# Patient Record
Sex: FEMALE | Race: ASIAN | NOT HISPANIC OR LATINO
[De-identification: names, ages, dates, MRNs, and addresses within clinical notes are randomized per-mention and may not be internally consistent; named-entity substitution may affect disease eponyms.]

---

## 2017-11-06 ENCOUNTER — APPOINTMENT (OUTPATIENT)
Dept: ORTHOPEDIC SURGERY | Facility: CLINIC | Age: 77
End: 2017-11-06
Payer: MEDICARE

## 2017-11-06 VITALS — BODY MASS INDEX: 26.06 KG/M2 | WEIGHT: 138 LBS | HEIGHT: 61 IN

## 2017-11-06 VITALS — TEMPERATURE: 98.1 F | DIASTOLIC BLOOD PRESSURE: 60 MMHG | HEART RATE: 97 BPM | SYSTOLIC BLOOD PRESSURE: 147 MMHG

## 2017-11-06 DIAGNOSIS — Z86.39 PERSONAL HISTORY OF OTHER ENDOCRINE, NUTRITIONAL AND METABOLIC DISEASE: ICD-10-CM

## 2017-11-06 PROCEDURE — 20610 DRAIN/INJ JOINT/BURSA W/O US: CPT | Mod: RT

## 2017-11-06 PROCEDURE — 73562 X-RAY EXAM OF KNEE 3: CPT | Mod: LT

## 2017-11-06 PROCEDURE — 99204 OFFICE O/P NEW MOD 45 MIN: CPT | Mod: 25

## 2017-11-15 ENCOUNTER — FORM ENCOUNTER (OUTPATIENT)
Age: 77
End: 2017-11-15

## 2017-11-16 ENCOUNTER — OUTPATIENT (OUTPATIENT)
Dept: OUTPATIENT SERVICES | Facility: HOSPITAL | Age: 77
LOS: 1 days | End: 2017-11-16
Payer: COMMERCIAL

## 2017-11-16 DIAGNOSIS — R07.9 CHEST PAIN, UNSPECIFIED: ICD-10-CM

## 2017-11-16 PROCEDURE — 93017 CV STRESS TEST TRACING ONLY: CPT

## 2017-11-16 PROCEDURE — 93306 TTE W/DOPPLER COMPLETE: CPT | Mod: 26

## 2017-11-16 PROCEDURE — 78452 HT MUSCLE IMAGE SPECT MULT: CPT

## 2017-11-16 PROCEDURE — 93016 CV STRESS TEST SUPVJ ONLY: CPT

## 2017-11-16 PROCEDURE — 93018 CV STRESS TEST I&R ONLY: CPT

## 2017-11-16 PROCEDURE — 93306 TTE W/DOPPLER COMPLETE: CPT

## 2017-11-16 PROCEDURE — A9500: CPT

## 2017-11-16 PROCEDURE — 78452 HT MUSCLE IMAGE SPECT MULT: CPT | Mod: 26

## 2018-01-03 ENCOUNTER — APPOINTMENT (OUTPATIENT)
Dept: ELECTROPHYSIOLOGY | Facility: CLINIC | Age: 78
End: 2018-01-03
Payer: MEDICARE

## 2018-01-03 ENCOUNTER — APPOINTMENT (OUTPATIENT)
Dept: PULMONOLOGY | Facility: CLINIC | Age: 78
End: 2018-01-03
Payer: MEDICARE

## 2018-01-03 VITALS — SYSTOLIC BLOOD PRESSURE: 139 MMHG | DIASTOLIC BLOOD PRESSURE: 75 MMHG | HEART RATE: 92 BPM | OXYGEN SATURATION: 99 %

## 2018-01-03 VITALS
HEIGHT: 59 IN | SYSTOLIC BLOOD PRESSURE: 138 MMHG | HEART RATE: 101 BPM | DIASTOLIC BLOOD PRESSURE: 60 MMHG | BODY MASS INDEX: 27.72 KG/M2 | WEIGHT: 137.5 LBS | OXYGEN SATURATION: 99 %

## 2018-01-03 DIAGNOSIS — M25.561 PAIN IN RIGHT KNEE: ICD-10-CM

## 2018-01-03 DIAGNOSIS — Z92.3 PERSONAL HISTORY OF IRRADIATION: ICD-10-CM

## 2018-01-03 DIAGNOSIS — M25.562 PAIN IN RIGHT KNEE: ICD-10-CM

## 2018-01-03 DIAGNOSIS — Z87.39 PERSONAL HISTORY OF OTHER DISEASES OF THE MUSCULOSKELETAL SYSTEM AND CONNECTIVE TISSUE: ICD-10-CM

## 2018-01-03 DIAGNOSIS — Z86.39 PERSONAL HISTORY OF OTHER ENDOCRINE, NUTRITIONAL AND METABOLIC DISEASE: ICD-10-CM

## 2018-01-03 DIAGNOSIS — Z87.09 PERSONAL HISTORY OF OTHER DISEASES OF THE RESPIRATORY SYSTEM: ICD-10-CM

## 2018-01-03 DIAGNOSIS — Z85.3 PERSONAL HISTORY OF MALIGNANT NEOPLASM OF BREAST: ICD-10-CM

## 2018-01-03 PROCEDURE — 94729 DIFFUSING CAPACITY: CPT

## 2018-01-03 PROCEDURE — 99215 OFFICE O/P EST HI 40 MIN: CPT

## 2018-01-03 PROCEDURE — 94664 DEMO&/EVAL PT USE INHALER: CPT | Mod: 59

## 2018-01-03 PROCEDURE — 85018 HEMOGLOBIN: CPT | Mod: QW

## 2018-01-03 PROCEDURE — 99205 OFFICE O/P NEW HI 60 MIN: CPT | Mod: 25

## 2018-01-03 PROCEDURE — 94727 GAS DIL/WSHOT DETER LNG VOL: CPT

## 2018-01-03 PROCEDURE — 94060 EVALUATION OF WHEEZING: CPT

## 2018-01-03 RX ORDER — MOMETASONE FUROATE MONOHYDRATE 50 UG/1
50 SPRAY, METERED NASAL
Refills: 0 | Status: DISCONTINUED | COMMUNITY
End: 2018-01-03

## 2018-01-03 RX ORDER — CLARITHROMYCIN 500 MG/1
500 TABLET, FILM COATED ORAL
Refills: 0 | Status: DISCONTINUED | COMMUNITY
End: 2018-01-03

## 2018-02-12 ENCOUNTER — APPOINTMENT (OUTPATIENT)
Dept: ORTHOPEDIC SURGERY | Facility: CLINIC | Age: 78
End: 2018-02-12
Payer: MEDICARE

## 2018-02-12 VITALS
BODY MASS INDEX: 27.62 KG/M2 | WEIGHT: 137 LBS | SYSTOLIC BLOOD PRESSURE: 157 MMHG | HEART RATE: 89 BPM | DIASTOLIC BLOOD PRESSURE: 72 MMHG | HEIGHT: 59 IN | TEMPERATURE: 98.1 F

## 2018-02-12 PROCEDURE — 99214 OFFICE O/P EST MOD 30 MIN: CPT | Mod: 25

## 2018-02-12 PROCEDURE — 20610 DRAIN/INJ JOINT/BURSA W/O US: CPT | Mod: LT

## 2018-02-14 ENCOUNTER — APPOINTMENT (OUTPATIENT)
Dept: ELECTROPHYSIOLOGY | Facility: CLINIC | Age: 78
End: 2018-02-14

## 2018-05-15 ENCOUNTER — RESULT REVIEW (OUTPATIENT)
Age: 78
End: 2018-05-15

## 2018-05-21 ENCOUNTER — APPOINTMENT (OUTPATIENT)
Dept: ORTHOPEDIC SURGERY | Facility: CLINIC | Age: 78
End: 2018-05-21
Payer: MEDICARE

## 2018-05-21 VITALS
TEMPERATURE: 98.7 F | HEART RATE: 96 BPM | SYSTOLIC BLOOD PRESSURE: 171 MMHG | HEIGHT: 59 IN | WEIGHT: 135 LBS | BODY MASS INDEX: 27.21 KG/M2 | DIASTOLIC BLOOD PRESSURE: 69 MMHG

## 2018-05-21 PROCEDURE — 20610 DRAIN/INJ JOINT/BURSA W/O US: CPT | Mod: 50

## 2018-05-21 PROCEDURE — 99214 OFFICE O/P EST MOD 30 MIN: CPT | Mod: 25

## 2018-07-13 ENCOUNTER — APPOINTMENT (OUTPATIENT)
Dept: PULMONOLOGY | Facility: CLINIC | Age: 78
End: 2018-07-13

## 2018-08-13 ENCOUNTER — APPOINTMENT (OUTPATIENT)
Dept: ORTHOPEDIC SURGERY | Facility: CLINIC | Age: 78
End: 2018-08-13
Payer: MEDICARE

## 2018-08-13 PROCEDURE — 99214 OFFICE O/P EST MOD 30 MIN: CPT | Mod: 25

## 2018-08-13 PROCEDURE — 20610 DRAIN/INJ JOINT/BURSA W/O US: CPT | Mod: 50

## 2018-11-16 ENCOUNTER — OTHER (OUTPATIENT)
Age: 78
End: 2018-11-16

## 2018-11-19 ENCOUNTER — APPOINTMENT (OUTPATIENT)
Dept: ORTHOPEDIC SURGERY | Facility: CLINIC | Age: 78
End: 2018-11-19
Payer: MEDICARE

## 2018-11-19 VITALS
DIASTOLIC BLOOD PRESSURE: 69 MMHG | TEMPERATURE: 98.3 F | BODY MASS INDEX: 29.03 KG/M2 | SYSTOLIC BLOOD PRESSURE: 171 MMHG | WEIGHT: 144 LBS | HEIGHT: 59 IN | HEART RATE: 97 BPM

## 2018-11-19 PROCEDURE — 20610 DRAIN/INJ JOINT/BURSA W/O US: CPT | Mod: 50

## 2018-11-19 PROCEDURE — 99214 OFFICE O/P EST MOD 30 MIN: CPT | Mod: 25

## 2019-04-22 ENCOUNTER — APPOINTMENT (OUTPATIENT)
Dept: ORTHOPEDIC SURGERY | Facility: CLINIC | Age: 79
End: 2019-04-22
Payer: MEDICARE

## 2019-04-22 VITALS
HEART RATE: 90 BPM | WEIGHT: 138 LBS | TEMPERATURE: 98.3 F | DIASTOLIC BLOOD PRESSURE: 71 MMHG | HEIGHT: 59 IN | BODY MASS INDEX: 27.82 KG/M2 | SYSTOLIC BLOOD PRESSURE: 173 MMHG

## 2019-04-22 DIAGNOSIS — M17.12 UNILATERAL PRIMARY OSTEOARTHRITIS, LEFT KNEE: ICD-10-CM

## 2019-04-22 DIAGNOSIS — M17.11 UNILATERAL PRIMARY OSTEOARTHRITIS, RIGHT KNEE: ICD-10-CM

## 2019-04-22 PROCEDURE — 99214 OFFICE O/P EST MOD 30 MIN: CPT

## 2019-04-22 NOTE — PHYSICAL EXAM
[LE] : 5/5 motor strength in bilateral lower extremities [ALL] : dorsalis pedis, posterior tibial, femoral, popliteal, and radial 2+ and symmetric bilaterally [Normal] : Oriented to person, place, and time, insight and judgement were intact and the affect was normal [Poor Appearance] : well-appearing [Obese] : not obese [Acute Distress] : not in acute distress [de-identified] : GENERAL APPEARANCE: Well nourished and hydrated, pleasant, alert, and oriented x 3. Appears their stated age. \par HEENT: Normocephalic, extraocular eye motion intact. Nasal septum midline. Oral cavity clear. External auditory canal clear. \par RESPIRATORY: Breath sounds clear and audible in all lobes. No wheezing, No accessory muscle use.\par CARDIOVASCULAR: No apparent abnormalities. No lower leg edema. No varicosities. Pedal pulses are palpable.\par NEUROLOGIC: Sensation is normal, no muscle weakness in the upper or lower extremities.\par DERMATOLOGIC: No apparent skin lesions, moist, warm, no rash.\par SPINE: Cervical spine appears normal and moves freely; thoracic spine appears normal and moves freely; lumbosacral spine appears normal and moves freely, normal, nontender.\par MUSCULOSKELETAL: Hands, wrists, and elbows are normal and move freely, shoulders are normal and move freely. [de-identified] : Bilateral knee exam shows varus alignment, medial joint line tenderness, pain and crepitus with ROM.

## 2019-04-22 NOTE — HISTORY OF PRESENT ILLNESS
[Stable] : stable [Pain Location] : pain [4] : a current pain level of 4/10 [Walking] : walking [Intermit.] : ~He/She~ states the symptoms seem to be intermittent [Standing] : standing [Bending] : worsened by bending [Recumbency] : relieved by recumbency [Rest] : relieved by rest [de-identified] : 79 year old female presents with bilateral knee pain, R>L. She has a known diagnosis of bilateral medial compartment osteoarthritis. Today she notes that her symptoms are stable since her last visit. She continues to have pain and difficulty when ambulating, and is currently using a cane.\par She has responded well to cortisone injections in the past. However, she noted elevated blood sugar following her cortisone injections she received at her last visit. \par She reports a medical history of diabetes. [de-identified] : cortisone

## 2019-04-22 NOTE — DISCUSSION/SUMMARY
[de-identified] : 79 year old female presents with end stage medial compartmental ostearthritis. I discussed operative and non operative treatments with the patient. Based on imaging she is a candidate for a total knee replacements once she has failed and exhausted nonoperative treatment. She would like to proceed with conservative treatment at this time. \par \par In the past she has received bilateral knee cortisone injections, but noted her blood sugar was elevated after injection. She defers repeat cortisone injections today.\par \par I ordered hyaluronic acid injections for her bilateral knees.\par \par She will follow-up when hyaluronic acid injections are available in the office. \par \par \par A knee replacement means resurfacing of all 3 surfaces of the patella, the femur, and tibia with metal and plastic parts. The prosthetic parts are usually cemented into position and well outpatient range of motion from full extension to about 120° of flexion. The postoperative motion, however, is determined by multiple factors, the most important of which is preoperative motion. In general, the better motion preoperatively, the better the motion postoperatively. The operation, pending medical clearance, gently requires hospitalization of 3-4 days for one knee, 5-7 days for bilateral knee replacements. In general, we prefer to perform the procedure under spinal anesthesia with femoral nerve block and occasional single shot sciatic nerve block. We may ask a patient to give 2 units of blood for bilateral total knee arthroplasties, for one knee we institute a normogram which may include administration of preoperative Procrit. The operative procedure takes probably 1-2 hours. The operation requires a straight incision anywhere from 5-7 inches down from the knee. Postoperatively, the patient is positioned in a continuous passive motion machine within the first 24 hours and is walking the day after surgery. The first couple days are very painful and the pain medication will alleviate, but not eliminate the pain. The patient must really push hard to get range of motion. Our goal for having a person go home as that the range of motion is approximately 0-90° of flexion and that they can walk with a walker or cane. A walking aid is to be dispensed once the patient is secure enough. In general there, there is no cast or brace required with routine knee replacement. In the long term, we do not encourage our patients to run for the sake of running, although pending their preoperative status, we often allow patient to play doubles tennis or comparative activities. We also allowed them to do gentle intermediate downhill skiing if they are truly an expert skier. Biking is encouraged as well swimming. The followup periods are usually 3 weeks, 6 weeks, 3 months, and yearly intervals. Potential complications with total knee replacement included anesthetic complications and death, infection around 1%, nerve damage, by which means peroneal nerve palsy, footdrop or flapping foot with ambulation. This is particularly more apt to occur in the patient with a valgus or knock-knee deformity. The incidence can be quite high in this particular patient population. There will be areas of skin numbness, but this is not an untoward effect nor do we consider it a complication. Other potential complications include dislocation of the patella component, usually less than 2%; loosening of the tibial or femoral component is much more infrequent. Most often this occurs with infection or long-term use. Patient of extreme risk including markedly overweight patient's may be more prone to prosthetic wear. Major blood vessel damage is also extremely rare. Directly because of the anatomic proximity of the popliteal artery this could be lacerated with subsequent repair required. Be it unlikely, disruption of the popliteal artery could theoretically result in amputation. Similarly, infection could theoretically result in amputation if one were to grow out of an organism that cannot be controlled with antibiotics. General medical complications include phlebitis, for which we would prophylactically anticoagulate patients, but could still occur, and fatal pulmonary embolus which has been reported. Cardiovascular problems, such as heart attack or ischemia are always a concern with such hemodynamic changes in the blood vascular system. Other General complications are rare, but anything medicine could theoretically happen. I think the patient understands the risk benefit ratio of total knee replacement and will think about whether there like to pursue with an operation or nonoperative treatment program.

## 2019-04-22 NOTE — ADDENDUM
[FreeTextEntry1] : I, Wood Jeffrey, acted solely as a scribe for Dr. Galen Berry on this date 04/22/2019.

## 2019-09-23 ENCOUNTER — EMERGENCY (EMERGENCY)
Facility: HOSPITAL | Age: 79
LOS: 1 days | Discharge: HOME | End: 2019-09-23
Attending: STUDENT IN AN ORGANIZED HEALTH CARE EDUCATION/TRAINING PROGRAM
Payer: COMMERCIAL

## 2019-09-23 VITALS
HEART RATE: 88 BPM | OXYGEN SATURATION: 98 % | SYSTOLIC BLOOD PRESSURE: 177 MMHG | DIASTOLIC BLOOD PRESSURE: 80 MMHG | RESPIRATION RATE: 18 BRPM

## 2019-09-23 VITALS
DIASTOLIC BLOOD PRESSURE: 67 MMHG | HEIGHT: 61 IN | HEART RATE: 91 BPM | OXYGEN SATURATION: 99 % | WEIGHT: 130.07 LBS | RESPIRATION RATE: 20 BRPM | TEMPERATURE: 98 F | SYSTOLIC BLOOD PRESSURE: 185 MMHG

## 2019-09-23 LAB
ALBUMIN SERPL ELPH-MCNC: 3.9 G/DL — SIGNIFICANT CHANGE UP (ref 3.3–5.2)
ALP SERPL-CCNC: 79 U/L — SIGNIFICANT CHANGE UP (ref 40–120)
ALT FLD-CCNC: 16 U/L — SIGNIFICANT CHANGE UP
ANION GAP SERPL CALC-SCNC: 16 MMOL/L — SIGNIFICANT CHANGE UP (ref 5–17)
AST SERPL-CCNC: 18 U/L — SIGNIFICANT CHANGE UP
BASOPHILS # BLD AUTO: 0.05 K/UL — SIGNIFICANT CHANGE UP (ref 0–0.2)
BASOPHILS NFR BLD AUTO: 0.4 % — SIGNIFICANT CHANGE UP (ref 0–2)
BILIRUB SERPL-MCNC: 0.2 MG/DL — LOW (ref 0.4–2)
BUN SERPL-MCNC: 19 MG/DL — SIGNIFICANT CHANGE UP (ref 8–20)
CALCIUM SERPL-MCNC: 9 MG/DL — SIGNIFICANT CHANGE UP (ref 8.6–10.2)
CHLORIDE SERPL-SCNC: 92 MMOL/L — LOW (ref 98–107)
CO2 SERPL-SCNC: 22 MMOL/L — SIGNIFICANT CHANGE UP (ref 22–29)
CREAT SERPL-MCNC: 1.09 MG/DL — SIGNIFICANT CHANGE UP (ref 0.5–1.3)
EOSINOPHIL # BLD AUTO: 0.12 K/UL — SIGNIFICANT CHANGE UP (ref 0–0.5)
EOSINOPHIL NFR BLD AUTO: 1 % — SIGNIFICANT CHANGE UP (ref 0–6)
GLUCOSE SERPL-MCNC: 169 MG/DL — HIGH (ref 70–115)
HCT VFR BLD CALC: 35.5 % — SIGNIFICANT CHANGE UP (ref 34.5–45)
HGB BLD-MCNC: 11 G/DL — LOW (ref 11.5–15.5)
IMM GRANULOCYTES NFR BLD AUTO: 0.6 % — SIGNIFICANT CHANGE UP (ref 0–1.5)
LYMPHOCYTES # BLD AUTO: 1.38 K/UL — SIGNIFICANT CHANGE UP (ref 1–3.3)
LYMPHOCYTES # BLD AUTO: 11 % — LOW (ref 13–44)
MCHC RBC-ENTMCNC: 20.4 PG — LOW (ref 27–34)
MCHC RBC-ENTMCNC: 31 GM/DL — LOW (ref 32–36)
MCV RBC AUTO: 65.9 FL — LOW (ref 80–100)
MONOCYTES # BLD AUTO: 0.73 K/UL — SIGNIFICANT CHANGE UP (ref 0–0.9)
MONOCYTES NFR BLD AUTO: 5.8 % — SIGNIFICANT CHANGE UP (ref 2–14)
NEUTROPHILS # BLD AUTO: 10.22 K/UL — HIGH (ref 1.8–7.4)
NEUTROPHILS NFR BLD AUTO: 81.2 % — HIGH (ref 43–77)
PLATELET # BLD AUTO: 283 K/UL — SIGNIFICANT CHANGE UP (ref 150–400)
POTASSIUM SERPL-MCNC: 4.2 MMOL/L — SIGNIFICANT CHANGE UP (ref 3.5–5.3)
POTASSIUM SERPL-SCNC: 4.2 MMOL/L — SIGNIFICANT CHANGE UP (ref 3.5–5.3)
PROT SERPL-MCNC: 7.3 G/DL — SIGNIFICANT CHANGE UP (ref 6.6–8.7)
RBC # BLD: 5.39 M/UL — HIGH (ref 3.8–5.2)
RBC # FLD: 15.6 % — HIGH (ref 10.3–14.5)
SODIUM SERPL-SCNC: 130 MMOL/L — LOW (ref 135–145)
WBC # BLD: 12.58 K/UL — HIGH (ref 3.8–10.5)
WBC # FLD AUTO: 12.58 K/UL — HIGH (ref 3.8–10.5)

## 2019-09-23 PROCEDURE — 99283 EMERGENCY DEPT VISIT LOW MDM: CPT

## 2019-09-23 PROCEDURE — 36415 COLL VENOUS BLD VENIPUNCTURE: CPT

## 2019-09-23 PROCEDURE — 99284 EMERGENCY DEPT VISIT MOD MDM: CPT

## 2019-09-23 PROCEDURE — 80053 COMPREHEN METABOLIC PANEL: CPT

## 2019-09-23 PROCEDURE — 85027 COMPLETE CBC AUTOMATED: CPT

## 2019-09-23 PROCEDURE — 82962 GLUCOSE BLOOD TEST: CPT

## 2019-09-23 NOTE — ED PROVIDER NOTE - PATIENT PORTAL LINK FT
You can access the FollowMyHealth Patient Portal offered by [facility] by registering at the following website: [email or web address]. By joining [employer]’s FollowMyHealth portal, you will also be able to view your health information using other applications (apps) compatible with our system.

## 2019-09-23 NOTE — ED ADULT NURSE NOTE - OBJECTIVE STATEMENT
pt with reports of low blood sugar, sent from PMD office for BS in 30s. pt AOX3, given PO glucose en route by EMS. BS stable on arrival. no complaints, given meal tray on arrival to ED.

## 2019-09-23 NOTE — ED PROVIDER NOTE - CLINICAL SUMMARY MEDICAL DECISION MAKING FREE TEXT BOX
labs reviewed. Pt ate food and is now holding his glucose steady.  will d/c to home with instructions to f/up with pcp in 1-2 days. instructed to return for any new/concerning symptoms.

## 2019-09-23 NOTE — ED PROVIDER NOTE - OBJECTIVE STATEMENT
Pt is a 78 yo F brought in for hypoglycemia. Pt states that she was at her doctor's office when she started to feel shaky and weak and became sweaty. Pt's glucose was checked and it was 37.  Pt was given oral glucose and improved. Pt with no complaints currently. Pt states that she took her insulin today but did not eat as much as she usually does afterwards. no n/v. no cp. no sob. no abd pain.

## 2020-02-24 ENCOUNTER — EMERGENCY (EMERGENCY)
Facility: HOSPITAL | Age: 80
LOS: 1 days | Discharge: HOME | End: 2020-02-24
Attending: STUDENT IN AN ORGANIZED HEALTH CARE EDUCATION/TRAINING PROGRAM
Payer: MEDICARE

## 2020-02-24 VITALS
SYSTOLIC BLOOD PRESSURE: 211 MMHG | DIASTOLIC BLOOD PRESSURE: 67 MMHG | WEIGHT: 115.96 LBS | HEART RATE: 104 BPM | TEMPERATURE: 98 F | RESPIRATION RATE: 18 BRPM | HEIGHT: 60 IN | OXYGEN SATURATION: 97 %

## 2020-02-24 PROCEDURE — 70450 CT HEAD/BRAIN W/O DYE: CPT | Mod: 26

## 2020-02-24 PROCEDURE — 99053 MED SERV 10PM-8AM 24 HR FAC: CPT

## 2020-02-24 PROCEDURE — 99285 EMERGENCY DEPT VISIT HI MDM: CPT

## 2020-02-24 NOTE — ED ADULT NURSE NOTE - CHIEF COMPLAINT QUOTE
"My BP is high and im very dizzy." +headache and dizziness that started at 1930. pt states she is compliant with her home HTN medications. MD Golden called to triage to assess pt @9023, priority head ct.

## 2020-02-24 NOTE — ED ADULT NURSE NOTE - NSIMPLEMENTINTERV_GEN_ALL_ED
Implemented All Fall Risk Interventions:  [place] to call system. Call bell, personal items and telephone within reach. Instruct patient to call for assistance. Room bathroom lighting operational. Non-slip footwear when patient is off stretcher. Physically safe environment: no spills, clutter or unnecessary equipment. Stretcher in lowest position, wheels locked, appropriate side rails in place. Provide visual cue, wrist band, yellow gown, etc. Monitor gait and stability. Monitor for mental status changes and reorient to person, place, and time. Review medications for side effects contributing to fall risk. Reinforce activity limits and safety measures with patient and family.

## 2020-02-24 NOTE — ED ADULT NURSE NOTE - OBJECTIVE STATEMENT
Pt received with reports of episode of lightheadedness/dizziness, headache, and HTN. Pt denies any pain at this time. Pt received awake, alert, oriented x4, denies lightheadedness/dizziness at this time. Respirations appearing even, unlabored, pt denies SOB or chest pain at this time. Pt denies N/V/D. Pt with skin warm, dry, appropriate for race. Peripheral pulses palpable, brisk capillary refill, no edema observed. Pt ambulatory with cane at baseline, denies recent falls. 20g PIV in place in L AC, labs drawn and sent per orders. VS documented per flow. Pt with no apparent acute distress observed at this time. Safety maintained. Will continue to monitor.

## 2020-02-24 NOTE — ED ADULT NURSE NOTE - CAS ELECT INFOMATION PROVIDED
DC instructions/DC instructions reviewed with patient, verbalizing understanding. VS documented per flow prior to discharge. Pt with no apparent acute distress observed at time of DC. Pt ambulatory to exit. Safety maintained in ED.

## 2020-02-24 NOTE — ED ADULT TRIAGE NOTE - CHIEF COMPLAINT QUOTE
"My BP is high and im very dizzy." +headache and dizziness that started at 1930. pt states she is compliant with her home HTN medications. MD Golden called to triage to assess pt @0737, priority head ct.

## 2020-02-25 VITALS
DIASTOLIC BLOOD PRESSURE: 75 MMHG | TEMPERATURE: 98 F | OXYGEN SATURATION: 98 % | HEART RATE: 85 BPM | SYSTOLIC BLOOD PRESSURE: 180 MMHG | RESPIRATION RATE: 20 BRPM

## 2020-02-25 LAB
ALBUMIN SERPL ELPH-MCNC: 4 G/DL — SIGNIFICANT CHANGE UP (ref 3.3–5.2)
ALP SERPL-CCNC: 89 U/L — SIGNIFICANT CHANGE UP (ref 40–120)
ALT FLD-CCNC: 11 U/L — SIGNIFICANT CHANGE UP
ANION GAP SERPL CALC-SCNC: 15 MMOL/L — SIGNIFICANT CHANGE UP (ref 5–17)
ANISOCYTOSIS BLD QL: SLIGHT — SIGNIFICANT CHANGE UP
APTT BLD: 32.3 SEC — SIGNIFICANT CHANGE UP (ref 27.5–36.3)
AST SERPL-CCNC: 13 U/L — SIGNIFICANT CHANGE UP
BASOPHILS # BLD AUTO: 0.16 K/UL — SIGNIFICANT CHANGE UP (ref 0–0.2)
BASOPHILS NFR BLD AUTO: 1.7 % — SIGNIFICANT CHANGE UP (ref 0–2)
BILIRUB SERPL-MCNC: 0.2 MG/DL — LOW (ref 0.4–2)
BUN SERPL-MCNC: 18 MG/DL — SIGNIFICANT CHANGE UP (ref 8–20)
CALCIUM SERPL-MCNC: 9.7 MG/DL — SIGNIFICANT CHANGE UP (ref 8.6–10.2)
CHLORIDE SERPL-SCNC: 93 MMOL/L — LOW (ref 98–107)
CO2 SERPL-SCNC: 26 MMOL/L — SIGNIFICANT CHANGE UP (ref 22–29)
CREAT SERPL-MCNC: 0.94 MG/DL — SIGNIFICANT CHANGE UP (ref 0.5–1.3)
DACRYOCYTES BLD QL SMEAR: SLIGHT — SIGNIFICANT CHANGE UP
ELLIPTOCYTES BLD QL SMEAR: SLIGHT — SIGNIFICANT CHANGE UP
EOSINOPHIL # BLD AUTO: 0.16 K/UL — SIGNIFICANT CHANGE UP (ref 0–0.5)
EOSINOPHIL NFR BLD AUTO: 1.7 % — SIGNIFICANT CHANGE UP (ref 0–6)
GIANT PLATELETS BLD QL SMEAR: PRESENT — SIGNIFICANT CHANGE UP
GLUCOSE SERPL-MCNC: 311 MG/DL — HIGH (ref 70–99)
HCT VFR BLD CALC: 40.1 % — SIGNIFICANT CHANGE UP (ref 34.5–45)
HGB BLD-MCNC: 11.6 G/DL — SIGNIFICANT CHANGE UP (ref 11.5–15.5)
HYPOCHROMIA BLD QL: SLIGHT — SIGNIFICANT CHANGE UP
INR BLD: 0.94 RATIO — SIGNIFICANT CHANGE UP (ref 0.88–1.16)
LIDOCAIN IGE QN: 16 U/L — LOW (ref 22–51)
LYMPHOCYTES # BLD AUTO: 1.99 K/UL — SIGNIFICANT CHANGE UP (ref 1–3.3)
LYMPHOCYTES # BLD AUTO: 21.6 % — SIGNIFICANT CHANGE UP (ref 13–44)
MACROCYTES BLD QL: SLIGHT — SIGNIFICANT CHANGE UP
MAGNESIUM SERPL-MCNC: 1.9 MG/DL — SIGNIFICANT CHANGE UP (ref 1.6–2.6)
MANUAL SMEAR VERIFICATION: SIGNIFICANT CHANGE UP
MCHC RBC-ENTMCNC: 19.5 PG — LOW (ref 27–34)
MCHC RBC-ENTMCNC: 28.9 GM/DL — LOW (ref 32–36)
MCV RBC AUTO: 67.5 FL — LOW (ref 80–100)
MICROCYTES BLD QL: SLIGHT — SIGNIFICANT CHANGE UP
MONOCYTES # BLD AUTO: 0.72 K/UL — SIGNIFICANT CHANGE UP (ref 0–0.9)
MONOCYTES NFR BLD AUTO: 7.8 % — SIGNIFICANT CHANGE UP (ref 2–14)
NEUTROPHILS # BLD AUTO: 6.19 K/UL — SIGNIFICANT CHANGE UP (ref 1.8–7.4)
NEUTROPHILS NFR BLD AUTO: 67.2 % — SIGNIFICANT CHANGE UP (ref 43–77)
NT-PROBNP SERPL-SCNC: 287 PG/ML — SIGNIFICANT CHANGE UP (ref 0–300)
OVALOCYTES BLD QL SMEAR: SLIGHT — SIGNIFICANT CHANGE UP
PLAT MORPH BLD: NORMAL — SIGNIFICANT CHANGE UP
PLATELET # BLD AUTO: 252 K/UL — SIGNIFICANT CHANGE UP (ref 150–400)
POIKILOCYTOSIS BLD QL AUTO: SLIGHT — SIGNIFICANT CHANGE UP
POLYCHROMASIA BLD QL SMEAR: SLIGHT — SIGNIFICANT CHANGE UP
POTASSIUM SERPL-MCNC: 3.9 MMOL/L — SIGNIFICANT CHANGE UP (ref 3.5–5.3)
POTASSIUM SERPL-SCNC: 3.9 MMOL/L — SIGNIFICANT CHANGE UP (ref 3.5–5.3)
PROT SERPL-MCNC: 7.4 G/DL — SIGNIFICANT CHANGE UP (ref 6.6–8.7)
PROTHROM AB SERPL-ACNC: 10.6 SEC — SIGNIFICANT CHANGE UP (ref 10–12.9)
RBC # BLD: 5.94 M/UL — HIGH (ref 3.8–5.2)
RBC # FLD: 16.2 % — HIGH (ref 10.3–14.5)
RBC BLD AUTO: ABNORMAL
SCHISTOCYTES BLD QL AUTO: SLIGHT — SIGNIFICANT CHANGE UP
SODIUM SERPL-SCNC: 134 MMOL/L — LOW (ref 135–145)
TROPONIN T SERPL-MCNC: <0.01 NG/ML — SIGNIFICANT CHANGE UP (ref 0–0.06)
WBC # BLD: 9.21 K/UL — SIGNIFICANT CHANGE UP (ref 3.8–10.5)
WBC # FLD AUTO: 9.21 K/UL — SIGNIFICANT CHANGE UP (ref 3.8–10.5)

## 2020-02-25 PROCEDURE — 93005 ELECTROCARDIOGRAM TRACING: CPT

## 2020-02-25 PROCEDURE — 83880 ASSAY OF NATRIURETIC PEPTIDE: CPT

## 2020-02-25 PROCEDURE — 71045 X-RAY EXAM CHEST 1 VIEW: CPT | Mod: 26

## 2020-02-25 PROCEDURE — 71045 X-RAY EXAM CHEST 1 VIEW: CPT

## 2020-02-25 PROCEDURE — 70450 CT HEAD/BRAIN W/O DYE: CPT

## 2020-02-25 PROCEDURE — 80053 COMPREHEN METABOLIC PANEL: CPT

## 2020-02-25 PROCEDURE — 84484 ASSAY OF TROPONIN QUANT: CPT

## 2020-02-25 PROCEDURE — 85027 COMPLETE CBC AUTOMATED: CPT

## 2020-02-25 PROCEDURE — 83735 ASSAY OF MAGNESIUM: CPT

## 2020-02-25 PROCEDURE — 93010 ELECTROCARDIOGRAM REPORT: CPT

## 2020-02-25 PROCEDURE — 83690 ASSAY OF LIPASE: CPT

## 2020-02-25 PROCEDURE — 36415 COLL VENOUS BLD VENIPUNCTURE: CPT

## 2020-02-25 PROCEDURE — 99284 EMERGENCY DEPT VISIT MOD MDM: CPT | Mod: 25

## 2020-02-25 PROCEDURE — 85610 PROTHROMBIN TIME: CPT

## 2020-02-25 PROCEDURE — 85730 THROMBOPLASTIN TIME PARTIAL: CPT

## 2020-02-25 RX ORDER — ACETAMINOPHEN 500 MG
650 TABLET ORAL ONCE
Refills: 0 | Status: COMPLETED | OUTPATIENT
Start: 2020-02-25 | End: 2020-02-25

## 2020-02-25 RX ADMIN — Medication 650 MILLIGRAM(S): at 01:54

## 2020-02-25 NOTE — ED PROVIDER NOTE - OBJECTIVE STATEMENT
79yo female with pmh of DM, HTN, HLD presents with ha. Pt states 3 hours ago with gradual onset of mild ha which has persisted. Did not take anything for ha. Pt states she's unsteady on her feet which is why she uses a cane this has been for years, not new contrary to triage note. Pt denies fevers/chills, head trauma, loc, focal neuro deficits, cp/sob/palp, cough, abd pain/n/v/d, urinary symptoms, recent travel and sick contacts.

## 2020-02-25 NOTE — ED PROVIDER NOTE - NS ED ROS FT
Review of Systems:  	•	CONSTITUTIONAL - no fever, no diaphoresis, no weight change  	•	SKIN - no rash  	•	HEMATOLOGIC - no bleeding, no bruising  	•	EYES - no eye pain, no blurred vision  	•	ENT - no change in hearing, no pain  	•	RESPIRATORY - no shortness of breath, no cough  	•	CARDIAC - no chest pain, no palpitations  	•	GI - no abd pain, no nausea, no vomiting, no diarrhea, no constipation, no bleeding  	•	GENITO-URINARY - no vaginal bleeding, no discharge, no dysuria; no hematuria  	•	ENDO - no polydypsia, no polyurea, no heat/no cold intolerance  	•	MUSCULOSKELETAL - no joint paint, no swelling, no redness  	•	NEUROLOGIC - no weakness, +headache, no anesthesia, no paresthesias

## 2020-02-25 NOTE — ED PROVIDER NOTE - PHYSICAL EXAMINATION
Const: Awake, alert and oriented. In no acute distress. Well appearing.  HEENT: NC/AT. Moist mucous membranes.  Eyes: No scleral icterus. EOMI.  Neck:. Soft and supple. Full ROM without pain.  Cardiac: Regular rate and regular rhythm. +S1/S2. Peripheral pulses 2+ and symmetric. No LE edema.  Resp: Speaking in full sentences. No evidence of respiratory distress. No wheezes, rales or rhonchi.  Abd: Soft, non-tender, non-distended. Normal bowel sounds in all 4 quadrants. No guarding or rebound.  Back: Spine midline and non-tender. No CVAT.  Skin: No rashes, abrasions or lacerations.  Lymph: No cervical lymphadenopathy.  Neuro: Awake, alert & oriented x 3. Moves all extremities symmetrically. follows commands, motor elidia upper and lower ext 5/5, sensory symm and intact CN 2-12 grossly intact, no ataxia, no nystagmus, no dysmetria, no ddk, symm elidia, no pronator drift

## 2020-02-25 NOTE — ED PROVIDER NOTE - PATIENT PORTAL LINK FT
You can access the FollowMyHealth Patient Portal offered by [facility] by registering at the following website: [email or web address]. By joining [email or web address]’s FollowMyHealth portal, you will also be able to view your health information using other applications (apps) compatible with our system.

## 2020-02-25 NOTE — ED PROVIDER NOTE - NSFOLLOWUPINSTRUCTIONS_ED_ALL_ED_FT
Hypertension    Hypertension, commonly called high blood pressure, is when the force of blood pumping through your arteries is too strong. Hypertension forces your heart to work harder to pump blood. Your arteries may become narrow or stiff. Having untreated or uncontrolled hypertension for a long period of time can cause heart attack, stroke, kidney disease, and other problems. If started on a medication, take exactly as prescribed by your health care professional. Maintain a healthy lifestyle and follow up with your primary care physician.    SEEK IMMEDIATE MEDICAL CARE IF YOU HAVE ANY OF THE FOLLOWING SYMPTOMS: severe headache, confusion, chest pain, abdominal pain, vomiting, or shortness of breath.    Headache    A headache is pain or discomfort felt around the head or neck area. The specific cause of a headache may not be found as there are many types including tension headaches, migraine headaches, and cluster headaches. Watch your condition for any changes. Things you can do to manage your pain include taking over the counter and prescription medications as instructed by your health care provider, lying down in a dark quiet room, limiting stress, getting regular sleep, and refraining from alcohol and tobacco products.    SEEK IMMEDIATE MEDICAL CARE IF YOU HAVE ANY OF THE FOLLOWING SYMPTOMS: fever, vomiting, stiff neck, loss of vision, problems with speech, muscle weakness, loss of balance, trouble walking, passing out, or confusion.    Please follow up with your primary care provider within 2 days.  Please return to ED for any concerning symptoms.

## 2020-02-25 NOTE — ED PROVIDER NOTE - CLINICAL SUMMARY MEDICAL DECISION MAKING FREE TEXT BOX
79yo female with pmh of DM, HTN, HLD presents with ha, pt with HTN which started to improve with no intervention due to symptoms and BP concern for ICH, CTH negative, pt neuro intact, no evidence of end organ damage on labs, pt to follow up with pmd, continue meds

## 2020-10-28 ENCOUNTER — APPOINTMENT (OUTPATIENT)
Dept: PULMONOLOGY | Facility: CLINIC | Age: 80
End: 2020-10-28
Payer: MEDICARE

## 2020-10-28 VITALS
SYSTOLIC BLOOD PRESSURE: 168 MMHG | HEART RATE: 101 BPM | DIASTOLIC BLOOD PRESSURE: 80 MMHG | HEIGHT: 59 IN | WEIGHT: 138 LBS | RESPIRATION RATE: 16 BRPM | BODY MASS INDEX: 27.82 KG/M2 | OXYGEN SATURATION: 97 %

## 2020-10-28 DIAGNOSIS — Z20.828 CONTACT WITH AND (SUSPECTED) EXPOSURE TO OTHER VIRAL COMMUNICABLE DISEASES: ICD-10-CM

## 2020-10-28 PROCEDURE — 99215 OFFICE O/P EST HI 40 MIN: CPT | Mod: 25

## 2020-10-28 PROCEDURE — 99072 ADDL SUPL MATRL&STAF TM PHE: CPT

## 2020-10-28 RX ORDER — PRAMIPEXOLE DIHYDROCHLORIDE 0.12 MG/1
0.12 TABLET ORAL
Refills: 0 | Status: DISCONTINUED | COMMUNITY
End: 2020-10-28

## 2020-10-28 RX ORDER — BUDESONIDE 0.25 MG/2ML
0.25 SUSPENSION RESPIRATORY (INHALATION)
Refills: 0 | Status: DISCONTINUED | COMMUNITY
End: 2020-10-28

## 2020-10-28 RX ORDER — TAMOXIFEN CITRATE 20 MG/1
TABLET, FILM COATED ORAL
Refills: 0 | Status: DISCONTINUED | COMMUNITY
End: 2020-10-28

## 2020-10-28 NOTE — REASON FOR VISIT
[Follow-Up] : a follow-up visit [Bronchiectasis] : bronchiectasis [Shortness of Breath] : shortness of breath [Wheezing] : wheezing

## 2020-10-28 NOTE — REVIEW OF SYSTEMS
Detail Level: Zone Detail Level: Detailed [Cough] : cough [Sputum] : sputum [Dyspnea] : dyspnea [Wheezing] : wheezing [Arthralgias] : arthralgias [Diabetes] : diabetes [Negative] : Psychiatric

## 2020-10-28 NOTE — PHYSICAL EXAM
[No Acute Distress] : no acute distress [Normal Oropharynx] : normal oropharynx [Normal Appearance] : normal appearance [No Neck Mass] : no neck mass [Normal Rate/Rhythm] : normal rate/rhythm [Normal S1, S2] : normal s1, s2 [No Murmurs] : no murmurs [No Resp Distress] : no resp distress [No Acc Muscle Use] : no acc muscle use [Normal Rhythm and Effort] : normal rhythm and effort [Wheeze] : wheeze [Rhonchi] : rhonchi [No Abnormalities] : no abnormalities [Benign] : benign [No Clubbing] : no clubbing [No Cyanosis] : no cyanosis [No Edema] : no edema [FROM] : FROM [Normal Color/ Pigmentation] : normal color/ pigmentation [No Focal Deficits] : no focal deficits [Oriented x3] : oriented x3 [Normal Affect] : normal affect [TextBox_[id number]] : José Luisping

## 2020-10-28 NOTE — CONSULT LETTER
[Dear  ___] : Dear  [unfilled], [Consult Letter:] : I had the pleasure of evaluating your patient, [unfilled]. [Please see my note below.] : Please see my note below. [Consult Closing:] : Thank you very much for allowing me to participate in the care of this patient.  If you have any questions, please do not hesitate to contact me. [Sincerely,] : Sincerely, [FreeTextEntry3] : Gian Hale MD\par

## 2020-11-20 ENCOUNTER — APPOINTMENT (OUTPATIENT)
Dept: DISASTER EMERGENCY | Facility: CLINIC | Age: 80
End: 2020-11-20

## 2020-11-24 ENCOUNTER — APPOINTMENT (OUTPATIENT)
Dept: PULMONOLOGY | Facility: CLINIC | Age: 80
End: 2020-11-24

## 2021-04-11 ENCOUNTER — INPATIENT (INPATIENT)
Facility: HOSPITAL | Age: 81
LOS: 12 days | Discharge: HOME | DRG: 202 | End: 2021-04-24
Attending: FAMILY MEDICINE | Admitting: HOSPITALIST
Payer: MEDICARE

## 2021-04-11 VITALS
WEIGHT: 147.93 LBS | TEMPERATURE: 98 F | DIASTOLIC BLOOD PRESSURE: 44 MMHG | HEART RATE: 105 BPM | OXYGEN SATURATION: 94 % | SYSTOLIC BLOOD PRESSURE: 199 MMHG | RESPIRATION RATE: 24 BRPM | HEIGHT: 60 IN

## 2021-04-11 DIAGNOSIS — I48.91 UNSPECIFIED ATRIAL FIBRILLATION: ICD-10-CM

## 2021-04-11 DIAGNOSIS — R09.89 OTHER SPECIFIED SYMPTOMS AND SIGNS INVOLVING THE CIRCULATORY AND RESPIRATORY SYSTEMS: ICD-10-CM

## 2021-04-11 LAB
ALBUMIN SERPL ELPH-MCNC: 3.4 G/DL — SIGNIFICANT CHANGE UP (ref 3.3–5.2)
ALP SERPL-CCNC: 105 U/L — SIGNIFICANT CHANGE UP (ref 40–120)
ALT FLD-CCNC: 19 U/L — SIGNIFICANT CHANGE UP
ANION GAP SERPL CALC-SCNC: 10 MMOL/L — SIGNIFICANT CHANGE UP (ref 5–17)
ANISOCYTOSIS BLD QL: SIGNIFICANT CHANGE UP
AST SERPL-CCNC: 20 U/L — SIGNIFICANT CHANGE UP
BASOPHILS # BLD AUTO: 0.04 K/UL — SIGNIFICANT CHANGE UP (ref 0–0.2)
BASOPHILS NFR BLD AUTO: 0.4 % — SIGNIFICANT CHANGE UP (ref 0–2)
BILIRUB SERPL-MCNC: 0.2 MG/DL — LOW (ref 0.4–2)
BUN SERPL-MCNC: 22 MG/DL — HIGH (ref 8–20)
CALCIUM SERPL-MCNC: 9 MG/DL — SIGNIFICANT CHANGE UP (ref 8.6–10.2)
CHLORIDE SERPL-SCNC: 104 MMOL/L — SIGNIFICANT CHANGE UP (ref 98–107)
CO2 SERPL-SCNC: 27 MMOL/L — SIGNIFICANT CHANGE UP (ref 22–29)
CREAT SERPL-MCNC: 1.97 MG/DL — HIGH (ref 0.5–1.3)
D DIMER BLD IA.RAPID-MCNC: 544 NG/ML DDU — HIGH
DACRYOCYTES BLD QL SMEAR: SLIGHT — SIGNIFICANT CHANGE UP
ELLIPTOCYTES BLD QL SMEAR: SLIGHT — SIGNIFICANT CHANGE UP
EOSINOPHIL # BLD AUTO: 0.06 K/UL — SIGNIFICANT CHANGE UP (ref 0–0.5)
EOSINOPHIL NFR BLD AUTO: 0.6 % — SIGNIFICANT CHANGE UP (ref 0–6)
GLUCOSE BLDC GLUCOMTR-MCNC: 217 MG/DL — HIGH (ref 70–99)
GLUCOSE SERPL-MCNC: 174 MG/DL — HIGH (ref 70–99)
HCT VFR BLD CALC: 31.8 % — LOW (ref 34.5–45)
HGB BLD-MCNC: 9.3 G/DL — LOW (ref 11.5–15.5)
HYPOCHROMIA BLD QL: SIGNIFICANT CHANGE UP
IMM GRANULOCYTES NFR BLD AUTO: 0.6 % — SIGNIFICANT CHANGE UP (ref 0–1.5)
LYMPHOCYTES # BLD AUTO: 0.7 K/UL — LOW (ref 1–3.3)
LYMPHOCYTES # BLD AUTO: 6.8 % — LOW (ref 13–44)
MANUAL SMEAR VERIFICATION: SIGNIFICANT CHANGE UP
MCHC RBC-ENTMCNC: 19.3 PG — LOW (ref 27–34)
MCHC RBC-ENTMCNC: 29.2 GM/DL — LOW (ref 32–36)
MCV RBC AUTO: 66 FL — LOW (ref 80–100)
MICROCYTES BLD QL: SIGNIFICANT CHANGE UP
MONOCYTES # BLD AUTO: 0.54 K/UL — SIGNIFICANT CHANGE UP (ref 0–0.9)
MONOCYTES NFR BLD AUTO: 5.2 % — SIGNIFICANT CHANGE UP (ref 2–14)
NEUTROPHILS # BLD AUTO: 8.95 K/UL — HIGH (ref 1.8–7.4)
NEUTROPHILS NFR BLD AUTO: 86.4 % — HIGH (ref 43–77)
NT-PROBNP SERPL-SCNC: 1789 PG/ML — HIGH (ref 0–300)
PLAT MORPH BLD: NORMAL — SIGNIFICANT CHANGE UP
PLATELET # BLD AUTO: 270 K/UL — SIGNIFICANT CHANGE UP (ref 150–400)
POIKILOCYTOSIS BLD QL AUTO: SLIGHT — SIGNIFICANT CHANGE UP
POLYCHROMASIA BLD QL SMEAR: SLIGHT — SIGNIFICANT CHANGE UP
POTASSIUM SERPL-MCNC: 4.4 MMOL/L — SIGNIFICANT CHANGE UP (ref 3.5–5.3)
POTASSIUM SERPL-SCNC: 4.4 MMOL/L — SIGNIFICANT CHANGE UP (ref 3.5–5.3)
PROT SERPL-MCNC: 6.7 G/DL — SIGNIFICANT CHANGE UP (ref 6.6–8.7)
RBC # BLD: 4.82 M/UL — SIGNIFICANT CHANGE UP (ref 3.8–5.2)
RBC # FLD: 17.8 % — HIGH (ref 10.3–14.5)
RBC BLD AUTO: ABNORMAL
SARS-COV-2 RNA SPEC QL NAA+PROBE: SIGNIFICANT CHANGE UP
SCHISTOCYTES BLD QL AUTO: SLIGHT — SIGNIFICANT CHANGE UP
SODIUM SERPL-SCNC: 141 MMOL/L — SIGNIFICANT CHANGE UP (ref 135–145)
TROPONIN T SERPL-MCNC: 0.03 NG/ML — SIGNIFICANT CHANGE UP (ref 0–0.06)
WBC # BLD: 10.35 K/UL — SIGNIFICANT CHANGE UP (ref 3.8–10.5)
WBC # FLD AUTO: 10.35 K/UL — SIGNIFICANT CHANGE UP (ref 3.8–10.5)

## 2021-04-11 PROCEDURE — 93010 ELECTROCARDIOGRAM REPORT: CPT

## 2021-04-11 PROCEDURE — 99223 1ST HOSP IP/OBS HIGH 75: CPT

## 2021-04-11 PROCEDURE — 99291 CRITICAL CARE FIRST HOUR: CPT

## 2021-04-11 PROCEDURE — 71045 X-RAY EXAM CHEST 1 VIEW: CPT | Mod: 26

## 2021-04-11 RX ORDER — INSULIN LISPRO 100/ML
VIAL (ML) SUBCUTANEOUS
Refills: 0 | Status: DISCONTINUED | OUTPATIENT
Start: 2021-04-11 | End: 2021-04-24

## 2021-04-11 RX ORDER — SODIUM CHLORIDE 9 MG/ML
1000 INJECTION, SOLUTION INTRAVENOUS
Refills: 0 | Status: DISCONTINUED | OUTPATIENT
Start: 2021-04-11 | End: 2021-04-24

## 2021-04-11 RX ORDER — HEPARIN SODIUM 5000 [USP'U]/ML
INJECTION INTRAVENOUS; SUBCUTANEOUS
Qty: 25000 | Refills: 0 | Status: DISCONTINUED | OUTPATIENT
Start: 2021-04-11 | End: 2021-04-11

## 2021-04-11 RX ORDER — DILTIAZEM HCL 120 MG
10 CAPSULE, EXT RELEASE 24 HR ORAL ONCE
Refills: 0 | Status: COMPLETED | OUTPATIENT
Start: 2021-04-11 | End: 2021-04-11

## 2021-04-11 RX ORDER — HYDRALAZINE HCL 50 MG
50 TABLET ORAL EVERY 12 HOURS
Refills: 0 | Status: DISCONTINUED | OUTPATIENT
Start: 2021-04-11 | End: 2021-04-24

## 2021-04-11 RX ORDER — DILTIAZEM HCL 120 MG
30 CAPSULE, EXT RELEASE 24 HR ORAL ONCE
Refills: 0 | Status: COMPLETED | OUTPATIENT
Start: 2021-04-11 | End: 2021-04-11

## 2021-04-11 RX ORDER — GLUCAGON INJECTION, SOLUTION 0.5 MG/.1ML
1 INJECTION, SOLUTION SUBCUTANEOUS ONCE
Refills: 0 | Status: DISCONTINUED | OUTPATIENT
Start: 2021-04-11 | End: 2021-04-24

## 2021-04-11 RX ORDER — HEPARIN SODIUM 5000 [USP'U]/ML
5500 INJECTION INTRAVENOUS; SUBCUTANEOUS ONCE
Refills: 0 | Status: COMPLETED | OUTPATIENT
Start: 2021-04-11 | End: 2021-04-11

## 2021-04-11 RX ORDER — PANTOPRAZOLE SODIUM 20 MG/1
40 TABLET, DELAYED RELEASE ORAL
Refills: 0 | Status: DISCONTINUED | OUTPATIENT
Start: 2021-04-11 | End: 2021-04-24

## 2021-04-11 RX ORDER — IPRATROPIUM/ALBUTEROL SULFATE 18-103MCG
3 AEROSOL WITH ADAPTER (GRAM) INHALATION ONCE
Refills: 0 | Status: COMPLETED | OUTPATIENT
Start: 2021-04-11 | End: 2021-04-11

## 2021-04-11 RX ORDER — DEXTROSE 50 % IN WATER 50 %
25 SYRINGE (ML) INTRAVENOUS ONCE
Refills: 0 | Status: DISCONTINUED | OUTPATIENT
Start: 2021-04-11 | End: 2021-04-24

## 2021-04-11 RX ORDER — IPRATROPIUM/ALBUTEROL SULFATE 18-103MCG
3 AEROSOL WITH ADAPTER (GRAM) INHALATION
Refills: 0 | Status: COMPLETED | OUTPATIENT
Start: 2021-04-11 | End: 2021-04-11

## 2021-04-11 RX ORDER — HEPARIN SODIUM 5000 [USP'U]/ML
5000 INJECTION INTRAVENOUS; SUBCUTANEOUS EVERY 12 HOURS
Refills: 0 | Status: DISCONTINUED | OUTPATIENT
Start: 2021-04-11 | End: 2021-04-15

## 2021-04-11 RX ORDER — DEXTROSE 50 % IN WATER 50 %
15 SYRINGE (ML) INTRAVENOUS ONCE
Refills: 0 | Status: DISCONTINUED | OUTPATIENT
Start: 2021-04-11 | End: 2021-04-24

## 2021-04-11 RX ORDER — INSULIN GLARGINE 100 [IU]/ML
15 INJECTION, SOLUTION SUBCUTANEOUS EVERY MORNING
Refills: 0 | Status: DISCONTINUED | OUTPATIENT
Start: 2021-04-12 | End: 2021-04-12

## 2021-04-11 RX ORDER — HEPARIN SODIUM 5000 [USP'U]/ML
5500 INJECTION INTRAVENOUS; SUBCUTANEOUS EVERY 6 HOURS
Refills: 0 | Status: DISCONTINUED | OUTPATIENT
Start: 2021-04-11 | End: 2021-04-11

## 2021-04-11 RX ORDER — INSULIN GLARGINE 100 [IU]/ML
15 INJECTION, SOLUTION SUBCUTANEOUS AT BEDTIME
Refills: 0 | Status: DISCONTINUED | OUTPATIENT
Start: 2021-04-11 | End: 2021-04-12

## 2021-04-11 RX ORDER — HEPARIN SODIUM 5000 [USP'U]/ML
2500 INJECTION INTRAVENOUS; SUBCUTANEOUS EVERY 6 HOURS
Refills: 0 | Status: DISCONTINUED | OUTPATIENT
Start: 2021-04-11 | End: 2021-04-11

## 2021-04-11 RX ORDER — AZITHROMYCIN 500 MG/1
250 TABLET, FILM COATED ORAL DAILY
Refills: 0 | Status: COMPLETED | OUTPATIENT
Start: 2021-04-12 | End: 2021-04-15

## 2021-04-11 RX ORDER — MAGNESIUM SULFATE 500 MG/ML
2 VIAL (ML) INJECTION ONCE
Refills: 0 | Status: COMPLETED | OUTPATIENT
Start: 2021-04-11 | End: 2021-04-11

## 2021-04-11 RX ORDER — DEXTROSE 50 % IN WATER 50 %
12.5 SYRINGE (ML) INTRAVENOUS ONCE
Refills: 0 | Status: DISCONTINUED | OUTPATIENT
Start: 2021-04-11 | End: 2021-04-24

## 2021-04-11 RX ORDER — HEPARIN SODIUM 5000 [USP'U]/ML
5000 INJECTION INTRAVENOUS; SUBCUTANEOUS EVERY 12 HOURS
Refills: 0 | Status: DISCONTINUED | OUTPATIENT
Start: 2021-04-11 | End: 2021-04-11

## 2021-04-11 RX ORDER — AMLODIPINE BESYLATE 2.5 MG/1
5 TABLET ORAL DAILY
Refills: 0 | Status: DISCONTINUED | OUTPATIENT
Start: 2021-04-11 | End: 2021-04-13

## 2021-04-11 RX ORDER — ALBUTEROL 90 UG/1
1 AEROSOL, METERED ORAL EVERY 4 HOURS
Refills: 0 | Status: COMPLETED | OUTPATIENT
Start: 2021-04-11 | End: 2022-03-10

## 2021-04-11 RX ORDER — AZITHROMYCIN 500 MG/1
500 TABLET, FILM COATED ORAL ONCE
Refills: 0 | Status: COMPLETED | OUTPATIENT
Start: 2021-04-11 | End: 2021-04-11

## 2021-04-11 RX ORDER — TIOTROPIUM BROMIDE 18 UG/1
1 CAPSULE ORAL; RESPIRATORY (INHALATION) DAILY
Refills: 0 | Status: COMPLETED | OUTPATIENT
Start: 2021-04-11 | End: 2022-03-10

## 2021-04-11 RX ORDER — IPRATROPIUM/ALBUTEROL SULFATE 18-103MCG
3 AEROSOL WITH ADAPTER (GRAM) INHALATION EVERY 6 HOURS
Refills: 0 | Status: DISCONTINUED | OUTPATIENT
Start: 2021-04-11 | End: 2021-04-14

## 2021-04-11 RX ADMIN — AZITHROMYCIN 500 MILLIGRAM(S): 500 TABLET, FILM COATED ORAL at 19:51

## 2021-04-11 RX ADMIN — Medication 3 MILLILITER(S): at 15:30

## 2021-04-11 RX ADMIN — HEPARIN SODIUM 5500 UNIT(S): 5000 INJECTION INTRAVENOUS; SUBCUTANEOUS at 17:40

## 2021-04-11 RX ADMIN — INSULIN GLARGINE 15 UNIT(S): 100 INJECTION, SOLUTION SUBCUTANEOUS at 21:09

## 2021-04-11 RX ADMIN — Medication 50 MILLIGRAM(S): at 22:14

## 2021-04-11 RX ADMIN — Medication 3 MILLILITER(S): at 19:51

## 2021-04-11 RX ADMIN — Medication 3 MILLILITER(S): at 15:28

## 2021-04-11 RX ADMIN — Medication 50 GRAM(S): at 15:28

## 2021-04-11 RX ADMIN — Medication 30 MILLILITER(S): at 22:14

## 2021-04-11 RX ADMIN — Medication 40 MILLIGRAM(S): at 23:48

## 2021-04-11 RX ADMIN — Medication 30 MILLIGRAM(S): at 17:41

## 2021-04-11 RX ADMIN — HEPARIN SODIUM 1200 UNIT(S)/HR: 5000 INJECTION INTRAVENOUS; SUBCUTANEOUS at 17:39

## 2021-04-11 RX ADMIN — Medication 3 MILLILITER(S): at 15:29

## 2021-04-11 RX ADMIN — AMLODIPINE BESYLATE 5 MILLIGRAM(S): 2.5 TABLET ORAL at 22:13

## 2021-04-11 RX ADMIN — Medication 10 MILLIGRAM(S): at 17:41

## 2021-04-11 RX ADMIN — Medication 4: at 19:51

## 2021-04-11 RX ADMIN — Medication 125 MILLIGRAM(S): at 15:28

## 2021-04-11 NOTE — ED PROVIDER NOTE - CARE PLAN
Principal Discharge DX:	Atrial fibrillation with rapid ventricular response  Secondary Diagnosis:	Diabetes mellitus  Secondary Diagnosis:	Asthma  Secondary Diagnosis:	Hyperlipemia  Secondary Diagnosis:	Hypertension

## 2021-04-11 NOTE — H&P ADULT - NSICDXPASTMEDICALHX_GEN_ALL_CORE_FT
PAST MEDICAL HISTORY:  Asthma     Diabetes mellitus     Emphysema     Hyperlipemia     Hypertension

## 2021-04-11 NOTE — H&P ADULT - HISTORY OF PRESENT ILLNESS
81yr old female with PMH of HTN, HPL, Asthma, DM-2 presented to ER with c/o Shortness of breath since last 2days - not improving with her inhalers, She could not quantify how many times she uses inhalers but uses every other day or so. recetly she had labs done at her PMD - which showed abnormal renal test - Hence was awaiting to see nephrology in office. She was seen in ER, found to be in acute respiratory distress, with some improvement with nebulizer and steroid. She was found to be in ? AFib per ER physician however EKG reveals sinus tachycardia with HR in 110s. She was admitted for acute asthma exacerbation with possible ? aFib RVR and THERESA.   pt has been eating and drinking well. but has had recent onset of swelling in her legs.  c/o epigastric burning pain.

## 2021-04-11 NOTE — H&P ADULT - NSHPPHYSICALEXAM_GEN_ALL_CORE
PHYSICAL EXAM:    GENERAL: NAD,  well-developed, acutely ill appearing   HEAD:  Atraumatic, Normocephalic  EYES: EOMI, PERRLA, conjunctiva and sclera clear  ENMT:  Moist mucous membranes,  No lesions  NECK: Supple, No JVD  NERVOUS SYSTEM:  Alert & Oriented X3, Good concentration  CHEST/LUNG: Clear to percussion bilaterally; b/l exp wheeze+  HEART: tachycardia. Regular rate and rhythm; No murmurs  ABDOMEN: Soft, Nontender, Nondistended; Bowel sounds present  EXTREMITIES:   No clubbing, cyanosis, or 2+ pedal edema  SKIN: No rashes or lesions

## 2021-04-11 NOTE — ED PROVIDER NOTE - OBJECTIVE STATEMENT
The patient is a 81 year old female presents with SOB, cough and wheezing with history of asthma  Feels exactly same as prior asthma but her ventolin is not effective  No fever and already received both vaccines for covid  History of DM, HLD and HTN

## 2021-04-11 NOTE — ED PROVIDER NOTE - CLINICAL SUMMARY MEDICAL DECISION MAKING FREE TEXT BOX
The patient presents with wheezing SOB dyspneic and also has new onset atrial fibrillation and will admit for further evaluation

## 2021-04-11 NOTE — ED PROVIDER NOTE - CRITICAL CARE ATTENDING CONTRIBUTION TO CARE
The patient seen immediately due to critical conditions and required multiple visits to stabilize the patient

## 2021-04-11 NOTE — ED ADULT NURSE NOTE - NSIMPLEMENTINTERV_GEN_ALL_ED
Implemented All Fall with Harm Risk Interventions:  [place] to call system. Call bell, personal items and telephone within reach. Instruct patient to call for assistance. Room bathroom lighting operational. Non-slip footwear when patient is off stretcher. Physically safe environment: no spills, clutter or unnecessary equipment. Stretcher in lowest position, wheels locked, appropriate side rails in place. Provide visual cue, wrist band, yellow gown, etc. Monitor gait and stability. Monitor for mental status changes and reorient to person, place, and time. Review medications for side effects contributing to fall risk. Reinforce activity limits and safety measures with patient and family. Provide visual clues: red socks.

## 2021-04-12 LAB
A1C WITH ESTIMATED AVERAGE GLUCOSE RESULT: 8.7 % — HIGH (ref 4–5.6)
ANION GAP SERPL CALC-SCNC: 14 MMOL/L — SIGNIFICANT CHANGE UP (ref 5–17)
BUN SERPL-MCNC: 33 MG/DL — HIGH (ref 8–20)
CALCIUM SERPL-MCNC: 8.5 MG/DL — LOW (ref 8.6–10.2)
CHLORIDE SERPL-SCNC: 102 MMOL/L — SIGNIFICANT CHANGE UP (ref 98–107)
CO2 SERPL-SCNC: 23 MMOL/L — SIGNIFICANT CHANGE UP (ref 22–29)
COVID-19 SPIKE DOMAIN AB INTERP: POSITIVE
COVID-19 SPIKE DOMAIN ANTIBODY RESULT: >250 U/ML — HIGH
CREAT SERPL-MCNC: 2.25 MG/DL — HIGH (ref 0.5–1.3)
ESTIMATED AVERAGE GLUCOSE: 203 MG/DL — HIGH (ref 68–114)
GLUCOSE BLDC GLUCOMTR-MCNC: 213 MG/DL — HIGH (ref 70–99)
GLUCOSE BLDC GLUCOMTR-MCNC: 297 MG/DL — HIGH (ref 70–99)
GLUCOSE BLDC GLUCOMTR-MCNC: 338 MG/DL — HIGH (ref 70–99)
GLUCOSE SERPL-MCNC: 264 MG/DL — HIGH (ref 70–99)
HCT VFR BLD CALC: 29.7 % — LOW (ref 34.5–45)
HGB BLD-MCNC: 9.1 G/DL — LOW (ref 11.5–15.5)
IRON SATN MFR SERPL: 13 % — LOW (ref 14–50)
IRON SATN MFR SERPL: 37 UG/DL — SIGNIFICANT CHANGE UP (ref 37–145)
MAGNESIUM SERPL-MCNC: 2.7 MG/DL — HIGH (ref 1.6–2.6)
MCHC RBC-ENTMCNC: 19.9 PG — LOW (ref 27–34)
MCHC RBC-ENTMCNC: 30.6 GM/DL — LOW (ref 32–36)
MCV RBC AUTO: 64.8 FL — LOW (ref 80–100)
PLATELET # BLD AUTO: 259 K/UL — SIGNIFICANT CHANGE UP (ref 150–400)
POTASSIUM SERPL-MCNC: 4.4 MMOL/L — SIGNIFICANT CHANGE UP (ref 3.5–5.3)
POTASSIUM SERPL-SCNC: 4.4 MMOL/L — SIGNIFICANT CHANGE UP (ref 3.5–5.3)
RBC # BLD: 4.58 M/UL — SIGNIFICANT CHANGE UP (ref 3.8–5.2)
RBC # FLD: 17.5 % — HIGH (ref 10.3–14.5)
SARS-COV-2 IGG+IGM SERPL QL IA: >250 U/ML — HIGH
SARS-COV-2 IGG+IGM SERPL QL IA: POSITIVE
SODIUM SERPL-SCNC: 139 MMOL/L — SIGNIFICANT CHANGE UP (ref 135–145)
TIBC SERPL-MCNC: 283 UG/DL — SIGNIFICANT CHANGE UP (ref 220–430)
TRANSFERRIN SERPL-MCNC: 198 MG/DL — SIGNIFICANT CHANGE UP (ref 192–382)
TSH SERPL-MCNC: 0.96 UIU/ML — SIGNIFICANT CHANGE UP (ref 0.27–4.2)
WBC # BLD: 8.75 K/UL — SIGNIFICANT CHANGE UP (ref 3.8–10.5)
WBC # FLD AUTO: 8.75 K/UL — SIGNIFICANT CHANGE UP (ref 3.8–10.5)

## 2021-04-12 PROCEDURE — 93970 EXTREMITY STUDY: CPT | Mod: 26

## 2021-04-12 PROCEDURE — 71250 CT THORAX DX C-: CPT | Mod: 26

## 2021-04-12 PROCEDURE — 76775 US EXAM ABDO BACK WALL LIM: CPT | Mod: 26

## 2021-04-12 PROCEDURE — 99233 SBSQ HOSP IP/OBS HIGH 50: CPT

## 2021-04-12 RX ORDER — INSULIN GLARGINE 100 [IU]/ML
20 INJECTION, SOLUTION SUBCUTANEOUS AT BEDTIME
Refills: 0 | Status: DISCONTINUED | OUTPATIENT
Start: 2021-04-12 | End: 2021-04-13

## 2021-04-12 RX ORDER — FOLIC ACID 0.8 MG
1 TABLET ORAL DAILY
Refills: 0 | Status: DISCONTINUED | OUTPATIENT
Start: 2021-04-12 | End: 2021-04-24

## 2021-04-12 RX ORDER — INSULIN GLARGINE 100 [IU]/ML
20 INJECTION, SOLUTION SUBCUTANEOUS EVERY MORNING
Refills: 0 | Status: DISCONTINUED | OUTPATIENT
Start: 2021-04-13 | End: 2021-04-13

## 2021-04-12 RX ORDER — INSULIN LISPRO 100/ML
8 VIAL (ML) SUBCUTANEOUS
Refills: 0 | Status: DISCONTINUED | OUTPATIENT
Start: 2021-04-12 | End: 2021-04-14

## 2021-04-12 RX ADMIN — AZITHROMYCIN 250 MILLIGRAM(S): 500 TABLET, FILM COATED ORAL at 12:46

## 2021-04-12 RX ADMIN — Medication 6: at 08:12

## 2021-04-12 RX ADMIN — Medication 8 UNIT(S): at 17:50

## 2021-04-12 RX ADMIN — HEPARIN SODIUM 5000 UNIT(S): 5000 INJECTION INTRAVENOUS; SUBCUTANEOUS at 05:44

## 2021-04-12 RX ADMIN — Medication 3 MILLILITER(S): at 14:55

## 2021-04-12 RX ADMIN — Medication 8: at 12:52

## 2021-04-12 RX ADMIN — PANTOPRAZOLE SODIUM 40 MILLIGRAM(S): 20 TABLET, DELAYED RELEASE ORAL at 05:53

## 2021-04-12 RX ADMIN — Medication 3 MILLILITER(S): at 20:02

## 2021-04-12 RX ADMIN — Medication 40 MILLIGRAM(S): at 17:39

## 2021-04-12 RX ADMIN — Medication 50 MILLIGRAM(S): at 05:53

## 2021-04-12 RX ADMIN — Medication 40 MILLIGRAM(S): at 05:44

## 2021-04-12 RX ADMIN — Medication 8 UNIT(S): at 12:52

## 2021-04-12 RX ADMIN — Medication 40 MILLIGRAM(S): at 12:46

## 2021-04-12 RX ADMIN — Medication 1 MILLIGRAM(S): at 21:41

## 2021-04-12 RX ADMIN — Medication 50 MILLIGRAM(S): at 17:39

## 2021-04-12 RX ADMIN — INSULIN GLARGINE 20 UNIT(S): 100 INJECTION, SOLUTION SUBCUTANEOUS at 21:43

## 2021-04-12 RX ADMIN — INSULIN GLARGINE 15 UNIT(S): 100 INJECTION, SOLUTION SUBCUTANEOUS at 09:51

## 2021-04-12 RX ADMIN — Medication 4: at 17:50

## 2021-04-12 RX ADMIN — Medication 3 MILLILITER(S): at 09:06

## 2021-04-12 RX ADMIN — AMLODIPINE BESYLATE 5 MILLIGRAM(S): 2.5 TABLET ORAL at 05:44

## 2021-04-12 RX ADMIN — Medication 30 MILLILITER(S): at 12:46

## 2021-04-12 RX ADMIN — Medication 3 MILLILITER(S): at 02:50

## 2021-04-12 NOTE — CHART NOTE - NSCHARTNOTEFT_GEN_A_CORE
SOCIAL WORK NOTE:  AS PER MD AND SBIRT- PATIENT WAS ACCEPTED TO [facility] INPATIENT DETOX UNIT TODAY BY [facility]. AMBULANCE ARRANGED FOR 1:00 PM TRANSFER AND ALL CLINICALS FAXED TO THEM AT FAX # [phone].  ED TEAM AWARE. NEAF COMPLETED AND PLACED ON DC RACK

## 2021-04-12 NOTE — CONSULT NOTE ADULT - SUBJECTIVE AND OBJECTIVE BOX
Patient is a 81y old  Female who presents with a chief complaint of SOB not improving with inhalers, cough and abnormal renal function test (11 Apr 2021 18:35)      HPI:  81yr old female with PMH of HTN, HPL, Asthma, DM-2 presented to ER with c/o Shortness of breath since last 2days - not improving with her inhalers, She could not quantify how many times she uses inhalers but uses every other day or so. recetly she had labs done at her PMD - which showed abnormal renal test - Hence was awaiting to see nephrology in office. She was seen in ER, found to be in acute respiratory distress, with some improvement with nebulizer and steroid. She was found to be in ? AFib per ER physician however EKG reveals sinus tachycardia with HR in 110s. She was admitted for acute asthma exacerbation with possible ? aFib RVR and THERESA.   pt has been eating and drinking well. but has had recent onset of swelling in her legs.  c/o epigastric burning pain.  (11 Apr 2021 18:35)    Feb 2020 - creat was 0.9   Treatment in ER noted , she feels better   Meds PTA included Metformin , Diovan and HCTZ   Covid PCR swab negative , Antibody test positive   Elevated BP , better now   No CHF on CXR , no hydro on renal sono , no DVT on leg doppler       PAST MEDICAL & SURGICAL HISTORY:  Asthma    Diabetes mellitus    Hypertension    Emphysema    Hyperlipemia    S/P appendectomy    S/P tubal ligation    S/P knee surgery  left        FAMILY HISTORY:  No pertinent family history in first degree relatives        Social History:    MEDICATIONS  (STANDING):  ALBUTerol    90 MICROgram(s) HFA Inhaler 1 Puff(s) Inhalation every 4 hours  albuterol/ipratropium for Nebulization 3 milliLiter(s) Nebulizer every 6 hours  aluminum hydroxide/magnesium hydroxide/simethicone Suspension 30 milliLiter(s) Oral every 12 hours  amLODIPine   Tablet 5 milliGRAM(s) Oral daily  azithromycin   Tablet 250 milliGRAM(s) Oral daily  dextrose 40% Gel 15 Gram(s) Oral once  dextrose 5%. 1000 milliLiter(s) (50 mL/Hr) IV Continuous <Continuous>  dextrose 5%. 1000 milliLiter(s) (100 mL/Hr) IV Continuous <Continuous>  dextrose 50% Injectable 25 Gram(s) IV Push once  dextrose 50% Injectable 12.5 Gram(s) IV Push once  dextrose 50% Injectable 25 Gram(s) IV Push once  glucagon  Injectable 1 milliGRAM(s) IntraMuscular once  heparin   Injectable 5000 Unit(s) SubCutaneous every 12 hours  hydrALAZINE 50 milliGRAM(s) Oral every 12 hours  insulin glargine Injectable (LANTUS) 20 Unit(s) SubCutaneous at bedtime  insulin lispro (ADMELOG) corrective regimen sliding scale   SubCutaneous three times a day before meals  insulin lispro Injectable (ADMELOG) 8 Unit(s) SubCutaneous three times a day before meals  methylPREDNISolone sodium succinate Injectable 40 milliGRAM(s) IV Push every 6 hours  pantoprazole    Tablet 40 milliGRAM(s) Oral before breakfast  tiotropium 18 MICROgram(s) Capsule 1 Capsule(s) Inhalation daily    MEDICATIONS  (PRN):  Medications  -methylPREDNISolone 4 MG Oral Tablet Therapy PackAs per package directions MDD:6 Quantity: 1 Refills: 0 Ordered: [id number] BROUSSARD MD Start : [id number]  -Cefuroxime Axetil 500 MG Oral TabletTake 1 tablet twice daily Quantity: 14 Refills: 0 Ordered: [id number]-Txe-[id number] BROUSSARD MD Start : 80-Oaf-8319Yzyohb  -Euflexxa 20 MG/2ML Intra-articular Solution Prefilled Syringeinject 20mg/2ml into both knees once a week for 3 weeks Quantity: 12 Refills: 0 Ordered: 32-Omd-[id number] N.P., YEON J Start : 26-[id number]-[id number]  -hydroCHLOROthiazide TABS Quantity: 0 Refills: 0 Ordered: 6-Nov-2017 MD Active  -HumaLOG 100 UNIT/ML Subcutaneous Solution Quantity: 0 Refills: 0 Ordered: 6-Nov-2017 MD Active  -metFORMIN HCl - 500 MG Oral Tablet Quantity: 0 Refills: 0 Ordered: 6-Nov-2017 MD Active  -Montelukast Sodium TABS Quantity: 0 Refills: 0 Ordered: 6-Nov-2017 MD Active  -Omeprazole 10 MG Oral Capsule Delayed Release Quantity: 0 Refills: 0 Ordered: 6-Nov-2017 MD Active  -Diovan TABS Quantity: 0 Refills: 0 Ordered: 6-Nov-2017 MD Active  -Crestor 5 MG Oral Tablet Quantity: 0 Refills: 0 Ordered: 6-Nov-2017 MD Active  -Gabapentin 300 MG Oral Capsule Quantity: 0 Refills: 0 Ordered: 6-Nov-2017 MD Active  -Fluticasone Propionate 50 MCG/ACT Nasal Suspension Quantity: 0 Refills: 0 Ordered: 6-Nov-2017 MD Active  -Vitamin D TABS Quantity: 0 Refills: 0 Ordered: 6-Nov-2017 MD Active  -Calcium TABS Quantity: 0 Refills: 0 Ordered: 6-Nov-2017 MD Active  -amLODIPine Besylate 10 MG Oral Tablet Quantity: 0 Refills: 0 Ordered: 6-Nov-2017 MD Active  -Alendronate Sodium TABS Quantity: 0 Refills: 0 Ordered: 6-Nov-2017 MD Active  -Albuterol AERS Quantity: 0 Refills: 0 Ordered: 6-Nov-2017 MD Active  -Tudorza Pressair 400 MCG/ACT Inhalation Aerosol Powder Breath Activated Quantity: 0 Refills: 0 Ordered: 6-Nov-2017 MD Active  -Advair Diskus 250-50 MCG/DOSE MISC Quantity: 0 Refills: 0 Ordered: 3-Prince-2018 MD Active      Allergies    No Known Allergies    Intolerances        Vital Signs Last 24 Hrs  T(C): 36.6 (12 Apr 2021 11:31), Max: 36.7 (12 Apr 2021 05:36)  T(F): 97.9 (12 Apr 2021 11:31), Max: 98 (12 Apr 2021 05:36)  HR: 88 (12 Apr 2021 14:55) (86 - 110)  BP: 144/66 (12 Apr 2021 11:31) (144/66 - 178/73)  BP(mean): --  RR: 18 (12 Apr 2021 11:31) (16 - 21)  SpO2: 100% (12 Apr 2021 14:55) (95% - 100%)  Daily     Daily   I&O's Detail    I&O's Summary    PE  GENERAL: NAD,  feels better   HEAD:  Atraumatic, Normocephalic  EYES: EOMI, PERRLA, conjunctiva and sclera clear  ENMT:  Moist mucous membranes,  No lesions  NECK: Supple, No JVD  NERVOUS SYSTEM:  Alert & Oriented X3, Good concentration  CHEST/LUNG: Clear to percussion bilaterally; b/l exp wheeze+  HEART: tachycardia. Regular rate and rhythm; No murmurs  ABDOMEN: Soft, Nontender, Nondistended; Bowel sounds present  EXTREMITIES:   No clubbing, cyanosis, or 2+ pedal edema      LABS:                        9.1    8.75  )-----------( 259      ( 12 Apr 2021 07:24 )             29.7     04-12    139  |  102  |  33.0<H>  ----------------------------<  264<H>  4.4   |  23.0  |  2.25<H>    Ca    8.5<L>      12 Apr 2021 07:24  Mg     2.7     04-12    TPro  6.7  /  Alb  3.4  /  TBili  0.2<L>  /  DBili  x   /  AST  20  /  ALT  19  /  AlkPhos  105  04-11        Magnesium, Serum: 2.7 mg/dL (04-12 @ 07:24)           EXAM:  US DPLX LWR EXT VEINS COMPL BI                          PROCEDURE DATE:  04/12/2021          INTERPRETATION:  CLINICAL INFORMATION: Edema. Evaluate DVT.    COMPARISON: None available.    TECHNIQUE: Duplex sonography of the BILATERAL LOWER extremity veins with color and spectral Doppler, with and without compression.    FINDINGS:    RIGHT:  Normal compressibility of the RIGHT common femoral, femoral and popliteal veins.  Doppler examination shows normal spontaneous and phasic flow.  No RIGHT calf vein thrombosis is detected.    LEFT:  Normal compressibility of the LEFT common femoral, femoral and popliteal veins.  Doppler examination shows normal spontaneous and phasic flow.  No LEFT calf vein thrombosis is detected.    IMPRESSION:  No evidence of deep venous thrombosis in either lower extremity.                NIRAV JOSHI MD; Attending Radiologist  This document has been electronically signed. Apr 12 2021  4:32AM    PROCEDURE DATE:  04/12/2021          INTERPRETATION:  CLINICAL INFORMATION: Acute kidney insufficiency.    COMPARISON: None available.    TECHNIQUE: Sonography of the kidneys and bladder.    FINDINGS:    Right kidney: 10.5 cm. No renal mass, hydronephrosis or calculi.    Left kidney: 9.1 cm. No renal mass, hydronephrosis or calculi.    Urinary bladder: Within normal limits.    Miscellaneous: Right pleural effusion.    IMPRESSION:    No hydronephrosis.            MARIALUISA AVILA MD; Attending Radiologist  This document has been electronically signed. Apr 12 2021  4:33AM  `   EXAM:  CT CHEST                          PROCEDURE DATE:  04/12/2021          INTERPRETATION:  CLINICAL INFORMATION: Shortness of breath.    COMPARISON: 4/26/2013.    CONTRAST/COMPLICATIONS:  IV Contrast: NONE  Oral Contrast: NONE  Complications: None reported at time of study completion    PROCEDURE:  CT of the Chest was performed.  Sagittal and coronal reformats were performed.    FINDINGS:    LUNGS AND AIRWAYS: Patent central airways.  Nodular opacity in the right upper lobe measuring 5 mm (series 3:75) unchanged. 4 mm left lower lobe peripheral lung nodule (series 3:90). No lung consolidation or mass. Bibasilar subsegmental atelectasis (right greater than left) with mild bilateral lower lobe bronchiectasis.  PLEURA: Small right and trace left pleural effusion.  MEDIASTINUM AND SURINDER: No lymphadenopathy.  VESSELS: Main pulmonary artery is dilated measuring up to 3.9 cm. Thoracic aorta is normal in caliber. Atherosclerotic calcifications of the thoracic aorta, great vessels, and coronary arteries.  HEART: Heart is borderline enlarged. No pericardial effusion.  CHEST WALL AND LOWER NECK: Surgical clips in the left axilla.  VISUALIZED UPPER ABDOMEN: Small hiatus hernia.  BONES: Mild degenerative changes of the spine. Mild compression fracture of T11 which is new compared with prior exam from 2013.    IMPRESSION:  Bibasilar subsegmental atelectasis (right greater than left) with mild bilateral lower lobe bronchiectasis. Small right and trace left pleural effusion.    Dilated mainpulmonary artery which may be reflective of underlying pulmonary arterial hypertension.    Mild compression fracture of T11 which is new compared with prior exam from 4/26/2013.              JORDAN CONCEPCION DO; Attending Radiologist  This document has been electronically signed. Apr 12 2021  2:57AM

## 2021-04-12 NOTE — PROGRESS NOTE ADULT - SUBJECTIVE AND OBJECTIVE BOX
IFEOMA VIVI    [id number]    81y      Female    CC: SOB   Feels some better today however any exertion makes it worse still   on 3lit NC o2    INTERVAL HPI/OVERNIGHT EVENTS: no acute events     TELE: Sinus - HR - 80-90s    REVIEW OF SYSTEMS:    CONSTITUTIONAL: No fever,  fatigue  RESPIRATORY: No cough, wheezing,  No shortness of breath  CARDIOVASCULAR: No chest pain, palpitations  GASTROINTESTINAL: No abdominal or epigastric pain. No nausea, vomiting        Vital Signs Last 24 Hrs  T(C): 36.6 (12 Apr 2021 11:31), Max: 36.7 (12 Apr 2021 05:36)  T(F): 97.9 (12 Apr 2021 11:31), Max: 98 (12 Apr 2021 05:36)  HR: 88 (12 Apr 2021 14:55) (86 - 110)  BP: 144/66 (12 Apr 2021 11:31) (144/66 - 178/73)  BP(mean): --  RR: 18 (12 Apr 2021 11:31) (16 - 21)  SpO2: 100% (12 Apr 2021 14:55) (95% - 100%)    PHYSICAL EXAM:    GENERAL: NAD, well-groomed  HEENT: PERRL, +EOMI  NECK: soft, Supple  CHEST/LUNG: Clear to percussion bilaterally; No wheezing  HEART: S1S2+, Regular rate and rhythm; No murmurs  ABDOMEN: Soft, Nontender, Nondistended; Bowel sounds present  EXTREMITIES:  No clubbing, cyanosis, or edema  SKIN: No rashes or lesions  NEURO: AAOX3        LABS:                        9.1    8.75  )-----------( 259      ( 12 Apr 2021 07:24 )             29.7     04-12    139  |  102  |  33.0<H>  ----------------------------<  264<H>  4.4   |  23.0  |  2.25<H>    Ca    8.5<L>      12 Apr 2021 07:24  Mg     2.7     04-12    TPro  6.7  /  Alb  3.4  /  TBili  0.2<L>  /  DBili  x   /  AST  20  /  ALT  19  /  AlkPhos  105  04-11            MEDICATIONS  (STANDING):  ALBUTerol    90 MICROgram(s) HFA Inhaler 1 Puff(s) Inhalation every 4 hours  albuterol/ipratropium for Nebulization 3 milliLiter(s) Nebulizer every 6 hours  aluminum hydroxide/magnesium hydroxide/simethicone Suspension 30 milliLiter(s) Oral every 12 hours  amLODIPine   Tablet 5 milliGRAM(s) Oral daily  azithromycin   Tablet 250 milliGRAM(s) Oral daily  dextrose 40% Gel 15 Gram(s) Oral once  dextrose 5%. 1000 milliLiter(s) (50 mL/Hr) IV Continuous <Continuous>  dextrose 5%. 1000 milliLiter(s) (100 mL/Hr) IV Continuous <Continuous>  dextrose 50% Injectable 25 Gram(s) IV Push once  dextrose 50% Injectable 12.5 Gram(s) IV Push once  dextrose 50% Injectable 25 Gram(s) IV Push once  folic acid 1 milliGRAM(s) Oral daily  glucagon  Injectable 1 milliGRAM(s) IntraMuscular once  heparin   Injectable 5000 Unit(s) SubCutaneous every 12 hours  hydrALAZINE 50 milliGRAM(s) Oral every 12 hours  insulin glargine Injectable (LANTUS) 20 Unit(s) SubCutaneous at bedtime  insulin lispro (ADMELOG) corrective regimen sliding scale   SubCutaneous three times a day before meals  insulin lispro Injectable (ADMELOG) 8 Unit(s) SubCutaneous three times a day before meals  methylPREDNISolone sodium succinate Injectable 40 milliGRAM(s) IV Push every 6 hours  pantoprazole    Tablet 40 milliGRAM(s) Oral before breakfast  tiotropium 18 MICROgram(s) Capsule 1 Capsule(s) Inhalation daily    MEDICATIONS  (PRN):      RADIOLOGY & ADDITIONAL TESTS:

## 2021-04-12 NOTE — PROGRESS NOTE ADULT - ASSESSMENT
81yr old female with PMH of HTN, HPL, Asthma, DM-2 presented to ER with c/o Shortness of breath since 2days and peripheral edema admitted for possible acute asthma exacerbation and possible acute renal failure.     # Acute Asthma Exacerbation -   ct steroids iv q6   Nebs q4   monitor   supportive O2     #  ? THERESA - ? CKD -   appreicate renal recs   appears mildly volume overloaded - no ivfluids  hold valsartan, HCTZ    # HTN - ct hydralazine, amlodipine, hold valsartan - BP - elevated -monitor - increase hydralazine - once ECHO results available   # Sinus tachycardia - suspect 2/2 neb + acute asthma - Monitor    # DM-2 - takes Levemir 30-30 + humalog 10 TID - BS uncontrolled - 2/2 steroids -   will ct 20 units BID + Admelog 8 TID + SSI for now   titrate     # Peripheral edema , elevated d-dimer - b/l doppler LE - Neg for DVT   f/u ECHO     # Abdominal pain - ?gastritis - resolved with Pantop + maalox - ct pantop - while on steroids     # DVT px - Heparin sq     Dc - pending clinical improvement - PT eval - walks with cane at home

## 2021-04-13 ENCOUNTER — TRANSCRIPTION ENCOUNTER (OUTPATIENT)
Age: 81
End: 2021-04-13

## 2021-04-13 LAB
24R-OH-CALCIDIOL SERPL-MCNC: 17.1 NG/ML — LOW (ref 30–80)
ANION GAP SERPL CALC-SCNC: 16 MMOL/L — SIGNIFICANT CHANGE UP (ref 5–17)
ANISOCYTOSIS BLD QL: SLIGHT — SIGNIFICANT CHANGE UP
BASOPHILS # BLD AUTO: 0 K/UL — SIGNIFICANT CHANGE UP (ref 0–0.2)
BASOPHILS NFR BLD AUTO: 0 % — SIGNIFICANT CHANGE UP (ref 0–2)
BUN SERPL-MCNC: 49 MG/DL — HIGH (ref 8–20)
CALCIUM SERPL-MCNC: 8.2 MG/DL — LOW (ref 8.6–10.2)
CALCIUM SERPL-MCNC: 8.5 MG/DL — SIGNIFICANT CHANGE UP (ref 8.4–10.5)
CHLORIDE SERPL-SCNC: 98 MMOL/L — SIGNIFICANT CHANGE UP (ref 98–107)
CO2 SERPL-SCNC: 23 MMOL/L — SIGNIFICANT CHANGE UP (ref 22–29)
CREAT SERPL-MCNC: 2.32 MG/DL — HIGH (ref 0.5–1.3)
ELLIPTOCYTES BLD QL SMEAR: SLIGHT — SIGNIFICANT CHANGE UP
EOSINOPHIL # BLD AUTO: 0 K/UL — SIGNIFICANT CHANGE UP (ref 0–0.5)
EOSINOPHIL NFR BLD AUTO: 0 % — SIGNIFICANT CHANGE UP (ref 0–6)
GLUCOSE BLDC GLUCOMTR-MCNC: 140 MG/DL — HIGH (ref 70–99)
GLUCOSE BLDC GLUCOMTR-MCNC: 170 MG/DL — HIGH (ref 70–99)
GLUCOSE BLDC GLUCOMTR-MCNC: 221 MG/DL — HIGH (ref 70–99)
GLUCOSE BLDC GLUCOMTR-MCNC: 240 MG/DL — HIGH (ref 70–99)
GLUCOSE BLDC GLUCOMTR-MCNC: 298 MG/DL — HIGH (ref 70–99)
GLUCOSE SERPL-MCNC: 252 MG/DL — HIGH (ref 70–99)
HCT VFR BLD CALC: 30 % — LOW (ref 34.5–45)
HGB BLD-MCNC: 9.2 G/DL — LOW (ref 11.5–15.5)
HYPOCHROMIA BLD QL: SLIGHT — SIGNIFICANT CHANGE UP
LYMPHOCYTES # BLD AUTO: 0.99 K/UL — LOW (ref 1–3.3)
LYMPHOCYTES # BLD AUTO: 6.1 % — LOW (ref 13–44)
MANUAL SMEAR VERIFICATION: SIGNIFICANT CHANGE UP
MCHC RBC-ENTMCNC: 19.7 PG — LOW (ref 27–34)
MCHC RBC-ENTMCNC: 30.7 GM/DL — LOW (ref 32–36)
MCV RBC AUTO: 64.2 FL — LOW (ref 80–100)
MICROCYTES BLD QL: SLIGHT — SIGNIFICANT CHANGE UP
MONOCYTES # BLD AUTO: 0.57 K/UL — SIGNIFICANT CHANGE UP (ref 0–0.9)
MONOCYTES NFR BLD AUTO: 3.5 % — SIGNIFICANT CHANGE UP (ref 2–14)
NEUTROPHILS # BLD AUTO: 14.72 K/UL — HIGH (ref 1.8–7.4)
NEUTROPHILS NFR BLD AUTO: 90.4 % — HIGH (ref 43–77)
PLAT MORPH BLD: NORMAL — SIGNIFICANT CHANGE UP
PLATELET # BLD AUTO: 301 K/UL — SIGNIFICANT CHANGE UP (ref 150–400)
POIKILOCYTOSIS BLD QL AUTO: SLIGHT — SIGNIFICANT CHANGE UP
POTASSIUM SERPL-MCNC: 4.9 MMOL/L — SIGNIFICANT CHANGE UP (ref 3.5–5.3)
POTASSIUM SERPL-SCNC: 4.9 MMOL/L — SIGNIFICANT CHANGE UP (ref 3.5–5.3)
PTH-INTACT FLD-MCNC: 129 PG/ML — HIGH (ref 15–65)
RBC # BLD: 4.67 M/UL — SIGNIFICANT CHANGE UP (ref 3.8–5.2)
RBC # FLD: 17.6 % — HIGH (ref 10.3–14.5)
RBC BLD AUTO: ABNORMAL
SODIUM SERPL-SCNC: 137 MMOL/L — SIGNIFICANT CHANGE UP (ref 135–145)
WBC # BLD: 16.28 K/UL — HIGH (ref 3.8–10.5)
WBC # FLD AUTO: 16.28 K/UL — HIGH (ref 3.8–10.5)

## 2021-04-13 PROCEDURE — 99233 SBSQ HOSP IP/OBS HIGH 50: CPT

## 2021-04-13 PROCEDURE — 93306 TTE W/DOPPLER COMPLETE: CPT | Mod: 26

## 2021-04-13 RX ORDER — LANOLIN ALCOHOL/MO/W.PET/CERES
1 CREAM (GRAM) TOPICAL AT BEDTIME
Refills: 0 | Status: DISCONTINUED | OUTPATIENT
Start: 2021-04-13 | End: 2021-04-24

## 2021-04-13 RX ORDER — INSULIN GLARGINE 100 [IU]/ML
30 INJECTION, SOLUTION SUBCUTANEOUS AT BEDTIME
Refills: 0 | Status: DISCONTINUED | OUTPATIENT
Start: 2021-04-13 | End: 2021-04-14

## 2021-04-13 RX ORDER — INSULIN GLARGINE 100 [IU]/ML
25 INJECTION, SOLUTION SUBCUTANEOUS EVERY MORNING
Refills: 0 | Status: DISCONTINUED | OUTPATIENT
Start: 2021-04-13 | End: 2021-04-14

## 2021-04-13 RX ORDER — AMLODIPINE BESYLATE 2.5 MG/1
5 TABLET ORAL ONCE
Refills: 0 | Status: COMPLETED | OUTPATIENT
Start: 2021-04-13 | End: 2021-04-13

## 2021-04-13 RX ORDER — AMLODIPINE BESYLATE 2.5 MG/1
10 TABLET ORAL DAILY
Refills: 0 | Status: DISCONTINUED | OUTPATIENT
Start: 2021-04-14 | End: 2021-04-15

## 2021-04-13 RX ADMIN — Medication 3 MILLILITER(S): at 02:40

## 2021-04-13 RX ADMIN — Medication 40 MILLIGRAM(S): at 05:53

## 2021-04-13 RX ADMIN — Medication 1 MILLIGRAM(S): at 21:21

## 2021-04-13 RX ADMIN — Medication 4: at 13:07

## 2021-04-13 RX ADMIN — AMLODIPINE BESYLATE 5 MILLIGRAM(S): 2.5 TABLET ORAL at 05:53

## 2021-04-13 RX ADMIN — HEPARIN SODIUM 5000 UNIT(S): 5000 INJECTION INTRAVENOUS; SUBCUTANEOUS at 05:53

## 2021-04-13 RX ADMIN — Medication 8 UNIT(S): at 08:38

## 2021-04-13 RX ADMIN — Medication 1 MILLIGRAM(S): at 13:00

## 2021-04-13 RX ADMIN — Medication 3 MILLILITER(S): at 08:36

## 2021-04-13 RX ADMIN — Medication 50 MILLIGRAM(S): at 05:53

## 2021-04-13 RX ADMIN — Medication 8 UNIT(S): at 13:06

## 2021-04-13 RX ADMIN — INSULIN GLARGINE 20 UNIT(S): 100 INJECTION, SOLUTION SUBCUTANEOUS at 10:00

## 2021-04-13 RX ADMIN — Medication 40 MILLIGRAM(S): at 21:21

## 2021-04-13 RX ADMIN — INSULIN GLARGINE 30 UNIT(S): 100 INJECTION, SOLUTION SUBCUTANEOUS at 21:21

## 2021-04-13 RX ADMIN — Medication 100 MILLIGRAM(S): at 21:21

## 2021-04-13 RX ADMIN — Medication 30 MILLILITER(S): at 05:54

## 2021-04-13 RX ADMIN — Medication 6: at 08:38

## 2021-04-13 RX ADMIN — Medication 40 MILLIGRAM(S): at 00:12

## 2021-04-13 RX ADMIN — PANTOPRAZOLE SODIUM 40 MILLIGRAM(S): 20 TABLET, DELAYED RELEASE ORAL at 05:55

## 2021-04-13 RX ADMIN — AZITHROMYCIN 250 MILLIGRAM(S): 500 TABLET, FILM COATED ORAL at 13:00

## 2021-04-13 NOTE — DISCHARGE NOTE PROVIDER - PROVIDER TOKENS
PROVIDER:[TOKEN:[[id number]],FOLLOWUP:[1 week]] PROVIDER:[TOKEN:[[id number]],FOLLOWUP:[1 week]],PROVIDER:[TOKEN:[[id number]],FOLLOWUP:[1 week]],PROVIDER:[TOKEN:[[id number]],FOLLOWUP:[1-3 days]]

## 2021-04-13 NOTE — DISCHARGE NOTE PROVIDER - CARE PROVIDER_API CALL
Alvin Giles (DO)  Medicine  [address] [place] [address] [place] [address]  Phone: [phone]  Fax: [phone]  Follow Up Time: 1 week   Alvin Giles (DO)  Medicine  [address] [facility], [place] [address]  Phone: [phone]  Fax: [phone]  Follow Up Time: 1 week    Kd Bennett)  Cardiovascular Disease  [address] [facility], [address]  [place] [address]  Phone: [phone]  Fax: [phone]  Follow Up Time: 1 week    Tiana Mcdonough  INTERNAL MEDICINE  [address] [place] [address]  Phone: [phone]  Fax: [phone]  Follow Up Time: 1-3 days

## 2021-04-13 NOTE — PROGRESS NOTE ADULT - ASSESSMENT
THERESA - baseline creat 0.9 Feb 2020  No hydro on renal sono   IRDM , HTN , AEOA   Poorly controlled DM - A1-C 8.7   Diovan , HCTZ , Metformin held for now   CAROL on CXR   No hydro on renal sono   ECHO noted   Ordered 24 h urine , but she refusesdto stay     HTN - Watch with med adjustments    Patient is going to sign out AMA   She does agree to follow up with us as an outpatient in 1-2 weeks

## 2021-04-13 NOTE — DISCHARGE NOTE PROVIDER - NSDCCPCAREPLAN_GEN_ALL_CORE_FT
PRINCIPAL DISCHARGE DIAGNOSIS  Diagnosis: Asthma  Assessment and Plan of Treatment: Continue medications as prescribed. Please follow up with your primary care physician within 1 week.      SECONDARY DISCHARGE DIAGNOSES  Diagnosis: Diabetes mellitus  Assessment and Plan of Treatment: Uncontrolled and was titrating medications. A1c 8.7.    Diagnosis: Hyperlipemia  Assessment and Plan of Treatment: Continue medications as prescribed. Please follow up with your primary care physician and cardiologist within 1 week.    Diagnosis: Hypertension  Assessment and Plan of Treatment: Continue medications as prescribed. Please follow up with your primary care physician and cardiologist within 1 week.    Diagnosis: Asthma  Assessment and Plan of Treatment: Continue medications as prescribed. Please follow up with your primary care physician within 1 week.

## 2021-04-13 NOTE — PROGRESS NOTE ADULT - ASSESSMENT
81yr old female with PMH of HTN, HPL, Asthma, DM-2 presented to ER with c/o Shortness of breath since 2days and peripheral edema admitted for possible acute asthma exacerbation and possible acute renal failure.     # Acute Asthma Exacerbation -   taper steroids iv q8  Nebs q4   wean off O2 as tolerated   PT     #  ? THERESA - ? CKD   appreicate renal recs   appears mildly volume overloaded - no ivfluids   hold valsartan, HCTZ, f/u ECHO - urine studies still pending     # HTN - ct hydralazine, amlodipine, hold valsartan - BP - elevated -monitor - increase amlodipine to 10mg   # Sinus tachycardia - suspect 2/2 neb + acute asthma - resolved - ok to dc monitor     # DM-2 - takes Levemir 30-30 + humalog 10 TID - BS uncontrolled - 2/2 steroids -   will ct 20 units BID + Admelog 8 TID + SSI for now   titrate     # Peripheral edema , elevated d-dimer - b/l doppler LE - Neg for DVT - f/u ECHO   # Abdominal pain - ?gastritis - resolved with Pantop + maalox - ct pantop - while on steroids   # DVT px - Heparin sq   Dc - pending clinical improvement, ECHO, urine studies - home. 1-2 days       Family son - Tyron was updated @[phone] - who discussed with patient and patient agreeable to stay  - AMA cancelled.

## 2021-04-13 NOTE — DISCHARGE NOTE PROVIDER - NSDCFUADDAPPT_GEN_ALL_CORE_FT
Patient Information     Patient Name MRN Sex Kiesha Collins [id number] Female 1940      Telephone Encounter by Denice Adair at 2017 10:09 AM     Author:  Denice Adair  Service:  (none) Author Type:  (none)     Filed:  2017 10:28 AM  Encounter Date:  2017 Status:  Addendum     :  Luh Hassan RN (NURS- Registered Nurse)        Related Notes: Original Note by Denice Adair filed at 2017 10:13 AM            LUH - PATIENT HAD A PNEUMONIA SHOT ON 2016 AND SAID SHE THINKS SHE NEEDS A SECOND ONE. SHOULD SHE GET A 2ND ONE NOW, OR AT A DIFFERENT TIME?  PLEASE CALL PATIENT.      Denice Adair ....................  2017   10:12 AM  Pt has a order in from Dr Messer for her second pneumococcal 23 to be administered and she has waited a year between Prevnar 13 and pneumococcal for Medicare to pay for this. Pt called and will schedule appointment in the future for this. LUH HASSAN, VICKY ....................  2017   10:27 AM         
< from: TTE Echo Complete w/Doppler (11.16.17 @ 09:15) >     Summary:   1. Left ventricular ejection fraction, by visual estimation, is 70 to   75%.   2. Normal global left ventricular systolic function.   3. Mildly increased LV wall thickness.   4. Normal left ventricular internal cavity size.   5. Spectral Doppler shows impaired relaxation pattern of left   ventricular myocardial filling (Grade I diastolic dysfunction).   6. Trivial pericardial effusion.   7. Mild mitral annular calcification.   8. Estimated pulmonary artery systolic pressure is 37.8 mmHg assuming a   right atrial pressure of 3 mmHg, which is consistent with borderline   pulmonary hypertension.   9. Peak transaortic gradient equals 11.0 mmHg, mean transaortic gradient   equals 5.9 mmHg, the calculated aortic valve area equals 1.73 cm² by the   continuity equation consistent with mild aortic stenosis.    < end of copied text >

## 2021-04-13 NOTE — DISCHARGE NOTE PROVIDER - NSDCMRMEDTOKEN_GEN_ALL_CORE_FT
amLODIPine 5 mg oral tablet: 1 tab(s) orally once a day  HumaLOG 100 units/mL injectable solution: 10 unit(s) injectable 3 times a day  hydrALAZINE 50 mg oral tablet: 1 tab(s) orally 2 times a day  Levemir 100 units/mL subcutaneous solution: 30 unit(s) subcutaneous 2 times a day  valsartan 320 mg oral tablet: 1 tab(s) orally once a day   calcitriol 0.25 mcg oral capsule: 1 cap(s) orally once a day  calcium acetate 667 mg oral tablet: 1 tab(s) orally once a day   dilTIAZem 240 mg/24 hours oral capsule, extended release: 1 cap(s) orally once a day  furosemide 40 mg oral tablet: 1 tab(s) orally 2 times a day  HumaLOG 100 units/mL injectable solution: 6  injectable 3 times a day  hydrALAZINE 50 mg oral tablet: 1 tab(s) orally 2 times a day  insulin detemir 100 units/mL subcutaneous solution: 12 unit(s) subcutaneous once a day (at bedtime)  magnesium oxide 400 mg (241.3 mg elemental magnesium) oral tablet: 1 tab(s) orally once a day  metoprolol tartrate 25 mg oral tablet: 1 tab(s) orally 2 times a day  pantoprazole 40 mg oral delayed release tablet: 1 tab(s) orally once a day (before a meal)  potassium chloride 15 mEq oral tablet, extended release: 2 tab(s) orally once a day  warfarin 5 mg oral tablet: 1 tab(s) orally once a day (at bedtime) MDD:Max 1 tab a day

## 2021-04-13 NOTE — PROGRESS NOTE ADULT - SUBJECTIVE AND OBJECTIVE BOX
IFEOMA VIVI    [id number]    81y      Female    CC: SOB   Feels better today, wanted to leave AMA as she is not able to sleep, still remains in ER Isolation bed   discussed risks of leaving - high chances of readmission, renal failure     INTERVAL HPI/OVERNIGHT EVENTS: no acute events     TELE: Sinus - HR - 80-90s - no acute events     REVIEW OF SYSTEMS:    CONSTITUTIONAL: No fever,  fatigue  RESPIRATORY: No cough, wheezing  CARDIOVASCULAR: No chest pain, palpitations  GASTROINTESTINAL: No abdominal or epigastric pain. No nausea, vomiting          Vital Signs Last 24 Hrs  T(C): 36.8 (13 Apr 2021 08:46), Max: 36.8 (13 Apr 2021 08:46)  T(F): 98.3 (13 Apr 2021 08:46), Max: 98.3 (13 Apr 2021 08:46)  HR: 90 (13 Apr 2021 08:46) (89 - 96)  BP: 153/49 (13 Apr 2021 08:46) (143/62 - 157/71)  BP(mean): --  RR: --  SpO2: 98% (13 Apr 2021 04:21) (98% - 98%)        PHYSICAL EXAM:    GENERAL: NAD, well-groomed  HEENT: PERRL, +EOMI  NECK: soft, Supple  CHEST/LUNG: Clear to percussion bilaterally; No wheezing  HEART: S1S2+, Regular rate and rhythm; No murmurs  ABDOMEN: Soft, Nontender, Nondistended; Bowel sounds present  EXTREMITIES:  No clubbing, cyanosis, or edema  SKIN: No rashes or lesions  NEURO: AAOX3        LABS:                          9.2    16.28 )-----------( 301      ( 13 Apr 2021 07:00 )             30.0   04-13    137  |  98  |  49.0<H>  ----------------------------<  252<H>  4.9   |  23.0  |  2.32<H>    Ca    8.2<L>      13 Apr 2021 07:00    Mg     2.7     04-12    TPro  6.7  /  Alb  3.4  /  TBili  0.2<L>  /  DBili  x   /  AST  20  /  ALT  19  /  AlkPhos  105  04-11              MEDICATIONS  (STANDING):  ALBUTerol    90 MICROgram(s) HFA Inhaler 1 Puff(s) Inhalation every 4 hours  albuterol/ipratropium for Nebulization 3 milliLiter(s) Nebulizer every 6 hours  aluminum hydroxide/magnesium hydroxide/simethicone Suspension 30 milliLiter(s) Oral every 12 hours  amLODIPine   Tablet 5 milliGRAM(s) Oral daily  azithromycin   Tablet 250 milliGRAM(s) Oral daily  dextrose 40% Gel 15 Gram(s) Oral once  dextrose 5%. 1000 milliLiter(s) (50 mL/Hr) IV Continuous <Continuous>  dextrose 5%. 1000 milliLiter(s) (100 mL/Hr) IV Continuous <Continuous>  dextrose 50% Injectable 25 Gram(s) IV Push once  dextrose 50% Injectable 12.5 Gram(s) IV Push once  dextrose 50% Injectable 25 Gram(s) IV Push once  folic acid 1 milliGRAM(s) Oral daily  glucagon  Injectable 1 milliGRAM(s) IntraMuscular once  heparin   Injectable 5000 Unit(s) SubCutaneous every 12 hours  hydrALAZINE 50 milliGRAM(s) Oral every 12 hours  insulin glargine Injectable (LANTUS) 20 Unit(s) SubCutaneous every morning  insulin glargine Injectable (LANTUS) 20 Unit(s) SubCutaneous at bedtime  insulin lispro (ADMELOG) corrective regimen sliding scale   SubCutaneous three times a day before meals  insulin lispro Injectable (ADMELOG) 8 Unit(s) SubCutaneous three times a day before meals  melatonin 1 milliGRAM(s) Oral at bedtime  methylPREDNISolone sodium succinate Injectable 40 milliGRAM(s) IV Push every 8 hours  pantoprazole    Tablet 40 milliGRAM(s) Oral before breakfast  tiotropium 18 MICROgram(s) Capsule 1 Capsule(s) Inhalation daily    MEDICATIONS  (PRN):  guaiFENesin   Syrup  (Sugar-Free) 100 milliGRAM(s) Oral every 6 hours PRN Cough      US renal - no hydro

## 2021-04-13 NOTE — DISCHARGE NOTE PROVIDER - HOSPITAL COURSE
Pt is an 81yr old female with PMH of HTN, HPL, Asthma, DM-2 presented to ER with c/o Shortness of breath since 2days and peripheral edema admitted for acute asthma exacerbation and possible acute renal failure. CT Chest shows bibasilar subsegmental atelectasis (right greater than left) with mild bilateral lower lobe bronchiectasis. Small right and trace left pleural effusion. Started on IV steroids. PO antibiotics, Nebs q4. Tapered off supplemental oxygen.  Seen by Nephrology for ? THERESA - ? CKD. Appears mildly volume overloaded so no iv fluids given. Valsartan and HCTZ held. US Renal shows no hydronephrosis. Also with T2DM, uncontrolled and was titrating medications. A1c 8.7.  TTE Echo was completed and Left ventricular ejection fraction is 70 to 75%. Grade I diastolic dysfunction). Trivial pericardial effusion, borderline pulmonary hypertension and mild aortic stenosis.     Pt requesting 4/13 to leave AMA. Patient is alert and oriented x3 and has capacity to make decisions. I explained at length to the Patient the risks of signing out AMA including but not limited to harm, injury or death. I explained the risks, benefits and alternatives to treatment as well as the risks of refusing inpatient treatment at this time since patient is still on IV steroids and THERESA not resolved. I offered to answer any questions and fully addressed concerns and answered any such questions. Patient fully understands what has been explained and answered and she accepts responsibility for any and all results of this decision.         Pt is an 81yr old female with PMH of HTN, HPL, Asthma, DM-2 presented to ER with c/o Shortness of breath since 2days and peripheral edema admitted for acute asthma exacerbation and possible acute renal failure. CT Chest shows bibasilar subsegmental atelectasis (right greater than left) with mild bilateral lower lobe bronchiectasis. Small right and trace left pleural effusion. Started on IV steroids. PO antibiotics, Nebs q4. Tapered off supplemental oxygen.  Seen by Nephrology for ? THERESA - ? CKD. Appears mildly volume overloaded so no iv fluids given. Valsartan and HCTZ held. US Renal shows no hydronephrosis. Also with T2DM, uncontrolled and was titrating medications. A1c 8.7.  TTE Echo was completed and Left ventricular ejection fraction is 70 to 75%. Grade I diastolic dysfunction). Trivial pericardial effusion, borderline pulmonary hypertension and mild aortic stenosis.     Pt had right heart cath showed Wedge pressure of 23. THERESA on CKD discharge Crt 2.45. INR on discharge 2.16   Pt will need to follow up with PCP on Mon, Wed, Fri for INR draw, will discharge on 5mg Coumadin for 7 days   Pt has finished prednisone neal for sever asthma. However due to steroid neal, pt will go home with 12u levemir at bedtime and Humolog 6U premeal 3 times a day with sliding scale. d  Pt also found to have secondary elevated PTH secondary to CKD. will need to follow up with nephrology and PCP

## 2021-04-13 NOTE — DISCHARGE NOTE PROVIDER - NSDCPNSUBOBJ_GEN_ALL_CORE
----- Message from Hunter Nevarez Vital Signs Last 24 Hrs  T(C): 36.7 (24 Apr 2021 05:50), Max: 37.1 (23 Apr 2021 16:09)  T(F): 98.1 (24 Apr 2021 05:50), Max: 98.7 (23 Apr 2021 16:09)  HR: 76 (24 Apr 2021 08:26) (70 - 82)  BP: 154/53 (24 Apr 2021 05:50) (126/60 - 154/53)  BP(mean): --  RR: 18 (24 Apr 2021 05:50) (17 - 19)  SpO2: 96% (24 Apr 2021 08:26) (93% - 96%)    PHYSICAL EXAM:    Constitutional: NAD,   HEENT: PERR, EOMI,   Neck: Soft and supple,    Respiratory: Breath sounds are clear bilaterally   Cardiovascular: S1 and S2,   Gastrointestinal: Bowel Sounds present, soft, nontender,   Extremities: No peripheral edema  Vascular: 2+ peripheral pulses  Neurological: A/O x 3,   Musculoskeletal: 5/5 strength b/l upper and lower extremities

## 2021-04-13 NOTE — PROGRESS NOTE ADULT - SUBJECTIVE AND OBJECTIVE BOX
NEPHROLOGY INTERVAL HPI/OVERNIGHT EVENTS:    Feels better   wants to sign out AMA   ECHO noted     MEDICATIONS  (STANDING):  ALBUTerol    90 MICROgram(s) HFA Inhaler 1 Puff(s) Inhalation every 4 hours  albuterol/ipratropium for Nebulization 3 milliLiter(s) Nebulizer every 6 hours  aluminum hydroxide/magnesium hydroxide/simethicone Suspension 30 milliLiter(s) Oral every 12 hours  amLODIPine   Tablet 5 milliGRAM(s) Oral daily  azithromycin   Tablet 250 milliGRAM(s) Oral daily  dextrose 40% Gel 15 Gram(s) Oral once  dextrose 5%. 1000 milliLiter(s) (50 mL/Hr) IV Continuous <Continuous>  dextrose 5%. 1000 milliLiter(s) (100 mL/Hr) IV Continuous <Continuous>  dextrose 50% Injectable 25 Gram(s) IV Push once  dextrose 50% Injectable 12.5 Gram(s) IV Push once  dextrose 50% Injectable 25 Gram(s) IV Push once  folic acid 1 milliGRAM(s) Oral daily  glucagon  Injectable 1 milliGRAM(s) IntraMuscular once  heparin   Injectable 5000 Unit(s) SubCutaneous every 12 hours  hydrALAZINE 50 milliGRAM(s) Oral every 12 hours  insulin glargine Injectable (LANTUS) 20 Unit(s) SubCutaneous every morning  insulin glargine Injectable (LANTUS) 20 Unit(s) SubCutaneous at bedtime  insulin lispro (ADMELOG) corrective regimen sliding scale   SubCutaneous three times a day before meals  insulin lispro Injectable (ADMELOG) 8 Unit(s) SubCutaneous three times a day before meals  methylPREDNISolone sodium succinate Injectable 40 milliGRAM(s) IV Push every 8 hours  pantoprazole    Tablet 40 milliGRAM(s) Oral before breakfast  tiotropium 18 MICROgram(s) Capsule 1 Capsule(s) Inhalation daily    MEDICATIONS  (PRN):      Allergies    No Known Allergies    Intolerances    Vital Signs Last 24 Hrs  T(C): 36.8 (13 Apr 2021 08:46), Max: 36.8 (13 Apr 2021 08:46)  T(F): 98.3 (13 Apr 2021 08:46), Max: 98.3 (13 Apr 2021 08:46)  HR: 90 (13 Apr 2021 08:46) (88 - 98)  BP: 153/49 (13 Apr 2021 08:46) (143/62 - 157/71)  BP(mean): --  RR: --  SpO2: 98% (13 Apr 2021 04:21) (98% - 100%)  Daily     Daily   I&O's Detail    I&O's Summary    PE  GENERAL: NAD,  feels better   HEAD:  Atraumatic, Normocephalic  EYES: EOMI, PERRLA, conjunctiva and sclera clear  ENMT:  Moist mucous membranes,  No lesions  NECK: Supple, No JVD  NERVOUS SYSTEM:  Alert & Oriented X3, Good concentration  CHEST/LUNG: EAE, Improved aeration   HEART: tachycardia. Regular rate and rhythm; No murmurs  ABDOMEN: Soft, Nontender, Nondistended; Bowel sounds present  EXTREMITIES:   No clubbing, cyanosis, or 2+ pedal edema    LABS:                        9.2    16.28 )-----------( 301      ( 13 Apr 2021 07:00 )             30.0     04-13    137  |  98  |  49.0<H>  ----------------------------<  252<H>  4.9   |  23.0  |  2.32<H>    Ca    8.2<L>      13 Apr 2021 07:00  Mg     2.7     04-12    TPro  6.7  /  Alb  3.4  /  TBili  0.2<L>  /  DBili  x   /  AST  20  /  ALT  19  /  AlkPhos  105  04-11  < from: TTE Echo Complete w/Doppler (11.16.17 @ 09:15) >        Summary:   1. Left ventricular ejection fraction, by visual estimation, is 70 to   75%.   2. Normal global left ventricular systolic function.   3. Mildly increased LV wall thickness.   4. Normal left ventricular internal cavity size.   5. Spectral Doppler shows impaired relaxation pattern of left   ventricular myocardial filling (Grade I diastolic dysfunction).   6. Trivial pericardial effusion.   7. Mild mitral annular calcification.   8. Estimated pulmonary artery systolic pressure is 37.8 mmHg assuming a   right atrial pressure of 3 mmHg, which is consistent with borderline   pulmonary hypertension.   9. Peak transaortic gradient equals 11.0 mmHg, mean transaortic gradient   equals 5.9 mmHg, the calculated aortic valve area equals 1.73 cm² by the   continuity equation consistent with mild aortic stenosis.    < end of copied text >                RADIOLOGY & ADDITIONAL TESTS:

## 2021-04-13 NOTE — DISCHARGE NOTE PROVIDER - CARE PROVIDERS DIRECT ADDRESSES
,DirectAddress_Unknown ,DirectAddress_Unknown,[email or web address]

## 2021-04-14 LAB
ANION GAP SERPL CALC-SCNC: 14 MMOL/L — SIGNIFICANT CHANGE UP (ref 5–17)
APPEARANCE UR: CLEAR — SIGNIFICANT CHANGE UP
BACTERIA # UR AUTO: ABNORMAL
BILIRUB UR-MCNC: NEGATIVE — SIGNIFICANT CHANGE UP
BUN SERPL-MCNC: 60 MG/DL — HIGH (ref 8–20)
CALCIUM SERPL-MCNC: 8.4 MG/DL — LOW (ref 8.6–10.2)
CHLORIDE SERPL-SCNC: 99 MMOL/L — SIGNIFICANT CHANGE UP (ref 98–107)
CO2 SERPL-SCNC: 24 MMOL/L — SIGNIFICANT CHANGE UP (ref 22–29)
COLOR SPEC: YELLOW — SIGNIFICANT CHANGE UP
CREAT ?TM UR-MCNC: 77 MG/DL — SIGNIFICANT CHANGE UP
CREAT SERPL-MCNC: 2.5 MG/DL — HIGH (ref 0.5–1.3)
DIFF PNL FLD: ABNORMAL
EPI CELLS # UR: SIGNIFICANT CHANGE UP
GLUCOSE BLDC GLUCOMTR-MCNC: 172 MG/DL — HIGH (ref 70–99)
GLUCOSE BLDC GLUCOMTR-MCNC: 56 MG/DL — LOW (ref 70–99)
GLUCOSE BLDC GLUCOMTR-MCNC: 68 MG/DL — LOW (ref 70–99)
GLUCOSE SERPL-MCNC: 152 MG/DL — HIGH (ref 70–99)
GLUCOSE UR QL: NEGATIVE MG/DL — SIGNIFICANT CHANGE UP
HCT VFR BLD CALC: 30.1 % — LOW (ref 34.5–45)
HGB BLD-MCNC: 9.2 G/DL — LOW (ref 11.5–15.5)
KETONES UR-MCNC: NEGATIVE — SIGNIFICANT CHANGE UP
LEUKOCYTE ESTERASE UR-ACNC: NEGATIVE — SIGNIFICANT CHANGE UP
MCHC RBC-ENTMCNC: 19.5 PG — LOW (ref 27–34)
MCHC RBC-ENTMCNC: 30.6 GM/DL — LOW (ref 32–36)
MCV RBC AUTO: 63.9 FL — LOW (ref 80–100)
NITRITE UR-MCNC: NEGATIVE — SIGNIFICANT CHANGE UP
PH UR: 6 — SIGNIFICANT CHANGE UP (ref 5–8)
PLATELET # BLD AUTO: 278 K/UL — SIGNIFICANT CHANGE UP (ref 150–400)
POTASSIUM SERPL-MCNC: 5.2 MMOL/L — SIGNIFICANT CHANGE UP (ref 3.5–5.3)
POTASSIUM SERPL-SCNC: 5.2 MMOL/L — SIGNIFICANT CHANGE UP (ref 3.5–5.3)
PROT UR-MCNC: 500 MG/DL
RBC # BLD: 4.71 M/UL — SIGNIFICANT CHANGE UP (ref 3.8–5.2)
RBC # FLD: 17.6 % — HIGH (ref 10.3–14.5)
RBC CASTS # UR COMP ASSIST: SIGNIFICANT CHANGE UP /HPF (ref 0–4)
SODIUM SERPL-SCNC: 137 MMOL/L — SIGNIFICANT CHANGE UP (ref 135–145)
SODIUM UR-SCNC: <30 MMOL/L — SIGNIFICANT CHANGE UP
SP GR SPEC: 1.01 — SIGNIFICANT CHANGE UP (ref 1.01–1.02)
UROBILINOGEN FLD QL: NEGATIVE MG/DL — SIGNIFICANT CHANGE UP
WBC # BLD: 14.96 K/UL — HIGH (ref 3.8–10.5)
WBC # FLD AUTO: 14.96 K/UL — HIGH (ref 3.8–10.5)
WBC UR QL: SIGNIFICANT CHANGE UP

## 2021-04-14 PROCEDURE — 99233 SBSQ HOSP IP/OBS HIGH 50: CPT

## 2021-04-14 PROCEDURE — 78580 LUNG PERFUSION IMAGING: CPT | Mod: 26

## 2021-04-14 PROCEDURE — 99222 1ST HOSP IP/OBS MODERATE 55: CPT

## 2021-04-14 PROCEDURE — 71045 X-RAY EXAM CHEST 1 VIEW: CPT | Mod: 26

## 2021-04-14 RX ORDER — ALBUTEROL 90 UG/1
1 AEROSOL, METERED ORAL EVERY 4 HOURS
Refills: 0 | Status: DISCONTINUED | OUTPATIENT
Start: 2021-04-14 | End: 2021-04-19

## 2021-04-14 RX ORDER — TIOTROPIUM BROMIDE 18 UG/1
1 CAPSULE ORAL; RESPIRATORY (INHALATION) DAILY
Refills: 0 | Status: DISCONTINUED | OUTPATIENT
Start: 2021-04-14 | End: 2021-04-19

## 2021-04-14 RX ORDER — ALPRAZOLAM 0.25 MG
0.25 TABLET ORAL ONCE
Refills: 0 | Status: DISCONTINUED | OUTPATIENT
Start: 2021-04-14 | End: 2021-04-14

## 2021-04-14 RX ADMIN — AMLODIPINE BESYLATE 10 MILLIGRAM(S): 2.5 TABLET ORAL at 05:11

## 2021-04-14 RX ADMIN — PANTOPRAZOLE SODIUM 40 MILLIGRAM(S): 20 TABLET, DELAYED RELEASE ORAL at 05:14

## 2021-04-14 RX ADMIN — Medication 1 MILLIGRAM(S): at 22:16

## 2021-04-14 RX ADMIN — AZITHROMYCIN 250 MILLIGRAM(S): 500 TABLET, FILM COATED ORAL at 11:51

## 2021-04-14 RX ADMIN — Medication 50 MILLIGRAM(S): at 17:52

## 2021-04-14 RX ADMIN — Medication 40 MILLIGRAM(S): at 14:29

## 2021-04-14 RX ADMIN — INSULIN GLARGINE 25 UNIT(S): 100 INJECTION, SOLUTION SUBCUTANEOUS at 08:41

## 2021-04-14 RX ADMIN — Medication 40 MILLIGRAM(S): at 22:16

## 2021-04-14 RX ADMIN — Medication 1 MILLIGRAM(S): at 11:51

## 2021-04-14 RX ADMIN — HEPARIN SODIUM 5000 UNIT(S): 5000 INJECTION INTRAVENOUS; SUBCUTANEOUS at 17:52

## 2021-04-14 RX ADMIN — ALBUTEROL 1 PUFF(S): 90 AEROSOL, METERED ORAL at 16:28

## 2021-04-14 RX ADMIN — HEPARIN SODIUM 5000 UNIT(S): 5000 INJECTION INTRAVENOUS; SUBCUTANEOUS at 05:12

## 2021-04-14 RX ADMIN — Medication 0.25 MILLIGRAM(S): at 22:16

## 2021-04-14 RX ADMIN — Medication 40 MILLIGRAM(S): at 05:12

## 2021-04-14 RX ADMIN — ALBUTEROL 1 PUFF(S): 90 AEROSOL, METERED ORAL at 19:50

## 2021-04-14 RX ADMIN — Medication 8 UNIT(S): at 07:50

## 2021-04-14 RX ADMIN — Medication 3 MILLILITER(S): at 04:38

## 2021-04-14 RX ADMIN — ALBUTEROL 1 PUFF(S): 90 AEROSOL, METERED ORAL at 12:12

## 2021-04-14 RX ADMIN — Medication 2: at 07:50

## 2021-04-14 RX ADMIN — Medication 50 MILLIGRAM(S): at 05:11

## 2021-04-14 NOTE — PROGRESS NOTE ADULT - SUBJECTIVE AND OBJECTIVE BOX
NEPHROLOGY INTERVAL HPI/OVERNIGHT EVENTS:    Examined earlier  sitting up eating   No distress  Denies HA CP  + Dyspnea w exertion    MEDICATIONS  (STANDING):  ALBUTerol    90 MICROgram(s) HFA Inhaler 1 Puff(s) Inhalation every 4 hours  amLODIPine   Tablet 10 milliGRAM(s) Oral daily  azithromycin   Tablet 250 milliGRAM(s) Oral daily  dextrose 40% Gel 15 Gram(s) Oral once  dextrose 5%. 1000 milliLiter(s) (50 mL/Hr) IV Continuous <Continuous>  dextrose 5%. 1000 milliLiter(s) (100 mL/Hr) IV Continuous <Continuous>  dextrose 50% Injectable 25 Gram(s) IV Push once  dextrose 50% Injectable 12.5 Gram(s) IV Push once  dextrose 50% Injectable 25 Gram(s) IV Push once  folic acid 1 milliGRAM(s) Oral daily  glucagon  Injectable 1 milliGRAM(s) IntraMuscular once  heparin   Injectable 5000 Unit(s) SubCutaneous every 12 hours  hydrALAZINE 50 milliGRAM(s) Oral every 12 hours  insulin glargine Injectable (LANTUS) 30 Unit(s) SubCutaneous at bedtime  insulin glargine Injectable (LANTUS) 25 Unit(s) SubCutaneous every morning  insulin lispro (ADMELOG) corrective regimen sliding scale   SubCutaneous three times a day before meals  insulin lispro Injectable (ADMELOG) 8 Unit(s) SubCutaneous three times a day before meals  melatonin 1 milliGRAM(s) Oral at bedtime  methylPREDNISolone sodium succinate Injectable 40 milliGRAM(s) IV Push every 8 hours  pantoprazole    Tablet 40 milliGRAM(s) Oral before breakfast  tiotropium 18 MICROgram(s) Capsule 1 Capsule(s) Inhalation daily    MEDICATIONS  (PRN):  guaiFENesin   Syrup  (Sugar-Free) 100 milliGRAM(s) Oral every 6 hours PRN Cough      Allergies    No Known Allergies    Intolerances        Vital Signs Last 24 Hrs  T(C): 36.3 (14 Apr 2021 09:14), Max: 36.8 (13 Apr 2021 15:29)  T(F): 97.3 (14 Apr 2021 09:14), Max: 98.3 (13 Apr 2021 15:29)  HR: 100 (14 Apr 2021 09:14) (88 - 103)  BP: 150/61 (14 Apr 2021 09:14) (121/51 - 161/72)  BP(mean): --  RR: 20 (14 Apr 2021 09:14) (18 - 20)  SpO2: 90% (14 Apr 2021 09:14) (90% - 98%)  Daily     Daily     PHYSICAL EXAM:  GENERAL: NAD  HEAD:  NCAT  EYES: EOMI  NECK: Supple, No JVD  NERVOUS SYSTEM:  Alert & Oriented X3  CHEST/LUNG: EAE, Improved aeration   HEART: tachycardia. Regular rate and rhythm  ABDOMEN: Soft, Nontender, Nondistended; Bowel sounds present  EXTREMITIES:  1- 2+ pedal edema B/L LE    LABS:                        9.2    14.96 )-----------( 278      ( 14 Apr 2021 07:24 )             30.1     04-14    137  |  99  |  60.0<H>  ----------------------------<  152<H>  5.2   |  24.0  |  2.50<H>    Ca    8.4<L>      14 Apr 2021 07:24                  RADIOLOGY & ADDITIONAL TESTS:

## 2021-04-14 NOTE — PROGRESS NOTE ADULT - ASSESSMENT
81yr old female with PMH of HTN, HPL, Asthma, DM-2 presented to ER with c/o Shortness of breath since 2days and peripheral edema admitted for possible acute asthma exacerbation and possible acute renal failure.     # Acute Asthma Exacerbation + severe pulm HTN  - suspect acute diastolic congestive heart failure - VQ scan - neg for PE   appreciate pulm recs    steroids iv q8  Nebs q4   wean off O2 as tolerated   PT     #  worsening THERESA on possible underlying CKD   appreicate renal recs   appears mildly volume overloaded - no ivfluids - may need diuresis /RRT   hold valsartan, HCTZ, f/u ECHO - urine studies still pending     # HTN - ct hydralazine, amlodipine, hold valsartan - BP - elevated -monitor - increase amlodipine to 10mg   # Sinus tachycardia - suspect 2/2 neb + acute asthma - resolved - ok to dc monitor     # DM-2 with hypoglycemia - suspect 2/2 poor renal clearance at home - takes Levemir 30-30 + humalog 10 TID - BS uncontrolled - 2/2 steroids -   hold LAntus - ct SSI for ow - monitor and titrate     # Peripheral edema , elevated d-dimer - b/l doppler LE - Neg for DVT - f/u ECHO - severe PHTN   # Abdominal pain - ?gastritis - resolved with Pantop + maalox - ct pantop - while on steroids   # DVT px - Heparin sq   Dc - pending clinical improvement      Family son - Tyron was updated @[phone].  Prednisone Counseling:  I discussed with the patient the risks of prolonged use of prednisone including but not limited to weight gain, insomnia, osteoporosis, mood changes, diabetes, susceptibility to infection, glaucoma and high blood pressure.  In cases where prednisone use is prolonged, patients should be monitored with blood pressure checks, serum glucose levels and an eye exam.  Additionally, the patient may need to be placed on GI prophylaxis, PCP prophylaxis, and calcium and vitamin D supplementation and/or a bisphosphonate.  The patient verbalized understanding of the proper use and the possible adverse effects of prednisone.  All of the patient's questions and concerns were addressed.

## 2021-04-14 NOTE — CONSULT NOTE ADULT - TIME BILLING
reviewing labs, notes, orders, radiographic studies, as well as counseling and coordinating care with the relevant multidisciplinary team, including with the primary and consulting providers.

## 2021-04-14 NOTE — CONSULT NOTE ADULT - SUBJECTIVE AND OBJECTIVE BOX
PULMONARY CONSULT NOTE      IFEOMA TRINIDAD  MRN-[id number]    Patient is a 81y old  Female who presents with a chief complaint of SOB not improving with inhalers, cough and abnormal renal function test (13 Apr 2021 15:23)      HISTORY OF PRESENT ILLNESS:  81F PMH asthma, bronchiectasis, breast CA s/p XRT, HTN, HLD, DM2 who p/w SOB. She denies history of smoking. No fevers, no chills. She was treated for acute exacerbation of asthma. On TTE found with PASP of 64mmHg and therefore pulmonary consult called. Also found with THERESA during her course. Denying N/V/D.     MEDICATIONS  (STANDING):  ALBUTerol    90 MICROgram(s) HFA Inhaler 1 Puff(s) Inhalation every 4 hours  amLODIPine   Tablet 10 milliGRAM(s) Oral daily  azithromycin   Tablet 250 milliGRAM(s) Oral daily  dextrose 40% Gel 15 Gram(s) Oral once  dextrose 5%. 1000 milliLiter(s) (50 mL/Hr) IV Continuous <Continuous>  dextrose 5%. 1000 milliLiter(s) (100 mL/Hr) IV Continuous <Continuous>  dextrose 50% Injectable 25 Gram(s) IV Push once  dextrose 50% Injectable 12.5 Gram(s) IV Push once  dextrose 50% Injectable 25 Gram(s) IV Push once  folic acid 1 milliGRAM(s) Oral daily  glucagon  Injectable 1 milliGRAM(s) IntraMuscular once  heparin   Injectable 5000 Unit(s) SubCutaneous every 12 hours  hydrALAZINE 50 milliGRAM(s) Oral every 12 hours  insulin glargine Injectable (LANTUS) 30 Unit(s) SubCutaneous at bedtime  insulin glargine Injectable (LANTUS) 25 Unit(s) SubCutaneous every morning  insulin lispro (ADMELOG) corrective regimen sliding scale   SubCutaneous three times a day before meals  insulin lispro Injectable (ADMELOG) 8 Unit(s) SubCutaneous three times a day before meals  melatonin 1 milliGRAM(s) Oral at bedtime  methylPREDNISolone sodium succinate Injectable 40 milliGRAM(s) IV Push every 8 hours  pantoprazole    Tablet 40 milliGRAM(s) Oral before breakfast  tiotropium 18 MICROgram(s) Capsule 1 Capsule(s) Inhalation daily    MEDICATIONS  (PRN):  guaiFENesin   Syrup  (Sugar-Free) 100 milliGRAM(s) Oral every 6 hours PRN Cough    Allergies    No Known Allergies    Intolerances      PAST MEDICAL & SURGICAL HISTORY:  Asthma    Diabetes mellitus    Hypertension    Emphysema    Hyperlipemia    S/P appendectomy    S/P tubal ligation    S/P knee surgery  left      FAMILY HISTORY:  No pertinent family history in first degree relatives          SOCIAL HISTORY  Smoking History:   Never smoker; reports second hand smoke exposure from her .    REVIEW OF SYSTEMS:  CONSTITUTIONAL:  No fevers, chills  HEENT:  No headache  CARDIOVASCULAR:  No chest pain, no palpitations  RESPIRATORY:  As per HPI  GASTROINTESTINAL:  No abdominal pain  GENITOURINARY:  No dysuria  NEUROLOGIC:  No seizures or headaches  PSYCHIATRIC:  No disorder of thought or mood      Vital Signs Last 24 Hrs  T(C): 36.3 (14 Apr 2021 09:14), Max: 36.8 (13 Apr 2021 15:29)  T(F): 97.3 (14 Apr 2021 09:14), Max: 98.3 (13 Apr 2021 15:29)  HR: 100 (14 Apr 2021 09:14) (88 - 103)  BP: 150/61 (14 Apr 2021 09:14) (121/51 - 161/72)  BP(mean): --  RR: 20 (14 Apr 2021 09:14) (18 - 20)  SpO2: 90% (14 Apr 2021 09:14) (90% - 98%)      PHYSICAL EXAMINATION:  GENERAL: In no apparent distress  HEENT: NC/AT  NECK: Supple, non-tender   LUNGS: Diffuse B/L wheezing  CV: +S1, S2  ABDOMEN: Soft, non-tender  EXTREMITIES: No rashes, lesions, 1-2+ pedal edema B/L  NEUROLOGIC: Grossly non-focal  PSYCH: Normal affect      LABS:                        9.2    14.96 )-----------( 278      ( 14 Apr 2021 07:24 )             30.1     04-14    137  |  99  |  60.0<H>  ----------------------------<  152<H>  5.2   |  24.0  |  2.50<H>    Ca    8.4<L>      14 Apr 2021 07:24                  Serum Pro-Brain Natriuretic Peptide: 1789 pg/mL (04-11-21 @ 15:54)          MICROBIOLOGY:  COVID-19 PCR . (04.11.21 @ 15:55)    COVID-19 PCR: NotDetec: You can help in the fight against COVID-19. [facility] may contact  you to see if you are interested in voluntarily participating in one of  our clinical trials.  Testing is performed using polymerase chain reaction (PCR) or  transcription mediated amplification (TMA). This COVID-19 (SARS-CoV-2)  nucleic acid amplification test was validated by [email or web address] [facility] and is  in use under the FDA Emergency Use Authorization (EUA) for clinical labs  CLIA-certified to perform high complexity testing. Test results should be  correlated with clinical presentation, patient history, and epidemiology.          RADIOLOGY & ADDITIONAL STUDIES:  < from: CT Chest No Cont (04.12.21 @ 01:45) >  EXAM:  CT CHEST                          PROCEDURE DATE:  04/12/2021          INTERPRETATION:  CLINICAL INFORMATION: Shortness of breath.    COMPARISON: 4/26/2013.    CONTRAST/COMPLICATIONS:  IV Contrast: NONE  Oral Contrast: NONE  Complications: None reported at time of study completion    PROCEDURE:  CT of the Chest was performed.  Sagittal and coronal reformats were performed.    FINDINGS:    LUNGS AND AIRWAYS: Patent central airways.  Nodular opacity in the right upper lobe measuring 5 mm (series 3:75) unchanged. 4 mm left lower lobe peripheral lung nodule (series 3:90). No lung consolidation or mass. Bibasilar subsegmental atelectasis (right greater than left) with mild bilateral lower lobe bronchiectasis.  PLEURA: Small right and trace left pleural effusion.  MEDIASTINUM AND SURINDER: No lymphadenopathy.  VESSELS: Main pulmonary artery is dilated measuring up to 3.9 cm. Thoracic aorta is normal in caliber. Atherosclerotic calcifications of the thoracic aorta, great vessels, and coronary arteries.  HEART: Heart is borderline enlarged. No pericardial effusion.  CHEST WALL AND LOWER NECK: Surgical clips in the left axilla.  VISUALIZED UPPER ABDOMEN: Small hiatus hernia.  BONES: Mild degenerative changes of the spine. Mild compression fracture of T11 which is new compared with prior exam from 2013.    IMPRESSION:  Bibasilar subsegmental atelectasis (right greater than left) with mild bilateral lower lobe bronchiectasis. Small right and trace left pleural effusion.    Dilated mainpulmonary artery which may be reflective of underlying pulmonary arterial hypertension.    Mild compression fracture of T11 which is new compared with prior exam from 4/26/2013.    < end of copied text >      ECHO:  < from: TTE Echo Complete w/o Contrast w/ Doppler (04.13.21 @ 17:34) >  Summary:   1. Normal global left ventricular systolic function.   2. Left ventricular ejection fraction, by visual estimation, is 70 to 75%.   3. Mildly increased LV wall thickness.   4. Normal left ventricular internal cavity size.   5. Normal right ventricular size and function.   6. The left atrium is normal in size.   7. The right atrium is normal in size.   8. Lipomatous hypertrophy of the intra-atrial septum.   9. Moderate mitral annular calcification.  10. Mild mitral valve regurgitation.  11. Elevated mitral valve mean gradient    5 mmHg at HR 99 BPM.  12. No aortic valve stenosis.  13. Estimated pulmonary artery systolic pressure is 63.7 mmHg assuming a right atrial pressure of 8 mmHg, which is consistent with severe pulmonary hypertension.  14. Recommend clinical correlation with the above findings.    Ewa Urbina MD Electronically signed on 4/13/2021 at 6:34:53 PM    < end of copied text >    < from: TTE Echo Complete w/Doppler (11.16.17 @ 09:15) >  Summary:   1. Left ventricular ejection fraction, by visual estimation, is 70 to   75%.   2. Normal global left ventricular systolic function.   3. Mildly increased LV wall thickness.   4. Normal left ventricular internal cavity size.   5. Spectral Doppler shows impaired relaxation pattern of left   ventricular myocardial filling (Grade I diastolic dysfunction).   6. Trivial pericardial effusion.   7. Mild mitral annular calcification.   8. Estimated pulmonary artery systolic pressure is 37.8 mmHg assuming a   right atrial pressure of 3 mmHg, which is consistent with borderline   pulmonary hypertension.   9. Peak transaortic gradient equals 11.0 mmHg, mean transaortic gradient   equals 5.9 mmHg, the calculated aortic valve area equals 1.73 cm² by the   continuity equation consistent with mild aortic stenosis.     Kd Bennett MD Electronically signed on 11/17/2017 at 10:10:42 AM    < end of copied text >

## 2021-04-14 NOTE — CHART NOTE - NSCHARTNOTEFT_GEN_A_CORE
Medicine PA- Cd for pt w/ wheezing, feels anxious. Medicine PA- Cd for pt w/ wheezing, feels anxious/ nervous, denies CP, SOB.   VSS- /66-23-98.3-95% 3L  Gen- Pt appears comfortable, in NAD  S1S2 ausc   Lungs- + few scattered wheezes  -stat albuterol neb tx  - xanax 0.25mg x1  - continue to monitor  - call PA if status changes

## 2021-04-14 NOTE — PROGRESS NOTE ADULT - SUBJECTIVE AND OBJECTIVE BOX
IFEOMA VIVI    [id number]    81y      Female    CC: SOB   still feels sob, sugars running low - sweaty, hungry     INTERVAL HPI/OVERNIGHT EVENTS: no acute events     TELE: Sinus - HR - 80-90s - no acute events     REVIEW OF SYSTEMS:    CONSTITUTIONAL: No fever,  fatigue  RESPIRATORY: No cough, wheezing  CARDIOVASCULAR: No chest pain, palpitations  GASTROINTESTINAL: No abdominal or epigastric pain. No nausea, vomiting          Vital Signs Last 24 Hrs  T(C): 36.4 (14 Apr 2021 14:09), Max: 36.8 (13 Apr 2021 19:49)  T(F): 97.6 (14 Apr 2021 14:09), Max: 98.3 (13 Apr 2021 19:49)  HR: 98 (14 Apr 2021 14:09) (88 - 100)  BP: 148/59 (14 Apr 2021 14:09) (121/51 - 150/61)  BP(mean): --  RR: 18 (14 Apr 2021 14:09) (18 - 20)  SpO2: 94% (14 Apr 2021 14:09) (90% - 97%)        PHYSICAL EXAM:    GENERAL: NAD  HEENT: PERRL, +EOMI  NECK: soft, Supple  CHEST/LUNG: Clear to percussion bilaterally; b/l basal crackles, no wheezing  HEART: S1S2+, Regular rate and rhythm; No murmurs  ABDOMEN: Soft, Nontender, Nondistended; Bowel sounds present  EXTREMITIES:  No clubbing, cyanosis, or edema  SKIN: No rashes or lesions  NEURO: AAOX3        LABS:                            9.2    14.96 )-----------( 278      ( 14 Apr 2021 07:24 )             30.1   04-14    137  |  99  |  60.0<H>  ----------------------------<  152<H>  5.2   |  24.0  |  2.50<H>    Ca    8.4<L>      14 Apr 2021 07:24              MEDICATIONS  (STANDING):  ALBUTerol    90 MICROgram(s) HFA Inhaler 1 Puff(s) Inhalation every 4 hours  amLODIPine   Tablet 10 milliGRAM(s) Oral daily  azithromycin   Tablet 250 milliGRAM(s) Oral daily  dextrose 40% Gel 15 Gram(s) Oral once  dextrose 5%. 1000 milliLiter(s) (50 mL/Hr) IV Continuous <Continuous>  dextrose 5%. 1000 milliLiter(s) (100 mL/Hr) IV Continuous <Continuous>  dextrose 50% Injectable 25 Gram(s) IV Push once  dextrose 50% Injectable 12.5 Gram(s) IV Push once  dextrose 50% Injectable 25 Gram(s) IV Push once  folic acid 1 milliGRAM(s) Oral daily  glucagon  Injectable 1 milliGRAM(s) IntraMuscular once  heparin   Injectable 5000 Unit(s) SubCutaneous every 12 hours  hydrALAZINE 50 milliGRAM(s) Oral every 12 hours  insulin lispro (ADMELOG) corrective regimen sliding scale   SubCutaneous three times a day before meals  melatonin 1 milliGRAM(s) Oral at bedtime  methylPREDNISolone sodium succinate Injectable 40 milliGRAM(s) IV Push every 8 hours  pantoprazole    Tablet 40 milliGRAM(s) Oral before breakfast  tiotropium 18 MICROgram(s) Capsule 1 Capsule(s) Inhalation daily    MEDICATIONS  (PRN):  guaiFENesin   Syrup  (Sugar-Free) 100 milliGRAM(s) Oral every 6 hours PRN Cough

## 2021-04-14 NOTE — PROGRESS NOTE ADULT - ASSESSMENT
THERESA - baseline creat 0.9 Feb 2020  rising Cr 1.9--> 2.2--> 2.3--> 2.5  No hydro on renal sono   IRDM , HTN   Poorly controlled DM - A1-C 8.7   Diovan , HCTZ , Metformin held for now   CAROL on CXR   No hydro on renal sono   ECHO noted severe pulm HTN, diastolic HF  Ordered 24 h urine - start  SOB--> NM scan noted very low probability PE  Needs diuretics if renal function cont along this trajectory  may ultimately need RRT    Will follow

## 2021-04-15 LAB
ANION GAP SERPL CALC-SCNC: 14 MMOL/L — SIGNIFICANT CHANGE UP (ref 5–17)
APTT BLD: 27.2 SEC — LOW (ref 27.5–35.5)
APTT BLD: >200 SEC — CRITICAL HIGH (ref 27.5–35.5)
BUN SERPL-MCNC: 60 MG/DL — HIGH (ref 8–20)
CALCIUM SERPL-MCNC: 8.1 MG/DL — LOW (ref 8.6–10.2)
CENTROMERE AB SER-ACNC: <0.2 AI — SIGNIFICANT CHANGE UP
CHLORIDE SERPL-SCNC: 100 MMOL/L — SIGNIFICANT CHANGE UP (ref 98–107)
CO2 SERPL-SCNC: 22 MMOL/L — SIGNIFICANT CHANGE UP (ref 22–29)
CREAT SERPL-MCNC: 2.46 MG/DL — HIGH (ref 0.5–1.3)
ENA SCL70 AB SER-ACNC: <0.2 AI — SIGNIFICANT CHANGE UP
GLUCOSE BLDC GLUCOMTR-MCNC: 228 MG/DL — HIGH (ref 70–99)
GLUCOSE BLDC GLUCOMTR-MCNC: 292 MG/DL — HIGH (ref 70–99)
GLUCOSE BLDC GLUCOMTR-MCNC: 349 MG/DL — HIGH (ref 70–99)
GLUCOSE BLDC GLUCOMTR-MCNC: 349 MG/DL — HIGH (ref 70–99)
GLUCOSE SERPL-MCNC: 260 MG/DL — HIGH (ref 70–99)
HCT VFR BLD CALC: 29.4 % — LOW (ref 34.5–45)
HCT VFR BLD CALC: 30.4 % — LOW (ref 34.5–45)
HGB BLD-MCNC: 8.7 G/DL — LOW (ref 11.5–15.5)
HGB BLD-MCNC: 9 G/DL — LOW (ref 11.5–15.5)
INR BLD: 0.93 RATIO — SIGNIFICANT CHANGE UP (ref 0.88–1.16)
MCHC RBC-ENTMCNC: 19.1 PG — LOW (ref 27–34)
MCHC RBC-ENTMCNC: 19.2 PG — LOW (ref 27–34)
MCHC RBC-ENTMCNC: 29.6 GM/DL — LOW (ref 32–36)
MCHC RBC-ENTMCNC: 29.6 GM/DL — LOW (ref 32–36)
MCV RBC AUTO: 64.6 FL — LOW (ref 80–100)
MCV RBC AUTO: 65 FL — LOW (ref 80–100)
PLATELET # BLD AUTO: 255 K/UL — SIGNIFICANT CHANGE UP (ref 150–400)
PLATELET # BLD AUTO: 264 K/UL — SIGNIFICANT CHANGE UP (ref 150–400)
POTASSIUM SERPL-MCNC: 5.5 MMOL/L — HIGH (ref 3.5–5.3)
POTASSIUM SERPL-SCNC: 5.5 MMOL/L — HIGH (ref 3.5–5.3)
PROTHROM AB SERPL-ACNC: 10.8 SEC — SIGNIFICANT CHANGE UP (ref 10.6–13.6)
RBC # BLD: 4.55 M/UL — SIGNIFICANT CHANGE UP (ref 3.8–5.2)
RBC # BLD: 4.68 M/UL — SIGNIFICANT CHANGE UP (ref 3.8–5.2)
RBC # FLD: 17.8 % — HIGH (ref 10.3–14.5)
RBC # FLD: 17.9 % — HIGH (ref 10.3–14.5)
SODIUM SERPL-SCNC: 136 MMOL/L — SIGNIFICANT CHANGE UP (ref 135–145)
WBC # BLD: 10.03 K/UL — SIGNIFICANT CHANGE UP (ref 3.8–10.5)
WBC # BLD: 10.81 K/UL — HIGH (ref 3.8–10.5)
WBC # FLD AUTO: 10.03 K/UL — SIGNIFICANT CHANGE UP (ref 3.8–10.5)
WBC # FLD AUTO: 10.81 K/UL — HIGH (ref 3.8–10.5)

## 2021-04-15 PROCEDURE — 99497 ADVNCD CARE PLAN 30 MIN: CPT | Mod: 25

## 2021-04-15 PROCEDURE — 99233 SBSQ HOSP IP/OBS HIGH 50: CPT

## 2021-04-15 PROCEDURE — 93010 ELECTROCARDIOGRAM REPORT: CPT

## 2021-04-15 PROCEDURE — 99223 1ST HOSP IP/OBS HIGH 75: CPT

## 2021-04-15 RX ORDER — HEPARIN SODIUM 5000 [USP'U]/ML
5500 INJECTION INTRAVENOUS; SUBCUTANEOUS ONCE
Refills: 0 | Status: COMPLETED | OUTPATIENT
Start: 2021-04-15 | End: 2021-04-15

## 2021-04-15 RX ORDER — HEPARIN SODIUM 5000 [USP'U]/ML
INJECTION INTRAVENOUS; SUBCUTANEOUS
Qty: 25000 | Refills: 0 | Status: DISCONTINUED | OUTPATIENT
Start: 2021-04-15 | End: 2021-04-20

## 2021-04-15 RX ORDER — FUROSEMIDE 40 MG
60 TABLET ORAL
Refills: 0 | Status: DISCONTINUED | OUTPATIENT
Start: 2021-04-15 | End: 2021-04-20

## 2021-04-15 RX ORDER — ALBUTEROL 90 UG/1
2.5 AEROSOL, METERED ORAL ONCE
Refills: 0 | Status: DISCONTINUED | OUTPATIENT
Start: 2021-04-14 | End: 2021-04-24

## 2021-04-15 RX ORDER — DILTIAZEM HCL 120 MG
30 CAPSULE, EXT RELEASE 24 HR ORAL EVERY 8 HOURS
Refills: 0 | Status: DISCONTINUED | OUTPATIENT
Start: 2021-04-15 | End: 2021-04-19

## 2021-04-15 RX ORDER — HEPARIN SODIUM 5000 [USP'U]/ML
2500 INJECTION INTRAVENOUS; SUBCUTANEOUS EVERY 6 HOURS
Refills: 0 | Status: DISCONTINUED | OUTPATIENT
Start: 2021-04-15 | End: 2021-04-22

## 2021-04-15 RX ORDER — HEPARIN SODIUM 5000 [USP'U]/ML
5500 INJECTION INTRAVENOUS; SUBCUTANEOUS EVERY 6 HOURS
Refills: 0 | Status: DISCONTINUED | OUTPATIENT
Start: 2021-04-15 | End: 2021-04-22

## 2021-04-15 RX ORDER — ALPRAZOLAM 0.25 MG
0.25 TABLET ORAL EVERY 8 HOURS
Refills: 0 | Status: DISCONTINUED | OUTPATIENT
Start: 2021-04-15 | End: 2021-04-19

## 2021-04-15 RX ORDER — SODIUM ZIRCONIUM CYCLOSILICATE 10 G/10G
5 POWDER, FOR SUSPENSION ORAL ONCE
Refills: 0 | Status: COMPLETED | OUTPATIENT
Start: 2021-04-15 | End: 2021-04-15

## 2021-04-15 RX ORDER — IPRATROPIUM/ALBUTEROL SULFATE 18-103MCG
3 AEROSOL WITH ADAPTER (GRAM) INHALATION EVERY 6 HOURS
Refills: 0 | Status: DISCONTINUED | OUTPATIENT
Start: 2021-04-15 | End: 2021-04-24

## 2021-04-15 RX ADMIN — HEPARIN SODIUM 0 UNIT(S)/HR: 5000 INJECTION INTRAVENOUS; SUBCUTANEOUS at 22:19

## 2021-04-15 RX ADMIN — Medication 1 MILLIGRAM(S): at 09:22

## 2021-04-15 RX ADMIN — Medication 3 MILLILITER(S): at 14:45

## 2021-04-15 RX ADMIN — Medication 4: at 07:38

## 2021-04-15 RX ADMIN — Medication 3 MILLILITER(S): at 20:50

## 2021-04-15 RX ADMIN — Medication 30 MILLIGRAM(S): at 15:03

## 2021-04-15 RX ADMIN — Medication 40 MILLIGRAM(S): at 12:55

## 2021-04-15 RX ADMIN — ALBUTEROL 1 PUFF(S): 90 AEROSOL, METERED ORAL at 03:47

## 2021-04-15 RX ADMIN — Medication 6: at 11:30

## 2021-04-15 RX ADMIN — AMLODIPINE BESYLATE 10 MILLIGRAM(S): 2.5 TABLET ORAL at 06:59

## 2021-04-15 RX ADMIN — Medication 40 MILLIGRAM(S): at 06:58

## 2021-04-15 RX ADMIN — SODIUM ZIRCONIUM CYCLOSILICATE 5 GRAM(S): 10 POWDER, FOR SUSPENSION ORAL at 15:03

## 2021-04-15 RX ADMIN — Medication 0.25 MILLIGRAM(S): at 20:06

## 2021-04-15 RX ADMIN — HEPARIN SODIUM 5000 UNIT(S): 5000 INJECTION INTRAVENOUS; SUBCUTANEOUS at 06:58

## 2021-04-15 RX ADMIN — PANTOPRAZOLE SODIUM 40 MILLIGRAM(S): 20 TABLET, DELAYED RELEASE ORAL at 06:59

## 2021-04-15 RX ADMIN — Medication 30 MILLIGRAM(S): at 22:19

## 2021-04-15 RX ADMIN — Medication 8: at 16:29

## 2021-04-15 RX ADMIN — Medication 1 MILLIGRAM(S): at 22:19

## 2021-04-15 RX ADMIN — Medication 0.25 MILLIGRAM(S): at 09:22

## 2021-04-15 RX ADMIN — Medication 40 MILLIGRAM(S): at 23:32

## 2021-04-15 RX ADMIN — Medication 50 MILLIGRAM(S): at 06:59

## 2021-04-15 RX ADMIN — HEPARIN SODIUM 5500 UNIT(S): 5000 INJECTION INTRAVENOUS; SUBCUTANEOUS at 14:20

## 2021-04-15 RX ADMIN — ALBUTEROL 1 PUFF(S): 90 AEROSOL, METERED ORAL at 00:55

## 2021-04-15 RX ADMIN — AZITHROMYCIN 250 MILLIGRAM(S): 500 TABLET, FILM COATED ORAL at 09:22

## 2021-04-15 RX ADMIN — Medication 50 MILLIGRAM(S): at 17:06

## 2021-04-15 RX ADMIN — HEPARIN SODIUM 1200 UNIT(S)/HR: 5000 INJECTION INTRAVENOUS; SUBCUTANEOUS at 14:19

## 2021-04-15 RX ADMIN — Medication 3 MILLILITER(S): at 09:22

## 2021-04-15 RX ADMIN — Medication 60 MILLIGRAM(S): at 17:06

## 2021-04-15 NOTE — PROGRESS NOTE ADULT - SUBJECTIVE AND OBJECTIVE BOX
NEPHROLOGY INTERVAL HPI/OVERNIGHT EVENTS:    Examined  + dyspnea w minimal exertion  + LE edema    MEDICATIONS  (STANDING):  ALBUTerol    0.083%. 2.5 milliGRAM(s) Nebulizer once  ALBUTerol    90 MICROgram(s) HFA Inhaler 1 Puff(s) Inhalation every 4 hours  albuterol/ipratropium for Nebulization 3 milliLiter(s) Nebulizer every 6 hours  amLODIPine   Tablet 10 milliGRAM(s) Oral daily  dextrose 40% Gel 15 Gram(s) Oral once  dextrose 5%. 1000 milliLiter(s) (50 mL/Hr) IV Continuous <Continuous>  dextrose 5%. 1000 milliLiter(s) (100 mL/Hr) IV Continuous <Continuous>  dextrose 50% Injectable 25 Gram(s) IV Push once  dextrose 50% Injectable 12.5 Gram(s) IV Push once  dextrose 50% Injectable 25 Gram(s) IV Push once  folic acid 1 milliGRAM(s) Oral daily  glucagon  Injectable 1 milliGRAM(s) IntraMuscular once  heparin   Injectable 5000 Unit(s) SubCutaneous every 12 hours  hydrALAZINE 50 milliGRAM(s) Oral every 12 hours  insulin lispro (ADMELOG) corrective regimen sliding scale   SubCutaneous three times a day before meals  melatonin 1 milliGRAM(s) Oral at bedtime  methylPREDNISolone sodium succinate Injectable 40 milliGRAM(s) IV Push every 8 hours  pantoprazole    Tablet 40 milliGRAM(s) Oral before breakfast  tiotropium 18 MICROgram(s) Capsule 1 Capsule(s) Inhalation daily    MEDICATIONS  (PRN):  ALPRAZolam 0.25 milliGRAM(s) Oral every 8 hours PRN anxiety  guaiFENesin   Syrup  (Sugar-Free) 100 milliGRAM(s) Oral every 6 hours PRN Cough      Allergies    No Known Allergies    Intolerances        Vital Signs Last 24 Hrs  T(C): 36.5 (15 Apr 2021 11:13), Max: 36.8 (2021 18:30)  T(F): 97.7 (15 Apr 2021 11:13), Max: 98.3 (2021 18:30)  HR: 80 (15 Apr 2021 11:13) (80 - 100)  BP: 134/64 (15 Apr 2021 11:13) (134/64 - 184/66)  BP(mean): --  RR: 19 (15 Apr 2021 11:13) (18 - 23)  SpO2: 96% (15 Apr 2021 11:13) (94% - 98%)  Daily     Daily     PHYSICAL EXAM:  GENERAL: NAD  HEAD:  NCAT  EYES: EOMI  NECK: Supple, No JVD  NERVOUS SYSTEM:  Alert & Oriented X3  CHEST/LUNG: EAE, Improved aeration   HEART: tachycardia. Regular rate and rhythm  ABDOMEN: Soft, Nontender, Nondistended; Bowel sounds present  EXTREMITIES:  1- 2+ pedal edema B/L LE    LABS:                        9.0    10.03 )-----------( 264      ( 15 Apr 2021 10:03 )             30.4     04-15    136  |  100  |  60.0<H>  ----------------------------<  260<H>  5.5<H>   |  22.0  |  2.46<H>    Ca    8.1<L>      15 Apr 2021 10:03        Urinalysis Basic - ( 2021 18:47 )    Color: Yellow / Appearance: Clear / S.015 / pH: x  Gluc: x / Ketone: Negative  / Bili: Negative / Urobili: Negative mg/dL   Blood: x / Protein: 500 mg/dL / Nitrite: Negative   Leuk Esterase: Negative / RBC: 0-2 /HPF / WBC 0-2   Sq Epi: x / Non Sq Epi: Occasional / Bacteria: Few              RADIOLOGY & ADDITIONAL TESTS:

## 2021-04-15 NOTE — PROGRESS NOTE ADULT - ASSESSMENT
THERESA - baseline creat 0.9 Feb 2020  rising Cr 1.9--> 2.2--> 2.3--> 2.5--> 2.4  No hydro on renal sono   IRDM , HTN   Poorly controlled DM - A1-C 8.7   Diovan , HCTZ , Metformin - cont to hold for now   CXR  w PVC   No hydro on renal sono   ECHO noted severe pulm HTN, diastolic HF  Ordered 24 h urine - Ongoing  SOB--> NM scan noted very low probability PE  Will start Lasix 60mg IV Q 12 first dose now  HyperKalemia will give one dose Lokelma  Needs diuretics if renal function cont along this trajectory will ultimately need RRT  I discussed dialysis w her she agrees to start should need arise  Cards plans noted RHC next week when renal function somewhat improved    Will follow

## 2021-04-15 NOTE — PROGRESS NOTE ADULT - ASSESSMENT
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  Pulmonary & Critical Care Medicine  [facility]  Pulmonary and Sleep Medicine at [place]  [address]  [place], [place] [address]  T: [phone]  F: [phone]

## 2021-04-15 NOTE — CONSULT NOTE ADULT - ATTENDING COMMENTS
Pt is seen, examined, chart reviewed, d/w np/pa.  Management as outlined above.  Severe Pulmonary HTN: newly diagnosed.  PE excluded on V/Q scan.  Echo with EF 70-75%, no significant diastolic dysfunction noted on this echo.   Diuresis per Renal due to THERESA/CKD. Plan for RHC next week when clinical exam improves.   New Onset Atrial Fibrillation: Repeat EKG and place on telemetry monitoring. Can D/C PO norvasc and start diltiazem if needed. - [id number] score of 5.  Start AC at this time IV hep.   Asthma Exacerbation:  Pulm following.

## 2021-04-15 NOTE — PROGRESS NOTE ADULT - ATTENDING COMMENTS
pt seen and examined at bedside  anxious, nervous about her condition. Discussed clinical condition   RS: b/l wheezing expiratory   CVS - tachycardia   PA - soft   Peripheral edema  '  agree with above   diuresis   RHC once stable   Possible need for RRT   Afib - AC   labs in am pt seen and examined at bedside  anxious, nervous about her condition. Discussed clinical condition   RS: b/l wheezing expiratory   CVS - tachycardia   PA - soft   Peripheral edema  '  agree with above   diuresis   RHC once stable   Possible need for RRT   Afib - AC   labs in am    family - Nicanor was updated @ [phone] in details

## 2021-04-15 NOTE — PROGRESS NOTE ADULT - SUBJECTIVE AND OBJECTIVE BOX
PULMONARY PROGRESS NOTE      IFEOMA TRINIDAD  MRN-[id number]    Patient is a 81y old  Female who presents with a chief complaint of SOB not improving with inhalers, cough and abnormal renal function test (2021 16:24)        INTERVAL HPI/OVERNIGHT EVENTS:  Pt seen and examined at the bedside. She still feels SOB.    MEDICATIONS  (STANDING):  ALBUTerol    0.083%. 2.5 milliGRAM(s) Nebulizer once  ALBUTerol    90 MICROgram(s) HFA Inhaler 1 Puff(s) Inhalation every 4 hours  albuterol/ipratropium for Nebulization 3 milliLiter(s) Nebulizer every 6 hours  amLODIPine   Tablet 10 milliGRAM(s) Oral daily  dextrose 40% Gel 15 Gram(s) Oral once  dextrose 5%. 1000 milliLiter(s) (50 mL/Hr) IV Continuous <Continuous>  dextrose 5%. 1000 milliLiter(s) (100 mL/Hr) IV Continuous <Continuous>  dextrose 50% Injectable 25 Gram(s) IV Push once  dextrose 50% Injectable 12.5 Gram(s) IV Push once  dextrose 50% Injectable 25 Gram(s) IV Push once  folic acid 1 milliGRAM(s) Oral daily  glucagon  Injectable 1 milliGRAM(s) IntraMuscular once  heparin   Injectable 5000 Unit(s) SubCutaneous every 12 hours  hydrALAZINE 50 milliGRAM(s) Oral every 12 hours  insulin lispro (ADMELOG) corrective regimen sliding scale   SubCutaneous three times a day before meals  melatonin 1 milliGRAM(s) Oral at bedtime  methylPREDNISolone sodium succinate Injectable 40 milliGRAM(s) IV Push every 8 hours  pantoprazole    Tablet 40 milliGRAM(s) Oral before breakfast  tiotropium 18 MICROgram(s) Capsule 1 Capsule(s) Inhalation daily    MEDICATIONS  (PRN):  ALPRAZolam 0.25 milliGRAM(s) Oral every 8 hours PRN anxiety  guaiFENesin   Syrup  (Sugar-Free) 100 milliGRAM(s) Oral every 6 hours PRN Cough    Allergies    No Known Allergies    Intolerances      PAST MEDICAL & SURGICAL HISTORY:  Asthma    Diabetes mellitus    Hypertension    Emphysema    Hyperlipemia    S/P appendectomy    S/P tubal ligation    S/P knee surgery  left          REVIEW OF SYSTEMS:  Negative except as noted in HPI      Vital Signs Last 24 Hrs  T(C): 36.7 (15 Apr 2021 06:37), Max: 36.8 (2021 18:30)  T(F): 98 (15 Apr 2021 06:37), Max: 98.3 (2021 18:30)  HR: 81 (15 Apr 2021 09:25) (81 - 100)  BP: 138/68 (15 Apr 2021 06:37) (138/68 - 184/66)  BP(mean): --  RR: 19 (15 Apr 2021 06:37) (18 - 23)  SpO2: 97% (15 Apr 2021 09:52) (94% - 98%)      PHYSICAL EXAMINATION:  GEN: In no apparent distress  HEENT: NC/AT  NECK: Supple, non-tender  CV: +S1, S2, RRR  RESPIRATORY: No significant wheezing on exam. Non-labored breathing  ABDOMEN: Soft, non-tender  EXTREMITIES: 1+ pedal edema B/L  NEURO: Grossly non-focal  PSYCH: Normal affect      LABS:                        9.0    10.03 )-----------( 264      ( 15 Apr 2021 10:03 )             30.4     04-15    136  |  100  |  60.0<H>  ----------------------------<  260<H>  5.5<H>   |  22.0  |  2.46<H>    Ca    8.1<L>      15 Apr 2021 10:03        Urinalysis Basic - ( 2021 18:47 )    Color: Yellow / Appearance: Clear / S.015 / pH: x  Gluc: x / Ketone: Negative  / Bili: Negative / Urobili: Negative mg/dL   Blood: x / Protein: 500 mg/dL / Nitrite: Negative   Leuk Esterase: Negative / RBC: 0-2 /HPF / WBC 0-2   Sq Epi: x / Non Sq Epi: Occasional / Bacteria: Few                      MICROBIOLOGY:  COVID-19 Bharat Domain Antibody (21 @ 12:43)    COVID-19 Bharat Domain Antibody Result: >250.00: Roche ECLIA Total AB ([place])  NOTE: This result index represents a total antibody measurement, which  includes IgG, IgA and IgM.  Measures Receptor Binding Domain of the Bharat Protein  Negative <= 0.79 U/mL  Positive  >= 0.80 U/mL U/mL    COVID-19 Bharat Domain AB Interp: Positive: This test has been authorized for emergency use by the FDA. [facility]  [employer] has validated this test to be accurate.  Results from antibody testing should not be used to inform infection  status.  A positive result is consistent with prior infection, or rarely be due to  past or present infection with non-SARS-CoV-2 coronavirus strains, such  as  coronavirus HKU1,  NL63, OC43, A3 or 229E.  A negative result does not rule out SARS-CoV-2 infection, particularly in  those who have beenin recent contact with the virus.        RADIOLOGY & ADDITIONAL STUDIES:  < from: Xray Chest 1 View AP/PA. (21 @ 11:44) >     EXAM:  XR CHEST AP OR PA 1V                          PROCEDURE DATE:  2021          INTERPRETATION:  HISTORY: Admitting Dxs: I48.91 UNSPECIFIED ATRIAL FIBRILLATION; ; v/q scan. in [facility] [place];  TECHNIQUE: Portable frontal view of the chest, 1 view.  COMPARISON: none.  FINDINGS:  Clips are seen in the left axillary region.  HEART:  Enlarged  LUNGS: Small bilateral effusions. Mild congestive changes. No lobar consolidation. No pneumothorax  BONES: degenerative changes        IMPRESSION:    Small bilateral effusions. Mild congestive changes. No lobar consolidation. No pneumothorax    < end of copied text >      ECHO:  < from: TTE Echo Complete w/o Contrast w/ Doppler (21 @ 17:34) >  Summary:   1. Normal global left ventricular systolic function.   2. Left ventricular ejection fraction, by visual estimation, is 70 to 75%.   3. Mildly increased LV wall thickness.   4. Normal left ventricular internal cavity size.   5. Normal right ventricular size and function.   6. The left atrium is normal in size.   7. The right atrium is normal in size.   8. Lipomatous hypertrophy of the intra-atrial septum.   9. Moderate mitral annular calcification.  10. Mild mitral valve regurgitation.  11. Elevated mitral valve mean gradient    5 mmHg at HR 99 BPM.  12. No aortic valve stenosis.  13. Estimated pulmonary artery systolic pressure is 63.7 mmHg assuming a right atrial pressure of 8 mmHg, which is consistent with severe pulmonary hypertension.  14. Recommend clinical correlation with the above findings.    Ewa Urbina MD Electronically signed on 2021 at 6:34:53 PM    < end of copied text >

## 2021-04-15 NOTE — GOALS OF CARE CONVERSATION - ADVANCED CARE PLANNING - CONVERSATION DETAILS
patient wants to be home and spend some time with her grandchildren. is very scared and has been depressed with ongoing medical issues, understands that she might need dialysis ultimately.   All this is however very overwhelming for her and she has  not made any end of life decisions. continues to remain hopeful that she will be able to prolong her life so she can be home with her grandchildren,   FULL code for now.

## 2021-04-15 NOTE — PROGRESS NOTE ADULT - ASSESSMENT
81yr old female with PMH of HTN, HPL, Asthma, DM-2 presented to ER with c/o Shortness of breath since 2days and peripheral edema admitted for possible acute asthma exacerbation and possible acute renal failure.     # Acute Asthma Exacerbation + severe pulm HTN  - suspect acute diastolic congestive heart failure - VQ scan - neg for PE   appreciate pulm recs    steroids iv q8 decreased to q 12hrs  Nebs q6  wean off O2 as tolerated   - As part of PH  check JAYDA, RF, ANCA, anti-Scl-70, anti-centromere Ab, anti-RNP (ordered)  Cardiology consult, probable RHC next week    #  worsening THERESA on possible underlying CKD/Hyperkalemia-  baseline creat 0.9 Feb 2020-   rising Cr 1.9--> 2.2--> 2.3--> 2.5--> 2.4  No hydro on renal sono, 24 h urine in progress   Lasix 60mg IV Q 12 first dose now  HyperKalemia - one dose Lokelma ordered  May eventually need RRT       # HTN - ct hydralazine, hold valsartan , stop amlodipine and start Cardizem  30 mg TID with hold parameters    # PAF- repeat EKG with atrial fibrillation, placed on Cardiac monitor, currently in NSR. Start Heparin drip as per Cardiology recommendations. Change amlodipine to Cardizem, discussed with Cardiology    # DM-2 with hypoglycemia - hypoglycemia yesterday, Lantus dc'd, steroids decreased today to BID, monitor and continue ISS, may need to resume Lantus    # Peripheral edema , elevated d-dimer - b/l doppler LE - Neg for DVT - f/u ECHO - severe PHTN , started on IV Lasix  # Abdominal pain - on PPI  # DVT px - Heparin drip

## 2021-04-15 NOTE — PROGRESS NOTE ADULT - SUBJECTIVE AND OBJECTIVE BOX
CC: SOB/THERESA/PHTN/PAF    INTERVAL HPI/OVERNIGHT EVENTS: Patient seen and examined. Events overnight noted. Patient with rapid speech, tearful, anxious appearing. Wants to go home but understands need to stay at this time. Emotional support given. Improved with po Xanax. Denies chest pain +NESS, no nausea, vomiting, fever, chills.     Vital Signs Last 24 Hrs  T(C): 36.5 (15 Apr 2021 11:13), Max: 36.8 (2021 18:30)  T(F): 97.7 (15 Apr 2021 11:13), Max: 98.3 (2021 18:30)  HR: 80 (15 Apr 2021 11:13) (80 - 100)  BP: 134/64 (15 Apr 2021 11:13) (134/64 - 184/66)  BP(mean): --  RR: 19 (15 Apr 2021 11:13) (18 - 23)  SpO2: 96% (15 Apr 2021 11:) (94% - 98%)    PHYSICAL EXAMINATION:  GEN: In no apparent distress  HEENT: NC/AT  NECK: Supple, non-tender  CV: +S1, S2, RRR  RESPIRATORY: CTA B/L, no wheeze, rales  ABDOMEN: Soft, non-tender  EXTREMITIES: 1+ pedal edema B/L  NEURO: Grossly non-focal  PSYCH: Anxious    I&O's Detail                                9.0    10.03 )-----------( 264      ( 15 Apr 2021 10:03 )             30.4     15 Apr 2021 10:03    136    |  100    |  60.0   ----------------------------<  260    5.5     |  22.0   |  2.46     Ca    8.1        15 Apr 2021 10:03        CAPILLARY BLOOD GLUCOSE      POCT Blood Glucose.: 292 mg/dL (15 Apr 2021 11:29)  POCT Blood Glucose.: 228 mg/dL (15 Apr 2021 07:38)  POCT Blood Glucose.: 68 mg/dL (2021 16:53)      Urinalysis Basic - ( 2021 18:47 )    Color: Yellow / Appearance: Clear / S.015 / pH: x  Gluc: x / Ketone: Negative  / Bili: Negative / Urobili: Negative mg/dL   Blood: x / Protein: 500 mg/dL / Nitrite: Negative   Leuk Esterase: Negative / RBC: 0-2 /HPF / WBC 0-2   Sq Epi: x / Non Sq Epi: Occasional / Bacteria: Few        MEDICATIONS  (STANDING):  ALBUTerol    0.083%. 2.5 milliGRAM(s) Nebulizer once  ALBUTerol    90 MICROgram(s) HFA Inhaler 1 Puff(s) Inhalation every 4 hours  albuterol/ipratropium for Nebulization 3 milliLiter(s) Nebulizer every 6 hours  amLODIPine   Tablet 10 milliGRAM(s) Oral daily  dextrose 40% Gel 15 Gram(s) Oral once  dextrose 5%. 1000 milliLiter(s) (50 mL/Hr) IV Continuous <Continuous>  dextrose 5%. 1000 milliLiter(s) (100 mL/Hr) IV Continuous <Continuous>  dextrose 50% Injectable 25 Gram(s) IV Push once  dextrose 50% Injectable 12.5 Gram(s) IV Push once  dextrose 50% Injectable 25 Gram(s) IV Push once  folic acid 1 milliGRAM(s) Oral daily  furosemide   Injectable 60 milliGRAM(s) IV Push two times a day  glucagon  Injectable 1 milliGRAM(s) IntraMuscular once  heparin   Injectable 5500 Unit(s) IV Push once  heparin  Infusion.  Unit(s)/Hr (12 mL/Hr) IV Continuous <Continuous>  hydrALAZINE 50 milliGRAM(s) Oral every 12 hours  insulin lispro (ADMELOG) corrective regimen sliding scale   SubCutaneous three times a day before meals  melatonin 1 milliGRAM(s) Oral at bedtime  methylPREDNISolone sodium succinate Injectable 40 milliGRAM(s) IV Push every 12 hours  pantoprazole    Tablet 40 milliGRAM(s) Oral before breakfast  sodium zirconium cyclosilicate 5 Gram(s) Oral once  tiotropium 18 MICROgram(s) Capsule 1 Capsule(s) Inhalation daily    MEDICATIONS  (PRN):  ALPRAZolam 0.25 milliGRAM(s) Oral every 8 hours PRN anxiety  guaiFENesin   Syrup  (Sugar-Free) 100 milliGRAM(s) Oral every 6 hours PRN Cough  heparin   Injectable 5500 Unit(s) IV Push every 6 hours PRN For aPTT less than 40  heparin   Injectable 2500 Unit(s) IV Push every 6 hours PRN For aPTT between 40 - 57      RADIOLOGY & ADDITIONAL TESTS:

## 2021-04-15 NOTE — CONSULT NOTE ADULT - SUBJECTIVE AND OBJECTIVE BOX
[facility]                                                               Office:  [address] [address]                                                              Telephone: [phone]. Fax:[phone]                                                                        CARDIOLOGY CONSULTATION NOTE                                                                                             Consult requested by:  Dr. Kong  Reason for Consultation: Pulmonary HTN  History obtained by: Patient and medical record   obtained: No    Covid Status: Negative 21    Chief complaint:    Patient is a 81y old  Female who presents with a chief complaint of SOB not improving with inhalers, cough and abnormal renal function test (15 Apr 2021 11:31)      HPI: 80 y/o F with a PMHx of HTN, HPL, asthma, and DMII who presented tot he ER with complaints of worsening dyspnea for 2 days. Patient states that she had increased inhaler usage, and just felt like nothing was improving.   81yr old female with PMH of HTN, HPL, Asthma, DM-2 presented to ER with c/o Shortness of breath since last 2days - not improving with her inhalers, She could not quantify how many times she uses inhalers but uses every other day or so. recetly she had labs done at her PMD - which showed abnormal renal test - Hence was awaiting to see nephrology in office. She was seen in ER, found to be in acute respiratory distress, with some improvement with nebulizer and steroid. She was found to be in ? AFib per ER physician however EKG reveals sinus tachycardia with HR in 110s. She was admitted for acute asthma exacerbation with possible ? aFib RVR and THERESA.   pt has been eating and drinking well. but has had recent onset of swelling in her legs.  c/o epigastric burning pain.  (2021 18:35)        REVIEW OF SYMPTOMS:     CONSTITUTIONAL: No fever, weight loss, or fatigue  ENMT:  No difficulty hearing, tinnitus, vertigo; No sinus or throat pain  NECK: No pain or stiffness  CARDIOVASCULAR: chest pain, dyspnea, syncope, palpitations, dizziness, Orthopnea, Paroxsymal nocturnal dyspnea  RESPIRATORY: Dyspnea on exertion, Shortness of breath, cough, wheezing  : No dysuria, no hematuria   GI: No dark color stool, no melena, no diarrhea, no constipation, no abdominal pain   NEURO: No headache, no dizziness, no slurred speech   MUSCULOSKELETAL: No joint pain or swelling; No muscle, back, or extremity pain  PSYCH: No agitation, no anxiety.    ALL OTHER REVIEW OF SYSTEMS ARE NEGATIVE.      PREVIOUS DIAGNOSTIC TESTING  ECHO FINDINGS:      STRESS FINDINGS:      CATHETERIZATION FINDINGS:         ALLERGIES: Allergies    No Known Allergies    Intolerances          PAST MEDICAL HISTORY  Asthma    Diabetes mellitus    Hypertension    Emphysema    Hyperlipemia        PAST SURGICAL HISTORY  S/P appendectomy    S/P tubal ligation    S/P knee surgery        FAMILY HISTORY:  No pertinent family history in first degree relatives        SOCIAL HISTORY:  Denies smoking/alcohol/drugs  CIGARETTES:     ALCOHOL:  DRUGS:      CURRENT MEDICATIONS:  amLODIPine   Tablet 10 milliGRAM(s) Oral daily  hydrALAZINE 50 milliGRAM(s) Oral every 12 hours    ALBUTerol    0.083%.  ALBUTerol    90 MICROgram(s) HFA Inhaler  albuterol/ipratropium for Nebulization  tiotropium 18 MICROgram(s) Capsule   melatonin  pantoprazole    Tablet  dextrose 40% Gel  dextrose 5%.  dextrose 5%.  dextrose 50% Injectable  dextrose 50% Injectable  dextrose 50% Injectable  folic acid  glucagon  Injectable  heparin   Injectable  insulin lispro (ADMELOG) corrective regimen sliding scale  methylPREDNISolone sodium succinate Injectable        HOME MEDICATIONS:      Vital Signs Last 24 Hrs  T(C): 36.5 (15 Apr 2021 11:13), Max: 36.8 (2021 18:30)  T(F): 97.7 (15 Apr 2021 11:13), Max: 98.3 (2021 18:30)  HR: 80 (15 Apr 2021 11:13) (80 - 100)  BP: 134/64 (15 Apr 2021 11:13) (134/64 - 184/66)  BP(mean): --  RR: 19 (15 Apr 2021 11:13) (18 - 23)  SpO2: 96% (15 Apr 2021 11:13) (94% - 98%)      PHYSICAL EXAM:  Constitutional: Comfortable . No acute distress.   HEENT: Atraumatic and normocephalic , neck is supple . no JVD. No carotid bruit. PEERL   CNS: A&Ox3. No focal deficits. EOMI. Cranial nerves II-IX are intact.   Lymph Nodes: Cervical : Not palpable.  Respiratory: CTAB  Cardiovascular: S1S2 RRR. No murmur/rubs or gallop.  Gastrointestinal: Soft non-tender and non distended . +Bowel sounds. negative Naranjo's sign.  Extremities: No edema.   Psychiatric: Calm . no agitation.  Skin: No skin rash/ulcers visualized to face, hands or feet.    Intake and output:     LABS:                        9.0    10.03 )-----------( 264      ( 15 Apr 2021 10:03 )             30.4     04-15    136  |  100  |  60.0<H>  ----------------------------<  260<H>  5.5<H>   |  22.0  |  2.46<H>    Ca    8.1<L>      15 Apr 2021 10:03        ;p-BNP=Serum Pro-Brain Natriuretic Peptide: 1789 pg/mL ( @ 15:54)      Urinalysis Basic - ( 2021 18:47 )    Color: Yellow / Appearance: Clear / S.015 / pH: x  Gluc: x / Ketone: Negative  / Bili: Negative / Urobili: Negative mg/dL   Blood: x / Protein: 500 mg/dL / Nitrite: Negative   Leuk Esterase: Negative / RBC: 0-2 /HPF / WBC 0-2   Sq Epi: x / Non Sq Epi: Occasional / Bacteria: Few        INTERPRETATION OF TELEMETRY: Reviewed by me.   ECG: Reviewed by me.     RADIOLOGY & ADDITIONAL STUDIES:    X-ray:  reviewed by me.   CT scan:   MRI:                                                                          [facility]                                                       [facility]                                                               Office:  [address] [address]                                                              Telephone: [phone]. Fax:[phone]                                                                        CARDIOLOGY CONSULTATION NOTE                                                                                             Consult requested by:  Dr. Kong  Reason for Consultation: Pulmonary HTN  History obtained by: Patient and medical record   obtained: No    Covid Status: Negative 21    Chief complaint:    Patient is a 81y old  Female who presents with a chief complaint of SOB not improving with inhalers, cough and abnormal renal function test (15 Apr 2021 11:31)      HPI: 82 y/o F with a PMHx of HTN, HPL, asthma, and DMII who presented tot he ER with complaints of worsening dyspnea for 2 days. Patient states that she had increased inhaler usage, and just felt like nothing was improving. Patient also following with her PMD for worsening renal failure, and had not seen a Nephrologist yet. Patient was found to have acute asthma exacerbation and was in Afib with RVR. Patient's echo shows severe pulmonary HTN, which is new to be diagnosed. Patient still with some shortness of breath that has been improving, but still with decreased renal function. Patient denies any fevers, chills, CP, syncope, abdominal pain, N/V/D, headache, or dizziness.     REVIEW OF SYMPTOMS:     CONSTITUTIONAL: No fever, weight loss, or fatigue  ENMT:  No difficulty hearing, tinnitus, vertigo; No sinus or throat pain  NECK: No pain or stiffness  CARDIOVASCULAR: AS PER HPI  RESPIRATORY: AS PER HPI  : No dysuria, no hematuria   GI: No dark color stool, no melena, no diarrhea, no constipation, no abdominal pain   NEURO: No headache, no dizziness, no slurred speech   MUSCULOSKELETAL: No joint pain or swelling; No muscle, back, or extremity pain  PSYCH: No agitation, no anxiety.    ALL OTHER REVIEW OF SYSTEMS ARE NEGATIVE.      PREVIOUS DIAGNOSTIC TESTING  ECHO FINDINGS:  < from: TTE Echo Complete w/o Contrast w/ Doppler (21 @ 17:34) >  Left Ventricle: The left ventricular internal cavity size is normal. Left ventricular wall thickness is mildly increased.  GlobalLV systolic function was normal. Left ventricular ejection fraction, by visual estimation, is 70 to 75%.  Right Ventricle: Normal right ventricular size and function.  Left Atrium: The left atrium is normal in size. Lipomatous hypertrophy of the intra-atrial septum.  Right Atrium: The right atrium is normal in size.  Pericardium: Trivial pericardial effusion is present. There is a significant pericardial fat pad present.  Mitral Valve: There is moderate mitral annular calcification. Mild mitral valve regurgitation is seen. Elevated mitral valve mean gradient    5 mmHg at HR 99 BPM.  Tricuspid Valve: The tricuspid valve is not well seen. Trivial tricuspid regurgitation is visualized. Estimated pulmonary artery systolic pressure is 63.7 mmHg assuming a right atrial pressure of 8 mmHg, which is consistent with severe pulmonary hypertension.  Aortic Valve: The aortic valve was not well visualized. No aortic valve stenosis.  Pulmonic Valve: The pulmonic valve was not well visualized.  Aorta: The aortic root is normal in size and structure.  Venous: The inferior vena cava was dilated, with respiratory size variation greater than 50%.      Summary:   1. Normal global left ventricular systolic function.   2. Left ventricular ejection fraction, by visual estimation, is 70 to 75%.   3. Mildly increased LV wall thickness.   4. Normal left ventricular internal cavity size.   5. Normal right ventricular size and function.   6. The left atrium is normal in size.   7. The right atrium is normal in size.   8. Lipomatous hypertrophy of the intra-atrial septum.   9. Moderate mitral annular calcification.  10. Mild mitral valve regurgitation.  11. Elevated mitral valve mean gradient    5 mmHg at HR 99 BPM.  12. No aortic valve stenosis.  13. Estimated pulmonary artery systolic pressure is 63.7 mmHg assuming a right atrial pressure of 8 mmHg, which is consistent with severe pulmonary hypertension.  14. Recommend clinical correlation with the above findings.    Ewa Urbina MD Electronically signed on 2021 at 6:34:53 PM    < end of copied text >      ALLERGIES: Allergies    No Known Allergies    Intolerances      PAST MEDICAL HISTORY  Asthma    Diabetes mellitus    Hypertension    Emphysema    Hyperlipemia        PAST SURGICAL HISTORY  S/P appendectomy    S/P tubal ligation    S/P knee surgery        FAMILY HISTORY:  No pertinent family history in first degree relatives        SOCIAL HISTORY:   CIGARETTES:   Never smoker, passive smoke exposure from her .  ALCOHOL: Denies  DRUGS: Denies      CURRENT MEDICATIONS:  amLODIPine   Tablet 10 milliGRAM(s) Oral daily  hydrALAZINE 50 milliGRAM(s) Oral every 12 hours    ALBUTerol    0.083%.  ALBUTerol    90 MICROgram(s) HFA Inhaler  albuterol/ipratropium for Nebulization  tiotropium 18 MICROgram(s) Capsule   melatonin  pantoprazole    Tablet  dextrose 40% Gel  dextrose 5%.  dextrose 5%.  dextrose 50% Injectable  dextrose 50% Injectable  dextrose 50% Injectable  folic acid  glucagon  Injectable  heparin   Injectable  insulin lispro (ADMELOG) corrective regimen sliding scale  methylPREDNISolone sodium succinate Injectable      HOME MEDICATIONS:  Home Medications:  amLODIPine 5 mg oral tablet: 1 tab(s) orally once a day (2021 18:46)  HumaLOG 100 units/mL injectable solution: 10 unit(s) injectable 3 times a day (2021 18:47)  hydrALAZINE 50 mg oral tablet: 1 tab(s) orally 2 times a day (2021 18:46)  Levemir 100 units/mL subcutaneous solution: 30 unit(s) subcutaneous 2 times a day (2021 18:46)  valsartan 320 mg oral tablet: 1 tab(s) orally once a day (2021 18:46)      Vital Signs Last 24 Hrs  T(C): 36.5 (15 Apr 2021 11:13), Max: 36.8 (2021 18:30)  T(F): 97.7 (15 Apr 2021 11:13), Max: 98.3 (2021 18:30)  HR: 80 (15 Apr 2021 11:13) (80 - 100)  BP: 134/64 (15 Apr 2021 11:13) (134/64 - 184/66)  RR: 19 (15 Apr 2021 11:13) (18 - 23)  SpO2: 96% (15 Apr 2021 11:13) (94% - 98%)      PHYSICAL EXAM:  Constitutional: Anxious. No acute distress.   HEENT: Atraumatic and normocephalic , neck is supple . no JVD. No carotid bruit. PEERL   CNS: A&Ox3. No focal deficits. EOMI. Cranial nerves II-IX are intact.   Lymph Nodes: Cervical : Not palpable.  Respiratory: Increased expiratory phase  Cardiovascular: S1S2 RRR. No murmur/rubs or gallop.  Gastrointestinal: Soft non-tender and non distended . +Bowel sounds. negative Naranjo's sign.  Extremities: 1+ B/l LE edema.   Psychiatric: Calm . no agitation.  Skin: No skin rash/ulcers visualized to face, hands or feet.    Intake and output:     LABS:                        9.0    10.03 )-----------( 264      ( 15 Apr 2021 10:03 )             30.4     04-15    136  |  100  |  60.0<H>  ----------------------------<  260<H>  5.5<H>   |  22.0  |  2.46<H>    Ca    8.1<L>      15 Apr 2021 10:03        ;p-BNP=Serum Pro-Brain Natriuretic Peptide: 1789 pg/mL ( @ 15:54)      Urinalysis Basic - ( 2021 18:47 )    Color: Yellow / Appearance: Clear / S.015 / pH: x  Gluc: x / Ketone: Negative  / Bili: Negative / Urobili: Negative mg/dL   Blood: x / Protein: 500 mg/dL / Nitrite: Negative   Leuk Esterase: Negative / RBC: 0-2 /HPF / WBC 0-2   Sq Epi: x / Non Sq Epi: Occasional / Bacteria: Few        INTERPRETATION OF TELEMETRY: Not on telemetry  ECG: Reviewed by me. Afib with RVR, PRWP, NSST/T wave abnormalities     RADIOLOGY & ADDITIONAL STUDIES:    X-ray:  reviewed by me.   < from: Xray Chest 1 View AP/PA. (21 @ 11:44) >     EXAM:  XR CHEST AP OR PA 1V                          PROCEDURE DATE:  2021          INTERPRETATION:  HISTORY: Admitting Dxs: I48.91 UNSPECIFIED ATRIAL FIBRILLATION; ; v/q [place]. [place] [facility] [place];  TECHNIQUE: Portable frontal view of the chest, 1 view.  COMPARISON: none.  FINDINGS:  Clips are seen in the left axillary region.  HEART:  Enlarged  LUNGS: Small bilateral effusions. Mild congestive changes. No lobar consolidation. No pneumothorax  BONES: degenerative changes        IMPRESSION:    Small bilateral effusions. Mild congestive changes. No lobar consolidation. No pneumothorax    < end of copied text >    CT scan:   < from: CT Chest No Cont (21 @ 01:45) >     EXAM:  CT CHEST                          PROCEDURE DATE:  2021          INTERPRETATION:  CLINICAL INFORMATION: Shortness of breath.    COMPARISON: 2013.    CONTRAST/COMPLICATIONS:  IV Contrast: NONE  Oral Contrast: NONE  Complications: None reported at time of study completion    PROCEDURE:  CT of the Chest was performed.  Sagittal and coronal reformats were performed.    FINDINGS:    LUNGS AND AIRWAYS: Patent central airways.  Nodular opacity in the right upper lobe measuring 5 mm (series 3:75) unchanged. 4 mm left lower lobe peripheral lung nodule (series 3:90). No lung consolidation or mass. Bibasilar subsegmental atelectasis (right greater than left) with mild bilateral lower lobe bronchiectasis.  PLEURA: Small right and trace left pleural effusion.  MEDIASTINUM AND SURINDER: No lymphadenopathy.  VESSELS: Main pulmonary artery is dilated measuring up to 3.9 cm. Thoracic aorta is normal in caliber. Atherosclerotic calcifications of the thoracic aorta, great vessels, and coronary arteries.  HEART: Heart is borderline enlarged. No pericardial effusion.  CHEST WALL AND LOWER NECK: Surgical clips in the left axilla.  VISUALIZED UPPER ABDOMEN: Small hiatus hernia.  BONES: Mild degenerative changes of the spine. Mild compression fracture of T11 which is new compared with prior exam from .    IMPRESSION:  Bibasilar subsegmental atelectasis (right greater than left) with mild bilateral lower lobe bronchiectasis. Small right and trace left pleural effusion.    Dilated mainpulmonary artery which may be reflective of underlying pulmonary arterial hypertension.    Mild compression fracture of T11 which is new compared with prior exam from 2013.    < end of copied text >

## 2021-04-16 LAB
ANA TITR SER: NEGATIVE — SIGNIFICANT CHANGE UP
ANION GAP SERPL CALC-SCNC: 12 MMOL/L — SIGNIFICANT CHANGE UP (ref 5–17)
APTT BLD: 176.4 SEC — CRITICAL HIGH (ref 27.5–35.5)
APTT BLD: 78.1 SEC — HIGH (ref 27.5–35.5)
BUN SERPL-MCNC: 67 MG/DL — HIGH (ref 8–20)
CALCIUM SERPL-MCNC: 7.8 MG/DL — LOW (ref 8.6–10.2)
CHLORIDE SERPL-SCNC: 96 MMOL/L — LOW (ref 98–107)
CO2 SERPL-SCNC: 25 MMOL/L — SIGNIFICANT CHANGE UP (ref 22–29)
COLLECT DURATION TIME UR: 24 HR — SIGNIFICANT CHANGE UP
COLLECT DURATION TIME UR: 24 HR — SIGNIFICANT CHANGE UP
CREAT SERPL-MCNC: 2.99 MG/DL — HIGH (ref 0.5–1.3)
GLUCOSE BLDC GLUCOMTR-MCNC: 263 MG/DL — HIGH (ref 70–99)
GLUCOSE BLDC GLUCOMTR-MCNC: 274 MG/DL — HIGH (ref 70–99)
GLUCOSE BLDC GLUCOMTR-MCNC: 394 MG/DL — HIGH (ref 70–99)
GLUCOSE BLDC GLUCOMTR-MCNC: 407 MG/DL — HIGH (ref 70–99)
GLUCOSE BLDC GLUCOMTR-MCNC: 92 MG/DL — SIGNIFICANT CHANGE UP (ref 70–99)
GLUCOSE SERPL-MCNC: 407 MG/DL — HIGH (ref 70–99)
HCT VFR BLD CALC: 29.5 % — LOW (ref 34.5–45)
HGB BLD-MCNC: 8.7 G/DL — LOW (ref 11.5–15.5)
MCHC RBC-ENTMCNC: 19.1 PG — LOW (ref 27–34)
MCHC RBC-ENTMCNC: 29.5 GM/DL — LOW (ref 32–36)
MCV RBC AUTO: 64.8 FL — LOW (ref 80–100)
PLATELET # BLD AUTO: 250 K/UL — SIGNIFICANT CHANGE UP (ref 150–400)
POTASSIUM SERPL-MCNC: 5.3 MMOL/L — SIGNIFICANT CHANGE UP (ref 3.5–5.3)
POTASSIUM SERPL-SCNC: 5.3 MMOL/L — SIGNIFICANT CHANGE UP (ref 3.5–5.3)
PROT 24H UR-MRATE: 3278 MG/24HR — HIGH (ref 50–100)
RBC # BLD: 4.55 M/UL — SIGNIFICANT CHANGE UP (ref 3.8–5.2)
RBC # FLD: 17.7 % — HIGH (ref 10.3–14.5)
RHEUMATOID FACT SERPL-ACNC: <10 IU/ML — SIGNIFICANT CHANGE UP (ref 0–13)
SODIUM SERPL-SCNC: 133 MMOL/L — LOW (ref 135–145)
TOTAL VOLUME - 24 HOUR: 2375 ML — SIGNIFICANT CHANGE UP
TOTAL VOLUME - 24 HOUR: 2375 ML — SIGNIFICANT CHANGE UP
URINE CREATININE CALCULATION: 0.8 G/24 HR — SIGNIFICANT CHANGE UP (ref 0.8–1.8)
URINE CREATININE CALCULATION: 0.8 G/24 HR — SIGNIFICANT CHANGE UP (ref 0.8–1.8)
WBC # BLD: 11.6 K/UL — HIGH (ref 3.8–10.5)
WBC # FLD AUTO: 11.6 K/UL — HIGH (ref 3.8–10.5)

## 2021-04-16 PROCEDURE — 99233 SBSQ HOSP IP/OBS HIGH 50: CPT

## 2021-04-16 PROCEDURE — 99232 SBSQ HOSP IP/OBS MODERATE 35: CPT

## 2021-04-16 PROCEDURE — 93010 ELECTROCARDIOGRAM REPORT: CPT

## 2021-04-16 RX ORDER — INSULIN GLARGINE 100 [IU]/ML
15 INJECTION, SOLUTION SUBCUTANEOUS AT BEDTIME
Refills: 0 | Status: DISCONTINUED | OUTPATIENT
Start: 2021-04-16 | End: 2021-04-18

## 2021-04-16 RX ORDER — ACETAMINOPHEN 500 MG
650 TABLET ORAL ONCE
Refills: 0 | Status: COMPLETED | OUTPATIENT
Start: 2021-04-16 | End: 2021-04-16

## 2021-04-16 RX ORDER — INSULIN GLARGINE 100 [IU]/ML
20 INJECTION, SOLUTION SUBCUTANEOUS EVERY MORNING
Refills: 0 | Status: DISCONTINUED | OUTPATIENT
Start: 2021-04-16 | End: 2021-04-20

## 2021-04-16 RX ORDER — SIMETHICONE 80 MG/1
80 TABLET, CHEWABLE ORAL ONCE
Refills: 0 | Status: COMPLETED | OUTPATIENT
Start: 2021-04-16 | End: 2021-04-16

## 2021-04-16 RX ORDER — SODIUM ZIRCONIUM CYCLOSILICATE 10 G/10G
5 POWDER, FOR SUSPENSION ORAL DAILY
Refills: 0 | Status: DISCONTINUED | OUTPATIENT
Start: 2021-04-16 | End: 2021-04-19

## 2021-04-16 RX ADMIN — Medication 3 MILLILITER(S): at 08:50

## 2021-04-16 RX ADMIN — PANTOPRAZOLE SODIUM 40 MILLIGRAM(S): 20 TABLET, DELAYED RELEASE ORAL at 05:57

## 2021-04-16 RX ADMIN — HEPARIN SODIUM 1000 UNIT(S)/HR: 5000 INJECTION INTRAVENOUS; SUBCUTANEOUS at 00:12

## 2021-04-16 RX ADMIN — Medication 30 MILLIGRAM(S): at 05:55

## 2021-04-16 RX ADMIN — Medication 1 MILLIGRAM(S): at 22:23

## 2021-04-16 RX ADMIN — Medication 3 MILLILITER(S): at 20:41

## 2021-04-16 RX ADMIN — Medication 0.25 MILLIGRAM(S): at 10:23

## 2021-04-16 RX ADMIN — Medication 650 MILLIGRAM(S): at 00:48

## 2021-04-16 RX ADMIN — SODIUM ZIRCONIUM CYCLOSILICATE 5 GRAM(S): 10 POWDER, FOR SUSPENSION ORAL at 11:29

## 2021-04-16 RX ADMIN — Medication 100 MILLIGRAM(S): at 02:24

## 2021-04-16 RX ADMIN — Medication 6: at 11:28

## 2021-04-16 RX ADMIN — Medication 3 MILLILITER(S): at 14:54

## 2021-04-16 RX ADMIN — Medication 30 MILLIGRAM(S): at 22:23

## 2021-04-16 RX ADMIN — INSULIN GLARGINE 20 UNIT(S): 100 INJECTION, SOLUTION SUBCUTANEOUS at 08:38

## 2021-04-16 RX ADMIN — HEPARIN SODIUM 0 UNIT(S)/HR: 5000 INJECTION INTRAVENOUS; SUBCUTANEOUS at 09:45

## 2021-04-16 RX ADMIN — SIMETHICONE 80 MILLIGRAM(S): 80 TABLET, CHEWABLE ORAL at 16:05

## 2021-04-16 RX ADMIN — HEPARIN SODIUM 800 UNIT(S)/HR: 5000 INJECTION INTRAVENOUS; SUBCUTANEOUS at 10:49

## 2021-04-16 RX ADMIN — Medication 60 MILLIGRAM(S): at 05:56

## 2021-04-16 RX ADMIN — Medication 30 MILLIGRAM(S): at 13:17

## 2021-04-16 RX ADMIN — Medication 12: at 08:05

## 2021-04-16 RX ADMIN — Medication 1 MILLIGRAM(S): at 11:29

## 2021-04-16 RX ADMIN — INSULIN GLARGINE 15 UNIT(S): 100 INJECTION, SOLUTION SUBCUTANEOUS at 22:23

## 2021-04-16 RX ADMIN — Medication 50 MILLIGRAM(S): at 17:04

## 2021-04-16 RX ADMIN — Medication 3 MILLILITER(S): at 02:52

## 2021-04-16 RX ADMIN — Medication 50 MILLIGRAM(S): at 05:55

## 2021-04-16 RX ADMIN — Medication 40 MILLIGRAM(S): at 10:54

## 2021-04-16 RX ADMIN — HEPARIN SODIUM 800 UNIT(S)/HR: 5000 INJECTION INTRAVENOUS; SUBCUTANEOUS at 17:59

## 2021-04-16 RX ADMIN — Medication 0.25 MILLIGRAM(S): at 22:23

## 2021-04-16 RX ADMIN — Medication 60 MILLIGRAM(S): at 17:03

## 2021-04-16 NOTE — PROGRESS NOTE ADULT - ASSESSMENT
Progressive THERESA, +PVC  No hydronephrosis seen on sonogram  Elevated BUN also due to steroid effects  - avoid potential nephrotoxins  - check serologies  - cont IV Lasix  - will need to consider RRT if continues to fail medical management    Anemia: multifactorial  Tsat= 13%  - check Ferritin and if acceptable start IV Fe  - consider DILAN  - check serum immunofixation    RO:  - check phos, iPTH, Vit D

## 2021-04-16 NOTE — PROGRESS NOTE ADULT - ASSESSMENT
81yr old female with PMH of HTN, HPL, Asthma, DM-2 presented to ER with c/o Shortness of breath since 2days and peripheral edema admitted for possible acute asthma exacerbation and possible acute renal failure.     # Acute Asthma Exacerbation + severe pulm HTN  - suspect acute diastolic congestive heart failure - VQ scan - neg for PE   appreciate pulm recs    steroids changed to oral taper  Nebs q6  wean off O2 as tolerated   - As part of PH  check JAYDA, RF, ANCA, anti-Scl-70, anti-centromere Ab, anti-RNP (ordered)  Cardiology consult, probable RHC next week    #  worsening THERESA on possible underlying CKD/Hyperkalemia-  baseline creat 0.9 Feb 2020-   rising Cr 1.9--> 2.2--> 2.3--> 2.5--> 2.4-->2.9  avoid potential nephrotoxins  No hydro on renal sono, 24 h urine in progress   Lasix 60mg IV Q 12  May eventually need RRT       # HTN - ct hydralazine, Cardizem. ARB on hold due to THERESA    # PAF- CM reviewed, continued episodes of PAF, now in NSR, on Heparin drip. Cardizem 30 mg TID, titrate for rate control    # DM-2 with hypoglycemia - Hyperglycemic after Lantus discontinued, Lantus now resumed. Steroids changed to oral taper    # Peripheral edema , elevated d-dimer - b/l doppler LE - Neg for DVT - f/u ECHO - severe PHTN ,  on IV Lasix  # Abdominal pain - on PPI  # DVT px - Heparin drip

## 2021-04-16 NOTE — PROGRESS NOTE ADULT - ASSESSMENT
A/P: 82 y/o F with a PMHx of HTN, HPL, asthma, and DMII who presented tot he ER with complaints of worsening dyspnea for 2 days. Patient states that she had increased inhaler usage, and just felt like nothing was improving. Patient also following with her PMD for worsening renal failure, and had not seen a Nephrologist yet. Patient was found to have acute asthma exacerbation and was in Afib with RVR. Patient's echo shows severe pulmonary HTN, which is new to be diagnosed. Patient still with some shortness of breath that has been improving, but still with decreased renal function. Patient denies any fevers, chills, CP, syncope, abdominal pain, N/V/D, headache, or dizziness.   Troponin negative x 1  BNP 1789    Severe Pulmonary HTN  Newly diagnosed.   PE excluded on V/Q scan.   Echo with EF 70-75%, no significant diastolic dysfunction noted on this echo.   Diuresis per Renal due to THERESA/CKD.   Plan for RHC next week when clinical exam improves.     Acute Asthma Exacerbation   Cont current management     THERESA on CKD  Lasix 60mg IV Q 12    PAF   IV Heparin drip.   Cardizem 30 mg TID, titrate for rate control

## 2021-04-16 NOTE — PROGRESS NOTE ADULT - SUBJECTIVE AND OBJECTIVE BOX
NEPHROLOGY INTERVAL HPI/OVERNIGHT EVENTS:  pt still with considerable sob  requiring supplemental O2    MEDICATIONS  (STANDING):  ALBUTerol    0.083%. 2.5 milliGRAM(s) Nebulizer once  ALBUTerol    90 MICROgram(s) HFA Inhaler 1 Puff(s) Inhalation every 4 hours  albuterol/ipratropium for Nebulization 3 milliLiter(s) Nebulizer every 6 hours  dextrose 40% Gel 15 Gram(s) Oral once  dextrose 5%. 1000 milliLiter(s) (50 mL/Hr) IV Continuous <Continuous>  dextrose 5%. 1000 milliLiter(s) (100 mL/Hr) IV Continuous <Continuous>  dextrose 50% Injectable 25 Gram(s) IV Push once  dextrose 50% Injectable 12.5 Gram(s) IV Push once  dextrose 50% Injectable 25 Gram(s) IV Push once  diltiazem    Tablet 30 milliGRAM(s) Oral every 8 hours  folic acid 1 milliGRAM(s) Oral daily  furosemide   Injectable 60 milliGRAM(s) IV Push two times a day  glucagon  Injectable 1 milliGRAM(s) IntraMuscular once  heparin  Infusion.  Unit(s)/Hr (12 mL/Hr) IV Continuous <Continuous>  hydrALAZINE 50 milliGRAM(s) Oral every 12 hours  insulin glargine Injectable (LANTUS) 20 Unit(s) SubCutaneous every morning  insulin glargine Injectable (LANTUS) 15 Unit(s) SubCutaneous at bedtime  insulin lispro (ADMELOG) corrective regimen sliding scale   SubCutaneous three times a day before meals  melatonin 1 milliGRAM(s) Oral at bedtime  pantoprazole    Tablet 40 milliGRAM(s) Oral before breakfast  predniSONE   Tablet   Oral   tiotropium 18 MICROgram(s) Capsule 1 Capsule(s) Inhalation daily    MEDICATIONS  (PRN):  ALPRAZolam 0.25 milliGRAM(s) Oral every 8 hours PRN anxiety  guaiFENesin   Syrup  (Sugar-Free) 100 milliGRAM(s) Oral every 6 hours PRN Cough  heparin   Injectable 5500 Unit(s) IV Push every 6 hours PRN For aPTT less than 40  heparin   Injectable 2500 Unit(s) IV Push every 6 hours PRN For aPTT between 40 - 57      Allergies    No Known Allergies          Vital Signs Last 24 Hrs  T(C): 36.9 (2021 04:56), Max: 36.9 (15 Apr 2021 23:52)  T(F): 98.4 (2021 04:56), Max: 98.4 (15 Apr 2021 23:52)  HR: 85 (2021 08:52) (77 - 96)  BP: 154/68 (2021 04:56) (134/64 - 163/54)  BP(mean): --  RR: 18 (2021 04:56) (18 - 19)  SpO2: 98% (2021 08:52) (96% - 98%)    PHYSICAL EXAM:      LABS:                        8.7    11.60 )-----------( 250      ( 2021 08:16 )             29.5     04-16    133<L>  |  96<L>  |  67.0<H>  ----------------------------<  407<H>  5.3   |  25.0  |  2.99<H>        Ca    7.8<L>      2021 08:16      PT/INR - ( 15 Apr 2021 13:48 )   PT: 10.8 sec;   INR: 0.93 ratio         PTT - ( 2021 08:16 )  PTT:176.4 sec  Urinalysis Basic - ( 2021 18:47 )    Color: Yellow / Appearance: Clear / S.015 / pH: x  Gluc: x / Ketone: Negative  / Bili: Negative / Urobili: Negative mg/dL   Blood: x / Protein: 500 mg/dL / Nitrite: Negative   Leuk Esterase: Negative / RBC: 0-2 /HPF / WBC 0-2   Sq Epi: x / Non Sq Epi: Occasional / Bacteria: Few          RADIOLOGY & ADDITIONAL TESTS:  < from: US Renal (21 @ 04:28) >   EXAM:  US KIDNEY(S)                          PROCEDURE DATE:  2021          INTERPRETATION:  CLINICAL INFORMATION: Acute kidney insufficiency.    COMPARISON: None available.    TECHNIQUE: Sonography of the kidneys and bladder.    FINDINGS:    Right kidney: 10.5 cm. No renal mass, hydronephrosis or calculi.    Left kidney: 9.1 cm. No renal mass, hydronephrosis or calculi.    Urinary bladder: Within normal limits.    Miscellaneous: Right pleural effusion.    IMPRESSION:    No hydronephrosis.    < end of copied text >      < from: Xray Chest 1 View AP/PA. (21 @ 11:44) >     EXAM:  XR CHEST AP OR PA 1V                          PROCEDURE DATE:  2021          INTERPRETATION:  HISTORY: Admitting Dxs: I48.91 UNSPECIFIED ATRIAL FIBRILLATION; ; v/q scan. in nuc med now;  TECHNIQUE: Portable frontal view of the chest, 1 view.  COMPARISON: none.  FINDINGS:  Clips are seen in the left axillary region.  HEART:  Enlarged  LUNGS: Small bilateral effusions. Mild congestive changes. No lobar consolidation. No pneumothorax  BONES: degenerative changes        IMPRESSION:    Small bilateral effusions. Mild congestive changes. No lobar consolidation. No pneumothorax    < end of copied text >

## 2021-04-16 NOTE — PROGRESS NOTE ADULT - SUBJECTIVE AND OBJECTIVE BOX
CC: SOB/THERESA/PHTN/PAF    INTERVAL HPI/OVERNIGHT EVENTS: Patient seen and examined. PAF noted on cardiac monitor overnight. Now in NSR. Appears less anxious. Denies chest pain, palpitations, nausea, vomiting, fever, chills. +NESS.     Vital Signs Last 24 Hrs  T(C): 36.9 (2021 04:56), Max: 36.9 (15 Apr 2021 23:52)  T(F): 98.4 (2021 04:56), Max: 98.4 (15 Apr 2021 23:52)  HR: 85 (2021 08:52) (77 - 96)  BP: 154/68 (2021 04:56) (142/68 - 163/54)  BP(mean): --  RR: 18 (2021 04:56) (18 - 19)  SpO2: 98% (2021 08:52) (96% - 98%)      PHYSICAL EXAMINATION:  GEN: In no apparent distress  HEENT: NC/AT  NECK: Supple, non-tender  CV: +S1, S2, RRR  RESPIRATORY: CTA B/L, no wheeze, rales  ABDOMEN: Soft, non-tender  EXTREMITIES: trace pedal edema B/L  NEURO: Grossly non-focal  PSYCH: Calm at present    I&O's Detail    15 Apr 2021 07:01  -  2021 07:00  --------------------------------------------------------  IN:    Heparin Infusion: 36 mL  Total IN: 36 mL    OUT:  Total OUT: 0 mL    Total NET: 36 mL                                    8.7    11.60 )-----------( 250      ( 2021 08:16 )             29.5     2021 08:16    133    |  96     |  67.0   ----------------------------<  407    5.3     |  25.0   |  2.99     Ca    7.8        2021 08:16      PT/INR - ( 15 Apr 2021 13:48 )   PT: 10.8 sec;   INR: 0.93 ratio         PTT - ( 2021 08:16 )  PTT:176.4 sec  CAPILLARY BLOOD GLUCOSE      POCT Blood Glucose.: 263 mg/dL (2021 11:27)  POCT Blood Glucose.: 394 mg/dL (2021 08:34)  POCT Blood Glucose.: 407 mg/dL (2021 08:02)  POCT Blood Glucose.: 349 mg/dL (15 Apr 2021 22:18)  POCT Blood Glucose.: 349 mg/dL (15 Apr 2021 16:28)      Urinalysis Basic - ( 2021 18:47 )    Color: Yellow / Appearance: Clear / S.015 / pH: x  Gluc: x / Ketone: Negative  / Bili: Negative / Urobili: Negative mg/dL   Blood: x / Protein: 500 mg/dL / Nitrite: Negative   Leuk Esterase: Negative / RBC: 0-2 /HPF / WBC 0-2   Sq Epi: x / Non Sq Epi: Occasional / Bacteria: Few        MEDICATIONS  (STANDING):  ALBUTerol    0.083%. 2.5 milliGRAM(s) Nebulizer once  ALBUTerol    90 MICROgram(s) HFA Inhaler 1 Puff(s) Inhalation every 4 hours  albuterol/ipratropium for Nebulization 3 milliLiter(s) Nebulizer every 6 hours  dextrose 40% Gel 15 Gram(s) Oral once  dextrose 5%. 1000 milliLiter(s) (50 mL/Hr) IV Continuous <Continuous>  dextrose 5%. 1000 milliLiter(s) (100 mL/Hr) IV Continuous <Continuous>  dextrose 50% Injectable 25 Gram(s) IV Push once  dextrose 50% Injectable 12.5 Gram(s) IV Push once  dextrose 50% Injectable 25 Gram(s) IV Push once  diltiazem    Tablet 30 milliGRAM(s) Oral every 8 hours  folic acid 1 milliGRAM(s) Oral daily  furosemide   Injectable 60 milliGRAM(s) IV Push two times a day  glucagon  Injectable 1 milliGRAM(s) IntraMuscular once  heparin  Infusion.  Unit(s)/Hr (12 mL/Hr) IV Continuous <Continuous>  hydrALAZINE 50 milliGRAM(s) Oral every 12 hours  insulin glargine Injectable (LANTUS) 20 Unit(s) SubCutaneous every morning  insulin glargine Injectable (LANTUS) 15 Unit(s) SubCutaneous at bedtime  insulin lispro (ADMELOG) corrective regimen sliding scale   SubCutaneous three times a day before meals  melatonin 1 milliGRAM(s) Oral at bedtime  pantoprazole    Tablet 40 milliGRAM(s) Oral before breakfast  predniSONE   Tablet   Oral   predniSONE   Tablet 40 milliGRAM(s) Oral daily  sodium zirconium cyclosilicate 5 Gram(s) Oral daily  tiotropium 18 MICROgram(s) Capsule 1 Capsule(s) Inhalation daily    MEDICATIONS  (PRN):  ALPRAZolam 0.25 milliGRAM(s) Oral every 8 hours PRN anxiety  guaiFENesin   Syrup  (Sugar-Free) 100 milliGRAM(s) Oral every 6 hours PRN Cough  heparin   Injectable 5500 Unit(s) IV Push every 6 hours PRN For aPTT less than 40  heparin   Injectable 2500 Unit(s) IV Push every 6 hours PRN For aPTT between 40 - 57      RADIOLOGY & ADDITIONAL TESTS:   CC: SOB/THERESA/PHTN/PAF      Hospital course in brief:   81yr old female with PMH of HTN, HPL, Asthma, DM-2 presented to ER with c/o Shortness of breath since 2days and peripheral edema admitted for possible acute asthma exacerbation and worsening  renal failure. patient initially repsonded to nebs and ivsteroids and wanted to leave AMA. however was still sob - Aghreed to stay. She was found to be fluid overloaded, ECHO showed severe pulm HTN - plm was consulted - VQ scan - neg. renal was consutled for worsening renal failure , started on iv lasix  - with only some improvement in clinical condition. Was also fpund to be in New onset Afib - Cardio was cnsulted, started on rate control  and AC.  remains sob requiring O2 aqnd deconditioned now. Per Renal - she may need RRT if continues to deteriorate.       INTERVAL HPI/OVERNIGHT EVENTS: Patient seen and examined. PAF noted on cardiac monitor overnight. Now in NSR. Appears less anxious. Denies chest pain, palpitations, nausea, vomiting, fever, chills. +NESS.             Vital Signs Last 24 Hrs  T(C): 36.9 (2021 04:56), Max: 36.9 (15 Apr 2021 23:52)  T(F): 98.4 (2021 04:56), Max: 98.4 (15 Apr 2021 23:52)  HR: 85 (2021 08:52) (77 - 96)  BP: 154/68 (2021 04:56) (142/68 - 163/54)  BP(mean): --  RR: 18 (2021 04:56) (18 - 19)  SpO2: 98% (2021 08:52) (96% - 98%)      PHYSICAL EXAMINATION:  GEN: In no apparent distress  HEENT: NC/AT  NECK: Supple, non-tender  CV: +S1, S2, RRR  RESPIRATORY: CTA B/L, no wheeze, rales  ABDOMEN: Soft, non-tender  EXTREMITIES: trace pedal edema B/L  NEURO: Grossly non-focal  PSYCH: Calm at present    I&O's Detail    15 Apr 2021 07:01  -  2021 07:00  --------------------------------------------------------  IN:    Heparin Infusion: 36 mL  Total IN: 36 mL    OUT:  Total OUT: 0 mL    Total NET: 36 mL                                    8.7    11.60 )-----------( 250      ( 2021 08:16 )             29.5     2021 08:16    133    |  96     |  67.0   ----------------------------<  407    5.3     |  25.0   |  2.99     Ca    7.8        2021 08:16      PT/INR - ( 15 Apr 2021 13:48 )   PT: 10.8 sec;   INR: 0.93 ratio         PTT - ( 2021 08:16 )  PTT:176.4 sec  CAPILLARY BLOOD GLUCOSE      POCT Blood Glucose.: 263 mg/dL (2021 11:27)  POCT Blood Glucose.: 394 mg/dL (2021 08:34)  POCT Blood Glucose.: 407 mg/dL (2021 08:02)  POCT Blood Glucose.: 349 mg/dL (15 Apr 2021 22:18)  POCT Blood Glucose.: 349 mg/dL (15 Apr 2021 16:28)      Urinalysis Basic - ( 2021 18:47 )    Color: Yellow / Appearance: Clear / S.015 / pH: x  Gluc: x / Ketone: Negative  / Bili: Negative / Urobili: Negative mg/dL   Blood: x / Protein: 500 mg/dL / Nitrite: Negative   Leuk Esterase: Negative / RBC: 0-2 /HPF / WBC 0-2   Sq Epi: x / Non Sq Epi: Occasional / Bacteria: Few        MEDICATIONS  (STANDING):  ALBUTerol    0.083%. 2.5 milliGRAM(s) Nebulizer once  ALBUTerol    90 MICROgram(s) HFA Inhaler 1 Puff(s) Inhalation every 4 hours  albuterol/ipratropium for Nebulization 3 milliLiter(s) Nebulizer every 6 hours  dextrose 40% Gel 15 Gram(s) Oral once  dextrose 5%. 1000 milliLiter(s) (50 mL/Hr) IV Continuous <Continuous>  dextrose 5%. 1000 milliLiter(s) (100 mL/Hr) IV Continuous <Continuous>  dextrose 50% Injectable 25 Gram(s) IV Push once  dextrose 50% Injectable 12.5 Gram(s) IV Push once  dextrose 50% Injectable 25 Gram(s) IV Push once  diltiazem    Tablet 30 milliGRAM(s) Oral every 8 hours  folic acid 1 milliGRAM(s) Oral daily  furosemide   Injectable 60 milliGRAM(s) IV Push two times a day  glucagon  Injectable 1 milliGRAM(s) IntraMuscular once  heparin  Infusion.  Unit(s)/Hr (12 mL/Hr) IV Continuous <Continuous>  hydrALAZINE 50 milliGRAM(s) Oral every 12 hours  insulin glargine Injectable (LANTUS) 20 Unit(s) SubCutaneous every morning  insulin glargine Injectable (LANTUS) 15 Unit(s) SubCutaneous at bedtime  insulin lispro (ADMELOG) corrective regimen sliding scale   SubCutaneous three times a day before meals  melatonin 1 milliGRAM(s) Oral at bedtime  pantoprazole    Tablet 40 milliGRAM(s) Oral before breakfast  predniSONE   Tablet   Oral   predniSONE   Tablet 40 milliGRAM(s) Oral daily  sodium zirconium cyclosilicate 5 Gram(s) Oral daily  tiotropium 18 MICROgram(s) Capsule 1 Capsule(s) Inhalation daily    MEDICATIONS  (PRN):  ALPRAZolam 0.25 milliGRAM(s) Oral every 8 hours PRN anxiety  guaiFENesin   Syrup  (Sugar-Free) 100 milliGRAM(s) Oral every 6 hours PRN Cough  heparin   Injectable 5500 Unit(s) IV Push every 6 hours PRN For aPTT less than 40  heparin   Injectable 2500 Unit(s) IV Push every 6 hours PRN For aPTT between 40 - 57      RADIOLOGY & ADDITIONAL TESTS:

## 2021-04-16 NOTE — PROGRESS NOTE ADULT - ATTENDING COMMENTS
pt seen and examined at bedside   c/o sob, getting weak.   RS: b/l basal crackles   CVS - tachycardic  Pedal edema 1+  Worsening renal failure  Afib - On AC  Severe pulm HTN   acute hypoxic resp failure - asthma flare- start on taper steroids + CHF + renal failure   Family - Nicanor - son's Boyfriend - who is an RN - is the  for now. updated with current plan of care. @ [phone]

## 2021-04-16 NOTE — PROGRESS NOTE ADULT - SUBJECTIVE AND OBJECTIVE BOX
CARDIOLOGY PROGRESS NOTE   ([facility])                                                                                                        Subjective: no n ew c/o, nad    Vitals:  T(C): 37.2 (04-16-21 @ 17:10), Max: 37.2 (04-16-21 @ 17:10)  HR: 87 (04-16-21 @ 17:10) (80 - 87)  BP: 153/67 (04-16-21 @ 17:10) (153/67 - 162/67)  RR: 17 (04-16-21 @ 17:10) (17 - 19)  SpO2: 98% (04-16-21 @ 17:10) (96% - 99%)  Wt(kg): --  I&O's Summary    15 Apr 2021 07:01  -  16 Apr 2021 07:00  --------------------------------------------------------  IN: 36 mL / OUT: 0 mL / NET: 36 mL          PHYSICAL EXAM:  Appearance: Normal	  HEENT:   Atraumatic  Cardiovascular: Normal S1 S2, No JVD, 1/6 SM lsb  Respiratory: Lungs bilateral air entry, clear	  Gastrointestinal:  Soft, Non-tender, + BS	  Skin: No rashes, No ecchymoses, No cyanosis  Neurologic: Alert and awake, able to move extremities  Extremities: trace ankle  edema      CURRENT MEDICATIONS:  diltiazem    Tablet 30 milliGRAM(s) Oral every 8 hours  furosemide   Injectable 60 milliGRAM(s) IV Push two times a day  hydrALAZINE 50 milliGRAM(s) Oral every 12 hours      ALBUTerol    0.083%. 2.5 milliGRAM(s) Nebulizer once  ALBUTerol    90 MICROgram(s) HFA Inhaler 1 Puff(s) Inhalation every 4 hours  albuterol/ipratropium for Nebulization 3 milliLiter(s) Nebulizer every 6 hours  guaiFENesin   Syrup  (Sugar-Free) 100 milliGRAM(s) Oral every 6 hours PRN  tiotropium 18 MICROgram(s) Capsule 1 Capsule(s) Inhalation daily    ALPRAZolam 0.25 milliGRAM(s) Oral every 8 hours PRN  melatonin 1 milliGRAM(s) Oral at bedtime    pantoprazole    Tablet 40 milliGRAM(s) Oral before breakfast    dextrose 40% Gel 15 Gram(s) Oral once  dextrose 50% Injectable 25 Gram(s) IV Push once  dextrose 50% Injectable 12.5 Gram(s) IV Push once  dextrose 50% Injectable 25 Gram(s) IV Push once  glucagon  Injectable 1 milliGRAM(s) IntraMuscular once  insulin glargine Injectable (LANTUS) 20 Unit(s) SubCutaneous every morning  insulin glargine Injectable (LANTUS) 15 Unit(s) SubCutaneous at bedtime  insulin lispro (ADMELOG) corrective regimen sliding scale   SubCutaneous three times a day before meals  predniSONE   Tablet   Oral   predniSONE   Tablet 40 milliGRAM(s) Oral daily    dextrose 5%. 1000 milliLiter(s) IV Continuous <Continuous>  dextrose 5%. 1000 milliLiter(s) IV Continuous <Continuous>  folic acid 1 milliGRAM(s) Oral daily  heparin   Injectable 5500 Unit(s) IV Push every 6 hours PRN  heparin   Injectable 2500 Unit(s) IV Push every 6 hours PRN  heparin  Infusion.  Unit(s)/Hr IV Continuous <Continuous>      LABS:	 	                            8.7    11.60 )-----------( 250      ( 16 Apr 2021 08:16 )             29.5     04-16    133<L>  |  96<L>  |  67.0<H>  ----------------------------<  407<H>  5.3   |  25.0  |  2.99<H>    Ca    7.8<L>      16 Apr 2021 08:16        TSH: Thyroid Stimulating Hormone, Serum: 0.96 uIU/mL (04-12 @ 07:24)

## 2021-04-16 NOTE — CHART NOTE - NSCHARTNOTEFT_GEN_A_CORE
Called by RN Re: patient c/o chest discomfort   patient seen at bedside  Patient describes it as "gas pain" that started approximately around 15:50. patient reports having lunch around 1300  pain is non-reproducable, nonradiating, and patient describes that it is similar to gas pain she has had in the past.  denies abdominal pain, palpitations, n/v/d  chart reviewed, last EKG on on time of admission.    GENERAL: elderly patient, NAD  CV: + s1,s2 regular rate and rhythm  Lungs: normal effort  ABDOMEN: soft, nontender, nondistended +BS    A/P  chest discomfort  - will given simethocone for possible gas pain  -EKG reviewed: SR with PACs, Rate 91, Qtc 457 no ST interval abnormalities  -discussed RN to continue to monitor and contact provider if symptoms worsen

## 2021-04-16 NOTE — PROVIDER CONTACT NOTE (CRITICAL VALUE NOTIFICATION) - ACTION/TREATMENT ORDERED:
Follow nomogram
Per Medicine PA follow nomogram. Stop infusion for 1 hour.  Then decrease by 2oounits

## 2021-04-17 LAB
24R-OH-CALCIDIOL SERPL-MCNC: 14.8 NG/ML — LOW (ref 30–80)
ANION GAP SERPL CALC-SCNC: 15 MMOL/L — SIGNIFICANT CHANGE UP (ref 5–17)
APTT BLD: 76.6 SEC — HIGH (ref 27.5–35.5)
BUN SERPL-MCNC: 72 MG/DL — HIGH (ref 8–20)
CALCIUM SERPL-MCNC: 7.9 MG/DL — LOW (ref 8.6–10.2)
CHLORIDE SERPL-SCNC: 97 MMOL/L — LOW (ref 98–107)
CO2 SERPL-SCNC: 27 MMOL/L — SIGNIFICANT CHANGE UP (ref 22–29)
CREAT SERPL-MCNC: 2.69 MG/DL — HIGH (ref 0.5–1.3)
FERRITIN SERPL-MCNC: 293 NG/ML — HIGH (ref 15–150)
GLUCOSE BLDC GLUCOMTR-MCNC: 166 MG/DL — HIGH (ref 70–99)
GLUCOSE BLDC GLUCOMTR-MCNC: 183 MG/DL — HIGH (ref 70–99)
GLUCOSE BLDC GLUCOMTR-MCNC: 269 MG/DL — HIGH (ref 70–99)
GLUCOSE BLDC GLUCOMTR-MCNC: 323 MG/DL — HIGH (ref 70–99)
GLUCOSE SERPL-MCNC: 161 MG/DL — HIGH (ref 70–99)
HCT VFR BLD CALC: 30.6 % — LOW (ref 34.5–45)
HGB BLD-MCNC: 9.2 G/DL — LOW (ref 11.5–15.5)
MAGNESIUM SERPL-MCNC: 2.4 MG/DL — SIGNIFICANT CHANGE UP (ref 1.6–2.6)
MCHC RBC-ENTMCNC: 19.2 PG — LOW (ref 27–34)
MCHC RBC-ENTMCNC: 30.1 GM/DL — LOW (ref 32–36)
MCV RBC AUTO: 64 FL — LOW (ref 80–100)
PHOSPHATE SERPL-MCNC: 5.7 MG/DL — HIGH (ref 2.4–4.7)
PLATELET # BLD AUTO: 221 K/UL — SIGNIFICANT CHANGE UP (ref 150–400)
POTASSIUM SERPL-MCNC: 4.6 MMOL/L — SIGNIFICANT CHANGE UP (ref 3.5–5.3)
POTASSIUM SERPL-SCNC: 4.6 MMOL/L — SIGNIFICANT CHANGE UP (ref 3.5–5.3)
PTH-INTACT FLD-MCNC: 267 PG/ML — HIGH (ref 15–65)
RBC # BLD: 4.78 M/UL — SIGNIFICANT CHANGE UP (ref 3.8–5.2)
RBC # FLD: 17.7 % — HIGH (ref 10.3–14.5)
RHEUMATOID FACT SERPL-ACNC: <10 IU/ML — SIGNIFICANT CHANGE UP (ref 0–13)
SODIUM SERPL-SCNC: 139 MMOL/L — SIGNIFICANT CHANGE UP (ref 135–145)
WBC # BLD: 8.48 K/UL — SIGNIFICANT CHANGE UP (ref 3.8–10.5)
WBC # FLD AUTO: 8.48 K/UL — SIGNIFICANT CHANGE UP (ref 3.8–10.5)

## 2021-04-17 PROCEDURE — 99232 SBSQ HOSP IP/OBS MODERATE 35: CPT

## 2021-04-17 PROCEDURE — 99233 SBSQ HOSP IP/OBS HIGH 50: CPT

## 2021-04-17 RX ADMIN — INSULIN GLARGINE 15 UNIT(S): 100 INJECTION, SOLUTION SUBCUTANEOUS at 21:25

## 2021-04-17 RX ADMIN — SODIUM ZIRCONIUM CYCLOSILICATE 5 GRAM(S): 10 POWDER, FOR SUSPENSION ORAL at 11:17

## 2021-04-17 RX ADMIN — Medication 50 MILLIGRAM(S): at 05:26

## 2021-04-17 RX ADMIN — HEPARIN SODIUM 800 UNIT(S)/HR: 5000 INJECTION INTRAVENOUS; SUBCUTANEOUS at 02:23

## 2021-04-17 RX ADMIN — Medication 3 MILLILITER(S): at 08:47

## 2021-04-17 RX ADMIN — Medication 30 MILLIGRAM(S): at 05:25

## 2021-04-17 RX ADMIN — Medication 3 MILLILITER(S): at 20:08

## 2021-04-17 RX ADMIN — Medication 40 MILLIGRAM(S): at 05:26

## 2021-04-17 RX ADMIN — Medication 2: at 11:17

## 2021-04-17 RX ADMIN — Medication 60 MILLIGRAM(S): at 17:05

## 2021-04-17 RX ADMIN — INSULIN GLARGINE 20 UNIT(S): 100 INJECTION, SOLUTION SUBCUTANEOUS at 07:52

## 2021-04-17 RX ADMIN — Medication 30 MILLIGRAM(S): at 13:24

## 2021-04-17 RX ADMIN — Medication 60 MILLIGRAM(S): at 05:25

## 2021-04-17 RX ADMIN — Medication 2: at 16:15

## 2021-04-17 RX ADMIN — Medication 50 MILLIGRAM(S): at 17:06

## 2021-04-17 RX ADMIN — Medication 1 MILLIGRAM(S): at 11:18

## 2021-04-17 RX ADMIN — Medication 6: at 07:29

## 2021-04-17 RX ADMIN — PANTOPRAZOLE SODIUM 40 MILLIGRAM(S): 20 TABLET, DELAYED RELEASE ORAL at 05:26

## 2021-04-17 RX ADMIN — Medication 3 MILLILITER(S): at 03:11

## 2021-04-17 RX ADMIN — Medication 0.25 MILLIGRAM(S): at 17:05

## 2021-04-17 RX ADMIN — Medication 1 MILLIGRAM(S): at 21:25

## 2021-04-17 RX ADMIN — Medication 30 MILLIGRAM(S): at 21:25

## 2021-04-17 RX ADMIN — Medication 3 MILLILITER(S): at 14:12

## 2021-04-17 NOTE — PROGRESS NOTE ADULT - ASSESSMENT
Progressive THEERSA===> likely renal hypoperfusion due to diuretics (in setting of pulm HTN and pre load dependence)  No hydronephrosis seen on sonogram  Elevated BUN also due to steroid effects  New pulm HTN (?) etiology  - avoid potential nephrotoxins  - cont IV Lasix to keep azotemic ---> if renal function worsens and/or hypotension develops then hold diuretics  - await serologies  - eventual cardiac cath    Anemia: multifactorial  Tsat= 13%  - await Ferritin---> if acceptable start IV Fe  - consider DILAN  - await serum immunofixation    RO:  - await phos, iPTH, Vit D Progressive THERESA===> likely renal hypoperfusion due to diuretics (in setting of pulm HTN and pre load dependence)  No hydronephrosis seen on sonogram  Elevated BUN also due to steroid effects  New pulm HTN (?) etiology  Nephrotic proteinuria (3,278 mg/24hr) likely DM  - avoid potential nephrotoxins  - cont IV Lasix to keep azotemic ---> if renal function worsens and/or hypotension develops then hold diuretics  - await serologies  - eventual cardiac cath    Anemia: multifactorial  Tsat= 13%  - await Ferritin---> if acceptable start IV Fe  - consider DILAN  - await serum immunofixation    RO:  - await phos, iPTH, Vit D

## 2021-04-17 NOTE — PROGRESS NOTE ADULT - ASSESSMENT
81yr old female with PMH of HTN, HPL, Asthma, DM-2 presented to ER with c/o Shortness of breath since 2days and peripheral edema admitted for possible acute asthma exacerbation and possible acute renal failure.     # Acute Asthma Exacerbation + severe pulm HTN   suspect acute diastolic congestive heart failure - VQ scan - neg for PE   appreciate pulm recs    steroids changed to oral taper  Nebs q6  wean off O2 as tolerated   - As part of PH  check JAYDA, RF, ANCA, anti-Scl-70, anti-centromere Ab, anti-RNP (ordered)  Cardiology consult, probable RHC next week    #  worsening THERESA on possible underlying CKD/Hyperkalemia-  baseline creat 0.9 Feb 2020-   rising Cr 1.9--> 2.2--> 2.3--> 2.5--> 2.4-->2.9-->2.6  avoid potential nephrotoxins  No hydro on renal sono, 24 h urine in progress   Lasix 60mg IV Q 12  May eventually need RRT       # HTN - ct hydralazine, Cardizem. ARB on hold due to THERESA    # PAF- CM reviewed, continued episodes of PAF, now in NSR, on Heparin drip. Cardizem 30 mg TID, titrate for rate control    # DM-2 with hypoglycemia - Hyperglycemic after Lantus discontinued, Lantus now resumed. Steroids changed to oral taper. Will continue to observe. If still elevated tomorrow, will adjust lantus     # Peripheral edema , elevated d-dimer - b/l doppler LE - Neg for DVT - f/u ECHO - severe PHTN ,  on IV Lasix    #anemia- multifactorial, HH stable.     # Abdominal pain - on PPI    # DVT px - Heparin drip    LVM to pt daughter's bf for update

## 2021-04-17 NOTE — PROGRESS NOTE ADULT - SUBJECTIVE AND OBJECTIVE BOX
HPI  Pt is a 80yo F admitted to [facility] for asthema exacerbation and sever pul HTN   Pt was seen and examined at bedside. Comfortable in bed, no acute distress. Crt function slight improving. BGM elevated     Vital Signs Last 24 Hrs  T(C): 36.8 (17 Apr 2021 11:25), Max: 37.2 (16 Apr 2021 17:10)  T(F): 98.2 (17 Apr 2021 11:25), Max: 98.9 (16 Apr 2021 17:10)  HR: 84 (17 Apr 2021 14:13) (79 - 99)  BP: 133/68 (17 Apr 2021 13:23) (113/66 - 154/86)  BP(mean): --  RR: 17 (17 Apr 2021 13:23) (17 - 19)  SpO2: 96% (17 Apr 2021 14:13) (96% - 98%)    I&O's Summary      CAPILLARY BLOOD GLUCOSE      POCT Blood Glucose.: 183 mg/dL (17 Apr 2021 11:15)  POCT Blood Glucose.: 269 mg/dL (17 Apr 2021 07:26)  POCT Blood Glucose.: 274 mg/dL (16 Apr 2021 22:20)  POCT Blood Glucose.: 92 mg/dL (16 Apr 2021 16:04)      PHYSICAL EXAM:    Constitutional: NAD,   HEENT: PERR, EOMI,   Neck: Soft and supple,    Respiratory: Breath sounds are clear bilaterally   Cardiovascular: S1 and S2,   Gastrointestinal: Bowel Sounds present, soft, nontender,   Extremities: No peripheral edema  Vascular: 2+ peripheral pulses  Neurological: A/O x 3,   Musculoskeletal: 5/5 strength b/l upper and lower extremities      MEDICATIONS:  MEDICATIONS  (STANDING):  ALBUTerol    0.083%. 2.5 milliGRAM(s) Nebulizer once  ALBUTerol    90 MICROgram(s) HFA Inhaler 1 Puff(s) Inhalation every 4 hours  albuterol/ipratropium for Nebulization 3 milliLiter(s) Nebulizer every 6 hours  dextrose 40% Gel 15 Gram(s) Oral once  dextrose 5%. 1000 milliLiter(s) (50 mL/Hr) IV Continuous <Continuous>  dextrose 5%. 1000 milliLiter(s) (100 mL/Hr) IV Continuous <Continuous>  dextrose 50% Injectable 25 Gram(s) IV Push once  dextrose 50% Injectable 12.5 Gram(s) IV Push once  dextrose 50% Injectable 25 Gram(s) IV Push once  diltiazem    Tablet 30 milliGRAM(s) Oral every 8 hours  folic acid 1 milliGRAM(s) Oral daily  furosemide   Injectable 60 milliGRAM(s) IV Push two times a day  glucagon  Injectable 1 milliGRAM(s) IntraMuscular once  heparin  Infusion.  Unit(s)/Hr (12 mL/Hr) IV Continuous <Continuous>  hydrALAZINE 50 milliGRAM(s) Oral every 12 hours  insulin glargine Injectable (LANTUS) 20 Unit(s) SubCutaneous every morning  insulin glargine Injectable (LANTUS) 15 Unit(s) SubCutaneous at bedtime  insulin lispro (ADMELOG) corrective regimen sliding scale   SubCutaneous three times a day before meals  melatonin 1 milliGRAM(s) Oral at bedtime  pantoprazole    Tablet 40 milliGRAM(s) Oral before breakfast  predniSONE   Tablet 40 milliGRAM(s) Oral daily  predniSONE   Tablet   Oral   sodium zirconium cyclosilicate 5 Gram(s) Oral daily  tiotropium 18 MICROgram(s) Capsule 1 Capsule(s) Inhalation daily      LABS: All Labs Reviewed:                        9.2    8.48  )-----------( 221      ( 17 Apr 2021 11:03 )             30.6     04-17    139  |  97<L>  |  72.0<H>  ----------------------------<  161<H>  4.6   |  27.0  |  2.69<H>    Ca    7.9<L>      17 Apr 2021 11:00  Phos  5.7     04-17  Mg     2.4     04-17      PTT - ( 17 Apr 2021 01:49 )  PTT:76.6 sec      Blood Culture:     RADIOLOGY/EKG:    DVT PPX:    ADVANCED DIRECTIVE:    DISPOSITION:

## 2021-04-17 NOTE — PROGRESS NOTE ADULT - SUBJECTIVE AND OBJECTIVE BOX
[place] [facility]                                                        Office: [address] [address]                                                       Telephone: [phone]. Fax:[phone]                                                                             PROGRESS NOTE    Subjective:  Patient is feeling OK, daughter at bedside     Review of symptoms:   Cardiac:  No chest pain. +Mild dyspnea. No palpitations.  Respiratory:no cough. No dyspnea  Gastrointestinal: No diarrhea. No abdominal pain. No bleeding.   Neuro: No focal neuro complaints.      	  Vitals:  T(C): 36.8 (04-17-21 @ 18:15), Max: 36.8 (04-17-21 @ 11:25)  HR: 98 (04-17-21 @ 18:15) (79 - 99)  BP: 155/70 (04-17-21 @ 17:06) (113/66 - 155/70)  RR: 18 (04-17-21 @ 18:15) (17 - 19)  SpO2: 97% (04-17-21 @ 18:15) (96% - 98%)  Wt(kg): --  I&O's Summary        PHYSICAL EXAM:  Appearance: Comfortable. No acute distress  HEENT:  Head and neck: Atraumatic. Normocephalic. , Neck is supple. No JVD,   Neurologic: Alert and awake, Grossly nonfocal.   Lymphatic: No cervical lymphadenopathy  Cardiovascular: Normal S1 S2, No murmurs. No JVD,   Respiratory: Lungs clear to auscultation, Occasional rhonchi  Gastrointestinal:  Soft, Non-tender, + BS  .    CURRENT MEDICATIONS:    MEDICATIONS  (STANDING):  ALBUTerol    0.083%. 2.5 milliGRAM(s) Nebulizer once  ALBUTerol    90 MICROgram(s) HFA Inhaler 1 Puff(s) Inhalation every 4 hours  albuterol/ipratropium for Nebulization 3 milliLiter(s) Nebulizer every 6 hours  dextrose 40% Gel 15 Gram(s) Oral once  dextrose 5%. 1000 milliLiter(s) (50 mL/Hr) IV Continuous <Continuous>  dextrose 5%. 1000 milliLiter(s) (100 mL/Hr) IV Continuous <Continuous>  dextrose 50% Injectable 25 Gram(s) IV Push once  dextrose 50% Injectable 12.5 Gram(s) IV Push once  dextrose 50% Injectable 25 Gram(s) IV Push once  diltiazem    Tablet 30 milliGRAM(s) Oral every 8 hours  folic acid 1 milliGRAM(s) Oral daily  furosemide   Injectable 60 milliGRAM(s) IV Push two times a day  glucagon  Injectable 1 milliGRAM(s) IntraMuscular once  heparin  Infusion.  Unit(s)/Hr (12 mL/Hr) IV Continuous <Continuous>  hydrALAZINE 50 milliGRAM(s) Oral every 12 hours  insulin glargine Injectable (LANTUS) 20 Unit(s) SubCutaneous every morning  insulin glargine Injectable (LANTUS) 15 Unit(s) SubCutaneous at bedtime  insulin lispro (ADMELOG) corrective regimen sliding scale   SubCutaneous three times a day before meals  melatonin 1 milliGRAM(s) Oral at bedtime  pantoprazole    Tablet 40 milliGRAM(s) Oral before breakfast  predniSONE   Tablet   Oral   predniSONE   Tablet 40 milliGRAM(s) Oral daily  sodium zirconium cyclosilicate 5 Gram(s) Oral daily  tiotropium 18 MICROgram(s) Capsule 1 Capsule(s) Inhalation daily      LABS:	 	                          9.2    8.48  )-----------( 221      ( 17 Apr 2021 11:03 )             30.6     04-17    139  |  97<L>  |  72.0<H>  ----------------------------<  161<H>  4.6   |  27.0  |  2.69<H>    Ca    7.9<L>      17 Apr 2021 11:00  Phos  5.7     04-17  Mg     2.4     04-17      proBNP:   Lipid Profile:           TELEMETRY: Reviewed  - No significant arrhythmias    ECG:

## 2021-04-17 NOTE — PROGRESS NOTE ADULT - SUBJECTIVE AND OBJECTIVE BOX
NEPHROLOGY INTERVAL HPI/OVERNIGHT EVENTS:  pt resting, comfortable  less sob noted and no acute distress noted    MEDICATIONS  (STANDING):  ALBUTerol    0.083%. 2.5 milliGRAM(s) Nebulizer once  ALBUTerol    90 MICROgram(s) HFA Inhaler 1 Puff(s) Inhalation every 4 hours  albuterol/ipratropium for Nebulization 3 milliLiter(s) Nebulizer every 6 hours  dextrose 40% Gel 15 Gram(s) Oral once  dextrose 5%. 1000 milliLiter(s) (50 mL/Hr) IV Continuous <Continuous>  dextrose 5%. 1000 milliLiter(s) (100 mL/Hr) IV Continuous <Continuous>  dextrose 50% Injectable 25 Gram(s) IV Push once  dextrose 50% Injectable 12.5 Gram(s) IV Push once  dextrose 50% Injectable 25 Gram(s) IV Push once  diltiazem    Tablet 30 milliGRAM(s) Oral every 8 hours  folic acid 1 milliGRAM(s) Oral daily  furosemide   Injectable 60 milliGRAM(s) IV Push two times a day  glucagon  Injectable 1 milliGRAM(s) IntraMuscular once  heparin  Infusion.  Unit(s)/Hr (12 mL/Hr) IV Continuous <Continuous>  hydrALAZINE 50 milliGRAM(s) Oral every 12 hours  insulin glargine Injectable (LANTUS) 20 Unit(s) SubCutaneous every morning  insulin glargine Injectable (LANTUS) 15 Unit(s) SubCutaneous at bedtime  insulin lispro (ADMELOG) corrective regimen sliding scale   SubCutaneous three times a day before meals  melatonin 1 milliGRAM(s) Oral at bedtime  pantoprazole    Tablet 40 milliGRAM(s) Oral before breakfast  predniSONE   Tablet   Oral   predniSONE   Tablet 40 milliGRAM(s) Oral daily  sodium zirconium cyclosilicate 5 Gram(s) Oral daily  tiotropium 18 MICROgram(s) Capsule 1 Capsule(s) Inhalation daily    MEDICATIONS  (PRN):  ALPRAZolam 0.25 milliGRAM(s) Oral every 8 hours PRN anxiety  guaiFENesin   Syrup  (Sugar-Free) 100 milliGRAM(s) Oral every 6 hours PRN Cough  heparin   Injectable 5500 Unit(s) IV Push every 6 hours PRN For aPTT less than 40  heparin   Injectable 2500 Unit(s) IV Push every 6 hours PRN For aPTT between 40 - 57      Allergies    No Known Allergies          Vital Signs Last 24 Hrs  T(C): 36.7 (17 Apr 2021 05:07), Max: 37.2 (16 Apr 2021 17:10)  T(F): 98.1 (17 Apr 2021 05:07), Max: 98.9 (16 Apr 2021 17:10)  HR: 83 (17 Apr 2021 05:07) (79 - 99)  BP: 113/66 (17 Apr 2021 05:07) (113/66 - 162/67)  BP(mean): --  RR: 18 (17 Apr 2021 05:07) (17 - 18)  SpO2: 97% (17 Apr 2021 05:07) (97% - 99%)    PHYSICAL EXAM:  GENERAL: Debilitated  HEAD: Dry mucous membranes  NECK: Supple, No JVD  NERVOUS SYSTEM:  Alert & Oriented X3  CHEST/LUNG: Diminished BS  HEART: RR no rub  ABDOMEN: Soft, Nontender, Nondistended; +BS  EXTREMITIES: Less LE edema    LABS:                        8.7    11.60 )-----------( 250      ( 16 Apr 2021 08:16 )             29.5     04-16    133<L>  |  96<L>  |  67.0<H>  ----------------------------<  407<H>  5.3   |  25.0  |  2.99<H>    Ca    7.8<L>      16 Apr 2021 08:16      PT/INR - ( 15 Apr 2021 13:48 )   PT: 10.8 sec;   INR: 0.93 ratio         PTT - ( 17 Apr 2021 01:49 )  PTT:76.6 sec        RADIOLOGY & ADDITIONAL TESTS:  < from: US Renal (04.12.21 @ 04:28) >     EXAM:  US KIDNEY(S)                          PROCEDURE DATE:  04/12/2021          INTERPRETATION:  CLINICAL INFORMATION: Acute kidney insufficiency.    COMPARISON: None available.    TECHNIQUE: Sonography of the kidneys and bladder.    FINDINGS:    Right kidney: 10.5 cm. No renal mass, hydronephrosis or calculi.    Left kidney: 9.1 cm. No renal mass, hydronephrosis or calculi.    Urinary bladder: Within normal limits.    Miscellaneous: Right pleural effusion.    IMPRESSION:    No hydronephrosis.    < end of copied text >

## 2021-04-18 LAB
ANION GAP SERPL CALC-SCNC: 10 MMOL/L — SIGNIFICANT CHANGE UP (ref 5–17)
APTT BLD: 102.5 SEC — HIGH (ref 27.5–35.5)
APTT BLD: 108.5 SEC — HIGH (ref 27.5–35.5)
APTT BLD: 83.1 SEC — HIGH (ref 27.5–35.5)
BUN SERPL-MCNC: 67 MG/DL — HIGH (ref 8–20)
CALCIUM SERPL-MCNC: 7.9 MG/DL — LOW (ref 8.4–10.5)
CALCIUM SERPL-MCNC: 8.3 MG/DL — LOW (ref 8.6–10.2)
CHLORIDE SERPL-SCNC: 96 MMOL/L — LOW (ref 98–107)
CO2 SERPL-SCNC: 32 MMOL/L — HIGH (ref 22–29)
CREAT SERPL-MCNC: 2.38 MG/DL — HIGH (ref 0.5–1.3)
GLUCOSE BLDC GLUCOMTR-MCNC: 119 MG/DL — HIGH (ref 70–99)
GLUCOSE BLDC GLUCOMTR-MCNC: 169 MG/DL — HIGH (ref 70–99)
GLUCOSE BLDC GLUCOMTR-MCNC: 208 MG/DL — HIGH (ref 70–99)
GLUCOSE BLDC GLUCOMTR-MCNC: 354 MG/DL — HIGH (ref 70–99)
GLUCOSE BLDC GLUCOMTR-MCNC: 415 MG/DL — HIGH (ref 70–99)
GLUCOSE SERPL-MCNC: 168 MG/DL — HIGH (ref 70–99)
HCT VFR BLD CALC: 31.5 % — LOW (ref 34.5–45)
HGB BLD-MCNC: 9.4 G/DL — LOW (ref 11.5–15.5)
MCHC RBC-ENTMCNC: 19.1 PG — LOW (ref 27–34)
MCHC RBC-ENTMCNC: 29.8 GM/DL — LOW (ref 32–36)
MCV RBC AUTO: 64 FL — LOW (ref 80–100)
PLATELET # BLD AUTO: 225 K/UL — SIGNIFICANT CHANGE UP (ref 150–400)
POTASSIUM SERPL-MCNC: 3.9 MMOL/L — SIGNIFICANT CHANGE UP (ref 3.5–5.3)
POTASSIUM SERPL-SCNC: 3.9 MMOL/L — SIGNIFICANT CHANGE UP (ref 3.5–5.3)
RBC # BLD: 4.92 M/UL — SIGNIFICANT CHANGE UP (ref 3.8–5.2)
RBC # FLD: 18.1 % — HIGH (ref 10.3–14.5)
SARS-COV-2 RNA SPEC QL NAA+PROBE: SIGNIFICANT CHANGE UP
SODIUM SERPL-SCNC: 138 MMOL/L — SIGNIFICANT CHANGE UP (ref 135–145)
WBC # BLD: 9.17 K/UL — SIGNIFICANT CHANGE UP (ref 3.8–10.5)
WBC # FLD AUTO: 9.17 K/UL — SIGNIFICANT CHANGE UP (ref 3.8–10.5)

## 2021-04-18 PROCEDURE — 99233 SBSQ HOSP IP/OBS HIGH 50: CPT

## 2021-04-18 PROCEDURE — 99232 SBSQ HOSP IP/OBS MODERATE 35: CPT

## 2021-04-18 RX ORDER — IRON SUCROSE 20 MG/ML
250 INJECTION, SOLUTION INTRAVENOUS
Refills: 0 | Status: COMPLETED | OUTPATIENT
Start: 2021-04-18 | End: 2021-04-21

## 2021-04-18 RX ORDER — INSULIN GLARGINE 100 [IU]/ML
20 INJECTION, SOLUTION SUBCUTANEOUS AT BEDTIME
Refills: 0 | Status: DISCONTINUED | OUTPATIENT
Start: 2021-04-18 | End: 2021-04-19

## 2021-04-18 RX ORDER — ERGOCALCIFEROL 1.25 MG/1
50000 CAPSULE ORAL
Refills: 0 | Status: DISCONTINUED | OUTPATIENT
Start: 2021-04-18 | End: 2021-04-24

## 2021-04-18 RX ORDER — CALCITRIOL 0.5 UG/1
0.25 CAPSULE ORAL DAILY
Refills: 0 | Status: DISCONTINUED | OUTPATIENT
Start: 2021-04-18 | End: 2021-04-24

## 2021-04-18 RX ORDER — CALCIUM ACETATE 667 MG
667 TABLET ORAL
Refills: 0 | Status: DISCONTINUED | OUTPATIENT
Start: 2021-04-18 | End: 2021-04-24

## 2021-04-18 RX ADMIN — Medication 30 MILLIGRAM(S): at 05:31

## 2021-04-18 RX ADMIN — IRON SUCROSE 262.5 MILLIGRAM(S): 20 INJECTION, SOLUTION INTRAVENOUS at 15:40

## 2021-04-18 RX ADMIN — Medication 12: at 17:36

## 2021-04-18 RX ADMIN — INSULIN GLARGINE 20 UNIT(S): 100 INJECTION, SOLUTION SUBCUTANEOUS at 22:02

## 2021-04-18 RX ADMIN — Medication 50 MILLIGRAM(S): at 17:37

## 2021-04-18 RX ADMIN — ERGOCALCIFEROL 50000 UNIT(S): 1.25 CAPSULE ORAL at 22:02

## 2021-04-18 RX ADMIN — Medication 40 MILLIGRAM(S): at 05:31

## 2021-04-18 RX ADMIN — PANTOPRAZOLE SODIUM 40 MILLIGRAM(S): 20 TABLET, DELAYED RELEASE ORAL at 05:32

## 2021-04-18 RX ADMIN — Medication 1 MILLIGRAM(S): at 12:48

## 2021-04-18 RX ADMIN — HEPARIN SODIUM 700 UNIT(S)/HR: 5000 INJECTION INTRAVENOUS; SUBCUTANEOUS at 16:39

## 2021-04-18 RX ADMIN — Medication 50 MILLIGRAM(S): at 05:31

## 2021-04-18 RX ADMIN — Medication 0.25 MILLIGRAM(S): at 02:09

## 2021-04-18 RX ADMIN — CALCITRIOL 0.25 MICROGRAM(S): 0.5 CAPSULE ORAL at 12:43

## 2021-04-18 RX ADMIN — Medication 0.25 MILLIGRAM(S): at 11:47

## 2021-04-18 RX ADMIN — Medication 1 MILLIGRAM(S): at 22:02

## 2021-04-18 RX ADMIN — Medication 100 MILLIGRAM(S): at 19:56

## 2021-04-18 RX ADMIN — Medication 60 MILLIGRAM(S): at 17:59

## 2021-04-18 RX ADMIN — Medication 0.25 MILLIGRAM(S): at 19:56

## 2021-04-18 RX ADMIN — Medication 3 MILLILITER(S): at 02:08

## 2021-04-18 RX ADMIN — Medication 3 MILLILITER(S): at 20:56

## 2021-04-18 RX ADMIN — Medication 3 MILLILITER(S): at 08:00

## 2021-04-18 RX ADMIN — INSULIN GLARGINE 20 UNIT(S): 100 INJECTION, SOLUTION SUBCUTANEOUS at 07:57

## 2021-04-18 RX ADMIN — Medication 30 MILLIGRAM(S): at 15:39

## 2021-04-18 RX ADMIN — Medication 60 MILLIGRAM(S): at 05:32

## 2021-04-18 RX ADMIN — Medication 667 MILLIGRAM(S): at 17:36

## 2021-04-18 RX ADMIN — SODIUM ZIRCONIUM CYCLOSILICATE 5 GRAM(S): 10 POWDER, FOR SUSPENSION ORAL at 12:48

## 2021-04-18 RX ADMIN — HEPARIN SODIUM 700 UNIT(S)/HR: 5000 INJECTION INTRAVENOUS; SUBCUTANEOUS at 23:32

## 2021-04-18 RX ADMIN — Medication 667 MILLIGRAM(S): at 08:38

## 2021-04-18 RX ADMIN — Medication 4: at 12:44

## 2021-04-18 RX ADMIN — Medication 3 MILLILITER(S): at 14:41

## 2021-04-18 RX ADMIN — Medication 667 MILLIGRAM(S): at 12:43

## 2021-04-18 RX ADMIN — Medication 30 MILLIGRAM(S): at 22:01

## 2021-04-18 NOTE — DIETITIAN INITIAL EVALUATION ADULT. - OTHER INFO
81yr old female with PMH of HTN, HPL, Asthma, DM-2 presented to ER with c/o Shortness of breath since 2days and peripheral edema admitted for possible acute asthma exacerbation and possible acute renal failure.  Diet, Consistent Carbohydrate w/Evening Snack (04-11-21 @ 18:33)  PO intake good.  Pt c/o needing to be changed -RN made aware- only brief interview conducted  1+ edema to feet and ankles

## 2021-04-18 NOTE — CHART NOTE - NSCHARTNOTEFT_GEN_A_CORE
Spoke with Dr. Lawson (Endocrinology)  Elevated PTH likely 2/2 worsening THERESA on possible underlying CKD, Scr 2.38  Nephro added: Ergo, Rocaltrol and Phoslo  Continue with current therapy   Dw Dr. Lombardo who agrees  Will hold off on consult for now per Dr. Lombardo Spoke with Dr. Lawson (Endocrinology)  Elevated PTH likely 2/2 worsening THERESA on possible underlying CKD, Scr 2.38  Nephro added: Ergo, Rocaltrol and Phoslo  Continue with current therapy   Dw Dr. Lombardo who agrees  Will hold off on consult for now per Dr. Lombardo    Addendum @ 18:30  BG elevated >400, administered 12U admelog  Repeat   Optimize hydration  Given CKD hx, held admelog - prolonged half-life with use of insulin in CKD  Changed PM Lantus to 20U QHS  Continue to monitor   Dr Lombardo requesting Endo consult for reccs

## 2021-04-18 NOTE — PROGRESS NOTE ADULT - ASSESSMENT
81yr old female with PMH of HTN, HPL, Asthma, DM-2 presented to ER with c/o Shortness of breath since 2days and peripheral edema admitted for possible acute asthma exacerbation and possible acute renal failure.     # Acute Asthma Exacerbation + severe pulm HTN   suspect acute diastolic congestive heart failure - VQ scan - neg for PE   appreciate pulm recs    steroids changed to oral taper  Nebs q6  wean off O2 as tolerated   - As part of PH  check JAYDA, RF, ANCA, anti-Scl-70, anti-centromere Ab, anti-RNP (ordered)  Cardiology consult, probable RHC next week  pt is slightly improved     #  worsening THERESA on possible underlying CKD/Hyperkalemia-  baseline creat 0.9 Feb 2020-   rising Cr 1.9--> 2.2--> 2.3--> 2.5--> 2.4-->2.9-->2.6-->2.38   avoid potential nephrotoxins  No hydro on renal sono, 24 h urine in progress   Lasix 60mg IV Q 12  May eventually need RRT  nephrology consult appreciated     # HTN - ct hydralazine, Cardizem. ARB on hold due to THERESA    # PAF- CM reviewed, continued episodes of PAF, now in NSR, on Heparin drip. Cardizem 30 mg TID, titrate for rate control    # DM-2 with hypoglycemia - Hyperglycemic after Lantus discontinued, Lantus now resumed. Steroids changed to oral taper. Will continue to observe. If still elevated tomorrow, will adjust lantus     # Peripheral edema , elevated d-dimer - b/l doppler LE - Neg for DVT - f/u ECHO - severe PHTN ,  on IV Lasix    #anemia- multifactorial, HH stable.     #hypocalcemia  Elevated PTH  endocrine consult     # Abdominal pain - on PPI    # DVT px - Heparin drip    LVM to pt daughter's bf for update

## 2021-04-18 NOTE — PROGRESS NOTE ADULT - SUBJECTIVE AND OBJECTIVE BOX
[place] [facility]                                                        Office: [address] [address]                                                       Telephone: [phone]. Fax:[phone]                                                                             PROGRESS NOTE    Subjective:  Patient is feeling tired, no chest pain or shortness of breath     Review of symptoms:   Cardiac:  No chest pain. No dyspnea. No palpitations.  Respiratory:no cough. No dyspnea  Gastrointestinal: No diarrhea. No abdominal pain. No bleeding.   Neuro: No focal neuro complaints.      	  Vitals:  T(C): 36.8 (04-18-21 @ 11:09), Max: 36.9 (04-18-21 @ 06:25)  HR: 87 (04-18-21 @ 11:09) (74 - 109)  BP: 147/56 (04-18-21 @ 11:09) (133/68 - 155/70)  RR: 18 (04-18-21 @ 11:09) (17 - 18)  SpO2: 100% (04-18-21 @ 11:09) (96% - 100%)  Wt(kg): --  I&O's Summary        PHYSICAL EXAM:  Appearance: Comfortable. No acute distress  HEENT:  Head and neck: Atraumatic. Normocephalic. , Neck is supple. No JVD,   Neurologic: Alert and awake, Grossly nonfocal.   Lymphatic: No cervical lymphadenopathy  Cardiovascular: Normal S1 S2, No murmurs. No JVD,   Respiratory: Lungs clear to auscultation  Gastrointestinal:  Soft, Non-tender, + BS  Lower Extremities: No edema    CURRENT MEDICATIONS:    MEDICATIONS  (STANDING):  ALBUTerol    0.083%. 2.5 milliGRAM(s) Nebulizer once  ALBUTerol    90 MICROgram(s) HFA Inhaler 1 Puff(s) Inhalation every 4 hours  albuterol/ipratropium for Nebulization 3 milliLiter(s) Nebulizer every 6 hours  calcitriol   Capsule 0.25 MICROGram(s) Oral daily  calcium acetate 667 milliGRAM(s) Oral three times a day with meals  dextrose 40% Gel 15 Gram(s) Oral once  dextrose 5%. 1000 milliLiter(s) (50 mL/Hr) IV Continuous <Continuous>  dextrose 5%. 1000 milliLiter(s) (100 mL/Hr) IV Continuous <Continuous>  dextrose 50% Injectable 25 Gram(s) IV Push once  dextrose 50% Injectable 12.5 Gram(s) IV Push once  dextrose 50% Injectable 25 Gram(s) IV Push once  diltiazem    Tablet 30 milliGRAM(s) Oral every 8 hours  ergocalciferol 26058 Unit(s) Oral <User Schedule>  folic acid 1 milliGRAM(s) Oral daily  furosemide   Injectable 60 milliGRAM(s) IV Push two times a day  glucagon  Injectable 1 milliGRAM(s) IntraMuscular once  heparin  Infusion.  Unit(s)/Hr (12 mL/Hr) IV Continuous <Continuous>  hydrALAZINE 50 milliGRAM(s) Oral every 12 hours  insulin glargine Injectable (LANTUS) 20 Unit(s) SubCutaneous every morning  insulin glargine Injectable (LANTUS) 15 Unit(s) SubCutaneous at bedtime  insulin lispro (ADMELOG) corrective regimen sliding scale   SubCutaneous three times a day before meals  iron sucrose IVPB 250 milliGRAM(s) IV Intermittent <User Schedule>  melatonin 1 milliGRAM(s) Oral at bedtime  pantoprazole    Tablet 40 milliGRAM(s) Oral before breakfast  predniSONE   Tablet   Oral   sodium zirconium cyclosilicate 5 Gram(s) Oral daily  tiotropium 18 MICROgram(s) Capsule 1 Capsule(s) Inhalation daily      LABS:	 	                          9.4    9.17  )-----------( 225      ( 18 Apr 2021 09:57 )             31.5     04-18    138  |  96<L>  |  67.0<H>  ----------------------------<  168<H>  3.9   |  32.0<H>  |  2.38<H>    Ca    8.3<L>      18 Apr 2021 09:55  Phos  5.7     04-17  Mg     2.4     04-17      proBNP:   Lipid Profile:           TELEMETRY: Reviewed  - No significant arrhythmias    ECG:

## 2021-04-18 NOTE — PROGRESS NOTE ADULT - ASSESSMENT
A/P: 80 y/o F with a PMHx of HTN, HPL, asthma, and DMII who presented tot he ER with complaints of worsening dyspnea for 2 days. Patient states that she had increased inhaler usage, and just felt like nothing was improving. Patient also following with her PMD for worsening renal failure, and had not seen a Nephrologist yet. Patient was found to have acute asthma exacerbation and was in Afib with RVR. Patient's echo shows severe pulmonary HTN, which is new to be diagnosed. Patient still with some shortness of breath that has been improving, but still with decreased renal function. Patient denies any fevers, chills, CP, syncope, abdominal pain, N/V/D, headache, or dizziness.   Troponin negative x 1  BNP 1789    Severe Pulmonary HTN  Newly diagnosed.   PE excluded on V/Q scan.   Echo with EF 70-75%, no significant diastolic dysfunction noted on this echo.   Diuresis per Renal due to THERESA/CKD.   Plan for RHC next week when clinical exam improves. - Patient states that she is not able to lie down, unable to schedule RHC     Acute Asthma Exacerbation   Cont current management     THERESA on CKD  Lasix 60mg IV Q 12    PAF - Average rate around 100  IV Heparin drip.   Cardizem 30 mg TID, titrate for rate control    Dr Bennett to follow.

## 2021-04-18 NOTE — PROGRESS NOTE ADULT - SUBJECTIVE AND OBJECTIVE BOX
NEPHROLOGY INTERVAL HPI/OVERNIGHT EVENTS:  pt resting comfortably  no acute distress noted    MEDICATIONS  (STANDING):  ALBUTerol    0.083%. 2.5 milliGRAM(s) Nebulizer once  ALBUTerol    90 MICROgram(s) HFA Inhaler 1 Puff(s) Inhalation every 4 hours  albuterol/ipratropium for Nebulization 3 milliLiter(s) Nebulizer every 6 hours  dextrose 40% Gel 15 Gram(s) Oral once  dextrose 5%. 1000 milliLiter(s) (50 mL/Hr) IV Continuous <Continuous>  dextrose 5%. 1000 milliLiter(s) (100 mL/Hr) IV Continuous <Continuous>  dextrose 50% Injectable 25 Gram(s) IV Push once  dextrose 50% Injectable 12.5 Gram(s) IV Push once  dextrose 50% Injectable 25 Gram(s) IV Push once  diltiazem    Tablet 30 milliGRAM(s) Oral every 8 hours  folic acid 1 milliGRAM(s) Oral daily  furosemide   Injectable 60 milliGRAM(s) IV Push two times a day  glucagon  Injectable 1 milliGRAM(s) IntraMuscular once  heparin  Infusion.  Unit(s)/Hr (12 mL/Hr) IV Continuous <Continuous>  hydrALAZINE 50 milliGRAM(s) Oral every 12 hours  insulin glargine Injectable (LANTUS) 20 Unit(s) SubCutaneous every morning  insulin glargine Injectable (LANTUS) 15 Unit(s) SubCutaneous at bedtime  insulin lispro (ADMELOG) corrective regimen sliding scale   SubCutaneous three times a day before meals  melatonin 1 milliGRAM(s) Oral at bedtime  pantoprazole    Tablet 40 milliGRAM(s) Oral before breakfast  predniSONE   Tablet   Oral   sodium zirconium cyclosilicate 5 Gram(s) Oral daily  tiotropium 18 MICROgram(s) Capsule 1 Capsule(s) Inhalation daily    MEDICATIONS  (PRN):  ALPRAZolam 0.25 milliGRAM(s) Oral every 8 hours PRN anxiety  guaiFENesin   Syrup  (Sugar-Free) 100 milliGRAM(s) Oral every 6 hours PRN Cough  heparin   Injectable 5500 Unit(s) IV Push every 6 hours PRN For aPTT less than 40  heparin   Injectable 2500 Unit(s) IV Push every 6 hours PRN For aPTT between 40 - 57      Allergies    No Known Allergies          Vital Signs Last 24 Hrs  T(C): 36.9 (18 Apr 2021 06:25), Max: 36.9 (18 Apr 2021 06:25)  T(F): 98.5 (18 Apr 2021 06:25), Max: 98.5 (18 Apr 2021 06:25)  HR: 85 (18 Apr 2021 06:25) (77 - 109)  BP: 150/69 (18 Apr 2021 06:25) (133/68 - 155/70)  BP(mean): --  RR: 18 (18 Apr 2021 06:25) (17 - 19)  SpO2: 100% (18 Apr 2021 06:25) (96% - 100%)    PHYSICAL EXAM:  GENERAL: Fatigued  HEAD: Dry mucous membranes  NECK: Supple, No JVD  NERVOUS SYSTEM:  Alert & Oriented X3  CHEST/LUNG: Diminished BS  HEART: RR no rub  ABDOMEN: Soft, Nontender, Nondistended; +BS  EXTREMITIES:  LE edema stable    LABS:                        9.2    8.48  )-----------( 221      ( 17 Apr 2021 11:03 )             30.6     04-17    139  |  97<L>  |  72.0<H>  ----------------------------<  161<H>  4.6   |  27.0  |  2.69<H>    Ca    7.9<L>      17 Apr 2021 11:00  Phos  5.7     04-17  Mg     2.4     04-17      PTT - ( 17 Apr 2021 01:49 )  PTT:76.6 sec    Intact PTH: 267 pg/mL (04-17 @ 20:22)  Vitamin D, 25-Hydroxy: 14.8 ng/mL (04-17 @ 20:22)  Magnesium, Serum: 2.4 mg/dL (04-17 @ 11:00)  Phosphorus Level, Serum: 5.7 mg/dL (04-17 @ 11:00)      RADIOLOGY & ADDITIONAL TESTS:  < from: US Renal (04.12.21 @ 04:28) >   EXAM:  US KIDNEY(S)                          PROCEDURE DATE:  04/12/2021          INTERPRETATION:  CLINICAL INFORMATION: Acute kidney insufficiency.    COMPARISON: None available.    TECHNIQUE: Sonography of the kidneys and bladder.    FINDINGS:    Right kidney: 10.5 cm. No renal mass, hydronephrosis or calculi.    Left kidney: 9.1 cm. No renal mass, hydronephrosis or calculi.    Urinary bladder: Within normal limits.    Miscellaneous: Right pleural effusion.    IMPRESSION:    No hydronephrosis.    < end of copied text >

## 2021-04-18 NOTE — PROGRESS NOTE ADULT - ASSESSMENT
Progressive THERESA (renal hypoperfusion due to diuretics (in setting of pulm HTN and pre load dependence)---> improved yesterday  No hydronephrosis seen on sonogram  Elevated BUN also due to steroid effects  New pulm HTN (?) etiology  Nephrotic proteinuria (3,278 mg/24hr) likely DM  - avoid potential nephrotoxins  - cont IV Lasix to keep azotemic ---> if renal function worsens and/or hypotension develops then hold diuretics  -  serologies negative thus far; still awaiting further results  - eventual cardiac cath when renal function stabilizes    Anemia: multifactorial  Tsat= 13%; Ferritin= 293  - start IV Fe  - will start DILAN if serum immunofixation unremarkable    RO:  - add Ergo, Rocaltrol and Phoslo

## 2021-04-18 NOTE — DIETITIAN INITIAL EVALUATION ADULT. - PERTINENT MEDS FT
MEDICATIONS  (STANDING):  ALBUTerol    0.083%. 2.5 milliGRAM(s) Nebulizer once  ALBUTerol    90 MICROgram(s) HFA Inhaler 1 Puff(s) Inhalation every 4 hours  albuterol/ipratropium for Nebulization 3 milliLiter(s) Nebulizer every 6 hours  calcitriol   Capsule 0.25 MICROGram(s) Oral daily  calcium acetate 667 milliGRAM(s) Oral three times a day with meals  dextrose 40% Gel 15 Gram(s) Oral once  dextrose 5%. 1000 milliLiter(s) (50 mL/Hr) IV Continuous <Continuous>  dextrose 5%. 1000 milliLiter(s) (100 mL/Hr) IV Continuous <Continuous>  dextrose 50% Injectable 25 Gram(s) IV Push once  dextrose 50% Injectable 12.5 Gram(s) IV Push once  dextrose 50% Injectable 25 Gram(s) IV Push once  diltiazem    Tablet 30 milliGRAM(s) Oral every 8 hours  ergocalciferol 50082 Unit(s) Oral <User Schedule>  folic acid 1 milliGRAM(s) Oral daily  furosemide   Injectable 60 milliGRAM(s) IV Push two times a day  glucagon  Injectable 1 milliGRAM(s) IntraMuscular once  heparin  Infusion.  Unit(s)/Hr (12 mL/Hr) IV Continuous <Continuous>  hydrALAZINE 50 milliGRAM(s) Oral every 12 hours  insulin glargine Injectable (LANTUS) 20 Unit(s) SubCutaneous every morning  insulin glargine Injectable (LANTUS) 15 Unit(s) SubCutaneous at bedtime  insulin lispro (ADMELOG) corrective regimen sliding scale   SubCutaneous three times a day before meals  iron sucrose IVPB 250 milliGRAM(s) IV Intermittent <User Schedule>  melatonin 1 milliGRAM(s) Oral at bedtime  pantoprazole    Tablet 40 milliGRAM(s) Oral before breakfast  predniSONE   Tablet   Oral   sodium zirconium cyclosilicate 5 Gram(s) Oral daily  tiotropium 18 MICROgram(s) Capsule 1 Capsule(s) Inhalation daily    MEDICATIONS  (PRN):  ALPRAZolam 0.25 milliGRAM(s) Oral every 8 hours PRN anxiety  guaiFENesin   Syrup  (Sugar-Free) 100 milliGRAM(s) Oral every 6 hours PRN Cough  heparin   Injectable 5500 Unit(s) IV Push every 6 hours PRN For aPTT less than 40  heparin   Injectable 2500 Unit(s) IV Push every 6 hours PRN For aPTT between 40 - 57

## 2021-04-18 NOTE — DIETITIAN INITIAL EVALUATION ADULT. - PERTINENT LABORATORY DATA
04-18 Na138 mmol/L Glu 168 mg/dL<H> K+ 3.9 mmol/L Cr  2.38 mg/dL<H> BUN 67.0 mg/dL<H> Phos n/a   Alb n/a   PAB n/a

## 2021-04-18 NOTE — PROGRESS NOTE ADULT - SUBJECTIVE AND OBJECTIVE BOX
HPI  Pt is a 80yo F admitted to [facility] for asthema exacerbation and sever pul HTN   Pt was seen and examined at bedside. Comfortable in bed, no acute distress. Crt function slight improving. Pt states she is still not able to lay flat completely     Vital Signs Last 24 Hrs  T(C): 36.8 (18 Apr 2021 11:09), Max: 36.9 (18 Apr 2021 06:25)  T(F): 98.2 (18 Apr 2021 11:09), Max: 98.5 (18 Apr 2021 06:25)  HR: 87 (18 Apr 2021 11:09) (74 - 109)  BP: 147/56 (18 Apr 2021 11:09) (133/68 - 155/70)  BP(mean): --  RR: 18 (18 Apr 2021 11:09) (17 - 18)  SpO2: 100% (18 Apr 2021 11:09) (96% - 100%)    I&O's Summary      CAPILLARY BLOOD GLUCOSE      POCT Blood Glucose.: 208 mg/dL (18 Apr 2021 12:13)  POCT Blood Glucose.: 119 mg/dL (18 Apr 2021 07:50)  POCT Blood Glucose.: 323 mg/dL (17 Apr 2021 21:25)  POCT Blood Glucose.: 166 mg/dL (17 Apr 2021 16:14)      PHYSICAL EXAM:    Constitutional: NAD,   HEENT: PERR, EOMI,   Neck: Soft and supple,    Respiratory: Breath sounds are clear bilaterally   Cardiovascular: S1 and S2,   Gastrointestinal: Bowel Sounds present, soft, nontender,   Extremities: No peripheral edema  Vascular: 2+ peripheral pulses  Neurological: A/O x 3,   Musculoskeletal: 5/5 strength b/l upper and lower extremities    MEDICATIONS:  MEDICATIONS  (STANDING):  ALBUTerol    0.083%. 2.5 milliGRAM(s) Nebulizer once  ALBUTerol    90 MICROgram(s) HFA Inhaler 1 Puff(s) Inhalation every 4 hours  albuterol/ipratropium for Nebulization 3 milliLiter(s) Nebulizer every 6 hours  calcitriol   Capsule 0.25 MICROGram(s) Oral daily  calcium acetate 667 milliGRAM(s) Oral three times a day with meals  dextrose 40% Gel 15 Gram(s) Oral once  dextrose 5%. 1000 milliLiter(s) (50 mL/Hr) IV Continuous <Continuous>  dextrose 5%. 1000 milliLiter(s) (100 mL/Hr) IV Continuous <Continuous>  dextrose 50% Injectable 25 Gram(s) IV Push once  dextrose 50% Injectable 12.5 Gram(s) IV Push once  dextrose 50% Injectable 25 Gram(s) IV Push once  diltiazem    Tablet 30 milliGRAM(s) Oral every 8 hours  ergocalciferol 66908 Unit(s) Oral <User Schedule>  folic acid 1 milliGRAM(s) Oral daily  furosemide   Injectable 60 milliGRAM(s) IV Push two times a day  glucagon  Injectable 1 milliGRAM(s) IntraMuscular once  heparin  Infusion.  Unit(s)/Hr (12 mL/Hr) IV Continuous <Continuous>  hydrALAZINE 50 milliGRAM(s) Oral every 12 hours  insulin glargine Injectable (LANTUS) 20 Unit(s) SubCutaneous every morning  insulin glargine Injectable (LANTUS) 15 Unit(s) SubCutaneous at bedtime  insulin lispro (ADMELOG) corrective regimen sliding scale   SubCutaneous three times a day before meals  iron sucrose IVPB 250 milliGRAM(s) IV Intermittent <User Schedule>  melatonin 1 milliGRAM(s) Oral at bedtime  pantoprazole    Tablet 40 milliGRAM(s) Oral before breakfast  predniSONE   Tablet   Oral   sodium zirconium cyclosilicate 5 Gram(s) Oral daily  tiotropium 18 MICROgram(s) Capsule 1 Capsule(s) Inhalation daily      LABS: All Labs Reviewed:                        9.4    9.17  )-----------( 225      ( 18 Apr 2021 09:57 )             31.5     04-18    138  |  96<L>  |  67.0<H>  ----------------------------<  168<H>  3.9   |  32.0<H>  |  2.38<H>    Ca    8.3<L>      18 Apr 2021 09:55  Phos  5.7     04-17  Mg     2.4     04-17      PTT - ( 18 Apr 2021 09:57 )  PTT:108.5 sec      Blood Culture:     RADIOLOGY/EKG:    DVT PPX:    ADVANCED DIRECTIVE:    DISPOSITION:

## 2021-04-19 LAB
ANA TITR SER: NEGATIVE — SIGNIFICANT CHANGE UP
ANION GAP SERPL CALC-SCNC: 11 MMOL/L — SIGNIFICANT CHANGE UP (ref 5–17)
APTT BLD: 75.7 SEC — HIGH (ref 27.5–35.5)
BUN SERPL-MCNC: 61 MG/DL — HIGH (ref 8–20)
C3 SERPL-MCNC: 138 MG/DL — SIGNIFICANT CHANGE UP (ref 81–157)
C4 SERPL-MCNC: 36 MG/DL — SIGNIFICANT CHANGE UP (ref 13–39)
CALCIUM SERPL-MCNC: 8.6 MG/DL — SIGNIFICANT CHANGE UP (ref 8.6–10.2)
CHLORIDE SERPL-SCNC: 96 MMOL/L — LOW (ref 98–107)
CO2 SERPL-SCNC: 36 MMOL/L — HIGH (ref 22–29)
CREAT SERPL-MCNC: 2.14 MG/DL — HIGH (ref 0.5–1.3)
GLUCOSE BLDC GLUCOMTR-MCNC: 124 MG/DL — HIGH (ref 70–99)
GLUCOSE BLDC GLUCOMTR-MCNC: 216 MG/DL — HIGH (ref 70–99)
GLUCOSE BLDC GLUCOMTR-MCNC: 226 MG/DL — HIGH (ref 70–99)
GLUCOSE BLDC GLUCOMTR-MCNC: 95 MG/DL — SIGNIFICANT CHANGE UP (ref 70–99)
GLUCOSE SERPL-MCNC: 60 MG/DL — LOW (ref 70–99)
HCT VFR BLD CALC: 31.1 % — LOW (ref 34.5–45)
HGB BLD-MCNC: 9.4 G/DL — LOW (ref 11.5–15.5)
INTERPRETATION SERPL IFE-IMP: SIGNIFICANT CHANGE UP
MAGNESIUM SERPL-MCNC: 1.9 MG/DL — SIGNIFICANT CHANGE UP (ref 1.6–2.6)
MCHC RBC-ENTMCNC: 19.4 PG — LOW (ref 27–34)
MCHC RBC-ENTMCNC: 30.2 GM/DL — LOW (ref 32–36)
MCV RBC AUTO: 64.1 FL — LOW (ref 80–100)
PLATELET # BLD AUTO: 222 K/UL — SIGNIFICANT CHANGE UP (ref 150–400)
POTASSIUM SERPL-MCNC: 3.4 MMOL/L — LOW (ref 3.5–5.3)
POTASSIUM SERPL-SCNC: 3.4 MMOL/L — LOW (ref 3.5–5.3)
RBC # BLD: 4.85 M/UL — SIGNIFICANT CHANGE UP (ref 3.8–5.2)
RBC # FLD: 18 % — HIGH (ref 10.3–14.5)
SODIUM SERPL-SCNC: 143 MMOL/L — SIGNIFICANT CHANGE UP (ref 135–145)
WBC # BLD: 10.39 K/UL — SIGNIFICANT CHANGE UP (ref 3.8–10.5)
WBC # FLD AUTO: 10.39 K/UL — SIGNIFICANT CHANGE UP (ref 3.8–10.5)

## 2021-04-19 PROCEDURE — 99232 SBSQ HOSP IP/OBS MODERATE 35: CPT

## 2021-04-19 PROCEDURE — 99223 1ST HOSP IP/OBS HIGH 75: CPT

## 2021-04-19 PROCEDURE — 99233 SBSQ HOSP IP/OBS HIGH 50: CPT

## 2021-04-19 RX ORDER — POTASSIUM CHLORIDE 20 MEQ
40 PACKET (EA) ORAL ONCE
Refills: 0 | Status: COMPLETED | OUTPATIENT
Start: 2021-04-19 | End: 2021-04-19

## 2021-04-19 RX ORDER — INSULIN LISPRO 100/ML
6 VIAL (ML) SUBCUTANEOUS
Refills: 0 | Status: DISCONTINUED | OUTPATIENT
Start: 2021-04-19 | End: 2021-04-24

## 2021-04-19 RX ORDER — DILTIAZEM HCL 120 MG
60 CAPSULE, EXT RELEASE 24 HR ORAL EVERY 6 HOURS
Refills: 0 | Status: DISCONTINUED | OUTPATIENT
Start: 2021-04-19 | End: 2021-04-24

## 2021-04-19 RX ORDER — INSULIN LISPRO 100/ML
VIAL (ML) SUBCUTANEOUS AT BEDTIME
Refills: 0 | Status: DISCONTINUED | OUTPATIENT
Start: 2021-04-19 | End: 2021-04-24

## 2021-04-19 RX ORDER — DILTIAZEM HCL 120 MG
30 CAPSULE, EXT RELEASE 24 HR ORAL EVERY 6 HOURS
Refills: 0 | Status: DISCONTINUED | OUTPATIENT
Start: 2021-04-19 | End: 2021-04-19

## 2021-04-19 RX ADMIN — Medication 60 MILLIGRAM(S): at 17:17

## 2021-04-19 RX ADMIN — Medication 50 MILLIGRAM(S): at 05:14

## 2021-04-19 RX ADMIN — INSULIN GLARGINE 20 UNIT(S): 100 INJECTION, SOLUTION SUBCUTANEOUS at 09:13

## 2021-04-19 RX ADMIN — Medication 6 UNIT(S): at 12:51

## 2021-04-19 RX ADMIN — Medication 667 MILLIGRAM(S): at 17:17

## 2021-04-19 RX ADMIN — Medication 667 MILLIGRAM(S): at 09:13

## 2021-04-19 RX ADMIN — Medication 1 MILLIGRAM(S): at 21:46

## 2021-04-19 RX ADMIN — Medication 60 MILLIGRAM(S): at 05:14

## 2021-04-19 RX ADMIN — Medication 667 MILLIGRAM(S): at 12:52

## 2021-04-19 RX ADMIN — Medication 0.25 MILLIGRAM(S): at 21:46

## 2021-04-19 RX ADMIN — Medication 4: at 12:51

## 2021-04-19 RX ADMIN — IRON SUCROSE 262.5 MILLIGRAM(S): 20 INJECTION, SOLUTION INTRAVENOUS at 17:19

## 2021-04-19 RX ADMIN — PANTOPRAZOLE SODIUM 40 MILLIGRAM(S): 20 TABLET, DELAYED RELEASE ORAL at 05:14

## 2021-04-19 RX ADMIN — Medication 6 UNIT(S): at 17:16

## 2021-04-19 RX ADMIN — HEPARIN SODIUM 700 UNIT(S)/HR: 5000 INJECTION INTRAVENOUS; SUBCUTANEOUS at 07:24

## 2021-04-19 RX ADMIN — Medication 30 MILLIGRAM(S): at 05:14

## 2021-04-19 RX ADMIN — Medication 40 MILLIEQUIVALENT(S): at 17:16

## 2021-04-19 RX ADMIN — Medication 3 MILLILITER(S): at 08:29

## 2021-04-19 RX ADMIN — Medication 3 MILLILITER(S): at 03:01

## 2021-04-19 RX ADMIN — Medication 60 MILLIGRAM(S): at 23:12

## 2021-04-19 RX ADMIN — Medication 3 MILLILITER(S): at 21:20

## 2021-04-19 RX ADMIN — CALCITRIOL 0.25 MICROGRAM(S): 0.5 CAPSULE ORAL at 12:52

## 2021-04-19 RX ADMIN — Medication 3 MILLILITER(S): at 15:11

## 2021-04-19 RX ADMIN — Medication 60 MILLIGRAM(S): at 12:52

## 2021-04-19 RX ADMIN — Medication 1 MILLIGRAM(S): at 12:52

## 2021-04-19 RX ADMIN — Medication 50 MILLIGRAM(S): at 17:18

## 2021-04-19 RX ADMIN — Medication 4: at 17:16

## 2021-04-19 NOTE — PROGRESS NOTE ADULT - ASSESSMENT
81yr old female with PMH of HTN, HPL, Asthma, DM-2 presented to ER with c/o Shortness of breath since 2days and peripheral edema admitted for possible acute asthma exacerbation and possible acute renal failure.     # Acute Asthma Exacerbation + severe pulm HTN   suspect acute diastolic congestive heart failure - VQ scan - neg for PE   appreciate pulm recs    steroids changed to oral taper  Nebs q6  wean off O2 as tolerated   As part of PH  check JAYDA, RF, ANCA, anti-Scl-70, anti-centromere Ab, anti-RNP (ordered)  Cardiology consult  RHC ?tomorrow if able to lay flat   pt is slightly improved     #  worsening THERESA on possible underlying CKD/Hyperkalemia  improving   avoid potential nephrotoxins  No hydro on renal sono, 24 h urine in progress   Lasix 60mg IV Q 12  nephrology consult appreciated     # HTN   cont hydralazine, Cardizem.   ARB on hold due to THERESA    # PAF  continued episodes of PAF, now in NSR,   on Heparin drip.   Cardizem 30 mg TID, titrate for rate control    # DM-2   with hypoglycemia   increased lantus to 20u BID   will continue monitor     # Peripheral edema , elevated d-dimer    b/l doppler LE - Neg for DVT   f/u ECHO - severe PHTN ,  on IV Lasix    #anemia  multifactorial, HH stable.     #hypocalcemia  Elevated PTH  likely secondary to CKD     # Abdominal pain   on PPI    # DVT px - Heparin drip    spoke w pt  for updates   f/u stat covid swab for possible RHC

## 2021-04-19 NOTE — PROGRESS NOTE ADULT - SUBJECTIVE AND OBJECTIVE BOX
HPI  Pt is a 82yo F admitted to [facility] for asthema exacerbation and sever pul HTN   Pt was seen and examined at bedside. Comfortable in bed, no acute distress. Crt function improving. Pt is able to lay flat 30 degrees today without problem       Vital Signs Last 24 Hrs  T(C): 36.8 (19 Apr 2021 05:00), Max: 36.8 (18 Apr 2021 11:09)  T(F): 98.3 (19 Apr 2021 05:00), Max: 98.3 (19 Apr 2021 05:00)  HR: 98 (19 Apr 2021 08:31) (68 - 98)  BP: 162/64 (19 Apr 2021 05:00) (115/75 - 162/64)  BP(mean): --  RR: 18 (19 Apr 2021 05:00) (18 - 18)  SpO2: 99% (19 Apr 2021 08:31) (97% - 100%)    I&O's Summary      CAPILLARY BLOOD GLUCOSE      POCT Blood Glucose.: 95 mg/dL (19 Apr 2021 07:52)  POCT Blood Glucose.: 169 mg/dL (18 Apr 2021 21:59)  POCT Blood Glucose.: 354 mg/dL (18 Apr 2021 17:58)  POCT Blood Glucose.: 415 mg/dL (18 Apr 2021 16:42)  POCT Blood Glucose.: 208 mg/dL (18 Apr 2021 12:13)      PHYSICAL EXAM:    Constitutional: NAD,   HEENT: PERR, EOMI,   Neck: Soft and supple,    Respiratory: Breath sounds are clear bilaterally   Cardiovascular: S1 and S2,   Gastrointestinal: Bowel Sounds present, soft, nontender,   Extremities: No peripheral edema  Vascular: 2+ peripheral pulses  Neurological: A/O x 3,   Musculoskeletal: 5/5 strength b/l upper and lower extremities    MEDICATIONS:  MEDICATIONS  (STANDING):  ALBUTerol    0.083%. 2.5 milliGRAM(s) Nebulizer once  albuterol/ipratropium for Nebulization 3 milliLiter(s) Nebulizer every 6 hours  calcitriol   Capsule 0.25 MICROGram(s) Oral daily  calcium acetate 667 milliGRAM(s) Oral three times a day with meals  dextrose 40% Gel 15 Gram(s) Oral once  dextrose 5%. 1000 milliLiter(s) (50 mL/Hr) IV Continuous <Continuous>  dextrose 5%. 1000 milliLiter(s) (100 mL/Hr) IV Continuous <Continuous>  dextrose 50% Injectable 25 Gram(s) IV Push once  dextrose 50% Injectable 12.5 Gram(s) IV Push once  dextrose 50% Injectable 25 Gram(s) IV Push once  diltiazem    Tablet 30 milliGRAM(s) Oral every 6 hours  ergocalciferol 17611 Unit(s) Oral <User Schedule>  folic acid 1 milliGRAM(s) Oral daily  furosemide   Injectable 60 milliGRAM(s) IV Push two times a day  glucagon  Injectable 1 milliGRAM(s) IntraMuscular once  heparin  Infusion.  Unit(s)/Hr (12 mL/Hr) IV Continuous <Continuous>  hydrALAZINE 50 milliGRAM(s) Oral every 12 hours  insulin glargine Injectable (LANTUS) 20 Unit(s) SubCutaneous every morning  insulin glargine Injectable (LANTUS) 20 Unit(s) SubCutaneous at bedtime  insulin lispro (ADMELOG) corrective regimen sliding scale   SubCutaneous three times a day before meals  iron sucrose IVPB 250 milliGRAM(s) IV Intermittent <User Schedule>  melatonin 1 milliGRAM(s) Oral at bedtime  pantoprazole    Tablet 40 milliGRAM(s) Oral before breakfast  potassium chloride    Tablet ER 40 milliEquivalent(s) Oral once  predniSONE   Tablet   Oral   predniSONE   Tablet 30 milliGRAM(s) Oral daily      LABS: All Labs Reviewed:                        9.4    10.39 )-----------( 222      ( 19 Apr 2021 07:06 )             31.1     04-19    143  |  96<L>  |  61.0<H>  ----------------------------<  60<L>  3.4<L>   |  36.0<H>  |  2.14<H>    Ca    8.6      19 Apr 2021 07:06  Phos  5.7     04-17  Mg     1.9     04-19      PTT - ( 19 Apr 2021 07:06 )  PTT:75.7 sec      Blood Culture:     RADIOLOGY/EKG:    DVT PPX:    ADVANCED DIRECTIVE:    DISPOSITION:

## 2021-04-19 NOTE — PROGRESS NOTE ADULT - ASSESSMENT
THERESA: renal hypoperfusion due to diuretics (in setting of new pulm HTN and pre load dependence)----> now recovering  No hydronephrosis seen on sonogram  Elevated BUN also due to steroid effects  Nephrotic proteinuria (3,278 mg/24hr) likely DM  Contraction alkalosis  - avoid potential nephrotoxins  - agree with stopping Lokelma  - consider transitioning to PO diuretics  - monitor labs    Anemia: multifactorial  Tsat= 13%; Ferritin= 293  - started IV Fe  - await serum immunofixation     RO:  - cont Ergo, Rocaltrol and Phoslo

## 2021-04-19 NOTE — PROGRESS NOTE ADULT - SUBJECTIVE AND OBJECTIVE BOX
NEPHROLOGY INTERVAL HPI/OVERNIGHT EVENTS:  pt remains comfortable  no acute sob    MEDICATIONS  (STANDING):  ALBUTerol    0.083%. 2.5 milliGRAM(s) Nebulizer once  albuterol/ipratropium for Nebulization 3 milliLiter(s) Nebulizer every 6 hours  calcitriol   Capsule 0.25 MICROGram(s) Oral daily  calcium acetate 667 milliGRAM(s) Oral three times a day with meals  dextrose 40% Gel 15 Gram(s) Oral once  dextrose 5%. 1000 milliLiter(s) (50 mL/Hr) IV Continuous <Continuous>  dextrose 5%. 1000 milliLiter(s) (100 mL/Hr) IV Continuous <Continuous>  dextrose 50% Injectable 25 Gram(s) IV Push once  dextrose 50% Injectable 12.5 Gram(s) IV Push once  dextrose 50% Injectable 25 Gram(s) IV Push once  diltiazem    Tablet 60 milliGRAM(s) Oral every 6 hours  ergocalciferol 18627 Unit(s) Oral <User Schedule>  folic acid 1 milliGRAM(s) Oral daily  furosemide   Injectable 60 milliGRAM(s) IV Push two times a day  glucagon  Injectable 1 milliGRAM(s) IntraMuscular once  heparin  Infusion.  Unit(s)/Hr (12 mL/Hr) IV Continuous <Continuous>  hydrALAZINE 50 milliGRAM(s) Oral every 12 hours  insulin glargine Injectable (LANTUS) 20 Unit(s) SubCutaneous at bedtime  insulin glargine Injectable (LANTUS) 20 Unit(s) SubCutaneous every morning  insulin lispro (ADMELOG) corrective regimen sliding scale   SubCutaneous three times a day before meals  iron sucrose IVPB 250 milliGRAM(s) IV Intermittent <User Schedule>  melatonin 1 milliGRAM(s) Oral at bedtime  pantoprazole    Tablet 40 milliGRAM(s) Oral before breakfast  potassium chloride    Tablet ER 40 milliEquivalent(s) Oral once  predniSONE   Tablet   Oral   predniSONE   Tablet 30 milliGRAM(s) Oral daily    MEDICATIONS  (PRN):  ALPRAZolam 0.25 milliGRAM(s) Oral every 8 hours PRN anxiety  guaiFENesin   Syrup  (Sugar-Free) 100 milliGRAM(s) Oral every 6 hours PRN Cough  heparin   Injectable 5500 Unit(s) IV Push every 6 hours PRN For aPTT less than 40  heparin   Injectable 2500 Unit(s) IV Push every 6 hours PRN For aPTT between 40 - 57      Allergies    No Known Allergies        Vital Signs Last 24 Hrs  T(C): 36.8 (19 Apr 2021 05:00), Max: 36.8 (18 Apr 2021 11:09)  T(F): 98.3 (19 Apr 2021 05:00), Max: 98.3 (19 Apr 2021 05:00)  HR: 98 (19 Apr 2021 08:31) (68 - 98)  BP: 162/64 (19 Apr 2021 05:00) (115/75 - 162/64)  BP(mean): --  RR: 18 (19 Apr 2021 05:00) (18 - 18)  SpO2: 99% (19 Apr 2021 08:31) (97% - 100%)    PHYSICAL EXAM:  GENERAL: Comfortable  NECK: Supple, No JVD  NERVOUS SYSTEM:  Alert & Oriented X3  CHEST/LUNG: Diminished BS  HEART: RR no rub  ABDOMEN: Soft, Nontender, Nondistended; +BS  EXTREMITIES:  LE edema less    LABS:                        9.4    10.39 )-----------( 222      ( 19 Apr 2021 07:06 )             31.1     04-19    143  |  96<L>  |  61.0<H>  ----------------------------<  60<L>  3.4<L>   |  36.0<H>  |  2.14<H>    Ca    8.6      19 Apr 2021 07:06  Phos  5.7     04-17  Mg     1.9     04-19      PTT - ( 19 Apr 2021 07:06 )  PTT:75.7 sec    Magnesium, Serum: 1.9 mg/dL (04-19 @ 07:06)      RADIOLOGY & ADDITIONAL TESTS:  < from: Xray Chest 1 View AP/PA. (04.14.21 @ 11:44) >     EXAM:  XR CHEST AP OR PA 1V                          PROCEDURE DATE:  04/14/2021          INTERPRETATION:  HISTORY: Admitting Dxs: I48.91 UNSPECIFIED ATRIAL FIBRILLATION; ; v/q scan. in nuc med now;  TECHNIQUE: Portable frontal view of the chest, 1 view.  COMPARISON: none.  FINDINGS:  Clips are seen in the left axillary region.  HEART:  Enlarged  LUNGS: Small bilateral effusions. Mild congestive changes. No lobar consolidation. No pneumothorax  BONES: degenerative changes        IMPRESSION:    Small bilateral effusions. Mild congestive changes. No lobar consolidation. No pneumothorax    < end of copied text >

## 2021-04-19 NOTE — PROGRESS NOTE ADULT - SUBJECTIVE AND OBJECTIVE BOX
[facility]                                                       [facility]                                                               Office: [address] [address]                                                              Telephone: [phone]. Fax:[phone]                                                                             PROGRESS NOTE  Reason for follow up: Dyspnea  COVID Status: 4/18- not detected  Update: 4/19- patient resting comfortably laying <30degree angle on 2L NC. c/o occasional SOB. Denies CP or palpitations. Tele AFib/flutter 90-110s. SBP avg >140s.      Review of symptoms:   All review of systems negative unless indicated otherwise above.     	  Vitals:  T(C): 36.8 (04-19-21 @ 05:00), Max: 36.8 (04-18-21 @ 11:09)  HR: 98 (04-19-21 @ 08:31) (68 - 98)  BP: 162/64 (04-19-21 @ 05:00) (115/75 - 162/64)  RR: 18 (04-19-21 @ 05:00) (18 - 18)  SpO2: 99% (04-19-21 @ 08:31) (97% - 100%)  Wt(kg): --  I&O's Summary          PHYSICAL EXAM:  Appearance: NAD, well nourished	  Neurologic: A&Ox3, PERRL, EOMI, Grossly non-focal with full strength in all four extremities  HEENT:   Normal oral mucosa, sclera non-icteric	  Lymphatic: No cervical lymphadenopathy  Cardiovascular: Normal S1 S2, No JVD, No murmur, No carotid bruits  Respiratory: b/l LL crackles	  Psychiatry: Mood & affect appropriate  Gastrointestinal:  Soft, Non-tender, + BS, no bruits	  Extremities: Normal range of motion, No clubbing, cyanosis. + trace b/l LE edema  Vascular: Peripheral pulses palpable 2+ bilaterally  Skin: No Erythema, No ecchymoses, No cyanosis, No rashes  Lines and Tubes: n/a      CURRENT MEDICATIONS:  diltiazem    Tablet 30 milliGRAM(s) Oral every 6 hours  furosemide   Injectable 60 milliGRAM(s) IV Push two times a day  hydrALAZINE 50 milliGRAM(s) Oral every 12 hours    ALBUTerol    0.083%.  albuterol/ipratropium for Nebulization  melatonin  pantoprazole    Tablet  dextrose 40% Gel  dextrose 50% Injectable  dextrose 50% Injectable  dextrose 50% Injectable  glucagon  Injectable  insulin glargine Injectable (LANTUS)  insulin glargine Injectable (LANTUS)  insulin lispro (ADMELOG) corrective regimen sliding scale  predniSONE   Tablet  predniSONE   Tablet  calcitriol   Capsule  calcium acetate  dextrose 5%.  dextrose 5%.  ergocalciferol  folic acid  heparin  Infusion.  iron sucrose IVPB  potassium chloride    Tablet ER      DIAGNOSTIC TESTING:  [ ] Echocardiogram:   < from: TTE Echo Complete w/o Contrast w/ Doppler (04.13.21 @ 17:34) >  Summary:   1. Normal global left ventricular systolic function.   2. Left ventricular ejection fraction, by visual estimation, is 70 to 75%.   3. Mildly increased LV wall thickness.   4. Normal left ventricular internal cavity size.   5. Normal right ventricular size and function.   6. The left atrium is normal in size.   7. The right atrium is normal in size.   8. Lipomatous hypertrophy of the intra-atrial septum.   9. Moderate mitral annular calcification.  10. Mild mitral valve regurgitation.  11. Elevated mitral valve mean gradient    5 mmHg at HR 99 BPM.  12. No aortic valve stenosis.  13. Estimated pulmonary artery systolic pressure is 63.7 mmHg assuming a right atrial pressure of 8 mmHg, which is consistent with severe pulmonary hypertension.  14. Recommend clinical correlation with the above findings.    Ewa Urbina MD Electronically signed on 4/13/2021 at 6:34:53 PM    < end of copied text >        LABS:	 	                            9.4    10.39 )-----------( 222      ( 19 Apr 2021 07:06 )             31.1     04-19    143  |  96<L>  |  61.0<H>  ----------------------------<  60<L>  3.4<L>   |  36.0<H>  |  2.14<H>    Ca    8.6      19 Apr 2021 07:06  Phos  5.7     04-17  Mg     1.9     04-19      proBNP:   Lipid Profile:   HgA1c:   TSH:       TELEMETRY: Reviewed

## 2021-04-19 NOTE — PROGRESS NOTE ADULT - ATTENDING COMMENTS
Pt is seen, examined, chart reviewed, d/w np/pa.  Management as outlined above.  Severe Pulmonary HTN: NPO after MN for RHC tomorrow 4/20.   PAF - Average rate around 100: cont hep. Increase CCB.

## 2021-04-19 NOTE — CONSULT NOTE ADULT - SUBJECTIVE AND OBJECTIVE BOX
HPI:  81 year old female with PMH of Type 2 DM with associated nephropathy,  HTN, HLD and Asthma who was admitted on 4/11 with dyspnea and severe pulmonary HTN (PE was excluded, possible diastolic CHF) +/- asthma exacerbation.  She was also found to have THERESA and pAfib.   We are now consulted for management of hyperglycemia in setting of steroid use.   She was on IV Solumedrol, and is now tapered to prednisone (received 40 mg yesterday and today tapered to 30 mg).     Patient was diagnosed with Type 2 DM in 1986 and was initially treated with oral agents and then transitioned to insulin.  Her DM is moderately uncontrolled on a regimen of Levemir 15- 20 units BID at home.  She was recently informed of associated nephropathy and was referred to renal prior to admission.      PAST MEDICAL & SURGICAL HISTORY:  Asthma  Diabetes mellitus  Hypertension  Emphysema  Hyperlipemia  S/P appendectomy  S/P tubal ligation  S/P knee surgery  left    Allergies  No Known Allergies    MEDICATIONS  (STANDING):  ALBUTerol    0.083%. 2.5 milliGRAM(s) Nebulizer once  albuterol/ipratropium for Nebulization 3 milliLiter(s) Nebulizer every 6 hours  calcitriol   Capsule 0.25 MICROGram(s) Oral daily  calcium acetate 667 milliGRAM(s) Oral three times a day with meals  diltiazem    Tablet 60 milliGRAM(s) Oral every 6 hours  ergocalciferol 78773 Unit(s) Oral <User Schedule>  folic acid 1 milliGRAM(s) Oral daily  furosemide   Injectable 60 milliGRAM(s) IV Push two times a day  glucagon  Injectable 1 milliGRAM(s) IntraMuscular once  heparin  Infusion.  Unit(s)/Hr (12 mL/Hr) IV Continuous <Continuous>  hydrALAZINE 50 milliGRAM(s) Oral every 12 hours  insulin glargine Injectable (LANTUS) 20 Unit(s) SubCutaneous every morning  insulin glargine Injectable (LANTUS) 20 Unit(s) SubCutaneous at bedtime  insulin lispro (ADMELOG) corrective regimen sliding scale   SubCutaneous three times a day before meals  iron sucrose IVPB 250 milliGRAM(s) IV Intermittent <User Schedule>  melatonin 1 milliGRAM(s) Oral at bedtime  pantoprazole    Tablet 40 milliGRAM(s) Oral before breakfast  potassium chloride    Tablet ER 40 milliEquivalent(s) Oral once  predniSONE   Tablet   Oral   predniSONE   Tablet 30 milliGRAM(s) Oral daily    SH: no smoking or EtOH    FH:  no known DM    ROS:  c/o dyspnea, otherwise 10 point ROS negative.     Vital Signs Last 24 Hrs  T(C): 36.8 (19 Apr 2021 05:00), Max: 36.8 (19 Apr 2021 05:00)  T(F): 98.3 (19 Apr 2021 05:00), Max: 98.3 (19 Apr 2021 05:00)  HR: 98 (19 Apr 2021 08:31) (68 - 98)  BP: 162/64 (19 Apr 2021 05:00) (115/75 - 162/64)  RR: 18 (19 Apr 2021 05:00) (18 - 18)  SpO2: 99% (19 Apr 2021 08:31) (97% - 99%)    PE:  Gen: elderly female, NAD  HEENT:  sclera anicteric, EOMI,  MMM  Neck:  no thyromegaly, no LAD  CV:  nl S1 + S2, RRR, no m/r/g  Resp:  nl respiratory effort,  reduced BS at left base  GI/ Abd: soft, NT/ ND, BS +  Neuro:  No tremor, sensation intact to light touch on b/l feet  MS:  no c/c/e, nl muscle tone  Skin:  no foot ulcers, no acanthosis  Psych: affect appropriate    LABS:  A1C with Estimated Average Glucose Result: 8.7 % (04.12.21 @ 07:25)  Thyroid Stimulating Hormone, Serum: 0.96 uIU/mL (04.12.21 @ 07:24)    04-19    143  |  96<L>  |  61.0<H>  ----------------------------<  60<L>  3.4<L>   |  36.0<H>  |  2.14<H>    Ca    8.6      19 Apr 2021 07:06  Mg     1.9     04-19                          9.4    10.39 )-----------( 222      ( 19 Apr 2021 07:06 )             31.1     CAPILLARY BLOOD GLUCOSE  POCT Blood Glucose.: 95 mg/dL (19 Apr 2021 07:52)  POCT Blood Glucose.: 169 mg/dL (18 Apr 2021 21:59)  POCT Blood Glucose.: 354 mg/dL (18 Apr 2021 17:58)  POCT Blood Glucose.: 415 mg/dL (18 Apr 2021 16:42)  POCT Blood Glucose.: 208 mg/dL (18 Apr 2021 12:13)     None known

## 2021-04-19 NOTE — PROGRESS NOTE ADULT - ASSESSMENT
80 y/o F with a PMHx of HTN, HPL, asthma, and DMII who presented tot he ER with complaints of worsening dyspnea for 2 days. Patient states that she had increased inhaler usage, and just felt like nothing was improving. Patient also following with her PMD for worsening renal failure, and had not seen a Nephrologist yet. Patient was found to have acute asthma exacerbation and was in Afib with RVR. Patient's echo shows severe pulmonary HTN, which is new to be diagnosed. Patient still with some shortness of breath that has been improving, but still with decreased renal function. Patient denies any fevers, chills, CP, syncope, abdominal pain, N/V/D, headache, or dizziness.   Troponin negative x 1  BNP 1789    4/19- patient resting comfortably laying <30degree angle on 2L NC. c/o occasional SOB. Denies CP or palpitations. Tele AFib/flutter 90-110s. SBP avg >140s.    Severe Pulmonary HTN  -Newly diagnosed.   -PE excluded on V/Q scan.   -Echo with EF 70-75%, no significant diastolic dysfunction noted on this echo.   -Diuresis per Renal due to THERESA/CKD.   -NPO after MN for RHC tomorrow 4/20    Acute Asthma Exacerbation   -Cont current management     THERESA on CKD  -Lasix 60mg IV Q 12    pAF - Average rate around 100  -c/w IV Heparin drip.   -increase Cardizem to 60 mg q6hrs, w/holding parameters. uptitrate for rate control    Kori Ellington, ДМИТРИЙ  [phone]

## 2021-04-20 DIAGNOSIS — I48.0 PAROXYSMAL ATRIAL FIBRILLATION: ICD-10-CM

## 2021-04-20 DIAGNOSIS — I27.20 PULMONARY HYPERTENSION, UNSPECIFIED: ICD-10-CM

## 2021-04-20 LAB
ANION GAP SERPL CALC-SCNC: 11 MMOL/L — SIGNIFICANT CHANGE UP (ref 5–17)
APTT BLD: 101.5 SEC — HIGH (ref 27.5–35.5)
APTT BLD: 28.8 SEC — SIGNIFICANT CHANGE UP (ref 27.5–35.5)
BLD GP AB SCN SERPL QL: SIGNIFICANT CHANGE UP
BUN SERPL-MCNC: 63 MG/DL — HIGH (ref 8–20)
CALCIUM SERPL-MCNC: 8.7 MG/DL — SIGNIFICANT CHANGE UP (ref 8.6–10.2)
CHLORIDE SERPL-SCNC: 94 MMOL/L — LOW (ref 98–107)
CO2 SERPL-SCNC: 34 MMOL/L — HIGH (ref 22–29)
CREAT SERPL-MCNC: 2.35 MG/DL — HIGH (ref 0.5–1.3)
GLUCOSE BLDC GLUCOMTR-MCNC: 199 MG/DL — HIGH (ref 70–99)
GLUCOSE BLDC GLUCOMTR-MCNC: 208 MG/DL — HIGH (ref 70–99)
GLUCOSE BLDC GLUCOMTR-MCNC: 209 MG/DL — HIGH (ref 70–99)
GLUCOSE BLDC GLUCOMTR-MCNC: 432 MG/DL — HIGH (ref 70–99)
GLUCOSE BLDC GLUCOMTR-MCNC: 447 MG/DL — HIGH (ref 70–99)
GLUCOSE BLDC GLUCOMTR-MCNC: 449 MG/DL — HIGH (ref 70–99)
GLUCOSE BLDC GLUCOMTR-MCNC: 467 MG/DL — CRITICAL HIGH (ref 70–99)
GLUCOSE BLDC GLUCOMTR-MCNC: 64 MG/DL — LOW (ref 70–99)
GLUCOSE BLDC GLUCOMTR-MCNC: 66 MG/DL — LOW (ref 70–99)
GLUCOSE SERPL-MCNC: 64 MG/DL — LOW (ref 70–99)
HCT VFR BLD CALC: 29.9 % — LOW (ref 34.5–45)
HGB BLD-MCNC: 9.1 G/DL — LOW (ref 11.5–15.5)
MCHC RBC-ENTMCNC: 19.2 PG — LOW (ref 27–34)
MCHC RBC-ENTMCNC: 30.4 GM/DL — LOW (ref 32–36)
MCV RBC AUTO: 63.2 FL — LOW (ref 80–100)
PLATELET # BLD AUTO: 233 K/UL — SIGNIFICANT CHANGE UP (ref 150–400)
POTASSIUM SERPL-MCNC: 4 MMOL/L — SIGNIFICANT CHANGE UP (ref 3.5–5.3)
POTASSIUM SERPL-SCNC: 4 MMOL/L — SIGNIFICANT CHANGE UP (ref 3.5–5.3)
RBC # BLD: 4.73 M/UL — SIGNIFICANT CHANGE UP (ref 3.8–5.2)
RBC # FLD: 18.3 % — HIGH (ref 10.3–14.5)
SARS-COV-2 RNA SPEC QL NAA+PROBE: SIGNIFICANT CHANGE UP
SODIUM SERPL-SCNC: 139 MMOL/L — SIGNIFICANT CHANGE UP (ref 135–145)
WBC # BLD: 11.47 K/UL — HIGH (ref 3.8–10.5)
WBC # FLD AUTO: 11.47 K/UL — HIGH (ref 3.8–10.5)

## 2021-04-20 PROCEDURE — 99233 SBSQ HOSP IP/OBS HIGH 50: CPT

## 2021-04-20 PROCEDURE — 99232 SBSQ HOSP IP/OBS MODERATE 35: CPT

## 2021-04-20 PROCEDURE — 99222 1ST HOSP IP/OBS MODERATE 55: CPT | Mod: 25

## 2021-04-20 PROCEDURE — 93451 RIGHT HEART CATH: CPT | Mod: 26

## 2021-04-20 RX ORDER — INSULIN GLARGINE 100 [IU]/ML
10 INJECTION, SOLUTION SUBCUTANEOUS EVERY MORNING
Refills: 0 | Status: DISCONTINUED | OUTPATIENT
Start: 2021-04-20 | End: 2021-04-21

## 2021-04-20 RX ORDER — ERYTHROPOIETIN 10000 [IU]/ML
20000 INJECTION, SOLUTION INTRAVENOUS; SUBCUTANEOUS
Refills: 0 | Status: DISCONTINUED | OUTPATIENT
Start: 2021-04-20 | End: 2021-04-24

## 2021-04-20 RX ORDER — DEXTROSE 50 % IN WATER 50 %
12.5 SYRINGE (ML) INTRAVENOUS ONCE
Refills: 0 | Status: COMPLETED | OUTPATIENT
Start: 2021-04-20 | End: 2021-04-20

## 2021-04-20 RX ORDER — FUROSEMIDE 40 MG
40 TABLET ORAL
Refills: 0 | Status: DISCONTINUED | OUTPATIENT
Start: 2021-04-20 | End: 2021-04-24

## 2021-04-20 RX ORDER — HEPARIN SODIUM 5000 [USP'U]/ML
INJECTION INTRAVENOUS; SUBCUTANEOUS
Qty: 25000 | Refills: 0 | Status: DISCONTINUED | OUTPATIENT
Start: 2021-04-20 | End: 2021-04-22

## 2021-04-20 RX ADMIN — Medication 50 MILLIGRAM(S): at 05:40

## 2021-04-20 RX ADMIN — Medication 60 MILLIGRAM(S): at 05:40

## 2021-04-20 RX ADMIN — Medication 667 MILLIGRAM(S): at 17:31

## 2021-04-20 RX ADMIN — ERYTHROPOIETIN 20000 UNIT(S): 10000 INJECTION, SOLUTION INTRAVENOUS; SUBCUTANEOUS at 22:48

## 2021-04-20 RX ADMIN — Medication 1 MILLIGRAM(S): at 22:48

## 2021-04-20 RX ADMIN — Medication 6 UNIT(S): at 17:30

## 2021-04-20 RX ADMIN — Medication 3 MILLILITER(S): at 15:46

## 2021-04-20 RX ADMIN — CALCITRIOL 0.25 MICROGRAM(S): 0.5 CAPSULE ORAL at 14:24

## 2021-04-20 RX ADMIN — HEPARIN SODIUM 1200 UNIT(S)/HR: 5000 INJECTION INTRAVENOUS; SUBCUTANEOUS at 17:35

## 2021-04-20 RX ADMIN — Medication 3 MILLILITER(S): at 20:25

## 2021-04-20 RX ADMIN — Medication 30 MILLIGRAM(S): at 05:40

## 2021-04-20 RX ADMIN — Medication 12: at 17:30

## 2021-04-20 RX ADMIN — PANTOPRAZOLE SODIUM 40 MILLIGRAM(S): 20 TABLET, DELAYED RELEASE ORAL at 05:40

## 2021-04-20 RX ADMIN — Medication 1 MILLIGRAM(S): at 14:25

## 2021-04-20 RX ADMIN — Medication 12.5 GRAM(S): at 08:50

## 2021-04-20 RX ADMIN — Medication 667 MILLIGRAM(S): at 14:25

## 2021-04-20 RX ADMIN — Medication 3 MILLILITER(S): at 08:26

## 2021-04-20 RX ADMIN — Medication 50 MILLIGRAM(S): at 17:31

## 2021-04-20 RX ADMIN — Medication 60 MILLIGRAM(S): at 22:52

## 2021-04-20 RX ADMIN — IRON SUCROSE 262.5 MILLIGRAM(S): 20 INJECTION, SOLUTION INTRAVENOUS at 17:40

## 2021-04-20 RX ADMIN — Medication 40 MILLIGRAM(S): at 17:40

## 2021-04-20 RX ADMIN — Medication 60 MILLIGRAM(S): at 17:31

## 2021-04-20 RX ADMIN — Medication 3 MILLILITER(S): at 03:16

## 2021-04-20 NOTE — PROGRESS NOTE ADULT - ATTENDING COMMENTS
Pt is seen, examined, chart reviewed, d/w np/pa.  Management as outlined above.  Pulmonary hypertension.   s/p RHC; findings are c/w element of left sided heart failure  would continue with diuresis at this time; lasix 60mg IV q12h with plans to transition to PO dose and follow up output for a repeat TTE when patient is euvolemic.

## 2021-04-20 NOTE — PROGRESS NOTE ADULT - PROBLEM SELECTOR PLAN 2
-continue anticoagulation with heparin gtt; resume gtt 2 hours post sheath removal  -Diltiazem 60mg q6h with parameters  -continue tele monitoring  -daily BMP; keep K> 4, mag >2

## 2021-04-20 NOTE — PROGRESS NOTE ADULT - ASSESSMENT
82 yo female with Type 2 DM with likely underlying nephropathy, HTN, HLD, asthma admitted with dyspnea in setting of severe pulmonary HTN, asthma exacerbation, THERESA and pAfib.      1.  Type 2 DM-  Lantus reduced to 10 units daily as prednisone is being weaned.  Continue standing premeal Lispro 6 units with meals + scale.   With further weaning in dose of prednisone, will need lowering in dose of Lispro.  Will need discharge on basal bolus insulin regimen.      2.  Secondary Hyperparathyroidism-  due to underlying renal disease, management per renal (on Calcitriol, ergocalciferol and Calicum acetate).  Calcium level has improved.    3.  Severe  Pulm HTN-  likely secondary to left heart failure-  management of diuretics as per cardiology  4.  Asthma-  steroids are being tapered.  5.  THERESA-  management as per Renal.

## 2021-04-20 NOTE — CONSULT NOTE ADULT - CONSULT REASON
THERESA
Diabetes Management
Evaluation of severe pulmonary hypertension on echo
Pulmonary HTN
Pulmonary hypertension

## 2021-04-20 NOTE — PROGRESS NOTE ADULT - PROBLEM SELECTOR PLAN 1
-post cath orders and right groin precautions   -based on RHC; findings are c/w element of left sided heart failure  -would continue with diuresis at this time; lasix 60mg IV q12h with plans to transition to PO dose and follow up output for a repeat TTE when patient is euvolemic.  -ordered furosemide 40mg BID; contraction alkalosis; confer with renal to see if metolazone would be a better option for diuresis at this time.  -goal net negative fluid balance at least 1 L    -return to bed when recovery time complete; please call Cardiac Cath NP if groin develops hematoma/bleeding/increased pain at access site

## 2021-04-20 NOTE — PROGRESS NOTE ADULT - ASSESSMENT
81yr old female with PMH of HTN, HPL, Asthma, DM-2 presented to ER with c/o Shortness of breath since 2days and peripheral edema admitted for possible acute asthma exacerbation and possible acute renal failure.     # Acute Asthma Exacerbation + severe pulm HTN   suspect acute diastolic congestive heart failure - VQ scan - neg for PE   appreciate pulm recs    steroids changed to oral taper  Nebs q6  wean off O2 as tolerated   As part of PH  check JAYDA, RF, ANCA, anti-Scl-70, anti-centromere Ab, anti-RNP (ordered)  Cardiology consult  Pending RHC today     #  worsening THERESA on possible underlying CKD/Hyperkalemia  improving   avoid potential nephrotoxins  No hydro on renal sono, 24 h urine in progress   Lasix 60mg IV Q 12  nephrology consult appreciated     # HTN   cont hydralazine, Cardizem.   ARB on hold due to THERESA    # PAF  continued episodes of PAF, now in NSR,   on Heparin drip.   Cardizem 30 mg TID, titrate for rate control    # DM-2   with hypoglycemia   endocrine consult appreciated  Lantus qhs and Lispro q6 premeal   will continue monitor     # Peripheral edema , elevated d-dimer    b/l doppler LE - Neg for DVT   f/u ECHO - severe PHTN ,  on IV Lasix    #anemia  multifactorial, HH stable.     #hypocalcemia  Elevated PTH  likely secondary to CKD     # Abdominal pain   on PPI    # DVT px - Heparin drip    Case up

## 2021-04-20 NOTE — PROGRESS NOTE ADULT - SUBJECTIVE AND OBJECTIVE BOX
Department of Cardiology                                                                  [facility]/[place] [address]                                                            Telephone: [phone]. Fax:[phone]    Procedure Note: Right Heart Cardiac Catheterization       Narrative:  81y  Female is now s/p right heart catheterization via right groin approach with Dr. Medel  Right groin sheath manually removed upon arrival in holding room  Cardiac output 5.94  Cardiac index 3.64  PVR 1.85  RA 16/14/12  RV 48/4/14  PCW 23/29/21  PA mean 32  PA 51/20/32  SAT AO 91.0%  SAT PA 61.6%      PAST MEDICAL & SURGICAL HISTORY:  Asthma    Diabetes mellitus    Hypertension    Emphysema    Hyperlipemia    S/P appendectomy    S/P tubal ligation    S/P knee surgery  left      FAMILY HISTORY:  No pertinent family history in first degree relatives      Home Medications:  amLODIPine 5 mg oral tablet: 1 tab(s) orally once a day (11 Apr 2021 18:46)  HumaLOG 100 units/mL injectable solution: 10 unit(s) injectable 3 times a day (11 Apr 2021 18:47)  hydrALAZINE 50 mg oral tablet: 1 tab(s) orally 2 times a day (11 Apr 2021 18:46)  Levemir 100 units/mL subcutaneous solution: 30 unit(s) subcutaneous 2 times a day (11 Apr 2021 18:46)  valsartan 320 mg oral tablet: 1 tab(s) orally once a day (11 Apr 2021 18:46)    Patient is a 81y old  Female who presents with a chief complaint of SOB not improving with inhalers, cough and abnormal renal function test (20 Apr 2021 09:13)    HEALTH ISSUES - PROBLEM Dx:  Suspected deep vein thrombosis (DVT)      HPI:  81yr old female with PMH of HTN, HPL, Asthma, DM-2 presented to ER with c/o Shortness of breath since last 2days - not improving with her inhalers, She could not quantify how many times she uses inhalers but uses every other day or so. recetly she had labs done at her PMD - which showed abnormal renal test - Hence was awaiting to see nephrology in office. She was seen in ER, found to be in acute respiratory distress, with some improvement with nebulizer and steroid. She was found to be in ? AFib per ER physician however EKG reveals sinus tachycardia with HR in 110s. She was admitted for acute asthma exacerbation with possible ? aFib RVR and THERESA.   pt has been eating and drinking well. but has had recent onset of swelling in her legs.  c/o epigastric burning pain.  (11 Apr 2021 18:35)    General: No fatigue, no fevers/chills  Respiratory: No dyspnea, no cough, no wheeze  CV: No chest pain, no palpitations  Abd: No nausea  Neuro: No headache, no dizziness    Objective:  Vital Signs Last 24 Hrs  T(C): 36.4 (20 Apr 2021 09:31), Max: 36.8 (19 Apr 2021 16:56)  T(F): 97.6 (20 Apr 2021 09:31), Max: 98.2 (19 Apr 2021 16:56)  HR: 92 (20 Apr 2021 09:31) (83 - 116)  BP: 160/65 (20 Apr 2021 09:31) (130/61 - 160/65)  BP(mean): --  RR: 18 (20 Apr 2021 09:31) (18 - 20)  SpO2: 97% (20 Apr 2021 09:31) (96% - 99%)    CM: SR  Neuro: A&OX3, CN 2-12 intact  HEENT: NC, AT  Lungs: CTA B/L  CV: S1, S2, no murmur, RRR  Abd: Soft  Right Groin: Soft, no bleeding, no hematoma  Extremity: + distal pulses                           9.1    11.47 )-----------( 233      ( 20 Apr 2021 10:22 )             29.9     04-20    139  |  94<L>  |  63.0<H>  ----------------------------<  64<L>  4.0   |  34.0<H>  |  2.35<H>    Ca    8.7      20 Apr 2021 10:22  Mg     1.9     04-19      PTT - ( 20 Apr 2021 10:22 )  PTT:101.5 sec      81y  Female is now s/p left heart catheterization via (right or left radial or groin) approach with  ___ :    -ADMIT due to: / Pt is already in-patient  -post cardiac cath orders  -radial or groin precautions  -bedrest x hours post procedure  -EKG post cath  -labs and EKG in am  -continue current medical therapy  -Dual anti platelet therapy with aspirin/ plavix (brilinta)  -statin therapy  -beta blocker  -follow up outpt in 2 weeks with Cardiologist  -[facility] following during hospitalization  - .rehab  -Management per Hospitalist / ICU  -Discharge in am if overnight tele, EKG, labs in am all remain WNL                                                                            Department of Cardiology                                                                  [facility]/[place] [address] [address]                                                            Telephone: [phone]. Fax:[phone]    Cardiology Progress Note  Procedure Note: Right Heart Cardiac Catheterization     Narrative:  81y  Female is now s/p right heart catheterization via right groin approach with Dr. Medel  Right groin sheath manually removed upon arrival in holding room  Cardiac output 5.94  Cardiac index 3.64  PVR 1.85  RA 16/14/12  RV 48/4/14  PCW 23/29/21  PA mean 32  PA 51/20/32  SAT AO 91.0%  SAT PA 61.6%      PAST MEDICAL & SURGICAL HISTORY:  Asthma    Diabetes mellitus    Hypertension    Emphysema    Hyperlipemia    S/P appendectomy    S/P tubal ligation    S/P knee surgery  left      FAMILY HISTORY:  No pertinent family history in first degree relatives      Home Medications:  amLODIPine 5 mg oral tablet: 1 tab(s) orally once a day (11 Apr 2021 18:46)  HumaLOG 100 units/mL injectable solution: 10 unit(s) injectable 3 times a day (11 Apr 2021 18:47)  hydrALAZINE 50 mg oral tablet: 1 tab(s) orally 2 times a day (11 Apr 2021 18:46)  Levemir 100 units/mL subcutaneous solution: 30 unit(s) subcutaneous 2 times a day (11 Apr 2021 18:46)  valsartan 320 mg oral tablet: 1 tab(s) orally once a day (11 Apr 2021 18:46)    Patient is a 81y old  Female who presents with a chief complaint of SOB not improving with inhalers, cough and abnormal renal function test (20 Apr 2021 09:13)    HEALTH ISSUES - PROBLEM Dx:  Suspected deep vein thrombosis (DVT)      HPI:  81yr old female with PMH of HTN, HPL, Asthma, DM-2 presented to ER with c/o Shortness of breath since last 2days - not improving with her inhalers, She could not quantify how many times she uses inhalers but uses every other day or so. recetly she had labs done at her PMD - which showed abnormal renal test - Hence was awaiting to see nephrology in office. She was seen in ER, found to be in acute respiratory distress, with some improvement with nebulizer and steroid. She was found to be in ? AFib per ER physician however EKG reveals sinus tachycardia with HR in 110s. She was admitted for acute asthma exacerbation with possible ? aFib RVR and THERESA.   pt has been eating and drinking well. but has had recent onset of swelling in her legs.  c/o epigastric burning pain.  (11 Apr 2021 18:35)    General: No fatigue, no fevers/chills  Respiratory: No dyspnea, no cough, no wheeze  CV: No chest pain, no palpitations  Abd: No nausea  Neuro: No headache, no dizziness    Objective:  Vital Signs Last 24 Hrs  T(C): 36.4 (20 Apr 2021 09:31), Max: 36.8 (19 Apr 2021 16:56)  T(F): 97.6 (20 Apr 2021 09:31), Max: 98.2 (19 Apr 2021 16:56)  HR: 92 (20 Apr 2021 09:31) (83 - 116)  BP: 160/65 (20 Apr 2021 09:31) (130/61 - 160/65)  BP(mean): --  RR: 18 (20 Apr 2021 09:31) (18 - 20)  SpO2: 97% (20 Apr 2021 09:31) (96% - 99%)    CM: SR  Neuro: A&OX3, CN 2-12 intact  HEENT: NC, AT  Lungs: CTA B/L  CV: S1, S2, no murmur, RRR  Abd: Soft  Right Groin: Soft, no bleeding, no hematoma  Extremity: + distal pulses                           9.1    11.47 )-----------( 233      ( 20 Apr 2021 10:22 )             29.9     04-20    139  |  94<L>  |  63.0<H>  ----------------------------<  64<L>  4.0   |  34.0<H>  |  2.35<H>    Ca    8.7      20 Apr 2021 10:22  Mg     1.9     04-19    PTT - ( 20 Apr 2021 10:22 )  PTT:101.5 sec

## 2021-04-20 NOTE — PROGRESS NOTE ADULT - SUBJECTIVE AND OBJECTIVE BOX
HPI  Pt is a 80yo F admitted to [facility] for asthema exacerbation and sever pul HTN   Pt was seen and examined at bedside. Comfortable in bed, no acute distress. pending RHC today, Hypoglycemic episode noted this morning     Vital Signs Last 24 Hrs  T(C): 36.7 (20 Apr 2021 09:12), Max: 36.8 (19 Apr 2021 16:56)  T(F): 98.1 (20 Apr 2021 09:12), Max: 98.2 (19 Apr 2021 16:56)  HR: 86 (20 Apr 2021 09:12) (83 - 116)  BP: 142/76 (20 Apr 2021 09:12) (130/61 - 144/68)  BP(mean): --  RR: 20 (20 Apr 2021 09:12) (18 - 20)  SpO2: 96% (20 Apr 2021 09:12) (96% - 99%)    I&O's Summary      CAPILLARY BLOOD GLUCOSE      POCT Blood Glucose.: 208 mg/dL (20 Apr 2021 09:03)  POCT Blood Glucose.: 66 mg/dL (20 Apr 2021 08:26)  POCT Blood Glucose.: 64 mg/dL (20 Apr 2021 08:24)  POCT Blood Glucose.: 124 mg/dL (19 Apr 2021 21:45)  POCT Blood Glucose.: 226 mg/dL (19 Apr 2021 16:46)  POCT Blood Glucose.: 216 mg/dL (19 Apr 2021 12:36)      PHYSICAL EXAM:    Constitutional: NAD,   HEENT: PERR, EOMI,   Neck: Soft and supple,    Respiratory: Breath sounds are clear bilaterally   Cardiovascular: S1 and S2,   Gastrointestinal: Bowel Sounds present, soft, nontender,   Extremities: No peripheral edema  Vascular: 2+ peripheral pulses  Neurological: A/O x 3,   Musculoskeletal: 5/5 strength b/l upper and lower extremities    MEDICATIONS:  MEDICATIONS  (STANDING):  ALBUTerol    0.083%. 2.5 milliGRAM(s) Nebulizer once  albuterol/ipratropium for Nebulization 3 milliLiter(s) Nebulizer every 6 hours  calcitriol   Capsule 0.25 MICROGram(s) Oral daily  calcium acetate 667 milliGRAM(s) Oral three times a day with meals  dextrose 40% Gel 15 Gram(s) Oral once  dextrose 5%. 1000 milliLiter(s) (50 mL/Hr) IV Continuous <Continuous>  dextrose 5%. 1000 milliLiter(s) (100 mL/Hr) IV Continuous <Continuous>  dextrose 50% Injectable 25 Gram(s) IV Push once  dextrose 50% Injectable 12.5 Gram(s) IV Push once  dextrose 50% Injectable 25 Gram(s) IV Push once  diltiazem    Tablet 60 milliGRAM(s) Oral every 6 hours  epoetin susie-epbx (RETACRIT) Injectable 13232 Unit(s) SubCutaneous <User Schedule>  ergocalciferol 33140 Unit(s) Oral <User Schedule>  folic acid 1 milliGRAM(s) Oral daily  glucagon  Injectable 1 milliGRAM(s) IntraMuscular once  heparin  Infusion.  Unit(s)/Hr (12 mL/Hr) IV Continuous <Continuous>  hydrALAZINE 50 milliGRAM(s) Oral every 12 hours  insulin glargine Injectable (LANTUS) 20 Unit(s) SubCutaneous every morning  insulin lispro (ADMELOG) corrective regimen sliding scale   SubCutaneous three times a day before meals  insulin lispro (ADMELOG) corrective regimen sliding scale   SubCutaneous at bedtime  insulin lispro Injectable (ADMELOG) 6 Unit(s) SubCutaneous three times a day before meals  iron sucrose IVPB 250 milliGRAM(s) IV Intermittent <User Schedule>  melatonin 1 milliGRAM(s) Oral at bedtime  pantoprazole    Tablet 40 milliGRAM(s) Oral before breakfast  predniSONE   Tablet   Oral       LABS: All Labs Reviewed:                        9.4    10.39 )-----------( 222      ( 19 Apr 2021 07:06 )             31.1     04-19    143  |  96<L>  |  61.0<H>  ----------------------------<  60<L>  3.4<L>   |  36.0<H>  |  2.14<H>    Ca    8.6      19 Apr 2021 07:06  Mg     1.9     04-19      PTT - ( 19 Apr 2021 07:06 )  PTT:75.7 sec      Blood Culture:     RADIOLOGY/EKG:    DVT PPX:    ADVANCED DIRECTIVE:    DISPOSITION:

## 2021-04-20 NOTE — PROGRESS NOTE ADULT - SUBJECTIVE AND OBJECTIVE BOX
HPI  Pt is a 80yo F admitted to [facility] for asthema exacerbation and sever pul HTN   Pt was seen and examined at bedside. Comfortable in bed, no acute distress. pending RHC today, Hypoglycemic episode noted this morning     Vital Signs Last 24 Hrs  T(C): 36.4 (20 Apr 2021 09:31), Max: 36.8 (19 Apr 2021 16:56)  T(F): 97.6 (20 Apr 2021 09:31), Max: 98.2 (19 Apr 2021 16:56)  HR: 92 (20 Apr 2021 09:31) (83 - 116)  BP: 160/65 (20 Apr 2021 09:31) (130/61 - 160/65)  BP(mean): --  RR: 18 (20 Apr 2021 09:31) (18 - 20)  SpO2: 97% (20 Apr 2021 09:31) (96% - 99%)    I&O's Summary      CAPILLARY BLOOD GLUCOSE      POCT Blood Glucose.: 209 mg/dL (20 Apr 2021 09:28)  POCT Blood Glucose.: 208 mg/dL (20 Apr 2021 09:03)  POCT Blood Glucose.: 66 mg/dL (20 Apr 2021 08:26)  POCT Blood Glucose.: 64 mg/dL (20 Apr 2021 08:24)  POCT Blood Glucose.: 124 mg/dL (19 Apr 2021 21:45)  POCT Blood Glucose.: 226 mg/dL (19 Apr 2021 16:46)  POCT Blood Glucose.: 216 mg/dL (19 Apr 2021 12:36)      PHYSICAL EXAM:    Constitutional: NAD,   HEENT: PERR, EOMI,   Neck: Soft and supple,    Respiratory: Breath sounds are clear bilaterally   Cardiovascular: S1 and S2,   Gastrointestinal: Bowel Sounds present, soft, nontender,   Extremities: No peripheral edema  Vascular: 2+ peripheral pulses  Neurological: A/O x 3,   Musculoskeletal: 5/5 strength b/l upper and lower extremities    MEDICATIONS:  MEDICATIONS  (STANDING):  ALBUTerol    0.083%. 2.5 milliGRAM(s) Nebulizer once  albuterol/ipratropium for Nebulization 3 milliLiter(s) Nebulizer every 6 hours  calcitriol   Capsule 0.25 MICROGram(s) Oral daily  calcium acetate 667 milliGRAM(s) Oral three times a day with meals  dextrose 40% Gel 15 Gram(s) Oral once  dextrose 5%. 1000 milliLiter(s) (50 mL/Hr) IV Continuous <Continuous>  dextrose 5%. 1000 milliLiter(s) (100 mL/Hr) IV Continuous <Continuous>  dextrose 50% Injectable 25 Gram(s) IV Push once  dextrose 50% Injectable 12.5 Gram(s) IV Push once  dextrose 50% Injectable 25 Gram(s) IV Push once  diltiazem    Tablet 60 milliGRAM(s) Oral every 6 hours  epoetin susie-epbx (RETACRIT) Injectable 86123 Unit(s) SubCutaneous <User Schedule>  ergocalciferol 54903 Unit(s) Oral <User Schedule>  folic acid 1 milliGRAM(s) Oral daily  glucagon  Injectable 1 milliGRAM(s) IntraMuscular once  heparin  Infusion.  Unit(s)/Hr (12 mL/Hr) IV Continuous <Continuous>  hydrALAZINE 50 milliGRAM(s) Oral every 12 hours  insulin glargine Injectable (LANTUS) 10 Unit(s) SubCutaneous every morning  insulin lispro (ADMELOG) corrective regimen sliding scale   SubCutaneous three times a day before meals  insulin lispro (ADMELOG) corrective regimen sliding scale   SubCutaneous at bedtime  insulin lispro Injectable (ADMELOG) 6 Unit(s) SubCutaneous three times a day before meals  iron sucrose IVPB 250 milliGRAM(s) IV Intermittent <User Schedule>  melatonin 1 milliGRAM(s) Oral at bedtime  pantoprazole    Tablet 40 milliGRAM(s) Oral before breakfast  predniSONE   Tablet   Oral       LABS: All Labs Reviewed:                        9.4    10.39 )-----------( 222      ( 19 Apr 2021 07:06 )             31.1     04-19    143  |  96<L>  |  61.0<H>  ----------------------------<  60<L>  3.4<L>   |  36.0<H>  |  2.14<H>    Ca    8.6      19 Apr 2021 07:06  Mg     1.9     04-19      PTT - ( 19 Apr 2021 07:06 )  PTT:75.7 sec      Blood Culture:     RADIOLOGY/EKG:    DVT PPX:    ADVANCED DIRECTIVE:    DISPOSITION:

## 2021-04-20 NOTE — PROGRESS NOTE ADULT - ASSESSMENT
THERESA: renal hypoperfusion due to diuretics (in setting of new pulm HTN and pre load dependence)  Pt clinically improving  No hydronephrosis seen on sonogram  Elevated BUN also steroid induced  Nephrotic proteinuria (3,278 mg/24hr) likely DM  Contraction alkalosis  - avoid potential nephrotoxins  -scheduled for R heart cath today---> hold diuretics pre procedure and transition to oral dosing  - monitor labs    Anemia: multifactorial  Tsat= 13%; Ferritin= 293  serum immunofixation: no monoclonal band  - cont IV Fe  - add DILAN   - follow H/H    RO:  - cont Ergo, Rocaltrol and Phoslo

## 2021-04-20 NOTE — CONSULT NOTE ADULT - REASON FOR ADMISSION
SOB not improving with inhalers, cough and abnormal renal function test

## 2021-04-20 NOTE — PROGRESS NOTE ADULT - SUBJECTIVE AND OBJECTIVE BOX
CC:  follow up DM    Interval HPI/ Overnight Events:  Became hypoglycemic this AM after 20 units of Lantus was administered yesterday (was NPO this morning for RHC).  RHC with findings of left hear failure.  Cardiology has adjusted diuretics.     MEDICATIONS  (STANDING):  ALBUTerol    0.083%. 2.5 milliGRAM(s) Nebulizer once  albuterol/ipratropium for Nebulization 3 milliLiter(s) Nebulizer every 6 hours  calcitriol   Capsule 0.25 MICROGram(s) Oral daily  calcium acetate 667 milliGRAM(s) Oral three times a day with meals  diltiazem    Tablet 60 milliGRAM(s) Oral every 6 hours  epoetin susie-epbx (RETACRIT) Injectable 41668 Unit(s) SubCutaneous <User Schedule>  ergocalciferol 04485 Unit(s) Oral <User Schedule>  folic acid 1 milliGRAM(s) Oral daily  furosemide    Tablet 40 milliGRAM(s) Oral two times a day  glucagon  Injectable 1 milliGRAM(s) IntraMuscular once  heparin  Infusion.  Unit(s)/Hr (12 mL/Hr) IV Continuous <Continuous>  hydrALAZINE 50 milliGRAM(s) Oral every 12 hours  insulin glargine Injectable (LANTUS) 10 Unit(s) SubCutaneous every morning  insulin lispro (ADMELOG) corrective regimen sliding scale   SubCutaneous at bedtime  insulin lispro (ADMELOG) corrective regimen sliding scale   SubCutaneous three times a day before meals  insulin lispro Injectable (ADMELOG) 6 Unit(s) SubCutaneous three times a day before meals  iron sucrose IVPB 250 milliGRAM(s) IV Intermittent <User Schedule>  melatonin 1 milliGRAM(s) Oral at bedtime  pantoprazole    Tablet 40 milliGRAM(s) Oral before breakfast  predniSONE   Tablet   Oral     Vital Signs Last 24 Hrs  T(C): 36.8 (20 Apr 2021 17:15), Max: 36.8 (20 Apr 2021 17:15)  T(F): 98.2 (20 Apr 2021 17:15), Max: 98.2 (20 Apr 2021 17:15)  HR: 103 (20 Apr 2021 17:15) (83 - 103)  BP: 149/67 (20 Apr 2021 17:15) (130/61 - 161/66)  RR: 18 (20 Apr 2021 17:15) (18 - 20)  SpO2: 96% (20 Apr 2021 17:15) (93% - 100%)    PE:  Gen: AAO, NAD  HEENT:  sclera anicteric, MMM  Neck:  no thyromegaly, no LAD  CV:  nl S1 + S2,  irregular rhythm   Resp:  nl respiratory effort, CTA b/l  GI/ Abd: soft, NT/ ND, BS +  Neuro:  No tremor  MS:  no c/c/e, nl muscle tone  Skin:  no acanthosis  Psych:  affect appropriate.    LABS:  04-20    139  |  94<L>  |  63.0<H>  ----------------------------<  64<L>  4.0   |  34.0<H>  |  2.35<H>    Ca    8.7      20 Apr 2021 10:22  Mg     1.9     04-19                          9.1    11.47 )-----------( 233      ( 20 Apr 2021 10:22 )             29.9     CAPILLARY BLOOD GLUCOSE  POCT Blood Glucose.: 209 mg/dL (20 Apr 2021 09:28)  POCT Blood Glucose.: 208 mg/dL (20 Apr 2021 09:03)  POCT Blood Glucose.: 66 mg/dL (20 Apr 2021 08:26)  POCT Blood Glucose.: 64 mg/dL (20 Apr 2021 08:24)  POCT Blood Glucose.: 124 mg/dL (19 Apr 2021 21:45)

## 2021-04-20 NOTE — CHART NOTE - NSCHARTNOTEFT_GEN_A_CORE
called by RN for pre-meal  FSBS at 1720, 447 mg/dl, patient given premeal and sliding scale Admelog of 18 units subcut. RN advised to repeat FSBS in 1 hour.

## 2021-04-20 NOTE — CONSULT NOTE ADULT - ASSESSMENT
80 yo female with Type 2 DM with likely underlying nephropathy, HTN, HLD, asthma admitted with dyspnea in setting of severe pulmonary HTN, asthma exacerbation, THERESA and pAfib.  Her diabetes has been complicated by steroid induced hyperglycemia.      1.  Type 2 DM-  would benefit from addition of prandial lispro as most commonly, glucocorticoids cause post prandial hyperglycemia.  Will reduce basal insulin to Lantus 20 units qAM and add standing premeal Lispro 6 units with meals + scale.     2.  Secondary Hyperparathyroidism-  due to underlying renal disease, management per renal (on Calcitriol, ergocalciferol and Calicum acetate).  Calcium level has improved.    3.  Severe  Pulm HTN-  management as per medicine and cardiology-  on diuretics.  4.  Asthma-  steroids are being tapered.  5.  THERESA-  management as per Renal.  
Impression:  - Pulmonary HTN - w h/o diastolic dysfunction, likely to be from group 2 - PH 2/2 L-heart disease; given h/o bronchiectasis, could contribute but pt not profoundly hypoxic  - Asthma, with acute exacerbation  - Bronchiectasis  - Breast CA s/p XRT  - THERESA    Recs:  - C/w steroids, nebs  - TTE appreciated  - Nephrology following re: THERESA  - Possible congestive THERESA - given LE edema, elevated BNP, and PH on TTE must consider diuresis  - V/Q scan pending to r/o CTEPH  - As part of PH would check JAYDA, RF, ANCA, anti-Scl-70, anti-centromere Ab, anti-RNP  - Needs o/p PFTs  - Eventual RHC if repeat TTE as outpatient with unimproved PASP when euvolemic and acute issues resolved  Thank you for this consult    Galen Murrieta M.D.  Pulmonary & Critical Care Medicine  [facility]  Pulmonary and Sleep Medicine at [place]  [address]  [place], [place] [address]  T: [phone]  F: [phone]
THERESA - baseline creat 0.9 Feb 2020  No hydro on renal sono   IRDM , HTN , AEOA   Poorly controlled DM - A1-C 8.7   Diovan , HCTZ , Metformin held for now   CAROL on CXR   No hydro on renal sono   ECHO to be done   Check UA , 24 h urine collection , repeat labs in am     HTN - Watch with med adjustments    RO - PTH , 25 vit D , Phos     Anemia - Follow up iron stores , add folate .  If BP stable in am , will add Epogen   
82 y/o female with PMhx of HTN, DM2 , ashtma, who presented with respiratory distresss  Echo showed severe pulmonary hypertension  It is reasonable to evaluate filling pressure and etiology of pulmonary hypertension especially in a patient with THERESA on CKD that has been diuresis   will proceed with RHC.
A/P: 82 y/o F with a PMHx of HTN, HPL, asthma, and DMII who presented tot he ER with complaints of worsening dyspnea for 2 days. Patient states that she had increased inhaler usage, and just felt like nothing was improving. Patient also following with her PMD for worsening renal failure, and had not seen a Nephrologist yet. Patient was found to have acute asthma exacerbation and was in Afib with RVR. Patient's echo shows severe pulmonary HTN, which is new to be diagnosed. Patient still with some shortness of breath that has been improving, but still with decreased renal function. Patient denies any fevers, chills, CP, syncope, abdominal pain, N/V/D, headache, or dizziness.   Troponin negative x 1  BNP 1789    Severe Pulmonary HTN  - Newly diagnosed.   - Unknown cause at this time.   - PE excluded on V/Q scan.   - Echo with EF 70-75%, no significant diastolic dysfunction noted on this echo.   - Diuresis per Renal due to THERESA/CKD.   - Plan for RHC next week when clinical exam improves.   - Monitor on telemetry.   -  Strict i/o and daily weights.    - Keep K > 4, Mg > 2.   - Monitor renal function with ongoing diuresis.    New Onset Atrial Fibrillation   - Noted on EKG 04/11/21.   - Repeat EKG and place on telemetry monitoring.   - Can D/C PO norvasc and start diltiazem if needed.   - [id number] score of 5   - Start AC at this time.   - Continue to monitor.     THERESA/CKD  - Nephrology following.   - Diuresis per renal    Asthma Exacerbation   - Pulm following.   - Continue steroids and nebs.     Assessment and recommendations are final when note is signed by the attending.

## 2021-04-20 NOTE — PROGRESS NOTE ADULT - SUBJECTIVE AND OBJECTIVE BOX
NEPHROLOGY INTERVAL HPI/OVERNIGHT EVENTS:  pt clinically stable  no acute distress noted    MEDICATIONS  (STANDING):  ALBUTerol    0.083%. 2.5 milliGRAM(s) Nebulizer once  albuterol/ipratropium for Nebulization 3 milliLiter(s) Nebulizer every 6 hours  calcitriol   Capsule 0.25 MICROGram(s) Oral daily  calcium acetate 667 milliGRAM(s) Oral three times a day with meals  dextrose 40% Gel 15 Gram(s) Oral once  dextrose 5%. 1000 milliLiter(s) (50 mL/Hr) IV Continuous <Continuous>  dextrose 5%. 1000 milliLiter(s) (100 mL/Hr) IV Continuous <Continuous>  dextrose 50% Injectable 25 Gram(s) IV Push once  dextrose 50% Injectable 12.5 Gram(s) IV Push once  dextrose 50% Injectable 25 Gram(s) IV Push once  diltiazem    Tablet 60 milliGRAM(s) Oral every 6 hours  ergocalciferol 37723 Unit(s) Oral <User Schedule>  folic acid 1 milliGRAM(s) Oral daily  furosemide   Injectable 60 milliGRAM(s) IV Push two times a day  glucagon  Injectable 1 milliGRAM(s) IntraMuscular once  heparin  Infusion.  Unit(s)/Hr (12 mL/Hr) IV Continuous <Continuous>  hydrALAZINE 50 milliGRAM(s) Oral every 12 hours  insulin glargine Injectable (LANTUS) 20 Unit(s) SubCutaneous every morning  insulin lispro (ADMELOG) corrective regimen sliding scale   SubCutaneous at bedtime  insulin lispro (ADMELOG) corrective regimen sliding scale   SubCutaneous three times a day before meals  insulin lispro Injectable (ADMELOG) 6 Unit(s) SubCutaneous three times a day before meals  iron sucrose IVPB 250 milliGRAM(s) IV Intermittent <User Schedule>  melatonin 1 milliGRAM(s) Oral at bedtime  pantoprazole    Tablet 40 milliGRAM(s) Oral before breakfast  predniSONE   Tablet   Oral     MEDICATIONS  (PRN):  ALPRAZolam 0.25 milliGRAM(s) Oral every 8 hours PRN anxiety  guaiFENesin   Syrup  (Sugar-Free) 100 milliGRAM(s) Oral every 6 hours PRN Cough  heparin   Injectable 5500 Unit(s) IV Push every 6 hours PRN For aPTT less than 40  heparin   Injectable 2500 Unit(s) IV Push every 6 hours PRN For aPTT between 40 - 57      Allergies    No Known Allergies        Vital Signs Last 24 Hrs  T(C): 36.6 (20 Apr 2021 04:31), Max: 36.8 (19 Apr 2021 16:56)  T(F): 97.8 (20 Apr 2021 04:31), Max: 98.2 (19 Apr 2021 16:56)  HR: 83 (20 Apr 2021 08:31) (83 - 116)  BP: 130/61 (20 Apr 2021 04:31) (130/61 - 144/68)  BP(mean): --  RR: 18 (20 Apr 2021 05:33) (18 - 18)  SpO2: 96% (20 Apr 2021 08:31) (96% - 99%)    PHYSICAL EXAM:  GENERAL: Fatigued  NECK: Supple, No JVD  NERVOUS SYSTEM:  Alert & Oriented X3  CHEST/LUNG: Diminished BS  HEART: RR no rub  ABDOMEN: Soft, Nontender, Nondistended; +BS  EXTREMITIES:  LE edema improved    LABS:                        9.4    10.39 )-----------( 222      ( 19 Apr 2021 07:06 )             31.1     04-19    143  |  96<L>  |  61.0<H>  ----------------------------<  60<L>  3.4<L>   |  36.0<H>  |  2.14<H>    Ca    8.6      19 Apr 2021 07:06  Mg     1.9     04-19      PTT - ( 19 Apr 2021 07:06 )  PTT:75.7 sec        RADIOLOGY & ADDITIONAL TESTS:  < from: Xray Chest 1 View AP/PA. (04.14.21 @ 11:44) >   EXAM:  XR CHEST AP OR PA 1V                          PROCEDURE DATE:  04/14/2021          INTERPRETATION:  HISTORY: Admitting Dxs: I48.91 UNSPECIFIED ATRIAL FIBRILLATION; ; v/q scan. in nuc med now;  TECHNIQUE: Portable frontal view of the chest, 1 view.  COMPARISON: none.  FINDINGS:  Clips are seen in the left axillary region.  HEART:  Enlarged  LUNGS: Small bilateral effusions. Mild congestive changes. No lobar consolidation. No pneumothorax  BONES: degenerative changes        IMPRESSION:    Small bilateral effusions. Mild congestive changes. No lobar consolidation. No pneumothorax        < end of copied text >

## 2021-04-20 NOTE — PROGRESS NOTE ADULT - ASSESSMENT
81yr old female with PMH of HTN, HPL, Asthma, DM-2 presented to ER with c/o Shortness of breath since 2days and peripheral edema admitted for possible acute asthma exacerbation and possible acute renal failure.     # Acute Asthma Exacerbation + severe pulm HTN   suspect acute diastolic congestive heart failure - VQ scan - neg for PE   appreciate pulm recs    steroids changed to oral taper  Nebs q6  wean off O2 as tolerated   As part of PH  check JAYDA, RF, ANCA, anti-Scl-70, anti-centromere Ab, anti-RNP (ordered)  Cardiology consult  Pending RHC today     #  worsening THERESA on possible underlying CKD/Hyperkalemia  improving   avoid potential nephrotoxins  No hydro on renal sono, 24 h urine in progress   Lasix 60mg IV Q 12  nephrology consult appreciated     # HTN   cont hydralazine, Cardizem.   ARB on hold due to THERESA    # PAF  continued episodes of PAF, now in NSR,   on Heparin drip.   Cardizem 30 mg TID, titrate for rate control    # DM-2   with hypoglycemia   endocrine consult appreciated  Lantus qhs and Lispro q6 premeal   will continue monitor     # Peripheral edema , elevated d-dimer    b/l doppler LE - Neg for DVT   f/u ECHO - severe PHTN ,  on IV Lasix    #anemia  multifactorial, HH stable.     #hypocalcemia  Elevated PTH  likely secondary to CKD     # Abdominal pain   on PPI    # DVT px - Heparin drip

## 2021-04-20 NOTE — CONSULT NOTE ADULT - SUBJECTIVE AND OBJECTIVE BOX
CARDIOLOGY CONSULTATION NOTE ([facility])  Consult requested by:  Kd Singer    Reason for Consultation: Evaluation of severe pulmonary HTN by RHC    History obtained by: Patient and medical record     obtained: No    Chief complaint:    Patient is a 81y old  Female who presents with a chief complaint of SOB not improving with inhalers, cough and abnormal renal function test (20 Apr 2021 17:15)      HPI:  81yr old female with PMH of HTN, HPL, Asthma, DM-2 presented to ER with c/o Shortness of breath since last 2days - not improving with her inhalers, She could not quantify how many times she uses inhalers but uses every other day or so. recetly she had labs done at her PMD - which showed abnormal renal test - Hence was awaiting to see nephrology in office. She was seen in ER, found to be in acute respiratory distress, with some improvement with nebulizer and steroid. She was found to be in ? AFib per ER physician however EKG reveals sinus tachycardia with HR in 110s. She was admitted for acute asthma exacerbation with possible ? aFib RVR and THERESA.   pt has been eating and drinking well. but has had recent onset of swelling in her legs.  c/o epigastric burning pain.  (11 Apr 2021 18:35)    Above noted and appreciated  Presented with acute respiratory distress . Echo showed severe pulmonary hypertension , has been receiving diuresis but has THERESA   Currently SOB and asking how long she will be asked to lay flat         REVIEW OF SYMPTOMS: Cardiovascular:  as per HPI;  Respiratory:  as per HPI  Genitourinary:  No dysuria, no hematuria; Gastrointestinal:  No nausea, no vomiting. No diarrhea.  No abdominal pain. No dark color stool, no melena ; Neurological: No headache, no dizziness, no slurred speech;  Psychiatric: No agitation, no anxiety.  ALL OTHER REVIEW OF SYSTEMS ARE NEGATIVE.    ALLERGIES: Allergies    No Known Allergies    Intolerances      MEDICATIONS  (STANDING):  ALBUTerol    0.083%. 2.5 milliGRAM(s) Nebulizer once  albuterol/ipratropium for Nebulization 3 milliLiter(s) Nebulizer every 6 hours  calcitriol   Capsule 0.25 MICROGram(s) Oral daily  calcium acetate 667 milliGRAM(s) Oral three times a day with meals  diltiazem    Tablet 60 milliGRAM(s) Oral every 6 hours  epoetin susie-epbx (RETACRIT) Injectable 27383 Unit(s) SubCutaneous <User Schedule>  ergocalciferol 73019 Unit(s) Oral <User Schedule>  folic acid 1 milliGRAM(s) Oral daily  furosemide    Tablet 40 milliGRAM(s) Oral two times a day  glucagon  Injectable 1 milliGRAM(s) IntraMuscular once  heparin  Infusion.  Unit(s)/Hr (12 mL/Hr) IV Continuous <Continuous>  hydrALAZINE 50 milliGRAM(s) Oral every 12 hours  insulin glargine Injectable (LANTUS) 10 Unit(s) SubCutaneous every morning  insulin lispro (ADMELOG) corrective regimen sliding scale   SubCutaneous at bedtime  insulin lispro (ADMELOG) corrective regimen sliding scale   SubCutaneous three times a day before meals  insulin lispro Injectable (ADMELOG) 6 Unit(s) SubCutaneous three times a day before meals  iron sucrose IVPB 250 milliGRAM(s) IV Intermittent <User Schedule>  melatonin 1 milliGRAM(s) Oral at bedtime  pantoprazole    Tablet 40 milliGRAM(s) Oral before breakfast  predniSONE   Tablet   Oral     MEDICATIONS  (PRN):  ALPRAZolam 0.25 milliGRAM(s) Oral every 8 hours PRN anxiety  guaiFENesin   Syrup  (Sugar-Free) 100 milliGRAM(s) Oral every 6 hours PRN Cough  heparin   Injectable 5500 Unit(s) IV Push every 6 hours PRN For aPTT less than 40  heparin   Injectable 2500 Unit(s) IV Push every 6 hours PRN For aPTT between 40 - 57        PAST MEDICAL HISTORY  Asthma    Diabetes mellitus    Hypertension    Emphysema    Hyperlipemia        PAST SURGICAL HISTORY  S/P appendectomy    S/P tubal ligation    S/P knee surgery        FAMILY HISTORY:  No pertinent family history in first degree relatives        SOCIAL HISTORY:  Denies smoking/alcohol/drugs      Vital Signs Last 24 Hrs  T(C): 36.8 (20 Apr 2021 17:15), Max: 36.8 (20 Apr 2021 17:15)  T(F): 98.2 (20 Apr 2021 17:15), Max: 98.2 (20 Apr 2021 17:15)  HR: 103 (20 Apr 2021 17:15) (83 - 103)  BP: 149/67 (20 Apr 2021 17:15) (130/61 - 161/66)  BP(mean): --  RR: 18 (20 Apr 2021 17:15) (18 - 20)  SpO2: 96% (20 Apr 2021 17:15) (93% - 100%)      PHYSICAL EXAM:  Constitutional: Comfortable . No acute distress.   HEENT: Atraumatic and normcephalic , neck is supple . no JVD. No carotid bruit.   CNS: A&Ox3. No focal deficits.  Lymph Nodes: Cervical : Not palpable.  Respiratory: bilateral basilar rales  Cardiovascular: S1S2 RRR. No murmur/rubs or gallop.  Gastrointestinal: Soft non-tender and non distended . +Bowel sounds, no HSM  Extremities: trace edema , DP and PT +2   Psychiatric: Calm . no agitation., mood appropriate  Skin: No skin lesions    Intake and output:     LABS:                        9.1    11.47 )-----------( 233      ( 20 Apr 2021 10:22 )             29.9     04-20    139  |  94<L>  |  63.0<H>  ----------------------------<  64<L>  4.0   |  34.0<H>  |  2.35<H>    Ca    8.7      20 Apr 2021 10:22  Mg     1.9     04-19        ;p-BNP=  PTT - ( 20 Apr 2021 15:01 )  PTT:28.8 sec      INTERPRETATION OF TELEMETRY:  ECG: (reviewed by me ), NSR with premature atrial complexes    RADIOLOGY & ADDITIONAL STUDIES:    X-ray:  (reviewed by me) , mild congestion, , mild cardiomegaly    ECHO FINDINGS:   < from: TTE Echo Complete w/o Contrast w/ Doppler (04.13.21 @ 17:34) >  Summary:   1. Normal global left ventricular systolic function.   2. Left ventricular ejection fraction, by visual estimation, is 70 to 75%.   3. Mildly increased LV wall thickness.   4. Normal left ventricular internal cavity size.   5. Normal right ventricular size and function.   6. The left atrium is normal in size.   7. The right atrium is normal in size.   8. Lipomatous hypertrophy of the intra-atrial septum.   9. Moderate mitral annular calcification.  10. Mild mitral valve regurgitation.  11. Elevated mitral valve mean gradient    5 mmHg at HR 99 BPM.  12. No aortic valve stenosis.  13. Estimated pulmonary artery systolic pressure is 63.7 mmHg assuming a right atrial pressure of 8 mmHg, which is consistent with severe pulmonary hypertension.  14. Recommend clinical correlation with the above findings.    < end of copied text >      Catheterization Findings

## 2021-04-21 LAB
ANION GAP SERPL CALC-SCNC: 14 MMOL/L — SIGNIFICANT CHANGE UP (ref 5–17)
APTT BLD: 114.3 SEC — HIGH (ref 27.5–35.5)
APTT BLD: 130.5 SEC — CRITICAL HIGH (ref 27.5–35.5)
APTT BLD: 96.2 SEC — HIGH (ref 27.5–35.5)
APTT BLD: >200 SEC — CRITICAL HIGH (ref 27.5–35.5)
BUN SERPL-MCNC: 67 MG/DL — HIGH (ref 8–20)
CALCIUM SERPL-MCNC: 8.6 MG/DL — SIGNIFICANT CHANGE UP (ref 8.6–10.2)
CHLORIDE SERPL-SCNC: 96 MMOL/L — LOW (ref 98–107)
CO2 SERPL-SCNC: 31 MMOL/L — HIGH (ref 22–29)
CREAT SERPL-MCNC: 2.44 MG/DL — HIGH (ref 0.5–1.3)
GBM IGG SER-ACNC: 3 — SIGNIFICANT CHANGE UP (ref 0–20)
GLUCOSE BLDC GLUCOMTR-MCNC: 194 MG/DL — HIGH (ref 70–99)
GLUCOSE BLDC GLUCOMTR-MCNC: 198 MG/DL — HIGH (ref 70–99)
GLUCOSE BLDC GLUCOMTR-MCNC: 250 MG/DL — HIGH (ref 70–99)
GLUCOSE BLDC GLUCOMTR-MCNC: 254 MG/DL — HIGH (ref 70–99)
GLUCOSE BLDC GLUCOMTR-MCNC: 66 MG/DL — LOW (ref 70–99)
GLUCOSE BLDC GLUCOMTR-MCNC: 68 MG/DL — LOW (ref 70–99)
GLUCOSE BLDC GLUCOMTR-MCNC: 69 MG/DL — LOW (ref 70–99)
GLUCOSE BLDC GLUCOMTR-MCNC: 86 MG/DL — SIGNIFICANT CHANGE UP (ref 70–99)
GLUCOSE SERPL-MCNC: 61 MG/DL — LOW (ref 70–99)
HCT VFR BLD CALC: 27.2 % — LOW (ref 34.5–45)
HCT VFR BLD CALC: 29.5 % — LOW (ref 34.5–45)
HGB BLD-MCNC: 8.2 G/DL — LOW (ref 11.5–15.5)
HGB BLD-MCNC: 9.2 G/DL — LOW (ref 11.5–15.5)
INR BLD: 1.05 RATIO — SIGNIFICANT CHANGE UP (ref 0.88–1.16)
MAGNESIUM SERPL-MCNC: 1.8 MG/DL — SIGNIFICANT CHANGE UP (ref 1.6–2.6)
MCHC RBC-ENTMCNC: 19.4 PG — LOW (ref 27–34)
MCHC RBC-ENTMCNC: 19.5 PG — LOW (ref 27–34)
MCHC RBC-ENTMCNC: 30.1 GM/DL — LOW (ref 32–36)
MCHC RBC-ENTMCNC: 31.2 GM/DL — LOW (ref 32–36)
MCV RBC AUTO: 62.4 FL — LOW (ref 80–100)
MCV RBC AUTO: 64.3 FL — LOW (ref 80–100)
PLATELET # BLD AUTO: 225 K/UL — SIGNIFICANT CHANGE UP (ref 150–400)
PLATELET # BLD AUTO: 236 K/UL — SIGNIFICANT CHANGE UP (ref 150–400)
POTASSIUM SERPL-MCNC: 3.4 MMOL/L — LOW (ref 3.5–5.3)
POTASSIUM SERPL-SCNC: 3.4 MMOL/L — LOW (ref 3.5–5.3)
PROTHROM AB SERPL-ACNC: 12.1 SEC — SIGNIFICANT CHANGE UP (ref 10.6–13.6)
RBC # BLD: 4.23 M/UL — SIGNIFICANT CHANGE UP (ref 3.8–5.2)
RBC # BLD: 4.73 M/UL — SIGNIFICANT CHANGE UP (ref 3.8–5.2)
RBC # FLD: 18.2 % — HIGH (ref 10.3–14.5)
RBC # FLD: 18.5 % — HIGH (ref 10.3–14.5)
SODIUM SERPL-SCNC: 141 MMOL/L — SIGNIFICANT CHANGE UP (ref 135–145)
WBC # BLD: 11.42 K/UL — HIGH (ref 3.8–10.5)
WBC # BLD: 13.04 K/UL — HIGH (ref 3.8–10.5)
WBC # FLD AUTO: 11.42 K/UL — HIGH (ref 3.8–10.5)
WBC # FLD AUTO: 13.04 K/UL — HIGH (ref 3.8–10.5)

## 2021-04-21 PROCEDURE — 99232 SBSQ HOSP IP/OBS MODERATE 35: CPT

## 2021-04-21 PROCEDURE — 99233 SBSQ HOSP IP/OBS HIGH 50: CPT

## 2021-04-21 RX ORDER — POTASSIUM CHLORIDE 20 MEQ
30 PACKET (EA) ORAL DAILY
Refills: 0 | Status: DISCONTINUED | OUTPATIENT
Start: 2021-04-21 | End: 2021-04-24

## 2021-04-21 RX ORDER — WARFARIN SODIUM 2.5 MG/1
5 TABLET ORAL ONCE
Refills: 0 | Status: COMPLETED | OUTPATIENT
Start: 2021-04-21 | End: 2021-04-21

## 2021-04-21 RX ORDER — INSULIN GLARGINE 100 [IU]/ML
8 INJECTION, SOLUTION SUBCUTANEOUS EVERY MORNING
Refills: 0 | Status: DISCONTINUED | OUTPATIENT
Start: 2021-04-21 | End: 2021-04-23

## 2021-04-21 RX ORDER — DEXTROSE 50 % IN WATER 50 %
15 SYRINGE (ML) INTRAVENOUS ONCE
Refills: 0 | Status: COMPLETED | OUTPATIENT
Start: 2021-04-21 | End: 2021-04-21

## 2021-04-21 RX ORDER — MAGNESIUM OXIDE 400 MG ORAL TABLET 241.3 MG
400 TABLET ORAL DAILY
Refills: 0 | Status: DISCONTINUED | OUTPATIENT
Start: 2021-04-21 | End: 2021-04-24

## 2021-04-21 RX ADMIN — Medication 60 MILLIGRAM(S): at 13:18

## 2021-04-21 RX ADMIN — Medication 50 MILLIGRAM(S): at 06:23

## 2021-04-21 RX ADMIN — Medication 60 MILLIGRAM(S): at 06:18

## 2021-04-21 RX ADMIN — Medication 2: at 22:20

## 2021-04-21 RX ADMIN — Medication 667 MILLIGRAM(S): at 16:44

## 2021-04-21 RX ADMIN — PANTOPRAZOLE SODIUM 40 MILLIGRAM(S): 20 TABLET, DELAYED RELEASE ORAL at 06:17

## 2021-04-21 RX ADMIN — Medication 3 MILLILITER(S): at 08:42

## 2021-04-21 RX ADMIN — Medication 667 MILLIGRAM(S): at 13:18

## 2021-04-21 RX ADMIN — HEPARIN SODIUM 1000 UNIT(S)/HR: 5000 INJECTION INTRAVENOUS; SUBCUTANEOUS at 12:25

## 2021-04-21 RX ADMIN — Medication 40 MILLIGRAM(S): at 06:23

## 2021-04-21 RX ADMIN — Medication 6 UNIT(S): at 11:49

## 2021-04-21 RX ADMIN — MAGNESIUM OXIDE 400 MG ORAL TABLET 400 MILLIGRAM(S): 241.3 TABLET ORAL at 13:19

## 2021-04-21 RX ADMIN — CALCITRIOL 0.25 MICROGRAM(S): 0.5 CAPSULE ORAL at 13:19

## 2021-04-21 RX ADMIN — HEPARIN SODIUM 0 UNIT(S)/HR: 5000 INJECTION INTRAVENOUS; SUBCUTANEOUS at 11:13

## 2021-04-21 RX ADMIN — Medication 30 MILLIEQUIVALENT(S): at 13:19

## 2021-04-21 RX ADMIN — Medication 40 MILLIGRAM(S): at 16:45

## 2021-04-21 RX ADMIN — Medication 4: at 16:43

## 2021-04-21 RX ADMIN — Medication 15 GRAM(S): at 07:50

## 2021-04-21 RX ADMIN — HEPARIN SODIUM 0 UNIT(S)/HR: 5000 INJECTION INTRAVENOUS; SUBCUTANEOUS at 20:36

## 2021-04-21 RX ADMIN — Medication 3 MILLILITER(S): at 15:30

## 2021-04-21 RX ADMIN — Medication 3 MILLILITER(S): at 03:24

## 2021-04-21 RX ADMIN — Medication 3 MILLILITER(S): at 20:47

## 2021-04-21 RX ADMIN — Medication 1 MILLIGRAM(S): at 13:19

## 2021-04-21 RX ADMIN — HEPARIN SODIUM 1200 UNIT(S)/HR: 5000 INJECTION INTRAVENOUS; SUBCUTANEOUS at 02:38

## 2021-04-21 RX ADMIN — INSULIN GLARGINE 10 UNIT(S): 100 INJECTION, SOLUTION SUBCUTANEOUS at 10:14

## 2021-04-21 RX ADMIN — Medication 667 MILLIGRAM(S): at 08:45

## 2021-04-21 RX ADMIN — Medication 6 UNIT(S): at 16:43

## 2021-04-21 RX ADMIN — Medication 1 MILLIGRAM(S): at 23:29

## 2021-04-21 RX ADMIN — WARFARIN SODIUM 5 MILLIGRAM(S): 2.5 TABLET ORAL at 23:29

## 2021-04-21 RX ADMIN — Medication 60 MILLIGRAM(S): at 16:45

## 2021-04-21 RX ADMIN — Medication 50 MILLIGRAM(S): at 16:46

## 2021-04-21 RX ADMIN — Medication 60 MILLIGRAM(S): at 23:29

## 2021-04-21 RX ADMIN — Medication 20 MILLIGRAM(S): at 06:18

## 2021-04-21 RX ADMIN — Medication 2: at 11:48

## 2021-04-21 NOTE — PROGRESS NOTE ADULT - ASSESSMENT
DM 2 with ARF and possible underlying CRF; Pulmonary Hypertension. hypokalemia on diuretic.    PO meds, follow up labs.

## 2021-04-21 NOTE — PROGRESS NOTE ADULT - ASSESSMENT
81yr old female with PMH of HTN, HPL, Asthma, DM-2 presented to ER with c/o Shortness of breath since 2days and peripheral edema admitted for possible acute asthma exacerbation and possible acute renal failure.     # Acute Asthma Exacerbation + severe pulm HTN   suspect acute diastolic congestive heart failure - VQ scan - neg for PE   appreciate pulm recs    steroids changed to oral taper  Nebs q6  wean off O2 as tolerated   As part of PH  check JAYDA, RF, ANCA, anti-Scl-70, anti-centromere Ab, anti-RNP (ordered)  Cardiology consult  RHC showed Wedge pressure 23   pending oxygen at rest and ambulation   Lasix 40mg BID PO     #  worsening THERESA on possible underlying CKD/Hyperkalemia  avoid potential nephrotoxins  No hydro on renal sono, 24 h urine in progress   nephrology consult appreciated     # HTN   cont hydralazine, Cardizem.   ARB on hold due to THERSEA    # PAF  continued episodes of PAF, now in NSR,   on Heparin drip.   Cardizem 30 mg TID, titrate for rate control  Will start Coumadin 5mg tonight   f/u INR in the am     # DM-2   with hypoglycemia   endocrine consult appreciated  Lantus qhs and Lispro q6 premeal   will continue monitor     # Peripheral edema , elevated d-dimer    b/l doppler LE - Neg for DVT   f/u ECHO - severe PHTN ,  onPO lasix now     #anemia  multifactorial, HH stable.     #hypocalcemia  Elevated PTH  likely secondary to CKD     # Abdominal pain   on PPI    # DVT px - Heparin drip

## 2021-04-21 NOTE — PROGRESS NOTE ADULT - SUBJECTIVE AND OBJECTIVE BOX
CC:  follow up DM    interval HPI/ Overnight Events:  Denies any dyspnea.  Wants to go home.  Had mild hypoglycemia this AM.    MEDICATIONS  (STANDING):  ALBUTerol    0.083%. 2.5 milliGRAM(s) Nebulizer once  albuterol/ipratropium for Nebulization 3 milliLiter(s) Nebulizer every 6 hours  calcitriol   Capsule 0.25 MICROGram(s) Oral daily  calcium acetate 667 milliGRAM(s) Oral three times a day with meals  dextrose 40% Gel 15 Gram(s) Oral once  dextrose 5%. 1000 milliLiter(s) (50 mL/Hr) IV Continuous <Continuous>  dextrose 5%. 1000 milliLiter(s) (100 mL/Hr) IV Continuous <Continuous>  dextrose 50% Injectable 25 Gram(s) IV Push once  dextrose 50% Injectable 12.5 Gram(s) IV Push once  dextrose 50% Injectable 25 Gram(s) IV Push once  diltiazem    Tablet 60 milliGRAM(s) Oral every 6 hours  epoetin susie-epbx (RETACRIT) Injectable 24922 Unit(s) SubCutaneous <User Schedule>  ergocalciferol 90328 Unit(s) Oral <User Schedule>  folic acid 1 milliGRAM(s) Oral daily  furosemide    Tablet 40 milliGRAM(s) Oral two times a day  glucagon  Injectable 1 milliGRAM(s) IntraMuscular once  heparin  Infusion.  Unit(s)/Hr (12 mL/Hr) IV Continuous <Continuous>  hydrALAZINE 50 milliGRAM(s) Oral every 12 hours  insulin glargine Injectable (LANTUS) 10 Unit(s) SubCutaneous every morning  insulin lispro (ADMELOG) corrective regimen sliding scale   SubCutaneous at bedtime  insulin lispro (ADMELOG) corrective regimen sliding scale   SubCutaneous three times a day before meals  insulin lispro Injectable (ADMELOG) 6 Unit(s) SubCutaneous three times a day before meals  iron sucrose IVPB 250 milliGRAM(s) IV Intermittent <User Schedule>  magnesium oxide 400 milliGRAM(s) Oral daily  melatonin 1 milliGRAM(s) Oral at bedtime  pantoprazole    Tablet 40 milliGRAM(s) Oral before breakfast  potassium chloride    Tablet ER 30 milliEquivalent(s) Oral daily  predniSONE   Tablet   Oral   predniSONE   Tablet 20 milliGRAM(s) Oral daily  warfarin 5 milliGRAM(s) Oral once    Vital Signs Last 24 Hrs  T(C): 36.7 (21 Apr 2021 16:15), Max: 37.1 (21 Apr 2021 06:36)  T(F): 98.1 (21 Apr 2021 16:15), Max: 98.8 (21 Apr 2021 06:36)  HR: 80 (21 Apr 2021 16:15) (80 - 99)  BP: 120/56 (21 Apr 2021 16:15) (120/56 - 129/62)  RR: 18 (21 Apr 2021 16:15) (18 - 18)  SpO2: 96% (21 Apr 2021 16:15) (95% - 99%)    PE:  Gen: AAO, NAD  HEENT:  sclera anicteric, MMM  Neck:  no thyromegaly, no LAD  CV:  nl S1 + S2, RRR, no m/r/g  Resp:  nl respiratory effort, CTA b/l  GI/ Abd: soft, NT/ ND, BS +  Neuro:  No tremor   MS:  no c/c/e, nl muscle tone  Skin:  no acanthosis  Psych: affect appropriate    LABS:  04-21    141  |  96<L>  |  67.0<H>  ----------------------------<  61<L>  3.4<L>   |  31.0<H>  |  2.44<H>    Ca    8.6      21 Apr 2021 08:20  Mg     1.8     04-21               9.2    13.04 )-----------( 236      ( 21 Apr 2021 08:20 )             29.5     CAPILLARY BLOOD GLUCOSE  POCT Blood Glucose.: 250 mg/dL (21 Apr 2021 16:41)  POCT Blood Glucose.: 194 mg/dL (21 Apr 2021 11:27)  POCT Blood Glucose.: 198 mg/dL (21 Apr 2021 10:14)  POCT Blood Glucose.: 86 mg/dL (21 Apr 2021 08:20)  POCT Blood Glucose.: 69 mg/dL (21 Apr 2021 08:02)  POCT Blood Glucose.: 68 mg/dL (21 Apr 2021 07:41)  POCT Blood Glucose.: 66 mg/dL (21 Apr 2021 07:39)  POCT Blood Glucose.: 199 mg/dL (20 Apr 2021 20:34)  POCT Blood Glucose.: 432 mg/dL (20 Apr 2021 18:32)  POCT Blood Glucose.: 449 mg/dL (20 Apr 2021 18:29)

## 2021-04-21 NOTE — PROGRESS NOTE ADULT - ASSESSMENT
80 yo female with Type 2 DM with likely underlying nephropathy, HTN, HLD, asthma admitted with dyspnea in setting of severe pulmonary HTN, asthma exacerbation, THERESA and pAfib.      1.  Type 2 DM-  Will reduce Lantus to 8 units daily. Continue standing premeal Lispro 6 units with meals + scale.  W ill need discharge on basal bolus insulin regimen.      2.  Secondary Hyperparathyroidism-  due to underlying renal disease, management per renal (on Calcitriol, ergocalciferol and Calcium acetate).  Calcium level has improved.    3.  Severe  Pulm HTN-  Continue diuretics as per cardiology  4.  Asthma-  steroids are being tapered.  5.  THERESA-  management as per Renal.

## 2021-04-21 NOTE — PROGRESS NOTE ADULT - SUBJECTIVE AND OBJECTIVE BOX
HPI  Pt is a 82yo F admitted to [facility] for asthema exacerbation and sever pul HTN   Pt was seen and examined at bedside. Comfortable in bed, no acute distress.  Hypoglycemic episode noted this morning       Vital Signs Last 24 Hrs  T(C): 36.9 (21 Apr 2021 10:55), Max: 37.1 (21 Apr 2021 06:36)  T(F): 98.5 (21 Apr 2021 10:55), Max: 98.8 (21 Apr 2021 06:36)  HR: 99 (21 Apr 2021 10:55) (81 - 103)  BP: 129/62 (21 Apr 2021 10:55) (126/70 - 152/65)  BP(mean): --  RR: 18 (21 Apr 2021 10:55) (18 - 18)  SpO2: 95% (21 Apr 2021 08:45) (93% - 100%)    I&O's Summary    21 Apr 2021 07:01  -  21 Apr 2021 12:51  --------------------------------------------------------  IN: 48 mL / OUT: 0 mL / NET: 48 mL        CAPILLARY BLOOD GLUCOSE      POCT Blood Glucose.: 194 mg/dL (21 Apr 2021 11:27)  POCT Blood Glucose.: 198 mg/dL (21 Apr 2021 10:14)  POCT Blood Glucose.: 86 mg/dL (21 Apr 2021 08:20)  POCT Blood Glucose.: 69 mg/dL (21 Apr 2021 08:02)  POCT Blood Glucose.: 68 mg/dL (21 Apr 2021 07:41)  POCT Blood Glucose.: 66 mg/dL (21 Apr 2021 07:39)  POCT Blood Glucose.: 199 mg/dL (20 Apr 2021 20:34)  POCT Blood Glucose.: 432 mg/dL (20 Apr 2021 18:32)  POCT Blood Glucose.: 449 mg/dL (20 Apr 2021 18:29)  POCT Blood Glucose.: 467 mg/dL (20 Apr 2021 17:22)  POCT Blood Glucose.: 447 mg/dL (20 Apr 2021 17:20)      PHYSICAL EXAM:    Constitutional: NAD,   HEENT: PERR, EOMI,   Neck: Soft and supple,    Respiratory: Breath sounds are clear bilaterally   Cardiovascular: S1 and S2,   Gastrointestinal: Bowel Sounds present, soft, nontender,   Extremities: No peripheral edema  Vascular: 2+ peripheral pulses  Neurological: A/O x 3,   Musculoskeletal: 5/5 strength b/l upper and lower extremities    MEDICATIONS:  MEDICATIONS  (STANDING):  ALBUTerol    0.083%. 2.5 milliGRAM(s) Nebulizer once  albuterol/ipratropium for Nebulization 3 milliLiter(s) Nebulizer every 6 hours  calcitriol   Capsule 0.25 MICROGram(s) Oral daily  calcium acetate 667 milliGRAM(s) Oral three times a day with meals  dextrose 40% Gel 15 Gram(s) Oral once  dextrose 5%. 1000 milliLiter(s) (50 mL/Hr) IV Continuous <Continuous>  dextrose 5%. 1000 milliLiter(s) (100 mL/Hr) IV Continuous <Continuous>  dextrose 50% Injectable 25 Gram(s) IV Push once  dextrose 50% Injectable 12.5 Gram(s) IV Push once  dextrose 50% Injectable 25 Gram(s) IV Push once  diltiazem    Tablet 60 milliGRAM(s) Oral every 6 hours  epoetin susie-epbx (RETACRIT) Injectable 91167 Unit(s) SubCutaneous <User Schedule>  ergocalciferol 15638 Unit(s) Oral <User Schedule>  folic acid 1 milliGRAM(s) Oral daily  furosemide    Tablet 40 milliGRAM(s) Oral two times a day  glucagon  Injectable 1 milliGRAM(s) IntraMuscular once  heparin  Infusion.  Unit(s)/Hr (12 mL/Hr) IV Continuous <Continuous>  hydrALAZINE 50 milliGRAM(s) Oral every 12 hours  insulin glargine Injectable (LANTUS) 10 Unit(s) SubCutaneous every morning  insulin lispro (ADMELOG) corrective regimen sliding scale   SubCutaneous at bedtime  insulin lispro (ADMELOG) corrective regimen sliding scale   SubCutaneous three times a day before meals  insulin lispro Injectable (ADMELOG) 6 Unit(s) SubCutaneous three times a day before meals  iron sucrose IVPB 250 milliGRAM(s) IV Intermittent <User Schedule>  magnesium oxide 400 milliGRAM(s) Oral daily  melatonin 1 milliGRAM(s) Oral at bedtime  pantoprazole    Tablet 40 milliGRAM(s) Oral before breakfast  potassium chloride    Tablet ER 30 milliEquivalent(s) Oral daily  predniSONE   Tablet 20 milliGRAM(s) Oral daily  predniSONE   Tablet   Oral   warfarin 5 milliGRAM(s) Oral once      LABS: All Labs Reviewed:                        9.2    13.04 )-----------( 236      ( 21 Apr 2021 08:20 )             29.5     04-21    141  |  96<L>  |  67.0<H>  ----------------------------<  61<L>  3.4<L>   |  31.0<H>  |  2.44<H>    Ca    8.6      21 Apr 2021 08:20  Mg     1.8     04-21      PTT - ( 21 Apr 2021 10:03 )  PTT:>200.0 sec      Blood Culture:     RADIOLOGY/EKG:    DVT PPX:    ADVANCED DIRECTIVE:    DISPOSITION:

## 2021-04-21 NOTE — PROGRESS NOTE ADULT - SUBJECTIVE AND OBJECTIVE BOX
NEPHROLOGY INTERVAL HPI/OVERNIGHT EVENTS:    No new events.    MEDICATIONS  (STANDING):  ALBUTerol    0.083%. 2.5 milliGRAM(s) Nebulizer once  albuterol/ipratropium for Nebulization 3 milliLiter(s) Nebulizer every 6 hours  calcitriol   Capsule 0.25 MICROGram(s) Oral daily  calcium acetate 667 milliGRAM(s) Oral three times a day with meals  dextrose 40% Gel 15 Gram(s) Oral once  dextrose 5%. 1000 milliLiter(s) (50 mL/Hr) IV Continuous <Continuous>  dextrose 5%. 1000 milliLiter(s) (100 mL/Hr) IV Continuous <Continuous>  dextrose 50% Injectable 25 Gram(s) IV Push once  dextrose 50% Injectable 12.5 Gram(s) IV Push once  dextrose 50% Injectable 25 Gram(s) IV Push once  diltiazem    Tablet 60 milliGRAM(s) Oral every 6 hours  ergocalciferol 13826 Unit(s) Oral <User Schedule>  folic acid 1 milliGRAM(s) Oral daily  furosemide   Injectable 60 milliGRAM(s) IV Push two times a day  glucagon  Injectable 1 milliGRAM(s) IntraMuscular once  heparin  Infusion.  Unit(s)/Hr (12 mL/Hr) IV Continuous <Continuous>  hydrALAZINE 50 milliGRAM(s) Oral every 12 hours  insulin glargine Injectable (LANTUS) 20 Unit(s) SubCutaneous every morning  insulin lispro (ADMELOG) corrective regimen sliding scale   SubCutaneous at bedtime  insulin lispro (ADMELOG) corrective regimen sliding scale   SubCutaneous three times a day before meals  insulin lispro Injectable (ADMELOG) 6 Unit(s) SubCutaneous three times a day before meals  iron sucrose IVPB 250 milliGRAM(s) IV Intermittent <User Schedule>  melatonin 1 milliGRAM(s) Oral at bedtime  pantoprazole    Tablet 40 milliGRAM(s) Oral before breakfast  predniSONE   Tablet   Oral     MEDICATIONS  (PRN):  ALPRAZolam 0.25 milliGRAM(s) Oral every 8 hours PRN anxiety  guaiFENesin   Syrup  (Sugar-Free) 100 milliGRAM(s) Oral every 6 hours PRN Cough  heparin   Injectable 5500 Unit(s) IV Push every 6 hours PRN For aPTT less than 40  heparin   Injectable 2500 Unit(s) IV Push every 6 hours PRN For aPTT between 40 - 57      Allergies    No Known Allergies        Vital Signs Last 24 Hrs  T(C): 36.9 (21 Apr 2021 10:55), Max: 37.1 (21 Apr 2021 06:36)  T(F): 98.5 (21 Apr 2021 10:55), Max: 98.8 (21 Apr 2021 06:36)  HR: 99 (21 Apr 2021 10:55) (81 - 103)  BP: 129/62 (21 Apr 2021 10:55) (126/70 - 161/66)  BP(mean): --  RR: 18 (21 Apr 2021 10:55) (18 - 18)  SpO2: 95% (21 Apr 2021 08:45) (93% - 100%)  T(C): 36.6 (20 Apr 2021 04:31), Max: 36.8 (19 Apr 2021 16:56)  T(F): 97.8 (20 Apr 2021 04:31), Max: 98.2 (19 Apr 2021 16:56)  HR: 83 (20 Apr 2021 08:31) (83 - 116)  BP: 130/61 (20 Apr 2021 04:31) (130/61 - 144/68)      PHYSICAL EXAM:    GENERAL: Comfortable in bed eating  NECK: Supple, No JVD  NERVOUS SYSTEM:  Alert, verbal  CHEST/LUNG: Diminished BS  HEART: RR with occasional PC    ABDOMEN: Soft, Nontender, Nondistended; +BS  EXTREMITIES:  Legs same  : Neg      LABS:    04-21    141  |  96<L>  |  67.0<H>  ----------------------------<  61<L>  3.4<L>   |  31.0<H>  |  2.44<H>    Ca    8.6      21 Apr 2021 08:20  Mg     1.8     04-21                            9.4    10.39 )-----------( 222      ( 19 Apr 2021 07:06 )             31.1     04-19    143  |  96<L>  |  61.0<H>  ----------------------------<  60<L>  3.4<L>   |  36.0<H>  |  2.14<H>    Ca    8.6      19 Apr 2021 07:06  Mg     1.9     04-19      PTT - ( 19 Apr 2021 07:06 )  PTT:75.7 sec

## 2021-04-21 NOTE — PROGRESS NOTE ADULT - SUBJECTIVE AND OBJECTIVE BOX
[facility] CARDIOLOGY[facility]            [facility]                          [address] [address]                       Phone: [phone]. Fax:[phone]                      ________________________________________________    HPI:  81yr old female with PMH of HTN, HPL, Asthma, DM-2 presented to ER with c/o Shortness of breath since last 2days - not improving with her inhalers, She could not quantify how many times she uses inhalers but uses every other day or so. recetly she had labs done at her PMD - which showed abnormal renal test - Hence was awaiting to see nephrology in office. She was seen in ER, found to be in acute respiratory distress, with some improvement with nebulizer and steroid. She was found to be in ? AFib per ER physician however EKG reveals sinus tachycardia with HR in 110s. She was admitted for acute asthma exacerbation with possible ? aFib RVR and THERESA.   pt has been eating and drinking well. but has had recent onset of swelling in her legs.  c/o epigastric burning pain.  (11 Apr 2021 18:35)      ROS: All review of systems negative unless indicated otherwise below.                          LAB RESULTS                 COMPLETE BLOOD COUNT( 21 Apr 2021 08:20 )                            9.2 g/dL<L>  13.04 K/uL<H> )---------------( 236 K/uL                        29.5 %<L>      Automated Differential     Auto Basophil # - X      Auto Basophil % - X      Auto Eosinophil # - X      Auto Eosinophil % - X      Auto Immature Granulocyte # - X      Auto Immature Granulocyte % - X      Auto Lymphocyte # - X      Auto Lymphocyte % - X      Auto Monocyte # - X      Auto Monocyte % - X      Auto Neutrophil # - X      Auto Neutrophil % - X                                      CHEMISTRY                 Basic Metabolic Panel (04-21-21 @ 08:20)    141  |  96<L>  |  67.0<H>  ----------------------------<  61<L>  3.4<L>   |  31.0<H>  |  2.44<H>    Ca    8.6      21 Apr 2021 08:20  Phos  5.7     04-17  Mg     1.8     04-21                    Liver Functions (04-11-21 @ 15:54))  TPro  6.7  /  Alb  3.4  /  TBili  0.2  /  DBili  x   /  AST  20  /  ALT  19  /  AlkPhos  105     PT/INR/PTT ( 21 Apr 2021 14:50 )                        :                       :      12.1         :       114.3                 .        .                   .              .           .       1.05        .                                                                   Cardiac Enzymes   ( 11 Apr 2021 15:54 )  Troponin T  0.03 ,  CPK  X    , CKMB  X    , BNP 1789<H>                          RADIOLOGY RESULTS: Personally visualized   < from: Xray Chest 1 View AP/PA. (04.14.21 @ 11:44) >  IMPRESSION:    Small bilateral effusions. Mild congestive changes. No lobar consolidation. No pneumothorax    < end of copied text >                          CARDIOLOGY RESULTS: Official Report/Preliminary Verbal Reports  ECHO:   < from: TTE Echo Complete w/o Contrast w/ Doppler (04.13.21 @ 17:34) >  Summary:   1. Normal global left ventricular systolic function.   2. Left ventricular ejection fraction, by visual estimation, is 70 to 75%.   3. Mildly increased LV wall thickness.   4. Normal left ventricular internal cavity size.   5. Normal right ventricular size and function.   6. The left atrium is normal in size.   7. The right atrium is normal in size.   8. Lipomatous hypertrophy of the intra-atrial septum.   9. Moderate mitral annular calcification.  10. Mild mitral valve regurgitation.  11. Elevated mitral valve mean gradient    5 mmHg at HR 99 BPM.  12. No aortic valve stenosis.  13. Estimated pulmonary artery systolic pressure is 63.7 mmHg assuming a right atrial pressure of 8 mmHg, which is consistent with severe pulmonary hypertension.  14. Recommend clinical correlation with the above findings.    Ewa Urbina MD Electronically signed on 4/13/2021 at 6:34:53 PM    < end of copied text >    HOME MEDICATIONS:  amLODIPine 5 mg oral tablet: 1 tab(s) orally once a day (11 Apr 2021 18:46)  HumaLOG 100 units/mL injectable solution: 10 unit(s) injectable 3 times a day (11 Apr 2021 18:47)  hydrALAZINE 50 mg oral tablet: 1 tab(s) orally 2 times a day (11 Apr 2021 18:46)  Levemir 100 units/mL subcutaneous solution: 30 unit(s) subcutaneous 2 times a day (11 Apr 2021 18:46)  valsartan 320 mg oral tablet: 1 tab(s) orally once a day (11 Apr 2021 18:46)                             Current Admission Active Medications  ALBUTerol    0.083%. 2.5 milliGRAM(s) Nebulizer once  albuterol/ipratropium for Nebulization 3 milliLiter(s) Nebulizer every 6 hours  ALPRAZolam 0.25 milliGRAM(s) Oral every 8 hours PRN anxiety  calcitriol   Capsule 0.25 MICROGram(s) Oral daily  calcium acetate 667 milliGRAM(s) Oral three times a day with meals  dextrose 40% Gel 15 Gram(s) Oral once  dextrose 5%. 1000 milliLiter(s) (50 mL/Hr) IV Continuous <Continuous>  dextrose 5%. 1000 milliLiter(s) (100 mL/Hr) IV Continuous <Continuous>  dextrose 50% Injectable 25 Gram(s) IV Push once  dextrose 50% Injectable 12.5 Gram(s) IV Push once  dextrose 50% Injectable 25 Gram(s) IV Push once  diltiazem    Tablet 60 milliGRAM(s) Oral every 6 hours  epoetin susie-epbx (RETACRIT) Injectable 13112 Unit(s) SubCutaneous <User Schedule>  ergocalciferol 38957 Unit(s) Oral <User Schedule>  folic acid 1 milliGRAM(s) Oral daily  furosemide    Tablet 40 milliGRAM(s) Oral two times a day  glucagon  Injectable 1 milliGRAM(s) IntraMuscular once  guaiFENesin   Syrup  (Sugar-Free) 100 milliGRAM(s) Oral every 6 hours PRN Cough  heparin   Injectable 5500 Unit(s) IV Push every 6 hours PRN For aPTT less than 40  heparin   Injectable 2500 Unit(s) IV Push every 6 hours PRN For aPTT between 40 - 57  heparin  Infusion.  Unit(s)/Hr (12 mL/Hr) IV Continuous <Continuous>  hydrALAZINE 50 milliGRAM(s) Oral every 12 hours  insulin glargine Injectable (LANTUS) 10 Unit(s) SubCutaneous every morning  insulin lispro (ADMELOG) corrective regimen sliding scale   SubCutaneous at bedtime  insulin lispro (ADMELOG) corrective regimen sliding scale   SubCutaneous three times a day before meals  insulin lispro Injectable (ADMELOG) 6 Unit(s) SubCutaneous three times a day before meals  iron sucrose IVPB 250 milliGRAM(s) IV Intermittent <User Schedule>  magnesium oxide 400 milliGRAM(s) Oral daily  melatonin 1 milliGRAM(s) Oral at bedtime  pantoprazole    Tablet 40 milliGRAM(s) Oral before breakfast  potassium chloride    Tablet ER 30 milliEquivalent(s) Oral daily  predniSONE   Tablet   Oral   predniSONE   Tablet 20 milliGRAM(s) Oral daily  warfarin 5 milliGRAM(s) Oral once                        PHYSICAL EXAM:  Vital Signs Last 24 Hrs  T(C): 36.7 (21 Apr 2021 16:15), Max: 37.1 (21 Apr 2021 06:36)  T(F): 98.1 (21 Apr 2021 16:15), Max: 98.8 (21 Apr 2021 06:36)  HR: 80 (21 Apr 2021 16:15) (80 - 103)  BP: 120/56 (21 Apr 2021 16:15) (120/56 - 149/67)  BP(mean): --  RR: 18 (21 Apr 2021 16:15) (18 - 18)  SpO2: 96% (21 Apr 2021 16:15) (95% - 99%)    GENERAL: NAD  NECK: Supple, No JVD  NERVOUS SYSTEM:  Alert & Oriented X3, non focal neuro exam.   CHEST/LUNG: clear lungs, No rales, rhonchi, wheezing, or rubs  HEART: Regular rate and rhythm; s1 and s2 auscultated, No murmurs, rubs, or gallops  ABDOMEN: Soft, Nontender, Nondistended; Bowel sounds present and normoactive.   EXTREMITIES:  2+ Peripheral Pulses, No clubbing, cyanosis, or edema  CATH SITE:

## 2021-04-21 NOTE — PROGRESS NOTE ADULT - ATTENDING COMMENTS
Pt is seen, examined, chart reviewed, d/w np/pa.  Management as outlined above.  Severe Pulmonary HTN:  PE excluded on V/Q scan. - Echo with EF 70-75%, no significant diastolic dysfunction noted on this echo.   s/p RHC with wedge of 23. PO lffursomide.   New Onset Atrial Fibrillation:  [id number] score of 5.  start coumadin, bridge with heparin

## 2021-04-21 NOTE — PROGRESS NOTE ADULT - ASSESSMENT
80 y/o F with a PMHx of HTN, HPL, asthma, and DMII who presented tot he ER with complaints of worsening dyspnea for 2 days. Patient states that she had increased inhaler usage, and just felt like nothing was improving. Patient also following with her PMD for worsening renal failure, and had not seen a Nephrologist yet. Patient was found to have acute asthma exacerbation and was in Afib with RVR. Patient's echo shows severe pulmonary HTN, which is new to be diagnosed. Patient still with some shortness of breath that has been improving, but still with decreased renal function. Patient denies any fevers, chills, CP, syncope, abdominal pain, N/V/D, headache, or dizziness.   Troponin negative x 1  BNP 1789    Severe Pulmonary HTN  - Newly diagnosed.   - Unknown cause at this time.   - PE excluded on V/Q scan.   - Echo with EF 70-75%, no significant diastolic dysfunction noted on this echo.   - s/p RHC with wedge of 23   - stop IV lasix 60mg BID  - transition to 40mg BID  - Diet/lifestyle modifications and medication compliance heavily reinforced   - Patient educated about groin site care, signs and symptoms of complication post procedure, and plan of care moving forward. Patient verbalized understanding with teach-back.     New Onset Atrial Fibrillation   - Noted on EKG 04/11/21.   - rate controlled  - continue cardizem   - [id number] score of 5   - start coumadin, bridge with heparin   - Continue to monitor.     THERESA/CKD  - Nephrology following.   - Diuresis per renal    Asthma Exacerbation   - Pulm following.   - Continue steroids and nebs.

## 2021-04-21 NOTE — PROVIDER CONTACT NOTE (CRITICAL VALUE NOTIFICATION) - RECOMMENDATIONS
PA made aware of aPTT. Full anticoagulation nomogram followed. Heparin gtt decreased to 8ml/hr and stopped infusion for 1 hour.

## 2021-04-22 LAB
ANION GAP SERPL CALC-SCNC: 13 MMOL/L — SIGNIFICANT CHANGE UP (ref 5–17)
APTT BLD: 62.4 SEC — HIGH (ref 27.5–35.5)
APTT BLD: 68 SEC — HIGH (ref 27.5–35.5)
BUN SERPL-MCNC: 57 MG/DL — HIGH (ref 8–20)
CALCIUM SERPL-MCNC: 8.8 MG/DL — SIGNIFICANT CHANGE UP (ref 8.6–10.2)
CHLORIDE SERPL-SCNC: 93 MMOL/L — LOW (ref 98–107)
CO2 SERPL-SCNC: 32 MMOL/L — HIGH (ref 22–29)
CREAT SERPL-MCNC: 2.33 MG/DL — HIGH (ref 0.5–1.3)
GLUCOSE BLDC GLUCOMTR-MCNC: 105 MG/DL — HIGH (ref 70–99)
GLUCOSE BLDC GLUCOMTR-MCNC: 138 MG/DL — HIGH (ref 70–99)
GLUCOSE BLDC GLUCOMTR-MCNC: 172 MG/DL — HIGH (ref 70–99)
GLUCOSE BLDC GLUCOMTR-MCNC: 235 MG/DL — HIGH (ref 70–99)
GLUCOSE SERPL-MCNC: 161 MG/DL — HIGH (ref 70–99)
HCT VFR BLD CALC: 30.7 % — LOW (ref 34.5–45)
HCT VFR BLD CALC: 31.4 % — LOW (ref 34.5–45)
HGB BLD-MCNC: 9.4 G/DL — LOW (ref 11.5–15.5)
HGB BLD-MCNC: 9.6 G/DL — LOW (ref 11.5–15.5)
INR BLD: 1.04 RATIO — SIGNIFICANT CHANGE UP (ref 0.88–1.16)
MAGNESIUM SERPL-MCNC: 1.9 MG/DL — SIGNIFICANT CHANGE UP (ref 1.6–2.6)
MCHC RBC-ENTMCNC: 19.5 PG — LOW (ref 27–34)
MCHC RBC-ENTMCNC: 19.6 PG — LOW (ref 27–34)
MCHC RBC-ENTMCNC: 30.6 GM/DL — LOW (ref 32–36)
MCHC RBC-ENTMCNC: 30.6 GM/DL — LOW (ref 32–36)
MCV RBC AUTO: 63.8 FL — LOW (ref 80–100)
MCV RBC AUTO: 64.1 FL — LOW (ref 80–100)
PLATELET # BLD AUTO: 256 K/UL — SIGNIFICANT CHANGE UP (ref 150–400)
PLATELET # BLD AUTO: 278 K/UL — SIGNIFICANT CHANGE UP (ref 150–400)
POTASSIUM SERPL-MCNC: 3.7 MMOL/L — SIGNIFICANT CHANGE UP (ref 3.5–5.3)
POTASSIUM SERPL-SCNC: 3.7 MMOL/L — SIGNIFICANT CHANGE UP (ref 3.5–5.3)
PROTHROM AB SERPL-ACNC: 12 SEC — SIGNIFICANT CHANGE UP (ref 10.6–13.6)
RBC # BLD: 4.81 M/UL — SIGNIFICANT CHANGE UP (ref 3.8–5.2)
RBC # BLD: 4.9 M/UL — SIGNIFICANT CHANGE UP (ref 3.8–5.2)
RBC # FLD: 18.9 % — HIGH (ref 10.3–14.5)
RBC # FLD: 19 % — HIGH (ref 10.3–14.5)
RHEUMATOID FACTOR IDENTRA RESULT: SIGNIFICANT CHANGE UP
SODIUM SERPL-SCNC: 137 MMOL/L — SIGNIFICANT CHANGE UP (ref 135–145)
WBC # BLD: 15.59 K/UL — HIGH (ref 3.8–10.5)
WBC # BLD: 18.95 K/UL — HIGH (ref 3.8–10.5)
WBC # FLD AUTO: 15.59 K/UL — HIGH (ref 3.8–10.5)
WBC # FLD AUTO: 18.95 K/UL — HIGH (ref 3.8–10.5)

## 2021-04-22 PROCEDURE — 99233 SBSQ HOSP IP/OBS HIGH 50: CPT

## 2021-04-22 PROCEDURE — 99232 SBSQ HOSP IP/OBS MODERATE 35: CPT

## 2021-04-22 RX ORDER — HEPARIN SODIUM 5000 [USP'U]/ML
800 INJECTION INTRAVENOUS; SUBCUTANEOUS
Qty: 25000 | Refills: 0 | Status: DISCONTINUED | OUTPATIENT
Start: 2021-04-22 | End: 2021-04-24

## 2021-04-22 RX ORDER — WARFARIN SODIUM 2.5 MG/1
5 TABLET ORAL ONCE
Refills: 0 | Status: COMPLETED | OUTPATIENT
Start: 2021-04-22 | End: 2021-04-22

## 2021-04-22 RX ORDER — METOPROLOL TARTRATE 50 MG
25 TABLET ORAL ONCE
Refills: 0 | Status: COMPLETED | OUTPATIENT
Start: 2021-04-22 | End: 2021-04-22

## 2021-04-22 RX ORDER — METOPROLOL TARTRATE 50 MG
25 TABLET ORAL
Refills: 0 | Status: DISCONTINUED | OUTPATIENT
Start: 2021-04-22 | End: 2021-04-24

## 2021-04-22 RX ORDER — HEPARIN SODIUM 5000 [USP'U]/ML
5500 INJECTION INTRAVENOUS; SUBCUTANEOUS EVERY 6 HOURS
Refills: 0 | Status: DISCONTINUED | OUTPATIENT
Start: 2021-04-22 | End: 2021-04-24

## 2021-04-22 RX ORDER — HEPARIN SODIUM 5000 [USP'U]/ML
2500 INJECTION INTRAVENOUS; SUBCUTANEOUS EVERY 6 HOURS
Refills: 0 | Status: DISCONTINUED | OUTPATIENT
Start: 2021-04-22 | End: 2021-04-24

## 2021-04-22 RX ADMIN — Medication 50 MILLIGRAM(S): at 17:23

## 2021-04-22 RX ADMIN — Medication 1 MILLIGRAM(S): at 22:09

## 2021-04-22 RX ADMIN — Medication 667 MILLIGRAM(S): at 17:22

## 2021-04-22 RX ADMIN — Medication 3 MILLILITER(S): at 04:02

## 2021-04-22 RX ADMIN — Medication 50 MILLIGRAM(S): at 06:37

## 2021-04-22 RX ADMIN — HEPARIN SODIUM 0 UNIT(S)/HR: 5000 INJECTION INTRAVENOUS; SUBCUTANEOUS at 09:29

## 2021-04-22 RX ADMIN — Medication 25 MILLIGRAM(S): at 13:45

## 2021-04-22 RX ADMIN — Medication 60 MILLIGRAM(S): at 15:19

## 2021-04-22 RX ADMIN — Medication 667 MILLIGRAM(S): at 07:57

## 2021-04-22 RX ADMIN — HEPARIN SODIUM 800 UNIT(S)/HR: 5000 INJECTION INTRAVENOUS; SUBCUTANEOUS at 10:09

## 2021-04-22 RX ADMIN — Medication 60 MILLIGRAM(S): at 22:09

## 2021-04-22 RX ADMIN — Medication 4: at 16:29

## 2021-04-22 RX ADMIN — Medication 40 MILLIGRAM(S): at 17:22

## 2021-04-22 RX ADMIN — Medication 667 MILLIGRAM(S): at 11:23

## 2021-04-22 RX ADMIN — Medication 20 MILLIGRAM(S): at 06:37

## 2021-04-22 RX ADMIN — WARFARIN SODIUM 5 MILLIGRAM(S): 2.5 TABLET ORAL at 22:09

## 2021-04-22 RX ADMIN — PANTOPRAZOLE SODIUM 40 MILLIGRAM(S): 20 TABLET, DELAYED RELEASE ORAL at 07:57

## 2021-04-22 RX ADMIN — Medication 2: at 07:57

## 2021-04-22 RX ADMIN — INSULIN GLARGINE 8 UNIT(S): 100 INJECTION, SOLUTION SUBCUTANEOUS at 08:32

## 2021-04-22 RX ADMIN — CALCITRIOL 0.25 MICROGRAM(S): 0.5 CAPSULE ORAL at 11:22

## 2021-04-22 RX ADMIN — Medication 60 MILLIGRAM(S): at 08:32

## 2021-04-22 RX ADMIN — HEPARIN SODIUM 800 UNIT(S)/HR: 5000 INJECTION INTRAVENOUS; SUBCUTANEOUS at 17:21

## 2021-04-22 RX ADMIN — Medication 3 MILLILITER(S): at 09:21

## 2021-04-22 RX ADMIN — MAGNESIUM OXIDE 400 MG ORAL TABLET 400 MILLIGRAM(S): 241.3 TABLET ORAL at 11:23

## 2021-04-22 RX ADMIN — Medication 6 UNIT(S): at 07:57

## 2021-04-22 RX ADMIN — Medication 3 MILLILITER(S): at 20:48

## 2021-04-22 RX ADMIN — Medication 6 UNIT(S): at 16:29

## 2021-04-22 RX ADMIN — Medication 30 MILLIEQUIVALENT(S): at 11:23

## 2021-04-22 RX ADMIN — Medication 6 UNIT(S): at 11:22

## 2021-04-22 RX ADMIN — Medication 1 MILLIGRAM(S): at 11:22

## 2021-04-22 RX ADMIN — Medication 40 MILLIGRAM(S): at 06:37

## 2021-04-22 RX ADMIN — Medication 3 MILLILITER(S): at 15:30

## 2021-04-22 NOTE — PROGRESS NOTE ADULT - ASSESSMENT
DM 2 with ARF and underlying CRF; Pulmonary Hypertension. hypokalemia  on diuretic better. pt on iv heparin.    PO meds, follow up labs.

## 2021-04-22 NOTE — PHYSICAL THERAPY INITIAL EVALUATION ADULT - DIAGNOSIS, PT EVAL
PT eval only.
Patient is functionally independent and does not require skilled physical therapy needs at this time.

## 2021-04-22 NOTE — PROGRESS NOTE ADULT - SUBJECTIVE AND OBJECTIVE BOX
CC:  follow up DM    Interval HPI/ Overnight Events:  Patient remains on IV heparin for Afib.  Denies any associated CP or dyspnea.   No further episodes of hypoglycemia after reduction in dose of Lantus.     MEDICATIONS  (STANDING):  ALBUTerol    0.083%. 2.5 milliGRAM(s) Nebulizer once  albuterol/ipratropium for Nebulization 3 milliLiter(s) Nebulizer every 6 hours  calcitriol   Capsule 0.25 MICROGram(s) Oral daily  calcium acetate 667 milliGRAM(s) Oral three times a day with meals  dextrose 40% Gel 15 Gram(s) Oral once  dextrose 5%. 1000 milliLiter(s) (50 mL/Hr) IV Continuous <Continuous>  dextrose 5%. 1000 milliLiter(s) (100 mL/Hr) IV Continuous <Continuous>  dextrose 50% Injectable 25 Gram(s) IV Push once  dextrose 50% Injectable 12.5 Gram(s) IV Push once  dextrose 50% Injectable 25 Gram(s) IV Push once  diltiazem    Tablet 60 milliGRAM(s) Oral every 6 hours  epoetin susie-epbx (RETACRIT) Injectable 19145 Unit(s) SubCutaneous <User Schedule>  ergocalciferol 35909 Unit(s) Oral <User Schedule>  folic acid 1 milliGRAM(s) Oral daily  furosemide    Tablet 40 milliGRAM(s) Oral two times a day  glucagon  Injectable 1 milliGRAM(s) IntraMuscular once  heparin  Infusion. 800 Unit(s)/Hr (8 mL/Hr) IV Continuous <Continuous>  hydrALAZINE 50 milliGRAM(s) Oral every 12 hours  insulin glargine Injectable (LANTUS) 8 Unit(s) SubCutaneous every morning  insulin lispro (ADMELOG) corrective regimen sliding scale   SubCutaneous at bedtime  insulin lispro (ADMELOG) corrective regimen sliding scale   SubCutaneous three times a day before meals  insulin lispro Injectable (ADMELOG) 6 Unit(s) SubCutaneous three times a day before meals  magnesium oxide 400 milliGRAM(s) Oral daily  melatonin 1 milliGRAM(s) Oral at bedtime  metoprolol tartrate 25 milliGRAM(s) Oral two times a day  pantoprazole    Tablet 40 milliGRAM(s) Oral before breakfast  potassium chloride    Tablet ER 30 milliEquivalent(s) Oral daily  predniSONE   Tablet   Oral   warfarin 5 milliGRAM(s) Oral once    Vital Signs Last 24 Hrs  T(C): 37.3 (22 Apr 2021 11:23), Max: 37.3 (22 Apr 2021 11:23)  T(F): 99.2 (22 Apr 2021 11:23), Max: 99.2 (22 Apr 2021 11:23)  HR: 108 (22 Apr 2021 11:23) (80 - 108)  BP: 153/66 (22 Apr 2021 11:23) (120/56 - 153/66)  RR: 19 (22 Apr 2021 11:23) (18 - 19)  SpO2: 95% (22 Apr 2021 09:21) (95% - 97%)    PE:  Gen: AAO, NAD  HEENT:  sclera anicteric, MMM  Neck:  no thyromegaly, no LAD  CV:  nl S1 + S2, RRR, 2/6 systolic murmur  Resp:  nl respiratory effort, CTA b/l  GI/ Abd: soft, NT/ ND, BS +  MS:  no c/c/e, nl muscle tone  Skin:  no acanthosis  Psych: affect appropriate    LABS:  04-22    137  |  93<L>  |  57.0<H>  ----------------------------<  161<H>  3.7   |  32.0<H>  |  2.33<H>    Ca    8.8      22 Apr 2021 08:19  Mg     1.9     04-22                          9.4    15.59 )-----------( 278      ( 22 Apr 2021 08:19 )             30.7     CAPILLARY BLOOD GLUCOSE  POCT Blood Glucose.: 105 mg/dL (22 Apr 2021 11:21)  POCT Blood Glucose.: 172 mg/dL (22 Apr 2021 07:56)  POCT Blood Glucose.: 254 mg/dL (21 Apr 2021 22:07)  POCT Blood Glucose.: 250 mg/dL (21 Apr 2021 16:41)

## 2021-04-22 NOTE — PROGRESS NOTE ADULT - ASSESSMENT
81yr old female with PMH of HTN, HPL, Asthma, DM-2 presented to ER with c/o Shortness of breath since 2days and peripheral edema admitted for possible acute asthma exacerbation and possible acute renal failure.     # Acute Asthma Exacerbation + severe pulm HTN   suspect acute diastolic congestive heart failure - VQ scan - neg for PE   appreciate pulm recs   steroids changed to oral taper  Nebs q6  wean off O2 as tolerated   As part of PH  check JAYDA, RF, ANCA, anti-Scl-70, anti-centromere Ab, anti-RNP (ordered)  Cardiology consult  RHC showed Wedge pressure 23   pending oxygen at rest and ambulation   Lasix 40mg BID PO     #  worsening THERESA on possible underlying CKD/Hyperkalemia  avoid potential nephrotoxins  No hydro on renal sono  nephrology consult appreciated     # HTN   cont hydralazine, Cardizem.   ARB on hold due to THERESA    # PAF  continued episodes of PAF, now in NSR,   on Heparin drip.   Cardizem 30 mg TID, titrate for rate control  Coumadin 5mg tonight   Subtherapeutic    f/u INR in the am     # DM-2   with hypoglycemia   endocrine consult appreciated  Lantus qhs and Lispro q6 pre-meal   will continue monitor     # Peripheral edema , elevated d-dimer    b/l doppler LE - Neg for DVT   f/u ECHO - severe PHTN ,  on PO Lasix now     #anemia  multifactorial, HH stable.     #hypocalcemia  Elevated PTH  likely secondary to CKD     # Abdominal pain   on PPI    # DVT px   Heparin drip  Coumadin bridge

## 2021-04-22 NOTE — PROGRESS NOTE ADULT - ASSESSMENT
80 yo female with Type 2 DM with likely underlying nephropathy, HTN, HLD, asthma admitted with dyspnea in setting of severe pulmonary HTN, asthma exacerbation, THERESA and pAfib.      1.  Type 2 DM-  Continue current insulin doses as blood glucose levels have improved today.  Will need discharge on basal bolus insulin regimen.      2.  Secondary Hyperparathyroidism-  due to underlying renal disease, management per renal (on Calcitriol, ergocalciferol and Calcium acetate).  Calcium level has improved.    3.  Severe  Pulm HTN-  Continue diuretics as per cardiology  4.  Asthma-  steroids are being tapered.  5.  THERESA-  management as per Renal.    6.  Afib-  on heparin gtt being bridged to warfarin.

## 2021-04-22 NOTE — PROGRESS NOTE ADULT - SUBJECTIVE AND OBJECTIVE BOX
HPI  Pt is a 80yo F admitted to [facility] for asthema exacerbation and sever pul HTN   Pt was seen and examined at bedside. Comfortable in bed, no acute distress.  Hypoglycemic episode noted this morning     Vital Signs Last 24 Hrs  T(C): 37.3 (22 Apr 2021 11:23), Max: 37.3 (22 Apr 2021 11:23)  T(F): 99.2 (22 Apr 2021 11:23), Max: 99.2 (22 Apr 2021 11:23)  HR: 108 (22 Apr 2021 11:23) (80 - 108)  BP: 153/66 (22 Apr 2021 11:23) (120/56 - 153/66)  BP(mean): --  RR: 19 (22 Apr 2021 11:23) (18 - 19)  SpO2: 95% (22 Apr 2021 09:21) (95% - 97%)    I&O's Summary    21 Apr 2021 07:01  -  22 Apr 2021 07:00  --------------------------------------------------------  IN: 118 mL / OUT: 0 mL / NET: 118 mL        CAPILLARY BLOOD GLUCOSE      POCT Blood Glucose.: 105 mg/dL (22 Apr 2021 11:21)  POCT Blood Glucose.: 172 mg/dL (22 Apr 2021 07:56)  POCT Blood Glucose.: 254 mg/dL (21 Apr 2021 22:07)  POCT Blood Glucose.: 250 mg/dL (21 Apr 2021 16:41)      PHYSICAL EXAM:    Constitutional: NAD,   HEENT: PERR, EOMI,   Neck: Soft and supple,    Respiratory: Breath sounds are clear bilaterally   Cardiovascular: S1 and S2,   Gastrointestinal: Bowel Sounds present, soft, nontender,   Extremities: No peripheral edema  Vascular: 2+ peripheral pulses  Neurological: A/O x 3,   Musculoskeletal: 5/5 strength b/l upper and lower extremities    MEDICATIONS:  MEDICATIONS  (STANDING):  ALBUTerol    0.083%. 2.5 milliGRAM(s) Nebulizer once  albuterol/ipratropium for Nebulization 3 milliLiter(s) Nebulizer every 6 hours  calcitriol   Capsule 0.25 MICROGram(s) Oral daily  calcium acetate 667 milliGRAM(s) Oral three times a day with meals  dextrose 40% Gel 15 Gram(s) Oral once  dextrose 5%. 1000 milliLiter(s) (50 mL/Hr) IV Continuous <Continuous>  dextrose 5%. 1000 milliLiter(s) (100 mL/Hr) IV Continuous <Continuous>  dextrose 50% Injectable 25 Gram(s) IV Push once  dextrose 50% Injectable 12.5 Gram(s) IV Push once  dextrose 50% Injectable 25 Gram(s) IV Push once  diltiazem    Tablet 60 milliGRAM(s) Oral every 6 hours  epoetin susie-epbx (RETACRIT) Injectable 23664 Unit(s) SubCutaneous <User Schedule>  ergocalciferol 41687 Unit(s) Oral <User Schedule>  folic acid 1 milliGRAM(s) Oral daily  furosemide    Tablet 40 milliGRAM(s) Oral two times a day  glucagon  Injectable 1 milliGRAM(s) IntraMuscular once  heparin  Infusion. 800 Unit(s)/Hr (8 mL/Hr) IV Continuous <Continuous>  hydrALAZINE 50 milliGRAM(s) Oral every 12 hours  insulin glargine Injectable (LANTUS) 8 Unit(s) SubCutaneous every morning  insulin lispro (ADMELOG) corrective regimen sliding scale   SubCutaneous at bedtime  insulin lispro (ADMELOG) corrective regimen sliding scale   SubCutaneous three times a day before meals  insulin lispro Injectable (ADMELOG) 6 Unit(s) SubCutaneous three times a day before meals  magnesium oxide 400 milliGRAM(s) Oral daily  melatonin 1 milliGRAM(s) Oral at bedtime  pantoprazole    Tablet 40 milliGRAM(s) Oral before breakfast  potassium chloride    Tablet ER 30 milliEquivalent(s) Oral daily  predniSONE   Tablet   Oral       LABS: All Labs Reviewed:                        9.4    15.59 )-----------( 278      ( 22 Apr 2021 08:19 )             30.7     04-22    137  |  93<L>  |  57.0<H>  ----------------------------<  161<H>  3.7   |  32.0<H>  |  2.33<H>    Ca    8.8      22 Apr 2021 08:19  Mg     1.9     04-22      PT/INR - ( 22 Apr 2021 08:19 )   PT: 12.0 sec;   INR: 1.04 ratio         PTT - ( 22 Apr 2021 08:19 )  PTT:68.0 sec      Blood Culture:     RADIOLOGY/EKG:    DVT PPX:    ADVANCED DIRECTIVE:    DISPOSITION:

## 2021-04-22 NOTE — PHYSICAL THERAPY INITIAL EVALUATION ADULT - PERTINENT HX OF CURRENT PROBLEM, REHAB EVAL
Acute asthma exacerbation, SOB
Pt is an 82 y/o female who presented with SOB, cough. (+) asthma exacerbation and hypotension.

## 2021-04-22 NOTE — PROGRESS NOTE ADULT - SUBJECTIVE AND OBJECTIVE BOX
NEPHROLOGY INTERVAL HPI/OVERNIGHT EVENTS:    No new events.    MEDICATIONS  (STANDING):  ALBUTerol    0.083%. 2.5 milliGRAM(s) Nebulizer once  albuterol/ipratropium for Nebulization 3 milliLiter(s) Nebulizer every 6 hours  calcitriol   Capsule 0.25 MICROGram(s) Oral daily  calcium acetate 667 milliGRAM(s) Oral three times a day with meals  dextrose 40% Gel 15 Gram(s) Oral once  dextrose 5%. 1000 milliLiter(s) (50 mL/Hr) IV Continuous <Continuous>  dextrose 5%. 1000 milliLiter(s) (100 mL/Hr) IV Continuous <Continuous>  dextrose 50% Injectable 25 Gram(s) IV Push once  dextrose 50% Injectable 12.5 Gram(s) IV Push once  dextrose 50% Injectable 25 Gram(s) IV Push once  diltiazem    Tablet 60 milliGRAM(s) Oral every 6 hours  ergocalciferol 21598 Unit(s) Oral <User Schedule>  folic acid 1 milliGRAM(s) Oral daily  furosemide   Injectable 60 milliGRAM(s) IV Push two times a day  glucagon  Injectable 1 milliGRAM(s) IntraMuscular once  heparin  Infusion.  Unit(s)/Hr (12 mL/Hr) IV Continuous <Continuous>  hydrALAZINE 50 milliGRAM(s) Oral every 12 hours  insulin glargine Injectable (LANTUS) 20 Unit(s) SubCutaneous every morning  insulin lispro (ADMELOG) corrective regimen sliding scale   SubCutaneous at bedtime  insulin lispro (ADMELOG) corrective regimen sliding scale   SubCutaneous three times a day before meals  insulin lispro Injectable (ADMELOG) 6 Unit(s) SubCutaneous three times a day before meals  iron sucrose IVPB 250 milliGRAM(s) IV Intermittent <User Schedule>  melatonin 1 milliGRAM(s) Oral at bedtime  pantoprazole    Tablet 40 milliGRAM(s) Oral before breakfast  predniSONE   Tablet   Oral     MEDICATIONS  (PRN):  ALPRAZolam 0.25 milliGRAM(s) Oral every 8 hours PRN anxiety  guaiFENesin   Syrup  (Sugar-Free) 100 milliGRAM(s) Oral every 6 hours PRN Cough  heparin   Injectable 5500 Unit(s) IV Push every 6 hours PRN For aPTT less than 40  heparin   Injectable 2500 Unit(s) IV Push every 6 hours PRN For aPTT between 40 - 57      Allergies    No Known Allergies        Vital Signs Last 24 Hrs  T(C): 36.8 (22 Apr 2021 04:49), Max: 36.9 (21 Apr 2021 10:55)  T(F): 98.2 (22 Apr 2021 04:49), Max: 98.5 (21 Apr 2021 10:55)  HR: 102 (22 Apr 2021 08:33) (80 - 102)  BP: 137/75 (22 Apr 2021 08:33) (120/56 - 137/75)  BP(mean): --  RR: 18 (22 Apr 2021 04:49) (18 - 18)  SpO2: 97% (22 Apr 2021 04:49) (95% - 97%)  T(C): 36.9 (21 Apr 2021 10:55), Max: 37.1 (21 Apr 2021 06:36)  T(F): 98.5 (21 Apr 2021 10:55), Max: 98.8 (21 Apr 2021 06:36)  HR: 99 (21 Apr 2021 10:55) (81 - 103)  BP: 129/62 (21 Apr 2021 10:55) (126/70 - 161/66)        PHYSICAL EXAM:    GENERAL: Comfortable in bed   NECK: Supple, No JVD  NERVOUS SYSTEM:  Alert, verbal  CHEST/LUNG: no 02, no wheezes  HEART: RR with occasional PC    ABDOMEN: Soft, Nontender, Nondistended; +BS  EXTREMITIES:  Legs same  : Neg      LABS:    04-22    137  |  93<L>  |  57.0<H>  ----------------------------<  161<H>  3.7   |  32.0<H>  |  2.33<H>    Ca    8.8      22 Apr 2021 08:19  Mg     1.9     04-22 04-21    141  |  96<L>  |  67.0<H>  ----------------------------<  61<L>  3.4<L>   |  31.0<H>  |  2.44<H>    Ca    8.6      21 Apr 2021 08:20  Mg     1.8     04-21                            9.4    10.39 )-----------( 222      ( 19 Apr 2021 07:06 )             31.1     04-19    143  |  96<L>  |  61.0<H>  ----------------------------<  60<L>  3.4<L>   |  36.0<H>  |  2.14<H>    Ca    8.6      19 Apr 2021 07:06  Mg     1.9     04-19      PTT - ( 19 Apr 2021 07:06 )  PTT:75.7 sec

## 2021-04-22 NOTE — PHYSICAL THERAPY INITIAL EVALUATION ADULT - GENERAL OBSERVATIONS, REHAB EVAL
Patient is found on commode with nursing no c/o pain 0/10 on telemetry.
Pt received sitting at edge of stretcher, + telemetry/SpO2 monitor.

## 2021-04-22 NOTE — PHYSICAL THERAPY INITIAL EVALUATION ADULT - ADDITIONAL COMMENTS
Patient reports she lives in house with 2 JOY and no steps inside. Patient has RW and cane at home. Pt lives with spouse and is available to assist if needed. Patient reports she was independent with all ADLs PTA.
Pt lives in a house with 2 JOY, no steps inside. Pt Reports using a cane but also has a RW. Spouse is available to assist as needed.

## 2021-04-23 LAB
APTT BLD: 106.8 SEC — HIGH (ref 27.5–35.5)
APTT BLD: 62.7 SEC — HIGH (ref 27.5–35.5)
APTT BLD: 94.2 SEC — HIGH (ref 27.5–35.5)
GLUCOSE BLDC GLUCOMTR-MCNC: 145 MG/DL — HIGH (ref 70–99)
GLUCOSE BLDC GLUCOMTR-MCNC: 247 MG/DL — HIGH (ref 70–99)
GLUCOSE BLDC GLUCOMTR-MCNC: 266 MG/DL — HIGH (ref 70–99)
GLUCOSE BLDC GLUCOMTR-MCNC: 321 MG/DL — HIGH (ref 70–99)
HCT VFR BLD CALC: 31.7 % — LOW (ref 34.5–45)
HGB BLD-MCNC: 9.5 G/DL — LOW (ref 11.5–15.5)
INR BLD: 1.31 RATIO — HIGH (ref 0.88–1.16)
INR BLD: 1.62 RATIO — HIGH (ref 0.88–1.16)
MCHC RBC-ENTMCNC: 19.3 PG — LOW (ref 27–34)
MCHC RBC-ENTMCNC: 30 GM/DL — LOW (ref 32–36)
MCV RBC AUTO: 64.4 FL — LOW (ref 80–100)
PLATELET # BLD AUTO: 261 K/UL — SIGNIFICANT CHANGE UP (ref 150–400)
PROTHROM AB SERPL-ACNC: 15 SEC — HIGH (ref 10.6–13.6)
PROTHROM AB SERPL-ACNC: 18.4 SEC — HIGH (ref 10.6–13.6)
RBC # BLD: 4.92 M/UL — SIGNIFICANT CHANGE UP (ref 3.8–5.2)
RBC # FLD: 19.3 % — HIGH (ref 10.3–14.5)
WBC # BLD: 17.63 K/UL — HIGH (ref 3.8–10.5)
WBC # FLD AUTO: 17.63 K/UL — HIGH (ref 3.8–10.5)

## 2021-04-23 PROCEDURE — 99233 SBSQ HOSP IP/OBS HIGH 50: CPT

## 2021-04-23 PROCEDURE — 99232 SBSQ HOSP IP/OBS MODERATE 35: CPT

## 2021-04-23 RX ORDER — INSULIN GLARGINE 100 [IU]/ML
12 INJECTION, SOLUTION SUBCUTANEOUS EVERY MORNING
Refills: 0 | Status: DISCONTINUED | OUTPATIENT
Start: 2021-04-23 | End: 2021-04-24

## 2021-04-23 RX ORDER — WARFARIN SODIUM 2.5 MG/1
5 TABLET ORAL ONCE
Refills: 0 | Status: COMPLETED | OUTPATIENT
Start: 2021-04-23 | End: 2021-04-23

## 2021-04-23 RX ORDER — ALPRAZOLAM 0.25 MG
0.25 TABLET ORAL EVERY 8 HOURS
Refills: 0 | Status: DISCONTINUED | OUTPATIENT
Start: 2021-04-23 | End: 2021-04-24

## 2021-04-23 RX ORDER — ACETAMINOPHEN 500 MG
650 TABLET ORAL ONCE
Refills: 0 | Status: COMPLETED | OUTPATIENT
Start: 2021-04-23 | End: 2021-04-23

## 2021-04-23 RX ADMIN — Medication 650 MILLIGRAM(S): at 21:37

## 2021-04-23 RX ADMIN — Medication 650 MILLIGRAM(S): at 22:22

## 2021-04-23 RX ADMIN — HEPARIN SODIUM 700 UNIT(S)/HR: 5000 INJECTION INTRAVENOUS; SUBCUTANEOUS at 08:58

## 2021-04-23 RX ADMIN — Medication 3 MILLILITER(S): at 15:43

## 2021-04-23 RX ADMIN — Medication 50 MILLIGRAM(S): at 16:47

## 2021-04-23 RX ADMIN — Medication 6 UNIT(S): at 08:03

## 2021-04-23 RX ADMIN — Medication 60 MILLIGRAM(S): at 16:47

## 2021-04-23 RX ADMIN — Medication 60 MILLIGRAM(S): at 21:34

## 2021-04-23 RX ADMIN — Medication 30 MILLIEQUIVALENT(S): at 11:42

## 2021-04-23 RX ADMIN — Medication 1 MILLIGRAM(S): at 11:42

## 2021-04-23 RX ADMIN — Medication 60 MILLIGRAM(S): at 05:56

## 2021-04-23 RX ADMIN — HEPARIN SODIUM 800 UNIT(S)/HR: 5000 INJECTION INTRAVENOUS; SUBCUTANEOUS at 01:50

## 2021-04-23 RX ADMIN — Medication 6 UNIT(S): at 11:41

## 2021-04-23 RX ADMIN — Medication 10 MILLIGRAM(S): at 05:56

## 2021-04-23 RX ADMIN — HEPARIN SODIUM 700 UNIT(S)/HR: 5000 INJECTION INTRAVENOUS; SUBCUTANEOUS at 17:49

## 2021-04-23 RX ADMIN — Medication 4: at 21:33

## 2021-04-23 RX ADMIN — Medication 667 MILLIGRAM(S): at 08:04

## 2021-04-23 RX ADMIN — Medication 0.25 MILLIGRAM(S): at 23:53

## 2021-04-23 RX ADMIN — INSULIN GLARGINE 8 UNIT(S): 100 INJECTION, SOLUTION SUBCUTANEOUS at 08:58

## 2021-04-23 RX ADMIN — Medication 50 MILLIGRAM(S): at 05:56

## 2021-04-23 RX ADMIN — Medication 6 UNIT(S): at 16:47

## 2021-04-23 RX ADMIN — CALCITRIOL 0.25 MICROGRAM(S): 0.5 CAPSULE ORAL at 11:42

## 2021-04-23 RX ADMIN — Medication 40 MILLIGRAM(S): at 05:56

## 2021-04-23 RX ADMIN — Medication 3 MILLILITER(S): at 03:56

## 2021-04-23 RX ADMIN — Medication 6: at 11:41

## 2021-04-23 RX ADMIN — Medication 1 MILLIGRAM(S): at 21:34

## 2021-04-23 RX ADMIN — MAGNESIUM OXIDE 400 MG ORAL TABLET 400 MILLIGRAM(S): 241.3 TABLET ORAL at 11:43

## 2021-04-23 RX ADMIN — Medication 40 MILLIGRAM(S): at 16:47

## 2021-04-23 RX ADMIN — Medication 667 MILLIGRAM(S): at 16:47

## 2021-04-23 RX ADMIN — Medication 25 MILLIGRAM(S): at 11:42

## 2021-04-23 RX ADMIN — PANTOPRAZOLE SODIUM 40 MILLIGRAM(S): 20 TABLET, DELAYED RELEASE ORAL at 05:57

## 2021-04-23 RX ADMIN — Medication 4: at 08:03

## 2021-04-23 RX ADMIN — Medication 667 MILLIGRAM(S): at 11:42

## 2021-04-23 RX ADMIN — WARFARIN SODIUM 5 MILLIGRAM(S): 2.5 TABLET ORAL at 21:34

## 2021-04-23 RX ADMIN — Medication 3 MILLILITER(S): at 19:50

## 2021-04-23 RX ADMIN — Medication 60 MILLIGRAM(S): at 11:42

## 2021-04-23 RX ADMIN — Medication 3 MILLILITER(S): at 08:18

## 2021-04-23 NOTE — PROGRESS NOTE ADULT - SUBJECTIVE AND OBJECTIVE BOX
HPI  Pt is a 80yo F admitted to [facility] for asthema exacerbation and sever pul HTN   Pt was seen and examined at bedside. Comfortable in bed, no acute distress.     Vital Signs Last 24 Hrs  T(C): 36.9 (23 Apr 2021 04:34), Max: 37.3 (22 Apr 2021 11:23)  T(F): 98.4 (23 Apr 2021 04:34), Max: 99.2 (22 Apr 2021 11:23)  HR: 76 (23 Apr 2021 08:18) (74 - 108)  BP: 152/68 (23 Apr 2021 04:34) (128/76 - 153/66)  BP(mean): --  RR: 18 (23 Apr 2021 04:34) (18 - 19)  SpO2: 96% (23 Apr 2021 08:18) (94% - 98%)    I&O's Summary    22 Apr 2021 07:01  -  23 Apr 2021 07:00  --------------------------------------------------------  IN: 88 mL / OUT: 0 mL / NET: 88 mL        CAPILLARY BLOOD GLUCOSE      POCT Blood Glucose.: 247 mg/dL (23 Apr 2021 08:01)  POCT Blood Glucose.: 138 mg/dL (22 Apr 2021 22:00)  POCT Blood Glucose.: 235 mg/dL (22 Apr 2021 16:21)  POCT Blood Glucose.: 105 mg/dL (22 Apr 2021 11:21)      PHYSICAL EXAM:    Constitutional: NAD,   HEENT: PERR, EOMI,   Neck: Soft and supple,    Respiratory: Breath sounds are clear bilaterally   Cardiovascular: S1 and S2,   Gastrointestinal: Bowel Sounds present, soft, nontender,   Extremities: No peripheral edema  Vascular: 2+ peripheral pulses  Neurological: A/O x 3,   Musculoskeletal: 5/5 strength b/l upper and lower extremities    MEDICATIONS:  MEDICATIONS  (STANDING):  ALBUTerol    0.083%. 2.5 milliGRAM(s) Nebulizer once  albuterol/ipratropium for Nebulization 3 milliLiter(s) Nebulizer every 6 hours  calcitriol   Capsule 0.25 MICROGram(s) Oral daily  calcium acetate 667 milliGRAM(s) Oral three times a day with meals  dextrose 40% Gel 15 Gram(s) Oral once  dextrose 5%. 1000 milliLiter(s) (50 mL/Hr) IV Continuous <Continuous>  dextrose 5%. 1000 milliLiter(s) (100 mL/Hr) IV Continuous <Continuous>  dextrose 50% Injectable 25 Gram(s) IV Push once  dextrose 50% Injectable 12.5 Gram(s) IV Push once  dextrose 50% Injectable 25 Gram(s) IV Push once  diltiazem    Tablet 60 milliGRAM(s) Oral every 6 hours  epoetin susie-epbx (RETACRIT) Injectable 19407 Unit(s) SubCutaneous <User Schedule>  ergocalciferol 05046 Unit(s) Oral <User Schedule>  folic acid 1 milliGRAM(s) Oral daily  furosemide    Tablet 40 milliGRAM(s) Oral two times a day  glucagon  Injectable 1 milliGRAM(s) IntraMuscular once  heparin  Infusion. 800 Unit(s)/Hr (8 mL/Hr) IV Continuous <Continuous>  hydrALAZINE 50 milliGRAM(s) Oral every 12 hours  insulin glargine Injectable (LANTUS) 8 Unit(s) SubCutaneous every morning  insulin lispro (ADMELOG) corrective regimen sliding scale   SubCutaneous at bedtime  insulin lispro (ADMELOG) corrective regimen sliding scale   SubCutaneous three times a day before meals  insulin lispro Injectable (ADMELOG) 6 Unit(s) SubCutaneous three times a day before meals  magnesium oxide 400 milliGRAM(s) Oral daily  melatonin 1 milliGRAM(s) Oral at bedtime  metoprolol tartrate 25 milliGRAM(s) Oral two times a day  pantoprazole    Tablet 40 milliGRAM(s) Oral before breakfast  potassium chloride    Tablet ER 30 milliEquivalent(s) Oral daily  predniSONE   Tablet   Oral   predniSONE   Tablet 10 milliGRAM(s) Oral daily  warfarin 5 milliGRAM(s) Oral once      LABS: All Labs Reviewed:                        9.5    17.63 )-----------( 261      ( 23 Apr 2021 08:26 )             31.7     04-22    137  |  93<L>  |  57.0<H>  ----------------------------<  161<H>  3.7   |  32.0<H>  |  2.33<H>    Ca    8.8      22 Apr 2021 08:19  Mg     1.9     04-22      PT/INR - ( 23 Apr 2021 08:26 )   PT: 15.0 sec;   INR: 1.31 ratio         PTT - ( 23 Apr 2021 08:26 )  PTT:106.8 sec      Blood Culture:     RADIOLOGY/EKG:    DVT PPX:    ADVANCED DIRECTIVE:    DISPOSITION:

## 2021-04-23 NOTE — PROGRESS NOTE ADULT - ASSESSMENT
81yr old female with PMH of HTN, HPL, Asthma, DM-2 presented to ER with c/o Shortness of breath since 2days and peripheral edema admitted for possible acute asthma exacerbation and possible acute renal failure.     # Acute Asthma Exacerbation + severe pulm HTN   suspect acute diastolic congestive heart failure - VQ scan - neg for PE   appreciate pulm recs   steroids changed to oral taper  Nebs q6  wean off O2 as tolerated   As part of PH  check JAYDA, RF, ANCA, anti-Scl-70, anti-centromere Ab, anti-RNP (ordered)  Cardiology consult  RHC showed Wedge pressure 23   pending oxygen at rest and ambulation   Lasix 40mg BID PO     #  worsening THERESA on possible underlying CKD/Hyperkalemia  avoid potential nephrotoxins  No hydro on renal sono  nephrology consult appreciated     # HTN   cont hydralazine, Cardizem.   ARB on hold due to THERESA    # PAF  continued episodes of PAF, now in NSR,   on Heparin drip.   Cardizem 60 mg q6hr, will changed to long acting tomorrow   Metoprolol 25 BID  HR controlled   Coumadin 5mg tonight   Subtherapeutic 1.31  f/u INR in the am     # DM-2   endocrine consult appreciated  Lantus qhs and Lispro q6 pre-meal   will continue monitor     # Peripheral edema , elevated d-dimer    b/l doppler LE - Neg for DVT   f/u ECHO - severe PHTN ,  on PO Lasix now     #anemia  multifactorial, HH stable.     #hypocalcemia  Elevated PTH  likely secondary to CKD     # Abdominal pain   on PPI    # DVT px   Heparin drip  Coumadin bridge

## 2021-04-23 NOTE — PROGRESS NOTE ADULT - ASSESSMENT
THERESA: renal hypoperfusion due to diuretics in setting of new pulm HTN and pre load dependence  Cr 2.4--> 2.3 renal function slowly improving  No hydronephrosis seen on sonogram  Elevated BUN also due to steroid effects  Nephrotic proteinuria (3,278 mg/24hr) likely DM  - avoid potential nephrotoxins  - Would cont Lasix 40 mg po Q 12  - monitor labs    Anemia: multifactorial  Tsat= 13%; Ferritin= 293  - cont IV Fe    RO:  - cont Ergo, Rocaltrol and Phoslo    Will follow

## 2021-04-23 NOTE — PROGRESS NOTE ADULT - SUBJECTIVE AND OBJECTIVE BOX
CC:  follow up DM    Interval HPI/ Overnight Events:  Denies any CP or dyspnea.  Wants to go home.      MEDICATIONS  (STANDING):  ALBUTerol    0.083%. 2.5 milliGRAM(s) Nebulizer once  albuterol/ipratropium for Nebulization 3 milliLiter(s) Nebulizer every 6 hours  calcitriol   Capsule 0.25 MICROGram(s) Oral daily  calcium acetate 667 milliGRAM(s) Oral three times a day with meals  dextrose 40% Gel 15 Gram(s) Oral once  dextrose 5%. 1000 milliLiter(s) (50 mL/Hr) IV Continuous <Continuous>  dextrose 5%. 1000 milliLiter(s) (100 mL/Hr) IV Continuous <Continuous>  dextrose 50% Injectable 25 Gram(s) IV Push once  dextrose 50% Injectable 12.5 Gram(s) IV Push once  dextrose 50% Injectable 25 Gram(s) IV Push once  diltiazem    Tablet 60 milliGRAM(s) Oral every 6 hours  epoetin susie-epbx (RETACRIT) Injectable 80995 Unit(s) SubCutaneous <User Schedule>  ergocalciferol 90954 Unit(s) Oral <User Schedule>  folic acid 1 milliGRAM(s) Oral daily  furosemide    Tablet 40 milliGRAM(s) Oral two times a day  glucagon  Injectable 1 milliGRAM(s) IntraMuscular once  heparin  Infusion. 800 Unit(s)/Hr (8 mL/Hr) IV Continuous <Continuous>  hydrALAZINE 50 milliGRAM(s) Oral every 12 hours  insulin glargine Injectable (LANTUS) 8 Unit(s) SubCutaneous every morning  insulin lispro (ADMELOG) corrective regimen sliding scale   SubCutaneous at bedtime  insulin lispro (ADMELOG) corrective regimen sliding scale   SubCutaneous three times a day before meals  insulin lispro Injectable (ADMELOG) 6 Unit(s) SubCutaneous three times a day before meals  magnesium oxide 400 milliGRAM(s) Oral daily  melatonin 1 milliGRAM(s) Oral at bedtime  metoprolol tartrate 25 milliGRAM(s) Oral two times a day  pantoprazole    Tablet 40 milliGRAM(s) Oral before breakfast  potassium chloride    Tablet ER 30 milliEquivalent(s) Oral daily  predniSONE   Tablet   Oral   predniSONE   Tablet 10 milliGRAM(s) Oral daily  warfarin 5 milliGRAM(s) Oral once    Vital Signs Last 24 Hrs  T(C): 36.8 (23 Apr 2021 10:43), Max: 36.9 (23 Apr 2021 04:34)  T(F): 98.2 (23 Apr 2021 10:43), Max: 98.4 (23 Apr 2021 04:34)  HR: 76 (23 Apr 2021 11:38) (74 - 103)  BP: 128/61 (23 Apr 2021 11:38) (126/60 - 152/68)  RR: 19 (23 Apr 2021 10:43) (18 - 19)  SpO2: 96% (23 Apr 2021 08:18) (94% - 98%)    PE:  Gen: AAO, NAD  HEENT:  sclera anicteric,  MMM  Neck:  no thyromegaly, no LAD  CV:  nl S1 + S2, RRR, no m/r/g  Resp:  nl respiratory effort, CTA b/l  GI/ Abd: soft, NT/ ND, BS +  Neuro:  No tremor   MS:  no c/c/e, nl muscle tone  Skin:  no acanthosis  Psych:  affect appropriate    LABS:  04-22    137  |  93<L>  |  57.0<H>  ----------------------------<  161<H>  3.7   |  32.0<H>  |  2.33<H>    Ca    8.8      22 Apr 2021 08:19  Mg     1.9     04-22                          9.5    17.63 )-----------( 261      ( 23 Apr 2021 08:26 )             31.7     CAPILLARY BLOOD GLUCOSE  POCT Blood Glucose.: 266 mg/dL (23 Apr 2021 11:38)  POCT Blood Glucose.: 247 mg/dL (23 Apr 2021 08:01)  POCT Blood Glucose.: 138 mg/dL (22 Apr 2021 22:00)  POCT Blood Glucose.: 235 mg/dL (22 Apr 2021 16:21)

## 2021-04-23 NOTE — PROGRESS NOTE ADULT - ASSESSMENT
80 yo female with Type 2 DM with likely underlying nephropathy, HTN, HLD, asthma admitted with dyspnea in setting of severe pulmonary HTN, asthma exacerbation, THERESA and pAfib.   She has developed mild hyperglycemia today.       1.  Type 2 DM-  Will increase Lantus to 12 units daily.  Continue Lispro 6 units with meals.  WOudl expect further BG improvement once prednisone is stopped.  Will need discharge on basal bolus insulin regimen.      2.  Secondary Hyperparathyroidism-  due to underlying renal disease, management per renal (on Calcitriol, ergocalciferol and Calcium acetate).  Calcium level has improved.    3.  Severe  Pulm HTN-  Continue diuretics as per cardiology  4.  Asthma-  steroids are being tapered.  5.  THERESA-  management as per Renal.    6.  Afib-  on heparin gtt being bridged to warfarin.

## 2021-04-23 NOTE — PROGRESS NOTE ADULT - SUBJECTIVE AND OBJECTIVE BOX
NEPHROLOGY INTERVAL HPI/OVERNIGHT EVENTS:    Examined  No distress  feeling better    MEDICATIONS  (STANDING):  ALBUTerol    0.083%. 2.5 milliGRAM(s) Nebulizer once  albuterol/ipratropium for Nebulization 3 milliLiter(s) Nebulizer every 6 hours  calcitriol   Capsule 0.25 MICROGram(s) Oral daily  calcium acetate 667 milliGRAM(s) Oral three times a day with meals  dextrose 40% Gel 15 Gram(s) Oral once  dextrose 5%. 1000 milliLiter(s) (50 mL/Hr) IV Continuous <Continuous>  dextrose 5%. 1000 milliLiter(s) (100 mL/Hr) IV Continuous <Continuous>  dextrose 50% Injectable 25 Gram(s) IV Push once  dextrose 50% Injectable 12.5 Gram(s) IV Push once  dextrose 50% Injectable 25 Gram(s) IV Push once  diltiazem    Tablet 60 milliGRAM(s) Oral every 6 hours  epoetin susie-epbx (RETACRIT) Injectable 62813 Unit(s) SubCutaneous <User Schedule>  ergocalciferol 22215 Unit(s) Oral <User Schedule>  folic acid 1 milliGRAM(s) Oral daily  furosemide    Tablet 40 milliGRAM(s) Oral two times a day  glucagon  Injectable 1 milliGRAM(s) IntraMuscular once  heparin  Infusion. 800 Unit(s)/Hr (8 mL/Hr) IV Continuous <Continuous>  hydrALAZINE 50 milliGRAM(s) Oral every 12 hours  insulin glargine Injectable (LANTUS) 12 Unit(s) SubCutaneous every morning  insulin lispro (ADMELOG) corrective regimen sliding scale   SubCutaneous at bedtime  insulin lispro (ADMELOG) corrective regimen sliding scale   SubCutaneous three times a day before meals  insulin lispro Injectable (ADMELOG) 6 Unit(s) SubCutaneous three times a day before meals  magnesium oxide 400 milliGRAM(s) Oral daily  melatonin 1 milliGRAM(s) Oral at bedtime  metoprolol tartrate 25 milliGRAM(s) Oral two times a day  pantoprazole    Tablet 40 milliGRAM(s) Oral before breakfast  potassium chloride    Tablet ER 30 milliEquivalent(s) Oral daily  predniSONE   Tablet   Oral   predniSONE   Tablet 10 milliGRAM(s) Oral daily  warfarin 5 milliGRAM(s) Oral once    MEDICATIONS  (PRN):  guaiFENesin   Syrup  (Sugar-Free) 100 milliGRAM(s) Oral every 6 hours PRN Cough  heparin   Injectable 5500 Unit(s) IV Push every 6 hours PRN For aPTT less than 40  heparin   Injectable 2500 Unit(s) IV Push every 6 hours PRN For aPTT between 40 - 57      Allergies    No Known Allergies    Intolerances        Vital Signs Last 24 Hrs  T(C): 36.8 (23 Apr 2021 10:43), Max: 36.9 (23 Apr 2021 04:34)  T(F): 98.2 (23 Apr 2021 10:43), Max: 98.4 (23 Apr 2021 04:34)  HR: 76 (23 Apr 2021 11:38) (74 - 100)  BP: 128/61 (23 Apr 2021 11:38) (126/60 - 152/68)  BP(mean): --  RR: 19 (23 Apr 2021 10:43) (18 - 19)  SpO2: 96% (23 Apr 2021 08:18) (94% - 98%)  Daily     Daily     PHYSICAL EXAM:  GENERAL: Comfortable  NECK: Supple, No JVD  NERVOUS SYSTEM:  Alert & Oriented X3  CHEST/LUNG: Diminished BS  HEART: RR no rub  ABDOMEN: Soft, Nontender, Nondistended; +BS  EXTREMITIES:  LE edema less      LABS:                        9.5    17.63 )-----------( 261      ( 23 Apr 2021 08:26 )             31.7     04-22    137  |  93<L>  |  57.0<H>  ----------------------------<  161<H>  3.7   |  32.0<H>  |  2.33<H>    Ca    8.8      22 Apr 2021 08:19  Mg     1.9     04-22      PT/INR - ( 23 Apr 2021 08:26 )   PT: 15.0 sec;   INR: 1.31 ratio         PTT - ( 23 Apr 2021 08:26 )  PTT:106.8 sec            RADIOLOGY & ADDITIONAL TESTS:

## 2021-04-24 ENCOUNTER — TRANSCRIPTION ENCOUNTER (OUTPATIENT)
Age: 81
End: 2021-04-24

## 2021-04-24 VITALS
RESPIRATION RATE: 19 BRPM | OXYGEN SATURATION: 96 % | HEART RATE: 76 BPM | DIASTOLIC BLOOD PRESSURE: 68 MMHG | TEMPERATURE: 98 F | SYSTOLIC BLOOD PRESSURE: 150 MMHG

## 2021-04-24 LAB
ANION GAP SERPL CALC-SCNC: 12 MMOL/L — SIGNIFICANT CHANGE UP (ref 5–17)
APTT BLD: 76.6 SEC — HIGH (ref 27.5–35.5)
APTT BLD: 93.5 SEC — HIGH (ref 27.5–35.5)
BUN SERPL-MCNC: 64 MG/DL — HIGH (ref 8–20)
CALCIUM SERPL-MCNC: 9.2 MG/DL — SIGNIFICANT CHANGE UP (ref 8.6–10.2)
CHLORIDE SERPL-SCNC: 91 MMOL/L — LOW (ref 98–107)
CO2 SERPL-SCNC: 33 MMOL/L — HIGH (ref 22–29)
CREAT SERPL-MCNC: 2.48 MG/DL — HIGH (ref 0.5–1.3)
GLUCOSE BLDC GLUCOMTR-MCNC: 259 MG/DL — HIGH (ref 70–99)
GLUCOSE BLDC GLUCOMTR-MCNC: 309 MG/DL — HIGH (ref 70–99)
GLUCOSE SERPL-MCNC: 257 MG/DL — HIGH (ref 70–99)
HCT VFR BLD CALC: 29 % — LOW (ref 34.5–45)
HGB BLD-MCNC: 8.7 G/DL — LOW (ref 11.5–15.5)
INR BLD: 2.16 RATIO — HIGH (ref 0.88–1.16)
MCHC RBC-ENTMCNC: 19.3 PG — LOW (ref 27–34)
MCHC RBC-ENTMCNC: 30 GM/DL — LOW (ref 32–36)
MCV RBC AUTO: 64.4 FL — LOW (ref 80–100)
PLATELET # BLD AUTO: 261 K/UL — SIGNIFICANT CHANGE UP (ref 150–400)
POTASSIUM SERPL-MCNC: 3.9 MMOL/L — SIGNIFICANT CHANGE UP (ref 3.5–5.3)
POTASSIUM SERPL-SCNC: 3.9 MMOL/L — SIGNIFICANT CHANGE UP (ref 3.5–5.3)
PROTHROM AB SERPL-ACNC: 24.2 SEC — HIGH (ref 10.6–13.6)
RBC # BLD: 4.5 M/UL — SIGNIFICANT CHANGE UP (ref 3.8–5.2)
RBC # FLD: 19.9 % — HIGH (ref 10.3–14.5)
SODIUM SERPL-SCNC: 136 MMOL/L — SIGNIFICANT CHANGE UP (ref 135–145)
WBC # BLD: 13.56 K/UL — HIGH (ref 3.8–10.5)
WBC # FLD AUTO: 13.56 K/UL — HIGH (ref 3.8–10.5)

## 2021-04-24 PROCEDURE — 86850 RBC ANTIBODY SCREEN: CPT

## 2021-04-24 PROCEDURE — 84300 ASSAY OF URINE SODIUM: CPT

## 2021-04-24 PROCEDURE — 86769 SARS-COV-2 COVID-19 ANTIBODY: CPT

## 2021-04-24 PROCEDURE — 99285 EMERGENCY DEPT VISIT HI MDM: CPT | Mod: 25

## 2021-04-24 PROCEDURE — 94799 UNLISTED PULMONARY SVC/PX: CPT

## 2021-04-24 PROCEDURE — C1769: CPT

## 2021-04-24 PROCEDURE — U0003: CPT

## 2021-04-24 PROCEDURE — 97163 PT EVAL HIGH COMPLEX 45 MIN: CPT

## 2021-04-24 PROCEDURE — 85027 COMPLETE CBC AUTOMATED: CPT

## 2021-04-24 PROCEDURE — 81001 URINALYSIS AUTO W/SCOPE: CPT

## 2021-04-24 PROCEDURE — 99239 HOSP IP/OBS DSCHRG MGMT >30: CPT

## 2021-04-24 PROCEDURE — 82306 VITAMIN D 25 HYDROXY: CPT

## 2021-04-24 PROCEDURE — 83735 ASSAY OF MAGNESIUM: CPT

## 2021-04-24 PROCEDURE — 83970 ASSAY OF PARATHORMONE: CPT

## 2021-04-24 PROCEDURE — 86334 IMMUNOFIX E-PHORESIS SERUM: CPT

## 2021-04-24 PROCEDURE — 83036 HEMOGLOBIN GLYCOSYLATED A1C: CPT

## 2021-04-24 PROCEDURE — 83516 IMMUNOASSAY NONANTIBODY: CPT

## 2021-04-24 PROCEDURE — 84466 ASSAY OF TRANSFERRIN: CPT

## 2021-04-24 PROCEDURE — 85025 COMPLETE CBC W/AUTO DIFF WBC: CPT

## 2021-04-24 PROCEDURE — 71250 CT THORAX DX C-: CPT

## 2021-04-24 PROCEDURE — 76775 US EXAM ABDO BACK WALL LIM: CPT

## 2021-04-24 PROCEDURE — 94640 AIRWAY INHALATION TREATMENT: CPT

## 2021-04-24 PROCEDURE — 80048 BASIC METABOLIC PNL TOTAL CA: CPT

## 2021-04-24 PROCEDURE — 82962 GLUCOSE BLOOD TEST: CPT

## 2021-04-24 PROCEDURE — 96374 THER/PROPH/DIAG INJ IV PUSH: CPT

## 2021-04-24 PROCEDURE — 83880 ASSAY OF NATRIURETIC PEPTIDE: CPT

## 2021-04-24 PROCEDURE — 93451 RIGHT HEART CATH: CPT

## 2021-04-24 PROCEDURE — 94760 N-INVAS EAR/PLS OXIMETRY 1: CPT

## 2021-04-24 PROCEDURE — 86900 BLOOD TYPING SEROLOGIC ABO: CPT

## 2021-04-24 PROCEDURE — 84100 ASSAY OF PHOSPHORUS: CPT

## 2021-04-24 PROCEDURE — 78580 LUNG PERFUSION IMAGING: CPT

## 2021-04-24 PROCEDURE — 85730 THROMBOPLASTIN TIME PARTIAL: CPT

## 2021-04-24 PROCEDURE — 93306 TTE W/DOPPLER COMPLETE: CPT

## 2021-04-24 PROCEDURE — 86901 BLOOD TYPING SEROLOGIC RH(D): CPT

## 2021-04-24 PROCEDURE — 86038 ANTINUCLEAR ANTIBODIES: CPT

## 2021-04-24 PROCEDURE — A9540: CPT

## 2021-04-24 PROCEDURE — 86431 RHEUMATOID FACTOR QUANT: CPT

## 2021-04-24 PROCEDURE — 82728 ASSAY OF FERRITIN: CPT

## 2021-04-24 PROCEDURE — 85610 PROTHROMBIN TIME: CPT

## 2021-04-24 PROCEDURE — 84484 ASSAY OF TROPONIN QUANT: CPT

## 2021-04-24 PROCEDURE — C1894: CPT

## 2021-04-24 PROCEDURE — 93970 EXTREMITY STUDY: CPT

## 2021-04-24 PROCEDURE — 87635 SARS-COV-2 COVID-19 AMP PRB: CPT

## 2021-04-24 PROCEDURE — 83550 IRON BINDING TEST: CPT

## 2021-04-24 PROCEDURE — 93005 ELECTROCARDIOGRAM TRACING: CPT

## 2021-04-24 PROCEDURE — 82570 ASSAY OF URINE CREATININE: CPT

## 2021-04-24 PROCEDURE — 80053 COMPREHEN METABOLIC PANEL: CPT

## 2021-04-24 PROCEDURE — 86160 COMPLEMENT ANTIGEN: CPT

## 2021-04-24 PROCEDURE — 86255 FLUORESCENT ANTIBODY SCREEN: CPT

## 2021-04-24 PROCEDURE — 82310 ASSAY OF CALCIUM: CPT

## 2021-04-24 PROCEDURE — 86235 NUCLEAR ANTIGEN ANTIBODY: CPT

## 2021-04-24 PROCEDURE — 84443 ASSAY THYROID STIM HORMONE: CPT

## 2021-04-24 PROCEDURE — 36415 COLL VENOUS BLD VENIPUNCTURE: CPT

## 2021-04-24 PROCEDURE — 71045 X-RAY EXAM CHEST 1 VIEW: CPT

## 2021-04-24 PROCEDURE — 83520 IMMUNOASSAY QUANT NOS NONAB: CPT

## 2021-04-24 PROCEDURE — 83540 ASSAY OF IRON: CPT

## 2021-04-24 PROCEDURE — 84156 ASSAY OF PROTEIN URINE: CPT

## 2021-04-24 PROCEDURE — 85379 FIBRIN DEGRADATION QUANT: CPT

## 2021-04-24 PROCEDURE — 86200 CCP ANTIBODY: CPT

## 2021-04-24 PROCEDURE — 96375 TX/PRO/DX INJ NEW DRUG ADDON: CPT

## 2021-04-24 PROCEDURE — C1889: CPT

## 2021-04-24 PROCEDURE — U0005: CPT

## 2021-04-24 RX ORDER — INSULIN LISPRO 100/ML
10 VIAL (ML) SUBCUTANEOUS
Qty: 0 | Refills: 0 | DISCHARGE

## 2021-04-24 RX ORDER — DILTIAZEM HCL 120 MG
1 CAPSULE, EXT RELEASE 24 HR ORAL
Qty: 14 | Refills: 0
Start: 2021-04-24 | End: 2021-05-07

## 2021-04-24 RX ORDER — HYDRALAZINE HCL 50 MG
1 TABLET ORAL
Qty: 0 | Refills: 0 | DISCHARGE

## 2021-04-24 RX ORDER — PANTOPRAZOLE SODIUM 20 MG/1
1 TABLET, DELAYED RELEASE ORAL
Qty: 14 | Refills: 0
Start: 2021-04-24 | End: 2021-05-07

## 2021-04-24 RX ORDER — INSULIN LISPRO 100/ML
6 VIAL (ML) SUBCUTANEOUS
Qty: 0 | Refills: 0 | DISCHARGE
Start: 2021-04-24

## 2021-04-24 RX ORDER — INSULIN DETEMIR 100/ML (3)
12 INSULIN PEN (ML) SUBCUTANEOUS
Qty: 0 | Refills: 0 | DISCHARGE
Start: 2021-04-24

## 2021-04-24 RX ORDER — METOPROLOL TARTRATE 50 MG
1 TABLET ORAL
Qty: 28 | Refills: 0
Start: 2021-04-24 | End: 2021-05-07

## 2021-04-24 RX ORDER — LANOLIN ALCOHOL/MO/W.PET/CERES
3 CREAM (GRAM) TOPICAL ONCE
Refills: 0 | Status: COMPLETED | OUTPATIENT
Start: 2021-04-24 | End: 2021-04-24

## 2021-04-24 RX ORDER — WARFARIN SODIUM 2.5 MG/1
1 TABLET ORAL
Qty: 7 | Refills: 0
Start: 2021-04-24 | End: 2021-04-30

## 2021-04-24 RX ORDER — MAGNESIUM OXIDE 400 MG ORAL TABLET 241.3 MG
1 TABLET ORAL
Qty: 14 | Refills: 0
Start: 2021-04-24 | End: 2021-05-07

## 2021-04-24 RX ORDER — CALCIUM ACETATE 667 MG
1 TABLET ORAL
Qty: 14 | Refills: 0
Start: 2021-04-24 | End: 2021-05-07

## 2021-04-24 RX ORDER — CALCITRIOL 0.5 UG/1
1 CAPSULE ORAL
Qty: 14 | Refills: 0
Start: 2021-04-24 | End: 2021-05-07

## 2021-04-24 RX ORDER — HYDRALAZINE HCL 50 MG
1 TABLET ORAL
Qty: 60 | Refills: 0
Start: 2021-04-24 | End: 2021-05-23

## 2021-04-24 RX ORDER — VALSARTAN 80 MG/1
1 TABLET ORAL
Qty: 0 | Refills: 0 | DISCHARGE

## 2021-04-24 RX ORDER — INSULIN DETEMIR 100/ML (3)
30 INSULIN PEN (ML) SUBCUTANEOUS
Qty: 0 | Refills: 0 | DISCHARGE

## 2021-04-24 RX ORDER — AMLODIPINE BESYLATE 2.5 MG/1
1 TABLET ORAL
Qty: 0 | Refills: 0 | DISCHARGE

## 2021-04-24 RX ORDER — DILTIAZEM HCL 120 MG
240 CAPSULE, EXT RELEASE 24 HR ORAL DAILY
Refills: 0 | Status: DISCONTINUED | OUTPATIENT
Start: 2021-04-24 | End: 2021-04-24

## 2021-04-24 RX ORDER — POTASSIUM CHLORIDE 20 MEQ
2 PACKET (EA) ORAL
Qty: 28 | Refills: 0
Start: 2021-04-24 | End: 2021-05-07

## 2021-04-24 RX ORDER — FUROSEMIDE 40 MG
1 TABLET ORAL
Qty: 28 | Refills: 0
Start: 2021-04-24 | End: 2021-05-07

## 2021-04-24 RX ADMIN — Medication 667 MILLIGRAM(S): at 11:25

## 2021-04-24 RX ADMIN — Medication 10 MILLIGRAM(S): at 05:56

## 2021-04-24 RX ADMIN — Medication 3 MILLIGRAM(S): at 01:35

## 2021-04-24 RX ADMIN — Medication 40 MILLIGRAM(S): at 05:56

## 2021-04-24 RX ADMIN — MAGNESIUM OXIDE 400 MG ORAL TABLET 400 MILLIGRAM(S): 241.3 TABLET ORAL at 11:24

## 2021-04-24 RX ADMIN — PANTOPRAZOLE SODIUM 40 MILLIGRAM(S): 20 TABLET, DELAYED RELEASE ORAL at 05:55

## 2021-04-24 RX ADMIN — Medication 667 MILLIGRAM(S): at 08:49

## 2021-04-24 RX ADMIN — Medication 8: at 08:02

## 2021-04-24 RX ADMIN — Medication 60 MILLIGRAM(S): at 05:55

## 2021-04-24 RX ADMIN — Medication 6 UNIT(S): at 08:01

## 2021-04-24 RX ADMIN — Medication 240 MILLIGRAM(S): at 10:03

## 2021-04-24 RX ADMIN — Medication 30 MILLIEQUIVALENT(S): at 11:24

## 2021-04-24 RX ADMIN — Medication 25 MILLIGRAM(S): at 11:24

## 2021-04-24 RX ADMIN — INSULIN GLARGINE 12 UNIT(S): 100 INJECTION, SOLUTION SUBCUTANEOUS at 08:48

## 2021-04-24 RX ADMIN — Medication 6 UNIT(S): at 11:25

## 2021-04-24 RX ADMIN — HEPARIN SODIUM 700 UNIT(S)/HR: 5000 INJECTION INTRAVENOUS; SUBCUTANEOUS at 08:26

## 2021-04-24 RX ADMIN — HEPARIN SODIUM 700 UNIT(S)/HR: 5000 INJECTION INTRAVENOUS; SUBCUTANEOUS at 02:13

## 2021-04-24 RX ADMIN — Medication 3 MILLILITER(S): at 08:25

## 2021-04-24 RX ADMIN — CALCITRIOL 0.25 MICROGRAM(S): 0.5 CAPSULE ORAL at 11:24

## 2021-04-24 RX ADMIN — Medication 3 MILLILITER(S): at 03:45

## 2021-04-24 RX ADMIN — Medication 6: at 11:25

## 2021-04-24 RX ADMIN — Medication 1 MILLIGRAM(S): at 11:24

## 2021-04-24 NOTE — PROGRESS NOTE ADULT - SUBJECTIVE AND OBJECTIVE BOX
NEPHROLOGY INTERVAL HPI/OVERNIGHT EVENTS:    Examined  No distress  feeling better  Less dyspnea    MEDICATIONS  (STANDING):  ALBUTerol    0.083%. 2.5 milliGRAM(s) Nebulizer once  albuterol/ipratropium for Nebulization 3 milliLiter(s) Nebulizer every 6 hours  calcitriol   Capsule 0.25 MICROGram(s) Oral daily  calcium acetate 667 milliGRAM(s) Oral three times a day with meals  dextrose 40% Gel 15 Gram(s) Oral once  dextrose 5%. 1000 milliLiter(s) (50 mL/Hr) IV Continuous <Continuous>  dextrose 5%. 1000 milliLiter(s) (100 mL/Hr) IV Continuous <Continuous>  dextrose 50% Injectable 25 Gram(s) IV Push once  dextrose 50% Injectable 12.5 Gram(s) IV Push once  dextrose 50% Injectable 25 Gram(s) IV Push once  diltiazem    milliGRAM(s) Oral daily  epoetin susie-epbx (RETACRIT) Injectable 22724 Unit(s) SubCutaneous <User Schedule>  ergocalciferol 58085 Unit(s) Oral <User Schedule>  folic acid 1 milliGRAM(s) Oral daily  furosemide    Tablet 40 milliGRAM(s) Oral two times a day  glucagon  Injectable 1 milliGRAM(s) IntraMuscular once  heparin  Infusion. 800 Unit(s)/Hr (8 mL/Hr) IV Continuous <Continuous>  hydrALAZINE 50 milliGRAM(s) Oral every 12 hours  insulin glargine Injectable (LANTUS) 12 Unit(s) SubCutaneous every morning  insulin lispro (ADMELOG) corrective regimen sliding scale   SubCutaneous at bedtime  insulin lispro (ADMELOG) corrective regimen sliding scale   SubCutaneous three times a day before meals  insulin lispro Injectable (ADMELOG) 6 Unit(s) SubCutaneous three times a day before meals  magnesium oxide 400 milliGRAM(s) Oral daily  melatonin 1 milliGRAM(s) Oral at bedtime  metoprolol tartrate 25 milliGRAM(s) Oral two times a day  pantoprazole    Tablet 40 milliGRAM(s) Oral before breakfast  potassium chloride    Tablet ER 30 milliEquivalent(s) Oral daily    MEDICATIONS  (PRN):  ALPRAZolam 0.25 milliGRAM(s) Oral every 8 hours PRN anxiety  guaiFENesin   Syrup  (Sugar-Free) 100 milliGRAM(s) Oral every 6 hours PRN Cough  heparin   Injectable 5500 Unit(s) IV Push every 6 hours PRN For aPTT less than 40  heparin   Injectable 2500 Unit(s) IV Push every 6 hours PRN For aPTT between 40 - 57      Allergies    No Known Allergies    Intolerances        Vital Signs Last 24 Hrs  T(C): 36.7 (24 Apr 2021 05:50), Max: 37.1 (23 Apr 2021 16:09)  T(F): 98.1 (24 Apr 2021 05:50), Max: 98.7 (23 Apr 2021 16:09)  HR: 76 (24 Apr 2021 08:26) (70 - 82)  BP: 154/53 (24 Apr 2021 05:50) (128/61 - 154/53)  BP(mean): --  RR: 18 (24 Apr 2021 05:50) (17 - 18)  SpO2: 96% (24 Apr 2021 08:26) (93% - 96%)  Daily     Daily     PHYSICAL EXAM:  GENERAL: Comfortable  NECK: Supple, No JVD  NERVOUS SYSTEM:  Alert & Oriented X3  CHEST/LUNG: Diminished BS  HEART: RR no rub  ABDOMEN: Soft, Nontender, Nondistended; +BS  EXTREMITIES:  LE edema less    LABS:                        9.5    17.63 )-----------( 261      ( 23 Apr 2021 08:26 )             31.7     04-24    136  |  91<L>  |  64.0<H>  ----------------------------<  257<H>  3.9   |  33.0<H>  |  2.48<H>    Ca    9.2      24 Apr 2021 07:16      PT/INR - ( 24 Apr 2021 07:16 )   PT: 24.2 sec;   INR: 2.16 ratio         PTT - ( 24 Apr 2021 07:16 )  PTT:93.5 sec            RADIOLOGY & ADDITIONAL TESTS:

## 2021-04-24 NOTE — DISCHARGE NOTE NURSING/CASE MANAGEMENT/SOCIAL WORK - PATIENT PORTAL LINK FT
You can access the FollowMyHealth Patient Portal offered by [facility] by registering at the following website: [email or web address]. By joining [facility]’s FollowMyHealth portal, you will also be able to view your health information using other applications (apps) compatible with our system.

## 2021-04-24 NOTE — PROGRESS NOTE ADULT - ASSESSMENT
THERESA: renal hypoperfusion due to diuretics in setting of new pulm HTN and pre load dependence  Cr 2.4--> 2.3--> 2.4 renal function slowly improving- suspect at new baseline  No hydronephrosis seen on sonogram  Elevated BUN also due to steroid effects  Nephrotic proteinuria (3,278 mg/24hr) likely DM  - avoid potential nephrotoxins  - Would cont Lasix 40 mg po Q 12  - monitor labs    Anemia: multifactorial  Tsat= 13%; Ferritin= 293  - cont IV Fe    RO:  - cont Ergo, Rocaltrol and Phoslo    If discharged should f/u w us in 1-2 weeks

## 2021-04-24 NOTE — PROGRESS NOTE ADULT - NSICDXPILOT_GEN_ALL_CORE
[place]

## 2021-04-24 NOTE — DISCHARGE NOTE NURSING/CASE MANAGEMENT/SOCIAL WORK - NSDCFUADDAPPT_GEN_ALL_CORE_FT
< from: TTE Echo Complete w/Doppler (11.16.17 @ 09:15) >     Summary:   1. Left ventricular ejection fraction, by visual estimation, is 70 to   75%.   2. Normal global left ventricular systolic function.   3. Mildly increased LV wall thickness.   4. Normal left ventricular internal cavity size.   5. Spectral Doppler shows impaired relaxation pattern of left   ventricular myocardial filling (Grade I diastolic dysfunction).   6. Trivial pericardial effusion.   7. Mild mitral annular calcification.   8. Estimated pulmonary artery systolic pressure is 37.8 mmHg assuming a   right atrial pressure of 3 mmHg, which is consistent with borderline   pulmonary hypertension.   9. Peak transaortic gradient equals 11.0 mmHg, mean transaortic gradient   equals 5.9 mmHg, the calculated aortic valve area equals 1.73 cm² by the   continuity equation consistent with mild aortic stenosis.    < end of copied text >

## 2021-04-24 NOTE — PROGRESS NOTE ADULT - PROVIDER SPECIALTY LIST ADULT
Cardiology
Hospitalist
Nephrology
Cardiology
Cardiology
Hospitalist
Hospitalist
Nephrology
Nephrology
Cardiology
Endocrinology
Hospitalist
Nephrology
Nephrology
Endocrinology
Nephrology
Endocrinology
Endocrinology
Hospitalist
Nephrology
Cardiology
Hospitalist
Pulmonology
Cardiology

## 2021-04-24 NOTE — PROGRESS NOTE ADULT - REASON FOR ADMISSION
SOB not improving with inhalers, cough and abnormal renal function test

## 2021-04-30 ENCOUNTER — EMERGENCY (EMERGENCY)
Facility: HOSPITAL | Age: 81
LOS: 1 days | Discharge: HOME | End: 2021-04-30
Attending: EMERGENCY MEDICINE
Payer: MEDICARE

## 2021-04-30 VITALS
SYSTOLIC BLOOD PRESSURE: 150 MMHG | HEART RATE: 67 BPM | HEIGHT: 60 IN | RESPIRATION RATE: 18 BRPM | WEIGHT: 147.93 LBS | DIASTOLIC BLOOD PRESSURE: 60 MMHG | OXYGEN SATURATION: 97 % | TEMPERATURE: 98 F

## 2021-04-30 LAB
ALBUMIN SERPL ELPH-MCNC: 3.2 G/DL — LOW (ref 3.3–5.2)
ALP SERPL-CCNC: 85 U/L — SIGNIFICANT CHANGE UP (ref 40–120)
ALT FLD-CCNC: 22 U/L — SIGNIFICANT CHANGE UP
ANION GAP SERPL CALC-SCNC: 12 MMOL/L — SIGNIFICANT CHANGE UP (ref 5–17)
ANISOCYTOSIS BLD QL: SLIGHT — SIGNIFICANT CHANGE UP
APTT BLD: 57.6 SEC — HIGH (ref 27.5–35.5)
AST SERPL-CCNC: 17 U/L — SIGNIFICANT CHANGE UP
BASOPHILS # BLD AUTO: 0.17 K/UL — SIGNIFICANT CHANGE UP (ref 0–0.2)
BASOPHILS NFR BLD AUTO: 2.6 % — HIGH (ref 0–2)
BILIRUB SERPL-MCNC: <0.2 MG/DL — LOW (ref 0.4–2)
BUN SERPL-MCNC: 72 MG/DL — HIGH (ref 8–20)
BURR CELLS BLD QL SMEAR: PRESENT — SIGNIFICANT CHANGE UP
CALCIUM SERPL-MCNC: 9 MG/DL — SIGNIFICANT CHANGE UP (ref 8.6–10.2)
CHLORIDE SERPL-SCNC: 98 MMOL/L — SIGNIFICANT CHANGE UP (ref 98–107)
CO2 SERPL-SCNC: 27 MMOL/L — SIGNIFICANT CHANGE UP (ref 22–29)
CREAT SERPL-MCNC: 2.88 MG/DL — HIGH (ref 0.5–1.3)
EOSINOPHIL # BLD AUTO: 0.28 K/UL — SIGNIFICANT CHANGE UP (ref 0–0.5)
EOSINOPHIL NFR BLD AUTO: 4.3 % — SIGNIFICANT CHANGE UP (ref 0–6)
GLUCOSE SERPL-MCNC: 158 MG/DL — HIGH (ref 70–99)
HCT VFR BLD CALC: 31.5 % — LOW (ref 34.5–45)
HGB BLD-MCNC: 9.4 G/DL — LOW (ref 11.5–15.5)
HYPOCHROMIA BLD QL: SLIGHT — SIGNIFICANT CHANGE UP
INR BLD: 4.35 RATIO — HIGH (ref 0.88–1.16)
LYMPHOCYTES # BLD AUTO: 0.51 K/UL — LOW (ref 1–3.3)
LYMPHOCYTES # BLD AUTO: 7.8 % — LOW (ref 13–44)
MANUAL SMEAR VERIFICATION: SIGNIFICANT CHANGE UP
MCHC RBC-ENTMCNC: 19.1 PG — LOW (ref 27–34)
MCHC RBC-ENTMCNC: 29.8 GM/DL — LOW (ref 32–36)
MCV RBC AUTO: 64.2 FL — LOW (ref 80–100)
MICROCYTES BLD QL: SLIGHT — SIGNIFICANT CHANGE UP
MONOCYTES # BLD AUTO: 0.46 K/UL — SIGNIFICANT CHANGE UP (ref 0–0.9)
MONOCYTES NFR BLD AUTO: 7 % — SIGNIFICANT CHANGE UP (ref 2–14)
NEUTROPHILS # BLD AUTO: 5.04 K/UL — SIGNIFICANT CHANGE UP (ref 1.8–7.4)
NEUTROPHILS NFR BLD AUTO: 77.4 % — HIGH (ref 43–77)
PLAT MORPH BLD: NORMAL — SIGNIFICANT CHANGE UP
PLATELET # BLD AUTO: 192 K/UL — SIGNIFICANT CHANGE UP (ref 150–400)
POIKILOCYTOSIS BLD QL AUTO: SLIGHT — SIGNIFICANT CHANGE UP
POTASSIUM SERPL-MCNC: 4 MMOL/L — SIGNIFICANT CHANGE UP (ref 3.5–5.3)
POTASSIUM SERPL-SCNC: 4 MMOL/L — SIGNIFICANT CHANGE UP (ref 3.5–5.3)
PROT SERPL-MCNC: 5.6 G/DL — LOW (ref 6.6–8.7)
PROTHROM AB SERPL-ACNC: 47 SEC — HIGH (ref 10.6–13.6)
RBC # BLD: 4.91 M/UL — SIGNIFICANT CHANGE UP (ref 3.8–5.2)
RBC # FLD: 20.7 % — HIGH (ref 10.3–14.5)
RBC BLD AUTO: ABNORMAL
SCHISTOCYTES BLD QL AUTO: SLIGHT — SIGNIFICANT CHANGE UP
SODIUM SERPL-SCNC: 137 MMOL/L — SIGNIFICANT CHANGE UP (ref 135–145)
TARGETS BLD QL SMEAR: SLIGHT — SIGNIFICANT CHANGE UP
TROPONIN T SERPL-MCNC: 0.03 NG/ML — SIGNIFICANT CHANGE UP (ref 0–0.06)
VARIANT LYMPHS # BLD: 0.9 % — SIGNIFICANT CHANGE UP (ref 0–6)
WBC # BLD: 6.51 K/UL — SIGNIFICANT CHANGE UP (ref 3.8–10.5)
WBC # FLD AUTO: 6.51 K/UL — SIGNIFICANT CHANGE UP (ref 3.8–10.5)

## 2021-04-30 PROCEDURE — 94640 AIRWAY INHALATION TREATMENT: CPT

## 2021-04-30 PROCEDURE — 36415 COLL VENOUS BLD VENIPUNCTURE: CPT

## 2021-04-30 PROCEDURE — 84484 ASSAY OF TROPONIN QUANT: CPT

## 2021-04-30 PROCEDURE — 85025 COMPLETE CBC W/AUTO DIFF WBC: CPT

## 2021-04-30 PROCEDURE — 99284 EMERGENCY DEPT VISIT MOD MDM: CPT | Mod: 25

## 2021-04-30 PROCEDURE — 71046 X-RAY EXAM CHEST 2 VIEWS: CPT | Mod: 26

## 2021-04-30 PROCEDURE — 85730 THROMBOPLASTIN TIME PARTIAL: CPT

## 2021-04-30 PROCEDURE — 99285 EMERGENCY DEPT VISIT HI MDM: CPT

## 2021-04-30 PROCEDURE — 71046 X-RAY EXAM CHEST 2 VIEWS: CPT

## 2021-04-30 PROCEDURE — 93010 ELECTROCARDIOGRAM REPORT: CPT

## 2021-04-30 PROCEDURE — 80053 COMPREHEN METABOLIC PANEL: CPT

## 2021-04-30 PROCEDURE — 93005 ELECTROCARDIOGRAM TRACING: CPT

## 2021-04-30 PROCEDURE — 85610 PROTHROMBIN TIME: CPT

## 2021-04-30 PROCEDURE — 99053 MED SERV 10PM-8AM 24 HR FAC: CPT

## 2021-04-30 RX ORDER — IPRATROPIUM/ALBUTEROL SULFATE 18-103MCG
3 AEROSOL WITH ADAPTER (GRAM) INHALATION
Refills: 0 | Status: COMPLETED | OUTPATIENT
Start: 2021-04-30 | End: 2021-04-30

## 2021-04-30 RX ADMIN — Medication 3 MILLILITER(S): at 02:25

## 2021-04-30 RX ADMIN — Medication 3 MILLILITER(S): at 02:23

## 2021-04-30 RX ADMIN — Medication 3 MILLILITER(S): at 02:20

## 2021-04-30 NOTE — ED PROVIDER NOTE - PROGRESS NOTE DETAILS
Pt re-assessed at this time, feeling well, noting improvement in symptoms. Lungs cta b/l. No hypoxia. VSS,. All results reviewed with pt, all questions answered. Cr slightly above baseline, 2.88. INR supratherapeutic at 4.35. Son states warfarin dose was just decreased and is scheduled for INR check on Saturday. Pt provided copy of all results and instructed to f/u with PMD as scheduled this weekend. States she has nebulizer and medications at home, does not need refills. Return precautions given. Stable for dc.

## 2021-04-30 NOTE — ED ADULT TRIAGE NOTE - CHIEF COMPLAINT QUOTE
patient states that she has HX of asthma, complaining of SOB and chest discomfort  used an inhaler without relief, was recently discharged

## 2021-04-30 NOTE — ED ADULT NURSE NOTE - NS ED NURSE LEVEL OF CONSCIOUSNESS ORIENTATION
Purpose of the visit was to evaluate for: goals of care/advanced care planning, pain/symptom management, withdrawal of interventions and comfort care. Spoke with RN as well as family (Andrew, son, and granddaughter, Mary) and discussed palliative care, goals of care, resuscitation status and [facility].      Assessment:  Patient is palliative care appropriate for inpatient care given (list diagnosis/symptoms):  PPS 40%, Hip fx and pain with any movement, a. Fib, HX MI.  Son, Andrew, reports HX of stroke, lupus, and vertebrae fractures with kyphoplasty.  Patient drowsy presently and unable to participate in conversation.  Unable to assess orientation status.          Recommendations/Plan: transfer to [facility] for palliative care for assessment and symptom management prn.  Initiate palliative care.   We will have a bed for patient on 4P today.    Other Comments: Son states she is not eating and would not want tube feeds, and she would want nothing to sustain life.  Son agrees po medications can be streamlined, and if she can take po medications that is ok, but if she refuses or unable to take po medications then we can stop them.  Suggested the son talk with Dr. Ledbetter to review po medications.  I asked if he would like IVFs, and he said not if they would cause harm.  I did explain that IVFs do not always cause harm, and he reiterated he just wanted comfort care.  RN and son reports the patient is refusing po medications.  Son reports when he told her they were going to let her be comfortable, she said thank you.  DC plans are to work with hospice and admit HSB or dc home with hospice if she is unable to stay here.      Thank you  JOSEFINA MCINTOSHN RN CHPN   Oriented - self; Oriented - place; Oriented - time

## 2021-04-30 NOTE — ED PROVIDER NOTE - ATTENDING CONTRIBUTION TO CARE
Recent admission for asthma and THERESA, tonight with recurrent dyspnea but did not use nebs at home. Improved with ED treatment, comfortable work of breathing, no hypoxia, labs stable from prior. Has good outpatient follow up arranged.

## 2021-04-30 NOTE — ED PROVIDER NOTE - PATIENT PORTAL LINK FT
You can access the FollowMyHealth Patient Portal offered by [facility] by registering at the following website: [email or web address]. By joining [facility]s FollowMyHealth portal, you will also be able to view your health information using other applications (apps) compatible with our system.

## 2021-04-30 NOTE — ED PROVIDER NOTE - NSFOLLOWUPINSTRUCTIONS_ED_ALL_ED_FT
- Follow up with your doctor on Saturday for INR check  - Return to the ED for any new or worsening symptoms.  - You may use your nebulizer machine as needed for shortness of breath 1-2 treatments every 6 hours     Asthma    Asthma is a condition in which the airways tighten and narrow, making it difficult to breath. Asthma episodes, also called asthma attacks, range from minor to life-threatening. Symptoms include wheezing, coughing, chest tightness, or shortness of breath. The diagnosis of asthma is made by a review of your medical history and a physical exam, but may involve additional testing. Asthma cannot be cured, but medicines and lifestyle changes can help control it. Avoid triggers of asthma which may include animal dander, pollen, mold, smoke, air pollutants, etc.     SEEK IMMEDIATE MEDICAL CARE IF YOU HAVE ANY OF THE FOLLOWING SYMPTOMS: worsening of symptoms, shortness of breath at rest, chest pain, bluish discoloration to lips or fingertips, lightheadedness/dizziness, or fever.

## 2021-04-30 NOTE — ED ADULT NURSE NOTE - OBJECTIVE STATEMENT
Pt. A&ox3. Pt. placed on cardiac monitor and continuos pulse ox. Pt. in NSR. Pt. respirations even and unlabored. Pt. O2 sat above 95%. Pt complaining of SOB and chest pain Pt. has hx of asthma. Pt. used inhaler at home with no relief.

## 2021-04-30 NOTE — ED ADULT NURSE NOTE - NSFALLRSKOUTCOME_ED_ALL_ED
Initiate Treatment: hydrocortisone 2.5 % topical cream BID\\nSig: Apply to affected areas around eyes bid x 2 weeks Detail Level: Zone Render In Strict Bullet Format?: No Plan to get approval for patch testing Continue Regimen: Vanicream moisturizing cream after applying hydrocortisone cream Universal Safety Interventions

## 2021-04-30 NOTE — ED PROVIDER NOTE - PHYSICAL EXAMINATION
Gen: No acute distress, non toxic  HEENT: Mucous membranes moist, pink conjunctivae, EOMI  CV: RRR, nl s1/s2.  Resp: Mild diffuse expiratory wheezes, no resp distress. O2 97% on RA  GI: Abdomen soft, NT, ND. No rebound, no guarding  : No CVAT  Neuro: A&O x 3, moving all 4 extremities  MSK: No spine or joint tenderness to palpation  Skin: No rashes. intact and perfused.

## 2021-04-30 NOTE — ED ADULT TRIAGE NOTE - BP NONINVASIVE DIASTOLIC (MM HG)
60 Spoke to patient and let her know we have sooner appointment for Tomorrow  the 24th with Kate  Patient stated she cant come in  because she works   Per Dr VELASQUEZ

## 2021-04-30 NOTE — ED PROVIDER NOTE - CLINICAL SUMMARY MEDICAL DECISION MAKING FREE TEXT BOX
80yo F with sob, wheezing onset today. Recent long admission for asthma in [facility]. No resp distress, no hypoxia. Will check labs including troponin, cxr and give duonebs. Will defer giving steroids at this time given pt's DMII and recent admission with iv steroid tx

## 2021-04-30 NOTE — ED PROVIDER NOTE - OBJECTIVE STATEMENT
82yo F pmhx HTN, HLD, DMII, asthma presents to ED c/o sob onset today. Pt was recently admitted for 2 weeks for asthma exacerbation. States she has been doing well at home since discharge but this evening began experiencing worsening shortness of breath. States symptoms feel like her asthma. Used ventolin inhaler without improvement. Also has nebulizer machine at home but did not use prior to arrival. Denies fever, sick contacts, cough, chest pain, palpitations, diaphoresis, nausea, vomiting, abdominal pain, dizziness.

## 2021-05-03 ENCOUNTER — APPOINTMENT (OUTPATIENT)
Dept: CARDIOLOGY | Facility: CLINIC | Age: 81
End: 2021-05-03
Payer: MEDICARE

## 2021-05-03 ENCOUNTER — NON-APPOINTMENT (OUTPATIENT)
Age: 81
End: 2021-05-03

## 2021-05-03 ENCOUNTER — APPOINTMENT (OUTPATIENT)
Dept: PULMONOLOGY | Facility: CLINIC | Age: 81
End: 2021-05-03
Payer: MEDICARE

## 2021-05-03 VITALS
HEART RATE: 67 BPM | SYSTOLIC BLOOD PRESSURE: 120 MMHG | DIASTOLIC BLOOD PRESSURE: 50 MMHG | TEMPERATURE: 97.3 F | WEIGHT: 129 LBS | RESPIRATION RATE: 16 BRPM | OXYGEN SATURATION: 97 % | HEIGHT: 59 IN | BODY MASS INDEX: 26 KG/M2

## 2021-05-03 VITALS
HEIGHT: 59 IN | SYSTOLIC BLOOD PRESSURE: 120 MMHG | HEART RATE: 77 BPM | TEMPERATURE: 99 F | DIASTOLIC BLOOD PRESSURE: 50 MMHG | OXYGEN SATURATION: 98 % | BODY MASS INDEX: 26 KG/M2 | WEIGHT: 129 LBS

## 2021-05-03 DIAGNOSIS — I48.91 UNSPECIFIED ATRIAL FIBRILLATION: ICD-10-CM

## 2021-05-03 DIAGNOSIS — K21.9 GASTRO-ESOPHAGEAL REFLUX DISEASE W/OUT ESOPHAGITIS: ICD-10-CM

## 2021-05-03 PROCEDURE — 99072 ADDL SUPL MATRL&STAF TM PHE: CPT

## 2021-05-03 PROCEDURE — 93000 ELECTROCARDIOGRAM COMPLETE: CPT

## 2021-05-03 PROCEDURE — 99213 OFFICE O/P EST LOW 20 MIN: CPT

## 2021-05-03 PROCEDURE — 99214 OFFICE O/P EST MOD 30 MIN: CPT

## 2021-05-03 RX ORDER — WARFARIN 4 MG/1
4 TABLET ORAL
Qty: 30 | Refills: 0 | Status: DISCONTINUED | COMMUNITY
Start: 2021-04-26

## 2021-05-03 RX ORDER — DILTIAZEM HYDROCHLORIDE 240 MG/1
240 CAPSULE, EXTENDED RELEASE ORAL
Qty: 14 | Refills: 0 | Status: DISCONTINUED | COMMUNITY
Start: 2021-04-24

## 2021-05-03 RX ORDER — FLUTICASONE PROPION/SALMETEROL 250-50 MCG
250-50 BLISTER, WITH INHALATION DEVICE INHALATION TWICE DAILY
Refills: 0 | Status: DISCONTINUED | COMMUNITY
End: 2021-05-03

## 2021-05-03 RX ORDER — ALBUTEROL 90 MCG
AEROSOL (GRAM) INHALATION
Refills: 0 | Status: DISCONTINUED | COMMUNITY
End: 2021-05-03

## 2021-05-03 RX ORDER — WARFARIN 3 MG/1
3 TABLET ORAL DAILY
Refills: 0 | Status: ACTIVE | COMMUNITY
Start: 2021-05-03

## 2021-05-03 RX ORDER — POTASSIUM CHLORIDE 750 MG/1
10 TABLET, FILM COATED, EXTENDED RELEASE ORAL
Qty: 42 | Refills: 0 | Status: DISCONTINUED | COMMUNITY
Start: 2021-04-24

## 2021-05-03 RX ORDER — CALCITRIOL 0.25 UG/1
0.25 CAPSULE, LIQUID FILLED ORAL DAILY
Refills: 0 | Status: ACTIVE | COMMUNITY
Start: 2021-05-03

## 2021-05-03 RX ORDER — DILTIAZEM HYDROCHLORIDE 120 MG/1
120 CAPSULE, EXTENDED RELEASE ORAL
Qty: 90 | Refills: 0 | Status: DISCONTINUED | COMMUNITY
Start: 2020-12-01

## 2021-05-03 RX ORDER — PANTOPRAZOLE 40 MG/1
40 TABLET, DELAYED RELEASE ORAL DAILY
Qty: 90 | Refills: 0 | Status: ACTIVE | COMMUNITY
Start: 2021-05-03

## 2021-05-03 RX ORDER — PANTOPRAZOLE 40 MG/1
40 TABLET, DELAYED RELEASE ORAL
Qty: 14 | Refills: 0 | Status: DISCONTINUED | COMMUNITY
Start: 2021-04-24

## 2021-05-03 RX ORDER — ALBUTEROL SULFATE 90 UG/1
108 (90 BASE) INHALANT RESPIRATORY (INHALATION)
Qty: 18 | Refills: 0 | Status: DISCONTINUED | COMMUNITY
Start: 2021-04-24

## 2021-05-03 RX ORDER — HYDRALAZINE HYDROCHLORIDE 50 MG/1
50 TABLET ORAL
Qty: 180 | Refills: 0 | Status: DISCONTINUED | COMMUNITY
Start: 2021-03-10

## 2021-05-03 RX ORDER — MAGNESIUM OXIDE 241.3 MG/1000MG
400 TABLET ORAL DAILY
Refills: 0 | Status: ACTIVE | COMMUNITY
Start: 2021-05-03

## 2021-05-03 RX ORDER — CHROMIUM 200 MCG
TABLET ORAL
Refills: 0 | Status: DISCONTINUED | COMMUNITY
End: 2021-05-03

## 2021-05-03 RX ORDER — WARFARIN 5 MG/1
5 TABLET ORAL
Qty: 7 | Refills: 0 | Status: DISCONTINUED | COMMUNITY
Start: 2021-04-24

## 2021-05-03 RX ORDER — POTASSIUM CHLORIDE 750 MG/1
10 TABLET, FILM COATED, EXTENDED RELEASE ORAL DAILY
Refills: 0 | Status: ACTIVE | COMMUNITY
Start: 2021-05-03

## 2021-05-03 RX ORDER — IRBESARTAN 300 MG/1
300 TABLET ORAL
Qty: 90 | Refills: 0 | Status: DISCONTINUED | COMMUNITY
Start: 2021-02-23

## 2021-05-03 RX ORDER — HYDROCHLOROTHIAZIDE 12.5 MG/1
TABLET ORAL
Refills: 0 | Status: DISCONTINUED | COMMUNITY
End: 2021-05-03

## 2021-05-03 RX ORDER — OMEPRAZOLE 10 MG/1
10 CAPSULE, DELAYED RELEASE ORAL AS DIRECTED
Refills: 0 | Status: DISCONTINUED | COMMUNITY
End: 2021-05-03

## 2021-05-03 RX ORDER — AZITHROMYCIN 250 MG/1
250 TABLET, FILM COATED ORAL
Qty: 6 | Refills: 0 | Status: DISCONTINUED | COMMUNITY
Start: 2021-03-25

## 2021-05-03 RX ORDER — ROSUVASTATIN CALCIUM 5 MG/1
5 TABLET, FILM COATED ORAL
Qty: 90 | Refills: 1 | Status: DISCONTINUED | COMMUNITY
End: 2021-05-03

## 2021-05-03 RX ORDER — AMLODIPINE BESYLATE 10 MG/1
10 TABLET ORAL DAILY
Qty: 90 | Refills: 3 | Status: DISCONTINUED | COMMUNITY
End: 2021-05-03

## 2021-05-03 RX ORDER — FUROSEMIDE 40 MG/1
40 TABLET ORAL
Qty: 28 | Refills: 0 | Status: DISCONTINUED | COMMUNITY
Start: 2021-04-24

## 2021-05-03 RX ORDER — METFORMIN ER 500 MG 500 MG/1
500 TABLET ORAL
Qty: 360 | Refills: 0 | Status: DISCONTINUED | COMMUNITY
Start: 2020-11-02

## 2021-05-03 RX ORDER — METHYLPREDNISOLONE 4 MG/1
4 TABLET ORAL
Qty: 1 | Refills: 0 | Status: DISCONTINUED | COMMUNITY
Start: 2020-10-28 | End: 2021-05-03

## 2021-05-03 RX ORDER — FLASH GLUCOSE SENSOR
KIT MISCELLANEOUS
Qty: 2 | Refills: 0 | Status: DISCONTINUED | COMMUNITY
Start: 2021-04-27

## 2021-05-03 RX ORDER — CEFUROXIME AXETIL 500 MG/1
500 TABLET ORAL
Qty: 14 | Refills: 0 | Status: DISCONTINUED | COMMUNITY
Start: 2020-10-28 | End: 2021-05-03

## 2021-05-03 RX ORDER — ROSUVASTATIN CALCIUM 20 MG/1
20 TABLET, FILM COATED ORAL DAILY
Refills: 0 | Status: ACTIVE | COMMUNITY
Start: 2021-05-03

## 2021-05-03 RX ORDER — METFORMIN HYDROCHLORIDE 500 MG/1
500 TABLET, COATED ORAL
Qty: 180 | Refills: 0 | Status: DISCONTINUED | COMMUNITY
End: 2021-05-03

## 2021-05-03 RX ORDER — INSULIN DETEMIR 100 [IU]/ML
100 INJECTION, SOLUTION SUBCUTANEOUS
Qty: 30 | Refills: 0 | Status: DISCONTINUED | COMMUNITY
Start: 2021-04-01

## 2021-05-03 RX ORDER — CALCIUM ACETATE 667 MG/1
667 TABLET ORAL
Qty: 42 | Refills: 0 | Status: DISCONTINUED | COMMUNITY
Start: 2021-04-24

## 2021-05-03 RX ORDER — HYALURONATE SODIUM 20 MG/2 ML
20 SYRINGE (ML) INTRAARTICULAR
Qty: 12 | Refills: 0 | Status: DISCONTINUED | OUTPATIENT
Start: 2019-04-22 | End: 2021-05-03

## 2021-05-03 RX ORDER — SYRINGE AND NEEDLE,INSULIN,1ML 31 GX5/16"
31G X 5/16" SYRINGE, EMPTY DISPOSABLE MISCELLANEOUS
Qty: 200 | Refills: 0 | Status: DISCONTINUED | COMMUNITY
Start: 2021-04-13

## 2021-05-03 RX ORDER — ACLIDINIUM BROMIDE 400 UG/1
400 POWDER, METERED RESPIRATORY (INHALATION)
Refills: 0 | Status: DISCONTINUED | COMMUNITY
End: 2021-05-03

## 2021-05-03 RX ORDER — ALBUTEROL SULFATE 2.5 MG/3ML
(2.5 MG/3ML) SOLUTION RESPIRATORY (INHALATION)
Qty: 150 | Refills: 0 | Status: DISCONTINUED | COMMUNITY
Start: 2021-04-13

## 2021-05-03 RX ORDER — VALSARTAN 40 MG/1
TABLET ORAL
Refills: 0 | Status: DISCONTINUED | COMMUNITY
End: 2021-05-03

## 2021-05-03 RX ORDER — INSULIN ASPART 100 [IU]/ML
100 INJECTION, SOLUTION INTRAVENOUS; SUBCUTANEOUS
Qty: 10 | Refills: 0 | Status: DISCONTINUED | COMMUNITY
Start: 2021-04-17

## 2021-05-03 RX ORDER — FUROSEMIDE 20 MG/1
20 TABLET ORAL
Qty: 30 | Refills: 0 | Status: DISCONTINUED | COMMUNITY
Start: 2021-01-04

## 2021-05-03 RX ORDER — CALCIUM ACETATE 667 MG
667 TABLET ORAL 3 TIMES DAILY
Refills: 0 | Status: ACTIVE | COMMUNITY
Start: 2021-05-03

## 2021-05-03 RX ORDER — SEMAGLUTIDE 1.34 MG/ML
2 INJECTION, SOLUTION SUBCUTANEOUS
Qty: 2 | Refills: 0 | Status: DISCONTINUED | COMMUNITY
Start: 2021-02-03

## 2021-05-03 RX ORDER — FLUTICASONE PROPIONATE 50 UG/1
50 SPRAY, METERED NASAL
Refills: 0 | Status: DISCONTINUED | COMMUNITY
End: 2021-05-03

## 2021-05-03 RX ORDER — GABAPENTIN 300 MG/1
300 CAPSULE ORAL
Refills: 0 | Status: DISCONTINUED | COMMUNITY
End: 2021-05-03

## 2021-05-03 RX ORDER — MONTELUKAST 10 MG/1
TABLET, FILM COATED ORAL
Refills: 0 | Status: DISCONTINUED | COMMUNITY
End: 2021-05-03

## 2021-05-03 RX ORDER — AMLODIPINE BESYLATE 5 MG/1
5 TABLET ORAL
Qty: 90 | Refills: 0 | Status: DISCONTINUED | COMMUNITY
Start: 2021-01-04

## 2021-05-03 RX ORDER — INSULIN DETEMIR 100 [IU]/ML
100 INJECTION, SOLUTION SUBCUTANEOUS AT BEDTIME
Refills: 0 | Status: ACTIVE | COMMUNITY
Start: 2021-05-03

## 2021-05-03 RX ORDER — CALCITRIOL 0.25 UG/1
0.25 CAPSULE, LIQUID FILLED ORAL
Qty: 14 | Refills: 0 | Status: DISCONTINUED | COMMUNITY
Start: 2021-04-24

## 2021-05-03 RX ORDER — GLIMEPIRIDE 4 MG/1
4 TABLET ORAL
Qty: 180 | Refills: 0 | Status: DISCONTINUED | COMMUNITY
Start: 2020-11-02

## 2021-05-03 RX ORDER — DILTIAZEM HYDROCHLORIDE 240 MG/1
240 CAPSULE, EXTENDED RELEASE ORAL
Refills: 0 | Status: ACTIVE | COMMUNITY
Start: 2021-05-03

## 2021-05-03 RX ORDER — ROSUVASTATIN CALCIUM 20 MG/1
20 TABLET, FILM COATED ORAL
Qty: 90 | Refills: 0 | Status: DISCONTINUED | COMMUNITY
Start: 2021-04-05

## 2021-05-03 RX ORDER — METOPROLOL TARTRATE 25 MG/1
25 TABLET, FILM COATED ORAL
Qty: 28 | Refills: 0 | Status: DISCONTINUED | COMMUNITY
Start: 2021-04-24

## 2021-05-03 RX ORDER — ALENDRONATE SODIUM 35 MG/1
TABLET ORAL
Refills: 0 | Status: DISCONTINUED | COMMUNITY
End: 2021-05-03

## 2021-05-03 RX ORDER — INSULIN LISPRO 100 [IU]/ML
100 INJECTION, SOLUTION INTRAVENOUS; SUBCUTANEOUS
Refills: 0 | Status: ACTIVE | COMMUNITY

## 2021-05-03 NOTE — HISTORY OF PRESENT ILLNESS
[FreeTextEntry1] : 82 y/o female with PMH of HTN, HLD, bronchiectasis, asthma, pulmonary HTN, T2DM, PAF and CKD who reports to the office for the first time for hospital discharge f/u. She was in [facility] last 4/11-24 for shortness of breath. She was diuresed, steroids and antibiotics given. RHC revealed wedge pressure = 21, PA mean = 32.\par \par Today. she remains with shortness of breath with minimal exertion. Denies chest pain, palpitations, syncope or near syncope. Uses 2 pillows (under the head) when sleeping. Compliant with medications. No bleeding episodes from AC. Uses wheelchair with long distance ambulation due to SOB. INR is being monitored by her PCP. Has trace ankle edema.

## 2021-05-03 NOTE — REVIEW OF SYSTEMS
[Cough] : cough [Sputum] : sputum [Dyspnea] : dyspnea [Wheezing] : wheezing [Arthralgias] : arthralgias [Diabetes] : diabetes [Negative] : Psychiatric

## 2021-05-03 NOTE — HISTORY OF PRESENT ILLNESS
[TextBox_4] : 10/28/20\par Patient last seen here almost 3 years ago where she was seen related to a history of bronciectasis.  She presents back here for recent complaints of increased cough, yellow sputum and shortness of breath.  She remains on Advair, Tudorza and PRN albuterol.  There is no fever.  No other treatment or interventions have been given.  A chest xray done (report only) notes chronic changes.  Since last visit patient reports no new changes, hospitalizations or diagnoses.\par \par 5/3/21\par 82 y/o female with PMH of HTN, HLD, bronchiectasis, asthma, pulmonary HTN, T2DM, PAF and CKD who reports to the office for the first time for hospital discharge f/u. She was in [facility] last 4/11-24/21 for shortness of breath. She was diuresed, steroids and antibiotics given. RHC revealed wedge pressure = 21, PA mean = 32.\par CT Chest showed bibasilar subsegmental atelectasis (right greater than left) with mild \par bilateral lower lobe bronchiectasis. Small right and trace left pleural \par effusion. Started on IV steroids. PO antibiotics, Nebs q4. Tapered off \par supplemental oxygen.\par \par Remains with shortness of breath with minimal exertion. Denies chest pain, palpitations, syncope or near syncope. Uses 2 pillows (under the head) when sleeping. Compliant with medications. No bleeding episodes from AC. Uses wheelchair with long distance ambulation due to SOB. NR is being monitored by her PCP. Has trace ankle edema. \par \par \par Remains with shortness of breath with minimal exertion. Denies chest pain, palpitations, syncope or near syncope. Uses 2 pillows (under the head) when sleeping. Compliant with medications. No bleeding episodes from AC. Uses wheelchair with long distance ambulation due to SOB. NR is being monitored by her PCP. Has trace ankle edema.

## 2021-05-03 NOTE — PHYSICAL EXAM
[No Acute Distress] : no acute distress [Normal Conjunctiva] : normal conjunctiva [Normal S1, S2] : normal S1, S2 [Murmur] : murmur [Soft] : abdomen soft [Moves all extremities] : moves all extremities [Alert and Oriented] : alert and oriented [de-identified] : deferred due to Covid 19 pandemic; patient is wearing mask [de-identified] : 2/6 dm [de-identified] : scattered crackles [de-identified] : trace ankle edema

## 2021-05-03 NOTE — ASSESSMENT
[FreeTextEntry1] : Inactive bronchiectasis\par Labeled asthma with no documentation\par NESS\par CKD\par Elevated PCW\par Pulmonary HTN

## 2021-05-03 NOTE — DISCUSSION/SUMMARY
[Patient] : the patient [Risks] : risks [Benefits] : benefits [___ Month(s)] : in [unfilled] month(s) [With ___] : with [unfilled] [FreeTextEntry1] : 80 y/o female with PMH of HTN, HLD, bronchiectasis, asthma, pulmonary HTN, T2DM, PAF and CKD who reports to the office for the first time for hospital discharge f/u. She was in [facility] last 4/11-24 for shortness of breath. She was diuresed, steroids and antibiotics given. RHC revealed wedge pressure = 21, PA mean = 32. Today. she remains with shortness of breath with minimal exertion. Denies chest pain, palpitations, syncope or near syncope. Uses 2 pillows (under the head) when sleeping. Compliant with medications. No bleeding episodes from AC. Uses wheelchair with long distance ambulation due to SOB. INR is being monitored by her PCP. Has trace ankle edema. \par \par A/P:\par \par 1. Pulmonary HTN - PE excluded on V/Q scan. Echo with EF 70-75%, no significant diastolic dysfunction. RHC with wedge of 23. On Lasix 40 mg BID. Continue diltiazem\par 2. PAF - SR on EKG. On Coumadin (INR is being monitored by her PCP), CCB\par 3. Bronchiectasis/asthma - f/u with Pulmonary Medicine\par 4. Normotensive\par 5. CKD - f/u with Nephrology \par 6. f/u with Dr. Bennett in 1 month or sooner if needed\par \par Janes Alcarazon, NP

## 2021-05-09 NOTE — ED ADULT NURSE NOTE - CADM POA URETHRAL CATHETER
Updated son Deondre Massey about current medical status and plan of care  All questions answered to his satisfaction  No

## 2021-05-15 ENCOUNTER — TRANSCRIPTION ENCOUNTER (OUTPATIENT)
Age: 81
End: 2021-05-15

## 2021-05-26 ENCOUNTER — APPOINTMENT (OUTPATIENT)
Dept: PULMONOLOGY | Facility: CLINIC | Age: 81
End: 2021-05-26
Payer: MEDICARE

## 2021-05-26 VITALS
OXYGEN SATURATION: 95 % | HEIGHT: 59 IN | SYSTOLIC BLOOD PRESSURE: 140 MMHG | WEIGHT: 128 LBS | BODY MASS INDEX: 25.8 KG/M2 | DIASTOLIC BLOOD PRESSURE: 60 MMHG | HEART RATE: 74 BPM | TEMPERATURE: 97.8 F

## 2021-05-26 PROCEDURE — 99214 OFFICE O/P EST MOD 30 MIN: CPT

## 2021-05-26 NOTE — PHYSICAL EXAM
[No Acute Distress] : no acute distress [Normal Oropharynx] : normal oropharynx [Normal Appearance] : normal appearance [No Neck Mass] : no neck mass [Normal Rate/Rhythm] : normal rate/rhythm [Normal S1, S2] : normal s1, s2 [No Murmurs] : no murmurs [No Resp Distress] : no resp distress [No Acc Muscle Use] : no acc muscle use [Normal Rhythm and Effort] : normal rhythm and effort [Clear to Auscultation Bilaterally] : clear to auscultation bilaterally [No Abnormalities] : no abnormalities [Benign] : benign [No Clubbing] : no clubbing [No Cyanosis] : no cyanosis [No Edema] : no edema [FROM] : FROM [Normal Color/ Pigmentation] : normal color/ pigmentation [No Focal Deficits] : no focal deficits [Oriented x3] : oriented x3 [Normal Affect] : normal affect [TextBox_99] : José Luisping

## 2021-05-26 NOTE — REVIEW OF SYSTEMS
[Cough] : no cough [Sputum] : no sputum [Dyspnea] : no dyspnea [Wheezing] : no wheezing [SOB on Exertion] : sob on exertion [Arthralgias] : arthralgias [Diabetes] : diabetes [Negative] : Psychiatric

## 2021-06-07 ENCOUNTER — APPOINTMENT (OUTPATIENT)
Dept: CARDIOLOGY | Facility: CLINIC | Age: 81
End: 2021-06-07

## 2021-06-18 NOTE — ED ADULT TRIAGE NOTE - CHIEF COMPLAINT QUOTE
Pt comes from doctors office to check A1C, BS 37, was given 30mg oral glucose, AOx4 denies pain/discomfort

## 2021-06-23 NOTE — PHYSICAL THERAPY INITIAL EVALUATION ADULT - WEIGHT-BEARING RESTRICTIONS: SIT/STAND, REHAB EVAL
Split-Thickness Skin Graft Text: The defect edges were debeveled with a #15 scalpel blade.  Given the location of the defect, shape of the defect and the proximity to free margins a split thickness skin graft was deemed most appropriate.  Using a sterile surgical marker, the primary defect shape was transferred to the donor site. The split thickness graft was then harvested.  The skin graft was then placed in the primary defect and oriented appropriately. full weight-bearing

## 2021-06-27 ENCOUNTER — APPOINTMENT (OUTPATIENT)
Dept: DISASTER EMERGENCY | Facility: CLINIC | Age: 81
End: 2021-06-27

## 2021-06-30 ENCOUNTER — APPOINTMENT (OUTPATIENT)
Dept: PULMONOLOGY | Facility: CLINIC | Age: 81
End: 2021-06-30

## 2021-08-02 ENCOUNTER — NON-APPOINTMENT (OUTPATIENT)
Age: 81
End: 2021-08-02

## 2021-08-02 ENCOUNTER — APPOINTMENT (OUTPATIENT)
Dept: CARDIOLOGY | Facility: CLINIC | Age: 81
End: 2021-08-02
Payer: MEDICARE

## 2021-08-02 VITALS
HEART RATE: 80 BPM | WEIGHT: 120 LBS | BODY MASS INDEX: 24.19 KG/M2 | DIASTOLIC BLOOD PRESSURE: 64 MMHG | RESPIRATION RATE: 16 BRPM | TEMPERATURE: 98.3 F | OXYGEN SATURATION: 96 % | HEIGHT: 59 IN | SYSTOLIC BLOOD PRESSURE: 140 MMHG

## 2021-08-02 PROCEDURE — 99214 OFFICE O/P EST MOD 30 MIN: CPT

## 2021-08-02 PROCEDURE — 93000 ELECTROCARDIOGRAM COMPLETE: CPT

## 2021-08-02 RX ORDER — ERGOCALCIFEROL 1.25 MG/1
1.25 MG CAPSULE, LIQUID FILLED ORAL
Qty: 13 | Refills: 0 | Status: ACTIVE | COMMUNITY
Start: 2021-06-17

## 2021-08-02 RX ORDER — FLASH GLUCOSE SCANNING READER
EACH MISCELLANEOUS
Qty: 1 | Refills: 0 | Status: ACTIVE | COMMUNITY
Start: 2021-05-08

## 2021-08-02 RX ORDER — FUROSEMIDE 40 MG/1
40 TABLET ORAL DAILY
Qty: 30 | Refills: 3 | Status: ACTIVE | COMMUNITY
Start: 2021-05-03

## 2021-08-31 ENCOUNTER — APPOINTMENT (OUTPATIENT)
Dept: PULMONOLOGY | Facility: CLINIC | Age: 81
End: 2021-08-31
Payer: MEDICARE

## 2021-08-31 VITALS
SYSTOLIC BLOOD PRESSURE: 140 MMHG | HEART RATE: 77 BPM | OXYGEN SATURATION: 94 % | WEIGHT: 116 LBS | DIASTOLIC BLOOD PRESSURE: 59 MMHG | BODY MASS INDEX: 23.43 KG/M2

## 2021-08-31 DIAGNOSIS — J47.1 BRONCHIECTASIS WITH (ACUTE) EXACERBATION: ICD-10-CM

## 2021-08-31 DIAGNOSIS — J47.9 BRONCHIECTASIS, UNCOMPLICATED: ICD-10-CM

## 2021-08-31 PROCEDURE — 99213 OFFICE O/P EST LOW 20 MIN: CPT

## 2021-08-31 RX ORDER — ALBUTEROL SULFATE 90 UG/1
108 (90 BASE) AEROSOL, METERED RESPIRATORY (INHALATION)
Qty: 1 | Refills: 5 | Status: ACTIVE | COMMUNITY
Start: 2021-08-31

## 2021-08-31 NOTE — REASON FOR VISIT
[Follow-Up] : a follow-up visit [Cough] : cough [Bronchiectasis] : bronchiectasis [Shortness of Breath] : shortness of breath

## 2021-08-31 NOTE — PHYSICAL EXAM
[No Acute Distress] : no acute distress [Normal Oropharynx] : normal oropharynx [Normal Appearance] : normal appearance [No Neck Mass] : no neck mass [Normal Rate/Rhythm] : normal rate/rhythm [Normal S1, S2] : normal s1, s2 [No Murmurs] : no murmurs [No Resp Distress] : no resp distress [No Acc Muscle Use] : no acc muscle use [Normal Rhythm and Effort] : normal rhythm and effort [No Abnormalities] : no abnormalities [Benign] : benign [No Clubbing] : no clubbing [No Cyanosis] : no cyanosis [No Edema] : no edema [FROM] : FROM [Normal Color/ Pigmentation] : normal color/ pigmentation [No Focal Deficits] : no focal deficits [Oriented x3] : oriented x3 [Normal Affect] : normal affect [TextBox_68] : Crackles left base [TextBox_99] : José Luisping

## 2021-08-31 NOTE — REVIEW OF SYSTEMS
[Cough] : cough [Dyspnea] : no dyspnea [Sputum] : no sputum [Wheezing] : no wheezing [SOB on Exertion] : sob on exertion [Arthralgias] : arthralgias [Diabetes] : diabetes [Negative] : Psychiatric

## 2021-11-10 DIAGNOSIS — Z01.818 ENCOUNTER FOR OTHER PREPROCEDURAL EXAMINATION: ICD-10-CM

## 2021-11-19 ENCOUNTER — APPOINTMENT (OUTPATIENT)
Dept: DISASTER EMERGENCY | Facility: CLINIC | Age: 81
End: 2021-11-19

## 2021-11-22 ENCOUNTER — APPOINTMENT (OUTPATIENT)
Dept: PULMONOLOGY | Facility: CLINIC | Age: 81
End: 2021-11-22

## 2021-12-06 ENCOUNTER — APPOINTMENT (OUTPATIENT)
Dept: CARDIOLOGY | Facility: CLINIC | Age: 81
End: 2021-12-06
Payer: MEDICARE

## 2021-12-06 ENCOUNTER — NON-APPOINTMENT (OUTPATIENT)
Age: 81
End: 2021-12-06

## 2021-12-06 VITALS
HEART RATE: 69 BPM | TEMPERATURE: 99.2 F | BODY MASS INDEX: 22.78 KG/M2 | HEIGHT: 59 IN | SYSTOLIC BLOOD PRESSURE: 164 MMHG | DIASTOLIC BLOOD PRESSURE: 58 MMHG | WEIGHT: 113 LBS | OXYGEN SATURATION: 94 %

## 2021-12-06 DIAGNOSIS — Z01.810 ENCOUNTER FOR PREPROCEDURAL CARDIOVASCULAR EXAMINATION: ICD-10-CM

## 2021-12-06 PROCEDURE — 93000 ELECTROCARDIOGRAM COMPLETE: CPT | Mod: NC

## 2021-12-06 PROCEDURE — 99214 OFFICE O/P EST MOD 30 MIN: CPT

## 2021-12-06 NOTE — PHYSICAL EXAM
[Well Developed] : well developed [Well Nourished] : well nourished [No Acute Distress] : no acute distress [Normal Conjunctiva] : normal conjunctiva [Normal Venous Pressure] : normal venous pressure [No Carotid Bruit] : no carotid bruit [Normal S1, S2] : normal S1, S2 [No Rub] : no rub [No Gallop] : no gallop [Clear Lung Fields] : clear lung fields [Good Air Entry] : good air entry [No Respiratory Distress] : no respiratory distress  [Soft] : abdomen soft [Non Tender] : non-tender [No Masses/organomegaly] : no masses/organomegaly [Normal Bowel Sounds] : normal bowel sounds [Normal Gait] : normal gait [No Edema] : no edema [No Cyanosis] : no cyanosis [No Clubbing] : no clubbing [No Varicosities] : no varicosities [No Rash] : no rash [No Skin Lesions] : no skin lesions [Moves all extremities] : moves all extremities [No Focal Deficits] : no focal deficits [Normal Speech] : normal speech [Alert and Oriented] : alert and oriented [Normal memory] : normal memory [de-identified] : 2/6 SAM ROSENBERG

## 2021-12-06 NOTE — HISTORY OF PRESENT ILLNESS
[FreeTextEntry1] :  in attendance\par \par PREOPERATIVE CARDIOVASCULAR EXAMINATION\par Pt is being scheduled for colonoscopy and maintained i=on warfarin for PAF.\par Admits SOB. She states "I am asthmatic" "If I walk a little bit further then I am SBOB"\par Denied orthopnea, pnd, edema, CP, syncope, near syncope. \par \par FUNCTIONAL STATUS\par Reports <4.0 METS\par \par CHRONIC, STABLE CONDITIONS\par 1) PAF on warfarin [id number] = 7\par 2) CKD\par 3) DM\par 4) Bronchiectasis, ASthma \par 5) HTN\par 6) Severe PHTN::  Echo  4/2021 with EF 70-75%, PASP 63.7 mmHg. NOrmal RV function.  RHC last 4/2021: PAP (S/D/M) = 51/20/32. PHTN likley secondary to LEft sided Heart Failure (PVR 1.85 [place] [address])\par h/o L Breast CA s/p Surgery in 2014 s/p   XRT \par \par

## 2021-12-06 NOTE — CARDIOLOGY SUMMARY
[de-identified] : EKG 12/6/2021\par Sinus  Rhythm \par No acute st t changes\par WITHIN NORMAL LIMITS\par

## 2021-12-06 NOTE — DISCUSSION/SUMMARY
[Patient] : the patient [___ Week(s)] : in [unfilled] week(s) [FreeTextEntry3] : FU at Dr. Hager  and then FU with me

## 2021-12-15 ENCOUNTER — APPOINTMENT (OUTPATIENT)
Dept: CARDIOLOGY | Facility: CLINIC | Age: 81
End: 2021-12-15

## 2022-02-03 ENCOUNTER — APPOINTMENT (OUTPATIENT)
Dept: CARDIOLOGY | Facility: CLINIC | Age: 82
End: 2022-02-03

## 2022-02-10 ENCOUNTER — APPOINTMENT (OUTPATIENT)
Dept: CARDIOLOGY | Facility: CLINIC | Age: 82
End: 2022-02-10

## 2022-02-10 ENCOUNTER — APPOINTMENT (OUTPATIENT)
Dept: CARDIOLOGY | Facility: CLINIC | Age: 82
End: 2022-02-10
Payer: MEDICARE

## 2022-02-10 PROCEDURE — 78452 HT MUSCLE IMAGE SPECT MULT: CPT

## 2022-02-10 PROCEDURE — A9500: CPT

## 2022-02-10 PROCEDURE — 93015 CV STRESS TEST SUPVJ I&R: CPT

## 2022-03-07 ENCOUNTER — APPOINTMENT (OUTPATIENT)
Dept: CARDIOLOGY | Facility: CLINIC | Age: 82
End: 2022-03-07
Payer: MEDICARE

## 2022-03-07 VITALS
SYSTOLIC BLOOD PRESSURE: 128 MMHG | OXYGEN SATURATION: 95 % | TEMPERATURE: 98.6 F | HEIGHT: 59 IN | RESPIRATION RATE: 14 BRPM | BODY MASS INDEX: 24.19 KG/M2 | WEIGHT: 120 LBS | HEART RATE: 60 BPM | DIASTOLIC BLOOD PRESSURE: 60 MMHG

## 2022-03-07 DIAGNOSIS — R06.00 DYSPNEA, UNSPECIFIED: ICD-10-CM

## 2022-03-07 PROCEDURE — 99214 OFFICE O/P EST MOD 30 MIN: CPT

## 2022-03-07 PROCEDURE — 93000 ELECTROCARDIOGRAM COMPLETE: CPT

## 2022-03-07 RX ORDER — PREDNISONE 10 MG/1
10 TABLET ORAL
Qty: 30 | Refills: 0 | Status: DISCONTINUED | COMMUNITY
Start: 2022-01-04

## 2022-03-07 RX ORDER — BLOOD SUGAR DIAGNOSTIC
STRIP MISCELLANEOUS
Qty: 150 | Refills: 0 | Status: DISCONTINUED | COMMUNITY
Start: 2022-03-04

## 2022-03-07 RX ORDER — AMOXICILLIN 500 MG/1
500 CAPSULE ORAL
Qty: 30 | Refills: 0 | Status: DISCONTINUED | COMMUNITY
Start: 2022-01-04

## 2022-03-07 RX ORDER — ESCITALOPRAM OXALATE 10 MG/1
10 TABLET ORAL
Qty: 90 | Refills: 0 | Status: DISCONTINUED | COMMUNITY
Start: 2021-12-15

## 2022-03-09 ENCOUNTER — NON-APPOINTMENT (OUTPATIENT)
Age: 82
End: 2022-03-09

## 2022-03-09 PROBLEM — R06.00 DYSPNEA ON EXERTION: Status: ACTIVE | Noted: 2018-01-03

## 2022-03-14 ENCOUNTER — APPOINTMENT (OUTPATIENT)
Dept: CARDIOLOGY | Facility: CLINIC | Age: 82
End: 2022-03-14
Payer: MEDICARE

## 2022-03-14 VITALS
SYSTOLIC BLOOD PRESSURE: 150 MMHG | WEIGHT: 113 LBS | DIASTOLIC BLOOD PRESSURE: 78 MMHG | HEIGHT: 59 IN | BODY MASS INDEX: 22.78 KG/M2 | HEART RATE: 72 BPM | TEMPERATURE: 98.41 F | OXYGEN SATURATION: 94 %

## 2022-03-14 DIAGNOSIS — I48.0 PAROXYSMAL ATRIAL FIBRILLATION: ICD-10-CM

## 2022-03-14 DIAGNOSIS — I10 ESSENTIAL (PRIMARY) HYPERTENSION: ICD-10-CM

## 2022-03-14 DIAGNOSIS — I27.20 PULMONARY HYPERTENSION, UNSPECIFIED: ICD-10-CM

## 2022-03-14 PROCEDURE — 99214 OFFICE O/P EST MOD 30 MIN: CPT

## 2022-03-14 NOTE — CARDIOLOGY SUMMARY
[de-identified] : EKG 12/6/2021\par Sinus  Rhythm \par No acute st t changes\par WITHIN NORMAL LIMITS\par

## 2022-03-14 NOTE — ED ADULT NURSE NOTE - NSFALLRSKASSESASSIST_ED_ALL_ED
[FreeTextEntry1] : Very pleasant 66-year-old woman presents for evaluation of flank pain and renal cyst.  She reports that she underwent a CT scan in the past which demonstrated no kidney stones.  Follow-up renal ultrasound demonstrated a renal cyst but no kidney stones.  She denies right flank pain.  She reports that this is been present for a while.  She reports that she drinks a lot of coffee but does not drink water otherwise.  She reports a lot of salt on her food.  She has a history of IBS. no

## 2022-03-14 NOTE — PHYSICAL EXAM
[Well Developed] : well developed [Well Nourished] : well nourished [No Acute Distress] : no acute distress [Normal Conjunctiva] : normal conjunctiva [Normal Venous Pressure] : normal venous pressure [No Carotid Bruit] : no carotid bruit [Normal S1, S2] : normal S1, S2 [No Rub] : no rub [No Gallop] : no gallop [Clear Lung Fields] : clear lung fields [Good Air Entry] : good air entry [No Respiratory Distress] : no respiratory distress  [Soft] : abdomen soft [Non Tender] : non-tender [No Masses/organomegaly] : no masses/organomegaly [Normal Bowel Sounds] : normal bowel sounds [Normal Gait] : normal gait [No Edema] : no edema [No Cyanosis] : no cyanosis [No Clubbing] : no clubbing [No Varicosities] : no varicosities [No Rash] : no rash [No Skin Lesions] : no skin lesions [Moves all extremities] : moves all extremities [No Focal Deficits] : no focal deficits [Normal Speech] : normal speech [Alert and Oriented] : alert and oriented [Normal memory] : normal memory [de-identified] : 2/6 SAM ROSENBERG

## 2022-03-14 NOTE — HISTORY OF PRESENT ILLNESS
[FreeTextEntry1] :  in attendance\par \par PHTN\par NESS\par Pt was instructed to follow-up in 2 month(s), with Dr. Hager for PHTN management at prior visit 3/9/2022 whe was seen by Valeriano Garcia and the case was d/w Dr. Hager, .  \par Has shortness of breath after walking about a block\par Uses wheelchair with long distance ambulation due to SOB.\par Pt was planned for the following at prior visit 3/9/2022:\par a. Pulmonary HTN with left and right heart components. Continue with CCB and furosemide at this time.\par b. Will repeat Echocardiogram with strain now after several months of diuretic. \par c. Will schedule for for PYP scan to r/o cardiac amyloidosis due to elevated left sided filling pressures\par d. Continue with other meds\par e. f/u with Dr. Haegr in 2 months or sooner if needed\par f)  Order was placed for: NM Amyloidosis SPECT, Single Area Single Day;\par g) Order was placed for: TTE\par \par \par FUNCTIONAL STATUS\par Reports <4.0 METS\par \par CHRONIC, STABLE CONDITIONS\par 1) PAF on warfarin [id number] = 7\par 2) CKD\par 3) DM\par 4) Bronchiectasis, ASthma \par 5) HTN\par 6) Severe PHTN::  Echo  4/2021 with EF 70-75%, PASP 63.7 mmHg. NOrmal RV function.  RHC last 4/2021: PAP (S/D/M) = 51/20/32. PHTN likley secondary to LEft sided Heart Failure (PVR 1.85 [facility])\par h/o L Breast CA s/p Surgery in 2014 s/p   XRT \par \par CARDIAC TESTING\par 1) Pharmacological NST last 2/10/2022 - normal myocardial perfusion scan, with no evidence of infarction or inducible ischemia\par 2) RHC last 4/20/2021: PAP (S/D/M) = 51/20/32. PCWP - 21 mmHg. PVR 1.85 [facility])\par 3) V/Q Scan last 4/14/2021 - very low probability of pulmonary embolus.\par 4) Echocardiogram 4/13/2021 with EF 70-75%, PASP 63.7 mmHg. Normal RV function. \par 5) US venous of LE last 4/12/2021 - no evidence of deep venous thrombosis in either lower extremity\par 6) CT Chest last 4/12/2021 - bibasilar subsegmental atelectasis (right greater than left) with mild bilateral lower lobe bronchiectasis. Small right and trace left pleural effusion. Dilated main pulmonary artery which may be reflective of underlying pulmonary arterial hypertension. \par \par \par \par

## 2022-03-16 ENCOUNTER — APPOINTMENT (OUTPATIENT)
Dept: CARDIOLOGY | Facility: CLINIC | Age: 82
End: 2022-03-16
Payer: MEDICARE

## 2022-03-16 PROCEDURE — 78803 RP LOCLZJ TUM SPECT 1 AREA: CPT

## 2022-03-16 PROCEDURE — A9538 TC99M PYROPHOSPHATE: CPT

## 2022-03-17 ENCOUNTER — APPOINTMENT (OUTPATIENT)
Dept: CARDIOLOGY | Facility: CLINIC | Age: 82
End: 2022-03-17
Payer: MEDICARE

## 2022-03-17 VITALS
DIASTOLIC BLOOD PRESSURE: 60 MMHG | OXYGEN SATURATION: 97 % | SYSTOLIC BLOOD PRESSURE: 180 MMHG | RESPIRATION RATE: 16 BRPM | HEART RATE: 74 BPM

## 2022-03-17 PROCEDURE — 93306 TTE W/DOPPLER COMPLETE: CPT

## 2022-03-17 PROCEDURE — 93356 MYOCRD STRAIN IMG SPCKL TRCK: CPT

## 2022-03-17 RX ORDER — HYDRALAZINE HYDROCHLORIDE 50 MG/1
50 TABLET ORAL 3 TIMES DAILY
Qty: 270 | Refills: 0 | Status: ACTIVE | COMMUNITY
Start: 2022-03-17

## 2022-03-17 RX ORDER — HYDRALAZINE HYDROCHLORIDE 50 MG/1
50 TABLET ORAL TWICE DAILY
Qty: 60 | Refills: 5 | Status: DISCONTINUED | COMMUNITY
Start: 2021-05-03 | End: 2022-03-17

## 2022-03-17 RX ORDER — METOPROLOL TARTRATE 25 MG/1
25 TABLET, FILM COATED ORAL TWICE DAILY
Qty: 180 | Refills: 3 | Status: ACTIVE | COMMUNITY
Start: 2021-05-03

## 2022-03-22 ENCOUNTER — TRANSCRIPTION ENCOUNTER (OUTPATIENT)
Age: 82
End: 2022-03-22

## 2022-03-22 ENCOUNTER — NON-APPOINTMENT (OUTPATIENT)
Age: 82
End: 2022-03-22

## 2022-05-13 ENCOUNTER — EMERGENCY (EMERGENCY)
Facility: HOSPITAL | Age: 82
LOS: 1 days | Discharge: HOME | End: 2022-05-13
Attending: EMERGENCY MEDICINE
Payer: MEDICARE

## 2022-05-13 VITALS
WEIGHT: 115.08 LBS | RESPIRATION RATE: 20 BRPM | TEMPERATURE: 99 F | OXYGEN SATURATION: 97 % | SYSTOLIC BLOOD PRESSURE: 170 MMHG | DIASTOLIC BLOOD PRESSURE: 57 MMHG | HEIGHT: 60 IN | HEART RATE: 114 BPM

## 2022-05-13 VITALS — HEART RATE: 100 BPM

## 2022-05-13 LAB
ALBUMIN SERPL ELPH-MCNC: 3.1 G/DL — LOW (ref 3.3–5.2)
ALP SERPL-CCNC: 89 U/L — SIGNIFICANT CHANGE UP (ref 40–120)
ALT FLD-CCNC: 12 U/L — SIGNIFICANT CHANGE UP
ANION GAP SERPL CALC-SCNC: 13 MMOL/L — SIGNIFICANT CHANGE UP (ref 5–17)
ANISOCYTOSIS BLD QL: SLIGHT — SIGNIFICANT CHANGE UP
AST SERPL-CCNC: 17 U/L — SIGNIFICANT CHANGE UP
BASOPHILS # BLD AUTO: 0.06 K/UL — SIGNIFICANT CHANGE UP (ref 0–0.2)
BASOPHILS NFR BLD AUTO: 0.5 % — SIGNIFICANT CHANGE UP (ref 0–2)
BILIRUB SERPL-MCNC: <0.2 MG/DL — LOW (ref 0.4–2)
BUN SERPL-MCNC: 46.6 MG/DL — HIGH (ref 8–20)
CALCIUM SERPL-MCNC: 7.8 MG/DL — LOW (ref 8.6–10.2)
CHLORIDE SERPL-SCNC: 100 MMOL/L — SIGNIFICANT CHANGE UP (ref 98–107)
CO2 SERPL-SCNC: 19 MMOL/L — LOW (ref 22–29)
CREAT SERPL-MCNC: 2.76 MG/DL — HIGH (ref 0.5–1.3)
EGFR: 17 ML/MIN/1.73M2 — LOW
EOSINOPHIL # BLD AUTO: 0.28 K/UL — SIGNIFICANT CHANGE UP (ref 0–0.5)
EOSINOPHIL NFR BLD AUTO: 2.2 % — SIGNIFICANT CHANGE UP (ref 0–6)
GLUCOSE SERPL-MCNC: 247 MG/DL — HIGH (ref 70–99)
HCT VFR BLD CALC: 26.4 % — LOW (ref 34.5–45)
HGB BLD-MCNC: 8.1 G/DL — LOW (ref 11.5–15.5)
IMM GRANULOCYTES NFR BLD AUTO: 0.4 % — SIGNIFICANT CHANGE UP (ref 0–1.5)
LIDOCAIN IGE QN: 62 U/L — HIGH (ref 22–51)
LYMPHOCYTES # BLD AUTO: 1.88 K/UL — SIGNIFICANT CHANGE UP (ref 1–3.3)
LYMPHOCYTES # BLD AUTO: 14.7 % — SIGNIFICANT CHANGE UP (ref 13–44)
MANUAL SMEAR VERIFICATION: SIGNIFICANT CHANGE UP
MCHC RBC-ENTMCNC: 20.6 PG — LOW (ref 27–34)
MCHC RBC-ENTMCNC: 30.7 GM/DL — LOW (ref 32–36)
MCV RBC AUTO: 67 FL — LOW (ref 80–100)
MICROCYTES BLD QL: SIGNIFICANT CHANGE UP
MONOCYTES # BLD AUTO: 0.81 K/UL — SIGNIFICANT CHANGE UP (ref 0–0.9)
MONOCYTES NFR BLD AUTO: 6.3 % — SIGNIFICANT CHANGE UP (ref 2–14)
NEUTROPHILS # BLD AUTO: 9.69 K/UL — HIGH (ref 1.8–7.4)
NEUTROPHILS NFR BLD AUTO: 75.9 % — SIGNIFICANT CHANGE UP (ref 43–77)
PLAT MORPH BLD: NORMAL — SIGNIFICANT CHANGE UP
PLATELET # BLD AUTO: 248 K/UL — SIGNIFICANT CHANGE UP (ref 150–400)
POIKILOCYTOSIS BLD QL AUTO: SLIGHT — SIGNIFICANT CHANGE UP
POTASSIUM SERPL-MCNC: 4.8 MMOL/L — SIGNIFICANT CHANGE UP (ref 3.5–5.3)
POTASSIUM SERPL-SCNC: 4.8 MMOL/L — SIGNIFICANT CHANGE UP (ref 3.5–5.3)
PROT SERPL-MCNC: 5.5 G/DL — LOW (ref 6.6–8.7)
RBC # BLD: 3.94 M/UL — SIGNIFICANT CHANGE UP (ref 3.8–5.2)
RBC # FLD: 15.1 % — HIGH (ref 10.3–14.5)
RBC BLD AUTO: ABNORMAL
SODIUM SERPL-SCNC: 132 MMOL/L — LOW (ref 135–145)
TARGETS BLD QL SMEAR: SLIGHT — SIGNIFICANT CHANGE UP
TROPONIN T SERPL-MCNC: 0.05 NG/ML — SIGNIFICANT CHANGE UP (ref 0–0.06)
WBC # BLD: 12.77 K/UL — HIGH (ref 3.8–10.5)
WBC # FLD AUTO: 12.77 K/UL — HIGH (ref 3.8–10.5)

## 2022-05-13 PROCEDURE — 84484 ASSAY OF TROPONIN QUANT: CPT

## 2022-05-13 PROCEDURE — 36415 COLL VENOUS BLD VENIPUNCTURE: CPT

## 2022-05-13 PROCEDURE — 80053 COMPREHEN METABOLIC PANEL: CPT

## 2022-05-13 PROCEDURE — 99285 EMERGENCY DEPT VISIT HI MDM: CPT

## 2022-05-13 PROCEDURE — 93005 ELECTROCARDIOGRAM TRACING: CPT

## 2022-05-13 PROCEDURE — 99053 MED SERV 10PM-8AM 24 HR FAC: CPT

## 2022-05-13 PROCEDURE — 85025 COMPLETE CBC W/AUTO DIFF WBC: CPT

## 2022-05-13 PROCEDURE — 99284 EMERGENCY DEPT VISIT MOD MDM: CPT | Mod: 25

## 2022-05-13 PROCEDURE — 83690 ASSAY OF LIPASE: CPT

## 2022-05-13 PROCEDURE — 93010 ELECTROCARDIOGRAM REPORT: CPT

## 2022-05-13 PROCEDURE — 96374 THER/PROPH/DIAG INJ IV PUSH: CPT

## 2022-05-13 PROCEDURE — 82962 GLUCOSE BLOOD TEST: CPT

## 2022-05-13 RX ORDER — SODIUM CHLORIDE 9 MG/ML
500 INJECTION INTRAMUSCULAR; INTRAVENOUS; SUBCUTANEOUS ONCE
Refills: 0 | Status: COMPLETED | OUTPATIENT
Start: 2022-05-13 | End: 2022-05-13

## 2022-05-13 RX ORDER — LIDOCAINE 4 G/100G
10 CREAM TOPICAL ONCE
Refills: 0 | Status: COMPLETED | OUTPATIENT
Start: 2022-05-13 | End: 2022-05-13

## 2022-05-13 RX ORDER — FAMOTIDINE 10 MG/ML
20 INJECTION INTRAVENOUS ONCE
Refills: 0 | Status: COMPLETED | OUTPATIENT
Start: 2022-05-13 | End: 2022-05-13

## 2022-05-13 RX ADMIN — SODIUM CHLORIDE 500 MILLILITER(S): 9 INJECTION INTRAMUSCULAR; INTRAVENOUS; SUBCUTANEOUS at 02:58

## 2022-05-13 RX ADMIN — FAMOTIDINE 20 MILLIGRAM(S): 10 INJECTION INTRAVENOUS at 02:45

## 2022-05-13 RX ADMIN — LIDOCAINE 10 MILLILITER(S): 4 CREAM TOPICAL at 02:58

## 2022-05-13 RX ADMIN — Medication 30 MILLILITER(S): at 02:58

## 2022-05-13 NOTE — ED ADULT NURSE NOTE - OBJECTIVE STATEMENT
Pt. c/p epigastric/upper abd. pain, states she feels "gassy".  As per pt's son, pt. has been experiencing sxs ongoing for months.  Pt. states tonight she took tums without relief.  Son also states pt. unable to sleep, has not been eating healthy, and blood sugar is high.

## 2022-05-13 NOTE — ED PROVIDER NOTE - PATIENT PORTAL LINK FT
You can access the FollowMyHealth Patient Portal offered by [facility] by registering at the following website: [email or web address]. By joining [employer]’s FollowMyHealth portal, you will also be able to view your health information using other applications (apps) compatible with our system.

## 2022-05-13 NOTE — ED PROVIDER NOTE - CARE PROVIDER_API CALL
Nicanor Porter)  Gastroenterology; Internal Medicine  [address] [place] [address], [place] [address]  Phone: [phone]  Fax: [phone]  Follow Up Time:

## 2022-05-13 NOTE — ED PROVIDER NOTE - PROGRESS NOTE DETAILS
Twin PGY-3: Pt reassessed, pt feeling better at this time, vss, pt able to walk, talk and vocalized plan of action. Discussed in depth and explained to pt in depth the next steps that need to be taking including proper follow up with PCP or specialists. All incidental findings were discussed with pt as well. Pt verbalized their concerns and all questions were answered. Pt understands dispo and wants discharge. Given good instructions when to return to ED and importance of f/u.

## 2022-05-13 NOTE — ED PROVIDER NOTE - OBJECTIVE STATEMENT
Pt is an 81 y/o F w/PMHx HTN, HLD, DMII, asthma presents c/o epigastric pain.  Pt states she typically gets this pain after eating a meal.  She ate dinner a little later than usual, around 10pm and has had the pain since.  Her son occasionally spends the night over, and was concerned due to the pain not going away as normal.  Pt states she tried taking Tums without relief.  Pain is epigastric, 6/10, non-radiating.  Pt denies n/v, fever, shortness of breath.  Denies chest pain contrary to triage note.

## 2022-07-18 ENCOUNTER — APPOINTMENT (OUTPATIENT)
Dept: CARDIOLOGY | Facility: CLINIC | Age: 82
End: 2022-07-18

## 2022-12-05 ENCOUNTER — INPATIENT (INPATIENT)
Facility: HOSPITAL | Age: 82
LOS: 31 days | Discharge: NURSING, REHAB OR LONG-TERM CARE | DRG: 673 | End: 2023-01-06
Attending: STUDENT IN AN ORGANIZED HEALTH CARE EDUCATION/TRAINING PROGRAM | Admitting: STUDENT IN AN ORGANIZED HEALTH CARE EDUCATION/TRAINING PROGRAM
Payer: MEDICARE

## 2022-12-05 VITALS
TEMPERATURE: 98 F | WEIGHT: 139.99 LBS | RESPIRATION RATE: 18 BRPM | OXYGEN SATURATION: 95 % | HEART RATE: 73 BPM | SYSTOLIC BLOOD PRESSURE: 192 MMHG | DIASTOLIC BLOOD PRESSURE: 70 MMHG

## 2022-12-05 PROCEDURE — 99285 EMERGENCY DEPT VISIT HI MDM: CPT

## 2022-12-05 PROCEDURE — 71045 X-RAY EXAM CHEST 1 VIEW: CPT | Mod: 26

## 2022-12-05 NOTE — ED PROVIDER NOTE - OBJECTIVE STATEMENT
82 y/o female with PMHx of HTN, HLD, DM, a-fib on coumadin presents to the ED c/o SOB. Pt is covid + and has had SOB, lower extremity edema and lower extremity pain for the past week or so, pt went to pmd earlier today who sent her for outpatient US which showed DVT in right lower extremity and was referred to the ED. Pt son states pt is at her mental baseline, however states pt is very anxious when it comes to her health. Son states pt recent INR levels have been fluctuating randomly sometimes as high as 5 or lower than 2

## 2022-12-05 NOTE — ED PROVIDER NOTE - PHYSICAL EXAMINATION
Gen: Well appearing in NAD  Head: NC/AT  Neck: trachea midline  Card: regular rate and rhythm  Resp:  diffuse rales worse at bases  Abd: soft, non-distended, non-tender  Ext: no deformities. + bilateral lower extremity edema, no calf tenderness  Neuro:  A&O appears non focal  Skin:  Warm and dry as visualized  Psych:  Normal affect and mood

## 2022-12-05 NOTE — ED PROVIDER NOTE - CARE PLAN
1 Principal Discharge DX:	THERESA (acute kidney injury)  Secondary Diagnosis:	DVT, lower extremity

## 2022-12-05 NOTE — ED PROVIDER NOTE - PROGRESS NOTE DETAILS
Kristen MEANS: Vascular surgery consult placed for dvt on supratherapeutic INR. Patient admitted to medicine.

## 2022-12-05 NOTE — ED ADULT TRIAGE NOTE - CHIEF COMPLAINT QUOTE
Patient c/o SOB over the last several days. Patient saw an NP today who did a CXR and told her to come to the ED for fluid in her lungs and +4 pitting edema in both of her legs. Patient was also told she has a blood clot in her right leg. Denies chest pain.

## 2022-12-06 DIAGNOSIS — N17.9 ACUTE KIDNEY FAILURE, UNSPECIFIED: ICD-10-CM

## 2022-12-06 LAB
A1C WITH ESTIMATED AVERAGE GLUCOSE RESULT: 10.9 % — HIGH (ref 4–5.6)
ALBUMIN SERPL ELPH-MCNC: 2.2 G/DL — LOW (ref 3.3–5.2)
ALP SERPL-CCNC: 105 U/L — SIGNIFICANT CHANGE UP (ref 40–120)
ALT FLD-CCNC: 11 U/L — SIGNIFICANT CHANGE UP
ANION GAP SERPL CALC-SCNC: 14 MMOL/L — SIGNIFICANT CHANGE UP (ref 5–17)
ANION GAP SERPL CALC-SCNC: 14 MMOL/L — SIGNIFICANT CHANGE UP (ref 5–17)
ANISOCYTOSIS BLD QL: SLIGHT — SIGNIFICANT CHANGE UP
APTT BLD: 54 SEC — HIGH (ref 27.5–35.5)
AST SERPL-CCNC: 20 U/L — SIGNIFICANT CHANGE UP
BASOPHILS # BLD AUTO: 0.07 K/UL — SIGNIFICANT CHANGE UP (ref 0–0.2)
BASOPHILS NFR BLD AUTO: 0.9 % — SIGNIFICANT CHANGE UP (ref 0–2)
BILIRUB SERPL-MCNC: <0.2 MG/DL — LOW (ref 0.4–2)
BLD GP AB SCN SERPL QL: SIGNIFICANT CHANGE UP
BUN SERPL-MCNC: 76.4 MG/DL — HIGH (ref 8–20)
BUN SERPL-MCNC: 78.4 MG/DL — HIGH (ref 8–20)
CALCIUM SERPL-MCNC: 7.8 MG/DL — LOW (ref 8.4–10.5)
CALCIUM SERPL-MCNC: 8.1 MG/DL — LOW (ref 8.4–10.5)
CHLORIDE SERPL-SCNC: 94 MMOL/L — LOW (ref 96–108)
CHLORIDE SERPL-SCNC: 95 MMOL/L — LOW (ref 96–108)
CO2 SERPL-SCNC: 22 MMOL/L — SIGNIFICANT CHANGE UP (ref 22–29)
CO2 SERPL-SCNC: 23 MMOL/L — SIGNIFICANT CHANGE UP (ref 22–29)
CREAT SERPL-MCNC: 5.44 MG/DL — HIGH (ref 0.5–1.3)
CREAT SERPL-MCNC: 5.81 MG/DL — HIGH (ref 0.5–1.3)
EGFR: 7 ML/MIN/1.73M2 — LOW
EGFR: 7 ML/MIN/1.73M2 — LOW
EOSINOPHIL # BLD AUTO: 0.34 K/UL — SIGNIFICANT CHANGE UP (ref 0–0.5)
EOSINOPHIL NFR BLD AUTO: 4.4 % — SIGNIFICANT CHANGE UP (ref 0–6)
ESTIMATED AVERAGE GLUCOSE: 266 MG/DL — HIGH (ref 68–114)
FERRITIN SERPL-MCNC: 383 NG/ML — HIGH (ref 15–150)
FLUAV AG NPH QL: SIGNIFICANT CHANGE UP
FLUBV AG NPH QL: SIGNIFICANT CHANGE UP
GLUCOSE BLDC GLUCOMTR-MCNC: 225 MG/DL — HIGH (ref 70–99)
GLUCOSE BLDC GLUCOMTR-MCNC: 228 MG/DL — HIGH (ref 70–99)
GLUCOSE BLDC GLUCOMTR-MCNC: 243 MG/DL — HIGH (ref 70–99)
GLUCOSE SERPL-MCNC: 205 MG/DL — HIGH (ref 70–99)
GLUCOSE SERPL-MCNC: 210 MG/DL — HIGH (ref 70–99)
HCT VFR BLD CALC: 24.5 % — LOW (ref 34.5–45)
HCT VFR BLD CALC: 26.2 % — LOW (ref 34.5–45)
HGB BLD-MCNC: 7.8 G/DL — LOW (ref 11.5–15.5)
HGB BLD-MCNC: 8.1 G/DL — LOW (ref 11.5–15.5)
INR BLD: 4.58 RATIO — HIGH (ref 0.88–1.16)
IRON SATN MFR SERPL: 16 % — SIGNIFICANT CHANGE UP (ref 14–50)
IRON SATN MFR SERPL: 30 UG/DL — LOW (ref 37–145)
LYMPHOCYTES # BLD AUTO: 1.37 K/UL — SIGNIFICANT CHANGE UP (ref 1–3.3)
LYMPHOCYTES # BLD AUTO: 17.5 % — SIGNIFICANT CHANGE UP (ref 13–44)
MANUAL SMEAR VERIFICATION: SIGNIFICANT CHANGE UP
MCHC RBC-ENTMCNC: 19.8 PG — LOW (ref 27–34)
MCHC RBC-ENTMCNC: 20.3 PG — LOW (ref 27–34)
MCHC RBC-ENTMCNC: 30.9 GM/DL — LOW (ref 32–36)
MCHC RBC-ENTMCNC: 31.8 GM/DL — LOW (ref 32–36)
MCV RBC AUTO: 63.8 FL — LOW (ref 80–100)
MCV RBC AUTO: 63.9 FL — LOW (ref 80–100)
MICROCYTES BLD QL: SLIGHT — SIGNIFICANT CHANGE UP
MONOCYTES # BLD AUTO: 0.55 K/UL — SIGNIFICANT CHANGE UP (ref 0–0.9)
MONOCYTES NFR BLD AUTO: 7 % — SIGNIFICANT CHANGE UP (ref 2–14)
NEUTROPHILS # BLD AUTO: 5.48 K/UL — SIGNIFICANT CHANGE UP (ref 1.8–7.4)
NEUTROPHILS NFR BLD AUTO: 70.2 % — SIGNIFICANT CHANGE UP (ref 43–77)
NT-PROBNP SERPL-SCNC: HIGH PG/ML (ref 0–300)
PHOSPHATE SERPL-MCNC: 4.6 MG/DL — SIGNIFICANT CHANGE UP (ref 2.4–4.7)
PLAT MORPH BLD: NORMAL — SIGNIFICANT CHANGE UP
PLATELET # BLD AUTO: 311 K/UL — SIGNIFICANT CHANGE UP (ref 150–400)
PLATELET # BLD AUTO: 361 K/UL — SIGNIFICANT CHANGE UP (ref 150–400)
POIKILOCYTOSIS BLD QL AUTO: SIGNIFICANT CHANGE UP
POLYCHROMASIA BLD QL SMEAR: SLIGHT — SIGNIFICANT CHANGE UP
POTASSIUM SERPL-MCNC: 4.4 MMOL/L — SIGNIFICANT CHANGE UP (ref 3.5–5.3)
POTASSIUM SERPL-MCNC: 4.7 MMOL/L — SIGNIFICANT CHANGE UP (ref 3.5–5.3)
POTASSIUM SERPL-SCNC: 4.4 MMOL/L — SIGNIFICANT CHANGE UP (ref 3.5–5.3)
POTASSIUM SERPL-SCNC: 4.7 MMOL/L — SIGNIFICANT CHANGE UP (ref 3.5–5.3)
PROT SERPL-MCNC: 5.6 G/DL — LOW (ref 6.6–8.7)
PROTHROM AB SERPL-ACNC: 54 SEC — HIGH (ref 10.5–13.4)
RBC # BLD: 3.84 M/UL — SIGNIFICANT CHANGE UP (ref 3.8–5.2)
RBC # BLD: 4.1 M/UL — SIGNIFICANT CHANGE UP (ref 3.8–5.2)
RBC # FLD: 16.5 % — HIGH (ref 10.3–14.5)
RBC # FLD: 16.9 % — HIGH (ref 10.3–14.5)
RBC BLD AUTO: ABNORMAL
RSV RNA NPH QL NAA+NON-PROBE: SIGNIFICANT CHANGE UP
SARS-COV-2 RNA SPEC QL NAA+PROBE: DETECTED
SMUDGE CELLS # BLD: PRESENT — SIGNIFICANT CHANGE UP
SODIUM SERPL-SCNC: 130 MMOL/L — LOW (ref 135–145)
SODIUM SERPL-SCNC: 130 MMOL/L — LOW (ref 135–145)
TIBC SERPL-MCNC: 193 UG/DL — LOW (ref 220–430)
TRANSFERRIN SERPL-MCNC: 135 MG/DL — LOW (ref 192–382)
TROPONIN T SERPL-MCNC: 0.03 NG/ML — SIGNIFICANT CHANGE UP (ref 0–0.06)
WBC # BLD: 6.84 K/UL — SIGNIFICANT CHANGE UP (ref 3.8–10.5)
WBC # BLD: 7.8 K/UL — SIGNIFICANT CHANGE UP (ref 3.8–10.5)
WBC # FLD AUTO: 6.84 K/UL — SIGNIFICANT CHANGE UP (ref 3.8–10.5)
WBC # FLD AUTO: 7.8 K/UL — SIGNIFICANT CHANGE UP (ref 3.8–10.5)

## 2022-12-06 PROCEDURE — 93970 EXTREMITY STUDY: CPT | Mod: 26

## 2022-12-06 PROCEDURE — 76775 US EXAM ABDO BACK WALL LIM: CPT | Mod: 26

## 2022-12-06 PROCEDURE — 93010 ELECTROCARDIOGRAM REPORT: CPT

## 2022-12-06 PROCEDURE — 99223 1ST HOSP IP/OBS HIGH 75: CPT

## 2022-12-06 PROCEDURE — 71045 X-RAY EXAM CHEST 1 VIEW: CPT | Mod: 26

## 2022-12-06 RX ORDER — ESCITALOPRAM OXALATE 10 MG/1
10 TABLET, FILM COATED ORAL DAILY
Refills: 0 | Status: DISCONTINUED | OUTPATIENT
Start: 2022-12-06 | End: 2022-12-16

## 2022-12-06 RX ORDER — ACETAMINOPHEN 500 MG
650 TABLET ORAL EVERY 6 HOURS
Refills: 0 | Status: DISCONTINUED | OUTPATIENT
Start: 2022-12-06 | End: 2022-12-10

## 2022-12-06 RX ORDER — DEXTROSE 50 % IN WATER 50 %
12.5 SYRINGE (ML) INTRAVENOUS ONCE
Refills: 0 | Status: DISCONTINUED | OUTPATIENT
Start: 2022-12-06 | End: 2023-01-06

## 2022-12-06 RX ORDER — SODIUM CHLORIDE 9 MG/ML
1000 INJECTION, SOLUTION INTRAVENOUS
Refills: 0 | Status: DISCONTINUED | OUTPATIENT
Start: 2022-12-06 | End: 2023-01-06

## 2022-12-06 RX ORDER — DEXTROSE 50 % IN WATER 50 %
15 SYRINGE (ML) INTRAVENOUS ONCE
Refills: 0 | Status: DISCONTINUED | OUTPATIENT
Start: 2022-12-06 | End: 2023-01-06

## 2022-12-06 RX ORDER — HYDRALAZINE HCL 50 MG
10 TABLET ORAL EVERY 6 HOURS
Refills: 0 | Status: DISCONTINUED | OUTPATIENT
Start: 2022-12-06 | End: 2023-01-06

## 2022-12-06 RX ORDER — GLUCAGON INJECTION, SOLUTION 0.5 MG/.1ML
1 INJECTION, SOLUTION SUBCUTANEOUS ONCE
Refills: 0 | Status: DISCONTINUED | OUTPATIENT
Start: 2022-12-06 | End: 2023-01-06

## 2022-12-06 RX ORDER — HYDRALAZINE HCL 50 MG
10 TABLET ORAL ONCE
Refills: 0 | Status: COMPLETED | OUTPATIENT
Start: 2022-12-06 | End: 2022-12-06

## 2022-12-06 RX ORDER — INSULIN LISPRO 100/ML
VIAL (ML) SUBCUTANEOUS
Refills: 0 | Status: DISCONTINUED | OUTPATIENT
Start: 2022-12-06 | End: 2023-01-06

## 2022-12-06 RX ORDER — SODIUM CHLORIDE 9 MG/ML
500 INJECTION INTRAMUSCULAR; INTRAVENOUS; SUBCUTANEOUS
Refills: 0 | Status: DISCONTINUED | OUTPATIENT
Start: 2022-12-06 | End: 2022-12-10

## 2022-12-06 RX ORDER — DEXTROSE 50 % IN WATER 50 %
25 SYRINGE (ML) INTRAVENOUS ONCE
Refills: 0 | Status: DISCONTINUED | OUTPATIENT
Start: 2022-12-06 | End: 2023-01-06

## 2022-12-06 RX ORDER — DILTIAZEM HCL 120 MG
240 CAPSULE, EXT RELEASE 24 HR ORAL DAILY
Refills: 0 | Status: DISCONTINUED | OUTPATIENT
Start: 2022-12-06 | End: 2023-01-06

## 2022-12-06 RX ORDER — METOPROLOL TARTRATE 50 MG
25 TABLET ORAL
Refills: 0 | Status: DISCONTINUED | OUTPATIENT
Start: 2022-12-06 | End: 2022-12-10

## 2022-12-06 RX ORDER — PANTOPRAZOLE SODIUM 20 MG/1
40 TABLET, DELAYED RELEASE ORAL
Refills: 0 | Status: DISCONTINUED | OUTPATIENT
Start: 2022-12-06 | End: 2023-01-06

## 2022-12-06 RX ADMIN — Medication 4: at 10:51

## 2022-12-06 RX ADMIN — PANTOPRAZOLE SODIUM 40 MILLIGRAM(S): 20 TABLET, DELAYED RELEASE ORAL at 10:51

## 2022-12-06 RX ADMIN — Medication 10 MILLIGRAM(S): at 22:39

## 2022-12-06 RX ADMIN — Medication 10 MILLIGRAM(S): at 06:46

## 2022-12-06 RX ADMIN — Medication 25 MILLIGRAM(S): at 16:57

## 2022-12-06 RX ADMIN — SODIUM CHLORIDE 100 MILLILITER(S): 9 INJECTION INTRAMUSCULAR; INTRAVENOUS; SUBCUTANEOUS at 22:40

## 2022-12-06 RX ADMIN — Medication 10 MILLIGRAM(S): at 09:08

## 2022-12-06 RX ADMIN — ESCITALOPRAM OXALATE 10 MILLIGRAM(S): 10 TABLET, FILM COATED ORAL at 11:32

## 2022-12-06 RX ADMIN — Medication 4: at 16:41

## 2022-12-06 RX ADMIN — Medication 240 MILLIGRAM(S): at 09:25

## 2022-12-06 RX ADMIN — Medication 25 MILLIGRAM(S): at 09:24

## 2022-12-06 NOTE — ED ADULT NURSE NOTE - NS ED NURSE LEVEL OF CONSCIOUSNESS MENTAL STATUS
Addended by: Mary Jo Snadhu on: 9/22/2020 11:56 AM     Modules accepted: Orders
Awake/Alert/Cooperative

## 2022-12-06 NOTE — H&P ADULT - NSHPSOCIALHISTORY_GEN_ALL_CORE
Denied tobacco, alcohol, or illicit drug use  Lives at home with her     Family History: Parents were without premature coronary artery disease

## 2022-12-06 NOTE — CONSULT NOTE ADULT - ASSESSMENT
Patient is an 82yoF with PMH HTN, DM, asthma, CHF, HLD, A-fib (on coumadin) who presents to the ED with RLE swelling that began around Thanksgiving. Per son at bedside, patient with worsening swelling, she saw her PCP earlier yesterday and underwent outpatient b/l LE duplex, which showed R superficial femoral vein thrombosis. Patient was then instructed to come to the ED. Of note, patient has been on Coumadin for approx 2 years, gets weekly INR, but for the past 2 months INR has either been sub- or supratherapeutic and they have been having difficulty managing it. She otherwise started having a cough, and tested positive for COVID on Friday on a home test. She otherwise has not had headache, chest pain, fevers, chills, nausea, vomiting, bleeding.     In the ED, patient is afebrile, hypertensive with SBP 190s, otherwise HD stable. Bloodwork significant for BNP 48222, INR 4.5 PT and PTT 54, BUN 76.4 and Cr 5.8. Outpatient duplex report showing R superficial femoral DVT, CD also available in chart.     PLan:  -patient admitted to medical service for workup of THERESA/fluid overload  -R superficial femoral vein thrombosis   -would consider transition to different oral anticoagulant on dc to remain within therapeutic range  -continue care per primary team  -discussed with attending

## 2022-12-06 NOTE — ED ADULT NURSE NOTE - OBJECTIVE STATEMENT
Assumed care of pt [id number]. Pt A&Ox4 c/o RLE DVT, RLE +4 Edema, and SOB for the last two days. Pt went to  appt which showed fluid around lungs. Pt in ED for further evaluation. Pt denies chest pain at this time, - SOB, - NESS, pt able to turn as needed. SPO2 98% on RA/ RR even and unlabored. Pt son states pt fell a couple days ago on her left side. No s/s of bruising noted. Pt comfortable at this time.

## 2022-12-06 NOTE — CONSULT NOTE ADULT - SUBJECTIVE AND OBJECTIVE BOX
Patient is an 82yoF with PMH HTN, DM, asthma, CHF, HLD, A-fib (on coumadin) who presents to the ED with RLE swelling that began around Thanksgiving. Per son at bedside, patient with worsening swelling, she saw her PCP earlier yesterday and underwent outpatient b/l LE duplex, which showed R superficial femoral vein thrombosis. Patient was then instructed to come to the ED. Of note, patient has been on Coumadin for approx 2 years, gets weekly INR, but for the past 2 months INR has either been sub- or supratherapeutic and they have been having difficulty managing it. She otherwise started having a cough, and tested positive for COVID on Friday on a home test. She otherwise has not had headache, chest pain, fevers, chills, nausea, vomiting, bleeding.     PMH: HTN, DM, asthma, CHF, HLD, A-fib (on coumadin)  PSH: appendectomy, tubal ligation, L knee surgery  Meds: pantoprazole, metoprolol, lasix, diltiazem, coumadin, humalog, levemir, rosuvastatin  Allergies: NKDA  SH: denies illicit drug use x3    Vitals:  Vital Signs Last 24 Hrs  T(C): 36.8 (06 Dec 2022 05:34), Max: 36.9 (05 Dec 2022 20:22)  T(F): 98.3 (06 Dec 2022 05:34), Max: 98.5 (05 Dec 2022 20:22)  HR: 71 (06 Dec 2022 05:34) (71 - 79)  BP: 198/67 (06 Dec 2022 05:34) (185/73 - 198/67)  BP(mean): --  RR: 19 (06 Dec 2022 05:34) (18 - 19)  SpO2: 96% (06 Dec 2022 05:34) (95% - 97%)    Parameters below as of 06 Dec 2022 05:34  Patient On (Oxygen Delivery Method): room air    Labs:  12-05    130<L>  |  94<L>  |  76.4<H>  ----------------------------<  205<H>  4.7   |  23.0  |  5.81<H>    Ca    7.8<L>      05 Dec 2022 00:00    TPro  5.6<L>  /  Alb  2.2<L>  /  TBili  <0.2<L>  /  DBili  x   /  AST  20  /  ALT  11  /  AlkPhos  105  12-05                            7.8    7.80  )-----------( 311      ( 05 Dec 2022 00:00 )             24.5       Physical Exam:  Gen: pt lying in bed, alert, in NAD  Resp: unlabored  CVS: RRR  Abd: soft, NT, ND  Ext: moving all extremities spontaneously, sensation intact, RLE more edematous compared to LLE with some mild erythema, b/l DP/PT pulses palpable

## 2022-12-06 NOTE — CONSULT NOTE ADULT - ASSESSMENT
83 y/o F w h/o HTN, Asthma, DM-2   p/w dyspnea and lower extremity edema for the past two weeks.  recently diagnosed with COVID-19   Outpatient testing was notable for a R superficial femoral vein thrombosis.      THERESA on CKD stage IV  Serum Cr 2.7 on 5/ 2022  elevated BNP 22,116  Had 3 gm proteinuria, 24 hr urine-  4/2021  COVID + supportive measures noted  COVID nephrotoxicity contributing?  Renal US r/o obstructive etiology    HypoNatremia    Anemia w low iron stores TS 16%    Supratherapeutic INR 4.8 yest 83 y/o F w h/o HTN, Asthma, DM-2   p/w dyspnea and lower extremity edema for the past two weeks.  recently diagnosed with COVID-19   Outpatient testing was notable for a R superficial femoral vein thrombosis.      THERESA on CKD stage IV  Serum Cr 2.7 on 5/ 2022  elevated BNP 22,116  Had 3 gm proteinuria, 24 hr urine-  4/2021  COVID + supportive measures noted  COVID nephrotoxicity contributing?  Renal US r/o obstructive etiology  Will repeat STAT labs now - BMP    HypoNatremia etiology?   Check Urine studies     Anemia w low iron stores TS 16%  Will need IV iron whwn infectious process resolves     Supratherapeutic INR 4.8 yest    AM labs will follow

## 2022-12-06 NOTE — PROGRESS NOTE ADULT - SUBJECTIVE AND OBJECTIVE BOX
Vascular surgery update:    Patient had a repeat venous duplex completed, prelim reading is neg for DVT   Patient may continue oral AC therapy when medicine approves  No indication for any further vascular testing   Will sign off

## 2022-12-06 NOTE — ED ADULT NURSE NOTE - IN THE PAST 6 MONTHS, HAVE YOU HEARD GUNS BEING SHOT OR HAD SOMEONE PULL A GUN ON YOU?
No

## 2022-12-06 NOTE — H&P ADULT - HISTORY OF PRESENT ILLNESS
82F who was referred to the hospital by her primary care physician. Additional information was provided by her son at the bedside. The patient was reported to have increased dyspnea and lower extremity edema for the past two weeks. She is on warfarin for anticoagulation and her sone reported that the INR values have been very variable over the past few months. At times the values are high but other times the values are low. She was also recently diagnosed with COVID-19 along with her . She is noted to have been compliant with her medications at home. Outpatient testing was notable for a right superficial femoral vein thrombosis. The patient was without history of thrombosis in the past. The patient's son reported that she has had poor appetite an oral intake over the past two weeks. On presentation to the emergency department, the patient as noted to have acute kidney injury.   82F who was referred to the hospital by her primary care physician. Additional information was provided by her son at the bedside. The patient was reported to have increased dyspnea and lower extremity edema for the past two weeks. She is on warfarin for anticoagulation and her sone reported that the INR values have been very variable over the past few months. At times the values are high but other times the values are low. She was also recently diagnosed with COVID-19 along with her . The patient was noted to have congestion and a productive cough. She is noted to have been compliant with her medications at home. Outpatient testing was notable for a right superficial femoral vein thrombosis. The patient was without history of thrombosis in the past. The patient's son reported that she has had poor appetite an oral intake over the past two weeks. On presentation to the emergency department, the patient as noted to have acute kidney injury.

## 2022-12-06 NOTE — H&P ADULT - NSHPPHYSICALEXAM_GEN_ALL_CORE
Vital Signs Last 24 Hrs  T(C): 36.9 (06 Dec 2022 08:02), Max: 36.9 (05 Dec 2022 20:22)  T(F): 98.4 (06 Dec 2022 08:02), Max: 98.5 (05 Dec 2022 20:22)  HR: 80 (06 Dec 2022 08:02) (71 - 80)  BP: 205/69 (06 Dec 2022 08:02) (185/73 - 205/69)  BP(mean): --  RR: 19 (06 Dec 2022 08:02) (18 - 19)  SpO2: 97% (06 Dec 2022 08:02) (95% - 97%)    Parameters below as of 06 Dec 2022 08:02  Patient On (Oxygen Delivery Method): room air    General appearance: No acute distress, Awake, Alert  HEENT: Normocephalic, Atraumatic, Conjunctiva clear, EOMI  Neck: Supple, No JVD, No tenderness  Lungs: Breath sound equal bilaterally, No wheezes, No rales  Cardiovascular: S1S2, Regular rhythm  Abdomen: Soft, Nontender, Nondistended, No guarding/rebound, Positive bowel sounds  Extremities: No clubbing, No cyanosis, No edema, No calf tenderness  Neuro: Strength equal bilaterally, No tremors  Psychiatric: Appropriate mood, Normal affect

## 2022-12-06 NOTE — H&P ADULT - ASSESSMENT
82F with a history of asthma, diabetes, hyperlipidemia, atrial fibrillation, hypertension, and hyperlipidemia, who was found to have a right superficial femoral vein thrombosis while on warfarin and acute kidney injury. She was also noted to have been recently diagnosed with COVID-19.    Chronic kidney disease with acute kidney injury - Monitoring renal function. The patient was noted to have poor oral intake, furosemide to be held and intravenous fluids to be administered. The patient's son reported that the patient's baseline creatinine is about 2.8. Nephrology consultation pending.    Hyponatremia - Suspect secondary to poor oral intake and continued use of furosemide. For trial of intravenous fluids.    Hypertension / Atrial fibrillation - On metoprolol and diltiazem. Warfarin held for now due to elevated INR.    Deep venous thrombosis - INR is noted to be supertherapeutic. Warfarin to be held for now. Unclear is the thrombosis is due to warfarin failure as the patient's INR levels have been subtherapeutic as well. The patient is also noted to have COVID-19.    Anemia - Continue to monitor hemoglobin levels. Suspect component of chronic kidney disease.    COVID-19 - No hypoxia noted on examination. Chest Xray was without significant infiltrates. Continue with supportive care. To defer CT angiogram at this time due to acute kidney injury, lack of hypoxia, and supratherapeutic INR.     Asthma - Inhaled bronchodilators as needed.    Discussed with the patient and her son at the bedside. 82F with a history of asthma, diabetes, hyperlipidemia, atrial fibrillation, hypertension, and hyperlipidemia, who was found to have a right superficial femoral vein thrombosis while on warfarin and acute kidney injury. She was also noted to have been recently diagnosed with COVID-19.    Chronic kidney disease with acute kidney injury - Monitoring renal function. The patient was noted to have poor oral intake, furosemide to be held and intravenous fluids to be administered. The patient's son reported that the patient's baseline creatinine is about 2.8. Nephrology consultation pending.    Hyponatremia - Suspect secondary to poor oral intake and continued use of furosemide. For trial of intravenous fluids.    Hypertension / Atrial fibrillation - On metoprolol and diltiazem. Warfarin held for now due to elevated INR.    Deep venous thrombosis - INR is noted to be supertherapeutic. Warfarin to be held for now. Unclear is the thrombosis is due to warfarin failure as the patient's INR levels have been subtherapeutic as well. The patient is also noted to have COVID-19.    Anemia - Continue to monitor hemoglobin levels. Suspect component of chronic kidney disease.    COVID-19 - No hypoxia noted on examination. Chest Xray was without significant infiltrates. Continue with supportive care. To defer CT angiogram at this time due to acute kidney injury, lack of hypoxia, and supratherapeutic INR.     Asthma - Inhaled bronchodilators as needed.    Diabetes - Insulin coverage, close monitoring of blood glucose levels.      Discussed with the patient and her son at the bedside.

## 2022-12-06 NOTE — H&P ADULT - BIRTH SEX
X ray in [address]    Ice to foot     May try exercises   Up to date exercises given     Naprosyn twice daily for foot pain with food      [facility]: [facility] - [facility]  [phone]   [email or web address]    Podiatry appointment if not improving        Female

## 2022-12-06 NOTE — CONSULT NOTE ADULT - SUBJECTIVE AND OBJECTIVE BOX
HPI:  82F who was referred to the hospital by her primary care physician. Additional information was provided by her son at the bedside. The patient was reported to have increased dyspnea and lower extremity edema for the past two weeks. She is on warfarin for anticoagulation and her sone reported that the INR values have been very variable over the past few months. At times the values are high but other times the values are low. She was also recently diagnosed with COVID-19 along with her . The patient was noted to have congestion and a productive cough. She is noted to have been compliant with her medications at home. Outpatient testing was notable for a right superficial femoral vein thrombosis. The patient was without history of thrombosis in the past. The patient's son reported that she has had poor appetite an oral intake over the past two weeks. On presentation to the emergency department, the patient as noted to have acute kidney injury.      PAST MEDICAL & SURGICAL HISTORY:  Asthma      Diabetes mellitus      Hypertension      Emphysema      Hyperlipemia      S/P appendectomy      S/P tubal ligation      S/P knee surgery  left    FAMILY HISTORY:  NC    Social History:Non smoker    MEDICATIONS  (STANDING):  dextrose 5%. 1000 milliLiter(s) (100 mL/Hr) IV Continuous <Continuous>  dextrose 5%. 1000 milliLiter(s) (50 mL/Hr) IV Continuous <Continuous>  dextrose 50% Injectable 25 Gram(s) IV Push once  dextrose 50% Injectable 12.5 Gram(s) IV Push once  dextrose 50% Injectable 25 Gram(s) IV Push once  diltiazem    milliGRAM(s) Oral daily  escitalopram 10 milliGRAM(s) Oral daily  glucagon  Injectable 1 milliGRAM(s) IntraMuscular once  insulin lispro (ADMELOG) corrective regimen sliding scale   SubCutaneous three times a day before meals  metoprolol tartrate 25 milliGRAM(s) Oral two times a day  pantoprazole    Tablet 40 milliGRAM(s) Oral before breakfast  sodium chloride 0.9%. 500 milliLiter(s) (100 mL/Hr) IV Continuous <Continuous>    MEDICATIONS  (PRN):  acetaminophen     Tablet .. 650 milliGRAM(s) Oral every 6 hours PRN Temp greater or equal to 38C (100.4F), Mild Pain (1 - 3)  dextrose Oral Gel 15 Gram(s) Oral once PRN Blood Glucose LESS THAN 70 milliGRAM(s)/deciliter  hydrALAZINE Injectable 10 milliGRAM(s) IV Push every 6 hours PRN SBP > 160   Meds reviewed      Vital Signs Last 24 Hrs  T(C): 36.9 (06 Dec 2022 08:02), Max: 36.9 (05 Dec 2022 20:22)  T(F): 98.4 (06 Dec 2022 08:02), Max: 98.5 (05 Dec 2022 20:22)  HR: 81 (06 Dec 2022 09:08) (71 - 81)  BP: 198/66 (06 Dec 2022 10:37) (185/73 - 205/69)  BP(mean): --  RR: 19 (06 Dec 2022 09:08) (18 - 19)  SpO2: 97% (06 Dec 2022 09:08) (95% - 97%)    Parameters below as of 06 Dec 2022 08:02  Patient On (Oxygen Delivery Method): room air      PHYSICAL EXAM:    GENERAL: appears chronically ill  HEAD:  NCAT  EYES: EOMI  NECK: Supple  NERVOUS SYSTEM:  Alert & Oriented X3  CHEST/LUNG: Clear to percussion bilaterally  HEART: Regular rate and rhythm  ABDOMEN: Soft, Nontender, Nondistended; +BS  EXTREMITIES:  edema      LABS:                        7.8    7.80  )-----------( 311      ( 05 Dec 2022 00:00 )             24.5     12-05    130<L>  |  94<L>  |  76.4<H>  ----------------------------<  205<H>  4.7   |  23.0  |  5.81<H>    Ca    7.8<L>      05 Dec 2022 00:00    TPro  5.6<L>  /  Alb  2.2<L>  /  TBili  <0.2<L>  /  DBili  x   /  AST  20  /  ALT  11  /  AlkPhos  105  12-05    PT/INR - ( 05 Dec 2022 00:00 )   PT: 54.0 sec;   INR: 4.58 ratio         PTT - ( 05 Dec 2022 00:00 )  PTT:54.0 sec            RADIOLOGY & ADDITIONAL TESTS:     HPI:  82F who was referred to the hospital by her primary care physician. Additional information was provided by her son at the bedside. The patient was reported to have increased dyspnea and lower extremity edema for the past two weeks. She is on warfarin for anticoagulation and her sone reported that the INR values have been very variable over the past few months. At times the values are high but other times the values are low. She was also recently diagnosed with COVID-19 along with her . The patient was noted to have congestion and a productive cough. She is noted to have been compliant with her medications at home. Outpatient testing was notable for a right superficial femoral vein thrombosis. The patient was without history of thrombosis in the past. The patient's son reported that she has had poor appetite an oral intake over the past two weeks. On presentation to the emergency department, the patient as noted to have acute kidney injury.  Frail elderly no acute distress  denies HA CP       PAST MEDICAL & SURGICAL HISTORY:  Asthma      Diabetes mellitus      Hypertension      Emphysema      Hyperlipemia      S/P appendectomy      S/P tubal ligation      S/P knee surgery  left    FAMILY HISTORY:  NC    Social History:Non smoker    MEDICATIONS  (STANDING):  dextrose 5%. 1000 milliLiter(s) (100 mL/Hr) IV Continuous <Continuous>  dextrose 5%. 1000 milliLiter(s) (50 mL/Hr) IV Continuous <Continuous>  dextrose 50% Injectable 25 Gram(s) IV Push once  dextrose 50% Injectable 12.5 Gram(s) IV Push once  dextrose 50% Injectable 25 Gram(s) IV Push once  diltiazem    milliGRAM(s) Oral daily  escitalopram 10 milliGRAM(s) Oral daily  glucagon  Injectable 1 milliGRAM(s) IntraMuscular once  insulin lispro (ADMELOG) corrective regimen sliding scale   SubCutaneous three times a day before meals  metoprolol tartrate 25 milliGRAM(s) Oral two times a day  pantoprazole    Tablet 40 milliGRAM(s) Oral before breakfast  sodium chloride 0.9%. 500 milliLiter(s) (100 mL/Hr) IV Continuous <Continuous>    MEDICATIONS  (PRN):  acetaminophen     Tablet .. 650 milliGRAM(s) Oral every 6 hours PRN Temp greater or equal to 38C (100.4F), Mild Pain (1 - 3)  dextrose Oral Gel 15 Gram(s) Oral once PRN Blood Glucose LESS THAN 70 milliGRAM(s)/deciliter  hydrALAZINE Injectable 10 milliGRAM(s) IV Push every 6 hours PRN SBP > 160   Meds reviewed      Vital Signs Last 24 Hrs  T(C): 36.9 (06 Dec 2022 08:02), Max: 36.9 (05 Dec 2022 20:22)  T(F): 98.4 (06 Dec 2022 08:02), Max: 98.5 (05 Dec 2022 20:22)  HR: 81 (06 Dec 2022 09:08) (71 - 81)  BP: 198/66 (06 Dec 2022 10:37) (185/73 - 205/69)  BP(mean): --  RR: 19 (06 Dec 2022 09:08) (18 - 19)  SpO2: 97% (06 Dec 2022 09:08) (95% - 97%)    Parameters below as of 06 Dec 2022 08:02  Patient On (Oxygen Delivery Method): room air      PHYSICAL EXAM:    GENERAL: appears chronically ill  HEAD:  NCAT  EYES: EOMI  NECK: Supple  NERVOUS SYSTEM:  Alert & Oriented  CHEST/LUNG: Clear to percussion bilaterally  HEART: Regular rate and rhythm  ABDOMEN: Soft, Nontender, Nondistended; +BS  EXTREMITIES: Trace edema      LABS:                        7.8    7.80  )-----------( 311      ( 05 Dec 2022 00:00 )             24.5     12-05    130<L>  |  94<L>  |  76.4<H>  ----------------------------<  205<H>  4.7   |  23.0  |  5.81<H>    Ca    7.8<L>      05 Dec 2022 00:00    TPro  5.6<L>  /  Alb  2.2<L>  /  TBili  <0.2<L>  /  DBili  x   /  AST  20  /  ALT  11  /  AlkPhos  105  12-05    PT/INR - ( 05 Dec 2022 00:00 )   PT: 54.0 sec;   INR: 4.58 ratio         PTT - ( 05 Dec 2022 00:00 )  PTT:54.0 sec            RADIOLOGY & ADDITIONAL TESTS:

## 2022-12-07 LAB
A1C WITH ESTIMATED AVERAGE GLUCOSE RESULT: 11.2 % — HIGH (ref 4–5.6)
ANION GAP SERPL CALC-SCNC: 13 MMOL/L — SIGNIFICANT CHANGE UP (ref 5–17)
BUN SERPL-MCNC: 72.8 MG/DL — HIGH (ref 8–20)
CALCIUM SERPL-MCNC: 7.6 MG/DL — LOW (ref 8.4–10.5)
CHLORIDE SERPL-SCNC: 100 MMOL/L — SIGNIFICANT CHANGE UP (ref 96–108)
CO2 SERPL-SCNC: 22 MMOL/L — SIGNIFICANT CHANGE UP (ref 22–29)
CREAT SERPL-MCNC: 5.71 MG/DL — HIGH (ref 0.5–1.3)
EGFR: 7 ML/MIN/1.73M2 — LOW
ESTIMATED AVERAGE GLUCOSE: 275 MG/DL — HIGH (ref 68–114)
GLUCOSE BLDC GLUCOMTR-MCNC: 171 MG/DL — HIGH (ref 70–99)
GLUCOSE BLDC GLUCOMTR-MCNC: 171 MG/DL — HIGH (ref 70–99)
GLUCOSE BLDC GLUCOMTR-MCNC: 197 MG/DL — HIGH (ref 70–99)
GLUCOSE BLDC GLUCOMTR-MCNC: 203 MG/DL — HIGH (ref 70–99)
GLUCOSE BLDC GLUCOMTR-MCNC: 257 MG/DL — HIGH (ref 70–99)
GLUCOSE SERPL-MCNC: 166 MG/DL — HIGH (ref 70–99)
HCT VFR BLD CALC: 21.8 % — LOW (ref 34.5–45)
HGB BLD-MCNC: 7 G/DL — CRITICAL LOW (ref 11.5–15.5)
INR BLD: 3.97 RATIO — HIGH (ref 0.88–1.16)
MCHC RBC-ENTMCNC: 20.4 PG — LOW (ref 27–34)
MCHC RBC-ENTMCNC: 32.1 GM/DL — SIGNIFICANT CHANGE UP (ref 32–36)
MCV RBC AUTO: 63.6 FL — LOW (ref 80–100)
PLATELET # BLD AUTO: 329 K/UL — SIGNIFICANT CHANGE UP (ref 150–400)
POTASSIUM SERPL-MCNC: 4 MMOL/L — SIGNIFICANT CHANGE UP (ref 3.5–5.3)
POTASSIUM SERPL-SCNC: 4 MMOL/L — SIGNIFICANT CHANGE UP (ref 3.5–5.3)
PROTHROM AB SERPL-ACNC: 46.7 SEC — HIGH (ref 10.5–13.4)
RBC # BLD: 3.43 M/UL — LOW (ref 3.8–5.2)
RBC # FLD: 16.3 % — HIGH (ref 10.3–14.5)
SODIUM SERPL-SCNC: 135 MMOL/L — SIGNIFICANT CHANGE UP (ref 135–145)
WBC # BLD: 7.06 K/UL — SIGNIFICANT CHANGE UP (ref 3.8–10.5)
WBC # FLD AUTO: 7.06 K/UL — SIGNIFICANT CHANGE UP (ref 3.8–10.5)

## 2022-12-07 PROCEDURE — 99233 SBSQ HOSP IP/OBS HIGH 50: CPT

## 2022-12-07 PROCEDURE — 71250 CT THORAX DX C-: CPT | Mod: 26

## 2022-12-07 RX ORDER — INSULIN LISPRO 100/ML
2 VIAL (ML) SUBCUTANEOUS ONCE
Refills: 0 | Status: COMPLETED | OUTPATIENT
Start: 2022-12-07 | End: 2022-12-07

## 2022-12-07 RX ORDER — LANOLIN ALCOHOL/MO/W.PET/CERES
1 CREAM (GRAM) TOPICAL AT BEDTIME
Refills: 0 | Status: DISCONTINUED | OUTPATIENT
Start: 2022-12-07 | End: 2023-01-06

## 2022-12-07 RX ORDER — ALBUTEROL 90 UG/1
2 AEROSOL, METERED ORAL EVERY 6 HOURS
Refills: 0 | Status: COMPLETED | OUTPATIENT
Start: 2022-12-07 | End: 2023-11-05

## 2022-12-07 RX ADMIN — Medication 650 MILLIGRAM(S): at 03:21

## 2022-12-07 RX ADMIN — Medication 240 MILLIGRAM(S): at 06:38

## 2022-12-07 RX ADMIN — Medication 2 UNIT(S): at 01:03

## 2022-12-07 RX ADMIN — Medication 2: at 08:04

## 2022-12-07 RX ADMIN — Medication 1200 MILLIGRAM(S): at 17:28

## 2022-12-07 RX ADMIN — ESCITALOPRAM OXALATE 10 MILLIGRAM(S): 10 TABLET, FILM COATED ORAL at 13:09

## 2022-12-07 RX ADMIN — Medication 2: at 16:26

## 2022-12-07 RX ADMIN — Medication 25 MILLIGRAM(S): at 06:38

## 2022-12-07 RX ADMIN — Medication 650 MILLIGRAM(S): at 02:51

## 2022-12-07 RX ADMIN — PANTOPRAZOLE SODIUM 40 MILLIGRAM(S): 20 TABLET, DELAYED RELEASE ORAL at 06:38

## 2022-12-07 RX ADMIN — Medication 25 MILLIGRAM(S): at 17:28

## 2022-12-07 RX ADMIN — Medication 1 MILLIGRAM(S): at 21:53

## 2022-12-07 RX ADMIN — Medication 4: at 11:40

## 2022-12-07 RX ADMIN — Medication 10 MILLIGRAM(S): at 21:53

## 2022-12-07 NOTE — PROGRESS NOTE ADULT - SUBJECTIVE AND OBJECTIVE BOX
Patient is a 82y old  Female who presents with a chief complaint of arf    Patient seen and examined at bedside.      ALLERGIES:  No Known Allergies    MEDICATIONS  (STANDING):  dextrose 5%. 1000 milliLiter(s) (100 mL/Hr) IV Continuous <Continuous>  dextrose 5%. 1000 milliLiter(s) (50 mL/Hr) IV Continuous <Continuous>  dextrose 50% Injectable 25 Gram(s) IV Push once  dextrose 50% Injectable 12.5 Gram(s) IV Push once  dextrose 50% Injectable 25 Gram(s) IV Push once  diltiazem    milliGRAM(s) Oral daily  escitalopram 10 milliGRAM(s) Oral daily  glucagon  Injectable 1 milliGRAM(s) IntraMuscular once  guaiFENesin ER 1200 milliGRAM(s) Oral every 12 hours  insulin lispro (ADMELOG) corrective regimen sliding scale   SubCutaneous three times a day before meals  metoprolol tartrate 25 milliGRAM(s) Oral two times a day  pantoprazole    Tablet 40 milliGRAM(s) Oral before breakfast  sodium chloride 0.9%. 500 milliLiter(s) (100 mL/Hr) IV Continuous <Continuous>    MEDICATIONS  (PRN):  acetaminophen     Tablet .. 650 milliGRAM(s) Oral every 6 hours PRN Temp greater or equal to 38C (100.4F), Mild Pain (1 - 3)  albuterol    90 MICROgram(s) HFA Inhaler 2 Puff(s) Inhalation every 6 hours PRN Shortness of Breath and/or Wheezing  dextrose Oral Gel 15 Gram(s) Oral once PRN Blood Glucose LESS THAN 70 milliGRAM(s)/deciliter  hydrALAZINE Injectable 10 milliGRAM(s) IV Push every 6 hours PRN SBP > 160    Vital Signs Last 24 Hrs  T(F): 98.6 (07 Dec 2022 10:01), Max: 98.6 (07 Dec 2022 10:01)  HR: 67 (07 Dec 2022 10:01) (63 - 74)  BP: 160/52 (07 Dec 2022 10:01) (151/51 - 172/75)  RR: 17 (07 Dec 2022 10:01) (17 - 18)  SpO2: 98% (07 Dec 2022 10:01) (96% - 98%)  I&O's Summary    PHYSICAL EXAM:  General: NAD, Alert  ENT: MMM, no thrush  Neck: Supple, No JVD  Lungs: Clear to auscultation bilaterally, good air entry, non-labored breathing  Cardio: +s1/s2  Abdomen: Soft, Nontender, Nondistended; Bowel sounds present  Extremities: No calf tenderness, +pitting edema    LABS:                        7.0    7.06  )-----------( 329      ( 07 Dec 2022 06:07 )             21.8     12-07    135  |  100  |  72.8  ----------------------------<  166  4.0   |  22.0  |  5.71    Ca    7.6      07 Dec 2022 06:07  Phos  4.6     12-06    TPro  5.6  /  Alb  2.2  /  TBili  <0.2  /  DBili  x   /  AST  20  /  ALT  11  /  AlkPhos  105  12-05    PT/INR - ( 07 Dec 2022 06:07 )   PT: 46.7 sec;   INR: 3.97 ratio    PTT - ( 05 Dec 2022 00:00 )  PTT:54.0 sec    Glucose  POCT Blood Glucose.: 203 mg/dL (07 Dec 2022 11:35)  POCT Blood Glucose.: 197 mg/dL (07 Dec 2022 08:03)  POCT Blood Glucose.: 257 mg/dL (07 Dec 2022 01:01)  POCT Blood Glucose.: 243 mg/dL (06 Dec 2022 23:37)  POCT Blood Glucose.: 225 mg/dL (06 Dec 2022 16:38)    RADIOLOGY & ADDITIONAL TESTS:  - no new tests

## 2022-12-07 NOTE — PATIENT PROFILE ADULT - FALL HARM RISK - HARM RISK INTERVENTIONS
Assistance with ambulation/Assistance OOB with selected safe patient handling equipment/Communicate Risk of Fall with Harm to all staff/Discuss with provider need for PT consult/Monitor for mental status changes/Monitor gait and stability/Move patient closer to nurses' station/Provide patient with walking aids - walker, cane, crutches/Reinforce activity limits and safety measures with patient and family/Reorient to person, place and time as needed/Tailored Fall Risk Interventions/Toileting schedule using arm’s reach rule for commode and bathroom/Use of alarms - bed, chair and/or voice tab/Visual Cue: Yellow wristband and red socks/Bed in lowest position, wheels locked, appropriate side rails in place/Call bell, personal items and telephone in reach/Instruct patient to call for assistance before getting out of bed or chair/Non-slip footwear when patient is out of bed/[place] to call system/Physically safe environment - no spills, clutter or unnecessary equipment/Purposeful Proactive Rounding/Room/bathroom lighting operational, light cord in reach

## 2022-12-07 NOTE — PROGRESS NOTE ADULT - ASSESSMENT
81 y/o F w h/o HTN, Asthma, DM-2   p/w dyspnea and lower extremity edema for the past two weeks.  recently diagnosed with COVID-19   Outpatient testing was notable for a R superficial femoral vein thrombosis.      THERESA on CKD stage IV  Serum Cr 2.7 on 5/ 2022 - hasn't f/u since  will need outpt labs from PMD  elevated BNP 22,116  Had 3 gm proteinuria, 24 hr urine-  4/2021  COVID + supportive measures noted  COVID nephrotoxicity contributing?  Renal US r/o obstructive etiology      HypoNatremia resolved    Anemia w low iron stores TS 16%  Will need IV iron whwn infectious process resolves   Add retacrit    Supratherapeutic INR 4.8 yest  FOBT    AM labs will follow

## 2022-12-07 NOTE — PROGRESS NOTE ADULT - ASSESSMENT
82F with a history of asthma, diabetes, hyperlipidemia, atrial fibrillation, hypertension, and hyperlipidemia, who was found to have a right superficial femoral vein thrombosis while on warfarin and acute kidney injury. She was also noted to have been recently diagnosed with COVID-19.    #THERESA on CKD   - monitor cr   - nephro consult appreciated  - avoid nephrotoxic meds  - combination of poor po intake, diabetes- uncontrolled     #HTN-Essential  - monitor blood pressure  - metoprolol diltiazem    #Afib  - metoprolol diltiazem  - coumadin when INR <3- currently supratherapuetic    #DVT  - coumadin when INR <3- currently supratherapuetic  - vascular surgery consult appreciated    #Anemia  - multifactorial - given kidney disease  - monitor h and h   - transfuse one unit prbc (transfusion timed to after 7PM given needs new type and screen per [facility] policy- pt son and RN aware)  - check b12 and folate  - will need iv iron once acute covid resolves per nephro    #COVID-19   - supportive care   - isolation precautions  - check ct chest    #Asthma   - supportive care  - albuterol prn    #Uncontrolled Type 2 Diabetes on insulin  - sliding scale  - a1c 11.2  - monitor fingersticks  - endo consult in am    spoke to patient son bedside all questions answered

## 2022-12-07 NOTE — PATIENT PROFILE ADULT - FALL HARM RISK - FACTORS NURSING JUDGEMENT
Please follow Dr. Gray's instruction sheet.  Keep dressing clean and dry.  Please call the office to make a follow up appointment. Yes

## 2022-12-07 NOTE — PROGRESS NOTE ADULT - SUBJECTIVE AND OBJECTIVE BOX
Patient seen and examined    Son present  Pt sl better    REVIEW OF SYSTEMS:    CONSTITUTIONAL: No F/C  RESPIRATORY: + cough with some expectoration,  mild SOB  CARDIOVASCULAR: No CP/palpitations,    GASTROINTESTINAL: No abdominal pain  or NVD   GENITOURINARY: No UTI sx  NEUROLOGICAL: No headaches, NO wk/numbness  MUSCULOSKELETAL:   EXTREMITIES : no swelling,    Vital Signs Last 24 Hrs  T(C): 36.8 (07 Dec 2022 16:20), Max: 37 (07 Dec 2022 10:01)  T(F): 98.3 (07 Dec 2022 16:20), Max: 98.6 (07 Dec 2022 10:01)  HR: 67 (07 Dec 2022 16:20) (63 - 74)  BP: 150/71 (07 Dec 2022 16:20) (150/71 - 172/75)  BP(mean): --  RR: 18 (07 Dec 2022 16:20) (17 - 18)  SpO2: 97% (07 Dec 2022 16:20) (96% - 98%)    Parameters below as of 07 Dec 2022 16:20  Patient On (Oxygen Delivery Method): room air        PHYSICAL EXAM:    GENERAL: NAD,   EYES:  conjunctiva and sclera clear  NECK: Supple, No JVD/Bruit  NERVOUS SYSTEM:  A/O x3,   CHEST:  No rales few rhonchi  HEART:  RRR, No murmur  ABDOMEN: Soft, NT/ND BS+  EXTREMITIES:  No Edema;  SKIN: No rashes    LABS:                          7.0    7.06  )-----------( 329      ( 07 Dec 2022 06:07 )             21.8     12-07    135  |  100  |  72.8<H>  ----------------------------<  166<H>  4.0   |  22.0  |  5.71<H>    Ca    7.6<L>      07 Dec 2022 06:07  Phos  4.6     12-06        MEDICATIONS  (STANDING):  acetaminophen     Tablet .. PRN  albuterol    90 MICROgram(s) HFA Inhaler PRN  dextrose 5%.  dextrose 5%.  dextrose 50% Injectable  dextrose 50% Injectable  dextrose 50% Injectable  dextrose Oral Gel PRN  diltiazem   CD  escitalopram  glucagon  Injectable  guaiFENesin ER  hydrALAZINE Injectable PRN  insulin lispro (ADMELOG) corrective regimen sliding scale  metoprolol tartrate  pantoprazole    Tablet  sodium chloride 0.9%.

## 2022-12-08 LAB
ANION GAP SERPL CALC-SCNC: 15 MMOL/L — SIGNIFICANT CHANGE UP (ref 5–17)
APTT BLD: 40.9 SEC — HIGH (ref 27.5–35.5)
BUN SERPL-MCNC: 72.3 MG/DL — HIGH (ref 8–20)
CALCIUM SERPL-MCNC: 7.4 MG/DL — LOW (ref 8.4–10.5)
CHLORIDE SERPL-SCNC: 99 MMOL/L — SIGNIFICANT CHANGE UP (ref 96–108)
CO2 SERPL-SCNC: 18 MMOL/L — LOW (ref 22–29)
CREAT SERPL-MCNC: 5.35 MG/DL — HIGH (ref 0.5–1.3)
EGFR: 8 ML/MIN/1.73M2 — LOW
FOLATE SERPL-MCNC: 8.9 NG/ML — SIGNIFICANT CHANGE UP
GLUCOSE BLDC GLUCOMTR-MCNC: 140 MG/DL — HIGH (ref 70–99)
GLUCOSE BLDC GLUCOMTR-MCNC: 209 MG/DL — HIGH (ref 70–99)
GLUCOSE BLDC GLUCOMTR-MCNC: 214 MG/DL — HIGH (ref 70–99)
GLUCOSE BLDC GLUCOMTR-MCNC: 249 MG/DL — HIGH (ref 70–99)
GLUCOSE SERPL-MCNC: 246 MG/DL — HIGH (ref 70–99)
HCT VFR BLD CALC: 26.9 % — LOW (ref 34.5–45)
HGB BLD-MCNC: 8.4 G/DL — LOW (ref 11.5–15.5)
INR BLD: 2.04 RATIO — HIGH (ref 0.88–1.16)
IRON SATN MFR SERPL: 36 % — SIGNIFICANT CHANGE UP (ref 14–50)
IRON SATN MFR SERPL: 63 UG/DL — SIGNIFICANT CHANGE UP (ref 37–145)
MCHC RBC-ENTMCNC: 21.1 PG — LOW (ref 27–34)
MCHC RBC-ENTMCNC: 31.2 GM/DL — LOW (ref 32–36)
MCV RBC AUTO: 67.4 FL — LOW (ref 80–100)
PLATELET # BLD AUTO: 348 K/UL — SIGNIFICANT CHANGE UP (ref 150–400)
POTASSIUM SERPL-MCNC: 4.9 MMOL/L — SIGNIFICANT CHANGE UP (ref 3.5–5.3)
POTASSIUM SERPL-SCNC: 4.9 MMOL/L — SIGNIFICANT CHANGE UP (ref 3.5–5.3)
PROTHROM AB SERPL-ACNC: 23.8 SEC — HIGH (ref 10.5–13.4)
RBC # BLD: 3.99 M/UL — SIGNIFICANT CHANGE UP (ref 3.8–5.2)
RBC # FLD: 18.3 % — HIGH (ref 10.3–14.5)
SODIUM SERPL-SCNC: 132 MMOL/L — LOW (ref 135–145)
TIBC SERPL-MCNC: 174 UG/DL — LOW (ref 220–430)
TRANSFERRIN SERPL-MCNC: 122 MG/DL — LOW (ref 192–382)
VIT B12 SERPL-MCNC: 933 PG/ML — SIGNIFICANT CHANGE UP (ref 232–1245)
WBC # BLD: 9.16 K/UL — SIGNIFICANT CHANGE UP (ref 3.8–10.5)
WBC # FLD AUTO: 9.16 K/UL — SIGNIFICANT CHANGE UP (ref 3.8–10.5)

## 2022-12-08 PROCEDURE — 99497 ADVNCD CARE PLAN 30 MIN: CPT | Mod: 25

## 2022-12-08 PROCEDURE — 99221 1ST HOSP IP/OBS SF/LOW 40: CPT

## 2022-12-08 PROCEDURE — 99233 SBSQ HOSP IP/OBS HIGH 50: CPT

## 2022-12-08 RX ORDER — WARFARIN SODIUM 2.5 MG/1
5 TABLET ORAL ONCE
Refills: 0 | Status: COMPLETED | OUTPATIENT
Start: 2022-12-08 | End: 2022-12-08

## 2022-12-08 RX ADMIN — Medication 4: at 17:19

## 2022-12-08 RX ADMIN — Medication 25 MILLIGRAM(S): at 05:40

## 2022-12-08 RX ADMIN — Medication 4: at 12:21

## 2022-12-08 RX ADMIN — Medication 10 MILLIGRAM(S): at 16:14

## 2022-12-08 RX ADMIN — Medication 1200 MILLIGRAM(S): at 16:14

## 2022-12-08 RX ADMIN — ESCITALOPRAM OXALATE 10 MILLIGRAM(S): 10 TABLET, FILM COATED ORAL at 12:24

## 2022-12-08 RX ADMIN — WARFARIN SODIUM 5 MILLIGRAM(S): 2.5 TABLET ORAL at 21:23

## 2022-12-08 RX ADMIN — Medication 10 MILLIGRAM(S): at 05:40

## 2022-12-08 RX ADMIN — Medication 1200 MILLIGRAM(S): at 05:40

## 2022-12-08 RX ADMIN — SODIUM CHLORIDE 100 MILLILITER(S): 9 INJECTION INTRAMUSCULAR; INTRAVENOUS; SUBCUTANEOUS at 12:24

## 2022-12-08 RX ADMIN — Medication 25 MILLIGRAM(S): at 16:14

## 2022-12-08 RX ADMIN — Medication 0.5 MILLIGRAM(S): at 21:23

## 2022-12-08 RX ADMIN — Medication 240 MILLIGRAM(S): at 05:40

## 2022-12-08 RX ADMIN — Medication 4: at 08:34

## 2022-12-08 RX ADMIN — PANTOPRAZOLE SODIUM 40 MILLIGRAM(S): 20 TABLET, DELAYED RELEASE ORAL at 05:39

## 2022-12-08 NOTE — CONSULT NOTE ADULT - ASSESSMENT
DM, likely type 2, insulin treated, which is the best mode of therapy given renal dysfunction. May require further dose adjustments; will follow

## 2022-12-08 NOTE — PROGRESS NOTE ADULT - SUBJECTIVE AND OBJECTIVE BOX
[facility] Division of Hospital Medicine    Chief Complaint: acute renal faliure     SUBJECTIVE / OVERNIGHT EVENTS: No acute events overnight. HD stable. Denies n/v/f/c/h/d.     Patient denies chest pain, SOB, abd pain, N/V, fever, chills, dysuria or any other complaints. All remainder ROS negative.     MEDICATIONS  (STANDING):  dextrose 5%. 1000 milliLiter(s) (100 mL/Hr) IV Continuous <Continuous>  dextrose 5%. 1000 milliLiter(s) (50 mL/Hr) IV Continuous <Continuous>  dextrose 50% Injectable 25 Gram(s) IV Push once  dextrose 50% Injectable 12.5 Gram(s) IV Push once  dextrose 50% Injectable 25 Gram(s) IV Push once  diltiazem    milliGRAM(s) Oral daily  escitalopram 10 milliGRAM(s) Oral daily  glucagon  Injectable 1 milliGRAM(s) IntraMuscular once  guaiFENesin ER 1200 milliGRAM(s) Oral every 12 hours  insulin lispro (ADMELOG) corrective regimen sliding scale   SubCutaneous three times a day before meals  metoprolol tartrate 25 milliGRAM(s) Oral two times a day  pantoprazole    Tablet 40 milliGRAM(s) Oral before breakfast  sodium chloride 0.9%. 500 milliLiter(s) (100 mL/Hr) IV Continuous <Continuous>  warfarin 5 milliGRAM(s) Oral once    MEDICATIONS  (PRN):  acetaminophen     Tablet .. 650 milliGRAM(s) Oral every 6 hours PRN Temp greater or equal to 38C (100.4F), Mild Pain (1 - 3)  albuterol    90 MICROgram(s) HFA Inhaler 2 Puff(s) Inhalation every 6 hours PRN Shortness of Breath and/or Wheezing  dextrose Oral Gel 15 Gram(s) Oral once PRN Blood Glucose LESS THAN 70 milliGRAM(s)/deciliter  hydrALAZINE Injectable 10 milliGRAM(s) IV Push every 6 hours PRN SBP > 160  melatonin 1 milliGRAM(s) Oral at bedtime PRN Sleep        I&O's Summary      PHYSICAL EXAM:  Vital Signs Last 24 Hrs  T(C): 36.7 (08 Dec 2022 10:35), Max: 36.9 (07 Dec 2022 20:53)  T(F): 98 (08 Dec 2022 10:35), Max: 98.5 (07 Dec 2022 20:53)  HR: 67 (08 Dec 2022 10:35) (65 - 75)  BP: 148/64 (08 Dec 2022 10:35) (148/64 - 181/66)  BP(mean): --  RR: 17 (08 Dec 2022 10:35) (16 - 18)  SpO2: 97% (08 Dec 2022 10:35) (95% - 97%)    Parameters below as of 08 Dec 2022 10:35  Patient On (Oxygen Delivery Method): room air      CONSTITUTIONAL: NAD, ill-appearing  RESPIRATORY: Normal respiratory effort; lungs are clear to auscultation bilaterally  CARDIOVASCULAR: Regular rate and rhythm, normal S1 and S2, no murmur/rub/gallop; +b/l LE pitting edema; Peripheral pulses are 2+ bilaterally  ABDOMEN: Nontender to palpation, normoactive bowel sounds  PSYCH: A+Ox 1 to person; affect appropriate  NEUROLOGY: CN 2-12 are intact and symmetric; no gross sensory deficits    LABS:                        8.4    9.16  )-----------( 348      ( 08 Dec 2022 06:49 )             26.9     12-08    132<L>  |  99  |  72.3<H>  ----------------------------<  246<H>  4.9   |  18.0<L>  |  5.35<H>    Ca    7.4<L>      08 Dec 2022 06:49  Phos  4.6     12-06      PT/INR - ( 08 Dec 2022 06:49 )   PT: 23.8 sec;   INR: 2.04 ratio         PTT - ( 08 Dec 2022 06:49 )  PTT:40.9 sec          CAPILLARY BLOOD GLUCOSE      POCT Blood Glucose.: 214 mg/dL (08 Dec 2022 10:55)  POCT Blood Glucose.: 249 mg/dL (08 Dec 2022 08:02)  POCT Blood Glucose.: 171 mg/dL (07 Dec 2022 21:56)  POCT Blood Glucose.: 171 mg/dL (07 Dec 2022 16:25)  POCT Blood Glucose.: 203 mg/dL (07 Dec 2022 11:35)        RADIOLOGY & ADDITIONAL TESTS:  Results Reviewed:   Imaging Personally Reviewed:  < from: CT Chest No Cont (12.07.22 @ 19:56) >  IMPRESSION:  Small bilateral pleural effusions with partial atelectasis of both lower   lobes. Subtle patchy right upper lobe opacities, possibly   infectious/inflammatory. Ill-defined right lower lobe nodular opacity is   indeterminate. Three-month follow-up chest CT recommended for complete   evaluation.    VERTEBRAL BODY ANALYSIS: Vertebral compression fractures as described,   consider further workup for osteoporosis.    --- End of Report ---            URMILA CAMPOVERDE MD; Attending Radiologist  This document has been electronically signed. Dec  7 2022  8:13PM    < end of copied text >      Electrocardiogram Personally Reviewed:

## 2022-12-08 NOTE — PROGRESS NOTE ADULT - ASSESSMENT
81 y/o F w h/o HTN, Asthma, DM-2   p/w dyspnea and lower extremity edema for the past two weeks.  recently diagnosed with COVID-19   Outpatient testing was notable for a R superficial femoral vein thrombosis.      THERESA on CKD stage IV - Creat 5.7> 5.35  Serum Cr 2.7 on 5/ 2022 - hasn't f/u since  will need outpt labs from PMD  elevated BNP 22,116  Had 3 gm proteinuria, 24 hr urine-  4/2021  COVID + supportive measures noted  COVID nephrotoxicity contributing?  Renal US r/o obstructive etiology      HypoNatremia better    Anemia w low iron stores TS 16%  Will need IV iron whwn infectious process resolves   Add retacrit    Supratherapeutic INR 4.8 yest  FOBT    Ca low but lb was 2.2 - recheck all  AM labs will follow

## 2022-12-08 NOTE — PROGRESS NOTE ADULT - ASSESSMENT
82F with a history of asthma, diabetes, hyperlipidemia, atrial fibrillation, hypertension, and hyperlipidemia, who was found to have a right superficial femoral vein thrombosis while on warfarin and acute kidney injury. She was also noted to have been recently diagnosed with COVID-19.    #THERESA on CKD   - Baseline SCr- 2.7 (5/20022) monitor cr   - nephro consult appreciated  - avoid nephrotoxic meds  - combination of poor po intake, diabetes- uncontrolled   - US renal- chronic medical disease  - Appreciate nephro recs    #HTN-Essential  - monitor blood pressure  - metoprolol diltiazem    #Afib  - metoprolol diltiazem  - coumadin resumed    #DVT  - coumadin resumed  - US Dsuplex- No evidence of deep venous thrombosis in either lower extremity.  - vascular surgery consult appreciated    #Anemia  - multifactorial - given kidney disease  - monitor h and h   - S/p 1U PRBC  - will need iv iron once acute covid resolves per nephro    #COVID-19   - supportive care   - isolation precautions  - Will need repeat outpatient CT chest in 3 months for RLL nodule    #Asthma   - supportive care  - albuterol prn    #Uncontrolled Type 2 Diabetes on insulin  - sliding scale  - a1c 11.2  - monitor fingersticks  - Endo c/s placed    CODE STATUS- FULL CODE  Updated patient's son, Richard (son- [phone]) on 12/8. Per family request, ordered ativan for anxiety.     PT c/s placed

## 2022-12-08 NOTE — CONSULT NOTE ADULT - SUBJECTIVE AND OBJECTIVE BOX
HPI:  82F who was referred to the hospital by her primary care physician. Additional information was provided by her son at the bedside. The patient was reported to have increased dyspnea and lower extremity edema for the past two weeks. She is on warfarin for anticoagulation and her sone reported that the INR values have been very variable over the past few months. At times the values are high but other times the values are low. She was also recently diagnosed with COVID-19 along with her . The patient was noted to have congestion and a productive cough. She is noted to have been compliant with her medications at home. Outpatient testing was notable for a right superficial femoral vein thrombosis. The patient was without history of thrombosis in the past. The patient's son reported that she has had poor appetite an oral intake over the past two weeks. On presentation to the emergency department, the patient as noted to have acute kidney injury.   (06 Dec 2022 08:44)  Consult requested for diabetes evaluation  h/o insulin treated diabetes ,injections given by a family member. She is on levemir 12 units, and 6 units of pre-meal insulin, with an increase to 8 units for hyperglycemia. Outpatient control is variable. She uses a blire sensor with additional fingersticks. Diabetes is complciated by chronic renal insufficiency      PAST MEDICAL & SURGICAL HISTORY:  Asthma      Diabetes mellitus      Hypertension      Emphysema      Hyperlipemia      S/P appendectomy      S/P tubal ligation      S/P knee surgery  left          FAMILY HISTORY:      SOCIAL HISTORY:    REVIEW OF SYSTEMS:  as noted in HPI    Neurologic: No headaches, no weakness, no burning or pain in feet, no tremor.    Endocrine: No heat intolerance, no cold intolerance, no increased sweating, no shakiness between meals.    Psych: No depression, no anxiety, no trouble concentrating    MEDICATIONS  (STANDING):  dextrose 5%. 1000 milliLiter(s) (100 mL/Hr) IV Continuous <Continuous>  dextrose 5%. 1000 milliLiter(s) (50 mL/Hr) IV Continuous <Continuous>  dextrose 50% Injectable 25 Gram(s) IV Push once  dextrose 50% Injectable 12.5 Gram(s) IV Push once  dextrose 50% Injectable 25 Gram(s) IV Push once  diltiazem    milliGRAM(s) Oral daily  escitalopram 10 milliGRAM(s) Oral daily  glucagon  Injectable 1 milliGRAM(s) IntraMuscular once  guaiFENesin ER 1200 milliGRAM(s) Oral every 12 hours  insulin lispro (ADMELOG) corrective regimen sliding scale   SubCutaneous three times a day before meals  LORazepam     Tablet 0.5 milliGRAM(s) Oral at bedtime  metoprolol tartrate 25 milliGRAM(s) Oral two times a day  pantoprazole    Tablet 40 milliGRAM(s) Oral before breakfast  sodium chloride 0.9%. 500 milliLiter(s) (100 mL/Hr) IV Continuous <Continuous>  warfarin 5 milliGRAM(s) Oral once    MEDICATIONS  (PRN):  acetaminophen     Tablet .. 650 milliGRAM(s) Oral every 6 hours PRN Temp greater or equal to 38C (100.4F), Mild Pain (1 - 3)  albuterol    90 MICROgram(s) HFA Inhaler 2 Puff(s) Inhalation every 6 hours PRN Shortness of Breath and/or Wheezing  dextrose Oral Gel 15 Gram(s) Oral once PRN Blood Glucose LESS THAN 70 milliGRAM(s)/deciliter  hydrALAZINE Injectable 10 milliGRAM(s) IV Push every 6 hours PRN SBP > 160  melatonin 1 milliGRAM(s) Oral at bedtime PRN Sleep      Allergies    No Known Allergies    Intolerances          PHYSICAL EXAM:    Vital Signs Last 24 Hrs  T(C): 36.7 (08 Dec 2022 10:35), Max: 36.9 (07 Dec 2022 20:53)  T(F): 98 (08 Dec 2022 10:35), Max: 98.5 (07 Dec 2022 20:53)  HR: 67 (08 Dec 2022 10:35) (65 - 75)  BP: 148/64 (08 Dec 2022 10:35) (148/64 - 181/66)  BP(mean): --  RR: 17 (08 Dec 2022 10:35) (16 - 18)  SpO2: 97% (08 Dec 2022 10:35) (95% - 97%)    Parameters below as of 08 Dec 2022 10:35  Patient On (Oxygen Delivery Method): room air        General appearance: Elderly, frequent coughing, NAD    LABS:                        8.4    9.16  )-----------( 348      ( 08 Dec 2022 06:49 )             26.9     12-08    132<L>  |  99  |  72.3<H>  ----------------------------<  246<H>  4.9   |  18.0<L>  |  5.35<H>    Ca    7.4<L>      08 Dec 2022 06:49  Phos  4.6     12-06                POCT Blood Glucose.: 214 mg/dL (12-08-22 @ 10:55)  POCT Blood Glucose.: 249 mg/dL (12-08-22 @ 08:02)  POCT Blood Glucose.: 171 mg/dL (12-07-22 @ 21:56)  POCT Blood Glucose.: 171 mg/dL (12-07-22 @ 16:25)  POCT Blood Glucose.: 203 mg/dL (12-07-22 @ 11:35)  POCT Blood Glucose.: 197 mg/dL (12-07-22 @ 08:03)  POCT Blood Glucose.: 257 mg/dL (12-07-22 @ 01:01)  POCT Blood Glucose.: 243 mg/dL (12-06-22 @ 23:37)  POCT Blood Glucose.: 225 mg/dL (12-06-22 @ 16:38)  POCT Blood Glucose.: 228 mg/dL (12-06-22 @ 10:34)

## 2022-12-08 NOTE — PROGRESS NOTE ADULT - SUBJECTIVE AND OBJECTIVE BOX
Patient seen and examined    Feels better  Eating easily,  present  no c/o cough CP SOB NV   No swelling feet    Vital Signs Last 24 Hrs  T(C): 36.7 (08 Dec 2022 16:05), Max: 36.9 (07 Dec 2022 20:53)  T(F): 98 (08 Dec 2022 16:05), Max: 98.5 (07 Dec 2022 20:53)  HR: 75 (08 Dec 2022 16:05) (65 - 75)  BP: 191/61 (08 Dec 2022 16:05) (148/64 - 191/61)  BP(mean): --  RR: 18 (08 Dec 2022 16:05) (16 - 18)  SpO2: 97% (08 Dec 2022 16:05) (95% - 97%)    Parameters below as of 08 Dec 2022 16:05  Patient On (Oxygen Delivery Method): room air        PHYSICAL EXAM    GENERAL: NAD,   EYES:  conjunctiva and sclera clear  NECK: Supple, No JVD/Bruit  NERVOUS SYSTEM:  A/O x3,   CHEST:  No rales, No rhonchi  HEART:  RRR, No murmur  ABDOMEN: Soft, NT/ND BS+  EXTREMITIES:  No Edema;  SKIN: No rashes    08 Dec 2022 06:49    132    |  99     |  72.3   ----------------------------<  246    4.9     |  18.0   |  5.35     Ca    7.4        08 Dec 2022 06:49                            8.4    9.16  )-----------( 348      ( 08 Dec 2022 06:49 )             26.9         Continue present treatment

## 2022-12-09 LAB
24R-OH-CALCIDIOL SERPL-MCNC: 13.3 NG/ML — LOW (ref 30–80)
ALBUMIN SERPL ELPH-MCNC: 1.9 G/DL — LOW (ref 3.3–5.2)
ALP SERPL-CCNC: 84 U/L — SIGNIFICANT CHANGE UP (ref 40–120)
ALT FLD-CCNC: 9 U/L — SIGNIFICANT CHANGE UP
ANION GAP SERPL CALC-SCNC: 14 MMOL/L — SIGNIFICANT CHANGE UP (ref 5–17)
AST SERPL-CCNC: 11 U/L — SIGNIFICANT CHANGE UP
BILIRUB SERPL-MCNC: <0.2 MG/DL — LOW (ref 0.4–2)
BUN SERPL-MCNC: 74.2 MG/DL — HIGH (ref 8–20)
CALCIUM SERPL-MCNC: 7.7 MG/DL — LOW (ref 8.4–10.5)
CHLORIDE SERPL-SCNC: 101 MMOL/L — SIGNIFICANT CHANGE UP (ref 96–108)
CO2 SERPL-SCNC: 17 MMOL/L — LOW (ref 22–29)
CREAT SERPL-MCNC: 4.98 MG/DL — HIGH (ref 0.5–1.3)
EGFR: 8 ML/MIN/1.73M2 — LOW
GLUCOSE BLDC GLUCOMTR-MCNC: 179 MG/DL — HIGH (ref 70–99)
GLUCOSE BLDC GLUCOMTR-MCNC: 203 MG/DL — HIGH (ref 70–99)
GLUCOSE BLDC GLUCOMTR-MCNC: 213 MG/DL — HIGH (ref 70–99)
GLUCOSE BLDC GLUCOMTR-MCNC: 256 MG/DL — HIGH (ref 70–99)
GLUCOSE SERPL-MCNC: 196 MG/DL — HIGH (ref 70–99)
HCT VFR BLD CALC: 25.9 % — LOW (ref 34.5–45)
HGB BLD-MCNC: 8.2 G/DL — LOW (ref 11.5–15.5)
INR BLD: 1.71 RATIO — HIGH (ref 0.88–1.16)
MCHC RBC-ENTMCNC: 21.1 PG — LOW (ref 27–34)
MCHC RBC-ENTMCNC: 31.7 GM/DL — LOW (ref 32–36)
MCV RBC AUTO: 66.6 FL — LOW (ref 80–100)
PHOSPHATE SERPL-MCNC: 4.9 MG/DL — HIGH (ref 2.4–4.7)
PLATELET # BLD AUTO: 379 K/UL — SIGNIFICANT CHANGE UP (ref 150–400)
POTASSIUM SERPL-MCNC: 4.6 MMOL/L — SIGNIFICANT CHANGE UP (ref 3.5–5.3)
POTASSIUM SERPL-SCNC: 4.6 MMOL/L — SIGNIFICANT CHANGE UP (ref 3.5–5.3)
PROT SERPL-MCNC: 5 G/DL — LOW (ref 6.6–8.7)
PROTHROM AB SERPL-ACNC: 19.9 SEC — HIGH (ref 10.5–13.4)
RBC # BLD: 3.89 M/UL — SIGNIFICANT CHANGE UP (ref 3.8–5.2)
RBC # FLD: 17.9 % — HIGH (ref 10.3–14.5)
SODIUM SERPL-SCNC: 132 MMOL/L — LOW (ref 135–145)
WBC # BLD: 9.41 K/UL — SIGNIFICANT CHANGE UP (ref 3.8–10.5)
WBC # FLD AUTO: 9.41 K/UL — SIGNIFICANT CHANGE UP (ref 3.8–10.5)

## 2022-12-09 PROCEDURE — 93971 EXTREMITY STUDY: CPT | Mod: 26,LT

## 2022-12-09 PROCEDURE — 99233 SBSQ HOSP IP/OBS HIGH 50: CPT

## 2022-12-09 PROCEDURE — 36000 PLACE NEEDLE IN VEIN: CPT | Mod: RT

## 2022-12-09 RX ORDER — CHOLECALCIFEROL (VITAMIN D3) 125 MCG
1000 CAPSULE ORAL DAILY
Refills: 0 | Status: DISCONTINUED | OUTPATIENT
Start: 2022-12-09 | End: 2023-01-06

## 2022-12-09 RX ORDER — SODIUM CHLORIDE 9 MG/ML
1000 INJECTION INTRAMUSCULAR; INTRAVENOUS; SUBCUTANEOUS
Refills: 0 | Status: DISCONTINUED | OUTPATIENT
Start: 2022-12-09 | End: 2022-12-10

## 2022-12-09 RX ORDER — ALBUTEROL 90 UG/1
2 AEROSOL, METERED ORAL EVERY 6 HOURS
Refills: 0 | Status: DISCONTINUED | OUTPATIENT
Start: 2022-12-09 | End: 2022-12-11

## 2022-12-09 RX ORDER — ERYTHROPOIETIN 10000 [IU]/ML
20000 INJECTION, SOLUTION INTRAVENOUS; SUBCUTANEOUS
Refills: 0 | Status: DISCONTINUED | OUTPATIENT
Start: 2022-12-09 | End: 2022-12-19

## 2022-12-09 RX ORDER — INSULIN GLARGINE 100 [IU]/ML
3 INJECTION, SOLUTION SUBCUTANEOUS AT BEDTIME
Refills: 0 | Status: DISCONTINUED | OUTPATIENT
Start: 2022-12-09 | End: 2022-12-10

## 2022-12-09 RX ORDER — WARFARIN SODIUM 2.5 MG/1
5 TABLET ORAL ONCE
Refills: 0 | Status: COMPLETED | OUTPATIENT
Start: 2022-12-09 | End: 2022-12-09

## 2022-12-09 RX ADMIN — Medication 25 MILLIGRAM(S): at 05:47

## 2022-12-09 RX ADMIN — INSULIN GLARGINE 3 UNIT(S): 100 INJECTION, SOLUTION SUBCUTANEOUS at 22:23

## 2022-12-09 RX ADMIN — WARFARIN SODIUM 5 MILLIGRAM(S): 2.5 TABLET ORAL at 22:18

## 2022-12-09 RX ADMIN — ESCITALOPRAM OXALATE 10 MILLIGRAM(S): 10 TABLET, FILM COATED ORAL at 12:30

## 2022-12-09 RX ADMIN — Medication 6: at 12:30

## 2022-12-09 RX ADMIN — Medication 10 MILLIGRAM(S): at 05:47

## 2022-12-09 RX ADMIN — PANTOPRAZOLE SODIUM 40 MILLIGRAM(S): 20 TABLET, DELAYED RELEASE ORAL at 05:47

## 2022-12-09 RX ADMIN — ALBUTEROL 2 PUFF(S): 90 AEROSOL, METERED ORAL at 17:43

## 2022-12-09 RX ADMIN — Medication 25 MILLIGRAM(S): at 18:27

## 2022-12-09 RX ADMIN — Medication 10 MILLIGRAM(S): at 19:06

## 2022-12-09 RX ADMIN — ERYTHROPOIETIN 20000 UNIT(S): 10000 INJECTION, SOLUTION INTRAVENOUS; SUBCUTANEOUS at 22:18

## 2022-12-09 RX ADMIN — Medication 1200 MILLIGRAM(S): at 05:47

## 2022-12-09 RX ADMIN — Medication 240 MILLIGRAM(S): at 05:47

## 2022-12-09 RX ADMIN — Medication 1000 UNIT(S): at 18:28

## 2022-12-09 RX ADMIN — Medication 0.5 MILLIGRAM(S): at 22:18

## 2022-12-09 RX ADMIN — Medication 4: at 18:26

## 2022-12-09 RX ADMIN — Medication 1200 MILLIGRAM(S): at 19:24

## 2022-12-09 RX ADMIN — Medication 4: at 07:47

## 2022-12-09 NOTE — PROGRESS NOTE ADULT - ASSESSMENT
82F with a history of asthma, diabetes, hyperlipidemia, atrial fibrillation, hypertension, and hyperlipidemia, who was found to have a right superficial femoral vein thrombosis while on warfarin and acute kidney injury. She was also noted to have been recently diagnosed with COVID-19.    #THERESA on CKD   - Baseline SCr- 2.7 (5/20022) monitor cr   - nephro consult appreciated  - avoid nephrotoxic meds  - combination of poor po intake, diabetes- uncontrolled   - US renal- chronic medical disease  - Appreciate nephro recs    #HTN-Essential  - monitor blood pressure  - metoprolol diltiazem    #Afib  - metoprolol diltiazem  - Will c/w coumadin    #DVT  - coumadin resumed  - US Duplex- No evidence of deep venous thrombosis in either lower extremity.  - F/u US duplex LUE to r/o DVT  - vascular surgery consult appreciated    #Anemia  - multifactorial - given kidney disease  - monitor h and h   - S/p 1U PRBC  - will need iv iron once acute covid resolves per nephro    #COVID-19   - supportive care   - isolation precautions  - Will need repeat outpatient CT chest in 3 months for RLL nodule    #Asthma   - supportive care  - albuterol prn    #Uncontrolled Type 2 Diabetes on insulin  - sliding scale  - a1c 11.2  - monitor fingersticks  - Endo c/s placed    CODE STATUS- FULL CODE  Updated patient's son, Richard (son- [phone]) on 12/8. Per family request, ordered ativan for anxiety.     PT c/s placed

## 2022-12-09 NOTE — PROGRESS NOTE ADULT - SUBJECTIVE AND OBJECTIVE BOX
NEPHROLOGY INTERVAL HPI/OVERNIGHT EVENTS:  pt clinically stable  no acute distress noted    MEDICATIONS  (STANDING):  dextrose 5%. 1000 milliLiter(s) (100 mL/Hr) IV Continuous <Continuous>  dextrose 5%. 1000 milliLiter(s) (50 mL/Hr) IV Continuous <Continuous>  dextrose 50% Injectable 25 Gram(s) IV Push once  dextrose 50% Injectable 12.5 Gram(s) IV Push once  dextrose 50% Injectable 25 Gram(s) IV Push once  diltiazem    milliGRAM(s) Oral daily  escitalopram 10 milliGRAM(s) Oral daily  glucagon  Injectable 1 milliGRAM(s) IntraMuscular once  guaiFENesin ER 1200 milliGRAM(s) Oral every 12 hours  insulin glargine Injectable (LANTUS) 3 Unit(s) SubCutaneous at bedtime  insulin lispro (ADMELOG) corrective regimen sliding scale   SubCutaneous three times a day before meals  LORazepam     Tablet 0.5 milliGRAM(s) Oral at bedtime  metoprolol tartrate 25 milliGRAM(s) Oral two times a day  pantoprazole    Tablet 40 milliGRAM(s) Oral before breakfast  sodium chloride 0.9%. 500 milliLiter(s) (100 mL/Hr) IV Continuous <Continuous>  warfarin 5 milliGRAM(s) Oral once    MEDICATIONS  (PRN):  acetaminophen     Tablet .. 650 milliGRAM(s) Oral every 6 hours PRN Temp greater or equal to 38C (100.4F), Mild Pain (1 - 3)  albuterol    90 MICROgram(s) HFA Inhaler 2 Puff(s) Inhalation every 6 hours PRN Shortness of Breath and/or Wheezing  dextrose Oral Gel 15 Gram(s) Oral once PRN Blood Glucose LESS THAN 70 milliGRAM(s)/deciliter  hydrALAZINE Injectable 10 milliGRAM(s) IV Push every 6 hours PRN SBP > 160  melatonin 1 milliGRAM(s) Oral at bedtime PRN Sleep      Allergies    No Known Allergies          Vital Signs Last 24 Hrs  T(C): 37 (09 Dec 2022 10:11), Max: 37 (09 Dec 2022 10:11)  T(F): 98.6 (09 Dec 2022 10:11), Max: 98.6 (09 Dec 2022 10:11)  HR: 77 (09 Dec 2022 10:11) (64 - 77)  BP: 160/69 (09 Dec 2022 10:11) (154/75 - 191/61)  BP(mean): --  RR: 18 (09 Dec 2022 10:11) (18 - 18)  SpO2: 96% (09 Dec 2022 10:11) (95% - 98%)    Parameters below as of 09 Dec 2022 10:11  Patient On (Oxygen Delivery Method): room air        PHYSICAL EXAM:  GENERAL: Weak, fatigued   NECK: Supple, No JVD  NERVOUS SYSTEM:  Awake, alert  EXTREMITIES:  No Edema;  SKIN: No rashes  Further exam deferred to limit COVID exposure    LABS:                        8.2    9.41  )-----------( 379      ( 09 Dec 2022 07:00 )             25.9     12-09    132<L>  |  101  |  74.2<H>  ----------------------------<  196<H>  4.6   |  17.0<L>  |  4.98<H>      Creatinine, Serum: 5.35 mg/dL (12.08.22)    Ca    7.7<L>      09 Dec 2022 07:00  Phos  4.9     12-09    TPro  5.0<L>  /  Alb  1.9<L>  /  TBili  <0.2<L>  /  DBili  x   /  AST  11  /  ALT  9   /  AlkPhos  84  12-09    PT/INR - ( 09 Dec 2022 07:00 )   PT: 19.9 sec;   INR: 1.71 ratio         PTT - ( 08 Dec 2022 06:49 )  PTT:40.9 sec    Phosphorus Level, Serum: 4.9 mg/dL (12-09 @ 07:00)      RADIOLOGY & ADDITIONAL TESTS:  < from: US Renal (12.06.22 @ 13:59) >    ACC: [id number] EXAM:  US KIDNEY(S)                          PROCEDURE DATE:  12/06/2022          INTERPRETATION:  CLINICAL INFORMATION: Acute kidney injury    COMPARISON: April 12, 2021    TECHNIQUE: Sonography of the kidneys and bladder.    FINDINGS:  Right kidney: 9.0 cm. No renal mass, hydronephrosis or calculi.   Subcentimeter cyst noted. Markedly echogenic renal parenchyma.    Left kidney: 9.4 cm. No renal mass, hydronephrosis or calculi. Markedly   echogenic renal parenchyma.    Urinary bladder: Echogenic foci with dirty shadowing noted in the bladder.    Other: Small left pleural effusion.    IMPRESSION:    Echogenic kidneys compatible with chronic medical renal disease, not   significantly changed compared with April 12, 2021.    No hydronephrosis.    Echogenic foci in the bladder, possibly representing gas. Please   correlate for possible recent instrumentation.    < end of copied text >

## 2022-12-09 NOTE — PROGRESS NOTE ADULT - ASSESSMENT
CKD(IV): +COVID  THERESA, hyponatremia improved  Hx NS (3g) +DM  - avoid potential nephrotoxins  - supportive care  - adjust IVF as needed  - follow labs    Anemia: +CKD; Tsat 36%  - add DILAN  - target Hgb > 10

## 2022-12-09 NOTE — PROGRESS NOTE ADULT - SUBJECTIVE AND OBJECTIVE BOX
[facility] Division of Hospital Medicine    Chief Complaint: acute renal failure      SUBJECTIVE / OVERNIGHT EVENTS: No acute events overnight. HD stable. Denies n/v/f/c/h/d.     Patient denies chest pain, SOB, abd pain, N/V, fever, chills, dysuria or any other complaints. All remainder ROS negative.     MEDICATIONS  (STANDING):  dextrose 5%. 1000 milliLiter(s) (100 mL/Hr) IV Continuous <Continuous>  dextrose 5%. 1000 milliLiter(s) (50 mL/Hr) IV Continuous <Continuous>  dextrose 50% Injectable 25 Gram(s) IV Push once  dextrose 50% Injectable 12.5 Gram(s) IV Push once  dextrose 50% Injectable 25 Gram(s) IV Push once  diltiazem    milliGRAM(s) Oral daily  escitalopram 10 milliGRAM(s) Oral daily  glucagon  Injectable 1 milliGRAM(s) IntraMuscular once  guaiFENesin ER 1200 milliGRAM(s) Oral every 12 hours  insulin lispro (ADMELOG) corrective regimen sliding scale   SubCutaneous three times a day before meals  LORazepam     Tablet 0.5 milliGRAM(s) Oral at bedtime  metoprolol tartrate 25 milliGRAM(s) Oral two times a day  pantoprazole    Tablet 40 milliGRAM(s) Oral before breakfast  sodium chloride 0.9%. 500 milliLiter(s) (100 mL/Hr) IV Continuous <Continuous>  warfarin 5 milliGRAM(s) Oral once    MEDICATIONS  (PRN):  acetaminophen     Tablet .. 650 milliGRAM(s) Oral every 6 hours PRN Temp greater or equal to 38C (100.4F), Mild Pain (1 - 3)  albuterol    90 MICROgram(s) HFA Inhaler 2 Puff(s) Inhalation every 6 hours PRN Shortness of Breath and/or Wheezing  dextrose Oral Gel 15 Gram(s) Oral once PRN Blood Glucose LESS THAN 70 milliGRAM(s)/deciliter  hydrALAZINE Injectable 10 milliGRAM(s) IV Push every 6 hours PRN SBP > 160  melatonin 1 milliGRAM(s) Oral at bedtime PRN Sleep        I&O's Summary      PHYSICAL EXAM:  Vital Signs Last 24 Hrs  T(C): 37 (09 Dec 2022 10:11), Max: 37 (09 Dec 2022 10:11)  T(F): 98.6 (09 Dec 2022 10:11), Max: 98.6 (09 Dec 2022 10:11)  HR: 77 (09 Dec 2022 10:11) (64 - 77)  BP: 160/69 (09 Dec 2022 10:11) (154/75 - 191/61)  BP(mean): --  RR: 18 (09 Dec 2022 10:11) (18 - 18)  SpO2: 96% (09 Dec 2022 10:11) (95% - 98%)    Parameters below as of 09 Dec 2022 10:11  Patient On (Oxygen Delivery Method): room air    CONSTITUTIONAL: NAD, ill-appearing  RESPIRATORY: Normal respiratory effort; lungs are clear to auscultation bilaterally  CARDIOVASCULAR: Regular rate and rhythm, normal S1 and S2, no murmur/rub/gallop; +b/l LE pitting edema; Peripheral pulses are 2+ bilaterally  ABDOMEN: Nontender to palpation, normoactive bowel sounds  PSYCH: A+Ox 1 to person; affect appropriate  NEUROLOGY: CN 2-12 are intact and symmetric; no gross sensory deficits    LABS:                        8.2    9.41  )-----------( 379      ( 09 Dec 2022 07:00 )             25.9     12-09    132<L>  |  101  |  74.2<H>  ----------------------------<  196<H>  4.6   |  17.0<L>  |  4.98<H>    Ca    7.7<L>      09 Dec 2022 07:00  Phos  4.9     12-09    TPro  5.0<L>  /  Alb  1.9<L>  /  TBili  <0.2<L>  /  DBili  x   /  AST  11  /  ALT  9   /  AlkPhos  84  12-09    PT/INR - ( 09 Dec 2022 07:00 )   PT: 19.9 sec;   INR: 1.71 ratio         PTT - ( 08 Dec 2022 06:49 )  PTT:40.9 sec          CAPILLARY BLOOD GLUCOSE      POCT Blood Glucose.: 203 mg/dL (09 Dec 2022 07:31)  POCT Blood Glucose.: 140 mg/dL (08 Dec 2022 21:34)  POCT Blood Glucose.: 209 mg/dL (08 Dec 2022 16:19)        RADIOLOGY & ADDITIONAL TESTS:  Results Reviewed:   Imaging Personally Reviewed:  Electrocardiogram Personally Reviewed:

## 2022-12-09 NOTE — PROGRESS NOTE ADULT - SUBJECTIVE AND OBJECTIVE BOX
[facility] Division of Hospital Medicine    Chief Complaint: acute renal failure       SUBJECTIVE / OVERNIGHT EVENTS: No acute events overnight. HD stable.     Patient denies chest pain, SOB, abd pain, N/V, fever, chills, dysuria or any other complaints. All remainder ROS negative.     MEDICATIONS  (STANDING):  dextrose 5%. 1000 milliLiter(s) (100 mL/Hr) IV Continuous <Continuous>  dextrose 5%. 1000 milliLiter(s) (50 mL/Hr) IV Continuous <Continuous>  dextrose 50% Injectable 25 Gram(s) IV Push once  dextrose 50% Injectable 12.5 Gram(s) IV Push once  dextrose 50% Injectable 25 Gram(s) IV Push once  diltiazem    milliGRAM(s) Oral daily  escitalopram 10 milliGRAM(s) Oral daily  glucagon  Injectable 1 milliGRAM(s) IntraMuscular once  guaiFENesin ER 1200 milliGRAM(s) Oral every 12 hours  insulin lispro (ADMELOG) corrective regimen sliding scale   SubCutaneous three times a day before meals  LORazepam     Tablet 0.5 milliGRAM(s) Oral at bedtime  metoprolol tartrate 25 milliGRAM(s) Oral two times a day  pantoprazole    Tablet 40 milliGRAM(s) Oral before breakfast  sodium chloride 0.9%. 500 milliLiter(s) (100 mL/Hr) IV Continuous <Continuous>  warfarin 5 milliGRAM(s) Oral once    MEDICATIONS  (PRN):  acetaminophen     Tablet .. 650 milliGRAM(s) Oral every 6 hours PRN Temp greater or equal to 38C (100.4F), Mild Pain (1 - 3)  albuterol    90 MICROgram(s) HFA Inhaler 2 Puff(s) Inhalation every 6 hours PRN Shortness of Breath and/or Wheezing  dextrose Oral Gel 15 Gram(s) Oral once PRN Blood Glucose LESS THAN 70 milliGRAM(s)/deciliter  hydrALAZINE Injectable 10 milliGRAM(s) IV Push every 6 hours PRN SBP > 160  melatonin 1 milliGRAM(s) Oral at bedtime PRN Sleep        I&O's Summary      PHYSICAL EXAM:  Vital Signs Last 24 Hrs  T(C): 37 (09 Dec 2022 10:11), Max: 37 (09 Dec 2022 10:11)  T(F): 98.6 (09 Dec 2022 10:11), Max: 98.6 (09 Dec 2022 10:11)  HR: 77 (09 Dec 2022 10:11) (64 - 77)  BP: 160/69 (09 Dec 2022 10:11) (154/75 - 191/61)  BP(mean): --  RR: 18 (09 Dec 2022 10:11) (18 - 18)  SpO2: 96% (09 Dec 2022 10:11) (95% - 98%)    Parameters below as of 09 Dec 2022 10:11  Patient On (Oxygen Delivery Method): room air    CONSTITUTIONAL: NAD, ill-appearing  RESPIRATORY: Normal respiratory effort; lungs are clear to auscultation bilaterally  CARDIOVASCULAR: Regular rate and rhythm, normal S1 and S2, no murmur/rub/gallop; +b/l LE pitting edema; Peripheral pulses are 2+ bilaterally. LUE swelling.   ABDOMEN: Nontender to palpation, normoactive bowel sounds  PSYCH: A+Ox 1 to person; affect appropriate  NEUROLOGY: CN 2-12 are intact and symmetric; no gross sensory deficits      LABS:                        8.2    9.41  )-----------( 379      ( 09 Dec 2022 07:00 )             25.9     12-09    132<L>  |  101  |  74.2<H>  ----------------------------<  196<H>  4.6   |  17.0<L>  |  4.98<H>    Ca    7.7<L>      09 Dec 2022 07:00  Phos  4.9     12-09    TPro  5.0<L>  /  Alb  1.9<L>  /  TBili  <0.2<L>  /  DBili  x   /  AST  11  /  ALT  9   /  AlkPhos  84  12-09    PT/INR - ( 09 Dec 2022 07:00 )   PT: 19.9 sec;   INR: 1.71 ratio         PTT - ( 08 Dec 2022 06:49 )  PTT:40.9 sec          CAPILLARY BLOOD GLUCOSE      POCT Blood Glucose.: 256 mg/dL (09 Dec 2022 11:53)  POCT Blood Glucose.: 203 mg/dL (09 Dec 2022 07:31)  POCT Blood Glucose.: 140 mg/dL (08 Dec 2022 21:34)  POCT Blood Glucose.: 209 mg/dL (08 Dec 2022 16:19)        RADIOLOGY & ADDITIONAL TESTS:  Results Reviewed:   Imaging Personally Reviewed:  Electrocardiogram Personally Reviewed:

## 2022-12-09 NOTE — PROGRESS NOTE ADULT - ASSESSMENT
82F with a history of asthma, diabetes, hyperlipidemia, atrial fibrillation, hypertension, and hyperlipidemia, who was found to have a right superficial femoral vein thrombosis while on warfarin and acute kidney injury. She was also noted to have been recently diagnosed with COVID-19.    #THERESA on CKD - improving  - Baseline SCr- 2.7 (5/20022) monitor cr   - nephro consult appreciated  - avoid nephrotoxic meds  - combination of poor po intake, diabetes- uncontrolled   - US renal- chronic medical disease  - Appreciate nephro recs    #DVT  - coumadin resumed  - US Duplex- No evidence of deep venous thrombosis in either lower extremity.  - vascular surgery consult appreciated  - F/u US duplex LUE to r/o DVT    #Anemia  - multifactorial - given kidney disease  - monitor h and h   - S/p 1U PRBC  - will need iv iron outpatient once acute covid resolves per nephro    #Uncontrolled Type 2 Diabetes on insulin  - sliding scale  - a1c 11.2  - monitor fingersticks  - Appreciate endo recs  - will start lantus 3 units    #Afib  - metoprolol diltiazem  - Will c/w coumadin  - F/u PT/INR in AM    #COVID-19   - supportive care   - isolation precautions  - Will need repeat outpatient CT chest in 3 months for RLL nodule    #HTN-Essential  - monitor blood pressure  - metoprolol diltiazem    #Asthma   - supportive care  - albuterol prn    CODE STATUS- FULL CODE  Updated patient's sonRichard (son- [phone]) on 12/9. Per family request, ordered ativan for anxiety.     PT c/s placed

## 2022-12-10 LAB
ALBUMIN SERPL ELPH-MCNC: 1.8 G/DL — LOW (ref 3.3–5.2)
ALP SERPL-CCNC: 73 U/L — SIGNIFICANT CHANGE UP (ref 40–120)
ALT FLD-CCNC: 8 U/L — SIGNIFICANT CHANGE UP
ANION GAP SERPL CALC-SCNC: 12 MMOL/L — SIGNIFICANT CHANGE UP (ref 5–17)
AST SERPL-CCNC: 11 U/L — SIGNIFICANT CHANGE UP
BILIRUB SERPL-MCNC: <0.2 MG/DL — LOW (ref 0.4–2)
BUN SERPL-MCNC: 70.2 MG/DL — HIGH (ref 8–20)
CALCIUM SERPL-MCNC: 7.5 MG/DL — LOW (ref 8.4–10.5)
CHLORIDE SERPL-SCNC: 103 MMOL/L — SIGNIFICANT CHANGE UP (ref 96–108)
CO2 SERPL-SCNC: 19 MMOL/L — LOW (ref 22–29)
CREAT SERPL-MCNC: 4.62 MG/DL — HIGH (ref 0.5–1.3)
EGFR: 9 ML/MIN/1.73M2 — LOW
GLUCOSE BLDC GLUCOMTR-MCNC: 130 MG/DL — HIGH (ref 70–99)
GLUCOSE BLDC GLUCOMTR-MCNC: 145 MG/DL — HIGH (ref 70–99)
GLUCOSE BLDC GLUCOMTR-MCNC: 198 MG/DL — HIGH (ref 70–99)
GLUCOSE BLDC GLUCOMTR-MCNC: 216 MG/DL — HIGH (ref 70–99)
GLUCOSE SERPL-MCNC: 215 MG/DL — HIGH (ref 70–99)
HCT VFR BLD CALC: 25.8 % — LOW (ref 34.5–45)
HGB BLD-MCNC: 8 G/DL — LOW (ref 11.5–15.5)
INR BLD: 3.71 RATIO — HIGH (ref 0.88–1.16)
MCHC RBC-ENTMCNC: 20.6 PG — LOW (ref 27–34)
MCHC RBC-ENTMCNC: 31 GM/DL — LOW (ref 32–36)
MCV RBC AUTO: 66.5 FL — LOW (ref 80–100)
PLATELET # BLD AUTO: 380 K/UL — SIGNIFICANT CHANGE UP (ref 150–400)
POTASSIUM SERPL-MCNC: 4.9 MMOL/L — SIGNIFICANT CHANGE UP (ref 3.5–5.3)
POTASSIUM SERPL-SCNC: 4.9 MMOL/L — SIGNIFICANT CHANGE UP (ref 3.5–5.3)
PROT SERPL-MCNC: 4.6 G/DL — LOW (ref 6.6–8.7)
PROTHROM AB SERPL-ACNC: 43.6 SEC — HIGH (ref 10.5–13.4)
RBC # BLD: 3.88 M/UL — SIGNIFICANT CHANGE UP (ref 3.8–5.2)
RBC # FLD: 19 % — HIGH (ref 10.3–14.5)
SODIUM SERPL-SCNC: 133 MMOL/L — LOW (ref 135–145)
WBC # BLD: 9.15 K/UL — SIGNIFICANT CHANGE UP (ref 3.8–10.5)
WBC # FLD AUTO: 9.15 K/UL — SIGNIFICANT CHANGE UP (ref 3.8–10.5)

## 2022-12-10 PROCEDURE — 99233 SBSQ HOSP IP/OBS HIGH 50: CPT

## 2022-12-10 RX ORDER — INSULIN GLARGINE 100 [IU]/ML
8 INJECTION, SOLUTION SUBCUTANEOUS AT BEDTIME
Refills: 0 | Status: DISCONTINUED | OUTPATIENT
Start: 2022-12-10 | End: 2022-12-11

## 2022-12-10 RX ORDER — METOPROLOL TARTRATE 50 MG
50 TABLET ORAL
Refills: 0 | Status: DISCONTINUED | OUTPATIENT
Start: 2022-12-10 | End: 2023-01-06

## 2022-12-10 RX ORDER — ACETAMINOPHEN 500 MG
650 TABLET ORAL EVERY 8 HOURS
Refills: 0 | Status: DISCONTINUED | OUTPATIENT
Start: 2022-12-10 | End: 2023-01-06

## 2022-12-10 RX ADMIN — ESCITALOPRAM OXALATE 10 MILLIGRAM(S): 10 TABLET, FILM COATED ORAL at 12:27

## 2022-12-10 RX ADMIN — INSULIN GLARGINE 8 UNIT(S): 100 INJECTION, SOLUTION SUBCUTANEOUS at 21:46

## 2022-12-10 RX ADMIN — Medication 4: at 17:36

## 2022-12-10 RX ADMIN — Medication 650 MILLIGRAM(S): at 18:00

## 2022-12-10 RX ADMIN — Medication 2: at 08:55

## 2022-12-10 RX ADMIN — Medication 10 MILLIGRAM(S): at 22:00

## 2022-12-10 RX ADMIN — ALBUTEROL 2 PUFF(S): 90 AEROSOL, METERED ORAL at 05:33

## 2022-12-10 RX ADMIN — SODIUM CHLORIDE 100 MILLILITER(S): 9 INJECTION INTRAMUSCULAR; INTRAVENOUS; SUBCUTANEOUS at 12:27

## 2022-12-10 RX ADMIN — Medication 1000 UNIT(S): at 12:26

## 2022-12-10 RX ADMIN — PANTOPRAZOLE SODIUM 40 MILLIGRAM(S): 20 TABLET, DELAYED RELEASE ORAL at 05:30

## 2022-12-10 RX ADMIN — Medication 10 MILLIGRAM(S): at 15:55

## 2022-12-10 RX ADMIN — Medication 0.5 MILLIGRAM(S): at 21:45

## 2022-12-10 RX ADMIN — Medication 50 MILLIGRAM(S): at 17:35

## 2022-12-10 RX ADMIN — SODIUM CHLORIDE 100 MILLILITER(S): 9 INJECTION INTRAMUSCULAR; INTRAVENOUS; SUBCUTANEOUS at 05:30

## 2022-12-10 RX ADMIN — Medication 650 MILLIGRAM(S): at 17:35

## 2022-12-10 RX ADMIN — Medication 1200 MILLIGRAM(S): at 17:40

## 2022-12-10 RX ADMIN — Medication 1200 MILLIGRAM(S): at 05:30

## 2022-12-10 RX ADMIN — Medication 240 MILLIGRAM(S): at 05:30

## 2022-12-10 RX ADMIN — Medication 25 MILLIGRAM(S): at 05:30

## 2022-12-10 RX ADMIN — Medication 650 MILLIGRAM(S): at 21:45

## 2022-12-10 RX ADMIN — ALBUTEROL 2 PUFF(S): 90 AEROSOL, METERED ORAL at 21:46

## 2022-12-10 RX ADMIN — Medication 650 MILLIGRAM(S): at 22:00

## 2022-12-10 NOTE — PROGRESS NOTE ADULT - ASSESSMENT
CKD(IV): +COVID  THERESA, hyponatremia ==> resolving  Hx NS (3g) +DM  - avoid potential nephrotoxins  - supportive care  - IV isotonic fluids  - follow labs    Anemia: +CKD; Tsat 36%  - added DILAN  - target Hgb > 10

## 2022-12-10 NOTE — PROGRESS NOTE ADULT - ASSESSMENT
82F with PMH asthma, diabetes, hyperlipidemia, atrial fibrillation, hypertension, and hyperlipidemia, who was found to have a right superficial femoral vein thrombosis while on warfarin and acute kidney injury. She was also noted to have been recently diagnosed with COVID-19.    #HTERESA on CKD IV  Improving  Currently appears volume overloaded with edematous extremities and puffy eyes. Received ~ 5L IVF since admission   renal- chronic medical disease  - Baseline SCr- 2.7 (5/20022) monitor cr   - avoid nephrotoxic meds  - combination of poor po intake, diabetes- uncontrolled   - Hold IVF for now  - Monitor I/O renal functions  - Repeat TTE; prior with severe Pulmonary HTN (likely secondary to Asthma)  - Nephrology follow up    #DVT  Supratherapeutic INR, No bleeding  US Duplex- No evidence of deep venous thrombosis in either lower extremity.  - Hold Coumadin for now, monitor INR    #Anemia  - multifactorial - given kidney disease  - monitor h and h   - S/p 1U PRBC  - will need iv iron outpatient once acute covid resolves      #Uncontrolled Type 2 Diabetes on insulin  Hyperglycemic  A1c 11.2  - BGM with SSI  - Glargine increased to 8U    #Atrial Fibrillation  Rate controlled, supratherapeutic INR  - Metoprolol, Diltiazem  - INR, dose coumadin; currently none    #COVID-19   Normoxic on room air  CT Chest with bilateral upper lobe opacities  - supportive care   - isolation precautions  - Will need repeat outpatient CT chest in 3 months for RLL nodule    #HTN-Essential  Uncontrolled  - monitor blood pressure  - Increase Metoprolol to 50mg, continue Diltiazem at current rate    #Asthma   - supportive care  - albuterol prn    PT pending  Advised RN to mobilize OOB  Incentive Spirometry    Prognosis - Guarded  CODE STATUS- FULL CODE  Updated patient's sonRichard (son- [phone]) on 12/10.    82F with PMH asthma, diabetes, hyperlipidemia, atrial fibrillation, hypertension, and hyperlipidemia, who was found to have a right superficial femoral vein thrombosis while on warfarin and acute kidney injury. She was also noted to have been recently diagnosed with COVID-19.    #THERESA on CKD IV  Improving  Currently appears volume overloaded with edematous extremities and puffy eyes. Received ~ 5L IVF since admission   renal- chronic medical disease  - Baseline SCr- 2.7 (5/20022) monitor cr   - avoid nephrotoxic meds  - combination of poor po intake, diabetes- uncontrolled   - Hold IVF for now  - Monitor I/O renal functions  - Repeat TTE; prior with severe Pulmonary HTN (likely secondary to Asthma)  - Nephrology follow up    #DVT  Supratherapeutic INR, No bleeding  US Duplex- No evidence of deep venous thrombosis in either lower extremity.  - Hold Coumadin for now, monitor INR    #Anemia  - multifactorial - given kidney disease  - monitor h and h   - S/p 1U PRBC  - will need iv iron outpatient once acute covid resolves      #Uncontrolled Type 2 Diabetes on insulin  Hyperglycemic  A1c 11.2  - BGM with SSI  - Glargine increased to 8U    #Atrial Fibrillation  Rate controlled, supratherapeutic INR  - Metoprolol, Diltiazem  - INR, dose coumadin; currently none    #COVID-19   Normoxic on room air  CT Chest with bilateral upper lobe opacities  - supportive care   - isolation precautions  - Will need repeat outpatient CT chest in 3 months for RLL nodule    #HTN-Essential  Uncontrolled  - monitor blood pressure  - Increase Metoprolol to 50mg, continue Diltiazem at current rate    #Asthma   - supportive care  - albuterol prn    PT pending  Advised RN to mobilize OOB  Incentive Spirometry    Prognosis - Guarded  CODE STATUS- FULL CODE  Updated patient's sonRichard at bedside

## 2022-12-10 NOTE — PROGRESS NOTE ADULT - SUBJECTIVE AND OBJECTIVE BOX
HOSPITALIST PROGRESS NOTE    IFEOMA TRINIDAD  [id number]  82yFemale    Patient is a 82y old  Female who presents with a chief complaint of acute renal failure (07 Dec 2022 15:46)      SUBJECTIVE:   Chart reviewed since last visit.  Patient seen and examined at bedside.      OBJECTIVE:  Vital Signs Last 24 Hrs  T(C): 37 (10 Dec 2022 09:37), Max: 37.3 (10 Dec 2022 04:30)  T(F): 98.6 (10 Dec 2022 09:37), Max: 99.2 (10 Dec 2022 04:30)  HR: 80 (10 Dec 2022 09:37) (71 - 89)  BP: 168/72 (10 Dec 2022 09:37) (153/62 - 168/72)  BP(mean): --  RR: 18 (10 Dec 2022 09:37) (16 - 18)  SpO2: 96% (10 Dec 2022 09:37) (90% - 97%)    Parameters below as of 10 Dec 2022 09:37  Patient On (Oxygen Delivery Method): room air        PHYSICAL EXAMINATION  General:   HEENT:    NECK:    CVS:   RESP:    GI:    :   MSK:    CNS:    INTEG:    PSYCH:      MONITOR:  CAPILLARY BLOOD GLUCOSE      POCT Blood Glucose.: 145 mg/dL (10 Dec 2022 11:40)  POCT Blood Glucose.: 198 mg/dL (10 Dec 2022 07:55)  POCT Blood Glucose.: 179 mg/dL (09 Dec 2022 22:20)  POCT Blood Glucose.: 213 mg/dL (09 Dec 2022 16:45)      A1C with Estimated Average Glucose Result: 11.2 % (12.07.22 @ 06:0  I&O's Summary                          8.0    9.15  )-----------( 380      ( 10 Dec 2022 08:10 )             25.8     PT/INR - ( 10 Dec 2022 08:10 )   PT: 43.6 sec;   INR: 3.71 ratio           12-10    133<L>  |  103  |  70.2<H>  ----------------------------<  215<H>  4.9   |  19.0<L>  |  4.62<H>    Ca    7.5<L>      10 Dec 2022 08:10  Phos  4.9     12-09    TPro  4.6<L>  /  Alb  1.8<L>  /  TBili  <0.2<L>  /  DBili  x   /  AST  11  /  ALT  8   /  AlkPhos  73  12-10      SARS-CoV-2 Result: Detected:      Culture:    TTE:  < from: TTE Echo Complete w/o Contrast w/ Doppler (04.13.21 @ 17:34) >  Summary:   1. Normal global left ventricular systolic function.   2. Left ventricular ejection fraction, by visual estimation, is 70 to 75%.   3. Mildly increased LV wall thickness.   4. Normal left ventricular internal cavity size.   5. Normal right ventricular size and function.   6. The left atrium is normal in size.   7. The right atrium is normal in size.   8. Lipomatous hypertrophy of the intra-atrial septum.   9. Moderate mitral annular calcification.  10. Mild mitral valve regurgitation.  11. Elevated mitral valve mean gradient    5 mmHg at HR 99 BPM.  12. No aortic valve stenosis.  13. Estimated pulmonary artery systolic pressure is 63.7 mmHg assuming a right atrial pressure of 8 mmHg, which is consistent with severe pulmonary hypertension.  14. Recommend clinical correlation with the above findings.    Ewa Urbina MD Electronically signed on 4/13/2021 at 6:34:53 PM    < end of copied text >    RADIOLOGY  < from: US Duplex Venous Lower Ext Complete, Bilateral (12.06.22 @ 09:53) >  IMPRESSION:  No evidence of deep venous thrombosis in either lower extremity.          --- End of Report ---    < end of copied text >  < from: US Renal (12.06.22 @ 13:59) >  IMPRESSION:    Echogenic kidneys compatible with chronic medical renal disease, not   significantly changed compared with April 12, 2021.    No hydronephrosis.    Echogenic foci in the bladder, possibly representing gas. Please   correlate for possible recent instrumentation.        --- End of Report ---    < end of copied text >  < from: CT Chest No Cont (12.07.22 @ 19:56) >  IMPRESSION:  Small bilateral pleural effusions with partial atelectasis of both lower   lobes. Subtle patchy right upper lobe opacities, possibly   infectious/inflammatory. Ill-defined right lower lobe nodular opacity is   indeterminate. Three-month follow-up chest CT recommended for complete   evaluation.    VERTEBRAL BODY ANALYSIS: Vertebral compression fractures as described,   consider further workup for osteoporosis.    --- End of Report ---      < end of copied text >    < from: US Duplex Venous Upper Ext Ltd, Left (12.09.22 @ 16:11) >    IMPRESSION:  No evidence of left upper extremity deep venous thrombosis.        --- End of Report ---      < end of copied text >      MEDICATIONS  (STANDING):  cholecalciferol 1000 Unit(s) Oral daily  dextrose 5%. 1000 milliLiter(s) (100 mL/Hr) IV Continuous <Continuous>  dextrose 5%. 1000 milliLiter(s) (50 mL/Hr) IV Continuous <Continuous>  dextrose 50% Injectable 25 Gram(s) IV Push once  dextrose 50% Injectable 12.5 Gram(s) IV Push once  dextrose 50% Injectable 25 Gram(s) IV Push once  diltiazem    milliGRAM(s) Oral daily  epoetin susie-epbx (RETACRIT) Injectable 66250 Unit(s) SubCutaneous <User Schedule>  escitalopram 10 milliGRAM(s) Oral daily  glucagon  Injectable 1 milliGRAM(s) IntraMuscular once  guaiFENesin ER 1200 milliGRAM(s) Oral every 12 hours  insulin glargine Injectable (LANTUS) 3 Unit(s) SubCutaneous at bedtime  insulin lispro (ADMELOG) corrective regimen sliding scale   SubCutaneous three times a day before meals  LORazepam     Tablet 0.5 milliGRAM(s) Oral at bedtime  metoprolol tartrate 25 milliGRAM(s) Oral two times a day  pantoprazole    Tablet 40 milliGRAM(s) Oral before breakfast  sodium chloride 0.9%. 1000 milliLiter(s) (100 mL/Hr) IV Continuous <Continuous>  sodium chloride 0.9%. 500 milliLiter(s) (100 mL/Hr) IV Continuous <Continuous>      MEDICATIONS  (PRN):  acetaminophen     Tablet .. 650 milliGRAM(s) Oral every 6 hours PRN Temp greater or equal to 38C (100.4F), Mild Pain (1 - 3)  albuterol    90 MICROgram(s) HFA Inhaler 2 Puff(s) Inhalation every 6 hours PRN Shortness of Breath and/or Wheezing  dextrose Oral Gel 15 Gram(s) Oral once PRN Blood Glucose LESS THAN 70 milliGRAM(s)/deciliter  hydrALAZINE Injectable 10 milliGRAM(s) IV Push every 6 hours PRN SBP > 160  melatonin 1 milliGRAM(s) Oral at bedtime PRN Sleep     HOSPITALIST PROGRESS NOTE    IFEOMA TRINIDAD  [id number]  82yFemale    Patient is a 82y old  Female who presents with a chief complaint of acute renal failure (07 Dec 2022 15:46)      SUBJECTIVE:   Chart reviewed since last visit.  Patient seen and examined at bedside for THERESA, SARS, pulmonary HTN  Complaining of abdominal pain on left side - No nausea, vomiting or diarrhea.      OBJECTIVE:  Vital Signs Last 24 Hrs  T(C): 37 (10 Dec 2022 09:37), Max: 37.3 (10 Dec 2022 04:30)  T(F): 98.6 (10 Dec 2022 09:37), Max: 99.2 (10 Dec 2022 04:30)  HR: 80 (10 Dec 2022 09:37) (71 - 89)  BP: 168/72 (10 Dec 2022 09:37) (153/62 - 168/72)   RR: 18 (10 Dec 2022 09:37) (16 - 18)  SpO2: 96% (10 Dec 2022 09:37) (90% - 97%)    Parameters below as of 10 Dec 2022 09:37  Patient On (Oxygen Delivery Method): room air        PHYSICAL EXAMINATION  General: Elderly female lying in bed, no acute distress. Son at bedside  HEENT:  Chemosis, puffy eyes  NECK:  distended neck veins  CVS: regular rate and rhythm S1 S2, Pedal edema  RESP:  Fair air entry with Rhonchi  GI:  Soft with LUQ tenderness  : No suprapubic tenderness  MSK:  Moves all extremities  CNS:  Awake, oriented x3. Follow simple commands  INTEG:  edematous  PSYCH:  Fair mood; fatigued    MONITOR:  CAPILLARY BLOOD GLUCOSE      POCT Blood Glucose.: 145 mg/dL (10 Dec 2022 11:40)  POCT Blood Glucose.: 198 mg/dL (10 Dec 2022 07:55)  POCT Blood Glucose.: 179 mg/dL (09 Dec 2022 22:20)  POCT Blood Glucose.: 213 mg/dL (09 Dec 2022 16:45)      A1C with Estimated Average Glucose Result: 11.2 % (12.07.22 @ 06:0  I&O's Summary                          8.0    9.15  )-----------( 380      ( 10 Dec 2022 08:10 )             25.8     PT/INR - ( 10 Dec 2022 08:10 )   PT: 43.6 sec;   INR: 3.71 ratio           12-10    133<L>  |  103  |  70.2<H>  ----------------------------<  215<H>  4.9   |  19.0<L>  |  4.62<H>    Ca    7.5<L>      10 Dec 2022 08:10  Phos  4.9     12-09    TPro  4.6<L>  /  Alb  1.8<L>  /  TBili  <0.2<L>  /  DBili  x   /  AST  11  /  ALT  8   /  AlkPhos  73  12-10      SARS-CoV-2 Result: Detected:      Culture:    TTE:  < from: TTE Echo Complete w/o Contrast w/ Doppler (04.13.21 @ 17:34) >  Summary:   1. Normal global left ventricular systolic function.   2. Left ventricular ejection fraction, by visual estimation, is 70 to 75%.   3. Mildly increased LV wall thickness.   4. Normal left ventricular internal cavity size.   5. Normal right ventricular size and function.   6. The left atrium is normal in size.   7. The right atrium is normal in size.   8. Lipomatous hypertrophy of the intra-atrial septum.   9. Moderate mitral annular calcification.  10. Mild mitral valve regurgitation.  11. Elevated mitral valve mean gradient    5 mmHg at HR 99 BPM.  12. No aortic valve stenosis.  13. Estimated pulmonary artery systolic pressure is 63.7 mmHg assuming a right atrial pressure of 8 mmHg, which is consistent with severe pulmonary hypertension.  14. Recommend clinical correlation with the above findings.    Ewa Urbina MD Electronically signed on 4/13/2021 at 6:34:53 PM    < end of copied text >    RADIOLOGY  < from: US Duplex Venous Lower Ext Complete, Bilateral (12.06.22 @ 09:53) >  IMPRESSION:  No evidence of deep venous thrombosis in either lower extremity.          --- End of Report ---    < end of copied text >  < from: US Renal (12.06.22 @ 13:59) >  IMPRESSION:    Echogenic kidneys compatible with chronic medical renal disease, not   significantly changed compared with April 12, 2021.    No hydronephrosis.    Echogenic foci in the bladder, possibly representing gas. Please   correlate for possible recent instrumentation.        --- End of Report ---    < end of copied text >  < from: CT Chest No Cont (12.07.22 @ 19:56) >  IMPRESSION:  Small bilateral pleural effusions with partial atelectasis of both lower   lobes. Subtle patchy right upper lobe opacities, possibly   infectious/inflammatory. Ill-defined right lower lobe nodular opacity is   indeterminate. Three-month follow-up chest CT recommended for complete   evaluation.    VERTEBRAL BODY ANALYSIS: Vertebral compression fractures as described,   consider further workup for osteoporosis.    --- End of Report ---      < end of copied text >    < from: US Duplex Venous Upper Ext Ltd, Left (12.09.22 @ 16:11) >    IMPRESSION:  No evidence of left upper extremity deep venous thrombosis.        --- End of Report ---      < end of copied text >      MEDICATIONS  (STANDING):  cholecalciferol 1000 Unit(s) Oral daily  dextrose 5%. 1000 milliLiter(s) (100 mL/Hr) IV Continuous <Continuous>  dextrose 5%. 1000 milliLiter(s) (50 mL/Hr) IV Continuous <Continuous>  dextrose 50% Injectable 25 Gram(s) IV Push once  dextrose 50% Injectable 12.5 Gram(s) IV Push once  dextrose 50% Injectable 25 Gram(s) IV Push once  diltiazem    milliGRAM(s) Oral daily  epoetin susie-epbx (RETACRIT) Injectable 59714 Unit(s) SubCutaneous <User Schedule>  escitalopram 10 milliGRAM(s) Oral daily  glucagon  Injectable 1 milliGRAM(s) IntraMuscular once  guaiFENesin ER 1200 milliGRAM(s) Oral every 12 hours  insulin glargine Injectable (LANTUS) 3 Unit(s) SubCutaneous at bedtime  insulin lispro (ADMELOG) corrective regimen sliding scale   SubCutaneous three times a day before meals  LORazepam     Tablet 0.5 milliGRAM(s) Oral at bedtime  metoprolol tartrate 25 milliGRAM(s) Oral two times a day  pantoprazole    Tablet 40 milliGRAM(s) Oral before breakfast  sodium chloride 0.9%. 1000 milliLiter(s) (100 mL/Hr) IV Continuous <Continuous>  sodium chloride 0.9%. 500 milliLiter(s) (100 mL/Hr) IV Continuous <Continuous>      MEDICATIONS  (PRN):  acetaminophen     Tablet .. 650 milliGRAM(s) Oral every 6 hours PRN Temp greater or equal to 38C (100.4F), Mild Pain (1 - 3)  albuterol    90 MICROgram(s) HFA Inhaler 2 Puff(s) Inhalation every 6 hours PRN Shortness of Breath and/or Wheezing  dextrose Oral Gel 15 Gram(s) Oral once PRN Blood Glucose LESS THAN 70 milliGRAM(s)/deciliter  hydrALAZINE Injectable 10 milliGRAM(s) IV Push every 6 hours PRN SBP > 160  melatonin 1 milliGRAM(s) Oral at bedtime PRN Sleep

## 2022-12-10 NOTE — PROGRESS NOTE ADULT - SUBJECTIVE AND OBJECTIVE BOX
NEPHROLOGY INTERVAL HPI/OVERNIGHT EVENTS:  no adverse overnight events noted  clinically stable    MEDICATIONS  (STANDING):  cholecalciferol 1000 Unit(s) Oral daily  dextrose 5%. 1000 milliLiter(s) (100 mL/Hr) IV Continuous <Continuous>  dextrose 5%. 1000 milliLiter(s) (50 mL/Hr) IV Continuous <Continuous>  dextrose 50% Injectable 25 Gram(s) IV Push once  dextrose 50% Injectable 12.5 Gram(s) IV Push once  dextrose 50% Injectable 25 Gram(s) IV Push once  diltiazem    milliGRAM(s) Oral daily  epoetin susie-epbx (RETACRIT) Injectable 79948 Unit(s) SubCutaneous <User Schedule>  escitalopram 10 milliGRAM(s) Oral daily  glucagon  Injectable 1 milliGRAM(s) IntraMuscular once  guaiFENesin ER 1200 milliGRAM(s) Oral every 12 hours  insulin glargine Injectable (LANTUS) 3 Unit(s) SubCutaneous at bedtime  insulin lispro (ADMELOG) corrective regimen sliding scale   SubCutaneous three times a day before meals  LORazepam     Tablet 0.5 milliGRAM(s) Oral at bedtime  metoprolol tartrate 25 milliGRAM(s) Oral two times a day  pantoprazole    Tablet 40 milliGRAM(s) Oral before breakfast  sodium chloride 0.9%. 500 milliLiter(s) (100 mL/Hr) IV Continuous <Continuous>  sodium chloride 0.9%. 1000 milliLiter(s) (100 mL/Hr) IV Continuous <Continuous>    MEDICATIONS  (PRN):  acetaminophen     Tablet .. 650 milliGRAM(s) Oral every 6 hours PRN Temp greater or equal to 38C (100.4F), Mild Pain (1 - 3)  albuterol    90 MICROgram(s) HFA Inhaler 2 Puff(s) Inhalation every 6 hours PRN Shortness of Breath and/or Wheezing  dextrose Oral Gel 15 Gram(s) Oral once PRN Blood Glucose LESS THAN 70 milliGRAM(s)/deciliter  hydrALAZINE Injectable 10 milliGRAM(s) IV Push every 6 hours PRN SBP > 160  melatonin 1 milliGRAM(s) Oral at bedtime PRN Sleep      Allergies    No Known Allergies          Vital Signs Last 24 Hrs  T(C): 37.3 (10 Dec 2022 04:30), Max: 37.3 (10 Dec 2022 04:30)  T(F): 99.2 (10 Dec 2022 04:30), Max: 99.2 (10 Dec 2022 04:30)  HR: 89 (10 Dec 2022 04:30) (71 - 89)  BP: 159/51 (10 Dec 2022 04:30) (153/62 - 165/69)  BP(mean): --  RR: 16 (10 Dec 2022 05:21) (16 - 18)  SpO2: 94% (10 Dec 2022 05:21) (90% - 97%)    Parameters below as of 10 Dec 2022 05:21  Patient On (Oxygen Delivery Method): room air        PHYSICAL EXAM:  GENERAL: Debilitated  NECK: Supple, No JVD  NERVOUS SYSTEM:  Awake, alert  EXTREMITIES:  No dependent edema  SKIN: No rashes  Further exam deferred to limit COVID exposure    LABS:                        8.2    9.41  )-----------( 379      ( 09 Dec 2022 07:00 )             25.9     12-09    132<L>  |  101  |  74.2<H>  ----------------------------<  196<H>  4.6   |  17.0<L>  |  4.98<H>    Ca    7.7<L>      09 Dec 2022 07:00  Phos  4.9     12-09    TPro  5.0<L>  /  Alb  1.9<L>  /  TBili  <0.2<L>  /  DBili  x   /  AST  11  /  ALT  9   /  AlkPhos  84  12-09    PT/INR - ( 09 Dec 2022 07:00 )   PT: 19.9 sec;   INR: 1.71 ratio         PTT - ( 08 Dec 2022 06:49 )  PTT:40.9 sec    Phosphorus Level, Serum: 4.9 mg/dL (12-09 @ 07:00)  Vitamin D, 25-Hydroxy: 13.3 ng/mL (12-09 @ 07:00)      RADIOLOGY & ADDITIONAL TESTS:  < from: US Renal (12.06.22 @ 13:59) >    ACC: [id number] EXAM:  US KIDNEY(S)                          PROCEDURE DATE:  12/06/2022          INTERPRETATION:  CLINICAL INFORMATION: Acute kidney injury    COMPARISON: April 12, 2021    TECHNIQUE: Sonography of the kidneys and bladder.    FINDINGS:  Right kidney: 9.0 cm. No renal mass, hydronephrosis or calculi.   Subcentimeter cyst noted. Markedly echogenic renal parenchyma.    Left kidney: 9.4 cm. No renal mass, hydronephrosis or calculi. Markedly   echogenic renal parenchyma.    Urinary bladder: Echogenic foci with dirty shadowing noted in the bladder.    Other: Small left pleural effusion.    IMPRESSION:    Echogenic kidneys compatible with chronic medical renal disease, not   significantly changed compared with April 12, 2021.    No hydronephrosis.    Echogenic foci in the bladder, possibly representing gas. Please   correlate for possible recent instrumentation.    < end of copied text >   NEPHROLOGY INTERVAL HPI/OVERNIGHT EVENTS:  no adverse overnight events noted  clinically stable    MEDICATIONS  (STANDING):  cholecalciferol 1000 Unit(s) Oral daily  dextrose 5%. 1000 milliLiter(s) (100 mL/Hr) IV Continuous <Continuous>  dextrose 5%. 1000 milliLiter(s) (50 mL/Hr) IV Continuous <Continuous>  dextrose 50% Injectable 25 Gram(s) IV Push once  dextrose 50% Injectable 12.5 Gram(s) IV Push once  dextrose 50% Injectable 25 Gram(s) IV Push once  diltiazem    milliGRAM(s) Oral daily  epoetin susie-epbx (RETACRIT) Injectable 60697 Unit(s) SubCutaneous <User Schedule>  escitalopram 10 milliGRAM(s) Oral daily  glucagon  Injectable 1 milliGRAM(s) IntraMuscular once  guaiFENesin ER 1200 milliGRAM(s) Oral every 12 hours  insulin glargine Injectable (LANTUS) 3 Unit(s) SubCutaneous at bedtime  insulin lispro (ADMELOG) corrective regimen sliding scale   SubCutaneous three times a day before meals  LORazepam     Tablet 0.5 milliGRAM(s) Oral at bedtime  metoprolol tartrate 25 milliGRAM(s) Oral two times a day  pantoprazole    Tablet 40 milliGRAM(s) Oral before breakfast  sodium chloride 0.9%. 500 milliLiter(s) (100 mL/Hr) IV Continuous <Continuous>  sodium chloride 0.9%. 1000 milliLiter(s) (100 mL/Hr) IV Continuous <Continuous>    MEDICATIONS  (PRN):  acetaminophen     Tablet .. 650 milliGRAM(s) Oral every 6 hours PRN Temp greater or equal to 38C (100.4F), Mild Pain (1 - 3)  albuterol    90 MICROgram(s) HFA Inhaler 2 Puff(s) Inhalation every 6 hours PRN Shortness of Breath and/or Wheezing  dextrose Oral Gel 15 Gram(s) Oral once PRN Blood Glucose LESS THAN 70 milliGRAM(s)/deciliter  hydrALAZINE Injectable 10 milliGRAM(s) IV Push every 6 hours PRN SBP > 160  melatonin 1 milliGRAM(s) Oral at bedtime PRN Sleep      Allergies    No Known Allergies          Vital Signs Last 24 Hrs  T(C): 37.3 (10 Dec 2022 04:30), Max: 37.3 (10 Dec 2022 04:30)  T(F): 99.2 (10 Dec 2022 04:30), Max: 99.2 (10 Dec 2022 04:30)  HR: 89 (10 Dec 2022 04:30) (71 - 89)  BP: 159/51 (10 Dec 2022 04:30) (153/62 - 165/69)  BP(mean): --  RR: 16 (10 Dec 2022 05:21) (16 - 18)  SpO2: 94% (10 Dec 2022 05:21) (90% - 97%)    Parameters below as of 10 Dec 2022 05:21  Patient On (Oxygen Delivery Method): room air        PHYSICAL EXAM:  GENERAL: Debilitated  NECK: Supple, No JVD  NERVOUS SYSTEM:  Awake, alert  EXTREMITIES:  No dependent edema  SKIN: No rashes  Further exam deferred to limit COVID exposure    LABS:                        8.2    9.41  )-----------( 379      ( 09 Dec 2022 07:00 )             25.9     12-09    132<L>  |  101  |  74.2<H>  ----------------------------<  196<H>  4.6   |  17.0<L>  |  4.98<H>        Ca    7.7<L>      09 Dec 2022 07:00  Phos  4.9     12-09    TPro  5.0<L>  /  Alb  1.9<L>  /  TBili  <0.2<L>  /  DBili  x   /  AST  11  /  ALT  9   /  AlkPhos  84  12-09    PT/INR - ( 09 Dec 2022 07:00 )   PT: 19.9 sec;   INR: 1.71 ratio         PTT - ( 08 Dec 2022 06:49 )  PTT:40.9 sec    Phosphorus Level, Serum: 4.9 mg/dL (12-09 @ 07:00)  Vitamin D, 25-Hydroxy: 13.3 ng/mL (12-09 @ 07:00)      RADIOLOGY & ADDITIONAL TESTS:  < from: US Renal (12.06.22 @ 13:59) >    ACC: [id number] EXAM:  US KIDNEY(S)                          PROCEDURE DATE:  12/06/2022          INTERPRETATION:  CLINICAL INFORMATION: Acute kidney injury    COMPARISON: April 12, 2021    TECHNIQUE: Sonography of the kidneys and bladder.    FINDINGS:  Right kidney: 9.0 cm. No renal mass, hydronephrosis or calculi.   Subcentimeter cyst noted. Markedly echogenic renal parenchyma.    Left kidney: 9.4 cm. No renal mass, hydronephrosis or calculi. Markedly   echogenic renal parenchyma.    Urinary bladder: Echogenic foci with dirty shadowing noted in the bladder.    Other: Small left pleural effusion.    IMPRESSION:    Echogenic kidneys compatible with chronic medical renal disease, not   significantly changed compared with April 12, 2021.    No hydronephrosis.    Echogenic foci in the bladder, possibly representing gas. Please   correlate for possible recent instrumentation.    < end of copied text >

## 2022-12-11 DIAGNOSIS — J90 PLEURAL EFFUSION, NOT ELSEWHERE CLASSIFIED: ICD-10-CM

## 2022-12-11 DIAGNOSIS — R06.02 SHORTNESS OF BREATH: ICD-10-CM

## 2022-12-11 DIAGNOSIS — I48.91 UNSPECIFIED ATRIAL FIBRILLATION: ICD-10-CM

## 2022-12-11 DIAGNOSIS — I10 ESSENTIAL (PRIMARY) HYPERTENSION: ICD-10-CM

## 2022-12-11 DIAGNOSIS — U07.1 COVID-19: ICD-10-CM

## 2022-12-11 DIAGNOSIS — R91.8 OTHER NONSPECIFIC ABNORMAL FINDING OF LUNG FIELD: ICD-10-CM

## 2022-12-11 DIAGNOSIS — N17.9 ACUTE KIDNEY FAILURE, UNSPECIFIED: ICD-10-CM

## 2022-12-11 DIAGNOSIS — J96.01 ACUTE RESPIRATORY FAILURE WITH HYPOXIA: ICD-10-CM

## 2022-12-11 LAB
ANION GAP SERPL CALC-SCNC: 13 MMOL/L — SIGNIFICANT CHANGE UP (ref 5–17)
BUN SERPL-MCNC: 69.6 MG/DL — HIGH (ref 8–20)
CALCIUM SERPL-MCNC: 7.9 MG/DL — LOW (ref 8.4–10.5)
CHLORIDE SERPL-SCNC: 103 MMOL/L — SIGNIFICANT CHANGE UP (ref 96–108)
CO2 SERPL-SCNC: 17 MMOL/L — LOW (ref 22–29)
CREAT SERPL-MCNC: 4.96 MG/DL — HIGH (ref 0.5–1.3)
CRP SERPL-MCNC: 10 MG/L — HIGH
D DIMER BLD IA.RAPID-MCNC: 285 NG/ML DDU — HIGH
EGFR: 8 ML/MIN/1.73M2 — LOW
FERRITIN SERPL-MCNC: 385 NG/ML — HIGH (ref 15–150)
GLUCOSE BLDC GLUCOMTR-MCNC: 112 MG/DL — HIGH (ref 70–99)
GLUCOSE BLDC GLUCOMTR-MCNC: 175 MG/DL — HIGH (ref 70–99)
GLUCOSE BLDC GLUCOMTR-MCNC: 190 MG/DL — HIGH (ref 70–99)
GLUCOSE BLDC GLUCOMTR-MCNC: 87 MG/DL — SIGNIFICANT CHANGE UP (ref 70–99)
GLUCOSE SERPL-MCNC: 94 MG/DL — SIGNIFICANT CHANGE UP (ref 70–99)
HCT VFR BLD CALC: 26.8 % — LOW (ref 34.5–45)
HGB BLD-MCNC: 8.2 G/DL — LOW (ref 11.5–15.5)
INR BLD: 5.99 RATIO — CRITICAL HIGH (ref 0.88–1.16)
MCHC RBC-ENTMCNC: 20.7 PG — LOW (ref 27–34)
MCHC RBC-ENTMCNC: 30.6 GM/DL — LOW (ref 32–36)
MCV RBC AUTO: 67.5 FL — LOW (ref 80–100)
NT-PROBNP SERPL-SCNC: HIGH PG/ML (ref 0–300)
PLATELET # BLD AUTO: 391 K/UL — SIGNIFICANT CHANGE UP (ref 150–400)
POTASSIUM SERPL-MCNC: 4.9 MMOL/L — SIGNIFICANT CHANGE UP (ref 3.5–5.3)
POTASSIUM SERPL-SCNC: 4.9 MMOL/L — SIGNIFICANT CHANGE UP (ref 3.5–5.3)
PROTHROM AB SERPL-ACNC: 70.7 SEC — HIGH (ref 10.5–13.4)
RBC # BLD: 3.97 M/UL — SIGNIFICANT CHANGE UP (ref 3.8–5.2)
RBC # FLD: 20.1 % — HIGH (ref 10.3–14.5)
SODIUM SERPL-SCNC: 133 MMOL/L — LOW (ref 135–145)
WBC # BLD: 12.71 K/UL — HIGH (ref 3.8–10.5)
WBC # FLD AUTO: 12.71 K/UL — HIGH (ref 3.8–10.5)

## 2022-12-11 PROCEDURE — 99233 SBSQ HOSP IP/OBS HIGH 50: CPT

## 2022-12-11 PROCEDURE — 71045 X-RAY EXAM CHEST 1 VIEW: CPT | Mod: 26

## 2022-12-11 PROCEDURE — 99497 ADVNCD CARE PLAN 30 MIN: CPT | Mod: 25

## 2022-12-11 PROCEDURE — 99223 1ST HOSP IP/OBS HIGH 75: CPT

## 2022-12-11 RX ORDER — HYDRALAZINE HCL 50 MG
25 TABLET ORAL EVERY 8 HOURS
Refills: 0 | Status: DISCONTINUED | OUTPATIENT
Start: 2022-12-11 | End: 2022-12-18

## 2022-12-11 RX ORDER — BUMETANIDE 0.25 MG/ML
1 INJECTION INTRAMUSCULAR; INTRAVENOUS
Refills: 0 | Status: DISCONTINUED | OUTPATIENT
Start: 2022-12-11 | End: 2022-12-13

## 2022-12-11 RX ORDER — DEXAMETHASONE 0.5 MG/5ML
6 ELIXIR ORAL DAILY
Refills: 0 | Status: DISCONTINUED | OUTPATIENT
Start: 2022-12-11 | End: 2022-12-11

## 2022-12-11 RX ORDER — INSULIN GLARGINE 100 [IU]/ML
5 INJECTION, SOLUTION SUBCUTANEOUS AT BEDTIME
Refills: 0 | Status: DISCONTINUED | OUTPATIENT
Start: 2022-12-11 | End: 2022-12-11

## 2022-12-11 RX ORDER — INSULIN GLARGINE 100 [IU]/ML
10 INJECTION, SOLUTION SUBCUTANEOUS AT BEDTIME
Refills: 0 | Status: DISCONTINUED | OUTPATIENT
Start: 2022-12-11 | End: 2022-12-16

## 2022-12-11 RX ORDER — ALBUTEROL 90 UG/1
2 AEROSOL, METERED ORAL EVERY 4 HOURS
Refills: 0 | Status: DISCONTINUED | OUTPATIENT
Start: 2022-12-11 | End: 2023-01-06

## 2022-12-11 RX ORDER — BUDESONIDE AND FORMOTEROL FUMARATE DIHYDRATE 160; 4.5 UG/1; UG/1
2 AEROSOL RESPIRATORY (INHALATION)
Refills: 0 | Status: DISCONTINUED | OUTPATIENT
Start: 2022-12-11 | End: 2023-01-06

## 2022-12-11 RX ORDER — DEXAMETHASONE 0.5 MG/5ML
6 ELIXIR ORAL DAILY
Refills: 0 | Status: DISCONTINUED | OUTPATIENT
Start: 2022-12-11 | End: 2022-12-22

## 2022-12-11 RX ADMIN — Medication 650 MILLIGRAM(S): at 13:24

## 2022-12-11 RX ADMIN — Medication 25 MILLIGRAM(S): at 23:13

## 2022-12-11 RX ADMIN — INSULIN GLARGINE 10 UNIT(S): 100 INJECTION, SOLUTION SUBCUTANEOUS at 23:12

## 2022-12-11 RX ADMIN — Medication 1000 UNIT(S): at 13:25

## 2022-12-11 RX ADMIN — Medication 50 MILLIGRAM(S): at 17:22

## 2022-12-11 RX ADMIN — Medication 650 MILLIGRAM(S): at 23:17

## 2022-12-11 RX ADMIN — Medication 50 MILLIGRAM(S): at 05:23

## 2022-12-11 RX ADMIN — Medication 1200 MILLIGRAM(S): at 17:22

## 2022-12-11 RX ADMIN — BUMETANIDE 1 MILLIGRAM(S): 0.25 INJECTION INTRAMUSCULAR; INTRAVENOUS at 13:24

## 2022-12-11 RX ADMIN — ESCITALOPRAM OXALATE 10 MILLIGRAM(S): 10 TABLET, FILM COATED ORAL at 13:24

## 2022-12-11 RX ADMIN — Medication 25 MILLIGRAM(S): at 13:25

## 2022-12-11 RX ADMIN — Medication 650 MILLIGRAM(S): at 05:24

## 2022-12-11 RX ADMIN — Medication 240 MILLIGRAM(S): at 05:24

## 2022-12-11 RX ADMIN — Medication 1200 MILLIGRAM(S): at 05:24

## 2022-12-11 RX ADMIN — Medication 6 MILLIGRAM(S): at 13:23

## 2022-12-11 RX ADMIN — Medication 650 MILLIGRAM(S): at 23:47

## 2022-12-11 RX ADMIN — PANTOPRAZOLE SODIUM 40 MILLIGRAM(S): 20 TABLET, DELAYED RELEASE ORAL at 05:24

## 2022-12-11 RX ADMIN — Medication 650 MILLIGRAM(S): at 05:54

## 2022-12-11 RX ADMIN — Medication 2: at 17:22

## 2022-12-11 RX ADMIN — ALBUTEROL 2 PUFF(S): 90 AEROSOL, METERED ORAL at 05:25

## 2022-12-11 RX ADMIN — Medication 650 MILLIGRAM(S): at 13:54

## 2022-12-11 NOTE — CONSULT NOTE ADULT - ASSESSMENT
Recent Covid infection - concern for progression of Covid though would not expect effusions with Covid infection  B/l L>R effusions  ARF though creatinine slowly improving  ? asthma  AF  Oxygenating well on 3 lpm NCO2 with O2 sat 98%  Reported DVT though not seen on repeat in hospital  Anemia - could be contributing to SOB    Rec:    Need to balance IVF hydration and diuresis given effusions noted on CT  On Decadron  On BD therapy with Symbicort; can change to nebs if pt not able to comply properly  Follow creatinine  Optimize cardiac function  Rate control o AF  AC for AF per cardiology  Follow H/H  Prognosis guarded    d/w son  d/w medicine

## 2022-12-11 NOTE — CONSULT NOTE ADULT - ASSESSMENT
82F PMH pulmonary HTN, asthma DM2, breast ca, PAF presents with progressive SOB with LE edema for 2 weeks. Was recently diagnosed with COVID. Reportedly take warfarin but has been having inconsistent INR values for the past couple months. Has been following outpatient cardio and showed improvement in her pulmonary HTN.    SOB   AoC Diastolic HF  COVID

## 2022-12-11 NOTE — PROGRESS NOTE ADULT - ASSESSMENT
CKD(IV): +COVID  THERESA, hyponatremia ==>slowly improving  Hx NS (3g) +DM  - avoid potential nephrotoxins  - supportive care  - IV isotonic fluids as needed  - follow labs; (?) eventual baseline Screat pt will establish    Anemia: +CKD; Tsat 36%  - cont DILAN  - target Hgb > 10 CKD(IV): +COVID  THERESA, hyponatremia ==>slowly improving  Hx NS (3g) +DM  - avoid potential nephrotoxins  - supportive care  - IV isotonic fluids now stopped  - follow labs; (?) eventual baseline Screat pt will establish    Anemia: +CKD; Tsat 36%  - cont DILAN  - target Hgb > 10

## 2022-12-11 NOTE — CONSULT NOTE ADULT - PROBLEM SELECTOR RECOMMENDATION 9
-Multifactorial (asthma, COVID, AoC Diastolic HF, pHTN). Audible wheezing without auscultation.   -Received fluids early on in admission  -Trop neg  -BNP 31K  -EKG unchanged  -CT chest with small pleural, RLL nodular opacity  -2021 PCWP 21. PA pressure 32  -02/2022 NST neg  -03/2022 65-70%, DDI, pulm artery pressure 55.1  -03/2022 + amyloid    PLAN: C/w bumex (inaccurate I+Os), f/u CXR / echo, DOAC on DC -Multifactorial (asthma, COVID, AoC Diastolic HF, pHTN). Audible wheezing without auscultation.   -Received fluids early on in admission  -Trop neg  -BNP 31K  -EKG unchanged  -CT chest with small pleural, RLL nodular opacity  -2021 PCWP 21. PA pressure 32  -02/2022 NST neg  -03/2022 65-70%, DDI, pulm artery pressure 55.1  -03/2022 + amyloid    PLAN: C/w bumex (inaccurate I+Os), f/u CXR / echo -Multifactorial (asthma, COVID, AoC Diastolic HF, pHTN). Audible wheezing without auscultation.   -Received fluids early on in admission  -Trop neg  -BNP 31K  -EKG unchanged  -CT chest with small pleural, RLL nodular opacity  -2021 PCWP 21. mean PA pressure 32  -02/2022 NST neg  -03/2022 65-70%, DDI, pulm artery pressure 55.1  -03/2022 + amyloid    PLAN: C/w bumex (inaccurate I+Os), f/u CXR / echo

## 2022-12-11 NOTE — CONSULT NOTE ADULT - SUBJECTIVE AND OBJECTIVE BOX
PULMONARY CONSULT NOTE      IFEOMA TRINIDAD  MRN-[id number]    Patient is a 82y old  Female who presents with a chief complaint of THERESA (11 Dec 2022 12:24)      HISTORY OF PRESENT ILLNESS:    82F admitted for increased dyspnea and lower extremity edema x 2 weeks. She is on warfarin for AF with variable INR levels recently. Pt reportedly dx'd with Covid 2 weeks ago after  also had symptoms. The patient was noted to have congestion and a productive cough. She is noted to have been compliant with her medications at home. Outpatient testing was notable for a right superficial femoral vein thrombosis but f/u LE duplex were reportedly negative. Pt with poor intake recently with possible dehydration. Pt with ? asthma though is not maintained on chronic inhaler therapy and denies smoking hx. Pt with ARF with elevated creatinine which has been slowly improving. CT with L>R effusions and BNP > 30K. Pt now with slight wheeze. She was started on Decadron. Son reports pt easily becomes anxious.      MEDICATIONS  (STANDING):  acetaminophen     Tablet .. 650 milliGRAM(s) Oral every 8 hours  albuterol    90 MICROgram(s) HFA Inhaler 2 Puff(s) Inhalation every 4 hours  budesonide  80 MICROgram(s)/formoterol 4.5 MICROgram(s) Inhaler 2 Puff(s) Inhalation two times a day  buMETAnide Injectable 1 milliGRAM(s) IV Push two times a day  cholecalciferol 1000 Unit(s) Oral daily  dexAMETHasone  Injectable 6 milliGRAM(s) IV Push daily  dextrose 5%. 1000 milliLiter(s) (100 mL/Hr) IV Continuous <Continuous>  dextrose 5%. 1000 milliLiter(s) (50 mL/Hr) IV Continuous <Continuous>  dextrose 50% Injectable 25 Gram(s) IV Push once  dextrose 50% Injectable 12.5 Gram(s) IV Push once  dextrose 50% Injectable 25 Gram(s) IV Push once  diltiazem    milliGRAM(s) Oral daily  epoetin susie-epbx (RETACRIT) Injectable 93576 Unit(s) SubCutaneous <User Schedule>  escitalopram 10 milliGRAM(s) Oral daily  glucagon  Injectable 1 milliGRAM(s) IntraMuscular once  guaiFENesin ER 1200 milliGRAM(s) Oral every 12 hours  hydrALAZINE 25 milliGRAM(s) Oral every 8 hours  insulin glargine Injectable (LANTUS) 10 Unit(s) SubCutaneous at bedtime  insulin lispro (ADMELOG) corrective regimen sliding scale   SubCutaneous three times a day before meals  metoprolol tartrate 50 milliGRAM(s) Oral two times a day  pantoprazole    Tablet 40 milliGRAM(s) Oral before breakfast      MEDICATIONS  (PRN):  dextrose Oral Gel 15 Gram(s) Oral once PRN Blood Glucose LESS THAN 70 milliGRAM(s)/deciliter  hydrALAZINE Injectable 10 milliGRAM(s) IV Push every 6 hours PRN SBP > 160  LORazepam     Tablet 0.5 milliGRAM(s) Oral every 8 hours PRN Anxiety  melatonin 1 milliGRAM(s) Oral at bedtime PRN Sleep      Allergies    No Known Allergies    Intolerances        PAST MEDICAL & SURGICAL HISTORY:  Asthma      Diabetes mellitus      Hypertension      Emphysema      Hyperlipemia      S/P appendectomy      S/P tubal ligation      S/P knee surgery  left          FAMILY HISTORY: N/c      SOCIAL HISTORY  Smoking History: denies    REVIEW OF SYSTEMS: as above        Vital Signs Last 24 Hrs  T(C): 36.7 (11 Dec 2022 16:17), Max: 37.4 (11 Dec 2022 04:27)  T(F): 98.1 (11 Dec 2022 16:17), Max: 99.3 (11 Dec 2022 04:27)  HR: 95 (11 Dec 2022 16:17) (61 - 95)  BP: 151/63 (11 Dec 2022 16:17) (132/64 - 198/69)  BP(mean): --  RR: 18 (11 Dec 2022 16:17) (18 - 20)  SpO2: 96% (11 Dec 2022 16:17) (95% - 98%)    Parameters below as of 11 Dec 2022 16:17  Patient On (Oxygen Delivery Method): nasal cannula  O2 Flow (L/min): 2      PHYSICAL EXAMINATION:    GENERAL: The patient is a well-developed, well-nourished female in no apparent distress.     HEENT: Head is normocephalic and atraumatic. Extraocular muscles are intact. Mucous membranes are moist.     NECK: Supple.     LUNGS: Clear to auscultation with mild wheeze vs upper airway sounds but no rales or rhonchi. Respirations unlabored    HEART: Regular rate and rhythm without murmur.    ABDOMEN: Soft, nontender, and nondistended.  No hepatosplenomegaly is noted.    EXTREMITIES: Without any cyanosis, clubbing, rash, with b/l edema.    NEUROLOGIC: Grossly intact.      LABS:                        8.2    12.71 )-----------( 391      ( 11 Dec 2022 09:15 )             26.8     12-11    133<L>  |  103  |  69.6<H>  ----------------------------<  94  4.9   |  17.0<L>  |  4.96<H>    Ca    7.9<L>      11 Dec 2022 09:15    TPro  4.6<L>  /  Alb  1.8<L>  /  TBili  <0.2<L>  /  DBili  x   /  AST  11  /  ALT  8   /  AlkPhos  73  12-10    PT/INR - ( 11 Dec 2022 09:15 )   PT: 70.7 sec;   INR: 5.99 ratio           Serum Pro-Brain Natriuretic Peptide: 08949 pg/mL (12-11-22 @ 09:15)    MICROBIOLOGY:    Flu With COVID-19 By ANA (12.05.22 @ 00:00)    SARS-CoV-2 Result: Detected: This Respiratory Panel uses polymerase chain reaction (PCR) to detect for  influenza A; influenza B; respiratory syncytial virus; and SARS-CoV-2.  This test was validated by [facility] and is in use under the FDA  Emergency Use Authorization (EUA) for clinical labs CLIA-certified to  perform high complexity testing. Test results should be correlated with  clinical presentation, patient history, and epidemiology.    Influenza A Result: [facility]    Influenza B Result: [facility]    Resp Syn Virus Result: [facility]        RADIOLOGY & ADDITIONAL STUDIES:      ACC: [id number] EXAM:  CT CHEST                          PROCEDURE DATE:  12/07/2022          INTERPRETATION:  EXAMINATION: CT CHEST    CLINICAL INDICATION: Dyspnea.    TECHNIQUE: Noncontrast CT of the chest was obtained.    COMPARISON: 4/12/2021.    FINDINGS:    AIRWAYS AND LUNGS: Mild tracheobronchial secretions.  Redemonstrated   bilaterally lobe bronchiectasis. Small bilateral pleural effusions with   partial atelectasis of both lower lobes. Subtle patchy right upper lobe   opacities. Ill-defined right lower lobe nodular opacity is new from the   prior study.    MEDIASTINUM AND PLEURA: There are no enlarged mediastinal, hilar or   axillary lymph nodes. The visualized portion of the thyroid gland is   unremarkable. There is no pneumothorax.    HEART AND VESSELS: There is cardiomegaly.  There are atherosclerotic   calcifications of the aorta and coronary arteries.  There is no   pericardial effusion.    UPPER ABDOMEN: Images of the upper abdomen demonstrate no abnormality.    BONES AND SOFT TISSUES: There are mild degenerative changes of the spine.    The soft tissues are unremarkable.    AI VERTEBRAL BODY ANALYSIS:  T11: Moderate compression fracture with 35% height loss and low bone   density of 72 HU, suspicious for osteoporosis.This is unchanged from the   prior study.  T12: low bone density of 84 HU, suspicious for osteoporosis.  L1: low bone density of 58 HU, suspicious for osteoporosis.    IMPRESSION:  Small bilateral pleural effusions with partial atelectasis of both lower   lobes. Subtle patchy right upper lobe opacities, possibly   infectious/inflammatory. Ill-defined right lower lobe nodular opacity is   indeterminate. Three-month follow-up chest CT recommended for complete   evaluation.    VERTEBRAL BODY ANALYSIS: Vertebral compression fractures as described,   consider further workup for osteoporosis.      URMILA CAMPOVERDE MD; Attending Radiologist  This document has been electronically signed. Dec  7 2022  8:13PM      ACC: [id number] EXAM:  US DPLX UPR EXT VEINS LTD LT                          PROCEDURE DATE:  12/09/2022          INTERPRETATION:  CLINICAL INFORMATION: Left arm swelling    COMPARISON: None available.    TECHNIQUE: Duplex sonography of the LEFT UPPER extremity veins with color   and spectral Doppler, with and without compression.    FINDINGS:    The left internal jugular, subclavian, axillary and brachial veins are   patent and compressible where applicable.  The basilic and cephalic veins   (superficial veins) are patent and without thrombus.    Doppler examination shows normal spontaneous and phasic flow.    IMPRESSION:  No evidence of left upper extremity deep venous thrombosis.        RADHA HUSAIN MD; Attending Radiologist  This document has been electronically signed. Dec  9 2022  4:12PM    ECHO:    Summary:   1. Normal global left ventricular systolic function.   2. Left ventricular ejection fraction, by visual estimation, is 70 to 75%.   3. Mildly increased LV wall thickness.   4. Normal left ventricular internal cavity size.   5. Normal right ventricular size and function.   6. The left atrium is normal in size.   7. The right atrium is normal in size.   8. Lipomatous hypertrophy of the intra-atrial septum.   9. Moderate mitral annular calcification.  10. Mild mitral valve regurgitation.  11. Elevated mitral valve mean gradient    5 mmHg at HR 99 BPM.  12. No aortic valve stenosis.  13. Estimated pulmonary artery systolic pressure is 63.7 mmHg assuming a right atrial pressure of 8 mmHg, which is consistent with severe pulmonary hypertension.  14. Recommend clinical correlation with the above findings.    Ewa Urbina MD Electronically signed on 4/13/2021 at 6:34:53 PM

## 2022-12-11 NOTE — PROGRESS NOTE ADULT - ASSESSMENT
82F with PMH asthma, diabetes, hyperlipidemia, atrial fibrillation, hypertension, and hyperlipidemia, who was found to have a right superficial femoral vein thrombosis while on warfarin and acute kidney injury. She was also noted to have been recently diagnosed with COVID-19.    #THERESA on CKD IV  Improving  Currently appears volume overloaded with edematous extremities and puffy eyes. Received ~ 5L IVF since admission   renal- chronic medical disease  - Baseline SCr- 2.7 (5/20022) monitor cr   - avoid nephrotoxic meds  - combination of poor po intake, diabetes- uncontrolled   - Hold IVF for now  - Monitor I/O renal functions  - Repeat TTE; prior with severe Pulmonary HTN (likely secondary to Asthma)  - Nephrology follow up    #DVT  Supratherapeutic INR, No bleeding  US Duplex- No evidence of deep venous thrombosis in either lower extremity.  - Hold Coumadin for now, monitor INR    #Anemia  - multifactorial - given kidney disease  - monitor h and h   - S/p 1U PRBC  - will need iv iron outpatient once acute covid resolves      #Uncontrolled Type 2 Diabetes on insulin  Hyperglycemic  A1c 11.2  - BGM with SSI  - Glargine increased to 8U    #Atrial Fibrillation  Rate controlled, supratherapeutic INR  - Metoprolol, Diltiazem  - INR, dose coumadin; currently none    #COVID-19   Normoxic on room air  CT Chest with bilateral upper lobe opacities  - supportive care   - isolation precautions  - Will need repeat outpatient CT chest in 3 months for RLL nodule    #HTN-Essential  Uncontrolled  - monitor blood pressure  - Increase Metoprolol to 50mg, continue Diltiazem at current rate    #Asthma   - supportive care  - albuterol prn    PT pending  Advised RN to mobilize OOB  Incentive Spirometry    Prognosis - Guarded  CODE STATUS- FULL CODE  Updated patient's sonRichard at bedside   82F with PMH asthma, diabetes, hyperlipidemia, atrial fibrillation, hypertension, and hyperlipidemia, who was found to have a right superficial femoral vein thrombosis while on warfarin and acute kidney injury. She was also noted to have been recently diagnosed with COVID-19.    Acute Hypoxic respiratory Failure - Multifactorial. Asthma, COVID-19, CHFpEF, Fluid overload.  - Supplemental O2, wean as tolerated  - Albuterol MDI change to q4 ATC, Add Budesonide/Formoterol  - Bumetanide 1mg IV q12  - TTE, Chest X-Ray  - Obtain D-Dimer, Ferritin, CRP  - Pulmonology consultation  - Cardiology consultation    #THERESA on CKD IV  Interval worsening.  ?CRS  US renal- chronic medical disease  - Baseline SCr- 2.7 (5/20022) monitor cr   - avoid nephrotoxic meds  - combination of poor po intake, diabetes- uncontrolled   - Hold IVF for now  - Monitor I/O renal functions  - Repeat TTE; prior with severe Pulmonary HTN (likely secondary to Asthma)  - Nephrology follow up    #DVT  Supratherapeutic INR, No bleeding  US Duplex- No evidence of deep venous thrombosis in either lower extremity.  - Hold Coumadin for now, monitor INR    #Anemia  - multifactorial - given kidney disease  - monitor h and h   - S/p 1U PRBC  - will need iv iron outpatient once acute covid resolves      #Uncontrolled Type 2 Diabetes on insulin  Hyperglycemic  A1c 11.2  - BGM with SSI  - Glargine increased to 10U as starting On Dexamethasone    #Atrial Fibrillation  Rate controlled, supratherapeutic INR  - Metoprolol, Diltiazem  - INR, dose coumadin; currently none    #COVID-19   Now with hypoxia - unclear if pneumonia, Asthma or heart failure  CT Chest with bilateral upper lobe opacities  - Care for respiratory failure as outlined above  - Obtain inflammatory markers  - isolation precautions  - Will need repeat outpatient CT chest in 3 months for RLL nodule    #HTN-Essential  Uncontrolled  - monitor blood pressure  -   Metoprolol to 50mg, continue Diltiazem at current rate  - Add Hydralazine 25mg q8    #Asthma   - supportive care  - albuterol prn    PT pending  Advised RN to mobilize OOB  Incentive Spirometry    Prognosis - Guarded  CODE STATUS- FULL CODE  Updated patient's son, Jack on phone  Discussed with CHELA Ramsey

## 2022-12-11 NOTE — PROGRESS NOTE ADULT - SUBJECTIVE AND OBJECTIVE BOX
HOSPITALIST PROGRESS NOTE    IFEOMA TRINIDAD  [id number]  82yFemale    Patient is a 82y old  Female who presents with a chief complaint of THERESA (10 Dec 2022 12:10)      SUBJECTIVE:   Chart reviewed since last visit.  Patient seen and examined at bedside for THERESA, COVID-19, DVT  Put on O2 last night due to hypoxia  Currently with dyspnea.  at bedside; also concerned about wheezing and breathing    OBJECTIVE:  Vital Signs Last 24 Hrs  T(C): 36.9 (11 Dec 2022 10:02), Max: 37.4 (11 Dec 2022 04:27)  T(F): 98.5 (11 Dec 2022 10:02), Max: 99.3 (11 Dec 2022 04:27)  HR: 61 (11 Dec 2022 10:02) (61 - 83)  BP: 159/79 (11 Dec 2022 10:02) (132/64 - 198/69)   RR: 18 (11 Dec 2022 10:02) (18 - 20)  SpO2: 98% (11 Dec 2022 10:02) (95% - 98%)    Parameters below as of 11 Dec 2022 10:02  Patient On (Oxygen Delivery Method): nasal cannula  O2 Flow (L/min): 2      PHYSICAL EXAMINATION  General: Elderly female lying in bed, increased WOB. Spouse at bedside  HEENT:  Eyes less puffy  NECK:  distended neck veins  CVS: regular rate and rhythm S1 S2, Pedal edema  RESP:  Diffuse wheeze bilaterally  GI:  Soft with LUQ tenderness  : No suprapubic tenderness  MSK:  Moves all extremities  CNS:  Awake, oriented x3. Follow simple commands  INTEG:  edematous (improved)  PSYCH:  Fair mood; fatigued    MONITOR:  CAPILLARY BLOOD GLUCOSE      POCT Blood Glucose.: 112 mg/dL (11 Dec 2022 11:46)  POCT Blood Glucose.: 87 mg/dL (11 Dec 2022 07:47)  POCT Blood Glucose.: 130 mg/dL (10 Dec 2022 21:44)  POCT Blood Glucose.: 216 mg/dL (10 Dec 2022 16:38)        I&O's Summary                          8.2    12.71 )-----------( 391      ( 11 Dec 2022 09:15 )             26.8     PT/INR - ( 11 Dec 2022 09:15 )   PT: 70.7 sec;   INR: 5.99 ratio           12-11    133<L>  |  103  |  69.6<H>  ----------------------------<  94  4.9   |  17.0<L>  |  4.96<H>    Ca    7.9<L>      11 Dec 2022 09:15    TPro  4.6<L>  /  Alb  1.8<L>  /  TBili  <0.2<L>  /  DBili  x   /  AST  11  /  ALT  8   /  AlkPhos  73  12-10            Culture:    TTE:    RADIOLOGY        MEDICATIONS  (STANDING):  acetaminophen     Tablet .. 650 milliGRAM(s) Oral every 8 hours  budesonide  80 MICROgram(s)/formoterol 4.5 MICROgram(s) Inhaler 2 Puff(s) Inhalation two times a day  buMETAnide Injectable 1 milliGRAM(s) IV Push two times a day  cholecalciferol 1000 Unit(s) Oral daily  dexAMETHasone  IVPB 6 milliGRAM(s) IV Intermittent daily  dextrose 5%. 1000 milliLiter(s) (50 mL/Hr) IV Continuous <Continuous>  dextrose 5%. 1000 milliLiter(s) (100 mL/Hr) IV Continuous <Continuous>  dextrose 50% Injectable 25 Gram(s) IV Push once  dextrose 50% Injectable 12.5 Gram(s) IV Push once  dextrose 50% Injectable 25 Gram(s) IV Push once  diltiazem    milliGRAM(s) Oral daily  epoetin susie-epbx (RETACRIT) Injectable 39216 Unit(s) SubCutaneous <User Schedule>  escitalopram 10 milliGRAM(s) Oral daily  glucagon  Injectable 1 milliGRAM(s) IntraMuscular once  guaiFENesin ER 1200 milliGRAM(s) Oral every 12 hours  hydrALAZINE 25 milliGRAM(s) Oral every 8 hours  insulin glargine Injectable (LANTUS) 5 Unit(s) SubCutaneous at bedtime  insulin lispro (ADMELOG) corrective regimen sliding scale   SubCutaneous three times a day before meals  LORazepam     Tablet 0.5 milliGRAM(s) Oral at bedtime  metoprolol tartrate 50 milliGRAM(s) Oral two times a day  pantoprazole    Tablet 40 milliGRAM(s) Oral before breakfast      MEDICATIONS  (PRN):  albuterol    90 MICROgram(s) HFA Inhaler 2 Puff(s) Inhalation every 6 hours PRN Shortness of Breath and/or Wheezing  dextrose Oral Gel 15 Gram(s) Oral once PRN Blood Glucose LESS THAN 70 milliGRAM(s)/deciliter  hydrALAZINE Injectable 10 milliGRAM(s) IV Push every 6 hours PRN SBP > 160  melatonin 1 milliGRAM(s) Oral at bedtime PRN Sleep   Telephone Encounter by Paula Bernal RN at 07/18/17 08:56 AM     Author:  Paula Bernal RN Service:  (none) Author Type:  Registered Nurse     Filed:  07/18/17 08:56 AM Encounter Date:  7/14/2017 Status:  Signed     :  Paula Bernal RN (Registered Nurse)            Appt made.[JV1.1M]  Electronically Signed by:    Paula Bernal RN , 7/18/2017[JV1.1T]        Revision History        User Key Date/Time User Provider Type Action    > JV1.1 07/18/17 08:56 AM Paula Bernal RN Registered Nurse Sign    M - Manual, T - Template

## 2022-12-11 NOTE — PROGRESS NOTE ADULT - SUBJECTIVE AND OBJECTIVE BOX
NEPHROLOGY INTERVAL HPI/OVERNIGHT EVENTS:  pt clinically stable  no acute overnight events noted    MEDICATIONS  (STANDING):  acetaminophen     Tablet .. 650 milliGRAM(s) Oral every 8 hours  cholecalciferol 1000 Unit(s) Oral daily  dextrose 5%. 1000 milliLiter(s) (100 mL/Hr) IV Continuous <Continuous>  dextrose 5%. 1000 milliLiter(s) (50 mL/Hr) IV Continuous <Continuous>  dextrose 50% Injectable 25 Gram(s) IV Push once  dextrose 50% Injectable 12.5 Gram(s) IV Push once  dextrose 50% Injectable 25 Gram(s) IV Push once  diltiazem    milliGRAM(s) Oral daily  epoetin susie-epbx (RETACRIT) Injectable 73098 Unit(s) SubCutaneous <User Schedule>  escitalopram 10 milliGRAM(s) Oral daily  glucagon  Injectable 1 milliGRAM(s) IntraMuscular once  guaiFENesin ER 1200 milliGRAM(s) Oral every 12 hours  insulin glargine Injectable (LANTUS) 8 Unit(s) SubCutaneous at bedtime  insulin lispro (ADMELOG) corrective regimen sliding scale   SubCutaneous three times a day before meals  LORazepam     Tablet 0.5 milliGRAM(s) Oral at bedtime  metoprolol tartrate 50 milliGRAM(s) Oral two times a day  pantoprazole    Tablet 40 milliGRAM(s) Oral before breakfast    MEDICATIONS  (PRN):  albuterol    90 MICROgram(s) HFA Inhaler 2 Puff(s) Inhalation every 6 hours PRN Shortness of Breath and/or Wheezing  dextrose Oral Gel 15 Gram(s) Oral once PRN Blood Glucose LESS THAN 70 milliGRAM(s)/deciliter  hydrALAZINE Injectable 10 milliGRAM(s) IV Push every 6 hours PRN SBP > 160  melatonin 1 milliGRAM(s) Oral at bedtime PRN Sleep      Allergies    No Known Allergies          Vital Signs Last 24 Hrs  T(C): 37.4 (11 Dec 2022 04:27), Max: 37.4 (11 Dec 2022 04:27)  T(F): 99.3 (11 Dec 2022 04:27), Max: 99.3 (11 Dec 2022 04:27)  HR: 70 (11 Dec 2022 04:27) (70 - 83)  BP: 152/60 (11 Dec 2022 04:27) (132/64 - 198/69)  BP(mean): --  RR: 18 (11 Dec 2022 04:27) (18 - 20)  SpO2: 98% (11 Dec 2022 04:27) (95% - 98%)    Parameters below as of 11 Dec 2022 04:27  Patient On (Oxygen Delivery Method): nasal cannula  O2 Flow (L/min): 2      PHYSICAL EXAM:  GENERAL: Weak, frail  NECK: Supple, No JVD  NERVOUS SYSTEM:  Awake, alert  EXTREMITIES:  No dependent edema  SKIN: No rashes  Further exam deferred to limit COVID exposure    LABS:                        8.0    9.15  )-----------( 380      ( 10 Dec 2022 08:10 )             25.8     12-10    133<L>  |  103  |  70.2<H>  ----------------------------<  215<H>  4.9   |  19.0<L>  |  4.62<H>    Creatinine, Serum: 4.98 mg/dL (12.09.22)    Ca    7.5<L>      10 Dec 2022 08:10  Phos  4.9     12-09    TPro  4.6<L>  /  Alb  1.8<L>  /  TBili  <0.2<L>  /  DBili  x   /  AST  11  /  ALT  8   /  AlkPhos  73  12-10    PT/INR - ( 10 Dec 2022 08:10 )   PT: 43.6 sec;   INR: 3.71 ratio                 RADIOLOGY & ADDITIONAL TESTS:   NEPHROLOGY INTERVAL HPI/OVERNIGHT EVENTS:  pt clinically stable  no acute overnight events noted  son at bedside    MEDICATIONS  (STANDING):  acetaminophen     Tablet .. 650 milliGRAM(s) Oral every 8 hours  cholecalciferol 1000 Unit(s) Oral daily  dextrose 5%. 1000 milliLiter(s) (100 mL/Hr) IV Continuous <Continuous>  dextrose 5%. 1000 milliLiter(s) (50 mL/Hr) IV Continuous <Continuous>  dextrose 50% Injectable 25 Gram(s) IV Push once  dextrose 50% Injectable 12.5 Gram(s) IV Push once  dextrose 50% Injectable 25 Gram(s) IV Push once  diltiazem    milliGRAM(s) Oral daily  epoetin susie-epbx (RETACRIT) Injectable 68775 Unit(s) SubCutaneous <User Schedule>  escitalopram 10 milliGRAM(s) Oral daily  glucagon  Injectable 1 milliGRAM(s) IntraMuscular once  guaiFENesin ER 1200 milliGRAM(s) Oral every 12 hours  insulin glargine Injectable (LANTUS) 8 Unit(s) SubCutaneous at bedtime  insulin lispro (ADMELOG) corrective regimen sliding scale   SubCutaneous three times a day before meals  LORazepam     Tablet 0.5 milliGRAM(s) Oral at bedtime  metoprolol tartrate 50 milliGRAM(s) Oral two times a day  pantoprazole    Tablet 40 milliGRAM(s) Oral before breakfast    MEDICATIONS  (PRN):  albuterol    90 MICROgram(s) HFA Inhaler 2 Puff(s) Inhalation every 6 hours PRN Shortness of Breath and/or Wheezing  dextrose Oral Gel 15 Gram(s) Oral once PRN Blood Glucose LESS THAN 70 milliGRAM(s)/deciliter  hydrALAZINE Injectable 10 milliGRAM(s) IV Push every 6 hours PRN SBP > 160  melatonin 1 milliGRAM(s) Oral at bedtime PRN Sleep      Allergies    No Known Allergies          Vital Signs Last 24 Hrs  T(C): 37.4 (11 Dec 2022 04:27), Max: 37.4 (11 Dec 2022 04:27)  T(F): 99.3 (11 Dec 2022 04:27), Max: 99.3 (11 Dec 2022 04:27)  HR: 70 (11 Dec 2022 04:27) (70 - 83)  BP: 152/60 (11 Dec 2022 04:27) (132/64 - 198/69)  BP(mean): --  RR: 18 (11 Dec 2022 04:27) (18 - 20)  SpO2: 98% (11 Dec 2022 04:27) (95% - 98%)    Parameters below as of 11 Dec 2022 04:27  Patient On (Oxygen Delivery Method): nasal cannula  O2 Flow (L/min): 2      PHYSICAL EXAM:  GENERAL: Weak, frail  NECK: Supple, No JVD  NERVOUS SYSTEM:  Awake, alert  EXTREMITIES:  No dependent edema  SKIN: No rashes  Further exam deferred to limit COVID exposure    LABS:                        8.0    9.15  )-----------( 380      ( 10 Dec 2022 08:10 )             25.8     12-10    133<L>  |  103  |  70.2<H>  ----------------------------<  215<H>  4.9   |  19.0<L>  |  4.62<H>        Creatinine, Serum: 4.98 mg/dL (12.09.22)    Ca    7.5<L>      10 Dec 2022 08:10  Phos  4.9     12-09    TPro  4.6<L>  /  Alb  1.8<L>  /  TBili  <0.2<L>  /  DBili  x   /  AST  11  /  ALT  8   /  AlkPhos  73  12-10    PT/INR - ( 10 Dec 2022 08:10 )   PT: 43.6 sec;   INR: 3.71 ratio                 RADIOLOGY & ADDITIONAL TESTS:

## 2022-12-11 NOTE — CONSULT NOTE ADULT - SUBJECTIVE AND OBJECTIVE BOX
[facility]                                              CARDIOLOGY AT [place] [address]                                             Telephone: [phone]. Fax:[phone]                                                       CARDIOLOGY CONSULTATION NOTE                                                                                             History obtained by: Patient and medical record  Community Cardiologist: Dr. Bennett   obtained: Yes [  ] No [  x]  Reason for Consultation: SOB  Available out pt records reviewed: Yes [x  ] No [  ]        HPI:  82F PMH pulmonary HTN, asthma DM2, breast ca, PAF presents with progressive SOB with LE edema. Was recently diagnosed with COVID. Reportedly take warfarin but has been having inconsistent INR values for the past couple months. Has been following outpatient cardio and showed improvement in her pulmonary HTN. Has been compliant with all meds prior to admission. Denies CP. palpitations     CARDIAC TESTING   ECHO: 03/2022 65-70%, DDI, pulm artery pressure 55.1    STRESS: 02/2022 neg    CATH: 2021 PCWP 21. PA pressure 32    ELECTROPHYSIOLOGY:     PAST MEDICAL HISTORY  Asthma    Diabetes mellitus    Hypertension    Emphysema    Hyperlipemia        PAST SURGICAL HISTORY  S/P appendectomy    S/P tubal ligation    S/P knee surgery        SOCIAL HISTORY:  Denies smoking/alcohol/drugs  CIGARETTES:     ALCOHOL:  DRUGS:    FAMILY HISTORY:    Family History of Cardiovascular Disease:  Yes [  ] No [ x ]  Coronary Artery Disease in first degree relative: Yes [  ] No [ x ]  Sudden Cardiac Death in First degree relative: Yes [  ] No [ x ]    HOME MEDICATIONS:  insulin detemir 100 units/mL subcutaneous solution: 12 unit(s) subcutaneous once a day (at bedtime) (06 Dec 2022 09:22)      CURRENT CARDIAC MEDICATIONS:  buMETAnide Injectable 1 milliGRAM(s) IV Push two times a day  diltiazem    milliGRAM(s) Oral daily  hydrALAZINE 25 milliGRAM(s) Oral every 8 hours  hydrALAZINE Injectable 10 milliGRAM(s) IV Push every 6 hours PRN SBP > 160  metoprolol tartrate 50 milliGRAM(s) Oral two times a day      CURRENT OTHER MEDICATIONS:  albuterol    90 MICROgram(s) HFA Inhaler 2 Puff(s) Inhalation every 4 hours  budesonide  80 MICROgram(s)/formoterol 4.5 MICROgram(s) Inhaler 2 Puff(s) Inhalation two times a day  guaiFENesin ER 1200 milliGRAM(s) Oral every 12 hours  acetaminophen     Tablet .. 650 milliGRAM(s) Oral every 8 hours  escitalopram 10 milliGRAM(s) Oral daily  LORazepam     Tablet 0.5 milliGRAM(s) Oral every 8 hours PRN Anxiety  melatonin 1 milliGRAM(s) Oral at bedtime PRN Sleep  pantoprazole    Tablet 40 milliGRAM(s) Oral before breakfast  cholecalciferol 1000 Unit(s) Oral daily  dexAMETHasone  Injectable 6 milliGRAM(s) IV Push daily  dextrose 5%. 1000 milliLiter(s) (100 mL/Hr) IV Continuous <Continuous>  dextrose 5%. 1000 milliLiter(s) (50 mL/Hr) IV Continuous <Continuous>  dextrose 50% Injectable 25 Gram(s) IV Push once, Stop order after: 1 Doses  dextrose 50% Injectable 12.5 Gram(s) IV Push once, Stop order after: 1 Doses  dextrose 50% Injectable 25 Gram(s) IV Push once, Stop order after: 1 Doses  dextrose Oral Gel 15 Gram(s) Oral once, Stop order after: 1 Doses PRN Blood Glucose LESS THAN 70 milliGRAM(s)/deciliter  epoetin susie-epbx (RETACRIT) Injectable 40683 Unit(s) SubCutaneous <User Schedule>  glucagon  Injectable 1 milliGRAM(s) IntraMuscular once, Stop order after: 1 Doses  insulin glargine Injectable (LANTUS) 10 Unit(s) SubCutaneous at bedtime  insulin lispro (ADMELOG) corrective regimen sliding scale   SubCutaneous three times a day before meals      ALLERGIES:   No Known Allergies      REVIEW OF SYMPTOMS:   CONSTITUTIONAL: No fever, no chills, no weight loss, no weight gain, no fatigue   ENMT:  No vertigo; No sinus or throat pain  NECK: No pain or stiffness  CARDIOVASCULAR: No chest pain, no dyspnea, no syncope/presyncope, no palpitations, no dizziness, no Orthopnea, no Paroxsymal nocturnal dyspnea  RESPIRATORY: +Shortness of breath, + cough, + wheezing  : No dysuria, no hematuria   GI: No dark color stool, no nausea, no diarrhea, no constipation, no abdominal pain   NEURO: No headache, no slurred speech   MUSCULOSKELETAL: No joint pain or swelling; No muscle, back, or extremity pain  PSYCH: No agitation, no anxiety.    ALL OTHER REVIEW OF SYSTEMS ARE NEGATIVE.    VITAL SIGNS:  T(C): 36.9 (12-11-22 @ 10:02), Max: 37.4 (12-11-22 @ 04:27)  T(F): 98.5 (12-11-22 @ 10:02), Max: 99.3 (12-11-22 @ 04:27)  HR: 61 (12-11-22 @ 10:02) (61 - 83)  BP: 159/79 (12-11-22 @ 10:02) (132/64 - 198/69)  RR: 18 (12-11-22 @ 10:02) (18 - 20)  SpO2: 98% (12-11-22 @ 10:02) (95% - 98%)    INTAKE AND OUTPUT:       PHYSICAL EXAM:  Constitutional: Comfortable . No acute distress.   HEENT: Atraumatic and normocephalic , neck is supple . no JVD. No carotid bruit.  CNS: A&Ox3. No focal deficits.   Respiratory: wheezing audible from 3 ft away, unlabored   Cardiovascular: RRR normal s1 s2. No murmur. No rubs or gallop.  Gastrointestinal: Soft, non-tender. +Bowel sounds.   Extremities: 2+ Peripheral Pulses, No clubbing, cyanosis, + edema  Psychiatric: Calm . no agitation.   Skin: Warm and dry, no ulcers on extremities     LABS:  ( 11 Dec 2022 09:15 )  Troponin T  X    ,  CPK  X    , CKMB  X    , BNP 82354<H>    , ( 05 Dec 2022 00:00 )  Troponin T  0.03 ,  CPK  X    , CKMB  X    , BNP 47628<H>                              8.2    12.71 )-----------( 391      ( 11 Dec 2022 09:15 )             26.8     12-11    133<L>  |  103  |  69.6<H>  ----------------------------<  94  4.9   |  17.0<L>  |  4.96<H>    Ca    7.9<L>      11 Dec 2022 09:15    TPro  4.6<L>  /  Alb  1.8<L>  /  TBili  <0.2<L>  /  DBili  x   /  AST  11  /  ALT  8   /  AlkPhos  73  12-10    PT/INR - ( 11 Dec 2022 09:15 )   PT: 70.7 sec;   INR: 5.99 ratio                     INTERPRETATION OF TELEMETRY: SR    ECG:   < from: 12 Lead ECG (12.06.22 @ 08:27) >  Sinus rhythm with  Premature atrial complexes  Low voltage QRS Limb lead  Increased R/S ratio in V1, consider early transition or posterior infarct  Abnormal ECG    < end of copied text >    Prior ECG: Yes [ x ] No [  ]    RADIOLOGY & ADDITIONAL STUDIES:    X-ray:    CT scan:   < from: CT Chest No Cont (12.07.22 @ 19:56) >  IMPRESSION:  Small bilateral pleural effusions with partial atelectasis of both lower   lobes. Subtle patchy right upper lobe opacities, possibly   infectious/inflammatory. Ill-defined right lower lobe nodular opacity is   indeterminate. Three-month follow-up chest CT recommended for complete   evaluation.      < end of copied text >    MRI:   US:

## 2022-12-11 NOTE — CONSULT NOTE ADULT - NS ATTEND AMEND GEN_ALL_CORE FT
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 Cardiology consulted for worsening respiratory status.  -Patient was off diuretic and received at least 5L IVF since admission  -Kidney function initially improved, now worsening (Cr currently 4.96, baseline 2.5 in 3/2022 per nephrology notes)  -Fluid overloaded on exam with pBNP 27957 (92680 on admission)  -Small pleural effusions b/l on CT chest 12/7  -Repeat CXR pending   -Repeat US negative for DVT b/l 12/6   -Patient on lasix 40mg po daily outpatient; agree with starting bumex 1mg IV q12   -Strict I/O's, daily weights, renal function and electrolytes  -Supratherapeutic INR; reportedly labile outpatient per family. Currently on hold. Unclear why patient is on coumadin; would consider switching to low dose eliquis on d/c  -BP uncontrolled; was started on hydralazine 25mg q8 by primary team. Continue to monitor

## 2022-12-12 DIAGNOSIS — I50.31 ACUTE DIASTOLIC (CONGESTIVE) HEART FAILURE: ICD-10-CM

## 2022-12-12 DIAGNOSIS — I48.91 UNSPECIFIED ATRIAL FIBRILLATION: ICD-10-CM

## 2022-12-12 DIAGNOSIS — N17.9 ACUTE KIDNEY FAILURE, UNSPECIFIED: ICD-10-CM

## 2022-12-12 DIAGNOSIS — I10 ESSENTIAL (PRIMARY) HYPERTENSION: ICD-10-CM

## 2022-12-12 DIAGNOSIS — E11.65 TYPE 2 DIABETES MELLITUS WITH HYPERGLYCEMIA: ICD-10-CM

## 2022-12-12 LAB
ANION GAP SERPL CALC-SCNC: 14 MMOL/L — SIGNIFICANT CHANGE UP (ref 5–17)
BUN SERPL-MCNC: 73.4 MG/DL — HIGH (ref 8–20)
CALCIUM SERPL-MCNC: 8 MG/DL — LOW (ref 8.4–10.5)
CHLORIDE SERPL-SCNC: 103 MMOL/L — SIGNIFICANT CHANGE UP (ref 96–108)
CO2 SERPL-SCNC: 15 MMOL/L — LOW (ref 22–29)
CREAT SERPL-MCNC: 5.56 MG/DL — HIGH (ref 0.5–1.3)
EGFR: 7 ML/MIN/1.73M2 — LOW
GLUCOSE BLDC GLUCOMTR-MCNC: 162 MG/DL — HIGH (ref 70–99)
GLUCOSE BLDC GLUCOMTR-MCNC: 171 MG/DL — HIGH (ref 70–99)
GLUCOSE BLDC GLUCOMTR-MCNC: 182 MG/DL — HIGH (ref 70–99)
GLUCOSE BLDC GLUCOMTR-MCNC: 200 MG/DL — HIGH (ref 70–99)
GLUCOSE SERPL-MCNC: 180 MG/DL — HIGH (ref 70–99)
HCT VFR BLD CALC: 27.6 % — LOW (ref 34.5–45)
HGB BLD-MCNC: 8.4 G/DL — LOW (ref 11.5–15.5)
INR BLD: 5.51 RATIO — CRITICAL HIGH (ref 0.88–1.16)
MCHC RBC-ENTMCNC: 20.6 PG — LOW (ref 27–34)
MCHC RBC-ENTMCNC: 30.4 GM/DL — LOW (ref 32–36)
MCV RBC AUTO: 67.8 FL — LOW (ref 80–100)
PLATELET # BLD AUTO: 436 K/UL — HIGH (ref 150–400)
POTASSIUM SERPL-MCNC: 5.6 MMOL/L — HIGH (ref 3.5–5.3)
POTASSIUM SERPL-SCNC: 5.6 MMOL/L — HIGH (ref 3.5–5.3)
PROTHROM AB SERPL-ACNC: 65 SEC — HIGH (ref 10.5–13.4)
RBC # BLD: 4.07 M/UL — SIGNIFICANT CHANGE UP (ref 3.8–5.2)
RBC # FLD: 20 % — HIGH (ref 10.3–14.5)
SODIUM SERPL-SCNC: 132 MMOL/L — LOW (ref 135–145)
WBC # BLD: 7.29 K/UL — SIGNIFICANT CHANGE UP (ref 3.8–10.5)
WBC # FLD AUTO: 7.29 K/UL — SIGNIFICANT CHANGE UP (ref 3.8–10.5)

## 2022-12-12 PROCEDURE — 99233 SBSQ HOSP IP/OBS HIGH 50: CPT | Mod: 25

## 2022-12-12 PROCEDURE — 99233 SBSQ HOSP IP/OBS HIGH 50: CPT

## 2022-12-12 PROCEDURE — 99232 SBSQ HOSP IP/OBS MODERATE 35: CPT

## 2022-12-12 PROCEDURE — 93306 TTE W/DOPPLER COMPLETE: CPT | Mod: 26

## 2022-12-12 PROCEDURE — 99222 1ST HOSP IP/OBS MODERATE 55: CPT

## 2022-12-12 RX ORDER — ACETAMINOPHEN 500 MG
1000 TABLET ORAL ONCE
Refills: 0 | Status: COMPLETED | OUTPATIENT
Start: 2022-12-12 | End: 2022-12-12

## 2022-12-12 RX ORDER — SODIUM ZIRCONIUM CYCLOSILICATE 10 G/10G
5 POWDER, FOR SUSPENSION ORAL ONCE
Refills: 0 | Status: COMPLETED | OUTPATIENT
Start: 2022-12-12 | End: 2022-12-12

## 2022-12-12 RX ORDER — SODIUM BICARBONATE 1 MEQ/ML
650 SYRINGE (ML) INTRAVENOUS THREE TIMES A DAY
Refills: 0 | Status: DISCONTINUED | OUTPATIENT
Start: 2022-12-12 | End: 2022-12-14

## 2022-12-12 RX ORDER — ONDANSETRON 8 MG/1
4 TABLET, FILM COATED ORAL ONCE
Refills: 0 | Status: COMPLETED | OUTPATIENT
Start: 2022-12-12 | End: 2022-12-12

## 2022-12-12 RX ADMIN — Medication 650 MILLIGRAM(S): at 14:00

## 2022-12-12 RX ADMIN — Medication 2: at 08:29

## 2022-12-12 RX ADMIN — ONDANSETRON 4 MILLIGRAM(S): 8 TABLET, FILM COATED ORAL at 22:51

## 2022-12-12 RX ADMIN — Medication 6 MILLIGRAM(S): at 07:00

## 2022-12-12 RX ADMIN — Medication 650 MILLIGRAM(S): at 13:30

## 2022-12-12 RX ADMIN — ALBUTEROL 2 PUFF(S): 90 AEROSOL, METERED ORAL at 12:10

## 2022-12-12 RX ADMIN — Medication 25 MILLIGRAM(S): at 20:16

## 2022-12-12 RX ADMIN — Medication 650 MILLIGRAM(S): at 20:16

## 2022-12-12 RX ADMIN — Medication 650 MILLIGRAM(S): at 20:15

## 2022-12-12 RX ADMIN — Medication 650 MILLIGRAM(S): at 07:01

## 2022-12-12 RX ADMIN — Medication 50 MILLIGRAM(S): at 07:02

## 2022-12-12 RX ADMIN — Medication 650 MILLIGRAM(S): at 07:30

## 2022-12-12 RX ADMIN — Medication 25 MILLIGRAM(S): at 13:35

## 2022-12-12 RX ADMIN — PANTOPRAZOLE SODIUM 40 MILLIGRAM(S): 20 TABLET, DELAYED RELEASE ORAL at 07:02

## 2022-12-12 RX ADMIN — Medication 240 MILLIGRAM(S): at 07:02

## 2022-12-12 RX ADMIN — BUMETANIDE 1 MILLIGRAM(S): 0.25 INJECTION INTRAMUSCULAR; INTRAVENOUS at 13:29

## 2022-12-12 RX ADMIN — Medication 1000 MILLIGRAM(S): at 23:22

## 2022-12-12 RX ADMIN — Medication 50 MILLIGRAM(S): at 17:26

## 2022-12-12 RX ADMIN — ALBUTEROL 2 PUFF(S): 90 AEROSOL, METERED ORAL at 20:16

## 2022-12-12 RX ADMIN — Medication 650 MILLIGRAM(S): at 20:00

## 2022-12-12 RX ADMIN — Medication 1200 MILLIGRAM(S): at 07:01

## 2022-12-12 RX ADMIN — INSULIN GLARGINE 10 UNIT(S): 100 INJECTION, SOLUTION SUBCUTANEOUS at 22:47

## 2022-12-12 RX ADMIN — Medication 1000 UNIT(S): at 13:29

## 2022-12-12 RX ADMIN — Medication 400 MILLIGRAM(S): at 22:47

## 2022-12-12 RX ADMIN — BUMETANIDE 1 MILLIGRAM(S): 0.25 INJECTION INTRAMUSCULAR; INTRAVENOUS at 07:01

## 2022-12-12 RX ADMIN — ESCITALOPRAM OXALATE 10 MILLIGRAM(S): 10 TABLET, FILM COATED ORAL at 13:30

## 2022-12-12 RX ADMIN — Medication 1200 MILLIGRAM(S): at 17:26

## 2022-12-12 RX ADMIN — BUDESONIDE AND FORMOTEROL FUMARATE DIHYDRATE 2 PUFF(S): 160; 4.5 AEROSOL RESPIRATORY (INHALATION) at 20:45

## 2022-12-12 RX ADMIN — Medication 2: at 17:26

## 2022-12-12 RX ADMIN — Medication 2: at 13:28

## 2022-12-12 RX ADMIN — SODIUM ZIRCONIUM CYCLOSILICATE 5 GRAM(S): 10 POWDER, FOR SUSPENSION ORAL at 15:03

## 2022-12-12 RX ADMIN — ERYTHROPOIETIN 20000 UNIT(S): 10000 INJECTION, SOLUTION INTRAVENOUS; SUBCUTANEOUS at 17:26

## 2022-12-12 RX ADMIN — ALBUTEROL 2 PUFF(S): 90 AEROSOL, METERED ORAL at 17:00

## 2022-12-12 RX ADMIN — ALBUTEROL 2 PUFF(S): 90 AEROSOL, METERED ORAL at 07:30

## 2022-12-12 RX ADMIN — Medication 25 MILLIGRAM(S): at 07:02

## 2022-12-12 NOTE — PHYSICAL THERAPY INITIAL EVALUATION ADULT - ADDITIONAL COMMENTS
Pt lives in a house with 4 steps to enter with  rails and  0 stairs inside   Pt owns medical equipment: SAC, RW, Commode, SC  Someone is always available to provide assist.

## 2022-12-12 NOTE — PROGRESS NOTE ADULT - SUBJECTIVE AND OBJECTIVE BOX
[facility]                                                         CARDIOLOGY AT [facility]                                                                  [address], [place] [address]                                                         Telephone: [phone]. Fax:[phone]                                                                             PROGRESS NOTE    Reason for follow up:   Update:   Ill appearing, wheezing  Review of symptoms:   Cardiac:  No chest pain. No dyspnea. No palpitations.  Respiratory: no cough. No dyspnea  Gastrointestinal: No diarrhea. No abdominal pain. No bleeding.   Neuro: No focal neuro complaints.    Vitals:  T(C): 36.6 (12-12-22 @ 10:20), Max: 36.7 (12-11-22 @ 16:17)  HR: 58 (12-12-22 @ 10:20) (58 - 95)  BP: 152/58 (12-12-22 @ 10:20) (148/79 - 152/58)  RR: 16 (12-12-22 @ 10:20) (16 - 18)  SpO2: 98% (12-12-22 @ 10:20) (96% - 99%)  I&O's Summary    PHYSICAL EXAM:  Appearance: Comfortable. Ill appearing  HEENT:  Atraumatic. Normocephalic.  Normal oral mucosa  Neurologic: A & O x 2, no gross focal deficits.  Cardiovascular: RRR S1 S2, No murmur, no rubs/gallops. No JVD  Respiratory:  Using accessor muscle to breath, sating 96% on 2LNC, upper airway wheezing noted.    Gastrointestinal:  Soft, Non-tender, + BS  Lower Extremities: 2+ Peripheral Pulses, No clubbing, cyanosis, or edema  Psychiatry: Patient is calm. No agitation.   Skin: warm and dry.    CURRENT CARDIAC MEDICATIONS:  buMETAnide Injectable 1 milliGRAM(s) IV Push two times a day  diltiazem    milliGRAM(s) Oral daily  hydrALAZINE 25 milliGRAM(s) Oral every 8 hours  hydrALAZINE Injectable 10 milliGRAM(s) IV Push every 6 hours PRN  metoprolol tartrate 50 milliGRAM(s) Oral two times a day    CURRENT OTHER MEDICATIONS:  albuterol    90 MICROgram(s) HFA Inhaler 2 Puff(s) Inhalation every 4 hours  budesonide  80 MICROgram(s)/formoterol 4.5 MICROgram(s) Inhaler 2 Puff(s) Inhalation two times a day  guaiFENesin ER 1200 milliGRAM(s) Oral every 12 hours  acetaminophen     Tablet .. 650 milliGRAM(s) Oral every 8 hours  escitalopram 10 milliGRAM(s) Oral daily  LORazepam     Tablet 0.5 milliGRAM(s) Oral every 8 hours PRN Anxiety  melatonin 1 milliGRAM(s) Oral at bedtime PRN Sleep  pantoprazole    Tablet 40 milliGRAM(s) Oral before breakfast  dexAMETHasone  Injectable 6 milliGRAM(s) IV Push daily  glucagon  Injectable 1 milliGRAM(s) IntraMuscular once, Stop order after: 1 Doses  insulin glargine Injectable (LANTUS) 10 Unit(s) SubCutaneous at bedtime  insulin lispro (ADMELOG) corrective regimen sliding scale   SubCutaneous three times a day before meals  cholecalciferol 1000 Unit(s) Oral daily  epoetin susie-epbx (RETACRIT) Injectable 33975 Unit(s) SubCutaneous <User Schedule>    LABS:	 	  ( 11 Dec 2022 09:15 )  Troponin T  X    ,  CPK  X    , CKMB  X    , BNP 90297<H>    , ( 05 Dec 2022 00:00 )  Troponin T  0.03 ,  CPK  X    , CKMB  X    , BNP 92650<H>                        8.4    7.29  )-----------( 436      ( 12 Dec 2022 06:57 )             27.6     12-12    132<L>  |  103  |  73.4<H>  ----------------------------<  180<H>  5.6<H>   |  15.0<L>  |  5.56<H>    Ca    8.0<L>      12 Dec 2022 06:57      PT/INR/PTT ( 12 Dec 2022 06:57 )                    :                       :      65.0         :       X                     .        .                   .              .           .       5.51        .                                         TELEMETRY: Sr, SB,

## 2022-12-12 NOTE — PROGRESS NOTE ADULT - ASSESSMENT
82F PMH pulmonary HTN, asthma DM2, breast ca, PAF presents with progressive SOB with LE edema. Was recently diagnosed with COVID. Reportedly take warfarin but has been having inconsistent INR values for the past couple months. Has been following outpatient cardio and showed improvement in her pulmonary HTN. Has been compliant with all meds prior to admission.

## 2022-12-12 NOTE — CONSULT NOTE ADULT - SUBJECTIVE AND OBJECTIVE BOX
Palliative Care Consult  HPI 82 year old female PMHx of asthma, diabetes, hyperlipidemia, atrial fibrillation, hypertension, and [facility] and recent diagnosis of covid. Associated symptoms of cough, congestion and found outpatient to have right superficial femoral vein thombosis which was found on Duplex ultrasound at [facility] to be negativefor DVT in either lower extremity) She is on coumadin which her INR has been variable per son over the past few months. No reports noted of chest pain. sob, fever, chills, n/v/d/c.  Admitted for right superficial femoral vein thrombosis(US Duplex- No evidence of deep venous thrombosis in either lower extremity , acute kidney injury, and covid 19. Called for palliative consult for [facility].    HPI:  82F who was referred to the hospital by her primary care physician. Additional information was provided by her son at the bedside. The patient was reported to have increased dyspnea and lower extremity edema for the past two weeks. She is on warfarin for anticoagulation and her sone reported that the INR values have been very variable over the past few months. At times the values are high but other times the values are low. She was also recently diagnosed with COVID-19 along with her . The patient was noted to have congestion and a productive cough. She is noted to have been compliant with her medications at home. Outpatient testing was notable for a right superficial femoral vein thrombosis. The patient was without history of thrombosis in the past. The patient's son reported that she has had poor appetite an oral intake over the past two weeks. On presentation to the emergency department, the patient as noted to have acute kidney injury.   (06 Dec 2022 08:44)>end of copied text    PERTINENT PMH REVIEWED: Yes     PAST MEDICAL & SURGICAL HISTORY:  Asthma      Diabetes mellitus      Hypertension      Emphysema      Hyperlipemia      S/P appendectomy      S/P tubal ligation      S/P knee surgery  left      SOCIAL HISTORY:                      Substance history: none                    Admitted from:  home                      Uatsdin/spirituality: unknown                    Cultural concerns: unknown                      Surrogate/HCP/Guardian: Phone#:JESSICA [phone]    FAMILY HISTORY:  No family history related to admission diagonsis    Allergies  No Known Allergies    ADVANCE DIRECTIVES/TREATMENT PREFERENCES:  Full Code-Surrogate/HCP/Guardian: Phone#:Richard (son- [phone])     Baseline ADLs (prior to admission):  Independent    Karnofsky/Palliative Performance Status Version 2:  %40  http://npcrc.org/files/[email or web address]_[email or web address]    Present Symptoms:   Dyspnea: mild   Nausea/Vomiting: No  Anxiety:  Yes   Depression: No  Fatigue: yes  Loss of appetite: Yes   Constipation: no    Pain: no pain            Character-            Duration-            Effect-            Factors-            Frequency-            Location-            Severity-    Pain AD Score:0  [email or web address] (press ctrl + left click to view)    Review of Systems: Reviewed                     Negative: no pain  All others negative    MEDICATIONS  (STANDING):  acetaminophen     Tablet .. 650 milliGRAM(s) Oral every 8 hours  albuterol    90 MICROgram(s) HFA Inhaler 2 Puff(s) Inhalation every 4 hours  budesonide  80 MICROgram(s)/formoterol 4.5 MICROgram(s) Inhaler 2 Puff(s) Inhalation two times a day  buMETAnide Injectable 1 milliGRAM(s) IV Push two times a day  cholecalciferol 1000 Unit(s) Oral daily  dexAMETHasone  Injectable 6 milliGRAM(s) IV Push daily  dextrose 5%. 1000 milliLiter(s) (100 mL/Hr) IV Continuous <Continuous>  dextrose 5%. 1000 milliLiter(s) (50 mL/Hr) IV Continuous <Continuous>  dextrose 50% Injectable 25 Gram(s) IV Push once  dextrose 50% Injectable 12.5 Gram(s) IV Push once  dextrose 50% Injectable 25 Gram(s) IV Push once  diltiazem    milliGRAM(s) Oral daily  epoetin susie-epbx (RETACRIT) Injectable 01764 Unit(s) SubCutaneous <User Schedule>  escitalopram 10 milliGRAM(s) Oral daily  glucagon  Injectable 1 milliGRAM(s) IntraMuscular once  guaiFENesin ER 1200 milliGRAM(s) Oral every 12 hours  hydrALAZINE 25 milliGRAM(s) Oral every 8 hours  insulin glargine Injectable (LANTUS) 10 Unit(s) SubCutaneous at bedtime  insulin lispro (ADMELOG) corrective regimen sliding scale   SubCutaneous three times a day before meals  metoprolol tartrate 50 milliGRAM(s) Oral two times a day  pantoprazole    Tablet 40 milliGRAM(s) Oral before breakfast    MEDICATIONS  (PRN):  dextrose Oral Gel 15 Gram(s) Oral once PRN Blood Glucose LESS THAN 70 milliGRAM(s)/deciliter  hydrALAZINE Injectable 10 milliGRAM(s) IV Push every 6 hours PRN SBP > 160  LORazepam     Tablet 0.5 milliGRAM(s) Oral every 8 hours PRN Anxiety  melatonin 1 milliGRAM(s) Oral at bedtime PRN Sleep      PHYSICAL EXAM:    Vital Signs Last 24 Hrs  T(C): 36.5 (12 Dec 2022 04:03), Max: 36.9 (11 Dec 2022 10:02)  T(F): 97.7 (12 Dec 2022 04:03), Max: 98.5 (11 Dec 2022 10:02)  HR: 64 (12 Dec 2022 04:03) (61 - 95)  BP: 148/79 (12 Dec 2022 04:03) (148/79 - 159/79)  BP(mean): --  RR: 18 (12 Dec 2022 04:03) (18 - 18)  SpO2: 99% (12 Dec 2022 04:03) (96% - 99%)    Parameters below as of 11 Dec 2022 21:06  Patient On (Oxygen Delivery Method): nasal cannula        General: alert  oriented x _3 in and out of sleep cycle    HEENT: normal     Lungs: comfortable - covid    CV: femeral thrombosis    GI: normal     : incontinent      MSK: weakness      Neuro: no focal deficits cognitive impairment    Skin: normal     LABS:                        8.4    7.29  )-----------( 436      ( 12 Dec 2022 06:57 )             27.6     12-12    132<L>  |  103  |  73.4<H>  ----------------------------<  180<H>  5.6<H>   |  15.0<L>  |  5.56<H>    Ca    8.0<L>      12 Dec 2022 06:57    PT/INR - ( 12 Dec 2022 06:57 )   PT: 65.0 sec;   INR: 5.51 ratio      I&O's Summary    RADIOLOGY & ADDITIONAL STUDIES:  ECHO:    Summary:   1. Normal global left ventricular systolic function.   2. Left ventricular ejection fraction, by visual estimation, is 70 to 75%.   3. Mildly increased LV wall thickness.   4. Normal left ventricular internal cavity size.   5. Normal right ventricular size and function.   6. The left atrium is normal in size.   7. The right atrium is normal in size.   8. Lipomatous hypertrophy of the intra-atrial septum.   9. Moderate mitral annular calcification.  10. Mild mitral valve regurgitation.  11. Elevated mitral valve mean gradient    5 mmHg at HR 99 BPM.  12. No aortic valve stenosis.  13. Estimated pulmonary artery systolic pressure is 63.7 mmHg assuming a right atrial pressure of 8 mmHg, which is consistent with severe pulmonary hypertension.  14. Recommend clinical correlation with the above findings.    12.07.22  CT CHEST  IMPRESSION:  Small bilateral pleural effusions with partial atelectasis of both lower   lobes. Subtle patchy right upper lobe opacities, possibly   infectious/inflammatory. Ill-defined right lower lobe nodular opacity is   indeterminate. Three-month follow-up chest CT recommended for complete   evaluation.  VERTEBRAL BODY ANALYSIS: Vertebral compression fractures as described,   consider further workup for osteoporosis.    DUPLEX 12.06.22  IMPRESSION:  No evidence of left upper extremity deep venous thrombosis.  IMPRESSION:  No evidence of deep venous thrombosis in either lower extremity.

## 2022-12-12 NOTE — PROGRESS NOTE ADULT - ASSESSMENT
82F never smoker with hx of asthma, Afib on coumadin, HTN, DM, HLD, recent covid 19 diagnosis referred to the ED by PCP due to progressively worsening dyspnea in the setting of known covid with worsening hypoxia and renal failure     Acute hypoxic respiratory failure in the setting of covid PNA w/ likely component of volume overload   c/w decadron   c/w supplemental O2 for goal >90-92%  c/w symbicort   c/w albuterol q4h standing   chest PT/pulmonary toilet   caution with sedating medications   will check procal   low threshold to start empiric abx   INR remains supratherapeutic with LE duplex negative for DVT; low suspicion for DVT/PE  trend covid labs   aspiration precautions   monitor strict I/Os  f/u nephrology recs  BP control   diuresis as tolerated; likely intravascular depleted given poor PO intake and hypoalbuminemia   poor prognosis     d/w son at bedside

## 2022-12-12 NOTE — PROGRESS NOTE ADULT - ASSESSMENT
82F with PMH asthma, diabetes, hyperlipidemia, atrial fibrillation, hypertension, and hyperlipidemia, who was found to have a right superficial femoral vein thrombosis while on warfarin and acute kidney injury. She was also noted to have been recently diagnosed with COVID-19.    Acute Hypoxic respiratory Failure - Multifactorial. Asthma, COVID-19, CHFpEF, Fluid overload.  Continues to have hypoxia and WOB  - Supplemental O2, wean as tolerated  - Albuterol MDI q4 ATC, Add Budesonide/Formoterol  - Bumetanide 1mg IV q12  - TTE,  - Pulmonology follow up  - Cardiology follow up    #THEREAS on CKD IV  Interval worsening.    US renal- chronic medical disease  - Baseline SCr- 2.7 (5/20022) monitor cr   - avoid nephrotoxic meds  - combination of poor po intake, diabetes- uncontrolled   - Hold IVF for now  - Monitor I/O renal functions  - Repeat TTE; prior with severe Pulmonary HTN (likely secondary to Asthma)  - Nephrology follow up    #DVT  Supratherapeutic INR, No bleeding  US Duplex- No evidence of deep venous thrombosis in either lower extremity.  - Hold Coumadin for now, monitor INR    #Anemia  - multifactorial - given kidney disease  - monitor h and h   - S/p 1U PRBC  - will need iv iron outpatient once acute covid resolves      #Uncontrolled Type 2 Diabetes on insulin  Hyperglycemia improved  A1c 11.2  - BGM with SSI  - Glargine increased to 10U as starting On Dexamethasone    #Atrial Fibrillation  Rate controlled, supratherapeutic INR  - Metoprolol, Diltiazem  - INR, dose coumadin; currently none    #COVID-19   Now with hypoxia - unclear if pneumonia, Asthma or heart failure  CT Chest with bilateral upper lobe opacities  - Started on Dexamethasone for COVID Asthma  - Care for respiratory failure as outlined above  - isolation precautions  - Will need repeat outpatient CT chest in 3 months for RLL nodule    #HTN-Essential  Uncontrolled but improving  - monitor blood pressure  -   Metoprolol to 50mg, continue Diltiazem at current rate  - Add Hydralazine 25mg q8         PT  recommendation for [facility]  Advised RN to mobilize OOB  Incentive Spirometry    Prognosis - Guarded  CODE STATUS- FULL CODE. Palliative consult   Discussed with patient spouse and RN Roxy

## 2022-12-12 NOTE — PROGRESS NOTE ADULT - SUBJECTIVE AND OBJECTIVE BOX
PULMONARY CONSULT NOTE      IFEOMA TRINIDAD  MRN-[id number]    Patient is a 82y old  Female who presents with a chief complaint of THERESA (11 Dec 2022 12:24)      HISTORY OF PRESENT ILLNESS:    82F never smoker with hx of asthma, Afib on coumadin, HTN, DM, HLD, recent covid 19 diagnosis referred to the ED by PCP due to progressively worsening dyspnea with associated LE edema, cough and congestion. She was reportedly compliant with her medications at home. She underwent LE duplex notable for right superficial femoral vein thrombosis with follow-up that was reportedly negative. She was found to have THERESA on CKD and developed hypoxia requiring 2L NC during hospital course. CT chest showed some slight opacities in the RUL and RLL, lower lobe bronchiectasis and small bilateral effusions. She received 1u pRBC 12/7.     Interval hx:  Pt remains on 2L nasal cannula and maintaining sats >94%. She appears fatigued but not in distress. Remains anxious. Cr uptrending today. Remains afebrile, hemodynamically stable.       MEDICATIONS  (STANDING):  acetaminophen     Tablet .. 650 milliGRAM(s) Oral every 8 hours  acetaminophen   IVPB .. 1000 milliGRAM(s) IV Intermittent once  albuterol    90 MICROgram(s) HFA Inhaler 2 Puff(s) Inhalation every 4 hours  budesonide  80 MICROgram(s)/formoterol 4.5 MICROgram(s) Inhaler 2 Puff(s) Inhalation two times a day  buMETAnide Injectable 1 milliGRAM(s) IV Push two times a day  cholecalciferol 1000 Unit(s) Oral daily  dexAMETHasone  Injectable 6 milliGRAM(s) IV Push daily  dextrose 5%. 1000 milliLiter(s) (100 mL/Hr) IV Continuous <Continuous>  dextrose 5%. 1000 milliLiter(s) (50 mL/Hr) IV Continuous <Continuous>  dextrose 50% Injectable 25 Gram(s) IV Push once  dextrose 50% Injectable 12.5 Gram(s) IV Push once  dextrose 50% Injectable 25 Gram(s) IV Push once  diltiazem    milliGRAM(s) Oral daily  epoetin susie-epbx (RETACRIT) Injectable 42604 Unit(s) SubCutaneous <User Schedule>  escitalopram 10 milliGRAM(s) Oral daily  glucagon  Injectable 1 milliGRAM(s) IntraMuscular once  guaiFENesin ER 1200 milliGRAM(s) Oral every 12 hours  hydrALAZINE 25 milliGRAM(s) Oral every 8 hours  insulin glargine Injectable (LANTUS) 10 Unit(s) SubCutaneous at bedtime  insulin lispro (ADMELOG) corrective regimen sliding scale   SubCutaneous three times a day before meals  metoprolol tartrate 50 milliGRAM(s) Oral two times a day  pantoprazole    Tablet 40 milliGRAM(s) Oral before breakfast  sodium bicarbonate 650 milliGRAM(s) Oral three times a day    MEDICATIONS  (PRN):  dextrose Oral Gel 15 Gram(s) Oral once PRN Blood Glucose LESS THAN 70 milliGRAM(s)/deciliter  hydrALAZINE Injectable 10 milliGRAM(s) IV Push every 6 hours PRN SBP > 160  LORazepam     Tablet 0.5 milliGRAM(s) Oral every 8 hours PRN Anxiety  melatonin 1 milliGRAM(s) Oral at bedtime PRN Sleep    Allergies  No Known Allergies  Intolerances    PAST MEDICAL & SURGICAL HISTORY:  Asthma  Diabetes mellitus  Hypertension  Emphysema  Hyperlipemia  S/P appendectomy  S/P tubal ligation  S/P knee surgery  left    FAMILY HISTORY: N/c    SOCIAL HISTORY  Smoking History: denies    REVIEW OF SYSTEMS: limited due to clinical condition     ICU Vital Signs Last 24 Hrs  T(C): 36.8 (12 Dec 2022 16:03), Max: 36.8 (12 Dec 2022 16:03)  T(F): 98.3 (12 Dec 2022 16:03), Max: 98.3 (12 Dec 2022 16:03)  HR: 63 (12 Dec 2022 16:03) (58 - 64)  BP: 135/47 (12 Dec 2022 16:03) (135/47 - 152/58)  BP(mean): --  ABP: --  ABP(mean): --  RR: 18 (12 Dec 2022 16:03) (16 - 18)  SpO2: 97% (12 Dec 2022 16:03) (97% - 99%)    O2 Parameters below as of 12 Dec 2022 16:03  Patient On (Oxygen Delivery Method): nasal cannula  O2 Flow (L/min): 2      PHYSICAL EXAMINATION:    GENERAL: awake, fatigued, in NAD     HEENT:  NC/AT, anicteric, MMM    NECK: Supple.     LUNGS: scattered rhonchi bilaterally     HEART:  S1S2, RRR     ABDOMEN: Soft, nontender, and nondistended    EXTREMITIES: Without any cyanosis, clubbing, rash; 1+ LE edema    NEUROLOGIC: No focal deficits       LABS:                                    8.4    7.29  )-----------( 436      ( 12 Dec 2022 06:57 )             27.6     12-12    132<L>  |  103  |  73.4<H>  ----------------------------<  180<H>  5.6<H>   |  15.0<L>  |  5.56<H>    Ca    8.0<L>      12 Dec 2022 06:57          RADIOLOGY & ADDITIONAL STUDIES:      ACC: [id number] EXAM:  CT CHEST                          PROCEDURE DATE:  12/07/2022          INTERPRETATION:  EXAMINATION: CT CHEST    CLINICAL INDICATION: Dyspnea.    TECHNIQUE: Noncontrast CT of the chest was obtained.    COMPARISON: 4/12/2021.    FINDINGS:    AIRWAYS AND LUNGS: Mild tracheobronchial secretions.  Redemonstrated   bilaterally lobe bronchiectasis. Small bilateral pleural effusions with   partial atelectasis of both lower lobes. Subtle patchy right upper lobe   opacities. Ill-defined right lower lobe nodular opacity is new from the   prior study.    MEDIASTINUM AND PLEURA: There are no enlarged mediastinal, hilar or   axillary lymph nodes. The visualized portion of the thyroid gland is   unremarkable. There is no pneumothorax.    HEART AND VESSELS: There is cardiomegaly.  There are atherosclerotic   calcifications of the aorta and coronary arteries.  There is no   pericardial effusion.    UPPER ABDOMEN: Images of the upper abdomen demonstrate no abnormality.    BONES AND SOFT TISSUES: There are mild degenerative changes of the spine.    The soft tissues are unremarkable.    AI VERTEBRAL BODY ANALYSIS:  T11: Moderate compression fracture with 35% height loss and low bone   density of 72 HU, suspicious for osteoporosis.This is unchanged from the   prior study.  T12: low bone density of 84 HU, suspicious for osteoporosis.  L1: low bone density of 58 HU, suspicious for osteoporosis.    IMPRESSION:  Small bilateral pleural effusions with partial atelectasis of both lower   lobes. Subtle patchy right upper lobe opacities, possibly   infectious/inflammatory. Ill-defined right lower lobe nodular opacity is   indeterminate. Three-month follow-up chest CT recommended for complete   evaluation.    VERTEBRAL BODY ANALYSIS: Vertebral compression fractures as described,   consider further workup for osteoporosis.      URMILA CAMPOVERDE MD; Attending Radiologist  This document has been electronically signed. Dec  7 2022  8:13PM      ACC: [id number] EXAM:  US DPLX UPR EXT VEINS LTD LT                          PROCEDURE DATE:  12/09/2022          INTERPRETATION:  CLINICAL INFORMATION: Left arm swelling    COMPARISON: None available.    TECHNIQUE: Duplex sonography of the LEFT UPPER extremity veins with color   and spectral Doppler, with and without compression.    FINDINGS:    The left internal jugular, subclavian, axillary and brachial veins are   patent and compressible where applicable.  The basilic and cephalic veins   (superficial veins) are patent and without thrombus.    Doppler examination shows normal spontaneous and phasic flow.    IMPRESSION:  No evidence of left upper extremity deep venous thrombosis.        RADHA HUSAIN MD; Attending Radiologist  This document has been electronically signed. Dec  9 2022  4:12PM    ECHO:    Summary:   1. Normal global left ventricular systolic function.   2. Left ventricular ejection fraction, by visual estimation, is 70 to 75%.   3. Mildly increased LV wall thickness.   4. Normal left ventricular internal cavity size.   5. Normal right ventricular size and function.   6. The left atrium is normal in size.   7. The right atrium is normal in size.   8. Lipomatous hypertrophy of the intra-atrial septum.   9. Moderate mitral annular calcification.  10. Mild mitral valve regurgitation.  11. Elevated mitral valve mean gradient    5 mmHg at HR 99 BPM.  12. No aortic valve stenosis.  13. Estimated pulmonary artery systolic pressure is 63.7 mmHg assuming a right atrial pressure of 8 mmHg, which is consistent with severe pulmonary hypertension.  14. Recommend clinical correlation with the above findings.    Ewa Urbina MD Electronically signed on 4/13/2021 at 6:34:53 PM

## 2022-12-12 NOTE — PROGRESS NOTE ADULT - SUBJECTIVE AND OBJECTIVE BOX
HOSPITALIST PROGRESS NOTE    IFEOMA TRINIDAD  [id number]  82yFemale    Patient is a 82y old  Female who presents with a chief complaint of respiratory failure, acute kidney injury, chf, and covid 19 (12 Dec 2022 11:18)      SUBJECTIVE:   Chart reviewed since last visit.  Patient seen and examined at bedside for acute CHFpEF, THERESA  Doesn't feel too good.      OBJECTIVE:  Vital Signs Last 24 Hrs  T(C): 36.6 (12 Dec 2022 10:20), Max: 36.7 (11 Dec 2022 16:17)  T(F): 97.9 (12 Dec 2022 10:20), Max: 98.1 (11 Dec 2022 16:17)  HR: 58 (12 Dec 2022 10:20) (58 - 95)  BP: 152/58 (12 Dec 2022 10:20) (148/79 - 152/58)   RR: 16 (12 Dec 2022 10:20) (16 - 18)  SpO2: 98% (12 Dec 2022 10:20) (96% - 99%)    Parameters below as of 12 Dec 2022 10:20  Patient On (Oxygen Delivery Method): nasal cannula  O2 Flow (L/min): 2      PHYSICAL EXAMINATION  General: Elderly female lying in bed, increased WOB. Spouse at bedside  HEENT:  Eyes less puffy  NECK:  distended neck veins  CVS: regular rate and rhythm S1 S2, Pedal edema  RESP: Accessory muscle usage.  Poor effort  GI:  Soft with LUQ tenderness  : No suprapubic tenderness  MSK:  Moves all extremities  CNS:  Awake, oriented x3. Follow simple commands  INTEG:  edematous (improved)  PSYCH:  Fair mood; fatigued      MONITOR:  CAPILLARY BLOOD GLUCOSE      POCT Blood Glucose.: 171 mg/dL (12 Dec 2022 11:48)  POCT Blood Glucose.: 200 mg/dL (12 Dec 2022 07:53)  POCT Blood Glucose.: 190 mg/dL (11 Dec 2022 23:11)  POCT Blood Glucose.: 175 mg/dL (11 Dec 2022 16:34)        I&O's Summary                          8.4    7.29  )-----------( 436      ( 12 Dec 2022 06:57 )             27.6     PT/INR - ( 12 Dec 2022 06:57 )   PT: 65.0 sec;   INR: 5.51 ratio           12-12    132<L>  |  103  |  73.4<H>  ----------------------------<  180<H>  5.6<H>   |  15.0<L>  |  5.56<H>    Ca    8.0<L>      12 Dec 2022 06:57              Culture:    TTE:    RADIOLOGY  Chest X-Ray with bilateral effusion vs infiltrate - pending official report      MEDICATIONS  (STANDING):  acetaminophen     Tablet .. 650 milliGRAM(s) Oral every 8 hours  albuterol    90 MICROgram(s) HFA Inhaler 2 Puff(s) Inhalation every 4 hours  budesonide  80 MICROgram(s)/formoterol 4.5 MICROgram(s) Inhaler 2 Puff(s) Inhalation two times a day  buMETAnide Injectable 1 milliGRAM(s) IV Push two times a day  cholecalciferol 1000 Unit(s) Oral daily  dexAMETHasone  Injectable 6 milliGRAM(s) IV Push daily  dextrose 5%. 1000 milliLiter(s) (100 mL/Hr) IV Continuous <Continuous>  dextrose 5%. 1000 milliLiter(s) (50 mL/Hr) IV Continuous <Continuous>  dextrose 50% Injectable 25 Gram(s) IV Push once  dextrose 50% Injectable 12.5 Gram(s) IV Push once  dextrose 50% Injectable 25 Gram(s) IV Push once  diltiazem    milliGRAM(s) Oral daily  epoetin susie-epbx (RETACRIT) Injectable 58192 Unit(s) SubCutaneous <User Schedule>  escitalopram 10 milliGRAM(s) Oral daily  glucagon  Injectable 1 milliGRAM(s) IntraMuscular once  guaiFENesin ER 1200 milliGRAM(s) Oral every 12 hours  hydrALAZINE 25 milliGRAM(s) Oral every 8 hours  insulin glargine Injectable (LANTUS) 10 Unit(s) SubCutaneous at bedtime  insulin lispro (ADMELOG) corrective regimen sliding scale   SubCutaneous three times a day before meals  metoprolol tartrate 50 milliGRAM(s) Oral two times a day  pantoprazole    Tablet 40 milliGRAM(s) Oral before breakfast  sodium bicarbonate 650 milliGRAM(s) Oral three times a day  sodium zirconium cyclosilicate 5 Gram(s) Oral once      MEDICATIONS  (PRN):  dextrose Oral Gel 15 Gram(s) Oral once PRN Blood Glucose LESS THAN 70 milliGRAM(s)/deciliter  hydrALAZINE Injectable 10 milliGRAM(s) IV Push every 6 hours PRN SBP > 160  LORazepam     Tablet 0.5 milliGRAM(s) Oral every 8 hours PRN Anxiety  melatonin 1 milliGRAM(s) Oral at bedtime PRN Sleep

## 2022-12-12 NOTE — PROGRESS NOTE ADULT - SUBJECTIVE AND OBJECTIVE BOX
NEPHROLOGY INTERVAL HPI/OVERNIGHT EVENTS:  pt comfortable when seen earlier  no acute distress appreciated  son at bedside    MEDICATIONS  (STANDING):  acetaminophen     Tablet .. 650 milliGRAM(s) Oral every 8 hours  albuterol    90 MICROgram(s) HFA Inhaler 2 Puff(s) Inhalation every 4 hours  budesonide  80 MICROgram(s)/formoterol 4.5 MICROgram(s) Inhaler 2 Puff(s) Inhalation two times a day  buMETAnide Injectable 1 milliGRAM(s) IV Push two times a day  cholecalciferol 1000 Unit(s) Oral daily  dexAMETHasone  Injectable 6 milliGRAM(s) IV Push daily  dextrose 5%. 1000 milliLiter(s) (100 mL/Hr) IV Continuous <Continuous>  dextrose 5%. 1000 milliLiter(s) (50 mL/Hr) IV Continuous <Continuous>  dextrose 50% Injectable 25 Gram(s) IV Push once  dextrose 50% Injectable 12.5 Gram(s) IV Push once  dextrose 50% Injectable 25 Gram(s) IV Push once  diltiazem    milliGRAM(s) Oral daily  epoetin susie-epbx (RETACRIT) Injectable 73026 Unit(s) SubCutaneous <User Schedule>  escitalopram 10 milliGRAM(s) Oral daily  glucagon  Injectable 1 milliGRAM(s) IntraMuscular once  guaiFENesin ER 1200 milliGRAM(s) Oral every 12 hours  hydrALAZINE 25 milliGRAM(s) Oral every 8 hours  insulin glargine Injectable (LANTUS) 10 Unit(s) SubCutaneous at bedtime  insulin lispro (ADMELOG) corrective regimen sliding scale   SubCutaneous three times a day before meals  metoprolol tartrate 50 milliGRAM(s) Oral two times a day  pantoprazole    Tablet 40 milliGRAM(s) Oral before breakfast  sodium zirconium cyclosilicate 5 Gram(s) Oral once    MEDICATIONS  (PRN):  dextrose Oral Gel 15 Gram(s) Oral once PRN Blood Glucose LESS THAN 70 milliGRAM(s)/deciliter  hydrALAZINE Injectable 10 milliGRAM(s) IV Push every 6 hours PRN SBP > 160  LORazepam     Tablet 0.5 milliGRAM(s) Oral every 8 hours PRN Anxiety  melatonin 1 milliGRAM(s) Oral at bedtime PRN Sleep      Allergies    No Known Allergies          Vital Signs Last 24 Hrs  T(C): 36.6 (12 Dec 2022 10:20), Max: 36.7 (11 Dec 2022 16:17)  T(F): 97.9 (12 Dec 2022 10:20), Max: 98.1 (11 Dec 2022 16:17)  HR: 58 (12 Dec 2022 10:20) (58 - 95)  BP: 152/58 (12 Dec 2022 10:20) (148/79 - 152/58)  BP(mean): --  RR: 16 (12 Dec 2022 10:20) (16 - 18)  SpO2: 98% (12 Dec 2022 10:20) (96% - 99%)    Parameters below as of 12 Dec 2022 10:20  Patient On (Oxygen Delivery Method): nasal cannula  O2 Flow (L/min): 2      PHYSICAL EXAM:    GENERAL: Debilitated  HEENT:  Facial edema  NECK: Supple, No JVD  NERVOUS SYSTEM:  Awake, alert  EXTREMITIES:  + dependent edema  SKIN: No rashes  Further exam deferred to limit COVID exposure    LABS:                        8.4    7.29  )-----------( 436      ( 12 Dec 2022 06:57 )             27.6     12-12    132<L>  |  103  |  73.4<H>  ----------------------------<  180<H>  5.6<H>   |  15.0<L>  |  5.56<H>        Ca    8.0<L>      12 Dec 2022 06:57      PT/INR - ( 12 Dec 2022 06:57 )   PT: 65.0 sec;   INR: 5.51 ratio                 RADIOLOGY & ADDITIONAL TESTS:

## 2022-12-12 NOTE — CONSULT NOTE ADULT - ASSESSMENT
82 year old female with acute hypoxic respiratory failure , acute kidney injury, and covid 19. Called for palliative consult for [facility]    Problem/Recommendation 1: Acute Hypoxic respiratory Failure   Asthma, COVID-19, CHFpEF, Fluid overload , pHTN  Cardiology consulted and following   Pulmonary consulted and following  US Duplex- No evidence of deep venous thrombosis in either lower extremity.  Coumadin on hold now, monitor INR    Problem/Recommendation 2:covid 19  CT Chest with bilateral upper lobe opacities  Maintain isolation precautions   Supplemental O2, wean as tolerated  Pulmonary consulted and following    Problem/Recommendation 3: Debility  Assist in ADLS  Maintain safety, fall, aspiration precautions    Problem/Recommendation 4: Palliative Care Encounter  Met with patient at bedside, introduced Palliative Care Team and Services at [facility].  Also met with patient's  Tc and son Richard  Per patient and  point of contact is their son Jack who has been very active in his mother's medical care  Call placed to jack at [phone] - unable to get in touch at this time - will call again for followup on goals of care  Discussed with patient and family - additional adult children are coming to town to visit and further discuss tonight amongst themselves as a family plan moving forward for their mother  Jack is bringing in additional paperwork including the HCP form for our records  Palliative to continue to follow  Code status remains Full Code    Total Time Spent___60_ minutes  This includes chart review, patient assessment, discussion and collaboration with interdisciplinary team members, ACP planning    COUNSELING:  Face to face meeting to discuss Advanced Care Planning - Time Spent _17_____Minutes.      Thank you for the opportunity to assist with the care of this patient.   [facility] Palliative Medicine Consult Service [phone].    82 year old female with acute hypoxic respiratory failure , acute kidney injury, and covid 19. Called for palliative consult for [facility]    Problem/Recommendation 1: Acute Hypoxic respiratory Failure   Asthma, COVID-19, CHFpEF, Fluid overload , pHTN  Cardiology consulted and following   Pulmonary consulted and following  US Duplex- No evidence of deep venous thrombosis in either lower extremity.  Coumadin on hold now, monitor INR    Problem/Recommendation 2:covid 19  CT Chest with bilateral upper lobe opacities  Maintain isolation precautions   Supplemental O2, wean as tolerated  Pulmonary consulted and following    Problem/Recommendation 3: Debility  Assist in ADLS  Maintain safety, fall, aspiration precautions    Problem/Recommendation 4: Palliative Care Encounter  Met with patient at bedside, introduced Palliative Care Team and Services at [facility].  Also met with patient's  Tc and son Richard  Per patient and  point of contact is their son Jack who has been very active in his mother's medical care  Call placed to jack at [phone] - unable to get in touch at this time - will call again for followup on goals of care  Discussed with patient and family - additional adult children are coming to town to visit and further discuss tonight amongst themselves as a family plan moving forward for their mother  Jack is bringing in additional paperwork including the HCP form for our records  Palliative to continue to follow  Code status remains Full Code    Total Time Spent___60_ minutes  This includes chart review, patient assessment, discussion and collaboration with interdisciplinary team members, ACP planning      Thank you for the opportunity to assist with the care of this patient.   [facility] Palliative Medicine Consult Service [phone].

## 2022-12-12 NOTE — PROGRESS NOTE ADULT - ASSESSMENT
CKD(IV): +COVID  THERESA worsened post IV diuretics  Hx NS (3g) +DM  Hyperkalemia  Metabolic Acidosis  - avoid potential nephrotoxins  - d/c Bumex now  - check CXR and bladder scan  - add NaHCO3 and Lokelma  - if current trend continues may need to consider CRRT    Anemia: +CKD; Tsat 36%  - cont DILAN  - target Hgb > 10 CKD(IV): +COVID  THERESA worsened post IV diuretics  Hx NS (3g) +DM  Hyperkalemia  Metabolic Acidosis  - avoid potential nephrotoxins  - cont Bumex for now as pt with signs of fluid overload  - check bladder scan  - add NaHCO3 and Lokelma  - if current trend continues may need to consider CRRT    Anemia: +CKD; Tsat 36%  - cont DILAN  - target Hgb > 10

## 2022-12-12 NOTE — PHYSICAL THERAPY INITIAL EVALUATION ADULT - ACTIVE RANGE OF MOTION EXAMINATION, REHAB EVAL
bilateral LE stiff and painful with ROM/bilateral upper extremity Active ROM was WFL (within functional limits)/bilateral  lower extremity Active ROM was WFL (within functional limits)/deficits as listed below

## 2022-12-13 LAB
ALBUMIN SERPL ELPH-MCNC: 2.2 G/DL — LOW (ref 3.3–5.2)
ALP SERPL-CCNC: 68 U/L — SIGNIFICANT CHANGE UP (ref 40–120)
ALT FLD-CCNC: 9 U/L — SIGNIFICANT CHANGE UP
ANION GAP SERPL CALC-SCNC: 14 MMOL/L — SIGNIFICANT CHANGE UP (ref 5–17)
ANION GAP SERPL CALC-SCNC: 16 MMOL/L — SIGNIFICANT CHANGE UP (ref 5–17)
AST SERPL-CCNC: 12 U/L — SIGNIFICANT CHANGE UP
BILIRUB SERPL-MCNC: <0.2 MG/DL — LOW (ref 0.4–2)
BUN SERPL-MCNC: 83.9 MG/DL — HIGH (ref 8–20)
BUN SERPL-MCNC: 85.9 MG/DL — HIGH (ref 8–20)
CALCIUM SERPL-MCNC: 7.7 MG/DL — LOW (ref 8.4–10.5)
CALCIUM SERPL-MCNC: 8 MG/DL — LOW (ref 8.4–10.5)
CHLORIDE SERPL-SCNC: 96 MMOL/L — SIGNIFICANT CHANGE UP (ref 96–108)
CHLORIDE SERPL-SCNC: 98 MMOL/L — SIGNIFICANT CHANGE UP (ref 96–108)
CO2 SERPL-SCNC: 17 MMOL/L — LOW (ref 22–29)
CO2 SERPL-SCNC: 18 MMOL/L — LOW (ref 22–29)
CREAT SERPL-MCNC: 5.81 MG/DL — HIGH (ref 0.5–1.3)
CREAT SERPL-MCNC: 5.85 MG/DL — HIGH (ref 0.5–1.3)
EGFR: 7 ML/MIN/1.73M2 — LOW
EGFR: 7 ML/MIN/1.73M2 — LOW
GLUCOSE BLDC GLUCOMTR-MCNC: 168 MG/DL — HIGH (ref 70–99)
GLUCOSE BLDC GLUCOMTR-MCNC: 181 MG/DL — HIGH (ref 70–99)
GLUCOSE BLDC GLUCOMTR-MCNC: 190 MG/DL — HIGH (ref 70–99)
GLUCOSE BLDC GLUCOMTR-MCNC: 192 MG/DL — HIGH (ref 70–99)
GLUCOSE BLDC GLUCOMTR-MCNC: 199 MG/DL — HIGH (ref 70–99)
GLUCOSE SERPL-MCNC: 146 MG/DL — HIGH (ref 70–99)
GLUCOSE SERPL-MCNC: 153 MG/DL — HIGH (ref 70–99)
HCT VFR BLD CALC: 26.1 % — LOW (ref 34.5–45)
HCT VFR BLD CALC: 27.4 % — LOW (ref 34.5–45)
HGB BLD-MCNC: 8.1 G/DL — LOW (ref 11.5–15.5)
HGB BLD-MCNC: 8.6 G/DL — LOW (ref 11.5–15.5)
INR BLD: 4.23 RATIO — HIGH (ref 0.88–1.16)
MCHC RBC-ENTMCNC: 21.1 PG — LOW (ref 27–34)
MCHC RBC-ENTMCNC: 21.2 PG — LOW (ref 27–34)
MCHC RBC-ENTMCNC: 31 GM/DL — LOW (ref 32–36)
MCHC RBC-ENTMCNC: 31.4 GM/DL — LOW (ref 32–36)
MCV RBC AUTO: 67.5 FL — LOW (ref 80–100)
MCV RBC AUTO: 68 FL — LOW (ref 80–100)
NRBC # BLD: 1 /100 WBCS — HIGH (ref 0–0)
PLATELET # BLD AUTO: 463 K/UL — HIGH (ref 150–400)
PLATELET # BLD AUTO: 499 K/UL — HIGH (ref 150–400)
POTASSIUM SERPL-MCNC: 5.7 MMOL/L — HIGH (ref 3.5–5.3)
POTASSIUM SERPL-MCNC: 5.8 MMOL/L — HIGH (ref 3.5–5.3)
POTASSIUM SERPL-SCNC: 5.7 MMOL/L — HIGH (ref 3.5–5.3)
POTASSIUM SERPL-SCNC: 5.8 MMOL/L — HIGH (ref 3.5–5.3)
PROCALCITONIN SERPL-MCNC: 0.22 NG/ML — HIGH (ref 0.02–0.1)
PROT SERPL-MCNC: 4.9 G/DL — LOW (ref 6.6–8.7)
PROTHROM AB SERPL-ACNC: 49.8 SEC — HIGH (ref 10.5–13.4)
RBC # BLD: 3.84 M/UL — SIGNIFICANT CHANGE UP (ref 3.8–5.2)
RBC # BLD: 4.06 M/UL — SIGNIFICANT CHANGE UP (ref 3.8–5.2)
RBC # FLD: 20.2 % — HIGH (ref 10.3–14.5)
RBC # FLD: 20.8 % — HIGH (ref 10.3–14.5)
SODIUM SERPL-SCNC: 128 MMOL/L — LOW (ref 135–145)
SODIUM SERPL-SCNC: 131 MMOL/L — LOW (ref 135–145)
WBC # BLD: 8.09 K/UL — SIGNIFICANT CHANGE UP (ref 3.8–10.5)
WBC # BLD: 9.38 K/UL — SIGNIFICANT CHANGE UP (ref 3.8–10.5)
WBC # FLD AUTO: 8.09 K/UL — SIGNIFICANT CHANGE UP (ref 3.8–10.5)
WBC # FLD AUTO: 9.38 K/UL — SIGNIFICANT CHANGE UP (ref 3.8–10.5)

## 2022-12-13 PROCEDURE — 71045 X-RAY EXAM CHEST 1 VIEW: CPT | Mod: 26

## 2022-12-13 PROCEDURE — 99233 SBSQ HOSP IP/OBS HIGH 50: CPT

## 2022-12-13 PROCEDURE — 99232 SBSQ HOSP IP/OBS MODERATE 35: CPT

## 2022-12-13 PROCEDURE — 99231 SBSQ HOSP IP/OBS SF/LOW 25: CPT

## 2022-12-13 PROCEDURE — 99232 SBSQ HOSP IP/OBS MODERATE 35: CPT | Mod: GC

## 2022-12-13 RX ORDER — CHLORHEXIDINE GLUCONATE 213 G/1000ML
1 SOLUTION TOPICAL
Refills: 0 | Status: DISCONTINUED | OUTPATIENT
Start: 2022-12-13 | End: 2022-12-16

## 2022-12-13 RX ORDER — SODIUM ZIRCONIUM CYCLOSILICATE 10 G/10G
5 POWDER, FOR SUSPENSION ORAL ONCE
Refills: 0 | Status: COMPLETED | OUTPATIENT
Start: 2022-12-13 | End: 2022-12-13

## 2022-12-13 RX ORDER — ALBUMIN HUMAN 25 %
100 VIAL (ML) INTRAVENOUS ONCE
Refills: 0 | Status: COMPLETED | OUTPATIENT
Start: 2022-12-13 | End: 2022-12-13

## 2022-12-13 RX ORDER — SODIUM CHLORIDE 9 MG/ML
10 INJECTION INTRAMUSCULAR; INTRAVENOUS; SUBCUTANEOUS
Refills: 0 | Status: DISCONTINUED | OUTPATIENT
Start: 2022-12-13 | End: 2023-01-06

## 2022-12-13 RX ORDER — IRON SUCROSE 20 MG/ML
100 INJECTION, SOLUTION INTRAVENOUS EVERY 24 HOURS
Refills: 0 | Status: COMPLETED | OUTPATIENT
Start: 2022-12-13 | End: 2022-12-17

## 2022-12-13 RX ADMIN — IRON SUCROSE 210 MILLIGRAM(S): 20 INJECTION, SOLUTION INTRAVENOUS at 17:13

## 2022-12-13 RX ADMIN — BUMETANIDE 1 MILLIGRAM(S): 0.25 INJECTION INTRAMUSCULAR; INTRAVENOUS at 05:43

## 2022-12-13 RX ADMIN — Medication 2: at 13:28

## 2022-12-13 RX ADMIN — Medication 650 MILLIGRAM(S): at 21:55

## 2022-12-13 RX ADMIN — Medication 25 MILLIGRAM(S): at 05:45

## 2022-12-13 RX ADMIN — SODIUM ZIRCONIUM CYCLOSILICATE 5 GRAM(S): 10 POWDER, FOR SUSPENSION ORAL at 06:22

## 2022-12-13 RX ADMIN — INSULIN GLARGINE 10 UNIT(S): 100 INJECTION, SOLUTION SUBCUTANEOUS at 21:19

## 2022-12-13 RX ADMIN — Medication 25 MILLIGRAM(S): at 21:25

## 2022-12-13 RX ADMIN — ALBUTEROL 2 PUFF(S): 90 AEROSOL, METERED ORAL at 00:09

## 2022-12-13 RX ADMIN — Medication 1200 MILLIGRAM(S): at 05:44

## 2022-12-13 RX ADMIN — Medication 1000 UNIT(S): at 13:20

## 2022-12-13 RX ADMIN — Medication 650 MILLIGRAM(S): at 05:43

## 2022-12-13 RX ADMIN — ALBUTEROL 2 PUFF(S): 90 AEROSOL, METERED ORAL at 10:25

## 2022-12-13 RX ADMIN — ESCITALOPRAM OXALATE 10 MILLIGRAM(S): 10 TABLET, FILM COATED ORAL at 13:20

## 2022-12-13 RX ADMIN — ALBUTEROL 2 PUFF(S): 90 AEROSOL, METERED ORAL at 17:47

## 2022-12-13 RX ADMIN — Medication 650 MILLIGRAM(S): at 13:20

## 2022-12-13 RX ADMIN — Medication 650 MILLIGRAM(S): at 21:25

## 2022-12-13 RX ADMIN — Medication 50 MILLIGRAM(S): at 05:44

## 2022-12-13 RX ADMIN — PANTOPRAZOLE SODIUM 40 MILLIGRAM(S): 20 TABLET, DELAYED RELEASE ORAL at 05:44

## 2022-12-13 RX ADMIN — BUDESONIDE AND FORMOTEROL FUMARATE DIHYDRATE 2 PUFF(S): 160; 4.5 AEROSOL RESPIRATORY (INHALATION) at 10:24

## 2022-12-13 RX ADMIN — Medication 650 MILLIGRAM(S): at 05:45

## 2022-12-13 RX ADMIN — ALBUTEROL 2 PUFF(S): 90 AEROSOL, METERED ORAL at 22:01

## 2022-12-13 RX ADMIN — Medication 650 MILLIGRAM(S): at 06:15

## 2022-12-13 RX ADMIN — Medication 650 MILLIGRAM(S): at 18:01

## 2022-12-13 RX ADMIN — ALBUTEROL 2 PUFF(S): 90 AEROSOL, METERED ORAL at 04:08

## 2022-12-13 RX ADMIN — Medication 50 MILLILITER(S): at 17:11

## 2022-12-13 RX ADMIN — Medication 650 MILLIGRAM(S): at 19:30

## 2022-12-13 RX ADMIN — Medication 2: at 10:10

## 2022-12-13 RX ADMIN — Medication 240 MILLIGRAM(S): at 05:45

## 2022-12-13 RX ADMIN — Medication 6 MILLIGRAM(S): at 05:42

## 2022-12-13 NOTE — PROGRESS NOTE ADULT - ASSESSMENT
83 y/o F with PMHx asthma, DM, HLD, atrial fibrillation, and HTN who was found to have a right superficial femoral vein thrombosis while on warfarin and acute kidney injury. Also found to be COVID+.    1. Uncontrolled T2DM, a1c 11.2%  - Continue lantus 10 units qhs  - Continue sliding scale coverage  - Bgs well controlled    2. THERESA/CKD  - Fluid overload, hyperkalemia  - Will need HD as per nephrology  - Vascular to place venous access    3. Acute hypoxic respiratory failure/multifactorial  - Supplemental O2, wean as tolerated  - Albuterol q4hr

## 2022-12-13 NOTE — DIETITIAN INITIAL EVALUATION ADULT - NS FNS DIET ORDER
Diet, DASH/TLC:   Sodium & Cholesterol Restricted  Consistent Carbohydrate {No Snacks} (CSTCHO) (12-06-22 @ 11:23)

## 2022-12-13 NOTE — CONSULT NOTE ADULT - ASSESSMENT
Patient is an 82yoF with PMH HTN, DM, asthma, CHF, HLD, A-fib (on coumadin) who presented to the ED with RLE swelling that began around Thanksgiving and new onset cough over the prior week. Of note, patient has been on Coumadin for approx 2 years, gets weekly INR, but for the past 2 months INR has either been sub- or supratherapeutic and they have been having difficulty managing it. Patient admitted for acute CHF exacerbation with fluid overload and COVID pneumonia. Vascular surgery consulted at this time for placement of Shiley catheter for emergent hemodialysis.    Patient is afebrile, most recent recorded BP 97/58 and on 1L NC, non tachycardic, bloodwork significant for INR 4.2, platelets 499, there are no cultures obtained during this hospitalization and she has been off coumadin.    Plan:  -shile placement in R femoral vein (due to increased risk of bleeding with INR 4.1 and necessity for emergent HD, if any bleeding may be more feasible to control/manage bleeding from groin compared to neck)  -patient may use catheter for HD  -please reach out to vascular surgery if long-term HD access is required  -continue care per primary team  -discussed with attending surgeon

## 2022-12-13 NOTE — DIETITIAN INITIAL EVALUATION ADULT - ORAL INTAKE PTA/DIET HISTORY
Spoke with son at bedside. Son stated pt with poor po intake PTA and currently. Pt has small bites of food throughout the day but frequently with early satiety. Son unaware of UBW or ht. Pt appears ~5'4". Educated on importance of small frequent meals, HBV protein, supplements, and protein/CHO pairing. Son verbalized understanding.

## 2022-12-13 NOTE — DIETITIAN INITIAL EVALUATION ADULT - ETIOLOGY
related to inability to meet sufficient protein-energy in setting of persistent lack of appetite and advanced age

## 2022-12-13 NOTE — CONSULT NOTE ADULT - SUBJECTIVE AND OBJECTIVE BOX
Patient is an 82yoF with PMH HTN, DM, asthma, CHF, HLD, A-fib (on coumadin) who presented to the ED with RLE swelling that began around Thanksgiving and new onset cough over the prior week. Of note, patient has been on Coumadin for approx 2 years, gets weekly INR, but for the past 2 months INR has either been sub- or supratherapeutic and they have been having difficulty managing it. Patient admitted for acute CHF exacerbation with fluid overload and COVID pneumonia. Vascular surgery consulted at this time for placement of Shiley catheter for emergent hemodialysis.    PMH: HTN, DM, asthma, CHF, HLD, A-fib (on coumadin)  PSH: appendectomy, tubal ligation, L knee surgery  Meds: pantoprazole, metoprolol, lasix, diltiazem, coumadin, humalog, levemir, rosuvastatin  Allergies: NKDA  SH: denies illicit drug use x3    Vitals:  Vital Signs Last 24 Hrs  T(C): 36.8 (12 Dec 2022 16:03), Max: 36.8 (12 Dec 2022 16:03)  T(F): 98.3 (12 Dec 2022 16:03), Max: 98.3 (12 Dec 2022 16:03)  HR: 63 (12 Dec 2022 16:03) (63 - 63)  BP: 97/48 (13 Dec 2022 07:34) (97/48 - 135/47)  BP(mean): --  RR: 16 (13 Dec 2022 07:34) (16 - 18)  SpO2: 88% (13 Dec 2022 07:34) (88% - 97%)    Parameters below as of 13 Dec 2022 07:34  Patient On (Oxygen Delivery Method): nasal cannula  O2 Flow (L/min): 2    Labs:  12-13    131<L>  |  98  |  85.9<H>  ----------------------------<  146<H>  5.8<H>   |  17.0<L>  |  5.85<H>    Ca    8.0<L>      13 Dec 2022 06:32    TPro  4.9<L>  /  Alb  2.2<L>  /  TBili  <0.2<L>  /  DBili  x   /  AST  12  /  ALT  9   /  AlkPhos  68  12-12                        8.6    9.38  )-----------( 499      ( 13 Dec 2022 06:32 )             27.4     Physical Exam:  Gen: pt lying in bed, alert, in NAD  Resp: unlabored  CVS: RRR  Abd: soft, NT, ND  Ext: moving all extremities spontaneously, sensation intact, RLE more edematous compared to LLE with some mild erythema, b/l DP/PT pulses palpable

## 2022-12-13 NOTE — DIETITIAN INITIAL EVALUATION ADULT - PERTINENT MEDS FT
MEDICATIONS  (STANDING):  acetaminophen     Tablet .. 650 milliGRAM(s) Oral every 8 hours  albuterol    90 MICROgram(s) HFA Inhaler 2 Puff(s) Inhalation every 4 hours  budesonide  80 MICROgram(s)/formoterol 4.5 MICROgram(s) Inhaler 2 Puff(s) Inhalation two times a day  cholecalciferol 1000 Unit(s) Oral daily  dexAMETHasone  Injectable 6 milliGRAM(s) IV Push daily  dextrose 5%. 1000 milliLiter(s) (100 mL/Hr) IV Continuous <Continuous>  dextrose 5%. 1000 milliLiter(s) (50 mL/Hr) IV Continuous <Continuous>  dextrose 50% Injectable 25 Gram(s) IV Push once  dextrose 50% Injectable 12.5 Gram(s) IV Push once  dextrose 50% Injectable 25 Gram(s) IV Push once  diltiazem    milliGRAM(s) Oral daily  epoetin susie-epbx (RETACRIT) Injectable 68036 Unit(s) SubCutaneous <User Schedule>  escitalopram 10 milliGRAM(s) Oral daily  glucagon  Injectable 1 milliGRAM(s) IntraMuscular once  guaiFENesin ER 1200 milliGRAM(s) Oral every 12 hours  hydrALAZINE 25 milliGRAM(s) Oral every 8 hours  insulin glargine Injectable (LANTUS) 10 Unit(s) SubCutaneous at bedtime  insulin lispro (ADMELOG) corrective regimen sliding scale   SubCutaneous three times a day before meals  metoprolol tartrate 50 milliGRAM(s) Oral two times a day  pantoprazole    Tablet 40 milliGRAM(s) Oral before breakfast  sodium bicarbonate 650 milliGRAM(s) Oral three times a day    MEDICATIONS  (PRN):  dextrose Oral Gel 15 Gram(s) Oral once PRN Blood Glucose LESS THAN 70 milliGRAM(s)/deciliter  hydrALAZINE Injectable 10 milliGRAM(s) IV Push every 6 hours PRN SBP > 160  LORazepam     Tablet 0.5 milliGRAM(s) Oral every 8 hours PRN Anxiety  melatonin 1 milliGRAM(s) Oral at bedtime PRN Sleep

## 2022-12-13 NOTE — PROGRESS NOTE ADULT - ASSESSMENT
82F never smoker with hx of asthma, Afib on coumadin, HTN, DM, HLD, recent covid 19 diagnosis referred to the ED by PCP due to progressively worsening dyspnea in the setting of known covid with worsening hypoxia and renal failure     Acute hypoxic respiratory failure in the setting of covid PNA w/ volume overload in setting of renal failure and pulm HTN  c/w decadron x 10 days   unable to receive remdesivir given renal failure   can trend covid labs intermittently; esr/crp/procal/d dimer  c/w supplemental O2 for goal >90-92%  c/w symbicort   c/w albuterol q4h standing   chest PT/pulmonary toilet   caution with sedating medications   low threshold to start empiric abx but currently procal not significantly elevated edwin given renal failure and has remained afebrile   INR remains supratherapeutic with LE duplex negative for DVT; low suspicion for DVT/PE  aspiration precautions   monitor strict I/Os  planned for HD per nephrology; volume optimization also likely to help with respiratory status   BP control   PT/OOB as tolerated   poor prognosis; palliative care following     d/w  at bedside

## 2022-12-13 NOTE — PROGRESS NOTE ADULT - ASSESSMENT
82 year old female with acute hypoxic respiratory failure , acute kidney injury, and covid 19. Called for palliative consult for [facility]    Problem/Recommendation 1: Acute Hypoxic respiratory Failure   Asthma, COVID-19, CHFpEF, Fluid overload , pHTN  Cardiology consulted and following   Pulmonary consulted and following  US Duplex- No evidence of deep venous thrombosis in either lower extremity.  Tentative plan for dialysis     Problem/Recommendation 2:Covid 19  CT Chest with bilateral upper lobe opacities  Maintain isolation precautions   Supplemental O2, wean as tolerated  Pulmonary consulted and following    Problem/Recommendation 3: Debility  Assist in ADLS  Maintain safety, fall, aspiration precautions    Problem/Recommendation 4: Palliative Care Encounter  Met with patient at bedside with her son Jack Riggins (there are 2 Vics in the family, one is Jack and the other goes by Hilda)  According to Family Health Care Decisions Act (FHCDA) surrogate decision making in the event patient does not have capacity to make it herself would default to her  Tc unless there is an official HCP form on file designating a specific person for this role.   As of now patient can make her own decisions with the support of her  Tc  Patient's adult children have been supportive and active in her medical care and decision making as well  Per Jack Lofton there is a living will and additional paperwork the patient has completed in the past, but no forms have been provided. Jack had a partial copy of a living will on his phone, but it wasn't shown as the complete document.  Discussed plan today with Marjan at bedside and patient - he explained goal today is tentative plan for Dialysis  Jack explained in the past patient had to go through dialysis as well so it's not new for them to hear this information  Palliative to continue to follow  Discussed with Dr. Dominguez  Code status remains Full Code    Total Time Spent__45__ minutes  This includes chart review, patient assessment, discussion and collaboration with interdisciplinary team members, ACP planning    Thank you for the opportunity to assist with the care of this patient.   [facility] Palliative Medicine Consult Service [phone].

## 2022-12-13 NOTE — PROGRESS NOTE ADULT - ASSESSMENT
82F with PMH asthma, diabetes, hyperlipidemia, atrial fibrillation, hypertension, and hyperlipidemia, who was found to have a right superficial femoral vein thrombosis while on warfarin and acute kidney injury. She was also noted to have been recently diagnosed with COVID-19.    Acute Hypoxic respiratory Failure - Multifactorial. Asthma, COVID-19, CHFpEF, Fluid overload.  Continues to have hypoxia and WOB.  TTE with intact LVF however has grade II diastolic dysfunction and moderate Pulmonary HTN  - Supplemental O2, wean as tolerated  - Albuterol MDI q4 ATC, Add Budesonide/Formoterol  - Bumetanide 1mg IV q12  - Pulmonology follow up  - Cardiology follow up    #THERESA on CKD IV  Continues to worsen with diuresis.   Associated Metabolic acidosis, hyperkalemia  US renal- chronic medical disease  - Strict I/O, daily weight  - Avoid Nephrotoxins  - Bicarbonate and Lokelma  - Nephrology to discuss CRRT with patient and family    Acute on Chronic Diastolic Heart failure  Resistant to diuresis  TTE with intact LVF however has grade II diastolic dysfunction and moderate Pulmonary HTN  - Strict I/O, daily weight, Fluid restriction  - Continue Metoprolol and Hydralazine   - Bumetanide discontinued  - Nephrology to discuss CRRT    DVT ruled on on repeat imaging  Supratherapeutic INR, downtrending. No bleeding  US Duplex- No evidence of deep venous thrombosis in either lower extremity.  - No Coumadin, monitor INR    #Anemia  Stable Hgb  Multifactorial - given kidney disease  Received PRBC x 1  - Monitor Hgb  - Will given IV Iron    Type 2 Diabetes on insulin  Fair glycemic control currently  A1c 11.2  - BGM with SSI  - Glargine increased to 10U as starting On Dexamethasone    #Atrial Fibrillation  Rate controlled, supratherapeutic INR  - Metoprolol, Diltiazem  - INR, dose coumadin; currently none    #COVID-19   Now with hypoxia - unclear if pneumonia, Asthma or heart failure  CT Chest with bilateral upper lobe opacities  - Started on Dexamethasone for Asthma; will taper once respiratoy status improved  - isolation precautions  - Will need repeat outpatient CT chest in 3 months for RLL nodule    #HTN-Essential  Improved  - monitor blood pressure  - Metoprolol to 50mg, continue Diltiazem at current rate  - Hydralazine 25mg q8    PT  recommendation for [facility]  Advised RN to mobilize OOB  Incentive Spirometry    Prognosis - Guarded  CODE STATUS- FULL CODE.       Discussed with patient son Kiley mcnulty who came from [facility]; states that his mother was on  in past and she recovered. He (as well as his siblings) Uderstand that she is sick; gave consent to Nephrology for CRRT  Discussed with Nephrology Dr Nickerson. Surgical consult called for vascular access  Discussed with palliative care

## 2022-12-13 NOTE — PROGRESS NOTE ADULT - ASSESSMENT
CKD(IV): +COVID  THERESA, progressive + fluid overload + hyperkalemia  Hx NS (3g) +DM  Hyperkalemia  Metabolic Acidosis  - pt will need to initiate HD for fluid and metabolic control  - pt and family agreeable  - vascular to place venous access    Anemia: +CKD; Tsat 36%  - cont DILAN  - target Hgb > 10

## 2022-12-13 NOTE — CONSULT NOTE ADULT - ATTENDING COMMENTS
agree with above, high risk given coagulopathy    Please protect patient's nondominant arm in anticipation for eventual permanent access

## 2022-12-13 NOTE — DIETITIAN INITIAL EVALUATION ADULT - WEIGHT CHANGE
Pt bladder scanned for 575 mls. Attempted to cath and irrigated x2. Cath meets resistance and cannot advance. MD Gil attempting now.   no

## 2022-12-13 NOTE — PROGRESS NOTE ADULT - SUBJECTIVE AND OBJECTIVE BOX
PULMONARY CONSULT NOTE    IFEOMA TRINIDAD  MRN-[id number]    Patient is a 82y old  Female who presents with a chief complaint of THERESA (11 Dec 2022 12:24)      HISTORY OF PRESENT ILLNESS:  82F never smoker with hx of asthma, Afib on coumadin, HTN, DM, HLD, recent covid 19 diagnosis referred to the ED by PCP due to progressively worsening dyspnea with associated LE edema, cough and congestion. She was reportedly compliant with her medications at home. She underwent LE duplex notable for right superficial femoral vein thrombosis with follow-up that was reportedly negative. She was found to have THERESA on CKD and developed hypoxia requiring 2L NC during hospital course. CT chest showed some slight opacities in the RUL and RLL, lower lobe bronchiectasis and small bilateral effusions. She received 1u pRBC 12/7. Has had interval worsening renal function/acidosis and is planned to initiate HD.     Interval hx:  She reports that she feels somewhat better today and appears more awake. She denies any shortness of breath. She denies coughing. She remains on 2L NC and saturating well.  She has had worsening renal failure and metabolic acidosis and is now planned to initiate HD.      MEDICATIONS  (STANDING):  acetaminophen     Tablet .. 650 milliGRAM(s) Oral every 8 hours  albumin human 25% IVPB 100 milliLiter(s) IV Intermittent once  albuterol    90 MICROgram(s) HFA Inhaler 2 Puff(s) Inhalation every 4 hours  budesonide  80 MICROgram(s)/formoterol 4.5 MICROgram(s) Inhaler 2 Puff(s) Inhalation two times a day  cholecalciferol 1000 Unit(s) Oral daily  dexAMETHasone  Injectable 6 milliGRAM(s) IV Push daily  dextrose 5%. 1000 milliLiter(s) (100 mL/Hr) IV Continuous <Continuous>  dextrose 5%. 1000 milliLiter(s) (50 mL/Hr) IV Continuous <Continuous>  dextrose 50% Injectable 25 Gram(s) IV Push once  dextrose 50% Injectable 12.5 Gram(s) IV Push once  dextrose 50% Injectable 25 Gram(s) IV Push once  diltiazem    milliGRAM(s) Oral daily  epoetin susie-epbx (RETACRIT) Injectable 48482 Unit(s) SubCutaneous <User Schedule>  escitalopram 10 milliGRAM(s) Oral daily  glucagon  Injectable 1 milliGRAM(s) IntraMuscular once  guaiFENesin ER 1200 milliGRAM(s) Oral every 12 hours  hydrALAZINE 25 milliGRAM(s) Oral every 8 hours  insulin glargine Injectable (LANTUS) 10 Unit(s) SubCutaneous at bedtime  insulin lispro (ADMELOG) corrective regimen sliding scale   SubCutaneous three times a day before meals  iron sucrose IVPB 100 milliGRAM(s) IV Intermittent every 24 hours  metoprolol tartrate 50 milliGRAM(s) Oral two times a day  pantoprazole    Tablet 40 milliGRAM(s) Oral before breakfast  sodium bicarbonate 650 milliGRAM(s) Oral three times a day    MEDICATIONS  (PRN):  dextrose Oral Gel 15 Gram(s) Oral once PRN Blood Glucose LESS THAN 70 milliGRAM(s)/deciliter  hydrALAZINE Injectable 10 milliGRAM(s) IV Push every 6 hours PRN SBP > 160  LORazepam     Tablet 0.5 milliGRAM(s) Oral every 8 hours PRN Anxiety  melatonin 1 milliGRAM(s) Oral at bedtime PRN Sleep    Allergies  No Known Allergies  Intolerances    PAST MEDICAL & SURGICAL HISTORY:  Asthma  Diabetes mellitus  Hypertension  Emphysema  Hyperlipemia  S/P appendectomy  S/P tubal ligation  S/P knee surgery  left    FAMILY HISTORY: N/c    SOCIAL HISTORY  Smoking History: denies    REVIEW OF SYSTEMS: negative except as per HPI    Vital Signs Last 24 Hrs  T(C): 36.8 (12 Dec 2022 16:03), Max: 36.8 (12 Dec 2022 16:03)  T(F): 98.3 (12 Dec 2022 16:03), Max: 98.3 (12 Dec 2022 16:03)  HR: 63 (12 Dec 2022 16:03) (63 - 63)  BP: 97/48 (13 Dec 2022 07:34) (97/48 - 135/47)  BP(mean): --  RR: 16 (13 Dec 2022 07:34) (16 - 18)  SpO2: 88% (13 Dec 2022 07:34) (88% - 97%)    Parameters below as of 13 Dec 2022 07:34  Patient On (Oxygen Delivery Method): nasal cannula  O2 Flow (L/min): 2      PHYSICAL EXAMINATION:    GENERAL: awake, alert, in NAD    HEENT:  NC/AT, anicteric, MMM    NECK: Supple    LUNGS: diminished bilaterally     HEART:  S1S2, RRR     ABDOMEN: Soft, nontender, and nondistended    EXTREMITIES: Without any cyanosis, clubbing, rash; 1+ bilateral LE edema    NEUROLOGIC: No focal deficits       LABS:                                   8.6    9.38  )-----------( 499      ( 13 Dec 2022 06:32 )             27.4       12-13    131<L>  |  98  |  85.9<H>  ----------------------------<  146<H>  5.8<H>   |  17.0<L>  |  5.85<H>    Ca    8.0<L>      13 Dec 2022 06:32    TPro  4.9<L>  /  Alb  2.2<L>  /  TBili  <0.2<L>  /  DBili  x   /  AST  12  /  ALT  9   /  AlkPhos  68  12-12         RADIOLOGY & ADDITIONAL STUDIES:      ACC: [id number] EXAM:  CT CHEST                          PROCEDURE DATE:  12/07/2022          INTERPRETATION:  EXAMINATION: CT CHEST    CLINICAL INDICATION: Dyspnea.    TECHNIQUE: Noncontrast CT of the chest was obtained.    COMPARISON: 4/12/2021.    FINDINGS:    AIRWAYS AND LUNGS: Mild tracheobronchial secretions.  Redemonstrated   bilaterally lobe bronchiectasis. Small bilateral pleural effusions with   partial atelectasis of both lower lobes. Subtle patchy right upper lobe   opacities. Ill-defined right lower lobe nodular opacity is new from the   prior study.    MEDIASTINUM AND PLEURA: There are no enlarged mediastinal, hilar or   axillary lymph nodes. The visualized portion of the thyroid gland is   unremarkable. There is no pneumothorax.    HEART AND VESSELS: There is cardiomegaly.  There are atherosclerotic   calcifications of the aorta and coronary arteries.  There is no   pericardial effusion.    UPPER ABDOMEN: Images of the upper abdomen demonstrate no abnormality.    BONES AND SOFT TISSUES: There are mild degenerative changes of the spine.    The soft tissues are unremarkable.    AI VERTEBRAL BODY ANALYSIS:  T11: Moderate compression fracture with 35% height loss and low bone   density of 72 HU, suspicious for osteoporosis.This is unchanged from the   prior study.  T12: low bone density of 84 HU, suspicious for osteoporosis.  L1: low bone density of 58 HU, suspicious for osteoporosis.    IMPRESSION:  Small bilateral pleural effusions with partial atelectasis of both lower   lobes. Subtle patchy right upper lobe opacities, possibly   infectious/inflammatory. Ill-defined right lower lobe nodular opacity is   indeterminate. Three-month follow-up chest CT recommended for complete   evaluation.    VERTEBRAL BODY ANALYSIS: Vertebral compression fractures as described,   consider further workup for osteoporosis.      URMILA CAMPOVERDE MD; Attending Radiologist  This document has been electronically signed. Dec  7 2022  8:13PM      ACC: [id number] EXAM:  US DPLX UPR EXT VEINS LTD LT                          PROCEDURE DATE:  12/09/2022          INTERPRETATION:  CLINICAL INFORMATION: Left arm swelling    COMPARISON: None available.    TECHNIQUE: Duplex sonography of the LEFT UPPER extremity veins with color   and spectral Doppler, with and without compression.    FINDINGS:    The left internal jugular, subclavian, axillary and brachial veins are   patent and compressible where applicable.  The basilic and cephalic veins   (superficial veins) are patent and without thrombus.    Doppler examination shows normal spontaneous and phasic flow.    IMPRESSION:  No evidence of left upper extremity deep venous thrombosis.        RADHA HUSAIN MD; Attending Radiologist  This document has been electronically signed. Dec  9 2022  4:12PM    ECHO:    Summary:   1. Normal global left ventricular systolic function.   2. Left ventricular ejection fraction, by visual estimation, is 70 to 75%.   3. Mildly increased LV wall thickness.   4. Normal left ventricular internal cavity size.   5. Normal right ventricular size and function.   6. The left atrium is normal in size.   7. The right atrium is normal in size.   8. Lipomatous hypertrophy of the intra-atrial septum.   9. Moderate mitral annular calcification.  10. Mild mitral valve regurgitation.  11. Elevated mitral valve mean gradient    5 mmHg at HR 99 BPM.  12. No aortic valve stenosis.  13. Estimated pulmonary artery systolic pressure is 63.7 mmHg assuming a right atrial pressure of 8 mmHg, which is consistent with severe pulmonary hypertension.  14. Recommend clinical correlation with the above findings.    Ewa Urbina MD Electronically signed on 4/13/2021 at 6:34:53 PM

## 2022-12-13 NOTE — PROGRESS NOTE ADULT - SUBJECTIVE AND OBJECTIVE BOX
HOSPITALIST PROGRESS NOTE    IFEOMA TRINIDAD  [id number]  82yFemale    Patient is a 82y old  Female who presents with a chief complaint of Acute renal failure     (13 Dec 2022 09:43)      SUBJECTIVE:   Chart reviewed since last visit.  Patient seen and examined at bedside for THERESA, CHF, respiratory failure, COVID-19.  'Im tired'  shortness of breath with coughing.  Denies any nausea vomiting or abdominal pain  Low urine output as per nursing    OBJECTIVE:  Vital Signs Last 24 Hrs  T(C): 36.8 (12 Dec 2022 16:03), Max: 36.8 (12 Dec 2022 16:03)  T(F): 98.3 (12 Dec 2022 16:03), Max: 98.3 (12 Dec 2022 16:03)  HR: 63 (12 Dec 2022 16:03) (63 - 63)  BP: 97/48 (13 Dec 2022 07:34) (97/48 - 135/47)   RR: 16 (13 Dec 2022 07:34) (16 - 18)  SpO2: 88% (13 Dec 2022 07:34) (88% - 97%)    Parameters below as of 13 Dec 2022 07:34  Patient On (Oxygen Delivery Method): nasal cannula  O2 Flow (L/min): 2    PHYSICAL EXAMINATION  General: Elderly female lying in bed, increased WOB. Appears chronically ill and fatigued. Son Jack at bedside  HEENT:  Periorbital edema  NECK:  distended neck veins  CVS: regular rate and rhythm S1 S2, Pedal edema  RESP: Accessory muscle usage.  Poor effort with decreased sounds at bases  GI:  Soft without any tenderness. BS+  : No suprapubic tenderness  MSK:  Moves all extremities  CNS:  Awake, oriented x3. Generalized weakness  INTEG:  Anasarca  PSYCH: fatigued      MONITOR:  CAPILLARY BLOOD GLUCOSE      POCT Blood Glucose.: 192 mg/dL (13 Dec 2022 07:56)  POCT Blood Glucose.: 162 mg/dL (12 Dec 2022 21:16)  POCT Blood Glucose.: 182 mg/dL (12 Dec 2022 17:19)  POCT Blood Glucose.: 171 mg/dL (12 Dec 2022 11:48)        I&O's Summary    12 Dec 2022 07:01  -  13 Dec 2022 07:00  --------------------------------------------------------  IN: 0 mL / OUT: 2 mL / NET: -2 mL                            8.6    9.38  )-----------( 499      ( 13 Dec 2022 06:32 )             27.4     PT/INR - ( 13 Dec 2022 06:32 )   PT: 49.8 sec;   INR: 4.23 ratio           12-13    131<L>  |  98  |  85.9<H>  ----------------------------<  146<H>  5.8<H>   |  17.0<L>  |  5.85<H>    Ca    8.0<L>      13 Dec 2022 06:32    TPro  4.9<L>  /  Alb  2.2<L>  /  TBili  <0.2<L>  /  DBili  x   /  AST  12  /  ALT  9   /  AlkPhos  68  12-12            Culture:    TTE:  < from: TTE Echo Complete w/ Contrast w/ Doppler (12.12.22 @ 12:11) >    Summary:   1. Mildly enlarged left atrium.   2. Normal wallmotion. Left ventricular ejection fraction, by visual   estimation, is 65 to 70%. Grade II diastolic dysfunction. Elevated mean   left atrial pressure.   3. The right atrium is normal in size.   4. Normal right ventricular size and function.   5. Mild mitral valve regurgitation.   6. Mild tricuspid regurgitation.   7. Estimated pulmonary artery systolic pressure is 50 mmHg - moderate   pulmonary hypertension.   8. There is no evidence of pericardial effusion.   9. Small right pleural effusion.    MD Leann, RPVI Electronically signed on 12/12/2022 at 4:00:46   PM      < end of copied text >    RADIOLOGY    < from: Xray Chest 1 View- PORTABLE-Urgent (Xray Chest 1 View- PORTABLE-Urgent .) (12.11.22 @ 14:35) >  IMPRESSION:  Low lung volumes.    Prominent pulmonary artery trunk and right hilum are consistent with the   history of pulmonary hypertension.    Right basilar and left mid to lower lung and retrocardiac opacities,   possibly due to pleural effusions with associated passive atelectasis.   The left-sided effusion may be partially loculated in the right-sided   effusion may extend into the minor fissure. Atelectasis of other cause   and/or other pathology including, but not limited to, pneumonia is not   excluded.    --- End of Report ---    < end of copied text >      MEDICATIONS  (STANDING):  acetaminophen     Tablet .. 650 milliGRAM(s) Oral every 8 hours  albuterol    90 MICROgram(s) HFA Inhaler 2 Puff(s) Inhalation every 4 hours  budesonide  80 MICROgram(s)/formoterol 4.5 MICROgram(s) Inhaler 2 Puff(s) Inhalation two times a day  cholecalciferol 1000 Unit(s) Oral daily  dexAMETHasone  Injectable 6 milliGRAM(s) IV Push daily  dextrose 5%. 1000 milliLiter(s) (100 mL/Hr) IV Continuous <Continuous>  dextrose 5%. 1000 milliLiter(s) (50 mL/Hr) IV Continuous <Continuous>  dextrose 50% Injectable 25 Gram(s) IV Push once  dextrose 50% Injectable 12.5 Gram(s) IV Push once  dextrose 50% Injectable 25 Gram(s) IV Push once  diltiazem    milliGRAM(s) Oral daily  epoetin susie-epbx (RETACRIT) Injectable 18271 Unit(s) SubCutaneous <User Schedule>  escitalopram 10 milliGRAM(s) Oral daily  glucagon  Injectable 1 milliGRAM(s) IntraMuscular once  guaiFENesin ER 1200 milliGRAM(s) Oral every 12 hours  hydrALAZINE 25 milliGRAM(s) Oral every 8 hours  insulin glargine Injectable (LANTUS) 10 Unit(s) SubCutaneous at bedtime  insulin lispro (ADMELOG) corrective regimen sliding scale   SubCutaneous three times a day before meals  metoprolol tartrate 50 milliGRAM(s) Oral two times a day  pantoprazole    Tablet 40 milliGRAM(s) Oral before breakfast  sodium bicarbonate 650 milliGRAM(s) Oral three times a day      MEDICATIONS  (PRN):  dextrose Oral Gel 15 Gram(s) Oral once PRN Blood Glucose LESS THAN 70 milliGRAM(s)/deciliter  hydrALAZINE Injectable 10 milliGRAM(s) IV Push every 6 hours PRN SBP > 160  LORazepam     Tablet 0.5 milliGRAM(s) Oral every 8 hours PRN Anxiety  melatonin 1 milliGRAM(s) Oral at bedtime PRN Sleep

## 2022-12-13 NOTE — DIETITIAN INITIAL EVALUATION ADULT - ADD RECOMMEND
Nepro TID to optimize po intake and provide an additional 425 kcal, 19.1g protein per serving   RX: nephro-margot daily

## 2022-12-13 NOTE — PROGRESS NOTE ADULT - SUBJECTIVE AND OBJECTIVE BOX
NEPHROLOGY INTERVAL HPI/OVERNIGHT EVENTS:  pt remains weak and no energy  Some mild sob  poor appetite  son at bedside      MEDICATIONS  (STANDING):  acetaminophen     Tablet .. 650 milliGRAM(s) Oral every 8 hours  albuterol    90 MICROgram(s) HFA Inhaler 2 Puff(s) Inhalation every 4 hours  budesonide  80 MICROgram(s)/formoterol 4.5 MICROgram(s) Inhaler 2 Puff(s) Inhalation two times a day  cholecalciferol 1000 Unit(s) Oral daily  dexAMETHasone  Injectable 6 milliGRAM(s) IV Push daily  dextrose 5%. 1000 milliLiter(s) (100 mL/Hr) IV Continuous <Continuous>  dextrose 5%. 1000 milliLiter(s) (50 mL/Hr) IV Continuous <Continuous>  dextrose 50% Injectable 25 Gram(s) IV Push once  dextrose 50% Injectable 12.5 Gram(s) IV Push once  dextrose 50% Injectable 25 Gram(s) IV Push once  diltiazem    milliGRAM(s) Oral daily  epoetin susie-epbx (RETACRIT) Injectable 04554 Unit(s) SubCutaneous <User Schedule>  escitalopram 10 milliGRAM(s) Oral daily  glucagon  Injectable 1 milliGRAM(s) IntraMuscular once  guaiFENesin ER 1200 milliGRAM(s) Oral every 12 hours  hydrALAZINE 25 milliGRAM(s) Oral every 8 hours  insulin glargine Injectable (LANTUS) 10 Unit(s) SubCutaneous at bedtime  insulin lispro (ADMELOG) corrective regimen sliding scale   SubCutaneous three times a day before meals  iron sucrose IVPB 100 milliGRAM(s) IV Intermittent every 24 hours  metoprolol tartrate 50 milliGRAM(s) Oral two times a day  pantoprazole    Tablet 40 milliGRAM(s) Oral before breakfast  sodium bicarbonate 650 milliGRAM(s) Oral three times a day    MEDICATIONS  (PRN):  dextrose Oral Gel 15 Gram(s) Oral once PRN Blood Glucose LESS THAN 70 milliGRAM(s)/deciliter  hydrALAZINE Injectable 10 milliGRAM(s) IV Push every 6 hours PRN SBP > 160  LORazepam     Tablet 0.5 milliGRAM(s) Oral every 8 hours PRN Anxiety  melatonin 1 milliGRAM(s) Oral at bedtime PRN Sleep      Allergies    No Known Allergies        Vital Signs Last 24 Hrs  T(C): 36.8 (12 Dec 2022 16:03), Max: 36.8 (12 Dec 2022 16:03)  T(F): 98.3 (12 Dec 2022 16:03), Max: 98.3 (12 Dec 2022 16:03)  HR: 63 (12 Dec 2022 16:03) (63 - 63)  BP: 97/48 (13 Dec 2022 07:34) (97/48 - 135/47)  BP(mean): --  RR: 16 (13 Dec 2022 07:34) (16 - 18)  SpO2: 88% (13 Dec 2022 07:34) (88% - 97%)    Parameters below as of 13 Dec 2022 07:34  Patient On (Oxygen Delivery Method): nasal cannula  O2 Flow (L/min): 2      PHYSICAL EXAM:  GENERAL: Frail, debilitated  HEENT:  Facial edema  NECK: Supple, No JVD  NERVOUS SYSTEM:  Awake, alert  EXTREMITIES:  + dependent edema  SKIN: No rashes  Further exam deferred to limit COVID exposure    LABS:                        8.6    9.38  )-----------( 499      ( 13 Dec 2022 06:32 )             27.4     12-13    131<L>  |  98  |  85.9<H>  ----------------------------<  146<H>  5.8<H>   |  17.0<L>  |  5.85<H>    Ca    8.0<L>      13 Dec 2022 06:32    TPro  4.9<L>  /  Alb  2.2<L>  /  TBili  <0.2<L>  /  DBili  x   /  AST  12  /  ALT  9   /  AlkPhos  68  12-12    PT/INR - ( 13 Dec 2022 06:32 )   PT: 49.8 sec;   INR: 4.23 ratio                 RADIOLOGY & ADDITIONAL TESTS:

## 2022-12-13 NOTE — DIETITIAN INITIAL EVALUATION ADULT - PERTINENT LABORATORY DATA
12-13    131<L>  |  98  |  85.9<H>  ----------------------------<  146<H>  5.8<H>   |  17.0<L>  |  5.85<H>    Ca    8.0<L>      13 Dec 2022 06:32    TPro  4.9<L>  /  Alb  2.2<L>  /  TBili  <0.2<L>  /  DBili  x   /  AST  12  /  ALT  9   /  AlkPhos  68  12-12  POCT Blood Glucose.: 192 mg/dL (12-13-22 @ 07:56)  A1C with Estimated Average Glucose Result: 11.2 % (12-07-22 @ 06:07)  A1C with Estimated Average Glucose Result: 10.9 % (12-06-22 @ 16:34)

## 2022-12-13 NOTE — PROGRESS NOTE ADULT - SUBJECTIVE AND OBJECTIVE BOX
INTERVAL EVENTS:  Follow up diabetes management  Family at bedside    ROS: Patient denies chest pain, SOB, abd pain, N/V.    MEDICATIONS  (STANDING):  acetaminophen     Tablet .. 650 milliGRAM(s) Oral every 8 hours  albumin human 25% IVPB 100 milliLiter(s) IV Intermittent once  albuterol    90 MICROgram(s) HFA Inhaler 2 Puff(s) Inhalation every 4 hours  budesonide  80 MICROgram(s)/formoterol 4.5 MICROgram(s) Inhaler 2 Puff(s) Inhalation two times a day  cholecalciferol 1000 Unit(s) Oral daily  dexAMETHasone  Injectable 6 milliGRAM(s) IV Push daily  dextrose 5%. 1000 milliLiter(s) (100 mL/Hr) IV Continuous <Continuous>  dextrose 5%. 1000 milliLiter(s) (50 mL/Hr) IV Continuous <Continuous>  dextrose 50% Injectable 25 Gram(s) IV Push once  dextrose 50% Injectable 12.5 Gram(s) IV Push once  dextrose 50% Injectable 25 Gram(s) IV Push once  diltiazem    milliGRAM(s) Oral daily  epoetin susie-epbx (RETACRIT) Injectable 13972 Unit(s) SubCutaneous <User Schedule>  escitalopram 10 milliGRAM(s) Oral daily  glucagon  Injectable 1 milliGRAM(s) IntraMuscular once  guaiFENesin ER 1200 milliGRAM(s) Oral every 12 hours  hydrALAZINE 25 milliGRAM(s) Oral every 8 hours  insulin glargine Injectable (LANTUS) 10 Unit(s) SubCutaneous at bedtime  insulin lispro (ADMELOG) corrective regimen sliding scale   SubCutaneous three times a day before meals  iron sucrose IVPB 100 milliGRAM(s) IV Intermittent every 24 hours  metoprolol tartrate 50 milliGRAM(s) Oral two times a day  pantoprazole    Tablet 40 milliGRAM(s) Oral before breakfast  sodium bicarbonate 650 milliGRAM(s) Oral three times a day    MEDICATIONS  (PRN):  dextrose Oral Gel 15 Gram(s) Oral once PRN Blood Glucose LESS THAN 70 milliGRAM(s)/deciliter  hydrALAZINE Injectable 10 milliGRAM(s) IV Push every 6 hours PRN SBP > 160  LORazepam     Tablet 0.5 milliGRAM(s) Oral every 8 hours PRN Anxiety  melatonin 1 milliGRAM(s) Oral at bedtime PRN Sleep    Allergies  No Known Allergies    Vital Signs Last 24 Hrs  T(C): 36.8 (12 Dec 2022 16:03), Max: 36.8 (12 Dec 2022 16:03)  T(F): 98.3 (12 Dec 2022 16:03), Max: 98.3 (12 Dec 2022 16:03)  HR: 63 (12 Dec 2022 16:03) (63 - 63)  BP: 97/48 (13 Dec 2022 07:34) (97/48 - 135/47)  BP(mean): --  RR: 16 (13 Dec 2022 07:34) (16 - 18)  SpO2: 88% (13 Dec 2022 07:34) (88% - 97%)    Parameters below as of 13 Dec 2022 07:34  Patient On (Oxygen Delivery Method): nasal cannula  O2 Flow (L/min): 2      PHYSICAL EXAM:  General: No apparent distress  Neck: Supple, trachea midline, no thyromegaly  Respiratory: Diminished  Cardiac: +S1, S2, no m/r/g  GI: +BS, soft, non tender, non distended  Extremities: Mild LE edema  Neuro: A+O    LABS:                        8.6    9.38  )-----------( 499      ( 13 Dec 2022 06:32 )             27.4     12-13    131<L>  |  98  |  85.9<H>  ----------------------------<  146<H>  5.8<H>   |  17.0<L>  |  5.85<H>    Ca    8.0<L>      13 Dec 2022 06:32    TPro  4.9<L>  /  Alb  2.2<L>  /  TBili  <0.2<L>  /  DBili  x   /  AST  12  /  ALT  9   /  AlkPhos  68  12-12      POCT Blood Glucose.: 192 mg/dL (12-13-22 @ 07:56)  POCT Blood Glucose.: 162 mg/dL (12-12-22 @ 21:16)  POCT Blood Glucose.: 182 mg/dL (12-12-22 @ 17:19)

## 2022-12-13 NOTE — DIETITIAN INITIAL EVALUATION ADULT - OTHER INFO
82F with PMH asthma, diabetes, hyperlipidemia, atrial fibrillation, hypertension, and hyperlipidemia, who was found to have a right superficial femoral vein thrombosis while on warfarin and acute kidney injury. She was also noted to have been recently diagnosed with COVID-19.

## 2022-12-13 NOTE — PROGRESS NOTE ADULT - SUBJECTIVE AND OBJECTIVE BOX
Palliative Care Followup    OVERNIGHT EVENTS: no acute overnight events - met with patient and son at bedside today   Tentative plan for dialysis today    Present Symptoms:   Dyspnea: no  Nausea/Vomiting: No  Anxiety:  Yes   Depression: No  Fatigue: yes  Loss of appetite: Yes   Constipation: no    Pain: no pain            Character-            Duration-            Effect-            Factors-            Frequency-            Location-            Severity-    Pain AD Score:0  [email or web address] (press ctrl + left click to view)    Review of Systems: Reviewed                     Negative: no pain  All others negative    MEDICATIONS  (STANDING):  acetaminophen     Tablet .. 650 milliGRAM(s) Oral every 8 hours  albuterol    90 MICROgram(s) HFA Inhaler 2 Puff(s) Inhalation every 4 hours  budesonide  80 MICROgram(s)/formoterol 4.5 MICROgram(s) Inhaler 2 Puff(s) Inhalation two times a day  cholecalciferol 1000 Unit(s) Oral daily  dexAMETHasone  Injectable 6 milliGRAM(s) IV Push daily  dextrose 5%. 1000 milliLiter(s) (100 mL/Hr) IV Continuous <Continuous>  dextrose 5%. 1000 milliLiter(s) (50 mL/Hr) IV Continuous <Continuous>  dextrose 50% Injectable 25 Gram(s) IV Push once  dextrose 50% Injectable 12.5 Gram(s) IV Push once  dextrose 50% Injectable 25 Gram(s) IV Push once  diltiazem    milliGRAM(s) Oral daily  epoetin susie-epbx (RETACRIT) Injectable 48452 Unit(s) SubCutaneous <User Schedule>  escitalopram 10 milliGRAM(s) Oral daily  glucagon  Injectable 1 milliGRAM(s) IntraMuscular once  guaiFENesin ER 1200 milliGRAM(s) Oral every 12 hours  hydrALAZINE 25 milliGRAM(s) Oral every 8 hours  insulin glargine Injectable (LANTUS) 10 Unit(s) SubCutaneous at bedtime  insulin lispro (ADMELOG) corrective regimen sliding scale   SubCutaneous three times a day before meals  metoprolol tartrate 50 milliGRAM(s) Oral two times a day  pantoprazole    Tablet 40 milliGRAM(s) Oral before breakfast  sodium bicarbonate 650 milliGRAM(s) Oral three times a day    MEDICATIONS  (PRN):  dextrose Oral Gel 15 Gram(s) Oral once PRN Blood Glucose LESS THAN 70 milliGRAM(s)/deciliter  hydrALAZINE Injectable 10 milliGRAM(s) IV Push every 6 hours PRN SBP > 160  LORazepam     Tablet 0.5 milliGRAM(s) Oral every 8 hours PRN Anxiety  melatonin 1 milliGRAM(s) Oral at bedtime PRN Sleep      PHYSICAL EXAM:    Vital Signs Last 24 Hrs  T(C): 36.8 (12 Dec 2022 16:03), Max: 36.8 (12 Dec 2022 16:03)  T(F): 98.3 (12 Dec 2022 16:03), Max: 98.3 (12 Dec 2022 16:03)  HR: 63 (12 Dec 2022 16:03) (58 - 63)  BP: 97/48 (13 Dec 2022 07:34) (97/48 - 152/58)  BP(mean): --  RR: 16 (13 Dec 2022 07:34) (16 - 18)  SpO2: 88% (13 Dec 2022 07:34) (88% - 98%)    Parameters below as of 13 Dec 2022 07:34  Patient On (Oxygen Delivery Method): nasal cannula  O2 Flow (L/min): 2      General: alert  oriented x _3 in and out of sleep cycle    HEENT: normal     Lungs: comfortable - covid    CV: femeral thrombosis    GI: normal     : incontinent      MSK: weakness      Neuro: no focal deficits cognitive impairment    Skin: normal     LABS:                          8.6    9.38  )-----------( 499      ( 13 Dec 2022 06:32 )             27.4     12-13    131<L>  |  98  |  85.9<H>  ----------------------------<  146<H>  5.8<H>   |  17.0<L>  |  5.85<H>    Ca    8.0<L>      13 Dec 2022 06:32    TPro  4.9<L>  /  Alb  2.2<L>  /  TBili  <0.2<L>  /  DBili  x   /  AST  12  /  ALT  9   /  AlkPhos  68  12-12    PT/INR - ( 13 Dec 2022 06:32 )   PT: 49.8 sec;   INR: 4.23 ratio      I&O's Summary    12 Dec 2022 07:01  -  13 Dec 2022 07:00  --------------------------------------------------------  IN: 0 mL / OUT: 2 mL / NET: -2 mL    RADIOLOGY & ADDITIONAL STUDIES:  ECHO:    Summary:   1. Normal global left ventricular systolic function.   2. Left ventricular ejection fraction, by visual estimation, is 70 to 75%.   3. Mildly increased LV wall thickness.   4. Normal left ventricular internal cavity size.   5. Normal right ventricular size and function.   6. The left atrium is normal in size.   7. The right atrium is normal in size.   8. Lipomatous hypertrophy of the intra-atrial septum.   9. Moderate mitral annular calcification.  10. Mild mitral valve regurgitation.  11. Elevated mitral valve mean gradient    5 mmHg at HR 99 BPM.  12. No aortic valve stenosis.  13. Estimated pulmonary artery systolic pressure is 63.7 mmHg assuming a right atrial pressure of 8 mmHg, which is consistent with severe pulmonary hypertension.  14. Recommend clinical correlation with the above findings.    12.07.22  CT CHEST  IMPRESSION:  Small bilateral pleural effusions with partial atelectasis of both lower   lobes. Subtle patchy right upper lobe opacities, possibly   infectious/inflammatory. Ill-defined right lower lobe nodular opacity is   indeterminate. Three-month follow-up chest CT recommended for complete   evaluation.  VERTEBRAL BODY ANALYSIS: Vertebral compression fractures as described,   consider further workup for osteoporosis.    DUPLEX 12.06.22  IMPRESSION:  No evidence of left upper extremity deep venous thrombosis.  IMPRESSION:  No evidence of deep venous thrombosis in either lower extremity.

## 2022-12-13 NOTE — DIETITIAN NUTRITION RISK NOTIFICATION - TREATMENT: THE FOLLOWING DIET HAS BEEN RECOMMENDED
Diet, DASH/TLC:   Sodium & Cholesterol Restricted  Consistent Carbohydrate {No Snacks} (CSTCHO) (12-06-22 @ 11:23) [Active]

## 2022-12-14 LAB
ANION GAP SERPL CALC-SCNC: 13 MMOL/L — SIGNIFICANT CHANGE UP (ref 5–17)
BUN SERPL-MCNC: 47 MG/DL — HIGH (ref 8–20)
CALCIUM SERPL-MCNC: 7.6 MG/DL — LOW (ref 8.4–10.5)
CHLORIDE SERPL-SCNC: 99 MMOL/L — SIGNIFICANT CHANGE UP (ref 96–108)
CO2 SERPL-SCNC: 24 MMOL/L — SIGNIFICANT CHANGE UP (ref 22–29)
CREAT SERPL-MCNC: 3.72 MG/DL — HIGH (ref 0.5–1.3)
EGFR: 12 ML/MIN/1.73M2 — LOW
GLUCOSE BLDC GLUCOMTR-MCNC: 117 MG/DL — HIGH (ref 70–99)
GLUCOSE BLDC GLUCOMTR-MCNC: 120 MG/DL — HIGH (ref 70–99)
GLUCOSE BLDC GLUCOMTR-MCNC: 139 MG/DL — HIGH (ref 70–99)
GLUCOSE BLDC GLUCOMTR-MCNC: 245 MG/DL — HIGH (ref 70–99)
GLUCOSE SERPL-MCNC: 132 MG/DL — HIGH (ref 70–99)
HBV CORE AB SER-ACNC: SIGNIFICANT CHANGE UP
HBV CORE IGM SER-ACNC: SIGNIFICANT CHANGE UP
HBV SURFACE AB SER-ACNC: <3 MIU/ML — LOW
HBV SURFACE AG SER-ACNC: SIGNIFICANT CHANGE UP
HCT VFR BLD CALC: 23.6 % — LOW (ref 34.5–45)
HCV AB S/CO SERPL IA: 0.15 S/CO — SIGNIFICANT CHANGE UP (ref 0–0.99)
HCV AB SERPL-IMP: SIGNIFICANT CHANGE UP
HGB BLD-MCNC: 7.5 G/DL — LOW (ref 11.5–15.5)
INR BLD: 2.01 RATIO — HIGH (ref 0.88–1.16)
MCHC RBC-ENTMCNC: 21.3 PG — LOW (ref 27–34)
MCHC RBC-ENTMCNC: 31.8 GM/DL — LOW (ref 32–36)
MCV RBC AUTO: 67 FL — LOW (ref 80–100)
MRSA PCR RESULT.: SIGNIFICANT CHANGE UP
NRBC # BLD: 3 /100 WBCS — HIGH (ref 0–0)
PLATELET # BLD AUTO: 399 K/UL — SIGNIFICANT CHANGE UP (ref 150–400)
POTASSIUM SERPL-MCNC: 4.4 MMOL/L — SIGNIFICANT CHANGE UP (ref 3.5–5.3)
POTASSIUM SERPL-SCNC: 4.4 MMOL/L — SIGNIFICANT CHANGE UP (ref 3.5–5.3)
PROTHROM AB SERPL-ACNC: 23.5 SEC — HIGH (ref 10.5–13.4)
RBC # BLD: 3.52 M/UL — LOW (ref 3.8–5.2)
RBC # FLD: 20.9 % — HIGH (ref 10.3–14.5)
S AUREUS DNA NOSE QL NAA+PROBE: SIGNIFICANT CHANGE UP
SODIUM SERPL-SCNC: 136 MMOL/L — SIGNIFICANT CHANGE UP (ref 135–145)
WBC # BLD: 12.23 K/UL — HIGH (ref 3.8–10.5)
WBC # FLD AUTO: 12.23 K/UL — HIGH (ref 3.8–10.5)

## 2022-12-14 PROCEDURE — 93985 DUP-SCAN HEMO COMPL BI STD: CPT | Mod: 26

## 2022-12-14 PROCEDURE — 99233 SBSQ HOSP IP/OBS HIGH 50: CPT

## 2022-12-14 RX ORDER — ALBUMIN HUMAN 25 %
100 VIAL (ML) INTRAVENOUS ONCE
Refills: 0 | Status: COMPLETED | OUTPATIENT
Start: 2022-12-14 | End: 2022-12-14

## 2022-12-14 RX ADMIN — Medication 0.5 MILLIGRAM(S): at 23:12

## 2022-12-14 RX ADMIN — BUDESONIDE AND FORMOTEROL FUMARATE DIHYDRATE 2 PUFF(S): 160; 4.5 AEROSOL RESPIRATORY (INHALATION) at 08:26

## 2022-12-14 RX ADMIN — Medication 50 MILLIGRAM(S): at 17:41

## 2022-12-14 RX ADMIN — Medication 1000 UNIT(S): at 17:35

## 2022-12-14 RX ADMIN — Medication 650 MILLIGRAM(S): at 16:45

## 2022-12-14 RX ADMIN — PANTOPRAZOLE SODIUM 40 MILLIGRAM(S): 20 TABLET, DELAYED RELEASE ORAL at 05:17

## 2022-12-14 RX ADMIN — Medication 50 MILLIGRAM(S): at 17:35

## 2022-12-14 RX ADMIN — Medication 25 MILLIGRAM(S): at 16:15

## 2022-12-14 RX ADMIN — Medication 650 MILLIGRAM(S): at 22:00

## 2022-12-14 RX ADMIN — ALBUTEROL 2 PUFF(S): 90 AEROSOL, METERED ORAL at 08:25

## 2022-12-14 RX ADMIN — IRON SUCROSE 210 MILLIGRAM(S): 20 INJECTION, SOLUTION INTRAVENOUS at 21:48

## 2022-12-14 RX ADMIN — Medication 25 MILLIGRAM(S): at 05:16

## 2022-12-14 RX ADMIN — Medication 650 MILLIGRAM(S): at 05:46

## 2022-12-14 RX ADMIN — ESCITALOPRAM OXALATE 10 MILLIGRAM(S): 10 TABLET, FILM COATED ORAL at 17:36

## 2022-12-14 RX ADMIN — Medication 6 MILLIGRAM(S): at 05:15

## 2022-12-14 RX ADMIN — Medication 1200 MILLIGRAM(S): at 17:36

## 2022-12-14 RX ADMIN — INSULIN GLARGINE 10 UNIT(S): 100 INJECTION, SOLUTION SUBCUTANEOUS at 21:49

## 2022-12-14 RX ADMIN — Medication 650 MILLIGRAM(S): at 16:15

## 2022-12-14 RX ADMIN — ALBUTEROL 2 PUFF(S): 90 AEROSOL, METERED ORAL at 20:00

## 2022-12-14 RX ADMIN — Medication 240 MILLIGRAM(S): at 05:16

## 2022-12-14 RX ADMIN — Medication 650 MILLIGRAM(S): at 21:49

## 2022-12-14 RX ADMIN — Medication 25 MILLIGRAM(S): at 21:49

## 2022-12-14 RX ADMIN — Medication 650 MILLIGRAM(S): at 05:17

## 2022-12-14 RX ADMIN — CHLORHEXIDINE GLUCONATE 1 APPLICATION(S): 213 SOLUTION TOPICAL at 06:40

## 2022-12-14 RX ADMIN — Medication 650 MILLIGRAM(S): at 05:16

## 2022-12-14 RX ADMIN — Medication 50 MILLIGRAM(S): at 05:16

## 2022-12-14 RX ADMIN — Medication 1200 MILLIGRAM(S): at 05:16

## 2022-12-14 RX ADMIN — ALBUTEROL 2 PUFF(S): 90 AEROSOL, METERED ORAL at 17:38

## 2022-12-14 NOTE — PROGRESS NOTE ADULT - ASSESSMENT
82F with PMH asthma, diabetes, hyperlipidemia, atrial fibrillation, hypertension, and hyperlipidemia, who was found to have a right superficial femoral vein thrombosis while on warfarin and acute kidney injury. She was also noted to have been recently diagnosed with COVID-19.    Acute Hypoxic respiratory Failure - Multifactorial. Asthma, COVID-19, CHFpEF, Fluid overload.  Continues to have hypoxia and WOB.  Started on HD 12/13/22  TTE with intact LVF however has grade II diastolic dysfunction and moderate Pulmonary HTN  - Supplemental O2, wean as tolerated  - Albuterol MDI q4 ATC, Add Budesonide/Formoterol    #THERESA on CKD IV  Continues to worsen with diuresis.   Associated Metabolic acidosis, hyperkalemia  HD initiated 12/13/22 via Right femoral access   renal- chronic medical disease  - Strict I/O, daily weight  - Avoid Nephrotoxins  - Bicarbonate and Lokelma  - Continue HD per Nephrology    Acute on Chronic Diastolic Heart failure  Resistant to diuresis, HD initiated 12/13/22 via Right femoral access  TTE with intact LVF however has grade II diastolic dysfunction and moderate Pulmonary HTN  - Strict I/O, daily weight, Fluid restriction  - Continue Metoprolol and Hydralazine   - HD for fluid removal    #Anemia  Stable Hgb  Multifactorial - given kidney disease  Received PRBC x 1  - Monitor Hgb  - IV Iron    Type 2 Diabetes on insulin  Fair glycemic control currently  A1c 11.2  - BGM with SSI  - Glargine increased to 10U as starting On Dexamethasone    #Atrial Fibrillation  Rate controlled, supratherapeutic INR  - Metoprolol, Diltiazem  - INR, dose coumadin; currently none    #COVID-19   Now with hypoxia - unclear if pneumonia, Asthma or heart failure  CT Chest with bilateral upper lobe opacities  - Started on Dexamethasone for Asthma; will taper once respiratoy status improved  - isolation precautions  - Will need repeat outpatient CT chest in 3 months for RLL nodule    #HTN-Essential  Improved  - monitor blood pressure  - Metoprolol to 50mg, continue Diltiazem at current rate  - Hydralazine 25mg q8    DVT ruled on on repeat imaging  Supratherapeutic INR, downtrending. No bleeding  US Duplex- No evidence of deep venous thrombosis in either lower extremity.  - No Coumadin, monitor INR    PT  recommendation for [facility]  Advised RN to mobilize OOB  Incentive Spirometry    Prognosis - Guarded  CODE STATUS- FULL CODE.       Currently awaiting response to HD; from conversations with family patient in similar situation 2 years ago requiring HD.  It appears they would not want long term HD

## 2022-12-14 NOTE — PROGRESS NOTE ADULT - ASSESSMENT
CKD(IV): +COVID  Clinically volume overloaded  THERESA worsened post IV diuretics  Volume status no better despite escalation of diuretics  No s/p HD # 1 12/13--> tolerated ok--> 2nd HD today  Hx NS (3g) +DM  Hyperkalemia- resolved w HD  Metabolic Acidosis - resolved w HD  - avoid potential nephrotoxins  - cont Bumex for now as pt with signs of fluid overload  - check bladder scan  - will stop--->  NaHCO3 and Lokelma    Unclear if will need long term dialysis or if pt and family would agree to this if needed  Family meeting to discuss GOC     Anemia: +CKD; Tsat 36%  - cont DILAN  - target Hgb > 10    Will follow

## 2022-12-14 NOTE — PROGRESS NOTE ADULT - SUBJECTIVE AND OBJECTIVE BOX
NEPHROLOGY INTERVAL HPI/OVERNIGHT EVENTS:    Examined  Sitting up in bed feels weak   visiting  No dyspnea at rest  Had first HD yest  COVID +    MEDICATIONS  (STANDING):  acetaminophen     Tablet .. 650 milliGRAM(s) Oral every 8 hours  albumin human 25% IVPB 100 milliLiter(s) IV Intermittent once  albuterol    90 MICROgram(s) HFA Inhaler 2 Puff(s) Inhalation every 4 hours  budesonide  80 MICROgram(s)/formoterol 4.5 MICROgram(s) Inhaler 2 Puff(s) Inhalation two times a day  chlorhexidine 4% Liquid 1 Application(s) Topical <User Schedule>  cholecalciferol 1000 Unit(s) Oral daily  dexAMETHasone  Injectable 6 milliGRAM(s) IV Push daily  dextrose 5%. 1000 milliLiter(s) (50 mL/Hr) IV Continuous <Continuous>  dextrose 5%. 1000 milliLiter(s) (100 mL/Hr) IV Continuous <Continuous>  dextrose 50% Injectable 25 Gram(s) IV Push once  dextrose 50% Injectable 12.5 Gram(s) IV Push once  dextrose 50% Injectable 25 Gram(s) IV Push once  diltiazem    milliGRAM(s) Oral daily  epoetin susie-epbx (RETACRIT) Injectable 31214 Unit(s) SubCutaneous <User Schedule>  escitalopram 10 milliGRAM(s) Oral daily  glucagon  Injectable 1 milliGRAM(s) IntraMuscular once  guaiFENesin ER 1200 milliGRAM(s) Oral every 12 hours  hydrALAZINE 25 milliGRAM(s) Oral every 8 hours  insulin glargine Injectable (LANTUS) 10 Unit(s) SubCutaneous at bedtime  insulin lispro (ADMELOG) corrective regimen sliding scale   SubCutaneous three times a day before meals  iron sucrose IVPB 100 milliGRAM(s) IV Intermittent every 24 hours  metoprolol tartrate 50 milliGRAM(s) Oral two times a day  pantoprazole    Tablet 40 milliGRAM(s) Oral before breakfast  sodium bicarbonate 650 milliGRAM(s) Oral three times a day    MEDICATIONS  (PRN):  dextrose Oral Gel 15 Gram(s) Oral once PRN Blood Glucose LESS THAN 70 milliGRAM(s)/deciliter  hydrALAZINE Injectable 10 milliGRAM(s) IV Push every 6 hours PRN SBP > 160  LORazepam     Tablet 0.5 milliGRAM(s) Oral every 8 hours PRN Anxiety  melatonin 1 milliGRAM(s) Oral at bedtime PRN Sleep  sodium chloride 0.9% lock flush 10 milliLiter(s) IV Push every 1 hour PRN Pre/post blood products, medications, blood draw, and to maintain line patency      Allergies    No Known Allergies    Intolerances        Vital Signs Last 24 Hrs  T(C): 36.7 (14 Dec 2022 09:15), Max: 37 (14 Dec 2022 04:38)  T(F): 98 (14 Dec 2022 09:15), Max: 98.6 (14 Dec 2022 04:38)  HR: 63 (14 Dec 2022 09:15) (57 - 80)  BP: 164/72 (14 Dec 2022 09:15) (135/58 - 168/65)  BP(mean): --  RR: 18 (14 Dec 2022 09:15) (17 - 18)  SpO2: 100% (14 Dec 2022 09:15) (96% - 100%)    Parameters below as of 14 Dec 2022 09:15  Patient On (Oxygen Delivery Method): nasal cannula  O2 Flow (L/min): 3    Daily     Daily Weight in k.9 (14 Dec 2022 04:38)    PHYSICAL EXAM:  GENERAL: Debilitated  HEENT:  Facial edema  NECK: Supple, No JVD  NERVOUS SYSTEM:  Awake, alert  EXTREMITIES:  + dependent edema  SKIN: No rashes  Further exam deferred to limit COVID exposure    LABS:                        7.5    12.23 )-----------( 399      ( 14 Dec 2022 06:40 )             23.6     12-14    136  |  99  |  47.0<H>  ----------------------------<  132<H>  4.4   |  24.0  |  3.72<H>    Ca    7.6<L>      14 Dec 2022 06:40    TPro  4.9<L>  /  Alb  2.2<L>  /  TBili  <0.2<L>  /  DBili  x   /  AST  12  /  ALT  9   /  AlkPhos  68  12-12    PT/INR - ( 14 Dec 2022 06:40 )   PT: 23.5 sec;   INR: 2.01 ratio                     RADIOLOGY & ADDITIONAL TESTS:

## 2022-12-14 NOTE — PROGRESS NOTE ADULT - SUBJECTIVE AND OBJECTIVE BOX
HOSPITALIST PROGRESS NOTE    IFEOMA TRINIDAD  [id number]  82yFemale    Patient is a 82y old  Female who presents with a chief complaint of LE edema (13 Dec 2022 13:56)      SUBJECTIVE:   Chart reviewed since last visit.  Patient seen and examined at bedside for CHF, THERESA.  Started on HD yesterday  Still with weakness, dyspnea, cough      OBJECTIVE:  Vital Signs Last 24 Hrs  T(C): 36.7 (14 Dec 2022 14:05), Max: 37 (14 Dec 2022 04:38)  T(F): 98 (14 Dec 2022 14:05), Max: 98.6 (14 Dec 2022 04:38)  HR: 67 (14 Dec 2022 14:05) (57 - 80)  BP: 186/71 (14 Dec 2022 14:05) (135/58 - 186/71)   RR: 18 (14 Dec 2022 14:05) (17 - 18)  SpO2: 100% (14 Dec 2022 14:05) (96% - 100%)    Parameters below as of 14 Dec 2022 14:05  Patient On (Oxygen Delivery Method): nasal cannula  O2 Flow (L/min): 3    PHYSICAL EXAMINATION  General: Elderly female lying in bed, Appears chronically ill and fatigued. Spouse at bedside  HEENT:  Periorbital edema  NECK:  distended neck veins  CVS: regular rate and rhythm S1 S2, Pedal edema  RESP: Accessory muscle usage.  Poor effort with decreased sounds at bases  GI:  Soft without any tenderness. BS+  : No suprapubic tenderness  MSK:  Moves all extremities  CNS:  Awake, oriented x3. Generalized weakness  INTEG:  Anasarca. Right femoral HD catheter  PSYCH: fatigued    MONITOR:  CAPILLARY BLOOD GLUCOSE      POCT Blood Glucose.: 117 mg/dL (14 Dec 2022 13:28)  POCT Blood Glucose.: 120 mg/dL (14 Dec 2022 07:46)  POCT Blood Glucose.: 190 mg/dL (13 Dec 2022 20:40)  POCT Blood Glucose.: 168 mg/dL (13 Dec 2022 17:36)  POCT Blood Glucose.: 181 mg/dL (13 Dec 2022 16:41)        I&O's Summary    13 Dec 2022 07:01  -  14 Dec 2022 07:00  --------------------------------------------------------  IN: 780 mL / OUT: 1400 mL / NET: -620 mL    14 Dec 2022 07:01  -  14 Dec 2022 14:48  --------------------------------------------------------  IN: 0 mL / OUT: 1000 mL / NET: -1000 mL                            7.5    12.23 )-----------( 399      ( 14 Dec 2022 06:40 )             23.6     PT/INR - ( 14 Dec 2022 06:40 )   PT: 23.5 sec;   INR: 2.01 ratio           12-14    136  |  99  |  47.0<H>  ----------------------------<  132<H>  4.4   |  24.0  |  3.72<H>    Ca    7.6<L>      14 Dec 2022 06:40    TPro  4.9<L>  /  Alb  2.2<L>  /  TBili  <0.2<L>  /  DBili  x   /  AST  12  /  ALT  9   /  AlkPhos  68  12-12            Culture:    TTE:    RADIOLOGY  < from: Xray Chest 1 View- PORTABLE-Urgent (Xray Chest 1 View- PORTABLE-Urgent .) (12.13.22 @ 09:46) >  IMPRESSION:  Progression of left effusion.        Thank you for the courtesy of this referral.    --- End of Report ---          < end of copied text >        MEDICATIONS  (STANDING):  acetaminophen     Tablet .. 650 milliGRAM(s) Oral every 8 hours  albuterol    90 MICROgram(s) HFA Inhaler 2 Puff(s) Inhalation every 4 hours  budesonide  80 MICROgram(s)/formoterol 4.5 MICROgram(s) Inhaler 2 Puff(s) Inhalation two times a day  chlorhexidine 4% Liquid 1 Application(s) Topical <User Schedule>  cholecalciferol 1000 Unit(s) Oral daily  dexAMETHasone  Injectable 6 milliGRAM(s) IV Push daily  dextrose 5%. 1000 milliLiter(s) (50 mL/Hr) IV Continuous <Continuous>  dextrose 5%. 1000 milliLiter(s) (100 mL/Hr) IV Continuous <Continuous>  dextrose 50% Injectable 25 Gram(s) IV Push once  dextrose 50% Injectable 12.5 Gram(s) IV Push once  dextrose 50% Injectable 25 Gram(s) IV Push once  diltiazem    milliGRAM(s) Oral daily  epoetin susie-epbx (RETACRIT) Injectable 74039 Unit(s) SubCutaneous <User Schedule>  escitalopram 10 milliGRAM(s) Oral daily  glucagon  Injectable 1 milliGRAM(s) IntraMuscular once  guaiFENesin ER 1200 milliGRAM(s) Oral every 12 hours  hydrALAZINE 25 milliGRAM(s) Oral every 8 hours  insulin glargine Injectable (LANTUS) 10 Unit(s) SubCutaneous at bedtime  insulin lispro (ADMELOG) corrective regimen sliding scale   SubCutaneous three times a day before meals  iron sucrose IVPB 100 milliGRAM(s) IV Intermittent every 24 hours  metoprolol tartrate 50 milliGRAM(s) Oral two times a day  pantoprazole    Tablet 40 milliGRAM(s) Oral before breakfast      MEDICATIONS  (PRN):  dextrose Oral Gel 15 Gram(s) Oral once PRN Blood Glucose LESS THAN 70 milliGRAM(s)/deciliter  hydrALAZINE Injectable 10 milliGRAM(s) IV Push every 6 hours PRN SBP > 160  LORazepam     Tablet 0.5 milliGRAM(s) Oral every 8 hours PRN Anxiety  melatonin 1 milliGRAM(s) Oral at bedtime PRN Sleep  sodium chloride 0.9% lock flush 10 milliLiter(s) IV Push every 1 hour PRN Pre/post blood products, medications, blood draw, and to maintain line patency

## 2022-12-14 NOTE — PROGRESS NOTE ADULT - SUBJECTIVE AND OBJECTIVE BOX
INTERVAL EVENTS:  Follow up diabetes management   at bedside      MEDICATIONS  (STANDING):  acetaminophen     Tablet .. 650 milliGRAM(s) Oral every 8 hours  albuterol    90 MICROgram(s) HFA Inhaler 2 Puff(s) Inhalation every 4 hours  budesonide  80 MICROgram(s)/formoterol 4.5 MICROgram(s) Inhaler 2 Puff(s) Inhalation two times a day  chlorhexidine 4% Liquid 1 Application(s) Topical <User Schedule>  cholecalciferol 1000 Unit(s) Oral daily  dexAMETHasone  Injectable 6 milliGRAM(s) IV Push daily  dextrose 5%. 1000 milliLiter(s) (50 mL/Hr) IV Continuous <Continuous>  dextrose 5%. 1000 milliLiter(s) (100 mL/Hr) IV Continuous <Continuous>  dextrose 50% Injectable 25 Gram(s) IV Push once  dextrose 50% Injectable 12.5 Gram(s) IV Push once  dextrose 50% Injectable 25 Gram(s) IV Push once  diltiazem    milliGRAM(s) Oral daily  epoetin susie-epbx (RETACRIT) Injectable 02546 Unit(s) SubCutaneous <User Schedule>  escitalopram 10 milliGRAM(s) Oral daily  glucagon  Injectable 1 milliGRAM(s) IntraMuscular once  guaiFENesin ER 1200 milliGRAM(s) Oral every 12 hours  hydrALAZINE 25 milliGRAM(s) Oral every 8 hours  insulin glargine Injectable (LANTUS) 10 Unit(s) SubCutaneous at bedtime  insulin lispro (ADMELOG) corrective regimen sliding scale   SubCutaneous three times a day before meals  iron sucrose IVPB 100 milliGRAM(s) IV Intermittent every 24 hours  metoprolol tartrate 50 milliGRAM(s) Oral two times a day  pantoprazole    Tablet 40 milliGRAM(s) Oral before breakfast    MEDICATIONS  (PRN):  dextrose Oral Gel 15 Gram(s) Oral once PRN Blood Glucose LESS THAN 70 milliGRAM(s)/deciliter  hydrALAZINE Injectable 10 milliGRAM(s) IV Push every 6 hours PRN SBP > 160  LORazepam     Tablet 0.5 milliGRAM(s) Oral every 8 hours PRN Anxiety  melatonin 1 milliGRAM(s) Oral at bedtime PRN Sleep  sodium chloride 0.9% lock flush 10 milliLiter(s) IV Push every 1 hour PRN Pre/post blood products, medications, blood draw, and to maintain line patency    Allergies  No Known Allergies    Vital Signs Last 24 Hrs  T(C): 36.8 (14 Dec 2022 11:14), Max: 37 (14 Dec 2022 04:38)  T(F): 98.2 (14 Dec 2022 11:14), Max: 98.6 (14 Dec 2022 04:38)  HR: 74 (14 Dec 2022 11:14) (57 - 80)  BP: 164/92 (14 Dec 2022 11:14) (135/58 - 168/65)  BP(mean): --  RR: 18 (14 Dec 2022 11:14) (17 - 18)  SpO2: 100% (14 Dec 2022 11:14) (96% - 100%)    Parameters below as of 14 Dec 2022 11:14  Patient On (Oxygen Delivery Method): nasal cannula      PHYSICAL EXAM:  General: No apparent distress  GI: +BS, soft, non tender, non distended  Extremities: Edema  Neuro: A+O      LABS:                        7.5    12.23 )-----------( 399      ( 14 Dec 2022 06:40 )             23.6     12-14    136  |  99  |  47.0<H>  ----------------------------<  132<H>  4.4   |  24.0  |  3.72<H>    Ca    7.6<L>      14 Dec 2022 06:40    TPro  4.9<L>  /  Alb  2.2<L>  /  TBili  <0.2<L>  /  DBili  x   /  AST  12  /  ALT  9   /  AlkPhos  68  12-12      POCT Blood Glucose.: 120 mg/dL (12-14-22 @ 07:46)  POCT Blood Glucose.: 190 mg/dL (12-13-22 @ 20:40)  POCT Blood Glucose.: 168 mg/dL (12-13-22 @ 17:36)  POCT Blood Glucose.: 181 mg/dL (12-13-22 @ 16:41)  POCT Blood Glucose.: 199 mg/dL (12-13-22 @ 13:19)

## 2022-12-14 NOTE — PROGRESS NOTE ADULT - ASSESSMENT
83 y/o F with PMHx asthma, DM, HLD, atrial fibrillation, and HTN who was found to have a right superficial femoral vein thrombosis while on warfarin and acute kidney injury. Also found to be COVID+.    1. Uncontrolled T2DM, a1c 11.2%  - Continue lantus 10 units qhs  - Continue sliding scale coverage  - Bgs well controlled    2. THERESA/CKD  - Fluid overload  - Started on HD, nephrology following    3. Acute hypoxic respiratory failure/multifactorial  - Supplemental O2, wean as tolerated  - Albuterol q4hr

## 2022-12-15 LAB
ANION GAP SERPL CALC-SCNC: 12 MMOL/L — SIGNIFICANT CHANGE UP (ref 5–17)
APPEARANCE UR: ABNORMAL
BACTERIA # UR AUTO: ABNORMAL
BILIRUB UR-MCNC: NEGATIVE — SIGNIFICANT CHANGE UP
BUN SERPL-MCNC: 35 MG/DL — HIGH (ref 8–20)
CALCIUM SERPL-MCNC: 7.5 MG/DL — LOW (ref 8.4–10.5)
CHLORIDE SERPL-SCNC: 101 MMOL/L — SIGNIFICANT CHANGE UP (ref 96–108)
CO2 SERPL-SCNC: 24 MMOL/L — SIGNIFICANT CHANGE UP (ref 22–29)
COLOR SPEC: YELLOW — SIGNIFICANT CHANGE UP
CREAT SERPL-MCNC: 2.95 MG/DL — HIGH (ref 0.5–1.3)
DIFF PNL FLD: ABNORMAL
EGFR: 15 ML/MIN/1.73M2 — LOW
EPI CELLS # UR: SIGNIFICANT CHANGE UP
GLUCOSE BLDC GLUCOMTR-MCNC: 105 MG/DL — HIGH (ref 70–99)
GLUCOSE BLDC GLUCOMTR-MCNC: 185 MG/DL — HIGH (ref 70–99)
GLUCOSE BLDC GLUCOMTR-MCNC: 202 MG/DL — HIGH (ref 70–99)
GLUCOSE BLDC GLUCOMTR-MCNC: 214 MG/DL — HIGH (ref 70–99)
GLUCOSE SERPL-MCNC: 122 MG/DL — HIGH (ref 70–99)
GLUCOSE UR QL: 250 MG/DL
HCT VFR BLD CALC: 25.5 % — LOW (ref 34.5–45)
HGB BLD-MCNC: 8 G/DL — LOW (ref 11.5–15.5)
INR BLD: 1.08 RATIO — SIGNIFICANT CHANGE UP (ref 0.88–1.16)
KETONES UR-MCNC: ABNORMAL
LEUKOCYTE ESTERASE UR-ACNC: ABNORMAL
MCHC RBC-ENTMCNC: 21.4 PG — LOW (ref 27–34)
MCHC RBC-ENTMCNC: 31.4 GM/DL — LOW (ref 32–36)
MCV RBC AUTO: 68.2 FL — LOW (ref 80–100)
NITRITE UR-MCNC: NEGATIVE — SIGNIFICANT CHANGE UP
NRBC # BLD: 6 /100 WBCS — HIGH (ref 0–0)
PH UR: 6 — SIGNIFICANT CHANGE UP (ref 5–8)
PLATELET # BLD AUTO: 393 K/UL — SIGNIFICANT CHANGE UP (ref 150–400)
POTASSIUM SERPL-MCNC: 4 MMOL/L — SIGNIFICANT CHANGE UP (ref 3.5–5.3)
POTASSIUM SERPL-SCNC: 4 MMOL/L — SIGNIFICANT CHANGE UP (ref 3.5–5.3)
PROT UR-MCNC: SIGNIFICANT CHANGE UP MG/DL
PROTHROM AB SERPL-ACNC: 12.5 SEC — SIGNIFICANT CHANGE UP (ref 10.5–13.4)
RBC # BLD: 3.74 M/UL — LOW (ref 3.8–5.2)
RBC # FLD: 20.8 % — HIGH (ref 10.3–14.5)
RBC CASTS # UR COMP ASSIST: >50 /HPF (ref 0–4)
SODIUM SERPL-SCNC: 136 MMOL/L — SIGNIFICANT CHANGE UP (ref 135–145)
SP GR SPEC: 1.01 — SIGNIFICANT CHANGE UP (ref 1.01–1.02)
UROBILINOGEN FLD QL: NEGATIVE MG/DL — SIGNIFICANT CHANGE UP
WBC # BLD: 15.72 K/UL — HIGH (ref 3.8–10.5)
WBC # FLD AUTO: 15.72 K/UL — HIGH (ref 3.8–10.5)
WBC UR QL: >50 /HPF (ref 0–5)

## 2022-12-15 PROCEDURE — 99233 SBSQ HOSP IP/OBS HIGH 50: CPT

## 2022-12-15 PROCEDURE — 36000 PLACE NEEDLE IN VEIN: CPT | Mod: LT

## 2022-12-15 PROCEDURE — 99231 SBSQ HOSP IP/OBS SF/LOW 25: CPT

## 2022-12-15 PROCEDURE — 74176 CT ABD & PELVIS W/O CONTRAST: CPT | Mod: 26

## 2022-12-15 PROCEDURE — 99232 SBSQ HOSP IP/OBS MODERATE 35: CPT

## 2022-12-15 RX ORDER — CEFTRIAXONE 500 MG/1
1000 INJECTION, POWDER, FOR SOLUTION INTRAMUSCULAR; INTRAVENOUS EVERY 24 HOURS
Refills: 0 | Status: COMPLETED | OUTPATIENT
Start: 2022-12-15 | End: 2022-12-24

## 2022-12-15 RX ORDER — CEFTRIAXONE 500 MG/1
1000 INJECTION, POWDER, FOR SOLUTION INTRAMUSCULAR; INTRAVENOUS EVERY 24 HOURS
Refills: 0 | Status: DISCONTINUED | OUTPATIENT
Start: 2022-12-15 | End: 2022-12-15

## 2022-12-15 RX ORDER — TRAMADOL HYDROCHLORIDE 50 MG/1
25 TABLET ORAL ONCE
Refills: 0 | Status: DISCONTINUED | OUTPATIENT
Start: 2022-12-15 | End: 2022-12-15

## 2022-12-15 RX ORDER — APIXABAN 2.5 MG/1
2.5 TABLET, FILM COATED ORAL
Refills: 0 | Status: DISCONTINUED | OUTPATIENT
Start: 2022-12-15 | End: 2022-12-18

## 2022-12-15 RX ADMIN — IRON SUCROSE 210 MILLIGRAM(S): 20 INJECTION, SOLUTION INTRAVENOUS at 17:17

## 2022-12-15 RX ADMIN — ALBUTEROL 2 PUFF(S): 90 AEROSOL, METERED ORAL at 12:27

## 2022-12-15 RX ADMIN — Medication 1000 UNIT(S): at 12:26

## 2022-12-15 RX ADMIN — Medication 10 MILLIGRAM(S): at 01:27

## 2022-12-15 RX ADMIN — Medication 1200 MILLIGRAM(S): at 06:37

## 2022-12-15 RX ADMIN — Medication 2: at 12:44

## 2022-12-15 RX ADMIN — Medication 4: at 17:18

## 2022-12-15 RX ADMIN — ALBUTEROL 2 PUFF(S): 90 AEROSOL, METERED ORAL at 21:27

## 2022-12-15 RX ADMIN — APIXABAN 2.5 MILLIGRAM(S): 2.5 TABLET, FILM COATED ORAL at 19:36

## 2022-12-15 RX ADMIN — INSULIN GLARGINE 10 UNIT(S): 100 INJECTION, SOLUTION SUBCUTANEOUS at 21:24

## 2022-12-15 RX ADMIN — Medication 0.5 MILLIGRAM(S): at 21:24

## 2022-12-15 RX ADMIN — PANTOPRAZOLE SODIUM 40 MILLIGRAM(S): 20 TABLET, DELAYED RELEASE ORAL at 06:37

## 2022-12-15 RX ADMIN — Medication 50 MILLIGRAM(S): at 17:26

## 2022-12-15 RX ADMIN — Medication 25 MILLIGRAM(S): at 23:01

## 2022-12-15 RX ADMIN — Medication 25 MILLIGRAM(S): at 12:25

## 2022-12-15 RX ADMIN — ALBUTEROL 2 PUFF(S): 90 AEROSOL, METERED ORAL at 12:38

## 2022-12-15 RX ADMIN — Medication 650 MILLIGRAM(S): at 06:36

## 2022-12-15 RX ADMIN — Medication 650 MILLIGRAM(S): at 21:24

## 2022-12-15 RX ADMIN — TRAMADOL HYDROCHLORIDE 25 MILLIGRAM(S): 50 TABLET ORAL at 01:27

## 2022-12-15 RX ADMIN — Medication 25 MILLIGRAM(S): at 06:40

## 2022-12-15 RX ADMIN — Medication 650 MILLIGRAM(S): at 07:06

## 2022-12-15 RX ADMIN — BUDESONIDE AND FORMOTEROL FUMARATE DIHYDRATE 2 PUFF(S): 160; 4.5 AEROSOL RESPIRATORY (INHALATION) at 08:35

## 2022-12-15 RX ADMIN — ESCITALOPRAM OXALATE 10 MILLIGRAM(S): 10 TABLET, FILM COATED ORAL at 12:25

## 2022-12-15 RX ADMIN — Medication 50 MILLIGRAM(S): at 06:36

## 2022-12-15 RX ADMIN — TRAMADOL HYDROCHLORIDE 25 MILLIGRAM(S): 50 TABLET ORAL at 02:27

## 2022-12-15 RX ADMIN — Medication 240 MILLIGRAM(S): at 06:37

## 2022-12-15 RX ADMIN — Medication 1200 MILLIGRAM(S): at 17:21

## 2022-12-15 RX ADMIN — Medication 10 MILLIGRAM(S): at 21:24

## 2022-12-15 RX ADMIN — Medication 650 MILLIGRAM(S): at 16:06

## 2022-12-15 RX ADMIN — ALBUTEROL 2 PUFF(S): 90 AEROSOL, METERED ORAL at 17:19

## 2022-12-15 RX ADMIN — Medication 6 MILLIGRAM(S): at 06:36

## 2022-12-15 RX ADMIN — CHLORHEXIDINE GLUCONATE 1 APPLICATION(S): 213 SOLUTION TOPICAL at 12:34

## 2022-12-15 RX ADMIN — BUDESONIDE AND FORMOTEROL FUMARATE DIHYDRATE 2 PUFF(S): 160; 4.5 AEROSOL RESPIRATORY (INHALATION) at 21:27

## 2022-12-15 RX ADMIN — Medication 650 MILLIGRAM(S): at 22:10

## 2022-12-15 RX ADMIN — CEFTRIAXONE 1000 MILLIGRAM(S): 500 INJECTION, POWDER, FOR SOLUTION INTRAMUSCULAR; INTRAVENOUS at 16:06

## 2022-12-15 NOTE — PROCEDURE NOTE - ADDITIONAL PROCEDURE DETAILS
US Guided 20g IV placed in left upper arm. +flash, flushes easily. Patient tolerated procedure well with no immediate complications. Tourniquet removed, all sharps disposed of appropriately.

## 2022-12-15 NOTE — PROGRESS NOTE ADULT - ASSESSMENT
82F never smoker with hx of asthma, Afib on coumadin, HTN, DM, HLD, recent covid 19 diagnosis referred to the ED by PCP due to progressively worsening dyspnea in the setting of known covid with worsening hypoxia and renal failure     Acute hypoxic respiratory failure in the setting of covid PNA w/ volume overload in setting of renal failure/pulm HTN  weaned off supplemental O2   c/w decadron to complete 10 days   unable to receive remdesivir given renal failure   c/w symbicort   c/w albuterol prn  chest PT/pulmonary toilet   caution with sedating medications   low threshold to start empiric abx but currently procal not significantly elevated edwin given renal failure and has remained afebrile with improving respiratory status    INR remains supratherapeutic with LE duplex negative for DVT and now on dose adjusted Eliquis   aspiration precautions   volume being optimized with dialysis per HD   bilateral small effusions in the setting of ESRD/hypoalbuminemia    BP control   PT/OOB as tolerated   pt will need repeat CT chest for f/u RLL nodule  poor prognosis; palliative care following   pt should f/u with pulm outpatient     d/w  at bedside     pulm will sign off; please call back with any questions/concerns

## 2022-12-15 NOTE — PROVIDER CONTACT NOTE (OTHER) - REASON
hypertension
hypertension
pain and htn
repeat bp
htn
htn
hypertension
pain
pt now AFib on monitor. pt was originally SR on monitor before HD.

## 2022-12-15 NOTE — PROGRESS NOTE ADULT - SUBJECTIVE AND OBJECTIVE BOX
PULMONARY CONSULT NOTE    IFEOMA TRINIDAD  MRN-[id number]    Patient is a 82y old  Female who presents with a chief complaint of THERESA (11 Dec 2022 12:24)      HISTORY OF PRESENT ILLNESS:  82F never smoker with hx of asthma, Afib on coumadin, HTN, DM, HLD, recent covid 19 diagnosis referred to the ED by PCP due to progressively worsening dyspnea with associated LE edema, cough and congestion. She was reportedly compliant with her medications at home. She underwent LE duplex notable for right superficial femoral vein thrombosis with follow-up that was reportedly negative. She was found to have THERESA on CKD and developed hypoxia requiring 2L NC during hospital course. CT chest showed some slight opacities in the RUL and RLL, lower lobe bronchiectasis and small bilateral effusions. She received 1u pRBC 12/7. Has had interval worsening renal function/acidosis and is planned to initiate HD.     Interval hx:  Pt completed 2 sessions of HD with removal of 2L of fluids thus far. Pt was weaned off nasal cannula and tolerating with sats in the 90s. Pt reports she feels good. Denies any shortness of breath. Denies cough. Denies chest pain. Afebrile, hemodynamically stable. CT Abd/pelvis showed small bilateral effusions w/ consolidations and 0.7cm RLL nodule and emphysematous cystitis. Seen by urology with no acute surgical intervention warranted and ceftriaxone started.      MEDICATIONS  (STANDING):  acetaminophen     Tablet .. 650 milliGRAM(s) Oral every 8 hours  albuterol    90 MICROgram(s) HFA Inhaler 2 Puff(s) Inhalation every 4 hours  apixaban 2.5 milliGRAM(s) Oral two times a day  budesonide  80 MICROgram(s)/formoterol 4.5 MICROgram(s) Inhaler 2 Puff(s) Inhalation two times a day  cefTRIAXone Injectable. 1000 milliGRAM(s) IV Push every 24 hours  chlorhexidine 4% Liquid 1 Application(s) Topical <User Schedule>  cholecalciferol 1000 Unit(s) Oral daily  dexAMETHasone  Injectable 6 milliGRAM(s) IV Push daily  dextrose 5%. 1000 milliLiter(s) (50 mL/Hr) IV Continuous <Continuous>  dextrose 5%. 1000 milliLiter(s) (100 mL/Hr) IV Continuous <Continuous>  dextrose 50% Injectable 25 Gram(s) IV Push once  dextrose 50% Injectable 12.5 Gram(s) IV Push once  dextrose 50% Injectable 25 Gram(s) IV Push once  diltiazem    milliGRAM(s) Oral daily  epoetin susie-epbx (RETACRIT) Injectable 01329 Unit(s) SubCutaneous <User Schedule>  escitalopram 10 milliGRAM(s) Oral daily  glucagon  Injectable 1 milliGRAM(s) IntraMuscular once  guaiFENesin ER 1200 milliGRAM(s) Oral every 12 hours  hydrALAZINE 25 milliGRAM(s) Oral every 8 hours  insulin glargine Injectable (LANTUS) 10 Unit(s) SubCutaneous at bedtime  insulin lispro (ADMELOG) corrective regimen sliding scale   SubCutaneous three times a day before meals  iron sucrose IVPB 100 milliGRAM(s) IV Intermittent every 24 hours  metoprolol tartrate 50 milliGRAM(s) Oral two times a day  pantoprazole    Tablet 40 milliGRAM(s) Oral before breakfast    MEDICATIONS  (PRN):  dextrose Oral Gel 15 Gram(s) Oral once PRN Blood Glucose LESS THAN 70 milliGRAM(s)/deciliter  hydrALAZINE Injectable 10 milliGRAM(s) IV Push every 6 hours PRN SBP > 160  LORazepam     Tablet 0.5 milliGRAM(s) Oral every 8 hours PRN Anxiety  melatonin 1 milliGRAM(s) Oral at bedtime PRN Sleep  sodium chloride 0.9% lock flush 10 milliLiter(s) IV Push every 1 hour PRN Pre/post blood products, medications, blood draw, and to maintain line patency    Allergies  No Known Allergies  Intolerances    PAST MEDICAL & SURGICAL HISTORY:  Asthma  Diabetes mellitus  Hypertension  Emphysema  Hyperlipemia  S/P appendectomy  S/P tubal ligation  S/P knee surgery  left    FAMILY HISTORY: N/c    SOCIAL HISTORY  Smoking History: denies    REVIEW OF SYSTEMS: negative except as per HPI    Vital Signs Last 24 Hrs  T(C): 37.4 (15 Dec 2022 16:14), Max: 37.4 (15 Dec 2022 16:14)  T(F): 99.4 (15 Dec 2022 16:14), Max: 99.4 (15 Dec 2022 16:14)  HR: 68 (15 Dec 2022 16:14) (62 - 73)  BP: 190/65 (15 Dec 2022 16:14) (141/55 - 190/65)  BP(mean): --  RR: 18 (15 Dec 2022 16:14) (16 - 18)  SpO2: 93% (15 Dec 2022 16:14) (93% - 96%)    Parameters below as of 15 Dec 2022 16:14  Patient On (Oxygen Delivery Method): room air    PHYSICAL EXAMINATION:    GENERAL: awake, alert, in NAD    HEENT:  NC/AT, anicteric, MMM    NECK: Supple    LUNGS: diminished bilaterally bases, no wheezing    HEART:  S1S2, RRR     ABDOMEN: Soft, nontender, and nondistended    EXTREMITIES: Without any cyanosis, clubbing, rash; improved bilateral LE edema    NEUROLOGIC: No focal deficits       LABS:                                                    8.0    15.72 )-----------( 393      ( 15 Dec 2022 07:08 )             25.5       12-15    136  |  101  |  35.0<H>  ----------------------------<  122<H>  4.0   |  24.0  |  2.95<H>    Ca    7.5<L>      15 Dec 2022 07:08             RADIOLOGY & ADDITIONAL STUDIES:      ACC: [id number] EXAM:  CT CHEST                          PROCEDURE DATE:  12/07/2022          INTERPRETATION:  EXAMINATION: CT CHEST    CLINICAL INDICATION: Dyspnea.    TECHNIQUE: Noncontrast CT of the chest was obtained.    COMPARISON: 4/12/2021.    FINDINGS:    AIRWAYS AND LUNGS: Mild tracheobronchial secretions.  Redemonstrated   bilaterally lobe bronchiectasis. Small bilateral pleural effusions with   partial atelectasis of both lower lobes. Subtle patchy right upper lobe   opacities. Ill-defined right lower lobe nodular opacity is new from the   prior study.    MEDIASTINUM AND PLEURA: There are no enlarged mediastinal, hilar or   axillary lymph nodes. The visualized portion of the thyroid gland is   unremarkable. There is no pneumothorax.    HEART AND VESSELS: There is cardiomegaly.  There are atherosclerotic   calcifications of the aorta and coronary arteries.  There is no   pericardial effusion.    UPPER ABDOMEN: Images of the upper abdomen demonstrate no abnormality.    BONES AND SOFT TISSUES: There are mild degenerative changes of the spine.    The soft tissues are unremarkable.    AI VERTEBRAL BODY ANALYSIS:  T11: Moderate compression fracture with 35% height loss and low bone   density of 72 HU, suspicious for osteoporosis.This is unchanged from the   prior study.  T12: low bone density of 84 HU, suspicious for osteoporosis.  L1: low bone density of 58 HU, suspicious for osteoporosis.    IMPRESSION:  Small bilateral pleural effusions with partial atelectasis of both lower   lobes. Subtle patchy right upper lobe opacities, possibly   infectious/inflammatory. Ill-defined right lower lobe nodular opacity is   indeterminate. Three-month follow-up chest CT recommended for complete   evaluation.    VERTEBRAL BODY ANALYSIS: Vertebral compression fractures as described,   consider further workup for osteoporosis.      URMILA CAMPOVERDE MD; Attending Radiologist  This document has been electronically signed. Dec  7 2022  8:13PM      ACC: [id number] EXAM:  US DPLX UPR EXT VEINS LTD LT                          PROCEDURE DATE:  12/09/2022          INTERPRETATION:  CLINICAL INFORMATION: Left arm swelling    COMPARISON: None available.    TECHNIQUE: Duplex sonography of the LEFT UPPER extremity veins with color   and spectral Doppler, with and without compression.    FINDINGS:    The left internal jugular, subclavian, axillary and brachial veins are   patent and compressible where applicable.  The basilic and cephalic veins   (superficial veins) are patent and without thrombus.    Doppler examination shows normal spontaneous and phasic flow.    IMPRESSION:  No evidence of left upper extremity deep venous thrombosis.        RADHA HUSAIN MD; Attending Radiologist  This document has been electronically signed. Dec  9 2022  4:12PM    CT Abd/pelvis:     FINDINGS:  LOWER CHEST: Small bilateral pleural effusions with associated consolidation and air bronchograms/bronchiectasis. 0.7 cm nodule in the right lower lobe.    LIVER: Hypertrophy of the caudate, similar in appearance to prior.  BILE DUCTS: Normal caliber.  GALLBLADDER: Within normal limits.  SPLEEN: Within normal limits.  PANCREAS: Within normal limits.  ADRENALS: Within normal limits.  KIDNEYS/URETERS: Within normal limits.    BLADDER: Area within the bladder lumen and within the bladder wall.  REPRODUCTIVE ORGANS: Large calcified fibroid    BOWEL: Small hiatal hernia. No bowel obstruction. Appendix is not visualized. No evidence of inflammation in the pericecal region.  PERITONEUM: Small abdominal and pelvic.  VESSELS: Atherosclerotic changes. Right-sided femoral venous catheter terminates in the IVC.  RETROPERITONEUM/LYMPH NODES: No lymphadenopathy.  ABDOMINAL WALL: Diffuse anasarca, most pronounced in the right flank/abdominal wall and right thigh.  BONES: Degenerative changes. Mild to moderate compression deformity of L4. Mild anterolisthesis of L4 on L5.  AI VERTEBRAL BODY ANALYSIS:  T10: Severe compression fracture with 41% height loss.  T11: low bone density of 87 HU, suspicious for osteoporosis.  T12: low bone density of 68 HU, suspicious for osteoporosis.  L1: low bone density of 64 HU, suspicious for osteoporosis.  L2: low bone density of 77 HU, suspicious for osteoporosis.    IMPRESSION:  Small bilateral pleural effusions with associated consolidation with air bronchograms/bronchiectasis. Findings may represent atelectasis, however superimposed pneumonia cannot be excluded.        ECHO:    Summary:   1. Normal global left ventricular systolic function.   2. Left ventricular ejection fraction, by visual estimation, is 70 to 75%.   3. Mildly increased LV wall thickness.   4. Normal left ventricular internal cavity size.   5. Normal right ventricular size and function.   6. The left atrium is normal in size.   7. The right atrium is normal in size.   8. Lipomatous hypertrophy of the intra-atrial septum.   9. Moderate mitral annular calcification.  10. Mild mitral valve regurgitation.  11. Elevated mitral valve mean gradient    5 mmHg at HR 99 BPM.  12. No aortic valve stenosis.  13. Estimated pulmonary artery systolic pressure is 63.7 mmHg assuming a right atrial pressure of 8 mmHg, which is consistent with severe pulmonary hypertension.  14. Recommend clinical correlation with the above findings.    Ewa Urbina MD Electronically signed on 4/13/2021 at 6:34:53 PM

## 2022-12-15 NOTE — PROGRESS NOTE ADULT - SUBJECTIVE AND OBJECTIVE BOX
HOSPITALIST PROGRESS NOTE    IFEOMA TRINIDAD  [id number]  82yFemale    Patient is a 82y old  Female who presents with a chief complaint of THERESA (14 Dec 2022 14:48)      SUBJECTIVE:   Chart reviewed since last visit.  Patient seen and examined at bedside for CHF, THERESA, respiratory failure  Weaned off O2; feels slightly better  Weak      OBJECTIVE:  Vital Signs Last 24 Hrs  T(C): 37 (15 Dec 2022 12:05), Max: 37.3 (15 Dec 2022 00:04)  T(F): 98.6 (15 Dec 2022 12:05), Max: 99.1 (15 Dec 2022 00:04)  HR: 62 (15 Dec 2022 12:05) (62 - 75)  BP: 145/58 (15 Dec 2022 12:05) (141/55 - 180/62)   RR: 16 (15 Dec 2022 12:05) (16 - 18)  SpO2: 96% (15 Dec 2022 12:05) (96% - 100%)    Parameters below as of 15 Dec 2022 12:05  Patient On (Oxygen Delivery Method): room air    PHYSICAL EXAMINATION  General: Elderly female lying in bed, Appears chronically ill and fatigued. Spouse at bedside  HEENT:  Periorbital edema improved, interval absence of Oxygen  NECK:  improved neck veins  CVS: regular rate and rhythm S1 S2, Pedal edema  RESP: Accessory muscle usage.  Poor effort with decreased sounds at bases  GI:  Soft without any tenderness. BS+  : No suprapubic tenderness  MSK:  Moves all extremities  CNS:  Awake, oriented x3. Generalized weakness  INTEG:  Anasarca. Right femoral HD catheter  PSYCH: fatigued      MONITOR:  CAPILLARY BLOOD GLUCOSE      POCT Blood Glucose.: 185 mg/dL (15 Dec 2022 12:24)  POCT Blood Glucose.: 105 mg/dL (15 Dec 2022 08:04)  POCT Blood Glucose.: 245 mg/dL (14 Dec 2022 21:46)  POCT Blood Glucose.: 139 mg/dL (14 Dec 2022 16:18)        I&O's Summary    14 Dec 2022 07:01  -  15 Dec 2022 07:00  --------------------------------------------------------  IN: 0 mL / OUT: 1000 mL / NET: -1000 mL    15 Dec 2022 07:01  -  15 Dec 2022 15:30  --------------------------------------------------------  IN: 116 mL / OUT: 0 mL / NET: 116 mL                            8.0    15.72 )-----------( 393      ( 15 Dec 2022 07:08 )             25.5     PT/INR - ( 15 Dec 2022 07:08 )   PT: 12.5 sec;   INR: 1.08 ratio           12-15    136  |  101  |  35.0<H>  ----------------------------<  122<H>  4.0   |  24.0  |  2.95<H>    Ca    7.5<L>      15 Dec 2022 07:08              Culture:    TTE:    RADIOLOGY        MEDICATIONS  (STANDING):  acetaminophen     Tablet .. 650 milliGRAM(s) Oral every 8 hours  albuterol    90 MICROgram(s) HFA Inhaler 2 Puff(s) Inhalation every 4 hours  budesonide  80 MICROgram(s)/formoterol 4.5 MICROgram(s) Inhaler 2 Puff(s) Inhalation two times a day  cefTRIAXone Injectable. 1000 milliGRAM(s) IV Push every 24 hours  chlorhexidine 4% Liquid 1 Application(s) Topical <User Schedule>  cholecalciferol 1000 Unit(s) Oral daily  dexAMETHasone  Injectable 6 milliGRAM(s) IV Push daily  dextrose 5%. 1000 milliLiter(s) (100 mL/Hr) IV Continuous <Continuous>  dextrose 5%. 1000 milliLiter(s) (50 mL/Hr) IV Continuous <Continuous>  dextrose 50% Injectable 25 Gram(s) IV Push once  dextrose 50% Injectable 12.5 Gram(s) IV Push once  dextrose 50% Injectable 25 Gram(s) IV Push once  diltiazem    milliGRAM(s) Oral daily  epoetin susie-epbx (RETACRIT) Injectable 32543 Unit(s) SubCutaneous <User Schedule>  escitalopram 10 milliGRAM(s) Oral daily  glucagon  Injectable 1 milliGRAM(s) IntraMuscular once  guaiFENesin ER 1200 milliGRAM(s) Oral every 12 hours  hydrALAZINE 25 milliGRAM(s) Oral every 8 hours  insulin glargine Injectable (LANTUS) 10 Unit(s) SubCutaneous at bedtime  insulin lispro (ADMELOG) corrective regimen sliding scale   SubCutaneous three times a day before meals  iron sucrose IVPB 100 milliGRAM(s) IV Intermittent every 24 hours  metoprolol tartrate 50 milliGRAM(s) Oral two times a day  pantoprazole    Tablet 40 milliGRAM(s) Oral before breakfast      MEDICATIONS  (PRN):  dextrose Oral Gel 15 Gram(s) Oral once PRN Blood Glucose LESS THAN 70 milliGRAM(s)/deciliter  hydrALAZINE Injectable 10 milliGRAM(s) IV Push every 6 hours PRN SBP > 160  LORazepam     Tablet 0.5 milliGRAM(s) Oral every 8 hours PRN Anxiety  melatonin 1 milliGRAM(s) Oral at bedtime PRN Sleep  sodium chloride 0.9% lock flush 10 milliLiter(s) IV Push every 1 hour PRN Pre/post blood products, medications, blood draw, and to maintain line patency

## 2022-12-15 NOTE — PROGRESS NOTE ADULT - ASSESSMENT
82 year old female with acute hypoxic respiratory failure , acute kidney injury, and covid 19. 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 Dominguez  Patient on Dialysis at this time, noted to be tolerating   Call placed to patient's son, Jack, who confirmed that him and his father Tc can meet with Palliative care and Dr. Dominguez tomorrow at 11am to further discuss Goals of care  According to Family Health Care Decisions Act (FHCDA) surrogate decision making in the event patient does not have capacity to make it herself would default to her  Tc unless there is an official HCP form on file designating a specific person for this role.   Patient is eligible for evaluation by hospice if family opts to discontinue dialysis and if it's within their goals of care - but at this time plan is to continue to medically optimize and see how patient tolerates HD at [facility]  Will see tomorrow how the family and patient feel regarding current course and goals for ongoing care  Code status: Remains Full Code    Surrogate/HCP/Guardian: Phone#:Tc- (spouse) [phone] (primary)  JACK [phone]-(SON)- support person- [phone]    Total Time Spent___35_ minutes  This includes chart review, patient assessment, discussion and collaboration with interdisciplinary team members, ACP planning    Thank you for the opportunity to assist with the care of this patient.   [facility] Palliative Medicine Consult Service [phone].

## 2022-12-15 NOTE — PROVIDER CONTACT NOTE (OTHER) - ACTION/TREATMENT ORDERED:
RN told to monitor and to call provider if HR sustains over 120.
provider aware
provider aware - ok to administer prn
provider aware - stated to administer iv hydralazine and repeat bp 1 hour after
stated to administer po hydralazine and not the PRN
provider stated ok to administer morning meds and prn hydralazine
ok to administer po hydralazine
provider aware - stated ok to administer PRN with hydralazine
provider stated ok to administer prn hydralazine with what is ordered for pain

## 2022-12-15 NOTE — PROGRESS NOTE ADULT - SUBJECTIVE AND OBJECTIVE BOX
NEPHROLOGY INTERVAL HPI/OVERNIGHT EVENTS:    Examined  Sitting up in bed  eating lunch w  assistance   Feeling better  Denies HA CP no SOB  COVID +    MEDICATIONS  (STANDING):  acetaminophen     Tablet .. 650 milliGRAM(s) Oral every 8 hours  albuterol    90 MICROgram(s) HFA Inhaler 2 Puff(s) Inhalation every 4 hours  budesonide  80 MICROgram(s)/formoterol 4.5 MICROgram(s) Inhaler 2 Puff(s) Inhalation two times a day  cefTRIAXone Injectable. 1000 milliGRAM(s) IV Push every 24 hours  chlorhexidine 4% Liquid 1 Application(s) Topical <User Schedule>  cholecalciferol 1000 Unit(s) Oral daily  dexAMETHasone  Injectable 6 milliGRAM(s) IV Push daily  dextrose 5%. 1000 milliLiter(s) (100 mL/Hr) IV Continuous <Continuous>  dextrose 5%. 1000 milliLiter(s) (50 mL/Hr) IV Continuous <Continuous>  dextrose 50% Injectable 25 Gram(s) IV Push once  dextrose 50% Injectable 12.5 Gram(s) IV Push once  dextrose 50% Injectable 25 Gram(s) IV Push once  diltiazem    milliGRAM(s) Oral daily  epoetin susie-epbx (RETACRIT) Injectable 95522 Unit(s) SubCutaneous <User Schedule>  escitalopram 10 milliGRAM(s) Oral daily  glucagon  Injectable 1 milliGRAM(s) IntraMuscular once  guaiFENesin ER 1200 milliGRAM(s) Oral every 12 hours  hydrALAZINE 25 milliGRAM(s) Oral every 8 hours  insulin glargine Injectable (LANTUS) 10 Unit(s) SubCutaneous at bedtime  insulin lispro (ADMELOG) corrective regimen sliding scale   SubCutaneous three times a day before meals  iron sucrose IVPB 100 milliGRAM(s) IV Intermittent every 24 hours  metoprolol tartrate 50 milliGRAM(s) Oral two times a day  pantoprazole    Tablet 40 milliGRAM(s) Oral before breakfast    MEDICATIONS  (PRN):  dextrose Oral Gel 15 Gram(s) Oral once PRN Blood Glucose LESS THAN 70 milliGRAM(s)/deciliter  hydrALAZINE Injectable 10 milliGRAM(s) IV Push every 6 hours PRN SBP > 160  LORazepam     Tablet 0.5 milliGRAM(s) Oral every 8 hours PRN Anxiety  melatonin 1 milliGRAM(s) Oral at bedtime PRN Sleep  sodium chloride 0.9% lock flush 10 milliLiter(s) IV Push every 1 hour PRN Pre/post blood products, medications, blood draw, and to maintain line patency      Allergies    No Known Allergies    Intolerances        Vital Signs Last 24 Hrs  T(C): 37 (15 Dec 2022 12:05), Max: 37.3 (15 Dec 2022 00:04)  T(F): 98.6 (15 Dec 2022 12:05), Max: 99.1 (15 Dec 2022 00:04)  HR: 62 (15 Dec 2022 12:05) (62 - 75)  BP: 145/58 (15 Dec 2022 12:05) (141/55 - 180/62)  BP(mean): --  RR: 16 (15 Dec 2022 12:05) (16 - 18)  SpO2: 96% (15 Dec 2022 12:05) (96% - 100%)    Parameters below as of 15 Dec 2022 12:05  Patient On (Oxygen Delivery Method): room air      Daily     Daily Weight in k.4 (15 Dec 2022 04:34)    PHYSICAL EXAM:  GENERAL: Debilitated  HEENT:  Facial edema  NECK: Supple, No JVD  NERVOUS SYSTEM:  Awake, alert  EXTREMITIES:  + dependent edema  SKIN: No rashes  Further exam deferred to limit COVID exposure    LABS:                        8.0    15.72 )-----------( 393      ( 15 Dec 2022 07:08 )             25.5     12-15    136  |  101  |  35.0<H>  ----------------------------<  122<H>  4.0   |  24.0  |  2.95<H>    Ca    7.5<L>      15 Dec 2022 07:08      PT/INR - ( 15 Dec 2022 07:08 )   PT: 12.5 sec;   INR: 1.08 ratio                     RADIOLOGY & ADDITIONAL TESTS:

## 2022-12-15 NOTE — PROGRESS NOTE ADULT - SUBJECTIVE AND OBJECTIVE BOX
Palliative Care Followup    OVERNIGHT EVENTS: no acute overnight events - plan for family meeting tomorrow for further GOC    Present Symptoms:   Due to the nature of this patient's COVID-19 isolation status, no bedside physical exam was done to limit spread of infection. Examination highlights were provided by bedside nurse documentation. Objective data were reviewed in detail. Discussed case with primary team.    MEDICATIONS  (STANDING):  acetaminophen     Tablet .. 650 milliGRAM(s) Oral every 8 hours  albuterol    90 MICROgram(s) HFA Inhaler 2 Puff(s) Inhalation every 4 hours  budesonide  80 MICROgram(s)/formoterol 4.5 MICROgram(s) Inhaler 2 Puff(s) Inhalation two times a day  cefTRIAXone Injectable. 1000 milliGRAM(s) IV Push every 24 hours  chlorhexidine 4% Liquid 1 Application(s) Topical <User Schedule>  cholecalciferol 1000 Unit(s) Oral daily  dexAMETHasone  Injectable 6 milliGRAM(s) IV Push daily  dextrose 5%. 1000 milliLiter(s) (100 mL/Hr) IV Continuous <Continuous>  dextrose 5%. 1000 milliLiter(s) (50 mL/Hr) IV Continuous <Continuous>  dextrose 50% Injectable 25 Gram(s) IV Push once  dextrose 50% Injectable 12.5 Gram(s) IV Push once  dextrose 50% Injectable 25 Gram(s) IV Push once  diltiazem    milliGRAM(s) Oral daily  epoetin susie-epbx (RETACRIT) Injectable 04976 Unit(s) SubCutaneous <User Schedule>  escitalopram 10 milliGRAM(s) Oral daily  glucagon  Injectable 1 milliGRAM(s) IntraMuscular once  guaiFENesin ER 1200 milliGRAM(s) Oral every 12 hours  hydrALAZINE 25 milliGRAM(s) Oral every 8 hours  insulin glargine Injectable (LANTUS) 10 Unit(s) SubCutaneous at bedtime  insulin lispro (ADMELOG) corrective regimen sliding scale   SubCutaneous three times a day before meals  iron sucrose IVPB 100 milliGRAM(s) IV Intermittent every 24 hours  metoprolol tartrate 50 milliGRAM(s) Oral two times a day  pantoprazole    Tablet 40 milliGRAM(s) Oral before breakfast    MEDICATIONS  (PRN):  dextrose Oral Gel 15 Gram(s) Oral once PRN Blood Glucose LESS THAN 70 milliGRAM(s)/deciliter  hydrALAZINE Injectable 10 milliGRAM(s) IV Push every 6 hours PRN SBP > 160  LORazepam     Tablet 0.5 milliGRAM(s) Oral every 8 hours PRN Anxiety  melatonin 1 milliGRAM(s) Oral at bedtime PRN Sleep  sodium chloride 0.9% lock flush 10 milliLiter(s) IV Push every 1 hour PRN Pre/post blood products, medications, blood draw, and to maintain line patency      PHYSICAL EXAM:    Vital Signs Last 24 Hrs  T(C): 37.1 (15 Dec 2022 04:34), Max: 37.3 (15 Dec 2022 00:04)  T(F): 98.7 (15 Dec 2022 04:34), Max: 99.1 (15 Dec 2022 00:04)  HR: 73 (15 Dec 2022 04:34) (67 - 75)  BP: 141/55 (15 Dec 2022 04:34) (141/55 - 186/71)  BP(mean): --  RR: 18 (15 Dec 2022 04:34) (18 - 18)  SpO2: 96% (15 Dec 2022 04:34) (96% - 100%)    Parameters below as of 15 Dec 2022 04:34  Patient On (Oxygen Delivery Method): room air    Due to the nature of this patient's COVID-19 isolation status, no bedside physical exam was done to limit spread of infection. Examination highlights were provided by bedside nurse documentation. Objective data were reviewed in detail. Discussed case with primary team.    LABS:                          8.0    15.72 )-----------( 393      ( 15 Dec 2022 07:08 )             25.5     12-15    136  |  101  |  35.0<H>  ----------------------------<  122<H>  4.0   |  24.0  |  2.95<H>    Ca    7.5<L>      15 Dec 2022 07:08    PT/INR - ( 15 Dec 2022 07:08 )   PT: 12.5 sec;   INR: 1.08 ratio      I&O's Summary    14 Dec 2022 07:01  -  15 Dec 2022 07:00  --------------------------------------------------------  IN: 0 mL / OUT: 1000 mL / NET: -1000 mL    RADIOLOGY & ADDITIONAL STUDIES:  ECHO:    Summary:   1. Normal global left ventricular systolic function.   2. Left ventricular ejection fraction, by visual estimation, is 70 to 75%.   3. Mildly increased LV wall thickness.   4. Normal left ventricular internal cavity size.   5. Normal right ventricular size and function.   6. The left atrium is normal in size.   7. The right atrium is normal in size.   8. Lipomatous hypertrophy of the intra-atrial septum.   9. Moderate mitral annular calcification.  10. Mild mitral valve regurgitation.  11. Elevated mitral valve mean gradient    5 mmHg at HR 99 BPM.  12. No aortic valve stenosis.  13. Estimated pulmonary artery systolic pressure is 63.7 mmHg assuming a right atrial pressure of 8 mmHg, which is consistent with severe pulmonary hypertension.  14. Recommend clinical correlation with the above findings.    CT abd 12.15.22  IMPRESSION:  Small bilateral pleural effusions with associated consolidation with air   bronchograms/bronchiectasis. Findings may represent atelectasis, however   superimposed pneumonia cannot be excluded.    Emphysematous cystitis.    Above findings were discussed with Dr. Dominguez on 12/15/2022 10:30 AM.    There is a 0.7 cm nodule in the right lower lobe. 3-6 month follow-up   chest CT is recommended.    Multiple additional findings as above.    VERTEBRAL BODY ANALYSIS: Vertebral compression fractures as described,   consider further workup for osteoporosis.    ADVANCE DIRECTIVES/TREATMENT PREFERENCES:  DNR YES NO  Completed on:                     MOLST  YES NO   Completed on:  Living Will  YES NO   Completed on: Palliative Care Followup    OVERNIGHT EVENTS: no acute overnight events - plan for family meeting tomorrow for further GOC    Present Symptoms:   Due to the nature of this patient's COVID-19 isolation status, no bedside physical exam was done to limit spread of infection. Examination highlights were provided by bedside nurse documentation. Objective data were reviewed in detail. Discussed case with primary team.    MEDICATIONS  (STANDING):  acetaminophen     Tablet .. 650 milliGRAM(s) Oral every 8 hours  albuterol    90 MICROgram(s) HFA Inhaler 2 Puff(s) Inhalation every 4 hours  budesonide  80 MICROgram(s)/formoterol 4.5 MICROgram(s) Inhaler 2 Puff(s) Inhalation two times a day  cefTRIAXone Injectable. 1000 milliGRAM(s) IV Push every 24 hours  chlorhexidine 4% Liquid 1 Application(s) Topical <User Schedule>  cholecalciferol 1000 Unit(s) Oral daily  dexAMETHasone  Injectable 6 milliGRAM(s) IV Push daily  dextrose 5%. 1000 milliLiter(s) (100 mL/Hr) IV Continuous <Continuous>  dextrose 5%. 1000 milliLiter(s) (50 mL/Hr) IV Continuous <Continuous>  dextrose 50% Injectable 25 Gram(s) IV Push once  dextrose 50% Injectable 12.5 Gram(s) IV Push once  dextrose 50% Injectable 25 Gram(s) IV Push once  diltiazem    milliGRAM(s) Oral daily  epoetin susie-epbx (RETACRIT) Injectable 31806 Unit(s) SubCutaneous <User Schedule>  escitalopram 10 milliGRAM(s) Oral daily  glucagon  Injectable 1 milliGRAM(s) IntraMuscular once  guaiFENesin ER 1200 milliGRAM(s) Oral every 12 hours  hydrALAZINE 25 milliGRAM(s) Oral every 8 hours  insulin glargine Injectable (LANTUS) 10 Unit(s) SubCutaneous at bedtime  insulin lispro (ADMELOG) corrective regimen sliding scale   SubCutaneous three times a day before meals  iron sucrose IVPB 100 milliGRAM(s) IV Intermittent every 24 hours  metoprolol tartrate 50 milliGRAM(s) Oral two times a day  pantoprazole    Tablet 40 milliGRAM(s) Oral before breakfast    MEDICATIONS  (PRN):  dextrose Oral Gel 15 Gram(s) Oral once PRN Blood Glucose LESS THAN 70 milliGRAM(s)/deciliter  hydrALAZINE Injectable 10 milliGRAM(s) IV Push every 6 hours PRN SBP > 160  LORazepam     Tablet 0.5 milliGRAM(s) Oral every 8 hours PRN Anxiety  melatonin 1 milliGRAM(s) Oral at bedtime PRN Sleep  sodium chloride 0.9% lock flush 10 milliLiter(s) IV Push every 1 hour PRN Pre/post blood products, medications, blood draw, and to maintain line patency      PHYSICAL EXAM:    Vital Signs Last 24 Hrs  T(C): 37.1 (15 Dec 2022 04:34), Max: 37.3 (15 Dec 2022 00:04)  T(F): 98.7 (15 Dec 2022 04:34), Max: 99.1 (15 Dec 2022 00:04)  HR: 73 (15 Dec 2022 04:34) (67 - 75)  BP: 141/55 (15 Dec 2022 04:34) (141/55 - 186/71)  BP(mean): --  RR: 18 (15 Dec 2022 04:34) (18 - 18)  SpO2: 96% (15 Dec 2022 04:34) (96% - 100%)    Parameters below as of 15 Dec 2022 04:34  Patient On (Oxygen Delivery Method): room air    Due to the nature of this patient's COVID-19 isolation status, no bedside physical exam was done to limit spread of infection. Examination highlights were provided by bedside nurse documentation. Objective data were reviewed in detail. Discussed case with primary team.    LABS:                          8.0    15.72 )-----------( 393      ( 15 Dec 2022 07:08 )             25.5     12-15    136  |  101  |  35.0<H>  ----------------------------<  122<H>  4.0   |  24.0  |  2.95<H>    Ca    7.5<L>      15 Dec 2022 07:08    PT/INR - ( 15 Dec 2022 07:08 )   PT: 12.5 sec;   INR: 1.08 ratio      I&O's Summary    14 Dec 2022 07:01  -  15 Dec 2022 07:00  --------------------------------------------------------  IN: 0 mL / OUT: 1000 mL / NET: -1000 mL    RADIOLOGY & ADDITIONAL STUDIES:  ECHO:    Summary:   1. Normal global left ventricular systolic function.   2. Left ventricular ejection fraction, by visual estimation, is 70 to 75%.   3. Mildly increased LV wall thickness.   4. Normal left ventricular internal cavity size.   5. Normal right ventricular size and function.   6. The left atrium is normal in size.   7. The right atrium is normal in size.   8. Lipomatous hypertrophy of the intra-atrial septum.   9. Moderate mitral annular calcification.  10. Mild mitral valve regurgitation.  11. Elevated mitral valve mean gradient    5 mmHg at HR 99 BPM.  12. No aortic valve stenosis.  13. Estimated pulmonary artery systolic pressure is 63.7 mmHg assuming a right atrial pressure of 8 mmHg, which is consistent with severe pulmonary hypertension.  14. Recommend clinical correlation with the above findings.    CT abd 12.15.22  IMPRESSION:  Small bilateral pleural effusions with associated consolidation with air   bronchograms/bronchiectasis. Findings may represent atelectasis, however   superimposed pneumonia cannot be excluded.    Emphysematous cystitis.    Above findings were discussed with Dr. Dominguez on 12/15/2022 10:30 AM.    There is a 0.7 cm nodule in the right lower lobe. 3-6 month follow-up   chest CT is recommended.    Multiple additional findings as above.    VERTEBRAL BODY ANALYSIS: Vertebral compression fractures as described,   consider further workup for osteoporosis.    ADVANCE DIRECTIVES/TREATMENT PREFERENCES:  Full Code

## 2022-12-15 NOTE — CONSULT NOTE ADULT - ASSESSMENT
81 yo female pt with multiple medical issues, covid +, THERESA, emphysematous cystitis  - cont ceftriaxone  - f/u UA, Urine cx  - recommend leigh to keep bladder decompressed x 1 week  - trend leukocytosis  - HD as per nephrology  - maintain glycemic control  - no  intervention required at this time

## 2022-12-15 NOTE — PROGRESS NOTE ADULT - ASSESSMENT
CKD(IV): +COVID  Clinically volume overloaded at admit--> better w UF removal     Nephrotic syndrome 3gm proteinuria  THERESA worsened post IV diuretics  Volume status no better despite escalation of diuretics--> HDstarted  Now s/p HD # 1- 12/13--> tolerated ok--> 2nd HD 12/14---> will hold today   Cont to watch for sign of renal recovery   Re-evaluate in AM for HD needs    Hyperkalemia- resolved w HD  Metabolic Acidosis - resolved w HD  - Bumex on hold  - will stop--->  NaHCO3 and Lokelma    Emphysematous cystitis seen on CT on abx    Unclear if will need long term dialysis or if pt and family would agree to this if needed  Family meeting to discuss GOC     Anemia: +CKD; Tsat 36%  - cont DILAN  - target Hgb > 10    Will follow

## 2022-12-15 NOTE — PROGRESS NOTE ADULT - ASSESSMENT
82F with PMH asthma, diabetes, hyperlipidemia, atrial fibrillation, hypertension, and hyperlipidemia, who was found to have a right superficial femoral vein thrombosis while on warfarin and acute kidney injury. She was also noted to have been recently diagnosed with COVID-19.    Acute on Chronic Diastolic Heart failure  Resistant to diuresis, HD initiated 12/13/22 via Right femoral access  TTE with intact LVF however has grade II diastolic dysfunction and moderate Pulmonary HTN  - Strict I/O, daily weight, Fluid restriction  - Continue Metoprolol and Hydralazine   - HD for fluid removal x 2 consecutive days    #THERESA on CKD IV  Improvement in renal indices with HD  Associated Metabolic acidosis, hyperkalemia  HD initiated 12/13/22 via Right femoral access   renal- chronic medical disease  - Strict I/O, daily weight  - Avoid Nephrotoxins  - Bicarbonate and Lokelma  - Hold HD today    Acute Hypoxic respiratory Failure - Multifactorial. Asthma, COVID-19, CHFpEF, Fluid overload.  Resolved  Started on HD 12/13/22  TTE with intact LVF however has grade II diastolic dysfunction and moderate Pulmonary HTN  - Supplemental O2, wean as tolerated  - Albuterol MDI q4 ATC, Add Budesonide/Formoterol    #Anemia  Stable Hgb  Multifactorial - given kidney disease  Received PRBC x 1  - Monitor Hgb  - IV Iron    Type 2 Diabetes on insulin  Fair glycemic control currently  A1c 11.2  - BGM with SSI  - Glargine increased to 10U as starting On Dexamethasone    #Atrial Fibrillation  Rate controlled, INR returned to normal  - Metoprolol, Diltiazem  - Start low dose Apixaban    #COVID-19   Now with hypoxia - unclear if pneumonia, Asthma or heart failure  CT Chest with bilateral upper lobe opacities  - Started on Dexamethasone for Asthma; will taper once respiratoy status improved  - isolation precautions  - Will need repeat outpatient CT chest in 3 months for RLL nodule    #HTN-Essential  Improved  - monitor blood pressure  - Metoprolol to 50mg, continue Diltiazem at current rate  - Hydralazine 25mg q8    DVT ruled on on repeat imaging  Supratherapeutic INR normalized No bleeding  US Duplex- No evidence of deep venous thrombosis in either lower extremity.    PT  recommendation for [facility]  Advised RN to mobilize OOB  Incentive Spirometry    Prognosis - Guarded  CODE STATUS- FULL CODE.     Discussed with patient, spouse, RN Roxy, Nephrology Dr Whitaker and Palliative Care ДМИТРИЙ Miller    Currently awaiting response to HD; from conversations with family patient in similar situation 2 years ago requiring HD.  It appears they would not want long term HD  Family meeting on 12/16/22   82F with PMH asthma, diabetes, hyperlipidemia, atrial fibrillation, hypertension, and hyperlipidemia, who was found to have a right superficial femoral vein thrombosis while on warfarin and acute kidney injury. She was also noted to have been recently diagnosed with COVID-19.    Acute on Chronic Diastolic Heart failure  Resistant to diuresis, HD initiated 12/13/22 via Right femoral access  TTE with intact LVF however has grade II diastolic dysfunction and moderate Pulmonary HTN  - Strict I/O, daily weight, Fluid restriction  - Continue Metoprolol and Hydralazine   - HD for fluid removal x 2 consecutive days    #THERESA on CKD IV  Improvement in renal indices with HD  Associated Metabolic acidosis, hyperkalemia  HD initiated 12/13/22 via Right femoral access   renal- chronic medical disease  - Strict I/O, daily weight  - Avoid Nephrotoxins  - Bicarbonate and Lokelma  - Hold HD today    Emphysematous Cystitis  - Check UA, Culture  - Start IV antibiotics  - Urology consulted    Acute Hypoxic respiratory Failure - Multifactorial. Asthma, COVID-19, CHFpEF, Fluid overload.  Resolved  Started on HD 12/13/22  TTE with intact LVF however has grade II diastolic dysfunction and moderate Pulmonary HTN  - Supplemental O2, wean as tolerated  - Albuterol MDI q4 ATC, Add Budesonide/Formoterol    #Anemia  Stable Hgb  Multifactorial - given kidney disease  Received PRBC x 1  - Monitor Hgb  - IV Iron    Type 2 Diabetes on insulin  Fair glycemic control currently  A1c 11.2  - BGM with SSI  - Glargine increased to 10U as starting On Dexamethasone    #Atrial Fibrillation  Rate controlled, INR returned to normal  - Metoprolol, Diltiazem  - Start low dose Apixaban    #COVID-19   Now with hypoxia - unclear if pneumonia, Asthma or heart failure  CT Chest with bilateral upper lobe opacities  - Started on Dexamethasone for Asthma; will taper once respiratoy status improved  - isolation precautions  - Will need repeat outpatient CT chest in 3 months for RLL nodule    #HTN-Essential  Improved  - monitor blood pressure  - Metoprolol to 50mg, continue Diltiazem at current rate  - Hydralazine 25mg q8    DVT ruled on on repeat imaging  Supratherapeutic INR normalized No bleeding  US Duplex- No evidence of deep venous thrombosis in either lower extremity.    PT  recommendation for [facility]  Advised RN to mobilize OOB  Incentive Spirometry    Prognosis - Guarded  CODE STATUS- FULL CODE.     Discussed with patient, spouse, RN Roxy, Nephrology Dr Whitaker and Palliative Care ДМИТРИЙ Miller    Currently awaiting response to HD; from conversations with family patient in similar situation 2 years ago requiring HD.  It appears they would not want long term HD  Family meeting on 12/16/22   82F with PMH asthma, diabetes, hyperlipidemia, atrial fibrillation, hypertension, and hyperlipidemia, who was found to have a right superficial femoral vein thrombosis while on warfarin and acute kidney injury. She was also noted to have been recently diagnosed with COVID-19.    Acute on Chronic Diastolic Heart failure  Resistant to diuresis, HD initiated 12/13/22 via Right femoral access  TTE with intact LVF however has grade II diastolic dysfunction and moderate Pulmonary HTN  - Strict I/O, daily weight, Fluid restriction  - Continue Metoprolol and Hydralazine   - HD for fluid removal x 2 consecutive days    #THERESA on CKD IV  Improvement in renal indices with HD  Associated Metabolic acidosis, hyperkalemia  HD initiated 12/13/22 via Right femoral access   renal- chronic medical disease  - Strict I/O, daily weight  - Avoid Nephrotoxins  - Bicarbonate and Lokelma  - Hold HD today    Emphysematous Cystitis  - Check UA, Culture  - Start IV antibiotics  - Urology consulted    Acute Hypoxic respiratory Failure - Multifactorial. Asthma, COVID-19, CHFpEF, Fluid overload.  Resolved  Started on HD 12/13/22  TTE with intact LVF however has grade II diastolic dysfunction and moderate Pulmonary HTN  - Supplemental O2, wean as tolerated  - Albuterol MDI q4 ATC, Add Budesonide/Formoterol    #Anemia  Stable Hgb  Multifactorial - given kidney disease  Received PRBC x 1  - Monitor Hgb  - IV Iron    Type 2 Diabetes on insulin  Fair glycemic control currently  A1c 11.2  - BGM with SSI  - Glargine increased to 10U as starting On Dexamethasone    #Atrial Fibrillation  Rate controlled, INR returned to normal  - Metoprolol, Diltiazem  - Start low dose Apixaban    #COVID-19   CT Chest with bilateral upper lobe opacities  - Started on Dexamethasone for Asthma; will taper once respiratoy status improved  - isolation precautions  - Will need repeat outpatient CT chest in 3 months for RLL nodule    #HTN-Essential  Improved  - monitor blood pressure  - Metoprolol to 50mg, continue Diltiazem at current rate  - Hydralazine 25mg q8    DVT ruled on on repeat imaging  Supratherapeutic INR normalized No bleeding  US Duplex- No evidence of deep venous thrombosis in either lower extremity.    PT  recommendation for [facility]  Advised RN to mobilize OOB  Incentive Spirometry    Prognosis - Guarded  CODE STATUS- FULL CODE.     Discussed with patient, spouse, RN Roxy, Nephrology Dr Whitaker and Palliative Care NP WoodsL    Currently awaiting response to HD; from conversations with family patient in similar situation 2 years ago requiring HD.  It appears they would not want long term HD  Family meeting on 12/16/22

## 2022-12-15 NOTE — PROVIDER CONTACT NOTE (OTHER) - DATE AND TIME:
15-Dec-2022 00:30
07-Dec-2022 21:40
08-Dec-2022 04:17
13-Dec-2022 20:49
14-Dec-2022 21:35
15-Dec-2022 00:25
09-Dec-2022 05:05
15-Dec-2022 21:20
15-Dec-2022 22:52

## 2022-12-15 NOTE — CONSULT NOTE ADULT - SUBJECTIVE AND OBJECTIVE BOX
HPI:  82F who was referred to the hospital by her primary care physician. Additional information was provided by her son at the bedside. The patient was reported to have increased dyspnea and lower extremity edema for the past two weeks. She is on warfarin for anticoagulation and her sone reported that the INR values have been very variable over the past few months. At times the values are high but other times the values are low. She was also recently diagnosed with COVID-19 along with her . The patient was noted to have congestion and a productive cough. She is noted to have been compliant with her medications at home. Outpatient testing was notable for a right superficial femoral vein thrombosis. The patient was without history of thrombosis in the past. The patient's son reported that she has had poor appetite an oral intake over the past two weeks. On presentation to the emergency department, the patient as noted to have acute kidney injury.   (06 Dec 2022 08:44)    Urology: called to see pt for emphysematous cystitis seen on CT scan.  Pt resting relatively comfortably.  Pt states she has some pain in her abdomen, +dysuria, no hematuria.     PAST MEDICAL & SURGICAL HISTORY:  Asthma      Diabetes mellitus      Hypertension      Emphysema      Hyperlipemia      S/P appendectomy      S/P tubal ligation      S/P knee surgery  left          acetaminophen     Tablet .. 650 milliGRAM(s) Oral every 8 hours  albuterol    90 MICROgram(s) HFA Inhaler 2 Puff(s) Inhalation every 4 hours  budesonide  80 MICROgram(s)/formoterol 4.5 MICROgram(s) Inhaler 2 Puff(s) Inhalation two times a day  cefTRIAXone Injectable. 1000 milliGRAM(s) IV Push every 24 hours  chlorhexidine 4% Liquid 1 Application(s) Topical <User Schedule>  cholecalciferol 1000 Unit(s) Oral daily  dexAMETHasone  Injectable 6 milliGRAM(s) IV Push daily  dextrose 5%. 1000 milliLiter(s) IV Continuous <Continuous>  dextrose 5%. 1000 milliLiter(s) IV Continuous <Continuous>  dextrose 50% Injectable 25 Gram(s) IV Push once  dextrose 50% Injectable 12.5 Gram(s) IV Push once  dextrose 50% Injectable 25 Gram(s) IV Push once  dextrose Oral Gel 15 Gram(s) Oral once PRN  diltiazem    milliGRAM(s) Oral daily  epoetin susie-epbx (RETACRIT) Injectable 27071 Unit(s) SubCutaneous <User Schedule>  escitalopram 10 milliGRAM(s) Oral daily  glucagon  Injectable 1 milliGRAM(s) IntraMuscular once  guaiFENesin ER 1200 milliGRAM(s) Oral every 12 hours  hydrALAZINE 25 milliGRAM(s) Oral every 8 hours  hydrALAZINE Injectable 10 milliGRAM(s) IV Push every 6 hours PRN  insulin glargine Injectable (LANTUS) 10 Unit(s) SubCutaneous at bedtime  insulin lispro (ADMELOG) corrective regimen sliding scale   SubCutaneous three times a day before meals  iron sucrose IVPB 100 milliGRAM(s) IV Intermittent every 24 hours  LORazepam     Tablet 0.5 milliGRAM(s) Oral every 8 hours PRN  melatonin 1 milliGRAM(s) Oral at bedtime PRN  metoprolol tartrate 50 milliGRAM(s) Oral two times a day  pantoprazole    Tablet 40 milliGRAM(s) Oral before breakfast  sodium chloride 0.9% lock flush 10 milliLiter(s) IV Push every 1 hour PRN      No Known Allergies      T(C): 37.1 (12-15-22 @ 04:34), Max: 37.3 (12-15-22 @ 00:04)  HR: 73 (12-15-22 @ 04:34) (67 - 75)  BP: 141/55 (12-15-22 @ 04:34) (141/55 - 186/71)  RR: 18 (12-15-22 @ 04:34) (18 - 18)  SpO2: 96% (12-15-22 @ 04:34) (96% - 100%)      12-14-22 @ 07:01  -  12-15-22 @ 07:00  --------------------------------------------------------  IN: 0 mL / OUT: 1000 mL / NET: -1000 mL                              8.0    15.72 )-----------( 393      ( 15 Dec 2022 07:08 )             25.5       12-15    136  |  101  |  35.0<H>  ----------------------------<  122<H>  4.0   |  24.0  |  2.95<H>    Ca    7.5<L>      15 Dec 2022 07:08      Radiology: < from: CT Abdomen and Pelvis No Cont (12.15.22 @ 10:03) >    ACC: [id number] EXAM:  CT ABDOMEN AND PELVIS                          PROCEDURE DATE:  12/15/2022          INTERPRETATION:  CLINICAL INFORMATION: Abdominal pain    COMPARISON: CT chest dated 4/26/2013.    CONTRAST/COMPLICATIONS:  IV Contrast: NONE  Oral Contrast: NONE  Complications: None reported at time of study completion    PROCEDURE:  CT of the Abdomen and Pelvis was performed.  Sagittal and coronal reformats were performed.    FINDINGS:  LOWER CHEST: Small bilateral pleural effusions with associated   consolidation and air bronchograms/bronchiectasis. 0.7 cm nodule in the   right lower lobe.    LIVER: Hypertrophy of the caudate, similar in appearance to prior.  BILE DUCTS: Normal caliber.  GALLBLADDER: Within normal limits.  SPLEEN: Within normal limits.  PANCREAS: Within normal limits.  ADRENALS: Within normal limits.  KIDNEYS/URETERS: Within normal limits.    BLADDER: Area within the bladder lumen and within the bladder wall.  REPRODUCTIVE ORGANS: Large calcified fibroid    BOWEL:Small hiatal hernia. No bowel obstruction. Appendix is not   visualized. No evidence of inflammation in the pericecal region.  PERITONEUM: Small abdominal and pelvic.  VESSELS: Atherosclerotic changes. Right-sided femoral venous catheter   terminatesin the IVC.  RETROPERITONEUM/LYMPH NODES: No lymphadenopathy.  ABDOMINAL WALL: Diffuse anasarca, most pronounced in the right   flank/abdominal wall and right thigh.  BONES: Degenerative changes. Mild to moderate compression deformity of   L4. Mild anterolisthesis of L4 on L5.  AI VERTEBRAL BODY ANALYSIS:  T10: Severe compression fracture with 41% height loss.  T11: low bone density of 87 HU, suspicious for osteoporosis.  T12: low bone density of 68 HU, suspicious for osteoporosis.  L1: low bone density of 64 HU, suspicious for osteoporosis.  L2: low bone density of 77 HU, suspicious for osteoporosis.    IMPRESSION:  Small bilateral pleural effusions with associated consolidation with air   bronchograms/bronchiectasis. Findings may represent atelectasis, however   superimposed pneumonia cannot be excluded.    Emphysematous cystitis.    Above findings were discussed with Dr. Dominguez on 12/15/2022 10:30 AM.    There is a 0.7 cm nodule in the right lower lobe. 3-6 month follow-up   chest CT is recommended.    Multiple additional findings as above.    VERTEBRAL BODY ANALYSIS: Vertebral compression fractures as described,   consider further workup for osteoporosis.    < end of copied text >

## 2022-12-16 DIAGNOSIS — I48.91 UNSPECIFIED ATRIAL FIBRILLATION: ICD-10-CM

## 2022-12-16 LAB
ANION GAP SERPL CALC-SCNC: 9 MMOL/L — SIGNIFICANT CHANGE UP (ref 5–17)
BUN SERPL-MCNC: 46.1 MG/DL — HIGH (ref 8–20)
CALCIUM SERPL-MCNC: 7.7 MG/DL — LOW (ref 8.4–10.5)
CHLORIDE SERPL-SCNC: 101 MMOL/L — SIGNIFICANT CHANGE UP (ref 96–108)
CO2 SERPL-SCNC: 23 MMOL/L — SIGNIFICANT CHANGE UP (ref 22–29)
CREAT SERPL-MCNC: 3.37 MG/DL — HIGH (ref 0.5–1.3)
EGFR: 13 ML/MIN/1.73M2 — LOW
GLUCOSE BLDC GLUCOMTR-MCNC: 169 MG/DL — HIGH (ref 70–99)
GLUCOSE BLDC GLUCOMTR-MCNC: 248 MG/DL — HIGH (ref 70–99)
GLUCOSE BLDC GLUCOMTR-MCNC: 255 MG/DL — HIGH (ref 70–99)
GLUCOSE BLDC GLUCOMTR-MCNC: 63 MG/DL — LOW (ref 70–99)
GLUCOSE SERPL-MCNC: 55 MG/DL — LOW (ref 70–99)
HCT VFR BLD CALC: 26.1 % — LOW (ref 34.5–45)
HGB BLD-MCNC: 7.9 G/DL — LOW (ref 11.5–15.5)
INR BLD: 1.11 RATIO — SIGNIFICANT CHANGE UP (ref 0.88–1.16)
MCHC RBC-ENTMCNC: 21.5 PG — LOW (ref 27–34)
MCHC RBC-ENTMCNC: 30.3 GM/DL — LOW (ref 32–36)
MCV RBC AUTO: 71.1 FL — LOW (ref 80–100)
NRBC # BLD: 7 /100 WBCS — HIGH (ref 0–0)
PLATELET # BLD AUTO: 319 K/UL — SIGNIFICANT CHANGE UP (ref 150–400)
POTASSIUM SERPL-MCNC: 4.4 MMOL/L — SIGNIFICANT CHANGE UP (ref 3.5–5.3)
POTASSIUM SERPL-SCNC: 4.4 MMOL/L — SIGNIFICANT CHANGE UP (ref 3.5–5.3)
PROTHROM AB SERPL-ACNC: 12.9 SEC — SIGNIFICANT CHANGE UP (ref 10.5–13.4)
RBC # BLD: 3.67 M/UL — LOW (ref 3.8–5.2)
RBC # FLD: 21.7 % — HIGH (ref 10.3–14.5)
SODIUM SERPL-SCNC: 133 MMOL/L — LOW (ref 135–145)
WBC # BLD: 15.62 K/UL — HIGH (ref 3.8–10.5)
WBC # FLD AUTO: 15.62 K/UL — HIGH (ref 3.8–10.5)

## 2022-12-16 PROCEDURE — 99232 SBSQ HOSP IP/OBS MODERATE 35: CPT | Mod: 25

## 2022-12-16 PROCEDURE — 99232 SBSQ HOSP IP/OBS MODERATE 35: CPT

## 2022-12-16 PROCEDURE — 99233 SBSQ HOSP IP/OBS HIGH 50: CPT

## 2022-12-16 RX ORDER — ESCITALOPRAM OXALATE 10 MG/1
20 TABLET, FILM COATED ORAL DAILY
Refills: 0 | Status: DISCONTINUED | OUTPATIENT
Start: 2022-12-16 | End: 2022-12-26

## 2022-12-16 RX ORDER — INSULIN GLARGINE 100 [IU]/ML
6 INJECTION, SOLUTION SUBCUTANEOUS AT BEDTIME
Refills: 0 | Status: DISCONTINUED | OUTPATIENT
Start: 2022-12-16 | End: 2022-12-18

## 2022-12-16 RX ORDER — INSULIN LISPRO 100/ML
3 VIAL (ML) SUBCUTANEOUS
Refills: 0 | Status: DISCONTINUED | OUTPATIENT
Start: 2022-12-16 | End: 2022-12-19

## 2022-12-16 RX ORDER — CHLORHEXIDINE GLUCONATE 213 G/1000ML
1 SOLUTION TOPICAL DAILY
Refills: 0 | Status: DISCONTINUED | OUTPATIENT
Start: 2022-12-16 | End: 2023-01-06

## 2022-12-16 RX ADMIN — BUDESONIDE AND FORMOTEROL FUMARATE DIHYDRATE 2 PUFF(S): 160; 4.5 AEROSOL RESPIRATORY (INHALATION) at 21:20

## 2022-12-16 RX ADMIN — Medication 6: at 12:20

## 2022-12-16 RX ADMIN — ESCITALOPRAM OXALATE 10 MILLIGRAM(S): 10 TABLET, FILM COATED ORAL at 12:18

## 2022-12-16 RX ADMIN — Medication 650 MILLIGRAM(S): at 06:22

## 2022-12-16 RX ADMIN — Medication 650 MILLIGRAM(S): at 05:47

## 2022-12-16 RX ADMIN — Medication 650 MILLIGRAM(S): at 14:30

## 2022-12-16 RX ADMIN — Medication 0.5 MILLIGRAM(S): at 21:17

## 2022-12-16 RX ADMIN — Medication 50 MILLIGRAM(S): at 05:47

## 2022-12-16 RX ADMIN — IRON SUCROSE 210 MILLIGRAM(S): 20 INJECTION, SOLUTION INTRAVENOUS at 16:19

## 2022-12-16 RX ADMIN — INSULIN GLARGINE 6 UNIT(S): 100 INJECTION, SOLUTION SUBCUTANEOUS at 21:19

## 2022-12-16 RX ADMIN — Medication 6 MILLIGRAM(S): at 05:47

## 2022-12-16 RX ADMIN — Medication 25 MILLIGRAM(S): at 05:47

## 2022-12-16 RX ADMIN — Medication 1200 MILLIGRAM(S): at 05:47

## 2022-12-16 RX ADMIN — CHLORHEXIDINE GLUCONATE 1 APPLICATION(S): 213 SOLUTION TOPICAL at 13:45

## 2022-12-16 RX ADMIN — Medication 650 MILLIGRAM(S): at 22:00

## 2022-12-16 RX ADMIN — Medication 3 UNIT(S): at 12:21

## 2022-12-16 RX ADMIN — Medication 25 MILLIGRAM(S): at 13:43

## 2022-12-16 RX ADMIN — Medication 240 MILLIGRAM(S): at 05:47

## 2022-12-16 RX ADMIN — Medication 650 MILLIGRAM(S): at 21:18

## 2022-12-16 RX ADMIN — ERYTHROPOIETIN 20000 UNIT(S): 10000 INJECTION, SOLUTION INTRAVENOUS; SUBCUTANEOUS at 21:34

## 2022-12-16 RX ADMIN — Medication 1000 UNIT(S): at 12:19

## 2022-12-16 RX ADMIN — Medication 25 MILLIGRAM(S): at 21:18

## 2022-12-16 RX ADMIN — Medication 650 MILLIGRAM(S): at 13:39

## 2022-12-16 RX ADMIN — CHLORHEXIDINE GLUCONATE 1 APPLICATION(S): 213 SOLUTION TOPICAL at 05:46

## 2022-12-16 RX ADMIN — ALBUTEROL 2 PUFF(S): 90 AEROSOL, METERED ORAL at 21:20

## 2022-12-16 RX ADMIN — APIXABAN 2.5 MILLIGRAM(S): 2.5 TABLET, FILM COATED ORAL at 05:47

## 2022-12-16 RX ADMIN — PANTOPRAZOLE SODIUM 40 MILLIGRAM(S): 20 TABLET, DELAYED RELEASE ORAL at 05:47

## 2022-12-16 NOTE — PROGRESS NOTE ADULT - PROBLEM SELECTOR PROBLEM 7
Hypertension, uncontrolled

## 2022-12-16 NOTE — PROGRESS NOTE ADULT - PROBLEM SELECTOR PROBLEM 4
Type 2 diabetes mellitus with hyperglycemia

## 2022-12-16 NOTE — PROGRESS NOTE ADULT - SUBJECTIVE AND OBJECTIVE BOX
HOSPITALIST PROGRESS NOTE    IFEOMA TRINIDAD  [id number]  82yFemale    Patient is a 82y old  Female who presents with a chief complaint of acute hypoxic respiratory failure , acute kidney injury, and covid 19 (16 Dec 2022 12:02)      SUBJECTIVE:   Chart reviewed since last visit.  Patient seen and examined at bedside for respiratory failure, progressive renal failure, emphysematous Cystitis, CHFpEF  Feels dyspneic today. Denies any chest pain, palpitations  Denies any nausea, vomiting, abdominal pain      OBJECTIVE:  Vital Signs Last 24 Hrs  T(C): 36.4 (16 Dec 2022 10:05), Max: 37.4 (15 Dec 2022 16:14)  T(F): 97.6 (16 Dec 2022 10:05), Max: 99.4 (15 Dec 2022 16:14)  HR: 60 (16 Dec 2022 10:05) (60 - 79)  BP: 122/57 (16 Dec 2022 10:05) (122/57 - 216/81)   RR: 16 (16 Dec 2022 10:05) (16 - 18)  SpO2: 96% (16 Dec 2022 10:05) (93% - 98%)    Parameters below as of 16 Dec 2022 10:05  Patient On (Oxygen Delivery Method): nasal cannula      PHYSICAL EXAMINATION  General: Elderly female lying in bed, Appears chronically ill and fatigued. Spouse at bedside  HEENT:  Periorbital edema worsening, back on 2LNC  NECK:  improved neck veins  CVS: regular rate and rhythm S1 S2, Pedal edema  RESP: Accessory muscle usage.  Poor effort with decreased sounds at bases  GI:  Soft without any tenderness. BS+  : No suprapubic tenderness  MSK:  Moves all extremities  CNS:  Awake, oriented x3. Generalized weakness  INTEG:  Anasarca. Right femoral HD catheter  PSYCH: fatigued      MONITOR:  CAPILLARY BLOOD GLUCOSE      POCT Blood Glucose.: 255 mg/dL (16 Dec 2022 12:17)  POCT Blood Glucose.: 63 mg/dL (16 Dec 2022 07:56)  POCT Blood Glucose.: 202 mg/dL (15 Dec 2022 21:22)  POCT Blood Glucose.: 214 mg/dL (15 Dec 2022 16:25)        I&O's Summary    15 Dec 2022 07:01  -  16 Dec 2022 07:00  --------------------------------------------------------  IN: 241 mL / OUT: 0 mL / NET: 241 mL                            7.9    15.62 )-----------( 319      ( 16 Dec 2022 06:48 )             26.1     PT/INR - ( 16 Dec 2022 06:48 )   PT: 12.9 sec;   INR: 1.11 ratio           -    133<L>  |  101  |  46.1<H>  ----------------------------<  55<L>  4.4   |  23.0  |  3.37<H>    Ca    7.7<L>      16 Dec 2022 06:48          Urinalysis Basic - ( 15 Dec 2022 16:20 )    Color: Yellow / Appearance: Slightly Turbid / S.010 / pH: x  Gluc: x / Ketone: Trace  / Bili: Negative / Urobili: Negative mg/dL   Blood: x / Protein: 300 mg/dL mg/dL / Nitrite: Negative   Leuk Esterase: Moderate / RBC: >50 /HPF / WBC >50 /HPF   Sq Epi: x / Non Sq Epi: Occasional / Bacteria: Few        Culture:    TTE:    RADIOLOGY        MEDICATIONS  (STANDING):  acetaminophen     Tablet .. 650 milliGRAM(s) Oral every 8 hours  albuterol    90 MICROgram(s) HFA Inhaler 2 Puff(s) Inhalation every 4 hours  apixaban 2.5 milliGRAM(s) Oral two times a day  budesonide  80 MICROgram(s)/formoterol 4.5 MICROgram(s) Inhaler 2 Puff(s) Inhalation two times a day  cefTRIAXone Injectable. 1000 milliGRAM(s) IV Push every 24 hours  chlorhexidine 2% Cloths 1 Application(s) Topical daily  cholecalciferol 1000 Unit(s) Oral daily  dexAMETHasone  Injectable 6 milliGRAM(s) IV Push daily  dextrose 5%. 1000 milliLiter(s) (50 mL/Hr) IV Continuous <Continuous>  dextrose 5%. 1000 milliLiter(s) (100 mL/Hr) IV Continuous <Continuous>  dextrose 50% Injectable 25 Gram(s) IV Push once  dextrose 50% Injectable 25 Gram(s) IV Push once  dextrose 50% Injectable 12.5 Gram(s) IV Push once  diltiazem    milliGRAM(s) Oral daily  epoetin susie-epbx (RETACRIT) Injectable 00890 Unit(s) SubCutaneous <User Schedule>  escitalopram 20 milliGRAM(s) Oral daily  glucagon  Injectable 1 milliGRAM(s) IntraMuscular once  guaiFENesin ER 1200 milliGRAM(s) Oral every 12 hours  hydrALAZINE 25 milliGRAM(s) Oral every 8 hours  insulin glargine Injectable (LANTUS) 6 Unit(s) SubCutaneous at bedtime  insulin lispro (ADMELOG) corrective regimen sliding scale   SubCutaneous three times a day before meals  insulin lispro Injectable (ADMELOG) 3 Unit(s) SubCutaneous three times a day before meals  iron sucrose IVPB 100 milliGRAM(s) IV Intermittent every 24 hours  metoprolol tartrate 50 milliGRAM(s) Oral two times a day  pantoprazole    Tablet 40 milliGRAM(s) Oral before breakfast      MEDICATIONS  (PRN):  dextrose Oral Gel 15 Gram(s) Oral once PRN Blood Glucose LESS THAN 70 milliGRAM(s)/deciliter  hydrALAZINE Injectable 10 milliGRAM(s) IV Push every 6 hours PRN SBP > 160  LORazepam     Tablet 0.5 milliGRAM(s) Oral every 8 hours PRN Anxiety  melatonin 1 milliGRAM(s) Oral at bedtime PRN Sleep  sodium chloride 0.9% lock flush 10 milliLiter(s) IV Push every 1 hour PRN Pre/post blood products, medications, blood draw, and to maintain line patency

## 2022-12-16 NOTE — PROGRESS NOTE ADULT - SUBJECTIVE AND OBJECTIVE BOX
Palliative Care Followup    OVERNIGHT EVENTS: no acute overnight events - plan for family meeting today for further [facility]  Patients mentation today is poor - lethargic - falls to sleep mid conversation    Present Symptoms:   Due to the nature of this patient's COVID-19 isolation status, no bedside physical exam was done to limit spread of infection. Examination highlights were provided by bedside nurse documentation. Objective data were reviewed in detail. Discussed case with primary team.    MEDICATIONS  (STANDING):  acetaminophen     Tablet .. 650 milliGRAM(s) Oral every 8 hours  albuterol    90 MICROgram(s) HFA Inhaler 2 Puff(s) Inhalation every 4 hours  apixaban 2.5 milliGRAM(s) Oral two times a day  budesonide  80 MICROgram(s)/formoterol 4.5 MICROgram(s) Inhaler 2 Puff(s) Inhalation two times a day  cefTRIAXone Injectable. 1000 milliGRAM(s) IV Push every 24 hours  chlorhexidine 4% Liquid 1 Application(s) Topical <User Schedule>  cholecalciferol 1000 Unit(s) Oral daily  dexAMETHasone  Injectable 6 milliGRAM(s) IV Push daily  dextrose 5%. 1000 milliLiter(s) (100 mL/Hr) IV Continuous <Continuous>  dextrose 5%. 1000 milliLiter(s) (50 mL/Hr) IV Continuous <Continuous>  dextrose 50% Injectable 25 Gram(s) IV Push once  dextrose 50% Injectable 12.5 Gram(s) IV Push once  dextrose 50% Injectable 25 Gram(s) IV Push once  diltiazem    milliGRAM(s) Oral daily  epoetin susie-epbx (RETACRIT) Injectable 79585 Unit(s) SubCutaneous <User Schedule>  escitalopram 10 milliGRAM(s) Oral daily  glucagon  Injectable 1 milliGRAM(s) IntraMuscular once  guaiFENesin ER 1200 milliGRAM(s) Oral every 12 hours  hydrALAZINE 25 milliGRAM(s) Oral every 8 hours  insulin glargine Injectable (LANTUS) 6 Unit(s) SubCutaneous at bedtime  insulin lispro (ADMELOG) corrective regimen sliding scale   SubCutaneous three times a day before meals  insulin lispro Injectable (ADMELOG) 3 Unit(s) SubCutaneous three times a day before meals  iron sucrose IVPB 100 milliGRAM(s) IV Intermittent every 24 hours  metoprolol tartrate 50 milliGRAM(s) Oral two times a day  pantoprazole    Tablet 40 milliGRAM(s) Oral before breakfast    MEDICATIONS  (PRN):  dextrose Oral Gel 15 Gram(s) Oral once PRN Blood Glucose LESS THAN 70 milliGRAM(s)/deciliter  hydrALAZINE Injectable 10 milliGRAM(s) IV Push every 6 hours PRN SBP > 160  LORazepam     Tablet 0.5 milliGRAM(s) Oral every 8 hours PRN Anxiety  melatonin 1 milliGRAM(s) Oral at bedtime PRN Sleep  sodium chloride 0.9% lock flush 10 milliLiter(s) IV Push every 1 hour PRN Pre/post blood products, medications, blood draw, and to maintain line patency    PHYSICAL EXAM:    Vital Signs Last 24 Hrs  T(C): 36.9 (16 Dec 2022 05:43), Max: 37.4 (15 Dec 2022 16:14)  T(F): 98.5 (16 Dec 2022 05:43), Max: 99.4 (15 Dec 2022 16:14)  HR: 79 (16 Dec 2022 05:43) (62 - 79)  BP: 167/78 (16 Dec 2022 05:43) (145/58 - 216/81)  BP(mean): --  RR: 17 (16 Dec 2022 05:43) (16 - 18)  SpO2: 95% (16 Dec 2022 05:43) (93% - 98%)    Parameters below as of 16 Dec 2022 05:43  Patient On (Oxygen Delivery Method): nasal cannula  O2 Flow (L/min): 2    Due to the nature of this patient's COVID-19 isolation status, no bedside physical exam was done to limit spread of infection. Examination highlights were provided by bedside nurse documentation. Objective data were reviewed in detail. Discussed case with primary team.    LABS:  Complete Blood Count in AM (12.16.22 @ 06:48)    Nucleated RBC: 7 /100 WBCs    WBC Count: 15.62 K/uL    RBC Count: 3.67 M/uL    Hemoglobin: 7.9 g/dL    Hematocrit: 26.1 %    Mean Cell Volume: 71.1 fl    Mean Cell Hemoglobin: 21.5 pg    Mean Cell Hemoglobin Conc: 30.3 gm/dL    Red Cell Distrib Width: 21.7 %    Platelet Count - Automated: 319 K/uL    RADIOLOGY & ADDITIONAL STUDIES:  ECHO:    Summary:   1. Normal global left ventricular systolic function.   2. Left ventricular ejection fraction, by visual estimation, is 70 to 75%.   3. Mildly increased LV wall thickness.   4. Normal left ventricular internal cavity size.   5. Normal right ventricular size and function.   6. The left atrium is normal in size.   7. The right atrium is normal in size.   8. Lipomatous hypertrophy of the intra-atrial septum.   9. Moderate mitral annular calcification.  10. Mild mitral valve regurgitation.  11. Elevated mitral valve mean gradient    5 mmHg at HR 99 BPM.  12. No aortic valve stenosis.  13. Estimated pulmonary artery systolic pressure is 63.7 mmHg assuming a right atrial pressure of 8 mmHg, which is consistent with severe pulmonary hypertension.  14. Recommend clinical correlation with the above findings.    CT abd 12.15.22  IMPRESSION:  Small bilateral pleural effusions with associated consolidation with air   bronchograms/bronchiectasis. Findings may represent atelectasis, however   superimposed pneumonia cannot be excluded.    Emphysematous cystitis.    Above findings were discussed with Dr. Dominguez on 12/15/2022 10:30 AM.    There is a 0.7 cm nodule in the right lower lobe. 3-6 month follow-up   chest CT is recommended.    Multiple additional findings as above.    VERTEBRAL BODY ANALYSIS: Vertebral compression fractures as described,   consider further workup for osteoporosis.    ADVANCE DIRECTIVES/TREATMENT PREFERENCES:  Full Code   Palliative Care Followup    OVERNIGHT EVENTS: no acute overnight events - plan for family meeting today for further [facility]  Patients mentation today is poor - lethargic - falls to sleep mid conversation    Present Symptoms: Difficult  to obtain due to poor mentation/lethargy  Due to the nature of this patient's COVID-19 isolation status, no bedside physical exam was done to limit spread of infection. Examination highlights were provided by bedside nurse documentation. Objective data were reviewed in detail. Discussed case with primary team.    MEDICATIONS  (STANDING):  acetaminophen     Tablet .. 650 milliGRAM(s) Oral every 8 hours  albuterol    90 MICROgram(s) HFA Inhaler 2 Puff(s) Inhalation every 4 hours  apixaban 2.5 milliGRAM(s) Oral two times a day  budesonide  80 MICROgram(s)/formoterol 4.5 MICROgram(s) Inhaler 2 Puff(s) Inhalation two times a day  cefTRIAXone Injectable. 1000 milliGRAM(s) IV Push every 24 hours  chlorhexidine 4% Liquid 1 Application(s) Topical <User Schedule>  cholecalciferol 1000 Unit(s) Oral daily  dexAMETHasone  Injectable 6 milliGRAM(s) IV Push daily  dextrose 5%. 1000 milliLiter(s) (100 mL/Hr) IV Continuous <Continuous>  dextrose 5%. 1000 milliLiter(s) (50 mL/Hr) IV Continuous <Continuous>  dextrose 50% Injectable 25 Gram(s) IV Push once  dextrose 50% Injectable 12.5 Gram(s) IV Push once  dextrose 50% Injectable 25 Gram(s) IV Push once  diltiazem    milliGRAM(s) Oral daily  epoetin susie-epbx (RETACRIT) Injectable 47620 Unit(s) SubCutaneous <User Schedule>  escitalopram 10 milliGRAM(s) Oral daily  glucagon  Injectable 1 milliGRAM(s) IntraMuscular once  guaiFENesin ER 1200 milliGRAM(s) Oral every 12 hours  hydrALAZINE 25 milliGRAM(s) Oral every 8 hours  insulin glargine Injectable (LANTUS) 6 Unit(s) SubCutaneous at bedtime  insulin lispro (ADMELOG) corrective regimen sliding scale   SubCutaneous three times a day before meals  insulin lispro Injectable (ADMELOG) 3 Unit(s) SubCutaneous three times a day before meals  iron sucrose IVPB 100 milliGRAM(s) IV Intermittent every 24 hours  metoprolol tartrate 50 milliGRAM(s) Oral two times a day  pantoprazole    Tablet 40 milliGRAM(s) Oral before breakfast    MEDICATIONS  (PRN):  dextrose Oral Gel 15 Gram(s) Oral once PRN Blood Glucose LESS THAN 70 milliGRAM(s)/deciliter  hydrALAZINE Injectable 10 milliGRAM(s) IV Push every 6 hours PRN SBP > 160  LORazepam     Tablet 0.5 milliGRAM(s) Oral every 8 hours PRN Anxiety  melatonin 1 milliGRAM(s) Oral at bedtime PRN Sleep  sodium chloride 0.9% lock flush 10 milliLiter(s) IV Push every 1 hour PRN Pre/post blood products, medications, blood draw, and to maintain line patency    PHYSICAL EXAM:    General: lethargic - falls to sleep mid conversation     HEENT: normal     Lungs: comfortable - covid    CV: femeral thrombosis    GI: normal     : incontinent      MSK: weakness      Neuro: no focal deficits cognitive impairment    Skin: normal     Vital Signs Last 24 Hrs  T(C): 36.9 (16 Dec 2022 05:43), Max: 37.4 (15 Dec 2022 16:14)  T(F): 98.5 (16 Dec 2022 05:43), Max: 99.4 (15 Dec 2022 16:14)  HR: 79 (16 Dec 2022 05:43) (62 - 79)  BP: 167/78 (16 Dec 2022 05:43) (145/58 - 216/81)  BP(mean): --  RR: 17 (16 Dec 2022 05:43) (16 - 18)  SpO2: 95% (16 Dec 2022 05:43) (93% - 98%)    Parameters below as of 16 Dec 2022 05:43  Patient On (Oxygen Delivery Method): nasal cannula  O2 Flow (L/min): 2    Due to the nature of this patient's COVID-19 isolation status, no bedside physical exam was done to limit spread of infection. Examination highlights were provided by bedside nurse documentation. Objective data were reviewed in detail. Discussed case with primary team.    LABS:  Complete Blood Count in AM (12.16.22 @ 06:48)    Nucleated RBC: 7 /100 WBCs    WBC Count: 15.62 K/uL    RBC Count: 3.67 M/uL    Hemoglobin: 7.9 g/dL    Hematocrit: 26.1 %    Mean Cell Volume: 71.1 fl    Mean Cell Hemoglobin: 21.5 pg    Mean Cell Hemoglobin Conc: 30.3 gm/dL    Red Cell Distrib Width: 21.7 %    Platelet Count - Automated: 319 K/uL    RADIOLOGY & ADDITIONAL STUDIES:  ECHO:    Summary:   1. Normal global left ventricular systolic function.   2. Left ventricular ejection fraction, by visual estimation, is 70 to 75%.   3. Mildly increased LV wall thickness.   4. Normal left ventricular internal cavity size.   5. Normal right ventricular size and function.   6. The left atrium is normal in size.   7. The right atrium is normal in size.   8. Lipomatous hypertrophy of the intra-atrial septum.   9. Moderate mitral annular calcification.  10. Mild mitral valve regurgitation.  11. Elevated mitral valve mean gradient    5 mmHg at HR 99 BPM.  12. No aortic valve stenosis.  13. Estimated pulmonary artery systolic pressure is 63.7 mmHg assuming a right atrial pressure of 8 mmHg, which is consistent with severe pulmonary hypertension.  14. Recommend clinical correlation with the above findings.    CT abd 12.15.22  IMPRESSION:  Small bilateral pleural effusions with associated consolidation with air   bronchograms/bronchiectasis. Findings may represent atelectasis, however   superimposed pneumonia cannot be excluded.    Emphysematous cystitis.    Above findings were discussed with Dr. Dominguez on 12/15/2022 10:30 AM.    There is a 0.7 cm nodule in the right lower lobe. 3-6 month follow-up   chest CT is recommended.    Multiple additional findings as above.    VERTEBRAL BODY ANALYSIS: Vertebral compression fractures as described,   consider further workup for osteoporosis.    ADVANCE DIRECTIVES/TREATMENT PREFERENCES:  Full Code

## 2022-12-16 NOTE — PROGRESS NOTE ADULT - SUBJECTIVE AND OBJECTIVE BOX
NEPHROLOGY INTERVAL HPI/OVERNIGHT EVENTS:     at bedside  He says she is doing better with HD   Less edema , more alert  No  SOB or CP       MEDICATIONS  (STANDING):  acetaminophen     Tablet .. 650 milliGRAM(s) Oral every 8 hours  albuterol    90 MICROgram(s) HFA Inhaler 2 Puff(s) Inhalation every 4 hours  apixaban 2.5 milliGRAM(s) Oral two times a day  budesonide  80 MICROgram(s)/formoterol 4.5 MICROgram(s) Inhaler 2 Puff(s) Inhalation two times a day  cefTRIAXone Injectable. 1000 milliGRAM(s) IV Push every 24 hours  chlorhexidine 2% Cloths 1 Application(s) Topical daily  cholecalciferol 1000 Unit(s) Oral daily  dexAMETHasone  Injectable 6 milliGRAM(s) IV Push daily  dextrose 5%. 1000 milliLiter(s) (100 mL/Hr) IV Continuous <Continuous>  dextrose 5%. 1000 milliLiter(s) (50 mL/Hr) IV Continuous <Continuous>  dextrose 50% Injectable 25 Gram(s) IV Push once  dextrose 50% Injectable 12.5 Gram(s) IV Push once  dextrose 50% Injectable 25 Gram(s) IV Push once  diltiazem    milliGRAM(s) Oral daily  epoetin susie-epbx (RETACRIT) Injectable 24960 Unit(s) SubCutaneous <User Schedule>  escitalopram 10 milliGRAM(s) Oral daily  glucagon  Injectable 1 milliGRAM(s) IntraMuscular once  guaiFENesin ER 1200 milliGRAM(s) Oral every 12 hours  hydrALAZINE 25 milliGRAM(s) Oral every 8 hours  insulin glargine Injectable (LANTUS) 6 Unit(s) SubCutaneous at bedtime  insulin lispro (ADMELOG) corrective regimen sliding scale   SubCutaneous three times a day before meals  insulin lispro Injectable (ADMELOG) 3 Unit(s) SubCutaneous three times a day before meals  iron sucrose IVPB 100 milliGRAM(s) IV Intermittent every 24 hours  metoprolol tartrate 50 milliGRAM(s) Oral two times a day  pantoprazole    Tablet 40 milliGRAM(s) Oral before breakfast    MEDICATIONS  (PRN):  dextrose Oral Gel 15 Gram(s) Oral once PRN Blood Glucose LESS THAN 70 milliGRAM(s)/deciliter  hydrALAZINE Injectable 10 milliGRAM(s) IV Push every 6 hours PRN SBP > 160  LORazepam     Tablet 0.5 milliGRAM(s) Oral every 8 hours PRN Anxiety  melatonin 1 milliGRAM(s) Oral at bedtime PRN Sleep  sodium chloride 0.9% lock flush 10 milliLiter(s) IV Push every 1 hour PRN Pre/post blood products, medications, blood draw, and to maintain line patency      Allergies    No Known Allergies    Intolerances          Vital Signs Last 24 Hrs  T(C): 36.4 (16 Dec 2022 10:05), Max: 37.4 (15 Dec 2022 16:14)  T(F): 97.6 (16 Dec 2022 10:05), Max: 99.4 (15 Dec 2022 16:14)  HR: 60 (16 Dec 2022 10:05) (60 - 79)  BP: 122/57 (16 Dec 2022 10:05) (122/57 - 216/81)  BP(mean): --  RR: 16 (16 Dec 2022 10:05) (16 - 18)  SpO2: 96% (16 Dec 2022 10:05) (93% - 98%)    Parameters below as of 16 Dec 2022 10:05  Patient On (Oxygen Delivery Method): nasal cannula      Daily     Daily   I&O's Detail    15 Dec 2022 07:01  -  16 Dec 2022 07:00  --------------------------------------------------------  IN:    Oral Fluid: 241 mL  Total IN: 241 mL    OUT:  Total OUT: 0 mL    Total NET: 241 mL        I&O's Summary    15 Dec 2022 07:01  -  16 Dec 2022 07:00  --------------------------------------------------------  IN: 241 mL / OUT: 0 mL / NET: 241 mL      PHYSICAL EXAM:  GENERAL: Frail, debilitated  HEENT:  Facial edema  NECK: Supple, No JVD  Lungs - EAE / Clear   Abd - soft , nontender   EXTREMITIES:  + dependent edema better  . + R femoral ayse cath   SKIN: No rashes      LABS:                        7.9    15.62 )-----------( 319      ( 16 Dec 2022 06:48 )             26.1     12-16    133<L>  |  101  |  46.1<H>  ----------------------------<  55<L>  4.4   |  23.0  |  3.37<H>    Ca    7.7<L>      16 Dec 2022 06:48      PT/INR - ( 16 Dec 2022 06:48 )   PT: 12.9 sec;   INR: 1.11 ratio           Urinalysis Basic - ( 15 Dec 2022 16:20 )    Color: Yellow / Appearance: Slightly Turbid / S.010 / pH: x  Gluc: x / Ketone: Trace  / Bili: Negative / Urobili: Negative mg/dL   Blood: x / Protein: 300 mg/dL mg/dL / Nitrite: Negative   Leuk Esterase: Moderate / RBC: >50 /HPF / WBC >50 /HPF   Sq Epi: x / Non Sq Epi: Occasional / Bacteria: Few            < from: CT Abdomen and Pelvis No Cont (12.15.22 @ 10:03) >    ACC: [id number] EXAM:  CT ABDOMEN AND PELVIS                          PROCEDURE DATE:  12/15/2022          INTERPRETATION:  CLINICAL INFORMATION: Abdominal pain    COMPARISON: CT chest dated 2013.    CONTRAST/COMPLICATIONS:  IV Contrast: NONE  Oral Contrast: NONE  Complications: None reported at time of study completion    PROCEDURE:  CT of the Abdomen and Pelvis was performed.  Sagittal and coronal reformats were performed.    FINDINGS:  LOWER CHEST: Small bilateral pleural effusions with associated   consolidation and air bronchograms/bronchiectasis. 0.7 cm nodule in the   right lower lobe.    LIVER: Hypertrophy of the caudate, similar in appearance to prior.  BILE DUCTS: Normal caliber.  GALLBLADDER: Within normal limits.  SPLEEN: Within normal limits.  PANCREAS: Within normal limits.  ADRENALS: Within normal limits.  KIDNEYS/URETERS: Within normal limits.    BLADDER: Area within the bladder lumen and within the bladder wall.  REPRODUCTIVE ORGANS: Large calcified fibroid    BOWEL:Small hiatal hernia. No bowel obstruction. Appendix is not   visualized. No evidence of inflammation in the pericecal region.  PERITONEUM: Small abdominal and pelvic.  VESSELS: Atherosclerotic changes. Right-sided femoral venous catheter   terminatesin the IVC.  RETROPERITONEUM/LYMPH NODES: No lymphadenopathy.  ABDOMINAL WALL: Diffuse anasarca, most pronounced in the right   flank/abdominal wall and right thigh.  BONES: Degenerative changes. Mild to moderate compression deformity of   L4. Mild anterolisthesis of L4 on L5.  AI VERTEBRAL BODY ANALYSIS:  T10: Severe compression fracture with 41% height loss.  T11: low bone density of 87 HU, suspicious for osteoporosis.  T12: low bone density of 68 HU, suspicious for osteoporosis.  L1: low bone density of 64 HU, suspicious for osteoporosis.  L2: low bone density of 77 HU, suspicious for osteoporosis.    IMPRESSION:  Small bilateral pleural effusions with associated consolidation with air   bronchograms/bronchiectasis. Findings may represent atelectasis, however   superimposed pneumonia cannot be excluded.    Emphysematous cystitis.    Above findings were discussed with Dr. Dominguez on 12/15/2022 10:30 AM.    There is a 0.7 cm nodule in the right lower lobe. 3-6 month follow-up   chest CT is recommended.    Multiple additional findings as above.    VERTEBRAL BODY ANALYSIS: Vertebral compression fractures as described,   consider further workup for osteoporosis.        --- End of Report ---      < end of copied text >    < from: TTE Echo Complete w/ Contrast w/ Doppler (22 @ 12:11) >      Summary:   1. Mildly enlarged left atrium.   2. Normal wallmotion. Left ventricular ejection fraction, by visual   estimation, is 65 to 70%. Grade II diastolic dysfunction. Elevated mean   left atrial pressure.   3. The right atrium is normal in size.   4. Normal right ventricular size and function.   5. Mild mitral valve regurgitation.   6. Mild tricuspid regurgitation.   7. Estimated pulmonary artery systolic pressure is 50 mmHg - moderate   pulmonary hypertension.   8. There is no evidence of pericardial effusion.   9. Small right pleural effusion.    MD Leann, RPVI Electronically signed on 2022 at 4:00:46   PM      < end of copied text >  RADIOLOGY & ADDITIONAL TESTS:

## 2022-12-16 NOTE — PROGRESS NOTE ADULT - ASSESSMENT
82 year old female with acute hypoxic respiratory failure , acute kidney injury, and covid 19. 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)  -Avoid nephrotoxins  Hospice eligible if family wants to stop dialysis - discussed with family hospice services    Problem/Recommendation 2: Debility  Provide Assist in ADLS  Maintain safety, fall, aspiration precautions    Problem/Recommendation 3: Palliative Care Encounter  Met with family at bedside - spoke to Son Jessica,  Jr and son in law regarding options in the event dialysis is not a route they want to pursue- Social work marilee natarajan also present during this meeting   Answered all questions and concerns   Discussed continuation of dialysis with potential for discharge to a [facility]/SNF on dialysis vs home on dialysis - we also discussed alternative options in the realms of hospice support at home or inpatient depending on severity of symptoms when dialysis is stopped) Family receptive to information - At the end ofour conversation patient's  expressed that he would like to currently continue with dialysis in the hospital but that over the weekend he will speak with his adult children to see their input on his wife's care. The patient herself mentation waxes and wanes, she falls to sleep mid conversation, she is able to identify her  as the person to speak with and provides permission to speak with her son Jessica.  Updated Dr. Dominguez  -Palliative to continue to follow    Code status: Full Code   Surrogate/HCP/Guardian: Phone#:Tc- (spouse) [phone] (primary)  JESSICA [phone]-(SON)- support person- [phone]    Total Time Spent___45_ minutes  This includes chart review, patient assessment, discussion and collaboration with interdisciplinary team members, ACP planning    Thank you for the opportunity to assist with the care of this patient.   [facility] Palliative Medicine Consult Service [phone].

## 2022-12-16 NOTE — PROGRESS NOTE ADULT - SUBJECTIVE AND OBJECTIVE BOX
[facility]                                                         CARDIOLOGY AT [facility]                                                                  [address] [address]                                                         Telephone: [phone]. Fax:[phone]                                                                             PROGRESS NOTE    Reason for follow up: Asked to see for cardiac optimization for Perma cath  Persistent increasing left effusion on cxr      Review of symptoms:   Cardiac:  No chest pain. No dyspnea. No palpitations.  Respiratory: no cough. No dyspnea  Gastrointestinal: No diarrhea. No abdominal pain. No bleeding.   Neuro: No focal neuro complaints.      Vitals:  T(C): 36.4 (12-16-22 @ 10:05), Max: 37.4 (12-15-22 @ 16:14)  HR: 60 (12-16-22 @ 10:05) (60 - 79)  BP: 122/57 (12-16-22 @ 10:05) (122/57 - 216/81)  RR: 16 (12-16-22 @ 10:05) (16 - 18)  SpO2: 96% (12-16-22 @ 10:05) (93% - 98%)  Wt(kg): --  I&O's Summary    15 Dec 2022 07:01  -  16 Dec 2022 07:00  --------------------------------------------------------  IN: 241 mL / OUT: 0 mL / NET: 241 mL      Weight (kg): 58.2 (12-13 @ 10:32)    PHYSICAL EXAM:  Appearance: Comfortable. No acute distress  HEENT:  Atraumatic. Normocephalic.  Normal oral mucosa  Neurologic: A & O x 3, no gross focal deficits.  Cardiovascular: RRR S1 S2 regular  Respiratory: Decreased breath sounds in left lung  Gastrointestinal:  Soft, Non-tender, + BS  Lower Extremities: No edema  Psychiatry: Patient is calm. No agitation.   Skin: warm and dry.      CURRENT MEDICATIONS:  MEDICATIONS  (STANDING):  acetaminophen     Tablet .. 650 milliGRAM(s) Oral every 8 hours  albuterol    90 MICROgram(s) HFA Inhaler 2 Puff(s) Inhalation every 4 hours  apixaban 2.5 milliGRAM(s) Oral two times a day  budesonide  80 MICROgram(s)/formoterol 4.5 MICROgram(s) Inhaler 2 Puff(s) Inhalation two times a day  cefTRIAXone Injectable. 1000 milliGRAM(s) IV Push every 24 hours  chlorhexidine 2% Cloths 1 Application(s) Topical daily  cholecalciferol 1000 Unit(s) Oral daily  dexAMETHasone  Injectable 6 milliGRAM(s) IV Push daily  diltiazem    milliGRAM(s) Oral daily  epoetin susie-epbx (RETACRIT) Injectable 91559 Unit(s) SubCutaneous <User Schedule>  escitalopram 20 milliGRAM(s) Oral daily  glucagon  Injectable 1 milliGRAM(s) IntraMuscular once  guaiFENesin ER 1200 milliGRAM(s) Oral every 12 hours  hydrALAZINE 25 milliGRAM(s) Oral every 8 hours  insulin glargine Injectable (LANTUS) 6 Unit(s) SubCutaneous at bedtime  insulin lispro (ADMELOG) corrective regimen sliding scale   SubCutaneous three times a day before meals  insulin lispro Injectable (ADMELOG) 3 Unit(s) SubCutaneous three times a day before meals  iron sucrose IVPB 100 milliGRAM(s) IV Intermittent every 24 hours  metoprolol tartrate 50 milliGRAM(s) Oral two times a day  pantoprazole    Tablet 40 milliGRAM(s) Oral before breakfast      LABS:	 	                         7.9    15.62 )-----------( 319      ( 16 Dec 2022 06:48 )             26.1     12-16    133<L>  |  101  |  46.1<H>  ----------------------------<  55<L>  4.4   |  23.0  |  3.37<H>    Ca    7.7<L>      16 Dec 2022 06:48      < from: TTE Echo Complete w/ Contrast w/ Doppler (12.12.22 @ 12:11) >    Summary:   1. Mildly enlarged left atrium.   2. Normal wallmotion. Left ventricular ejection fraction, by visual   estimation, is 65 to 70%. Grade II diastolic dysfunction. Elevated mean   left atrial pressure.   3. The right atrium is normal in size.   4. Normal right ventricular size and function.   5. Mild mitral valve regurgitation.   6. Mild tricuspid regurgitation.   7. Estimated pulmonary artery systolic pressure is 50 mmHg - moderate   pulmonary hypertension.   8. There is no evidence of pericardial effusion.   9. Small right pleural effusio    	    < from: Cardiac Cath Lab - Adult (04.20.21 @ 09:51) >  Signed 04/26/2021 07:46:24  HEMODYNAMIC TABLES  Pressures:  NO PHASE  Pressures:  - HR: 99  Pressures:  - Rhythm:  Pressures:  -- Pulmonary Artery (S/D/M): 51/20/32  Pressures:  -- Pulmonary Capillary Wedge: 23/29/21  Pressures:  -- Right Atrium (a/v/M): 16/14/12  Pressures:  -- Right Ventricle (s/edp): 48/14/--  O2 Sats:  NO PHASE    EKG 12-6  NSR prwp v1-v2    Recent NST no ischmeia

## 2022-12-16 NOTE — PROGRESS NOTE ADULT - PROBLEM SELECTOR PLAN 2
would hold eliquis for 2 days and bridge with  heparin drip  continue Cardizem for rate control
Hx PAF sr, sb with brief periods of irregularity  C/w Cardizem  DOAC on DC.

## 2022-12-16 NOTE — PROGRESS NOTE ADULT - PROBLEM SELECTOR PROBLEM 2
Afib
Atrial fibrillation
Acute diastolic congestive heart failure

## 2022-12-16 NOTE — CONSULT NOTE ADULT - ASSESSMENT
Vascular re-consulted today for placement of PC.     Plan:  -Vein mapping completed  -LUE Extremity protected   -Cardiology consulted for clearance   -Patient planned for fistula creation and permacath placement on Wednesday afternoon  -PLEASE HOLD ELIQUIS AFTER MONDAY MORNING DOSE.   -Continue HD via Shiley   -Medically optimize patient

## 2022-12-16 NOTE — PROGRESS NOTE ADULT - ASSESSMENT
CKD(IV): +COVID  THERESA, progressive + fluid overload + hyperkalemia  Hx NS (3g) +DM, Baseline creat 5/21 was 2.9   Hyperkalemia and Metabolic Acidosis better with HD   No hydro , emphysematous cystitis on CT 12/15   ECHO 12/12 noted , reasonable   Hep B and C negative   Will set up for additional HD today    is in favor of continuation of HD if she is clinically improving (  x 62 years )   She will need an eventual permacath ( Eliquis will need to be held x 48 h )   See HD orders for today    Anemia: +CKD; Tsat 36%  - cont DILAN and Venofer   - target Hgb > 10

## 2022-12-16 NOTE — PROGRESS NOTE ADULT - ASSESSMENT
82F with PMH asthma, diabetes, hyperlipidemia, atrial fibrillation, hypertension, and hyperlipidemia, who was found to have a right superficial femoral vein thrombosis while on warfarin and acute kidney injury. She was also noted to have been recently diagnosed with COVID-19.    Acute on Chronic Diastolic Heart failure  Resistant to diuresis, HD initiated 12/13/22 via Right femoral access.  No HD on 12/15/22 with resultant hypoxia  TTE with intact LVF however has grade II diastolic dysfunction and moderate Pulmonary HTN  - Strict I/O, daily weight, Fluid restriction  - Continue Metoprolol and Hydralazine   - HD for fluid removal x 2 consecutive days  - CT chest for evaluation of effusion vs infiltrate  - cardiology follow up    #THERESA on CKD IV  HD initiated 12/13/22 via Right femoral access  Improvement in renal indices with HD  US renal- chronic medical disease  - Strict I/O, daily weight  - Avoid Nephrotoxins  - Bicarbonate and Lokelma  - Resume HD  - Vascular called for PermCath placement as patient, family want to continue HD     Emphysematous Cystitis  - IV antibiotics  - Follow urine culture  - Urology consult appreciated    Acute Hypoxic respiratory Failure - Multifactorial. Asthma, COVID-19, CHFpEF, Fluid overload.  Back on 2L as no HD yesterday  Started on HD 12/13/22  TTE with intact LVF however has grade II diastolic dysfunction and moderate Pulmonary HTN  - Supplemental O2, wean as tolerated  - Albuterol MDI q4 ATC, Add Budesonide/Formoterol    #Anemia  Stable Hgb  Multifactorial - given kidney disease  Received PRBC x 1  - Monitor Hgb  - IV Iron    Type 2 Diabetes on insulin  Fair glycemic control currently  A1c 11.2  - BGM with SSI  - Glargine 10U as  On Dexamethasone    #Atrial Fibrillation  Rate controlled, INR returned to normal  - Metoprolol, Diltiazem  - Start low dose Apixaban    #COVID-19   CT Chest with bilateral upper lobe opacities  - Started on Dexamethasone for Asthma; will taper once respiratory status improved  - isolation precautions  - Will need repeat outpatient CT chest in 3 months for RLL nodule    #HTN-Essential  Improved  - monitor blood pressure  - Metoprolol to 50mg, continue Diltiazem at current rate  - Hydralazine 25mg q8    DVT ruled on on repeat imaging  Supratherapeutic INR normalized No bleeding  US Duplex- No evidence of deep venous thrombosis in either lower extremity.    PT  recommendation for [facility]; Spouse wants to take home  Advised RN to mobilize OOB  Incentive Spirometry    Prognosis - Guarded  CODE STATUS- FULL CODE.     Discussed with patient, spouse, RN  Kayli, Nephrology Dr Kinney and Palliative Care NP Woods[facility]    Family (Spouse is main decision maker) wants HD, no restrictions, full code.

## 2022-12-16 NOTE — CONSULT NOTE ADULT - SUBJECTIVE AND OBJECTIVE BOX
Vascular Surgery Consult Progress Note    Patient is an 82yoF with PMH HTN, DM, asthma, CHF, HLD, A-fib (on coumadin) who presented to the ED with RLE swelling that began around Thanksgiving and new onset cough over the prior week. Of note, patient has been on Coumadin for approx 2 years, gets weekly INR, but for the past 2 months INR has either been sub- or supratherapeutic and they have been having difficulty managing it. Patient admitted for acute CHF exacerbation with fluid overload and COVID pneumonia. Vascular surgery consulted on 12/06 for placement of Shiley catheter for emergent hemodialysis. Patients INR has been supratherapeutic over the last week, awaiting normalization to place permacath. Vascular re-consulted today for placement of PC. Pt seen & examined at bedside. Pain controlled. No complaints. No F/C, N/V, CP/SOB.     Medications:  cefTRIAXone Injectable. 1000  apixaban 2.5  cefTRIAXone Injectable. 1000  diltiazem     hydrALAZINE 25  hydrALAZINE Injectable 10 PRN  metoprolol tartrate 50      Vital Signs Last 24 Hrs  T(C): 36.4 (16 Dec 2022 10:05), Max: 37.4 (15 Dec 2022 16:14)  T(F): 97.6 (16 Dec 2022 10:05), Max: 99.4 (15 Dec 2022 16:14)  HR: 60 (16 Dec 2022 10:05) (60 - 79)  BP: 122/57 (16 Dec 2022 10:05) (122/57 - 216/81)  BP(mean): --  RR: 16 (16 Dec 2022 10:05) (16 - 18)  SpO2: 96% (16 Dec 2022 10:05) (93% - 98%)    Parameters below as of 16 Dec 2022 10:05  Patient On (Oxygen Delivery Method): nasal cannula        I&O's Summary    15 Dec 2022 07:01  -  16 Dec 2022 07:00  --------------------------------------------------------  IN: 241 mL / OUT: 0 mL / NET: 241 mL        Physical Exam:  Patient appears comfortable. No acute distress  HEENT:  Atraumatic. Normocephalic.  Normal oral mucosa, PERRL, Neck is supple  Neurologic: A & O x 3, no focal deficits. EOMI.  Cardiovascular: normal rate and rhythm  Respiratory: respirations are even and non labored   Gastrointestinal:  Soft, Non-tender, non distended  Extremities: No edema, ANTONIO, warm and well perfused  Pulses: palpable radial pulses, palpable femoral pulses    LABS:                        7.9    15.62 )-----------( 319      ( 16 Dec 2022 06:48 )             26.1     12-16    133<L>  |  101  |  46.1<H>  ----------------------------<  55<L>  4.4   |  23.0  |  3.37<H>    Ca    7.7<L>      16 Dec 2022 06:48      PT/INR - ( 16 Dec 2022 06:48 )   PT: 12.9 sec;   INR: 1.11 ratio

## 2022-12-16 NOTE — PROGRESS NOTE ADULT - SUBJECTIVE AND OBJECTIVE BOX
INTERVAL EVENTS:  Follow up diabetes management    ROS: Denies pain    MEDICATIONS  (STANDING):  acetaminophen     Tablet .. 650 milliGRAM(s) Oral every 8 hours  albuterol    90 MICROgram(s) HFA Inhaler 2 Puff(s) Inhalation every 4 hours  apixaban 2.5 milliGRAM(s) Oral two times a day  budesonide  80 MICROgram(s)/formoterol 4.5 MICROgram(s) Inhaler 2 Puff(s) Inhalation two times a day  cefTRIAXone Injectable. 1000 milliGRAM(s) IV Push every 24 hours  chlorhexidine 2% Cloths 1 Application(s) Topical daily  cholecalciferol 1000 Unit(s) Oral daily  dexAMETHasone  Injectable 6 milliGRAM(s) IV Push daily  dextrose 5%. 1000 milliLiter(s) (50 mL/Hr) IV Continuous <Continuous>  dextrose 5%. 1000 milliLiter(s) (100 mL/Hr) IV Continuous <Continuous>  dextrose 50% Injectable 25 Gram(s) IV Push once  dextrose 50% Injectable 25 Gram(s) IV Push once  dextrose 50% Injectable 12.5 Gram(s) IV Push once  diltiazem    milliGRAM(s) Oral daily  epoetin susie-epbx (RETACRIT) Injectable 89698 Unit(s) SubCutaneous <User Schedule>  escitalopram 10 milliGRAM(s) Oral daily  glucagon  Injectable 1 milliGRAM(s) IntraMuscular once  guaiFENesin ER 1200 milliGRAM(s) Oral every 12 hours  hydrALAZINE 25 milliGRAM(s) Oral every 8 hours  insulin glargine Injectable (LANTUS) 6 Unit(s) SubCutaneous at bedtime  insulin lispro (ADMELOG) corrective regimen sliding scale   SubCutaneous three times a day before meals  insulin lispro Injectable (ADMELOG) 3 Unit(s) SubCutaneous three times a day before meals  iron sucrose IVPB 100 milliGRAM(s) IV Intermittent every 24 hours  metoprolol tartrate 50 milliGRAM(s) Oral two times a day  pantoprazole    Tablet 40 milliGRAM(s) Oral before breakfast    MEDICATIONS  (PRN):  dextrose Oral Gel 15 Gram(s) Oral once PRN Blood Glucose LESS THAN 70 milliGRAM(s)/deciliter  hydrALAZINE Injectable 10 milliGRAM(s) IV Push every 6 hours PRN SBP > 160  LORazepam     Tablet 0.5 milliGRAM(s) Oral every 8 hours PRN Anxiety  melatonin 1 milliGRAM(s) Oral at bedtime PRN Sleep  sodium chloride 0.9% lock flush 10 milliLiter(s) IV Push every 1 hour PRN Pre/post blood products, medications, blood draw, and to maintain line patency    Allergies  No Known Allergies    Vital Signs Last 24 Hrs  T(C): 36.4 (16 Dec 2022 10:05), Max: 37.4 (15 Dec 2022 16:14)  T(F): 97.6 (16 Dec 2022 10:05), Max: 99.4 (15 Dec 2022 16:14)  HR: 60 (16 Dec 2022 10:05) (60 - 79)  BP: 122/57 (16 Dec 2022 10:05) (122/57 - 216/81)  BP(mean): --  RR: 16 (16 Dec 2022 10:05) (16 - 18)  SpO2: 96% (16 Dec 2022 10:05) (93% - 98%)    Parameters below as of 16 Dec 2022 10:05  Patient On (Oxygen Delivery Method): nasal cannula      PHYSICAL EXAM:  General: No apparent distress  Neck: Supple, trachea midline, no thyromegaly  Respiratory: Lungs clear bilaterally, normal rate, effort  Cardiac: +S1, S2, no m/r/g  GI: +BS, soft, non tender, non distended  Extremities: LE edema  Neuro: Alert    LABS:                        7.9    15.62 )-----------( 319      ( 16 Dec 2022 06:48 )             26.1     12-16    133<L>  |  101  |  46.1<H>  ----------------------------<  55<L>  4.4   |  23.0  |  3.37<H>    Ca    7.7<L>      16 Dec 2022 06:48      Urinalysis Basic - ( 15 Dec 2022 16:20 )    Color: Yellow / Appearance: Slightly Turbid / S.010 / pH: x  Gluc: x / Ketone: Trace  / Bili: Negative / Urobili: Negative mg/dL   Blood: x / Protein: 300 mg/dL mg/dL / Nitrite: Negative   Leuk Esterase: Moderate / RBC: >50 /HPF / WBC >50 /HPF   Sq Epi: x / Non Sq Epi: Occasional / Bacteria: Few      POCT Blood Glucose.: 255 mg/dL (22 @ 12:17)  POCT Blood Glucose.: 63 mg/dL (22 @ 07:56)  POCT Blood Glucose.: 202 mg/dL (12-15-22 @ 21:22)  POCT Blood Glucose.: 214 mg/dL (12-15-22 @ 16:25)

## 2022-12-16 NOTE — PROGRESS NOTE ADULT - ASSESSMENT
83 y/o F with PMHx asthma, DM, HLD, atrial fibrillation, and HTN who was found to have a right superficial femoral vein thrombosis while on warfarin and acute kidney injury. Also found to be COVID+.    1. Uncontrolled T2DM, a1c 11.2%  - Hypoglycemic  - Lantus decreased to 6 units qhs  - Admelog 3 units tid with meals  - Continue sliding scale coverage    2. THERESA/CKD  - Fluid overload  - Started on HD, nephrology following    3. Acute hypoxic respiratory failure/multifactorial  - Supplemental O2, wean as tolerated  - Albuterol q4hr

## 2022-12-16 NOTE — PROGRESS NOTE ADULT - ASSESSMENT
83 y/o female with  Pulmonary HTN, Asthma DM2, Breast CA % , PAF presents with progressive SOB with LE edema. Arrived covid positive.   Has been following outpatient cardio and showed improvement in her pulmonary HTN. Has been compliant with all meds prior to admission.  Dialysis started and now going for PermCath

## 2022-12-16 NOTE — CONSULT NOTE ADULT - ATTENDING COMMENTS
patient with CKD progressed to ESRD now requiring permanent HD access    -will plan for AVF/AVG and permacath for wednesday; please obtain medical and cardiac risk assessment and optimization

## 2022-12-16 NOTE — PROGRESS NOTE ADULT - PROBLEM SELECTOR PROBLEM 8
Fibrillation, atrial

## 2022-12-16 NOTE — PROGRESS NOTE ADULT - PROBLEM SELECTOR PROBLEM 3
Acute renal failure superimposed on stage 4 chronic kidney disease

## 2022-12-17 LAB
ANION GAP SERPL CALC-SCNC: 10 MMOL/L — SIGNIFICANT CHANGE UP (ref 5–17)
BUN SERPL-MCNC: 42.9 MG/DL — HIGH (ref 8–20)
CALCIUM SERPL-MCNC: 7.4 MG/DL — LOW (ref 8.4–10.5)
CHLORIDE SERPL-SCNC: 99 MMOL/L — SIGNIFICANT CHANGE UP (ref 96–108)
CO2 SERPL-SCNC: 24 MMOL/L — SIGNIFICANT CHANGE UP (ref 22–29)
CREAT SERPL-MCNC: 3.05 MG/DL — HIGH (ref 0.5–1.3)
EGFR: 15 ML/MIN/1.73M2 — LOW
GLUCOSE BLDC GLUCOMTR-MCNC: 194 MG/DL — HIGH (ref 70–99)
GLUCOSE BLDC GLUCOMTR-MCNC: 197 MG/DL — HIGH (ref 70–99)
GLUCOSE BLDC GLUCOMTR-MCNC: 293 MG/DL — HIGH (ref 70–99)
GLUCOSE BLDC GLUCOMTR-MCNC: 317 MG/DL — HIGH (ref 70–99)
GLUCOSE SERPL-MCNC: 185 MG/DL — HIGH (ref 70–99)
HCT VFR BLD CALC: 25.5 % — LOW (ref 34.5–45)
HGB BLD-MCNC: 7.8 G/DL — LOW (ref 11.5–15.5)
INR BLD: 0.97 RATIO — SIGNIFICANT CHANGE UP (ref 0.88–1.16)
MCHC RBC-ENTMCNC: 21.4 PG — LOW (ref 27–34)
MCHC RBC-ENTMCNC: 30.6 GM/DL — LOW (ref 32–36)
MCV RBC AUTO: 70.1 FL — LOW (ref 80–100)
NRBC # BLD: 7 /100 WBCS — HIGH (ref 0–0)
PHOSPHATE SERPL-MCNC: 3.6 MG/DL — SIGNIFICANT CHANGE UP (ref 2.4–4.7)
PLATELET # BLD AUTO: 242 K/UL — SIGNIFICANT CHANGE UP (ref 150–400)
POTASSIUM SERPL-MCNC: 4.4 MMOL/L — SIGNIFICANT CHANGE UP (ref 3.5–5.3)
POTASSIUM SERPL-SCNC: 4.4 MMOL/L — SIGNIFICANT CHANGE UP (ref 3.5–5.3)
PROTHROM AB SERPL-ACNC: 11.3 SEC — SIGNIFICANT CHANGE UP (ref 10.5–13.4)
RBC # BLD: 3.64 M/UL — LOW (ref 3.8–5.2)
RBC # FLD: 21.9 % — HIGH (ref 10.3–14.5)
SODIUM SERPL-SCNC: 133 MMOL/L — LOW (ref 135–145)
WBC # BLD: 12.03 K/UL — HIGH (ref 3.8–10.5)
WBC # FLD AUTO: 12.03 K/UL — HIGH (ref 3.8–10.5)

## 2022-12-17 PROCEDURE — 99232 SBSQ HOSP IP/OBS MODERATE 35: CPT

## 2022-12-17 RX ADMIN — APIXABAN 2.5 MILLIGRAM(S): 2.5 TABLET, FILM COATED ORAL at 17:35

## 2022-12-17 RX ADMIN — Medication 25 MILLIGRAM(S): at 21:39

## 2022-12-17 RX ADMIN — ALBUTEROL 2 PUFF(S): 90 AEROSOL, METERED ORAL at 12:18

## 2022-12-17 RX ADMIN — Medication 3 UNIT(S): at 08:33

## 2022-12-17 RX ADMIN — ESCITALOPRAM OXALATE 20 MILLIGRAM(S): 10 TABLET, FILM COATED ORAL at 14:19

## 2022-12-17 RX ADMIN — Medication 8: at 17:07

## 2022-12-17 RX ADMIN — Medication 240 MILLIGRAM(S): at 05:36

## 2022-12-17 RX ADMIN — Medication 50 MILLIGRAM(S): at 05:36

## 2022-12-17 RX ADMIN — Medication 650 MILLIGRAM(S): at 14:50

## 2022-12-17 RX ADMIN — IRON SUCROSE 210 MILLIGRAM(S): 20 INJECTION, SOLUTION INTRAVENOUS at 18:50

## 2022-12-17 RX ADMIN — Medication 25 MILLIGRAM(S): at 05:36

## 2022-12-17 RX ADMIN — BUDESONIDE AND FORMOTEROL FUMARATE DIHYDRATE 2 PUFF(S): 160; 4.5 AEROSOL RESPIRATORY (INHALATION) at 12:17

## 2022-12-17 RX ADMIN — ALBUTEROL 2 PUFF(S): 90 AEROSOL, METERED ORAL at 18:50

## 2022-12-17 RX ADMIN — Medication 6 MILLIGRAM(S): at 05:35

## 2022-12-17 RX ADMIN — Medication 650 MILLIGRAM(S): at 14:20

## 2022-12-17 RX ADMIN — Medication 1000 UNIT(S): at 14:19

## 2022-12-17 RX ADMIN — CHLORHEXIDINE GLUCONATE 1 APPLICATION(S): 213 SOLUTION TOPICAL at 14:20

## 2022-12-17 RX ADMIN — Medication 25 MILLIGRAM(S): at 14:19

## 2022-12-17 RX ADMIN — Medication 1200 MILLIGRAM(S): at 17:35

## 2022-12-17 RX ADMIN — Medication 2: at 08:33

## 2022-12-17 RX ADMIN — Medication 1200 MILLIGRAM(S): at 05:35

## 2022-12-17 RX ADMIN — CEFTRIAXONE 1000 MILLIGRAM(S): 500 INJECTION, POWDER, FOR SOLUTION INTRAMUSCULAR; INTRAVENOUS at 16:35

## 2022-12-17 RX ADMIN — Medication 650 MILLIGRAM(S): at 05:43

## 2022-12-17 RX ADMIN — Medication 3 UNIT(S): at 17:07

## 2022-12-17 RX ADMIN — INSULIN GLARGINE 6 UNIT(S): 100 INJECTION, SOLUTION SUBCUTANEOUS at 21:40

## 2022-12-17 RX ADMIN — APIXABAN 2.5 MILLIGRAM(S): 2.5 TABLET, FILM COATED ORAL at 05:36

## 2022-12-17 RX ADMIN — Medication 2: at 12:18

## 2022-12-17 RX ADMIN — Medication 650 MILLIGRAM(S): at 21:39

## 2022-12-17 RX ADMIN — PANTOPRAZOLE SODIUM 40 MILLIGRAM(S): 20 TABLET, DELAYED RELEASE ORAL at 05:36

## 2022-12-17 RX ADMIN — Medication 3 UNIT(S): at 12:19

## 2022-12-17 RX ADMIN — Medication 0.5 MILLIGRAM(S): at 23:03

## 2022-12-17 RX ADMIN — Medication 50 MILLIGRAM(S): at 17:35

## 2022-12-17 RX ADMIN — Medication 650 MILLIGRAM(S): at 05:36

## 2022-12-17 NOTE — PROGRESS NOTE ADULT - SUBJECTIVE AND OBJECTIVE BOX
HPI  Pt is a 81yo F admitted to [facility] for Covid, pleural effusion, fluid overload, new HD.   Pt was seen and examined at bedside with  and son at bedside. Pt states she is comfortable, denies of any SOB, chest pain. hemodynamically stable.     Vital Signs Last 24 Hrs  T(C): 37.2 (17 Dec 2022 10:26), Max: 37.2 (17 Dec 2022 10:26)  T(F): 99 (17 Dec 2022 10:26), Max: 99 (17 Dec 2022 10:26)  HR: 73 (17 Dec 2022 10:26) (73 - 89)  BP: 146/62 (17 Dec 2022 10:26) (114/76 - 156/81)  BP(mean): --  RR: 18 (17 Dec 2022 10:26) (16 - 18)  SpO2: 99% (17 Dec 2022 10:26) (98% - 100%)    Parameters below as of 17 Dec 2022 10:26  Patient On (Oxygen Delivery Method): nasal cannula        I&O's Summary    16 Dec 2022 07:01  -  17 Dec 2022 07:00  --------------------------------------------------------  IN: 0 mL / OUT: 1000 mL / NET: -1000 mL        CAPILLARY BLOOD GLUCOSE      POCT Blood Glucose.: 197 mg/dL (17 Dec 2022 11:44)  POCT Blood Glucose.: 194 mg/dL (17 Dec 2022 07:44)  POCT Blood Glucose.: 248 mg/dL (16 Dec 2022 21:16)  POCT Blood Glucose.: 169 mg/dL (16 Dec 2022 17:15)      PHYSICAL EXAM:    Constitutional: frail   HEENT: PERR, EOMI,   Neck: Soft and supple,    Respiratory: Breath sounds are clear bilaterally, base crackles noted, on supplementary O2   Cardiovascular: S1 and S2,   Gastrointestinal: Bowel Sounds present, soft, nontender,    Extremities: anasarca noted   Vascular: 2+ peripheral pulses  Neurological: A/O x 3,       MEDICATIONS:  MEDICATIONS  (STANDING):  acetaminophen     Tablet .. 650 milliGRAM(s) Oral every 8 hours  albuterol    90 MICROgram(s) HFA Inhaler 2 Puff(s) Inhalation every 4 hours  apixaban 2.5 milliGRAM(s) Oral two times a day  budesonide  80 MICROgram(s)/formoterol 4.5 MICROgram(s) Inhaler 2 Puff(s) Inhalation two times a day  cefTRIAXone Injectable. 1000 milliGRAM(s) IV Push every 24 hours  chlorhexidine 2% Cloths 1 Application(s) Topical daily  cholecalciferol 1000 Unit(s) Oral daily  dexAMETHasone  Injectable 6 milliGRAM(s) IV Push daily  dextrose 5%. 1000 milliLiter(s) (100 mL/Hr) IV Continuous <Continuous>  dextrose 5%. 1000 milliLiter(s) (50 mL/Hr) IV Continuous <Continuous>  dextrose 50% Injectable 25 Gram(s) IV Push once  dextrose 50% Injectable 12.5 Gram(s) IV Push once  dextrose 50% Injectable 25 Gram(s) IV Push once  diltiazem    milliGRAM(s) Oral daily  epoetin susie-epbx (RETACRIT) Injectable 40551 Unit(s) SubCutaneous <User Schedule>  escitalopram 20 milliGRAM(s) Oral daily  glucagon  Injectable 1 milliGRAM(s) IntraMuscular once  guaiFENesin ER 1200 milliGRAM(s) Oral every 12 hours  hydrALAZINE 25 milliGRAM(s) Oral every 8 hours  insulin glargine Injectable (LANTUS) 6 Unit(s) SubCutaneous at bedtime  insulin lispro (ADMELOG) corrective regimen sliding scale   SubCutaneous three times a day before meals  insulin lispro Injectable (ADMELOG) 3 Unit(s) SubCutaneous three times a day before meals  iron sucrose IVPB 100 milliGRAM(s) IV Intermittent every 24 hours  metoprolol tartrate 50 milliGRAM(s) Oral two times a day  pantoprazole    Tablet 40 milliGRAM(s) Oral before breakfast      LABS: All Labs Reviewed:                        7.8    12.03 )-----------( 242      ( 17 Dec 2022 05:00 )             25.5     12-17    133<L>  |  99  |  42.9<H>  ----------------------------<  185<H>  4.4   |  24.0  |  3.05<H>    Ca    7.4<L>      17 Dec 2022 05:00  Phos  3.6     12-17      PT/INR - ( 17 Dec 2022 05:00 )   PT: 11.3 sec;   INR: 0.97 ratio               Blood Culture:     RADIOLOGY/EKG:    DVT PPX:    ADVANCED DIRECTIVE:    DISPOSITION:

## 2022-12-17 NOTE — PROGRESS NOTE ADULT - ASSESSMENT
82F with PMH asthma, diabetes, hyperlipidemia, atrial fibrillation, hypertension, and hyperlipidemia, who was found to have a right superficial femoral vein thrombosis while on warfarin and acute kidney injury. She was also noted to have been recently diagnosed with COVID-19.    *Acute on Chronic Diastolic Heart failure  Resistant to diuresis, HD initiated 12/13/22 via Right femoral access.  nephrology consult appreciated, next HD Monday   TTE with intact LVF however has grade II diastolic dysfunction and moderate Pulmonary HTN  Strict I/O, daily weight, Fluid restriction  Continue Metoprolol and Hydralazine   pending CT chest for evaluation of effusion vs infiltrate  cardiology follow up    *THERESA on CKD IV  HD initiated 12/13/22 via Right femoral access  Improvement in renal indices with HD  US renal- chronic medical disease  Avoid Nephrotoxins  s/p Bicarbonate and Lokelma  cont HD per renal   family want to cont HD and full code  Vascular consult appreciate it   Permacath Wed, last dose Eliquis Monday am.     *Emphysematous Cystitis  IV antibiotics  Follow urine culture  Urology consult appreciated  still pending urine culture     *Acute Hypoxic respiratory Failure - Multifactorial. Asthma, COVID-19, CHFpEF, Fluid overload.  Back on 2L   Started on HD 12/13/22  TTE with intact LVF however has grade II diastolic dysfunction and moderate Pulmonary HTN  Supplemental O2, wean as tolerated  Albuterol MDI q4 ATC, Add Budesonide/Formoterol    *Anemia  Stable Hgb  Multifactorial - given kidney disease  Received PRBC x 1  Monitor Hgb  IV Iron    *Type 2 Diabetes on insulin  Fair glycemic control currently  A1c 11.2  BGM with SSI  Glargine 10U as  On Dexamethasone    *Atrial Fibrillation  Rate controlled, INR returned to normal  Metoprolol, Diltiazem  Start low dose Apixaban    *COVID-19   CT Chest with bilateral upper lobe opacities  Started on Dexamethasone for Asthma; will taper once respiratory status improved  isolation precautions  Will need repeat outpatient CT chest in 3 months for RLL nodule    *HTN-Essential  Improved  monitor blood pressure  Metoprolol to 50mg, continue Diltiazem at current rate  Hydralazine 25mg q8    *DVT ruled out on repeat imaging  Supratherapeutic INR normalized No bleeding  US Duplex- No evidence of deep venous thrombosis in either lower extremity.    *PT  recommendation for [facility]; Spouse wants to take home  Advised RN to mobilize OOB  Incentive Spirometry    Poor prognosis     CODE STATUS- FULL CODE.     Discussed with patient, spouse, and son.     Family (Spouse is main decision maker) wants HD, no restrictions, full code    Dispo: pending Permcath placement on Wed, Last dose Eliquis Monday am   pending repeat CT chest

## 2022-12-17 NOTE — PROGRESS NOTE ADULT - SUBJECTIVE AND OBJECTIVE BOX
Subjective: Pt feeling well. Ultrasound guided IV placed this evening. Has no complaints at this time. Vitals stable, no acute events.     MEDICATIONS  (STANDING):  acetaminophen     Tablet .. 650 milliGRAM(s) Oral every 8 hours  albuterol    90 MICROgram(s) HFA Inhaler 2 Puff(s) Inhalation every 4 hours  apixaban 2.5 milliGRAM(s) Oral two times a day  budesonide  80 MICROgram(s)/formoterol 4.5 MICROgram(s) Inhaler 2 Puff(s) Inhalation two times a day  cefTRIAXone Injectable. 1000 milliGRAM(s) IV Push every 24 hours  chlorhexidine 2% Cloths 1 Application(s) Topical daily  cholecalciferol 1000 Unit(s) Oral daily  dexAMETHasone  Injectable 6 milliGRAM(s) IV Push daily  dextrose 5%. 1000 milliLiter(s) (100 mL/Hr) IV Continuous <Continuous>  dextrose 5%. 1000 milliLiter(s) (50 mL/Hr) IV Continuous <Continuous>  dextrose 50% Injectable 25 Gram(s) IV Push once  dextrose 50% Injectable 12.5 Gram(s) IV Push once  dextrose 50% Injectable 25 Gram(s) IV Push once  diltiazem    milliGRAM(s) Oral daily  epoetin susie-epbx (RETACRIT) Injectable 52903 Unit(s) SubCutaneous <User Schedule>  escitalopram 20 milliGRAM(s) Oral daily  glucagon  Injectable 1 milliGRAM(s) IntraMuscular once  guaiFENesin ER 1200 milliGRAM(s) Oral every 12 hours  hydrALAZINE 25 milliGRAM(s) Oral every 8 hours  insulin glargine Injectable (LANTUS) 6 Unit(s) SubCutaneous at bedtime  insulin lispro (ADMELOG) corrective regimen sliding scale   SubCutaneous three times a day before meals  insulin lispro Injectable (ADMELOG) 3 Unit(s) SubCutaneous three times a day before meals  iron sucrose IVPB 100 milliGRAM(s) IV Intermittent every 24 hours  metoprolol tartrate 50 milliGRAM(s) Oral two times a day  pantoprazole    Tablet 40 milliGRAM(s) Oral before breakfast    MEDICATIONS  (PRN):  dextrose Oral Gel 15 Gram(s) Oral once PRN Blood Glucose LESS THAN 70 milliGRAM(s)/deciliter  hydrALAZINE Injectable 10 milliGRAM(s) IV Push every 6 hours PRN SBP > 160  LORazepam     Tablet 0.5 milliGRAM(s) Oral every 8 hours PRN Anxiety  melatonin 1 milliGRAM(s) Oral at bedtime PRN Sleep  sodium chloride 0.9% lock flush 10 milliLiter(s) IV Push every 1 hour PRN Pre/post blood products, medications, blood draw, and to maintain line patency      Vital Signs Last 24 Hrs  T(C): 36.9 (16 Dec 2022 19:36), Max: 36.9 (16 Dec 2022 05:43)  T(F): 98.5 (16 Dec 2022 19:36), Max: 98.5 (16 Dec 2022 05:43)  HR: 84 (16 Dec 2022 19:36) (60 - 84)  BP: 153/71 (16 Dec 2022 19:36) (114/76 - 167/78)  BP(mean): --  RR: 16 (16 Dec 2022 19:36) (16 - 18)  SpO2: 98% (16 Dec 2022 19:36) (95% - 100%)    Parameters below as of 16 Dec 2022 19:36  Patient On (Oxygen Delivery Method): nasal cannula  O2 Flow (L/min): 1      Physical Exam:  Constitutional: NAD  HEENT: PERRL, EOMI  Neck: No JVD, FROM without pain  Respiratory: Respirations non-labored, no accessory muscle use  Gastrointestinal: Soft, non-tender, non-distended  Extremities: No peripheral edema, No cyanosis  Neurological: A&O x 3; without gross deficit  Musculoskeletal: No joint pain, swelling, deformity, or point tenderness; no limitation of movement. MARGARET protected.       LABS:                        7.9    15.62 )-----------( 319      ( 16 Dec 2022 06:48 )             26.1     12-16    133<L>  |  101  |  46.1<H>  ----------------------------<  55<L>  4.4   |  23.0  |  3.37<H>    Ca    7.7<L>      16 Dec 2022 06:48      PT/INR - ( 16 Dec 2022 06:48 )   PT: 12.9 sec;   INR: 1.11 ratio           Urinalysis Basic - ( 15 Dec 2022 16:20 )    Color: Yellow / Appearance: Slightly Turbid / S.010 / pH: x  Gluc: x / Ketone: Trace  / Bili: Negative / Urobili: Negative mg/dL   Blood: x / Protein: 300 mg/dL mg/dL / Nitrite: Negative   Leuk Esterase: Moderate / RBC: >50 /HPF / WBC >50 /HPF   Sq Epi: x / Non Sq Epi: Occasional / Bacteria: Few

## 2022-12-17 NOTE — PROGRESS NOTE ADULT - ASSESSMENT
81yo F admitted for CHF exacerbation now needing dialysis access. Also COVID+.     Plan:  -Vein mapping completed  -LUE Extremity protected   -Cardiology consulted for clearance   -Patient planned for fistula creation and permacath placement on Wednesday afternoon  -PLEASE HOLD ELIQUIS AFTER MONDAY MORNING DOSE.   -Continue HD via [facility]   -Medically optimize patient

## 2022-12-17 NOTE — PROGRESS NOTE ADULT - SUBJECTIVE AND OBJECTIVE BOX
NEPHROLOGY INTERVAL HPI/OVERNIGHT EVENTS:    Feels better   More alert   Eating breakfast and smiling this am   Vascular surgery planning access placement    Remains afebrile on Ceftriaxone     MEDICATIONS  (STANDING):  acetaminophen     Tablet .. 650 milliGRAM(s) Oral every 8 hours  albuterol    90 MICROgram(s) HFA Inhaler 2 Puff(s) Inhalation every 4 hours  apixaban 2.5 milliGRAM(s) Oral two times a day  budesonide  80 MICROgram(s)/formoterol 4.5 MICROgram(s) Inhaler 2 Puff(s) Inhalation two times a day  cefTRIAXone Injectable. 1000 milliGRAM(s) IV Push every 24 hours  chlorhexidine 2% Cloths 1 Application(s) Topical daily  cholecalciferol 1000 Unit(s) Oral daily  dexAMETHasone  Injectable 6 milliGRAM(s) IV Push daily  dextrose 5%. 1000 milliLiter(s) (100 mL/Hr) IV Continuous <Continuous>  dextrose 5%. 1000 milliLiter(s) (50 mL/Hr) IV Continuous <Continuous>  dextrose 50% Injectable 25 Gram(s) IV Push once  dextrose 50% Injectable 12.5 Gram(s) IV Push once  dextrose 50% Injectable 25 Gram(s) IV Push once  diltiazem    milliGRAM(s) Oral daily  epoetin susie-epbx (RETACRIT) Injectable 76757 Unit(s) SubCutaneous <User Schedule>  escitalopram 20 milliGRAM(s) Oral daily  glucagon  Injectable 1 milliGRAM(s) IntraMuscular once  guaiFENesin ER 1200 milliGRAM(s) Oral every 12 hours  hydrALAZINE 25 milliGRAM(s) Oral every 8 hours  insulin glargine Injectable (LANTUS) 6 Unit(s) SubCutaneous at bedtime  insulin lispro (ADMELOG) corrective regimen sliding scale   SubCutaneous three times a day before meals  insulin lispro Injectable (ADMELOG) 3 Unit(s) SubCutaneous three times a day before meals  iron sucrose IVPB 100 milliGRAM(s) IV Intermittent every 24 hours  metoprolol tartrate 50 milliGRAM(s) Oral two times a day  pantoprazole    Tablet 40 milliGRAM(s) Oral before breakfast    MEDICATIONS  (PRN):  dextrose Oral Gel 15 Gram(s) Oral once PRN Blood Glucose LESS THAN 70 milliGRAM(s)/deciliter  hydrALAZINE Injectable 10 milliGRAM(s) IV Push every 6 hours PRN SBP > 160  LORazepam     Tablet 0.5 milliGRAM(s) Oral every 8 hours PRN Anxiety  melatonin 1 milliGRAM(s) Oral at bedtime PRN Sleep  sodium chloride 0.9% lock flush 10 milliLiter(s) IV Push every 1 hour PRN Pre/post blood products, medications, blood draw, and to maintain line patency      Allergies    No Known Allergies    Intolerances        Vital Signs Last 24 Hrs  T(C): 36.7 (17 Dec 2022 05:29), Max: 36.9 (16 Dec 2022 19:36)  T(F): 98.1 (17 Dec 2022 05:), Max: 98.5 (16 Dec 2022 19:36)  HR: 89 (17 Dec 2022 05:) (60 - 89)  BP: 156/81 (17 Dec 2022 05:) (114/76 - 162/88)  BP(mean): --  RR: 18 (17 Dec 2022 05:) (16 - 18)  SpO2: 99% (17 Dec 2022 05:) (96% - 100%)    Parameters below as of 17 Dec 2022 05:29  Patient On (Oxygen Delivery Method): nasal cannula  O2 Flow (L/min): 1    Daily     Daily   I&O's Detail    16 Dec 2022 07:01  -  17 Dec 2022 07:00  --------------------------------------------------------  IN:  Total IN: 0 mL    OUT:    Other (mL): 1000 mL  Total OUT: 1000 mL    Total NET: -1000 mL        I&O's Summary    16 Dec 2022 07:01  -  17 Dec 2022 07:00  --------------------------------------------------------  IN: 0 mL / OUT: 1000 mL / NET: -1000 mL        PHYSICAL EXAM:    GENERAL: Frail, debilitated  HEENT:  Facial edema  NECK: Supple, No JVD  Lungs - EAE / Clear   Abd - soft , nontender   EXTREMITIES:  + dependent edema better  . + R femoral ayse cath     LABS:                        7.8    12.03 )-----------( 242      ( 17 Dec 2022 05:00 )             25.5     12-    133<L>  |  99  |  42.9<H>  ----------------------------<  185<H>  4.4   |  24.0  |  3.05<H>    Ca    7.4<L>      17 Dec 2022 05:00  Phos  3.6           PT/INR - ( 17 Dec 2022 05:00 )   PT: 11.3 sec;   INR: 0.97 ratio           Urinalysis Basic - ( 15 Dec 2022 16:20 )    Color: Yellow / Appearance: Slightly Turbid / S.010 / pH: x  Gluc: x / Ketone: Trace  / Bili: Negative / Urobili: Negative mg/dL   Blood: x / Protein: 300 mg/dL mg/dL / Nitrite: Negative   Leuk Esterase: Moderate / RBC: >50 /HPF / WBC >50 /HPF   Sq Epi: x / Non Sq Epi: Occasional / Bacteria: Few      Phosphorus Level, Serum: 3.6 mg/dL ( @ 05:00)          RADIOLOGY & ADDITIONAL TESTS:

## 2022-12-17 NOTE — PROGRESS NOTE ADULT - ASSESSMENT
CKD(IV): +COVID  THERESA, progressive + fluid overload + hyperkalemia  Clinically improving with HD   Hx NS (3g) +DM, Baseline creat 5/21 was 2.9   Hyperkalemia and Metabolic Acidosis better with HD   No hydro , emphysematous cystitis on CT 12/15   ECHO 12/12 noted , reasonable   Hep B and C negative   Will set up for additional HD Monday 12/19   is in favor of continuation of HD if she is clinically improving (  x 62 years )   She will need an eventual permacath ( Eliquis will need to be held x 48 h ) , arm access ---> Vascular planning Wed 12/21       Anemia: +CKD; Tsat 36%  - cont DILAN and Venofer   - target Hgb > 10    Discussed with son at bedside

## 2022-12-18 LAB
ANION GAP SERPL CALC-SCNC: 12 MMOL/L — SIGNIFICANT CHANGE UP (ref 5–17)
BUN SERPL-MCNC: 62 MG/DL — HIGH (ref 8–20)
CALCIUM SERPL-MCNC: 7.6 MG/DL — LOW (ref 8.4–10.5)
CHLORIDE SERPL-SCNC: 97 MMOL/L — SIGNIFICANT CHANGE UP (ref 96–108)
CO2 SERPL-SCNC: 22 MMOL/L — SIGNIFICANT CHANGE UP (ref 22–29)
CREAT SERPL-MCNC: 3.51 MG/DL — HIGH (ref 0.5–1.3)
EGFR: 12 ML/MIN/1.73M2 — LOW
GLUCOSE BLDC GLUCOMTR-MCNC: 214 MG/DL — HIGH (ref 70–99)
GLUCOSE BLDC GLUCOMTR-MCNC: 233 MG/DL — HIGH (ref 70–99)
GLUCOSE BLDC GLUCOMTR-MCNC: 241 MG/DL — HIGH (ref 70–99)
GLUCOSE BLDC GLUCOMTR-MCNC: 321 MG/DL — HIGH (ref 70–99)
GLUCOSE SERPL-MCNC: 307 MG/DL — HIGH (ref 70–99)
HCT VFR BLD CALC: 28.7 % — LOW (ref 34.5–45)
HGB BLD-MCNC: 8.3 G/DL — LOW (ref 11.5–15.5)
MCHC RBC-ENTMCNC: 21.9 PG — LOW (ref 27–34)
MCHC RBC-ENTMCNC: 28.9 GM/DL — LOW (ref 32–36)
MCV RBC AUTO: 75.7 FL — LOW (ref 80–100)
NRBC # BLD: 5 /100 WBCS — HIGH (ref 0–0)
PLATELET # BLD AUTO: 178 K/UL — SIGNIFICANT CHANGE UP (ref 150–400)
POTASSIUM SERPL-MCNC: 5.3 MMOL/L — SIGNIFICANT CHANGE UP (ref 3.5–5.3)
POTASSIUM SERPL-SCNC: 5.3 MMOL/L — SIGNIFICANT CHANGE UP (ref 3.5–5.3)
RBC # BLD: 3.79 M/UL — LOW (ref 3.8–5.2)
RBC # FLD: 25 % — HIGH (ref 10.3–14.5)
SODIUM SERPL-SCNC: 131 MMOL/L — LOW (ref 135–145)
WBC # BLD: 14.97 K/UL — HIGH (ref 3.8–10.5)
WBC # FLD AUTO: 14.97 K/UL — HIGH (ref 3.8–10.5)

## 2022-12-18 PROCEDURE — 99221 1ST HOSP IP/OBS SF/LOW 40: CPT

## 2022-12-18 PROCEDURE — 99233 SBSQ HOSP IP/OBS HIGH 50: CPT

## 2022-12-18 PROCEDURE — 99232 SBSQ HOSP IP/OBS MODERATE 35: CPT

## 2022-12-18 PROCEDURE — 71250 CT THORAX DX C-: CPT | Mod: 26

## 2022-12-18 RX ORDER — INSULIN GLARGINE 100 [IU]/ML
8 INJECTION, SOLUTION SUBCUTANEOUS AT BEDTIME
Refills: 0 | Status: DISCONTINUED | OUTPATIENT
Start: 2022-12-18 | End: 2022-12-19

## 2022-12-18 RX ORDER — HYDRALAZINE HCL 50 MG
25 TABLET ORAL ONCE
Refills: 0 | Status: COMPLETED | OUTPATIENT
Start: 2022-12-18 | End: 2022-12-18

## 2022-12-18 RX ORDER — HYDRALAZINE HCL 50 MG
50 TABLET ORAL EVERY 8 HOURS
Refills: 0 | Status: DISCONTINUED | OUTPATIENT
Start: 2022-12-18 | End: 2023-01-06

## 2022-12-18 RX ADMIN — Medication 650 MILLIGRAM(S): at 07:57

## 2022-12-18 RX ADMIN — CEFTRIAXONE 1000 MILLIGRAM(S): 500 INJECTION, POWDER, FOR SOLUTION INTRAMUSCULAR; INTRAVENOUS at 17:20

## 2022-12-18 RX ADMIN — ALBUTEROL 2 PUFF(S): 90 AEROSOL, METERED ORAL at 09:11

## 2022-12-18 RX ADMIN — Medication 3 UNIT(S): at 17:19

## 2022-12-18 RX ADMIN — Medication 650 MILLIGRAM(S): at 22:20

## 2022-12-18 RX ADMIN — Medication 3 UNIT(S): at 08:09

## 2022-12-18 RX ADMIN — Medication 8: at 08:04

## 2022-12-18 RX ADMIN — ALBUTEROL 2 PUFF(S): 90 AEROSOL, METERED ORAL at 13:00

## 2022-12-18 RX ADMIN — Medication 650 MILLIGRAM(S): at 17:56

## 2022-12-18 RX ADMIN — Medication 6 MILLIGRAM(S): at 05:32

## 2022-12-18 RX ADMIN — ALBUTEROL 2 PUFF(S): 90 AEROSOL, METERED ORAL at 17:14

## 2022-12-18 RX ADMIN — Medication 50 MILLIGRAM(S): at 17:19

## 2022-12-18 RX ADMIN — Medication 30 MILLILITER(S): at 00:15

## 2022-12-18 RX ADMIN — INSULIN GLARGINE 8 UNIT(S): 100 INJECTION, SOLUTION SUBCUTANEOUS at 21:55

## 2022-12-18 RX ADMIN — APIXABAN 2.5 MILLIGRAM(S): 2.5 TABLET, FILM COATED ORAL at 05:32

## 2022-12-18 RX ADMIN — Medication 4: at 17:19

## 2022-12-18 RX ADMIN — CHLORHEXIDINE GLUCONATE 1 APPLICATION(S): 213 SOLUTION TOPICAL at 11:33

## 2022-12-18 RX ADMIN — Medication 650 MILLIGRAM(S): at 05:33

## 2022-12-18 RX ADMIN — Medication 650 MILLIGRAM(S): at 21:23

## 2022-12-18 RX ADMIN — ESCITALOPRAM OXALATE 20 MILLIGRAM(S): 10 TABLET, FILM COATED ORAL at 17:18

## 2022-12-18 RX ADMIN — PANTOPRAZOLE SODIUM 40 MILLIGRAM(S): 20 TABLET, DELAYED RELEASE ORAL at 05:32

## 2022-12-18 RX ADMIN — Medication 1200 MILLIGRAM(S): at 05:33

## 2022-12-18 RX ADMIN — Medication 240 MILLIGRAM(S): at 05:33

## 2022-12-18 RX ADMIN — Medication 4: at 11:50

## 2022-12-18 RX ADMIN — Medication 1200 MILLIGRAM(S): at 17:19

## 2022-12-18 RX ADMIN — BUDESONIDE AND FORMOTEROL FUMARATE DIHYDRATE 2 PUFF(S): 160; 4.5 AEROSOL RESPIRATORY (INHALATION) at 17:14

## 2022-12-18 RX ADMIN — Medication 25 MILLIGRAM(S): at 17:19

## 2022-12-18 RX ADMIN — Medication 50 MILLIGRAM(S): at 21:49

## 2022-12-18 RX ADMIN — Medication 1000 UNIT(S): at 17:18

## 2022-12-18 RX ADMIN — Medication 650 MILLIGRAM(S): at 17:18

## 2022-12-18 RX ADMIN — Medication 25 MILLIGRAM(S): at 18:49

## 2022-12-18 RX ADMIN — Medication 25 MILLIGRAM(S): at 05:32

## 2022-12-18 RX ADMIN — Medication 10 MILLIGRAM(S): at 20:39

## 2022-12-18 RX ADMIN — Medication 3 UNIT(S): at 11:50

## 2022-12-18 RX ADMIN — Medication 50 MILLIGRAM(S): at 05:33

## 2022-12-18 NOTE — PROGRESS NOTE ADULT - SUBJECTIVE AND OBJECTIVE BOX
NEPHROLOGY INTERVAL HPI/OVERNIGHT EVENTS:    Feels better   seated upright   For CT chest to assess L effusion   BP not ideal  + Eliquis     MEDICATIONS  (STANDING):  acetaminophen     Tablet .. 650 milliGRAM(s) Oral every 8 hours  albuterol    90 MICROgram(s) HFA Inhaler 2 Puff(s) Inhalation every 4 hours  apixaban 2.5 milliGRAM(s) Oral two times a day  budesonide  80 MICROgram(s)/formoterol 4.5 MICROgram(s) Inhaler 2 Puff(s) Inhalation two times a day  cefTRIAXone Injectable. 1000 milliGRAM(s) IV Push every 24 hours  chlorhexidine 2% Cloths 1 Application(s) Topical daily  cholecalciferol 1000 Unit(s) Oral daily  dexAMETHasone  Injectable 6 milliGRAM(s) IV Push daily  dextrose 5%. 1000 milliLiter(s) (100 mL/Hr) IV Continuous <Continuous>  dextrose 5%. 1000 milliLiter(s) (50 mL/Hr) IV Continuous <Continuous>  dextrose 50% Injectable 25 Gram(s) IV Push once  dextrose 50% Injectable 12.5 Gram(s) IV Push once  dextrose 50% Injectable 25 Gram(s) IV Push once  diltiazem    milliGRAM(s) Oral daily  epoetin susie-epbx (RETACRIT) Injectable 28408 Unit(s) SubCutaneous <User Schedule>  escitalopram 20 milliGRAM(s) Oral daily  glucagon  Injectable 1 milliGRAM(s) IntraMuscular once  guaiFENesin ER 1200 milliGRAM(s) Oral every 12 hours  hydrALAZINE 25 milliGRAM(s) Oral every 8 hours  insulin glargine Injectable (LANTUS) 6 Unit(s) SubCutaneous at bedtime  insulin lispro (ADMELOG) corrective regimen sliding scale   SubCutaneous three times a day before meals  insulin lispro Injectable (ADMELOG) 3 Unit(s) SubCutaneous three times a day before meals  metoprolol tartrate 50 milliGRAM(s) Oral two times a day  pantoprazole    Tablet 40 milliGRAM(s) Oral before breakfast    MEDICATIONS  (PRN):  dextrose Oral Gel 15 Gram(s) Oral once PRN Blood Glucose LESS THAN 70 milliGRAM(s)/deciliter  hydrALAZINE Injectable 10 milliGRAM(s) IV Push every 6 hours PRN SBP > 160  LORazepam     Tablet 0.5 milliGRAM(s) Oral every 8 hours PRN Anxiety  melatonin 1 milliGRAM(s) Oral at bedtime PRN Sleep  sodium chloride 0.9% lock flush 10 milliLiter(s) IV Push every 1 hour PRN Pre/post blood products, medications, blood draw, and to maintain line patency      Allergies    No Known Allergies    Intolerances          Vital Signs Last 24 Hrs  T(C): 36.4 (18 Dec 2022 05:24), Max: 37.6 (17 Dec 2022 16:48)  T(F): 97.6 (18 Dec 2022 05:24), Max: 99.6 (17 Dec 2022 16:48)  HR: 85 (18 Dec 2022 05:24) (68 - 85)  BP: 176/79 (18 Dec 2022 05:24) (146/62 - 176/79)  BP(mean): --  RR: 18 (18 Dec 2022 05:24) (17 - 18)  SpO2: 98% (18 Dec 2022 05:24) (97% - 99%)    Parameters below as of 18 Dec 2022 05:24  Patient On (Oxygen Delivery Method): nasal cannula  O2 Flow (L/min): 1    Daily     Daily   I&O's Detail    I&O's Summary      PHYSICAL EXAM:    GENERAL: Frail, debilitated  HEENT:  Facial edema  NECK: Supple, No JVD  Lungs - Decreased BS at bases L > R  Abd - soft , nontender   EXTREMITIES:  + dependent edema better  . + R femoral ayse cath   LABS:                        8.3    14.97 )-----------( 178      ( 18 Dec 2022 06:41 )             28.7     12-18    131<L>  |  97  |  62.0<H>  ----------------------------<  307<H>  5.3   |  22.0  |  3.51<H>    Ca    7.6<L>      18 Dec 2022 06:41  Phos  3.6     12-17      PT/INR - ( 17 Dec 2022 05:00 )   PT: 11.3 sec;   INR: 0.97 ratio                     RADIOLOGY & ADDITIONAL TESTS:

## 2022-12-18 NOTE — PROGRESS NOTE ADULT - ASSESSMENT
83 y/o F with PMHx asthma, DM, HLD, atrial fibrillation, and HTN who was found to have a right superficial femoral vein thrombosis while on warfarin and acute kidney injury. Also found to be COVID+.    Currently on:  Lantus 6U qhs (decreased from 10U due to hypoglycemic episode)  Admelog 3U TIDac    Patient more tired today. Unable to sleep well. Tolerates meals.  Fasting POC glucose 307     1. Uncontrolled T2DM, a1c 11.2%     Increase Lantus to 8 units qhs  - Continue Admelog 3U TID  - Continue sliding scale coverage  Monitor POC glucose qhs and qhs. Might require Admelog adjustment as well.     2. THERESA/CKD  - Fluid overload  - Started on HD, nephrology following    3. Acute hypoxic respiratory failure/multifactorial  - Supplemental O2, wean as tolerated  - Albuterol q4hr

## 2022-12-18 NOTE — PROGRESS NOTE ADULT - ASSESSMENT
CKD(IV): +COVID  THERESA, progressive + fluid overload + hyperkalemia  Hx NS (3g) +DM, Baseline creat 5/21 was 2.9   Hyperkalemia and Metabolic Acidosis better with HD   No hydro , emphysematous cystitis on CT 12/15   ECHO 12/12 noted , reasonable   Hep B and C negative   Will set up for additional HD tomorrow    is in favor of continuation of HD if she is clinically improving (  x 62 years )   She will need an eventual permacath ( Eliquis will need to be held x 48 h )   See HD orders for tomiorrow     Anemia: +CKD; Tsat 36%  - cont DILAN and Venofer   - target Hgb > 10    HTN - Can adjust Hydralazine as needed     Effusions - For repeat CT chest to reassess L effusion

## 2022-12-18 NOTE — CONSULT NOTE ADULT - SUBJECTIVE AND OBJECTIVE BOX
HPI:  82F who was referred to the hospital by her primary care physician. Additional information was provided by her son at the bedside. The patient was reported to have increased dyspnea and lower extremity edema for the past two weeks. She is on warfarin for anticoagulation and her sone reported that the INR values have been very variable over the past few months. At times the values are high but other times the values are low. She was also recently diagnosed with COVID-19 along with her . The patient was noted to have congestion and a productive cough. She is noted to have been compliant with her medications at home. Outpatient testing was notable for a right superficial femoral vein thrombosis. The patient was without history of thrombosis in the past. The patient's son reported that she has had poor appetite an oral intake over the past two weeks. On presentation to the emergency department, the patient as noted to have acute kidney injury.  She had acute on chronic diastolic heart failure that was difficult to treat secondary to her renal dysfunction and need for diuresis, so HD was initiated for volume removal.  She has become more dyspnaeic and a CT chest was performed revealing small right and moderate left pleural effusions with a significant amount of atelectasis.  Thoracic surgery was called to evaluate for drainage. She was seen with her  at the bedside, who answers questions for her.  She was not very interactive due to her general medical condition.  unable to complete full ROS due to lack of participation.     (06 Dec 2022 08:44)      PAST MEDICAL & SURGICAL HISTORY:  Asthma      Diabetes mellitus      Hypertension      Emphysema      Hyperlipemia      S/P appendectomy      S/P tubal ligation      S/P knee surgery  left          REVIEW OF SYSTEMS      as per HPI    MEDICATIONS  (STANDING):  acetaminophen     Tablet .. 650 milliGRAM(s) Oral every 8 hours  albuterol    90 MICROgram(s) HFA Inhaler 2 Puff(s) Inhalation every 4 hours  budesonide  80 MICROgram(s)/formoterol 4.5 MICROgram(s) Inhaler 2 Puff(s) Inhalation two times a day  cefTRIAXone Injectable. 1000 milliGRAM(s) IV Push every 24 hours  chlorhexidine 2% Cloths 1 Application(s) Topical daily  cholecalciferol 1000 Unit(s) Oral daily  dexAMETHasone  Injectable 6 milliGRAM(s) IV Push daily  dextrose 5%. 1000 milliLiter(s) (50 mL/Hr) IV Continuous <Continuous>  dextrose 5%. 1000 milliLiter(s) (100 mL/Hr) IV Continuous <Continuous>  dextrose 50% Injectable 12.5 Gram(s) IV Push once  dextrose 50% Injectable 25 Gram(s) IV Push once  dextrose 50% Injectable 25 Gram(s) IV Push once  diltiazem    milliGRAM(s) Oral daily  epoetin susie-epbx (RETACRIT) Injectable 92385 Unit(s) SubCutaneous <User Schedule>  escitalopram 20 milliGRAM(s) Oral daily  glucagon  Injectable 1 milliGRAM(s) IntraMuscular once  guaiFENesin ER 1200 milliGRAM(s) Oral every 12 hours  hydrALAZINE 25 milliGRAM(s) Oral every 8 hours  insulin glargine Injectable (LANTUS) 8 Unit(s) SubCutaneous at bedtime  insulin lispro (ADMELOG) corrective regimen sliding scale   SubCutaneous three times a day before meals  insulin lispro Injectable (ADMELOG) 3 Unit(s) SubCutaneous three times a day before meals  metoprolol tartrate 50 milliGRAM(s) Oral two times a day  pantoprazole    Tablet 40 milliGRAM(s) Oral before breakfast    MEDICATIONS  (PRN):  dextrose Oral Gel 15 Gram(s) Oral once PRN Blood Glucose LESS THAN 70 milliGRAM(s)/deciliter  hydrALAZINE Injectable 10 milliGRAM(s) IV Push every 6 hours PRN SBP > 160  LORazepam     Tablet 0.5 milliGRAM(s) Oral every 8 hours PRN Anxiety  melatonin 1 milliGRAM(s) Oral at bedtime PRN Sleep  sodium chloride 0.9% lock flush 10 milliLiter(s) IV Push every 1 hour PRN Pre/post blood products, medications, blood draw, and to maintain line patency      Allergies    No Known Allergies    Intolerances        SOCIAL HISTORY:    FAMILY HISTORY:      Vital Signs Last 24 Hrs  T(C): 37.2 (18 Dec 2022 16:53), Max: 37.2 (18 Dec 2022 16:53)  T(F): 98.9 (18 Dec 2022 16:53), Max: 98.9 (18 Dec 2022 16:53)  HR: 97 (18 Dec 2022 16:53) (68 - 97)  BP: 209/72 (18 Dec 2022 16:53) (142/61 - 209/72)  BP(mean): --  RR: 18 (18 Dec 2022 16:53) (17 - 18)  SpO2: 99% (18 Dec 2022 16:53) (98% - 99%)    Parameters below as of 18 Dec 2022 16:53  Patient On (Oxygen Delivery Method): nasal cannula        PHYSICAL EXAM:    clear anteriorly,   S1S2 tachy  Abd soft  R groin shiley  US of left chest - layering effusion at multiple rib spaces at posterior axillary line        LABS:                        8.3    14.97 )-----------( 178      ( 18 Dec 2022 06:41 )             28.7     12-18    131<L>  |  97  |  62.0<H>  ----------------------------<  307<H>  5.3   |  22.0  |  3.51<H>    Ca    7.6<L>      18 Dec 2022 06:41  Phos  3.6     12-17      PT/INR - ( 17 Dec 2022 05:00 )   PT: 11.3 sec;   INR: 0.97 ratio               RADIOLOGY & ADDITIONAL STUDIES: HPI:  82F who was referred to the hospital by her primary care physician. Additional information was provided by her son at the bedside. The patient was reported to have increased dyspnea and lower extremity edema for the past two weeks. She is on warfarin for anticoagulation and her son reported that the INR values have been very variable over the past few months. At times the values are high but other times the values are low. She was also recently diagnosed with COVID-19 along with her . The patient was noted to have congestion and a productive cough. She is noted to have been compliant with her medications at home. Outpatient testing was notable for a right superficial femoral vein thrombosis. The patient was without history of thrombosis in the past. The patient's son reported that she has had poor appetite an oral intake over the past two weeks. On presentation to the emergency department, the patient as noted to have acute kidney injury.  She had acute on chronic diastolic heart failure that was difficult to treat secondary to her renal dysfunction and need for diuresis, so HD was initiated for volume removal.  She has become more dyspnaeic and a CT chest was performed revealing small right and moderate left pleural effusions with a significant amount of atelectasis.  Thoracic surgery was called to evaluate for drainage. She was seen with her  at the bedside, who answers questions for her.  She was not very interactive due to her general medical condition.  unable to complete full ROS due to lack of participation.     (06 Dec 2022 08:44)      PAST MEDICAL & SURGICAL HISTORY:  Asthma      Diabetes mellitus      Hypertension      Emphysema      Hyperlipemia      S/P appendectomy      S/P tubal ligation      S/P knee surgery  left          REVIEW OF SYSTEMS      as per HPI    MEDICATIONS  (STANDING):  acetaminophen     Tablet .. 650 milliGRAM(s) Oral every 8 hours  albuterol    90 MICROgram(s) HFA Inhaler 2 Puff(s) Inhalation every 4 hours  budesonide  80 MICROgram(s)/formoterol 4.5 MICROgram(s) Inhaler 2 Puff(s) Inhalation two times a day  cefTRIAXone Injectable. 1000 milliGRAM(s) IV Push every 24 hours  chlorhexidine 2% Cloths 1 Application(s) Topical daily  cholecalciferol 1000 Unit(s) Oral daily  dexAMETHasone  Injectable 6 milliGRAM(s) IV Push daily  dextrose 5%. 1000 milliLiter(s) (50 mL/Hr) IV Continuous <Continuous>  dextrose 5%. 1000 milliLiter(s) (100 mL/Hr) IV Continuous <Continuous>  dextrose 50% Injectable 12.5 Gram(s) IV Push once  dextrose 50% Injectable 25 Gram(s) IV Push once  dextrose 50% Injectable 25 Gram(s) IV Push once  diltiazem    milliGRAM(s) Oral daily  epoetin susie-epbx (RETACRIT) Injectable 25161 Unit(s) SubCutaneous <User Schedule>  escitalopram 20 milliGRAM(s) Oral daily  glucagon  Injectable 1 milliGRAM(s) IntraMuscular once  guaiFENesin ER 1200 milliGRAM(s) Oral every 12 hours  hydrALAZINE 25 milliGRAM(s) Oral every 8 hours  insulin glargine Injectable (LANTUS) 8 Unit(s) SubCutaneous at bedtime  insulin lispro (ADMELOG) corrective regimen sliding scale   SubCutaneous three times a day before meals  insulin lispro Injectable (ADMELOG) 3 Unit(s) SubCutaneous three times a day before meals  metoprolol tartrate 50 milliGRAM(s) Oral two times a day  pantoprazole    Tablet 40 milliGRAM(s) Oral before breakfast    MEDICATIONS  (PRN):  dextrose Oral Gel 15 Gram(s) Oral once PRN Blood Glucose LESS THAN 70 milliGRAM(s)/deciliter  hydrALAZINE Injectable 10 milliGRAM(s) IV Push every 6 hours PRN SBP > 160  LORazepam     Tablet 0.5 milliGRAM(s) Oral every 8 hours PRN Anxiety  melatonin 1 milliGRAM(s) Oral at bedtime PRN Sleep  sodium chloride 0.9% lock flush 10 milliLiter(s) IV Push every 1 hour PRN Pre/post blood products, medications, blood draw, and to maintain line patency      Allergies    No Known Allergies    Intolerances        SOCIAL HISTORY:    FAMILY HISTORY:      Vital Signs Last 24 Hrs  T(C): 37.2 (18 Dec 2022 16:53), Max: 37.2 (18 Dec 2022 16:53)  T(F): 98.9 (18 Dec 2022 16:53), Max: 98.9 (18 Dec 2022 16:53)  HR: 97 (18 Dec 2022 16:53) (68 - 97)  BP: 209/72 (18 Dec 2022 16:53) (142/61 - 209/72)  BP(mean): --  RR: 18 (18 Dec 2022 16:53) (17 - 18)  SpO2: 99% (18 Dec 2022 16:53) (98% - 99%)    Parameters below as of 18 Dec 2022 16:53  Patient On (Oxygen Delivery Method): nasal cannula        PHYSICAL EXAM:    clear anteriorly,   S1S2 tachy  Abd soft  R groin shiley  US of left chest - layering effusion at multiple rib spaces at posterior axillary line        LABS:                        8.3    14.97 )-----------( 178      ( 18 Dec 2022 06:41 )             28.7     12-18    131<L>  |  97  |  62.0<H>  ----------------------------<  307<H>  5.3   |  22.0  |  3.51<H>    Ca    7.6<L>      18 Dec 2022 06:41  Phos  3.6     12-17      PT/INR - ( 17 Dec 2022 05:00 )   PT: 11.3 sec;   INR: 0.97 ratio               RADIOLOGY & ADDITIONAL STUDIES:

## 2022-12-18 NOTE — CONSULT NOTE ADULT - PROBLEM SELECTOR RECOMMENDATION 9
Left Accessible on POCUS  Hold eliquis  Thoracic can place pigtail 12/19 in PM  Discussed with Dr. Alejandra Left Accessible on POCUS  Hold eliquis  Thoracic can place pigtail 12/19 in PM  Aggressive chest PT (? mucus plugging in LLL bronchus)  Discussed with Dr. Alejandra

## 2022-12-18 NOTE — PROGRESS NOTE ADULT - SUBJECTIVE AND OBJECTIVE BOX
Patient states she just saw Dr. Vidales yesterday and can't afford to come in for another appointment. She states she has always had anemia due to her bariatric surgery. She is asking to please just have the lab orders.   HPI  Pt is a 83yo F admitted to [facility] for Covid, pleural effusion, fluid overload, new HD.   Pt was seen and examined at bedside with  and son at bedside. Pt states she is comfortable, denies of any chest pain. Per son, overnight had episode of SOB and started wheezing and is not improved by inhaler. Tmax noted to be 99.6     Vital Signs Last 24 Hrs  T(C): 36.8 (18 Dec 2022 09:19), Max: 37.6 (17 Dec 2022 16:48)  T(F): 98.2 (18 Dec 2022 09:19), Max: 99.6 (17 Dec 2022 16:48)  HR: 74 (18 Dec 2022 09:19) (68 - 85)  BP: 142/61 (18 Dec 2022 09:19) (142/61 - 176/79)  BP(mean): --  RR: 17 (18 Dec 2022 09:19) (17 - 18)  SpO2: 98% (18 Dec 2022 09:19) (97% - 98%)    Parameters below as of 18 Dec 2022 09:19  Patient On (Oxygen Delivery Method): nasal cannula        I&O's Summary      CAPILLARY BLOOD GLUCOSE      POCT Blood Glucose.: 214 mg/dL (18 Dec 2022 11:44)  POCT Blood Glucose.: 321 mg/dL (18 Dec 2022 08:03)  POCT Blood Glucose.: 293 mg/dL (17 Dec 2022 21:38)  POCT Blood Glucose.: 317 mg/dL (17 Dec 2022 16:28)      PHYSICAL EXAM:    Constitutional: frail   HEENT: PERR, EOMI,   Neck: Soft and supple,    Respiratory: Breath sounds are clear bilaterally, base crackles noted, right lower lobe wheezing noted. on supplementary 2L O2   Cardiovascular: S1 and S2,   Gastrointestinal: Bowel Sounds present, soft, nontender,    Extremities: bilateral upper and lower +1 edema noted   Vascular: 2+ peripheral pulses  Neurological: A/O x 3,     MEDICATIONS:  MEDICATIONS  (STANDING):  acetaminophen     Tablet .. 650 milliGRAM(s) Oral every 8 hours  albuterol    90 MICROgram(s) HFA Inhaler 2 Puff(s) Inhalation every 4 hours  apixaban 2.5 milliGRAM(s) Oral two times a day  budesonide  80 MICROgram(s)/formoterol 4.5 MICROgram(s) Inhaler 2 Puff(s) Inhalation two times a day  cefTRIAXone Injectable. 1000 milliGRAM(s) IV Push every 24 hours  chlorhexidine 2% Cloths 1 Application(s) Topical daily  cholecalciferol 1000 Unit(s) Oral daily  dexAMETHasone  Injectable 6 milliGRAM(s) IV Push daily  dextrose 5%. 1000 milliLiter(s) (50 mL/Hr) IV Continuous <Continuous>  dextrose 5%. 1000 milliLiter(s) (100 mL/Hr) IV Continuous <Continuous>  dextrose 50% Injectable 25 Gram(s) IV Push once  dextrose 50% Injectable 25 Gram(s) IV Push once  dextrose 50% Injectable 12.5 Gram(s) IV Push once  diltiazem    milliGRAM(s) Oral daily  epoetin susie-epbx (RETACRIT) Injectable 03095 Unit(s) SubCutaneous <User Schedule>  escitalopram 20 milliGRAM(s) Oral daily  glucagon  Injectable 1 milliGRAM(s) IntraMuscular once  guaiFENesin ER 1200 milliGRAM(s) Oral every 12 hours  hydrALAZINE 25 milliGRAM(s) Oral every 8 hours  insulin glargine Injectable (LANTUS) 6 Unit(s) SubCutaneous at bedtime  insulin lispro (ADMELOG) corrective regimen sliding scale   SubCutaneous three times a day before meals  insulin lispro Injectable (ADMELOG) 3 Unit(s) SubCutaneous three times a day before meals  metoprolol tartrate 50 milliGRAM(s) Oral two times a day  pantoprazole    Tablet 40 milliGRAM(s) Oral before breakfast      LABS: All Labs Reviewed:                        8.3    14.97 )-----------( 178      ( 18 Dec 2022 06:41 )             28.7     12-18    131<L>  |  97  |  62.0<H>  ----------------------------<  307<H>  5.3   |  22.0  |  3.51<H>    Ca    7.6<L>      18 Dec 2022 06:41  Phos  3.6     12-17      PT/INR - ( 17 Dec 2022 05:00 )   PT: 11.3 sec;   INR: 0.97 ratio               Blood Culture:     RADIOLOGY/EKG:    DVT PPX:    ADVANCED DIRECTIVE:    DISPOSITION:

## 2022-12-18 NOTE — CONSULT NOTE ADULT - PROVIDER SPECIALTY LIST ADULT
Nephrology
Thoracic Surgery
Vascular Surgery
Cardiology
Endocrinology
Pulmonology
Palliative Care
Urology

## 2022-12-18 NOTE — PROGRESS NOTE ADULT - ASSESSMENT
82F with PMH asthma, diabetes, hyperlipidemia, atrial fibrillation, hypertension, and hyperlipidemia, who was found to have a right superficial femoral vein thrombosis while on warfarin and acute kidney injury. She was also noted to have been recently diagnosed with COVID-19.    *Acute on Chronic Diastolic Heart failure  Resistant to diuresis, HD initiated 12/13/22 via Right femoral access.  nephrology consult appreciated, next HD tomorrow   TTE with intact LVF however has grade II diastolic dysfunction and moderate Pulmonary HTN  Strict I/O, daily weight, Fluid restriction  Continue Metoprolol and Hydralazine   cardiology follow up  repeat CT chest noted     *pleural effusion   small to mod L and small R pleural effusion with LLL complete and RLL near complete compressive atelectasis   CT surg consult pending given pt has SOB   incentive spirometry     *THERESA on CKD IV  HD initiated 12/13/22 via Right femoral access  Improvement in renal indices with HD  US renal- chronic medical disease  Avoid Nephrotoxins  s/p Bicarbonate and Lokelma  cont HD per renal   family want to cont HD and full code  Vascular consult appreciate it   Permacath Wed, last dose Eliquis Monday am.     *Emphysematous Cystitis  IV antibiotics  Follow urine culture  Urology consult appreciated  still pending urine culture     *Acute Hypoxic respiratory Failure - Multifactorial. Asthma, COVID-19, CHFpEF, Fluid overload.  Back on 2L   Started on HD 12/13/22  TTE with intact LVF however has grade II diastolic dysfunction and moderate Pulmonary HTN  Supplemental O2, wean as tolerated  currently on 2L   Albuterol MDI q4 ATC, Add Budesonide/Formoterol  per pul, Decadron x 10 days (today day 8/10)    *Anemia  Stable Hgb  Multifactorial - given kidney disease  Received PRBC x 1  Monitor Hgb  IV Iron    *Type 2 Diabetes on insulin  Fair glycemic control currently  A1c 11.2  BGM with SSI  Glargine 10U as  On Dexamethasone    *Atrial Fibrillation  Rate controlled, INR returned to normal  Metoprolol, Diltiazem  Start low dose Apixaban    *COVID-19   CT Chest with bilateral upper lobe opacities  Started on Dexamethasone for Asthma; will taper once respiratory status improved  isolation precautions  Will need repeat outpatient CT chest in 3 months for RLL nodule    *HTN-Essential  Improved  monitor blood pressure  Metoprolol to 50mg, continue Diltiazem at current rate  Hydralazine 25mg q8    *DVT ruled out on repeat imaging  Supratherapeutic INR normalized No bleeding  US Duplex- No evidence of deep venous thrombosis in either lower extremity.    *PT  recommendation for [facility]; Spouse wants to take home  Advised RN to mobilize OOB  Incentive Spirometry    Poor prognosis     CODE STATUS- FULL CODE.     Discussed with patient, spouse, and son.     Family (Spouse is main decision maker) wants HD, no restrictions, full code    Dispo: pending Permcath placement on Wed, Last dose Eliquis tomorrow am   pending CT surg regarding need for thoracocentesis 82F with PMH asthma, diabetes, hyperlipidemia, atrial fibrillation, hypertension, and hyperlipidemia, who was found to have a right superficial femoral vein thrombosis while on warfarin and acute kidney injury. She was also noted to have been recently diagnosed with COVID-19.    *Acute on Chronic Diastolic Heart failure  Resistant to diuresis, HD initiated 12/13/22 via Right femoral access.  nephrology consult appreciated, next HD tomorrow   TTE with intact LVF however has grade II diastolic dysfunction and moderate Pulmonary HTN  Strict I/O, daily weight, Fluid restriction  Continue Metoprolol and Hydralazine   cardiology follow up  repeat CT chest noted     *pleural effusion   small to mod L and small R pleural effusion with LLL complete and RLL near complete compressive atelectasis   CT surg consult pending given pt has SOB   incentive spirometry     *THERESA on CKD IV  HD initiated 12/13/22 via Right femoral access  Improvement in renal indices with HD   renal- chronic medical disease  Avoid Nephrotoxins  s/p Bicarbonate and Lokelma  cont HD per renal   family want to cont HD and full code  Vascular consult appreciate it   Permacath Wed, last dose Eliquis Monday am.     *Emphysematous Cystitis  IV antibiotics  Follow urine culture  Urology consult appreciated  still pending urine culture     *Acute Hypoxic respiratory Failure - Multifactorial. Asthma, COVID-19, CHFpEF, Fluid overload.  Back on 2L   Started on HD 12/13/22  TTE with intact LVF however has grade II diastolic dysfunction and moderate Pulmonary HTN  Supplemental O2, wean as tolerated  currently on 2L   Albuterol MDI q4 ATC, Add Budesonide/Formoterol  per pul, Decadron x 10 days (today day 8/10)    *Anemia  Stable Hgb  Multifactorial - given kidney disease  Received PRBC x 1  Monitor Hgb  IV Iron    *Type 2 Diabetes on insulin  BGM uncontrolled  endocrine following   A1c 11.2  BGM with SSI  hypoglycemic episode while on 10u lantus   due to hyperglycemia, Lantus increase to 8u tonight     *Atrial Fibrillation  Rate controlled, INR returned to normal  Metoprolol, Diltiazem  Start low dose Apixaban    *COVID-19   CT Chest with bilateral upper lobe opacities  Started on Dexamethasone for Asthma; will taper once respiratory status improved  isolation precautions  Will need repeat outpatient CT chest in 3 months for RLL nodule    *HTN-Essential  Improved  monitor blood pressure  Metoprolol to 50mg, continue Diltiazem at current rate  Hydralazine 25mg q8    *DVT ruled out on repeat imaging  Supratherapeutic INR normalized No bleeding  US Duplex- No evidence of deep venous thrombosis in either lower extremity.    *PT  recommendation for [facility]; Spouse wants to take home  Advised RN to mobilize OOB  Incentive Spirometry    Poor prognosis     CODE STATUS- FULL CODE.     Discussed with patient, spouse, and son.     Family (Spouse is main decision maker) wants HD, no restrictions, full code    Dispo: pending Permcath placement on Wed, Last dose Eliquis tomorrow am   pending CT surg regarding need for thoracocentesis

## 2022-12-18 NOTE — CONSULT NOTE ADULT - ASSESSMENT
82F with PMH asthma, diabetes, hyperlipidemia, atrial fibrillation, hypertension, and hyperlipidemia, who was found to have a right superficial femoral vein thrombosis while on warfarin and acute kidney injury. She was also noted to have been recently diagnosed with COVID-19. She has had acute on chronic diastolic heart failure, progressive THERESA requiring dialysis, and hypoxic respiratory failure.  She now has increased bilateral pleural effusions, L>R.

## 2022-12-18 NOTE — CONSULT NOTE ADULT - CONSULT REQUESTED DATE/TIME
16-Dec-2022 13:54
06-Dec-2022 04:31
08-Dec-2022 13:28
13-Dec-2022 14:30
15-Dec-2022
11-Dec-2022 16:29
12-Dec-2022 11:31
06-Dec-2022 11:31
18-Dec-2022 17:33
11-Dec-2022 13:44

## 2022-12-18 NOTE — CONSULT NOTE ADULT - CONSULT REASON
THERESA on CKD
Permacath placement
Pleural effusion
uncontrolled diabetes
GOC
SOB
RLE DVT
shiley placement
emphysematous cystitis
SOB

## 2022-12-18 NOTE — PROGRESS NOTE ADULT - SUBJECTIVE AND OBJECTIVE BOX
Interval Events:  no overnight events  follow up on     patient seen and examined at bedside.      MEDICATIONS  (STANDING):  acetaminophen     Tablet .. 650 milliGRAM(s) Oral every 8 hours  albuterol    90 MICROgram(s) HFA Inhaler 2 Puff(s) Inhalation every 4 hours  apixaban 2.5 milliGRAM(s) Oral two times a day  budesonide  80 MICROgram(s)/formoterol 4.5 MICROgram(s) Inhaler 2 Puff(s) Inhalation two times a day  cefTRIAXone Injectable. 1000 milliGRAM(s) IV Push every 24 hours  chlorhexidine 2% Cloths 1 Application(s) Topical daily  cholecalciferol 1000 Unit(s) Oral daily  dexAMETHasone  Injectable 6 milliGRAM(s) IV Push daily  dextrose 5%. 1000 milliLiter(s) (50 mL/Hr) IV Continuous <Continuous>  dextrose 5%. 1000 milliLiter(s) (100 mL/Hr) IV Continuous <Continuous>  dextrose 50% Injectable 12.5 Gram(s) IV Push once  dextrose 50% Injectable 25 Gram(s) IV Push once  dextrose 50% Injectable 25 Gram(s) IV Push once  diltiazem    milliGRAM(s) Oral daily  epoetin susie-epbx (RETACRIT) Injectable 27750 Unit(s) SubCutaneous <User Schedule>  escitalopram 20 milliGRAM(s) Oral daily  glucagon  Injectable 1 milliGRAM(s) IntraMuscular once  guaiFENesin ER 1200 milliGRAM(s) Oral every 12 hours  hydrALAZINE 25 milliGRAM(s) Oral every 8 hours  insulin glargine Injectable (LANTUS) 6 Unit(s) SubCutaneous at bedtime  insulin lispro (ADMELOG) corrective regimen sliding scale   SubCutaneous three times a day before meals  insulin lispro Injectable (ADMELOG) 3 Unit(s) SubCutaneous three times a day before meals  metoprolol tartrate 50 milliGRAM(s) Oral two times a day  pantoprazole    Tablet 40 milliGRAM(s) Oral before breakfast    MEDICATIONS  (PRN):  dextrose Oral Gel 15 Gram(s) Oral once PRN Blood Glucose LESS THAN 70 milliGRAM(s)/deciliter  hydrALAZINE Injectable 10 milliGRAM(s) IV Push every 6 hours PRN SBP > 160  LORazepam     Tablet 0.5 milliGRAM(s) Oral every 8 hours PRN Anxiety  melatonin 1 milliGRAM(s) Oral at bedtime PRN Sleep  sodium chloride 0.9% lock flush 10 milliLiter(s) IV Push every 1 hour PRN Pre/post blood products, medications, blood draw, and to maintain line patency      Vital Signs Last 24 Hrs  T(C): 36.8 (18 Dec 2022 09:19), Max: 37.6 (17 Dec 2022 16:48)  T(F): 98.2 (18 Dec 2022 09:19), Max: 99.6 (17 Dec 2022 16:48)  HR: 74 (18 Dec 2022 09:19) (68 - 85)  BP: 142/61 (18 Dec 2022 09:19) (142/61 - 176/79)  BP(mean): --  RR: 17 (18 Dec 2022 09:19) (17 - 18)  SpO2: 98% (18 Dec 2022 09:19) (97% - 98%)    Parameters below as of 18 Dec 2022 09:19  Patient On (Oxygen Delivery Method): nasal cannula      Weight (kg): 58.2 (12-13-22 @ 10:32)    Physical Exam:    Constitutional: Thin elderly female, NAD, sleepy but easily wakes up   HEENT: EOMI, no exophalmos  Neck: trachea midline, no thyroid enlargement  Respiratory: CTAB, normal respirations  Cardiovascular: S1 and S2, RRR  Gastrointestinal: BS+, soft, ntnd  Extremities: B/l LE peripheral edema  Neurological: AOx3, no focal deficits  Skin: no rashes, no acanthosis    LABS  12-18    131<L>  |  97  |  62.0<H>  ----------------------------<  307<H>  5.3   |  22.0  |  3.51<H>    Ca    7.6<L>      18 Dec 2022 06:41  Phos  3.6     12-17                            8.3    14.97 )-----------( 178      ( 18 Dec 2022 06:41 )             28.7       A1C with Estimated Average Glucose Result: 11.2 % (12-07-22 @ 06:07)  A1C with Estimated Average Glucose Result: 10.9 % (12-06-22 @ 16:34)      CAPILLARY BLOOD GLUCOSE      POCT Blood Glucose.: 214 mg/dL (18 Dec 2022 11:44)  POCT Blood Glucose.: 321 mg/dL (18 Dec 2022 08:03)  POCT Blood Glucose.: 293 mg/dL (17 Dec 2022 21:38)  POCT Blood Glucose.: 317 mg/dL (17 Dec 2022 16:28)

## 2022-12-19 LAB
ANION GAP SERPL CALC-SCNC: 13 MMOL/L — SIGNIFICANT CHANGE UP (ref 5–17)
B PERT IGG+IGM PNL SER: ABNORMAL
BUN SERPL-MCNC: 78.5 MG/DL — HIGH (ref 8–20)
CALCIUM SERPL-MCNC: 7.8 MG/DL — LOW (ref 8.4–10.5)
CHLORIDE SERPL-SCNC: 95 MMOL/L — LOW (ref 96–108)
CO2 SERPL-SCNC: 23 MMOL/L — SIGNIFICANT CHANGE UP (ref 22–29)
COLOR FLD: ABNORMAL
CREAT SERPL-MCNC: 4.37 MG/DL — HIGH (ref 0.5–1.3)
EGFR: 10 ML/MIN/1.73M2 — LOW
FLUID INTAKE SUBSTANCE CLASS: SIGNIFICANT CHANGE UP
GLUCOSE BLDC GLUCOMTR-MCNC: 179 MG/DL — HIGH (ref 70–99)
GLUCOSE BLDC GLUCOMTR-MCNC: 298 MG/DL — HIGH (ref 70–99)
GLUCOSE BLDC GLUCOMTR-MCNC: 326 MG/DL — HIGH (ref 70–99)
GLUCOSE BLDC GLUCOMTR-MCNC: 375 MG/DL — HIGH (ref 70–99)
GLUCOSE SERPL-MCNC: 347 MG/DL — HIGH (ref 70–99)
HCT VFR BLD CALC: 28.7 % — LOW (ref 34.5–45)
HGB BLD-MCNC: 8.5 G/DL — LOW (ref 11.5–15.5)
MCHC RBC-ENTMCNC: 21.9 PG — LOW (ref 27–34)
MCHC RBC-ENTMCNC: 29.6 GM/DL — LOW (ref 32–36)
MCV RBC AUTO: 73.8 FL — LOW (ref 80–100)
NRBC # BLD: 5 /100 WBCS — HIGH (ref 0–0)
PH FLD: 8 — SIGNIFICANT CHANGE UP
PLATELET # BLD AUTO: 231 K/UL — SIGNIFICANT CHANGE UP (ref 150–400)
POTASSIUM SERPL-MCNC: 5.7 MMOL/L — HIGH (ref 3.5–5.3)
POTASSIUM SERPL-SCNC: 5.7 MMOL/L — HIGH (ref 3.5–5.3)
RBC # BLD: 3.89 M/UL — SIGNIFICANT CHANGE UP (ref 3.8–5.2)
RBC # FLD: 25.7 % — HIGH (ref 10.3–14.5)
RCV VOL RI: 3000 /UL — HIGH (ref 0–0)
SODIUM SERPL-SCNC: 131 MMOL/L — LOW (ref 135–145)
TOTAL NUCLEATED CELL COUNT, BODY FLUID: 134 /UL — SIGNIFICANT CHANGE UP
TUBE TYPE: SIGNIFICANT CHANGE UP
WBC # BLD: 12.16 K/UL — HIGH (ref 3.8–10.5)
WBC # FLD AUTO: 12.16 K/UL — HIGH (ref 3.8–10.5)

## 2022-12-19 PROCEDURE — 99232 SBSQ HOSP IP/OBS MODERATE 35: CPT

## 2022-12-19 PROCEDURE — 32554 ASPIRATE PLEURA W/O IMAGING: CPT | Mod: LT

## 2022-12-19 PROCEDURE — 99233 SBSQ HOSP IP/OBS HIGH 50: CPT

## 2022-12-19 PROCEDURE — 71045 X-RAY EXAM CHEST 1 VIEW: CPT | Mod: 26

## 2022-12-19 PROCEDURE — 88112 CYTOPATH CELL ENHANCE TECH: CPT | Mod: 26

## 2022-12-19 PROCEDURE — 88305 TISSUE EXAM BY PATHOLOGIST: CPT | Mod: 26

## 2022-12-19 PROCEDURE — 88341 IMHCHEM/IMCYTCHM EA ADD ANTB: CPT | Mod: 26

## 2022-12-19 PROCEDURE — 88342 IMHCHEM/IMCYTCHM 1ST ANTB: CPT | Mod: 26

## 2022-12-19 RX ORDER — INSULIN LISPRO 100/ML
5 VIAL (ML) SUBCUTANEOUS
Refills: 0 | Status: DISCONTINUED | OUTPATIENT
Start: 2022-12-19 | End: 2022-12-21

## 2022-12-19 RX ORDER — ERYTHROPOIETIN 10000 [IU]/ML
20000 INJECTION, SOLUTION INTRAVENOUS; SUBCUTANEOUS
Refills: 0 | Status: DISCONTINUED | OUTPATIENT
Start: 2022-12-19 | End: 2022-12-19

## 2022-12-19 RX ORDER — HEPARIN SODIUM 5000 [USP'U]/ML
5000 INJECTION INTRAVENOUS; SUBCUTANEOUS EVERY 12 HOURS
Refills: 0 | Status: DISCONTINUED | OUTPATIENT
Start: 2022-12-19 | End: 2022-12-22

## 2022-12-19 RX ORDER — INSULIN LISPRO 100/ML
6 VIAL (ML) SUBCUTANEOUS ONCE
Refills: 0 | Status: COMPLETED | OUTPATIENT
Start: 2022-12-19 | End: 2022-12-19

## 2022-12-19 RX ORDER — SIMETHICONE 80 MG/1
80 TABLET, CHEWABLE ORAL ONCE
Refills: 0 | Status: COMPLETED | OUTPATIENT
Start: 2022-12-19 | End: 2022-12-19

## 2022-12-19 RX ORDER — ERYTHROPOIETIN 10000 [IU]/ML
10000 INJECTION, SOLUTION INTRAVENOUS; SUBCUTANEOUS
Refills: 0 | Status: DISCONTINUED | OUTPATIENT
Start: 2022-12-19 | End: 2023-01-06

## 2022-12-19 RX ORDER — INSULIN GLARGINE 100 [IU]/ML
10 INJECTION, SOLUTION SUBCUTANEOUS AT BEDTIME
Refills: 0 | Status: DISCONTINUED | OUTPATIENT
Start: 2022-12-19 | End: 2022-12-21

## 2022-12-19 RX ADMIN — HEPARIN SODIUM 5000 UNIT(S): 5000 INJECTION INTRAVENOUS; SUBCUTANEOUS at 18:50

## 2022-12-19 RX ADMIN — Medication 50 MILLIGRAM(S): at 05:20

## 2022-12-19 RX ADMIN — Medication 650 MILLIGRAM(S): at 21:12

## 2022-12-19 RX ADMIN — Medication 6 MILLIGRAM(S): at 05:19

## 2022-12-19 RX ADMIN — INSULIN GLARGINE 10 UNIT(S): 100 INJECTION, SOLUTION SUBCUTANEOUS at 21:13

## 2022-12-19 RX ADMIN — CEFTRIAXONE 1000 MILLIGRAM(S): 500 INJECTION, POWDER, FOR SOLUTION INTRAMUSCULAR; INTRAVENOUS at 15:51

## 2022-12-19 RX ADMIN — Medication 6: at 18:50

## 2022-12-19 RX ADMIN — Medication 5 UNIT(S): at 18:50

## 2022-12-19 RX ADMIN — ESCITALOPRAM OXALATE 20 MILLIGRAM(S): 10 TABLET, FILM COATED ORAL at 15:51

## 2022-12-19 RX ADMIN — CHLORHEXIDINE GLUCONATE 1 APPLICATION(S): 213 SOLUTION TOPICAL at 20:13

## 2022-12-19 RX ADMIN — Medication 240 MILLIGRAM(S): at 05:20

## 2022-12-19 RX ADMIN — ERYTHROPOIETIN 10000 UNIT(S): 10000 INJECTION, SOLUTION INTRAVENOUS; SUBCUTANEOUS at 11:12

## 2022-12-19 RX ADMIN — Medication 1200 MILLIGRAM(S): at 05:21

## 2022-12-19 RX ADMIN — Medication 650 MILLIGRAM(S): at 16:20

## 2022-12-19 RX ADMIN — ALBUTEROL 2 PUFF(S): 90 AEROSOL, METERED ORAL at 15:50

## 2022-12-19 RX ADMIN — Medication 10: at 08:21

## 2022-12-19 RX ADMIN — Medication 50 MILLIGRAM(S): at 15:51

## 2022-12-19 RX ADMIN — Medication 650 MILLIGRAM(S): at 22:19

## 2022-12-19 RX ADMIN — Medication 650 MILLIGRAM(S): at 15:50

## 2022-12-19 RX ADMIN — ALBUTEROL 2 PUFF(S): 90 AEROSOL, METERED ORAL at 00:00

## 2022-12-19 RX ADMIN — Medication 6 UNIT(S): at 22:35

## 2022-12-19 RX ADMIN — Medication 50 MILLIGRAM(S): at 18:50

## 2022-12-19 RX ADMIN — PANTOPRAZOLE SODIUM 40 MILLIGRAM(S): 20 TABLET, DELAYED RELEASE ORAL at 05:21

## 2022-12-19 RX ADMIN — Medication 1000 UNIT(S): at 15:51

## 2022-12-19 RX ADMIN — Medication 650 MILLIGRAM(S): at 06:30

## 2022-12-19 RX ADMIN — Medication 650 MILLIGRAM(S): at 05:21

## 2022-12-19 RX ADMIN — SIMETHICONE 80 MILLIGRAM(S): 80 TABLET, CHEWABLE ORAL at 05:21

## 2022-12-19 RX ADMIN — ALBUTEROL 2 PUFF(S): 90 AEROSOL, METERED ORAL at 05:42

## 2022-12-19 RX ADMIN — Medication 50 MILLIGRAM(S): at 21:12

## 2022-12-19 NOTE — PROGRESS NOTE ADULT - ASSESSMENT
82F with PMH asthma, diabetes, hyperlipidemia, atrial fibrillation, hypertension, and hyperlipidemia, who was found to have a right superficial femoral vein thrombosis while on warfarin and acute kidney injury. She was also noted to have been recently diagnosed with COVID-19.    Acute on Chronic Diastolic Heart failure  - Resistant to diuresis, HD initiated 12/13/22 via Right femoral access.  - nephrology consult appreciated, next HD tomorrow   - TTE with intact LVF however has grade II diastolic dysfunction and moderate Pulmonary HTN  - Strict I/O, daily weight, Fluid restriction  - Continue Metoprolol and Hydralazine   - CT chest reviewed  - Cardio following    Pleural effusion   - small to mod L and small R pleural effusion with LLL complete and RLL near complete compressive atelectasis   - CT surg consult pending given pt has SOB   - incentive spirometry   - Possible chest tube today    THERESA on CKD IV  - HD initiated 12/13/22 via Right femoral access  - Improvement in renal indices with HD  -  renal- chronic medical disease  - Avoid Nephrotoxins  - s/p Bicarbonate and Lokelma  - cont HD per renal   - family want to cont HD and full code  - Vascular following   - Permacath Wed, hold Eliquis    Emphysematous Cystitis  - IV antibiotics  - Follow urine culture  - Urology consult appreciated    Acute Hypoxic respiratory Failure - Multifactorial. Asthma, COVID-19, CHFpEF, Fluid overload.  - Back on 2L   - Started on HD 12/13/22  - TTE with intact LVF however has grade II diastolic dysfunction and moderate Pulmonary HTN  - Supplemental O2, wean as tolerated  - Albuterol MDI q4 ATC, Add Budesonide/Formoterol  - Per pul, Decadron x 10 days    Anemia  - Stable Hgb  - Multifactorial - given kidney disease  - Received PRBC x 1  - Monitor Hgb  - IV Iron    Type 2 Diabetes on insulin  - BGM uncontrolled  - endocrine following   - A1c 11.2  - BGM with SSI, Lantus    Atrial Fibrillation  - Rate controlled, INR returned to normal  - Metoprolol, Diltiazem  - Eliquis    COVID-19   - CT Chest with bilateral upper lobe opacities  - Started on Dexamethasone for Asthma; will taper once respiratory status improved  - isolation precautions  - Will need repeat outpatient CT chest in 3 months for RLL nodule    HTN-Essential  - Improved  - monitor blood pressure  - Metoprolol to 50mg, continue Diltiazem at current rate  - Hydralazine 25mg q8    DVT ruled out on repeat imaging  - Supratherapeutic INR normalized No bleeding  - US Duplex- No evidence of deep venous thrombosis in either lower extremity.    PT  recommendation for [facility]; Spouse wants to take home    Poor prognosis     CODE STATUS- FULL CODE.     Family (Spouse is main decision maker) wants HD, no restrictions, full code   82F with PMH asthma, diabetes, hyperlipidemia, atrial fibrillation, hypertension, and hyperlipidemia, who was found to have a right superficial femoral vein thrombosis while on warfarin and acute kidney injury. She was also noted to have been recently diagnosed with COVID-19.    Acute on Chronic Diastolic Heart failure  - Resistant to diuresis, HD initiated 12/13/22 via Right femoral access.  - nephrology consult appreciated, next HD tomorrow   - TTE with intact LVF however has grade II diastolic dysfunction and moderate Pulmonary HTN  - Strict I/O, daily weight, Fluid restriction  - Continue Metoprolol and Hydralazine   - CT chest reviewed  - Cardio following    Pleural effusion   - small to mod L and small R pleural effusion with LLL complete and RLL near complete compressive atelectasis   - CT surg consult pending given pt has SOB   - incentive spirometry   - Possible chest tube today    THERESA on CKD IV  - HD initiated 12/13/22 via Right femoral access  - Improvement in renal indices with HD  -  renal- chronic medical disease  - Avoid Nephrotoxins  - s/p Bicarbonate and Lokelma  - cont HD per renal   - family want to cont HD and full code  - Vascular following   - Permacath Wed, hold Eliquis    Emphysematous Cystitis  - IV antibiotics  - Follow urine culture  - Urology consult appreciated    Acute Hypoxic respiratory Failure - Multifactorial. Asthma, COVID-19, CHFpEF, Fluid overload.  - Back on 2L   - Started on HD 12/13/22  - TTE with intact LVF however has grade II diastolic dysfunction and moderate Pulmonary HTN  - Supplemental O2, wean as tolerated  - Albuterol MDI q4 ATC, Add Budesonide/Formoterol  - Per pul, Decadron x 10 days    Anemia  - Stable Hgb  - Multifactorial - given kidney disease  - Received PRBC x 1  - Monitor Hgb  - IV Iron    Type 2 Diabetes on insulin  - BGM uncontrolled  - endocrine following   - A1c 11.2  - BGM with SSI, Lantus    Atrial Fibrillation  - Rate controlled, INR returned to normal  - Metoprolol, Diltiazem  - Eliquis    COVID-19   - CT Chest with bilateral upper lobe opacities  - Started on Dexamethasone for Asthma; will taper once respiratory status improved  - isolation precautions  - Will need repeat outpatient CT chest in 3 months for RLL nodule    HTN-Essential  - Improved  - monitor blood pressure  - Metoprolol to 50mg, continue Diltiazem at current rate  - Hydralazine 25mg q8    DVT ruled out on repeat imaging  - Supratherapeutic INR normalized No bleeding  - US Duplex- No evidence of deep venous thrombosis in either lower extremity.    PT  recommendation for [facility]; Spouse wants to take home    Poor prognosis     CODE STATUS- FULL CODE.     Family (Spouse is main decision maker) wants HD, no restrictions, full code     updated at bedside. Son later updated who is requesting follow up with his father regarding code status. Attempted to call, no answer.

## 2022-12-19 NOTE — PROGRESS NOTE ADULT - ASSESSMENT
83 y/o F with PMHx asthma, DM, HLD, atrial fibrillation, and HTN who was found to have a right superficial femoral vein thrombosis while on warfarin and acute kidney injury. Also found to be COVID+.    1. Uncontrolled T2DM, a1c 11.2%  - Bgs elevated  - Increase Lantus to 10 units qhs  - Increase admelog to 5 units tid with meals  - Continue sliding scale coverage    2. THERESA/CKD  - Fluid overload  - Started on HD, nephrology following    3. Acute Hypoxic respiratory Failure/Asthma/COVID-19/CHFpEF  - On decadron  - Supplemental oxygen as needed  - Albuterol/Budesonide

## 2022-12-19 NOTE — PROGRESS NOTE ADULT - ASSESSMENT
82F with asthma, HLD, DM, afib, HTN, admitted with COVID, pleural effusions, fluid overload, initiated on new dialysis.     #1 Respiratory failure  - off oxygen, improved  - s/p COVID  - fluid overload, s/p HD  - diastolic heart failure, on regimen per primary team  - nebs/ inhalers per pulmonary for asthma     #2 Acute kidney injury on CKD   - supportive measures  - trend creatinine   - avoid nephrotoxins      #3 Pleural effusion  - CT surgery following    #4 Encounter or palliative care  - legal surrogate - Tc- (spouse) [phone] (primary)  JESSICA [phone]-(SON)- support person- [phone]  - seems family is opting to continue all measures, they have been educated regarding overall goals and plan of care. Will peripherally follow, if family opting to forego aggressive measures or dialysis please call us. Available for further discussions if needed.

## 2022-12-19 NOTE — PROCEDURE NOTE - NSPROCDETAILS_GEN_ALL_CORE
blood seen on insertion/dressing applied/flushes easily/secured in place
location identified, draped/prepped, sterile technique used/blood seen on insertion/dressing applied/flushes easily/secured in place/sterile technique, catheter placed
Seldinger technique/dressing applied/secured in place/sterile dressing applied/percutaneous/thoracostomy tube placed percutaneously/ultrasound assessment of fluid (location)
guidewire recovered/lumen(s) aspirated and flushed/sterile dressing applied/sterile technique, catheter placed/ultrasound guidance with use of sterile gel and probe cove

## 2022-12-19 NOTE — PROCEDURE NOTE - NSPOSTCAREGUIDE_GEN_A_CORE
Instructed patient/caregiver regarding signs and symptoms of infection
Verbal/written post procedure instructions were given to patient/caregiver/Instructed patient/caregiver to follow-up with primary care physician/Instructed patient/caregiver regarding signs and symptoms of infection/Keep the cast/splint/dressing clean and dry/Care for catheter as per unit/ICU protocols

## 2022-12-19 NOTE — PROGRESS NOTE ADULT - SUBJECTIVE AND OBJECTIVE BOX
OVERNIGHT EVENTS: per chart, family and patient want to continue all aggressive measures including HD. patient seen in dialysis, comfortable     Present Symptoms:     Dyspnea: none   Nausea/Vomiting: No  Anxiety:  No  Depression: unable   Fatigue: unable   Loss of appetite: unable   Constipation: none     Pain: none currently             Character-            Duration-            Effect-            Factors-            Frequency-            Location-            Severity-    Pain AD Score:  [email or web address] (press ctrl + left click to view)    Review of Systems: Reviewed                     Negative: no chest pain                    All others negative    MEDICATIONS  (STANDING):  acetaminophen     Tablet .. 650 milliGRAM(s) Oral every 8 hours  albuterol    90 MICROgram(s) HFA Inhaler 2 Puff(s) Inhalation every 4 hours  budesonide  80 MICROgram(s)/formoterol 4.5 MICROgram(s) Inhaler 2 Puff(s) Inhalation two times a day  cefTRIAXone Injectable. 1000 milliGRAM(s) IV Push every 24 hours  chlorhexidine 2% Cloths 1 Application(s) Topical daily  cholecalciferol 1000 Unit(s) Oral daily  dexAMETHasone  Injectable 6 milliGRAM(s) IV Push daily  dextrose 5%. 1000 milliLiter(s) (50 mL/Hr) IV Continuous <Continuous>  dextrose 5%. 1000 milliLiter(s) (100 mL/Hr) IV Continuous <Continuous>  dextrose 50% Injectable 25 Gram(s) IV Push once  dextrose 50% Injectable 12.5 Gram(s) IV Push once  dextrose 50% Injectable 25 Gram(s) IV Push once  diltiazem    milliGRAM(s) Oral daily  epoetin susie-epbx (RETACRIT) Injectable 49791 Unit(s) IV Push <User Schedule>  escitalopram 20 milliGRAM(s) Oral daily  glucagon  Injectable 1 milliGRAM(s) IntraMuscular once  guaiFENesin ER 1200 milliGRAM(s) Oral every 12 hours  hydrALAZINE 50 milliGRAM(s) Oral every 8 hours  insulin glargine Injectable (LANTUS) 8 Unit(s) SubCutaneous at bedtime  insulin lispro (ADMELOG) corrective regimen sliding scale   SubCutaneous three times a day before meals  insulin lispro Injectable (ADMELOG) 3 Unit(s) SubCutaneous three times a day before meals  metoprolol tartrate 50 milliGRAM(s) Oral two times a day  pantoprazole    Tablet 40 milliGRAM(s) Oral before breakfast    MEDICATIONS  (PRN):  dextrose Oral Gel 15 Gram(s) Oral once PRN Blood Glucose LESS THAN 70 milliGRAM(s)/deciliter  hydrALAZINE Injectable 10 milliGRAM(s) IV Push every 6 hours PRN SBP > 160  melatonin 1 milliGRAM(s) Oral at bedtime PRN Sleep  sodium chloride 0.9% lock flush 10 milliLiter(s) IV Push every 1 hour PRN Pre/post blood products, medications, blood draw, and to maintain line patency    PHYSICAL EXAM:    Vital Signs Last 24 Hrs  T(C): 36.3 (19 Dec 2022 11:56), Max: 37.2 (18 Dec 2022 16:53)  T(F): 97.4 (19 Dec 2022 11:56), Max: 98.9 (18 Dec 2022 16:53)  HR: 76 (19 Dec 2022 11:56) (68 - 100)  BP: 154/70 (19 Dec 2022 11:56) (132/70 - 209/72)  BP(mean): --  RR: 18 (19 Dec 2022 11:56) (16 - 20)  SpO2: 100% (19 Dec 2022 11:56) (95% - 100%)    Parameters below as of 19 Dec 2022 11:56  Patient On (Oxygen Delivery Method): nasal cannula    General: alert, no acute distress in HD     HEENT: normal     Lungs: comfortable    CV: normal      GI: normal     : Dialysis patient     MSK: weakness    Skin: no rash    LABS:                      8.5    12.16 )-----------( 231      ( 19 Dec 2022 06:05 )             28.7     12-19    131<L>  |  95<L>  |  78.5<H>  ----------------------------<  347<H>  5.7<H>   |  23.0  |  4.37<H>    Ca    7.8<L>      19 Dec 2022 06:05    I&O's Summary    RADIOLOGY & ADDITIONAL STUDIES:    < from: CT Chest No Cont (12.18.22 @ 11:25) >  IMPRESSION: Small to moderate-sized left and small right pleural   effusions with left lower lobe complete and right lower lobe near   complete compressive atelectasis demonstrating overall interval   progression since December 7, 2022. Opacification of some of the   intraparenchymal central airways, left greater than right than right   likely due to internal secretions.    Subacute fractures of the posterior aspects of the left 7th through 11th   ribs.    Small cluster of small nodular opacities within the right upper lobe new   since December 7, 2022 suggestive of endobronchial spread of infection.    --- End of Report---    CRYSTAL SANDOVAL MD; Attending Radiologist  This document has been electronically signed. Dec 18 2022 11:52AM    < end of copied text >    < from: CT Chest No Cont (12.18.22 @ 11:25) >    INTERPRETATION:  Clinical indication: Evaluate for pneumonia, respiratory   failure.    Axial CT images of the chest are obtained without intravenous   administration of contrast.    Comparison is made with the chest CT of December 7, 2022.    No enlarged axillary or mediastinal lymph nodes. Left axillary surgical   clips.    Heart size is enlarged. No pericardial effusion. Main pulmonary artery is   enlarged measuring about 3.5 cm which can be seen in the setting of   pulmonary arterial hypertension as on the prior chest CT of April 12, 2021. Mitral annular calcifications. Vascular calcifications with   involvement of the aorta and the coronary arteries.    Evaluation of the upper abdomen demonstrate density of the liver which   can be seen in the setting of amiodarone administration or be a prior   blood transfusions.    Subcutaneous edema.    Small right and small to moderate-sized left bilateral pleural effusions   with overall interval increase in size since December 7, 2022 and as on   the prior abdominal CT of December 15, 2022.    Evaluation of the lungs demonstrate complete left lower lobe compressive   atelectasis with significantinterval progression since December 7, 2022   and as on the prior abdominal CT of December 15, 2022. Near complete   atelectasis of the right lower lobe also progressed since December 7, 2022. Respiratory motion artifact limits evaluation of the lung   parenchyma.    Cluster of a few ill-defined nodular opacities within the right upper   lobe towards the apex measuring up to about 5 mm is new since December 7, 2022 suggestive of distal airway impaction. Previously noted right middle   lobe subcentimeter nodular opacity is not well evaluated due to motion   artifact may have slightly decreased in size.    Opacification of the left lower lobe segmental and many of the   subsegmental airways likely due to internal secretions. Opacification of   a few of the right lower lobe segmental airways.    Degenerative changes of the spine. Superior endplate loss of height of   the T11 vertebral body is unchanged since April 12, 2021 abdominal CT.   Fractures of the posterior aspects of the left 7th through 11th rib with   minimal callus formation representing subacute fractures. A few other   left rib old fractures.    IMPRESSION: Small to moderate-sized left and small right pleural   effusions with left lower lobe complete and right lower lobe near   complete compressive atelectasis demonstrating overall interval   progression since December 7, 2022. Opacification of some of the   intraparenchymal central airways, left greater than right than right   likely due to internal secretions.    Subacute fractures of the posterior aspects of the left 7th through 11th   ribs.    Small cluster of small nodular opacities within the right upper lobe new   since December 7, 2022 suggestive of endobronchial spread of infection.    --- End of Report---    CRYSTAL SANDOVAL MD; Attending Radiologist  This document has been electronically signed. Dec 18 2022 11:52AM    < end of copied text >    ADVANCE DIRECTIVES/TREATMENT PREFERENCES:  Full code

## 2022-12-19 NOTE — PROGRESS NOTE ADULT - SUBJECTIVE AND OBJECTIVE BOX
INTERVAL EVENTS:  Follow up diabetes management  Hyperglycemia, insulin doses adjusted  Examined at dialysis     MEDICATIONS  (STANDING):  acetaminophen     Tablet .. 650 milliGRAM(s) Oral every 8 hours  albuterol    90 MICROgram(s) HFA Inhaler 2 Puff(s) Inhalation every 4 hours  budesonide  80 MICROgram(s)/formoterol 4.5 MICROgram(s) Inhaler 2 Puff(s) Inhalation two times a day  cefTRIAXone Injectable. 1000 milliGRAM(s) IV Push every 24 hours  chlorhexidine 2% Cloths 1 Application(s) Topical daily  cholecalciferol 1000 Unit(s) Oral daily  dexAMETHasone  Injectable 6 milliGRAM(s) IV Push daily  dextrose 5%. 1000 milliLiter(s) (100 mL/Hr) IV Continuous <Continuous>  dextrose 5%. 1000 milliLiter(s) (50 mL/Hr) IV Continuous <Continuous>  dextrose 50% Injectable 25 Gram(s) IV Push once  dextrose 50% Injectable 12.5 Gram(s) IV Push once  dextrose 50% Injectable 25 Gram(s) IV Push once  diltiazem    milliGRAM(s) Oral daily  epoetin susie-epbx (RETACRIT) Injectable 26029 Unit(s) IV Push <User Schedule>  escitalopram 20 milliGRAM(s) Oral daily  glucagon  Injectable 1 milliGRAM(s) IntraMuscular once  guaiFENesin ER 1200 milliGRAM(s) Oral every 12 hours  heparin   Injectable 5000 Unit(s) SubCutaneous every 12 hours  hydrALAZINE 50 milliGRAM(s) Oral every 8 hours  insulin glargine Injectable (LANTUS) 10 Unit(s) SubCutaneous at bedtime  insulin lispro (ADMELOG) corrective regimen sliding scale   SubCutaneous three times a day before meals  insulin lispro Injectable (ADMELOG) 5 Unit(s) SubCutaneous three times a day before meals  metoprolol tartrate 50 milliGRAM(s) Oral two times a day  pantoprazole    Tablet 40 milliGRAM(s) Oral before breakfast    MEDICATIONS  (PRN):  dextrose Oral Gel 15 Gram(s) Oral once PRN Blood Glucose LESS THAN 70 milliGRAM(s)/deciliter  hydrALAZINE Injectable 10 milliGRAM(s) IV Push every 6 hours PRN SBP > 160  melatonin 1 milliGRAM(s) Oral at bedtime PRN Sleep  sodium chloride 0.9% lock flush 10 milliLiter(s) IV Push every 1 hour PRN Pre/post blood products, medications, blood draw, and to maintain line patency    Allergies  No Known Allergies    Vital Signs Last 24 Hrs  T(C): 36.3 (19 Dec 2022 11:56), Max: 37.2 (18 Dec 2022 16:53)  T(F): 97.4 (19 Dec 2022 11:56), Max: 98.9 (18 Dec 2022 16:53)  HR: 76 (19 Dec 2022 11:56) (68 - 100)  BP: 154/70 (19 Dec 2022 11:56) (132/70 - 209/72)  BP(mean): --  RR: 18 (19 Dec 2022 11:56) (16 - 20)  SpO2: 100% (19 Dec 2022 11:56) (95% - 100%)    Parameters below as of 19 Dec 2022 11:56  Patient On (Oxygen Delivery Method): nasal cannula      PHYSICAL EXAM:  General: Generalized edema  Neck: Supple, trachea midline, no thyromegaly  Respiratory: Lungs clear bilaterally, normal rate, effort  Cardiac: +S1, S2, no m/r/g  GI: +BS, soft, non tender, non distended  Extremities: LE edema  Neuro: A+O    LABS:                        8.5    12.16 )-----------( 231      ( 19 Dec 2022 06:05 )             28.7     12-19    131<L>  |  95<L>  |  78.5<H>  ----------------------------<  347<H>  5.7<H>   |  23.0  |  4.37<H>    Ca    7.8<L>      19 Dec 2022 06:05      POCT Blood Glucose.: 179 mg/dL (12-19-22 @ 11:17)  POCT Blood Glucose.: 375 mg/dL (12-19-22 @ 08:08)  POCT Blood Glucose.: 241 mg/dL (12-18-22 @ 21:25)  POCT Blood Glucose.: 233 mg/dL (12-18-22 @ 16:38)         Implemented All Universal Safety Interventions:  [place] to call system. Call bell, personal items and telephone within reach. Instruct patient to call for assistance. Room bathroom lighting operational. Non-slip footwear when patient is off stretcher. Physically safe environment: no spills, clutter or unnecessary equipment. Stretcher in lowest position, wheels locked, appropriate side rails in place.

## 2022-12-19 NOTE — PROGRESS NOTE ADULT - SUBJECTIVE AND OBJECTIVE BOX
Subjective: Pt feeling well. Has no complaints at this time. Vitals stable, no acute events. Pending thoracentesis with CT surgery today.      MEDICATIONS  (STANDING):  acetaminophen     Tablet .. 650 milliGRAM(s) Oral every 8 hours  albuterol    90 MICROgram(s) HFA Inhaler 2 Puff(s) Inhalation every 4 hours  apixaban 2.5 milliGRAM(s) Oral two times a day  budesonide  80 MICROgram(s)/formoterol 4.5 MICROgram(s) Inhaler 2 Puff(s) Inhalation two times a day  cefTRIAXone Injectable. 1000 milliGRAM(s) IV Push every 24 hours  chlorhexidine 2% Cloths 1 Application(s) Topical daily  cholecalciferol 1000 Unit(s) Oral daily  dexAMETHasone  Injectable 6 milliGRAM(s) IV Push daily  dextrose 5%. 1000 milliLiter(s) (100 mL/Hr) IV Continuous <Continuous>  dextrose 5%. 1000 milliLiter(s) (50 mL/Hr) IV Continuous <Continuous>  dextrose 50% Injectable 25 Gram(s) IV Push once  dextrose 50% Injectable 12.5 Gram(s) IV Push once  dextrose 50% Injectable 25 Gram(s) IV Push once  diltiazem    milliGRAM(s) Oral daily  epoetin susie-epbx (RETACRIT) Injectable 15733 Unit(s) SubCutaneous <User Schedule>  escitalopram 20 milliGRAM(s) Oral daily  glucagon  Injectable 1 milliGRAM(s) IntraMuscular once  guaiFENesin ER 1200 milliGRAM(s) Oral every 12 hours  hydrALAZINE 25 milliGRAM(s) Oral every 8 hours  insulin glargine Injectable (LANTUS) 6 Unit(s) SubCutaneous at bedtime  insulin lispro (ADMELOG) corrective regimen sliding scale   SubCutaneous three times a day before meals  insulin lispro Injectable (ADMELOG) 3 Unit(s) SubCutaneous three times a day before meals  iron sucrose IVPB 100 milliGRAM(s) IV Intermittent every 24 hours  metoprolol tartrate 50 milliGRAM(s) Oral two times a day  pantoprazole    Tablet 40 milliGRAM(s) Oral before breakfast    MEDICATIONS  (PRN):  dextrose Oral Gel 15 Gram(s) Oral once PRN Blood Glucose LESS THAN 70 milliGRAM(s)/deciliter  hydrALAZINE Injectable 10 milliGRAM(s) IV Push every 6 hours PRN SBP > 160  LORazepam     Tablet 0.5 milliGRAM(s) Oral every 8 hours PRN Anxiety  melatonin 1 milliGRAM(s) Oral at bedtime PRN Sleep  sodium chloride 0.9% lock flush 10 milliLiter(s) IV Push every 1 hour PRN Pre/post blood products, medications, blood draw, and to maintain line patency     Vital Signs Last 24 Hrs  T(C): 37 (19 Dec 2022 04:55), Max: 37.2 (18 Dec 2022 16:53)  T(F): 98.6 (19 Dec 2022 04:55), Max: 98.9 (18 Dec 2022 16:53)  HR: 84 (19 Dec 2022 04:55) (74 - 100)  BP: 168/66 (19 Dec 2022 04:55) (142/61 - 209/72)  BP(mean): --  ABP: --  ABP(mean): --  RR: 18 (19 Dec 2022 04:55) (17 - 20)  SpO2: 95% (19 Dec 2022 04:55) (95% - 100%)    O2 Parameters below as of 19 Dec 2022 04:55  Patient On (Oxygen Delivery Method): nasal cannula    Physical Exam:  Constitutional: NAD  HEENT: PERRL, EOMI  Neck: No JVD, FROM without pain  Respiratory: Respirations non-labored, no accessory muscle use  Gastrointestinal: Soft, non-tender, non-distended  Extremities: No peripheral edema, No cyanosis  Neurological: A&O x 3; without gross deficit  Musculoskeletal: No joint pain, swelling, deformity, or point tenderness; no limitation of movement. LUE protected.     .  LABS:                         8.3    14.97 )-----------( 178      ( 18 Dec 2022 06:41 )             28.7     12-18    131<L>  |  97  |  62.0<H>  ----------------------------<  307<H>  5.3   |  22.0  |  3.51<H>    Ca    7.6<L>      18 Dec 2022 06:41                RADIOLOGY, EKG & ADDITIONAL TESTS: Reviewed.     I&O's Detail

## 2022-12-19 NOTE — PROGRESS NOTE ADULT - ASSESSMENT
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

## 2022-12-19 NOTE — PROGRESS NOTE ADULT - SUBJECTIVE AND OBJECTIVE BOX
[facility] Division of Hospital Medicine  Sanjay Crews MD    Chief Complaint:  Patient is a 82y old  Female who presents with a chief complaint of CHF exacerbation, COVID pneumonia (19 Dec 2022 07:36)      SUBJECTIVE / OVERNIGHT EVENTS:  Patient seen and examined at bedside. No acute events reported overnight. No new complaints.    MEDICATIONS  (STANDING):  acetaminophen     Tablet .. 650 milliGRAM(s) Oral every 8 hours  albuterol    90 MICROgram(s) HFA Inhaler 2 Puff(s) Inhalation every 4 hours  budesonide  80 MICROgram(s)/formoterol 4.5 MICROgram(s) Inhaler 2 Puff(s) Inhalation two times a day  cefTRIAXone Injectable. 1000 milliGRAM(s) IV Push every 24 hours  chlorhexidine 2% Cloths 1 Application(s) Topical daily  cholecalciferol 1000 Unit(s) Oral daily  dexAMETHasone  Injectable 6 milliGRAM(s) IV Push daily  dextrose 5%. 1000 milliLiter(s) (50 mL/Hr) IV Continuous <Continuous>  dextrose 5%. 1000 milliLiter(s) (100 mL/Hr) IV Continuous <Continuous>  dextrose 50% Injectable 25 Gram(s) IV Push once  dextrose 50% Injectable 12.5 Gram(s) IV Push once  dextrose 50% Injectable 25 Gram(s) IV Push once  diltiazem    milliGRAM(s) Oral daily  epoetin susie-epbx (RETACRIT) Injectable 02330 Unit(s) IV Push <User Schedule>  escitalopram 20 milliGRAM(s) Oral daily  glucagon  Injectable 1 milliGRAM(s) IntraMuscular once  guaiFENesin ER 1200 milliGRAM(s) Oral every 12 hours  hydrALAZINE 50 milliGRAM(s) Oral every 8 hours  insulin glargine Injectable (LANTUS) 8 Unit(s) SubCutaneous at bedtime  insulin lispro (ADMELOG) corrective regimen sliding scale   SubCutaneous three times a day before meals  insulin lispro Injectable (ADMELOG) 3 Unit(s) SubCutaneous three times a day before meals  metoprolol tartrate 50 milliGRAM(s) Oral two times a day  pantoprazole    Tablet 40 milliGRAM(s) Oral before breakfast    MEDICATIONS  (PRN):  dextrose Oral Gel 15 Gram(s) Oral once PRN Blood Glucose LESS THAN 70 milliGRAM(s)/deciliter  hydrALAZINE Injectable 10 milliGRAM(s) IV Push every 6 hours PRN SBP > 160  melatonin 1 milliGRAM(s) Oral at bedtime PRN Sleep  sodium chloride 0.9% lock flush 10 milliLiter(s) IV Push every 1 hour PRN Pre/post blood products, medications, blood draw, and to maintain line patency        I&O's Summary      PHYSICAL EXAM:  Vital Signs Last 24 Hrs  T(C): 36.8 (19 Dec 2022 08:41), Max: 37.2 (18 Dec 2022 16:53)  T(F): 98.3 (19 Dec 2022 08:41), Max: 98.9 (18 Dec 2022 16:53)  HR: 68 (19 Dec 2022 08:41) (68 - 100)  BP: 132/70 (19 Dec 2022 08:41) (132/70 - 209/72)  BP(mean): --  RR: 18 (19 Dec 2022 08:41) (16 - 20)  SpO2: 100% (19 Dec 2022 08:41) (95% - 100%)    Parameters below as of 19 Dec 2022 08:41  Patient On (Oxygen Delivery Method): nasal cannula            Constitutional: Elderly, frail   HEENT: PERR, EOMI,   Neck: Soft and supple,    Respiratory: Breath sounds are clear bilaterally, base crackles noted, right lower lobe wheezing noted. on supplementary 2L O2   Cardiovascular: S1 and S2,   Gastrointestinal: Bowel Sounds present, soft, nontender,    Extremities: bilateral upper and lower +1 edema noted   Vascular: 2+ peripheral pulses  Neurological: A/O x 3,     LABS:                        8.5    12.16 )-----------( 231      ( 19 Dec 2022 06:05 )             28.7     12-19    131<L>  |  95<L>  |  78.5<H>  ----------------------------<  347<H>  5.7<H>   |  23.0  |  4.37<H>    Ca    7.8<L>      19 Dec 2022 06:05                CAPILLARY BLOOD GLUCOSE      POCT Blood Glucose.: 375 mg/dL (19 Dec 2022 08:08)  POCT Blood Glucose.: 241 mg/dL (18 Dec 2022 21:25)  POCT Blood Glucose.: 233 mg/dL (18 Dec 2022 16:38)  POCT Blood Glucose.: 214 mg/dL (18 Dec 2022 11:44)        RADIOLOGY & ADDITIONAL TESTS:  Results Reviewed:   Imaging Personally Reviewed:  Electrocardiogram Personally Reviewed:

## 2022-12-19 NOTE — PROGRESS NOTE ADULT - SUBJECTIVE AND OBJECTIVE BOX
NEPHROLOGY INTERVAL HPI/OVERNIGHT EVENTS:    Seen on HD this am  Thoracic surgery holding Eliquis and planning on draining L effusion   Meds noted   BP stable         MEDICATIONS  (STANDING):  acetaminophen     Tablet .. 650 milliGRAM(s) Oral every 8 hours  albuterol    90 MICROgram(s) HFA Inhaler 2 Puff(s) Inhalation every 4 hours  budesonide  80 MICROgram(s)/formoterol 4.5 MICROgram(s) Inhaler 2 Puff(s) Inhalation two times a day  cefTRIAXone Injectable. 1000 milliGRAM(s) IV Push every 24 hours  chlorhexidine 2% Cloths 1 Application(s) Topical daily  cholecalciferol 1000 Unit(s) Oral daily  dexAMETHasone  Injectable 6 milliGRAM(s) IV Push daily  dextrose 5%. 1000 milliLiter(s) (50 mL/Hr) IV Continuous <Continuous>  dextrose 5%. 1000 milliLiter(s) (100 mL/Hr) IV Continuous <Continuous>  dextrose 50% Injectable 25 Gram(s) IV Push once  dextrose 50% Injectable 12.5 Gram(s) IV Push once  dextrose 50% Injectable 25 Gram(s) IV Push once  diltiazem    milliGRAM(s) Oral daily  epoetin susie-epbx (RETACRIT) Injectable 06001 Unit(s) IV Push <User Schedule>  escitalopram 20 milliGRAM(s) Oral daily  glucagon  Injectable 1 milliGRAM(s) IntraMuscular once  guaiFENesin ER 1200 milliGRAM(s) Oral every 12 hours  hydrALAZINE 50 milliGRAM(s) Oral every 8 hours  insulin glargine Injectable (LANTUS) 8 Unit(s) SubCutaneous at bedtime  insulin lispro (ADMELOG) corrective regimen sliding scale   SubCutaneous three times a day before meals  insulin lispro Injectable (ADMELOG) 3 Unit(s) SubCutaneous three times a day before meals  metoprolol tartrate 50 milliGRAM(s) Oral two times a day  pantoprazole    Tablet 40 milliGRAM(s) Oral before breakfast    MEDICATIONS  (PRN):  dextrose Oral Gel 15 Gram(s) Oral once PRN Blood Glucose LESS THAN 70 milliGRAM(s)/deciliter  hydrALAZINE Injectable 10 milliGRAM(s) IV Push every 6 hours PRN SBP > 160  melatonin 1 milliGRAM(s) Oral at bedtime PRN Sleep  sodium chloride 0.9% lock flush 10 milliLiter(s) IV Push every 1 hour PRN Pre/post blood products, medications, blood draw, and to maintain line patency      Allergies    No Known Allergies    Intolerances          Vital Signs Last 24 Hrs  T(C): 36.3 (19 Dec 2022 11:56), Max: 37.2 (18 Dec 2022 16:53)  T(F): 97.4 (19 Dec 2022 11:56), Max: 98.9 (18 Dec 2022 16:53)  HR: 76 (19 Dec 2022 11:56) (68 - 100)  BP: 154/70 (19 Dec 2022 11:56) (132/70 - 209/72)  BP(mean): --  RR: 18 (19 Dec 2022 11:56) (16 - 20)  SpO2: 100% (19 Dec 2022 11:56) (95% - 100%)    Parameters below as of 19 Dec 2022 11:56  Patient On (Oxygen Delivery Method): nasal cannula      Daily     Daily   I&O's Detail    I&O's Summary      PHYSICAL EXAM:    GENERAL: Frail, debilitated  HEENT:  Facial edema  NECK: Supple, No JVD  Lungs - Decreased BS at bases L > R  Abd - soft , nontender   EXTREMITIES:  + dependent edema better  . + R femoral ayse cath   LABS:                        8.5    12.16 )-----------( 231      ( 19 Dec 2022 06:05 )             28.7     12-19    131<L>  |  95<L>  |  78.5<H>  ----------------------------<  347<H>  5.7<H>   |  23.0  |  4.37<H>    Ca    7.8<L>      19 Dec 2022 06:05                  < from: CT Chest No Cont (12.18.22 @ 11:25) >    ACC: [id number] EXAM:  CT CHEST                          PROCEDURE DATE:  12/18/2022          INTERPRETATION:  Clinical indication: Evaluate for pneumonia, respiratory   failure.    Axial CT images of the chest are obtained without intravenous   administration of contrast.    Comparison is made with the chest CT of December 7, 2022.    No enlarged axillary or mediastinal lymph nodes. Left axillary surgical   clips.    Heart size is enlarged. No pericardial effusion. Main pulmonary artery is   enlarged measuring about 3.5 cm which can be seen in the setting of   pulmonary arterial hypertension as on the prior chest CT of April 12, 2021. Mitral annular calcifications. Vascular calcifications with   involvement of the aorta and the coronary arteries.    Evaluation of the upper abdomen demonstrate density of the liver which   can be seen in the setting of amiodarone administration or be a prior   blood transfusions.    Subcutaneous edema.    Small right and small to moderate-sized left bilateral pleural effusions   with overall interval increase in size since December 7, 2022 and as on   the prior abdominal CT of December 15, 2022.    Evaluation of the lungs demonstrate complete left lower lobe compressive   atelectasis with significantinterval progression since December 7, 2022   and as on the prior abdominal CT of December 15, 2022. Near complete   atelectasis of the right lower lobe also progressed since December 7, 2022. Respiratory motion artifact limits evaluation of the lung   parenchyma.    Cluster of a few ill-defined nodular opacities within the right upper   lobe towards the apex measuring up to about 5 mm is new since December 7, 2022 suggestive of distal airway impaction. Previously noted right middle   lobe subcentimeter nodular opacity is not well evaluated due to motion   artifact may have slightly decreased in size.    Opacification of the left lower lobe segmental and many of the   subsegmental airways likely due to internal secretions. Opacification of   a few of the right lower lobe segmental airways.    Degenerative changes of the spine. Superior endplate loss of height of   the T11 vertebral body is unchanged since April 12, 2021 abdominal CT.   Fractures of the posterior aspects of the left 7th through 11th rib with   minimal callus formation representing subacute fractures. A few other   left rib old fractures.    IMPRESSION: Small to moderate-sized left and small right pleural   effusions with left lower lobe complete and right lower lobe near   complete compressive atelectasis demonstrating overall interval   progression since December 7, 2022. Opacification of some of the   intraparenchymal central airways, left greater than right than right   likely due to internal secretions.    Subacute fractures of the posterior aspects of the left 7th through 11th   ribs.    Small cluster of small nodular opacities within the right upper lobe new   since December 7, 2022 suggestive of endobronchial spread of infection.    --- End of Report---            CRYSTAL SANDOVAL MD; Attending Radiologist  This document has been electronically signed. Dec 18 2022 11:52AM    < end of copied text >  RADIOLOGY & ADDITIONAL TESTS:

## 2022-12-19 NOTE — PROGRESS NOTE ADULT - ASSESSMENT
81yo F admitted for CHF exacerbation now needing dialysis access.     Plan:  -Vein mapping completed  -LUE Extremity protected   -Cardiology consulted for clearance   -Patient planned for fistula creation and permacath placement on 12/21  -PLEASE HOLD ELIQUIS AFTER MONDAY MORNING DOSE.   -Continue HD via Sh[facility]   -Medically optimize patient

## 2022-12-20 ENCOUNTER — TRANSCRIPTION ENCOUNTER (OUTPATIENT)
Age: 82
End: 2022-12-20

## 2022-12-20 LAB
ALBUMIN FLD-MCNC: 0.7 G/DL — SIGNIFICANT CHANGE UP
ANION GAP SERPL CALC-SCNC: 11 MMOL/L — SIGNIFICANT CHANGE UP (ref 5–17)
APTT BLD: 27.5 SEC — SIGNIFICANT CHANGE UP (ref 27.5–35.5)
BLD GP AB SCN SERPL QL: SIGNIFICANT CHANGE UP
BUN SERPL-MCNC: 63.4 MG/DL — HIGH (ref 8–20)
CALCIUM SERPL-MCNC: 7.9 MG/DL — LOW (ref 8.4–10.5)
CHLORIDE SERPL-SCNC: 96 MMOL/L — SIGNIFICANT CHANGE UP (ref 96–108)
CO2 SERPL-SCNC: 26 MMOL/L — SIGNIFICANT CHANGE UP (ref 22–29)
CREAT SERPL-MCNC: 3.81 MG/DL — HIGH (ref 0.5–1.3)
EGFR: 11 ML/MIN/1.73M2 — LOW
FOLATE+VIT B12 SERBLD-IMP: 2 % — SIGNIFICANT CHANGE UP
GLUCOSE BLDC GLUCOMTR-MCNC: 235 MG/DL — HIGH (ref 70–99)
GLUCOSE BLDC GLUCOMTR-MCNC: 243 MG/DL — HIGH (ref 70–99)
GLUCOSE BLDC GLUCOMTR-MCNC: 277 MG/DL — HIGH (ref 70–99)
GLUCOSE BLDC GLUCOMTR-MCNC: 97 MG/DL — SIGNIFICANT CHANGE UP (ref 70–99)
GLUCOSE FLD-MCNC: 288 MG/DL — SIGNIFICANT CHANGE UP
GLUCOSE SERPL-MCNC: 98 MG/DL — SIGNIFICANT CHANGE UP (ref 70–99)
GRAM STN FLD: SIGNIFICANT CHANGE UP
HCT VFR BLD CALC: 29.8 % — LOW (ref 34.5–45)
HGB BLD-MCNC: 8.9 G/DL — LOW (ref 11.5–15.5)
INR BLD: 0.94 RATIO — SIGNIFICANT CHANGE UP (ref 0.88–1.16)
LDH SERPL L TO P-CCNC: 74 U/L — SIGNIFICANT CHANGE UP
LYMPHOCYTES # FLD: 70 % — SIGNIFICANT CHANGE UP
MCHC RBC-ENTMCNC: 22 PG — LOW (ref 27–34)
MCHC RBC-ENTMCNC: 29.9 GM/DL — LOW (ref 32–36)
MCV RBC AUTO: 73.6 FL — LOW (ref 80–100)
MESOTHL CELL # FLD: 3 % — SIGNIFICANT CHANGE UP
MONOS+MACROS # FLD: 9 % — SIGNIFICANT CHANGE UP
NEUTROPHILS-BODY FLUID: 16 % — SIGNIFICANT CHANGE UP
NRBC # BLD: 8 /100 WBCS — HIGH (ref 0–0)
PLATELET # BLD AUTO: 144 K/UL — LOW (ref 150–400)
POTASSIUM SERPL-MCNC: 4.8 MMOL/L — SIGNIFICANT CHANGE UP (ref 3.5–5.3)
POTASSIUM SERPL-SCNC: 4.8 MMOL/L — SIGNIFICANT CHANGE UP (ref 3.5–5.3)
PROT FLD-MCNC: <1 G/DL — SIGNIFICANT CHANGE UP
PROTHROM AB SERPL-ACNC: 10.9 SEC — SIGNIFICANT CHANGE UP (ref 10.5–13.4)
RBC # BLD: 4.05 M/UL — SIGNIFICANT CHANGE UP (ref 3.8–5.2)
RBC # FLD: 26.8 % — HIGH (ref 10.3–14.5)
SARS-COV-2 RNA SPEC QL NAA+PROBE: DETECTED
SODIUM SERPL-SCNC: 133 MMOL/L — LOW (ref 135–145)
SPECIMEN SOURCE: SIGNIFICANT CHANGE UP
WBC # BLD: 15 K/UL — HIGH (ref 3.8–10.5)
WBC # FLD AUTO: 15 K/UL — HIGH (ref 3.8–10.5)

## 2022-12-20 PROCEDURE — 99231 SBSQ HOSP IP/OBS SF/LOW 25: CPT

## 2022-12-20 PROCEDURE — 71045 X-RAY EXAM CHEST 1 VIEW: CPT | Mod: 26

## 2022-12-20 PROCEDURE — 99233 SBSQ HOSP IP/OBS HIGH 50: CPT

## 2022-12-20 PROCEDURE — 99497 ADVNCD CARE PLAN 30 MIN: CPT | Mod: 25

## 2022-12-20 RX ORDER — CEFAZOLIN SODIUM 1 G
2000 VIAL (EA) INJECTION ONCE
Refills: 0 | Status: DISCONTINUED | OUTPATIENT
Start: 2022-12-20 | End: 2022-12-20

## 2022-12-20 RX ORDER — CEFAZOLIN SODIUM 1 G
2000 VIAL (EA) INJECTION ONCE
Refills: 0 | Status: COMPLETED | OUTPATIENT
Start: 2022-12-20 | End: 2022-12-20

## 2022-12-20 RX ADMIN — CEFTRIAXONE 1000 MILLIGRAM(S): 500 INJECTION, POWDER, FOR SOLUTION INTRAMUSCULAR; INTRAVENOUS at 18:00

## 2022-12-20 RX ADMIN — BUDESONIDE AND FORMOTEROL FUMARATE DIHYDRATE 2 PUFF(S): 160; 4.5 AEROSOL RESPIRATORY (INHALATION) at 09:39

## 2022-12-20 RX ADMIN — HEPARIN SODIUM 5000 UNIT(S): 5000 INJECTION INTRAVENOUS; SUBCUTANEOUS at 05:48

## 2022-12-20 RX ADMIN — BUDESONIDE AND FORMOTEROL FUMARATE DIHYDRATE 2 PUFF(S): 160; 4.5 AEROSOL RESPIRATORY (INHALATION) at 19:55

## 2022-12-20 RX ADMIN — Medication 50 MILLIGRAM(S): at 15:05

## 2022-12-20 RX ADMIN — ALBUTEROL 2 PUFF(S): 90 AEROSOL, METERED ORAL at 18:00

## 2022-12-20 RX ADMIN — HEPARIN SODIUM 5000 UNIT(S): 5000 INJECTION INTRAVENOUS; SUBCUTANEOUS at 18:00

## 2022-12-20 RX ADMIN — Medication 1000 UNIT(S): at 12:25

## 2022-12-20 RX ADMIN — Medication 650 MILLIGRAM(S): at 15:04

## 2022-12-20 RX ADMIN — Medication 650 MILLIGRAM(S): at 06:51

## 2022-12-20 RX ADMIN — INSULIN GLARGINE 10 UNIT(S): 100 INJECTION, SOLUTION SUBCUTANEOUS at 21:05

## 2022-12-20 RX ADMIN — ALBUTEROL 2 PUFF(S): 90 AEROSOL, METERED ORAL at 09:39

## 2022-12-20 RX ADMIN — ALBUTEROL 2 PUFF(S): 90 AEROSOL, METERED ORAL at 12:26

## 2022-12-20 RX ADMIN — Medication 650 MILLIGRAM(S): at 22:00

## 2022-12-20 RX ADMIN — Medication 1200 MILLIGRAM(S): at 05:47

## 2022-12-20 RX ADMIN — Medication 240 MILLIGRAM(S): at 05:47

## 2022-12-20 RX ADMIN — Medication 2000 MILLIGRAM(S): at 12:25

## 2022-12-20 RX ADMIN — CHLORHEXIDINE GLUCONATE 1 APPLICATION(S): 213 SOLUTION TOPICAL at 14:54

## 2022-12-20 RX ADMIN — Medication 50 MILLIGRAM(S): at 05:47

## 2022-12-20 RX ADMIN — Medication 4: at 12:25

## 2022-12-20 RX ADMIN — Medication 50 MILLIGRAM(S): at 21:05

## 2022-12-20 RX ADMIN — PANTOPRAZOLE SODIUM 40 MILLIGRAM(S): 20 TABLET, DELAYED RELEASE ORAL at 05:47

## 2022-12-20 RX ADMIN — Medication 6: at 16:05

## 2022-12-20 RX ADMIN — Medication 6 MILLIGRAM(S): at 05:48

## 2022-12-20 RX ADMIN — Medication 650 MILLIGRAM(S): at 21:05

## 2022-12-20 RX ADMIN — Medication 50 MILLIGRAM(S): at 18:29

## 2022-12-20 RX ADMIN — Medication 650 MILLIGRAM(S): at 16:34

## 2022-12-20 RX ADMIN — Medication 5 UNIT(S): at 16:06

## 2022-12-20 RX ADMIN — Medication 5 UNIT(S): at 12:26

## 2022-12-20 RX ADMIN — Medication 50 MILLIGRAM(S): at 05:49

## 2022-12-20 RX ADMIN — ESCITALOPRAM OXALATE 20 MILLIGRAM(S): 10 TABLET, FILM COATED ORAL at 12:25

## 2022-12-20 RX ADMIN — Medication 650 MILLIGRAM(S): at 05:48

## 2022-12-20 RX ADMIN — Medication 1200 MILLIGRAM(S): at 18:28

## 2022-12-20 NOTE — GOALS OF CARE CONVERSATION - ADVANCED CARE PLANNING - CONVERSATION DETAILS
and decision maker Antonio Riggins requesting DNR/DNI for patient. MOLST form completed. Would like to continue with HD.

## 2022-12-20 NOTE — PROGRESS NOTE ADULT - SUBJECTIVE AND OBJECTIVE BOX
NEPHROLOGY INTERVAL HPI/OVERNIGHT EVENTS:    S/P left chest tube insertion , 600 ml   Vascular removed femoral   For Permacath placement am , AVF deferred for now   Afebrile , BP stable   (-) 2 L with HD 12/19     MEDICATIONS  (STANDING):  acetaminophen     Tablet .. 650 milliGRAM(s) Oral every 8 hours  albuterol    90 MICROgram(s) HFA Inhaler 2 Puff(s) Inhalation every 4 hours  budesonide  80 MICROgram(s)/formoterol 4.5 MICROgram(s) Inhaler 2 Puff(s) Inhalation two times a day  ceFAZolin  Injectable. 2000 milliGRAM(s) IV Push once  cefTRIAXone Injectable. 1000 milliGRAM(s) IV Push every 24 hours  chlorhexidine 2% Cloths 1 Application(s) Topical daily  cholecalciferol 1000 Unit(s) Oral daily  dexAMETHasone  Injectable 6 milliGRAM(s) IV Push daily  dextrose 5%. 1000 milliLiter(s) (100 mL/Hr) IV Continuous <Continuous>  dextrose 5%. 1000 milliLiter(s) (50 mL/Hr) IV Continuous <Continuous>  dextrose 50% Injectable 25 Gram(s) IV Push once  dextrose 50% Injectable 12.5 Gram(s) IV Push once  dextrose 50% Injectable 25 Gram(s) IV Push once  diltiazem    milliGRAM(s) Oral daily  epoetin susie-epbx (RETACRIT) Injectable 13267 Unit(s) IV Push <User Schedule>  escitalopram 20 milliGRAM(s) Oral daily  glucagon  Injectable 1 milliGRAM(s) IntraMuscular once  guaiFENesin ER 1200 milliGRAM(s) Oral every 12 hours  heparin   Injectable 5000 Unit(s) SubCutaneous every 12 hours  hydrALAZINE 50 milliGRAM(s) Oral every 8 hours  insulin glargine Injectable (LANTUS) 10 Unit(s) SubCutaneous at bedtime  insulin lispro (ADMELOG) corrective regimen sliding scale   SubCutaneous three times a day before meals  insulin lispro Injectable (ADMELOG) 5 Unit(s) SubCutaneous three times a day before meals  metoprolol tartrate 50 milliGRAM(s) Oral two times a day  pantoprazole    Tablet 40 milliGRAM(s) Oral before breakfast    MEDICATIONS  (PRN):  dextrose Oral Gel 15 Gram(s) Oral once PRN Blood Glucose LESS THAN 70 milliGRAM(s)/deciliter  hydrALAZINE Injectable 10 milliGRAM(s) IV Push every 6 hours PRN SBP > 160  melatonin 1 milliGRAM(s) Oral at bedtime PRN Sleep  sodium chloride 0.9% lock flush 10 milliLiter(s) IV Push every 1 hour PRN Pre/post blood products, medications, blood draw, and to maintain line patency      Allergies    No Known Allergies    Intolerances          Vital Signs Last 24 Hrs  T(C): 37.4 (20 Dec 2022 10:24), Max: 37.4 (20 Dec 2022 10:24)  T(F): 99.3 (20 Dec 2022 10:24), Max: 99.3 (20 Dec 2022 10:24)  HR: 67 (20 Dec 2022 10:24) (67 - 90)  BP: 111/59 (20 Dec 2022 10:24) (111/59 - 165/78)  BP(mean): --  RR: 17 (20 Dec 2022 10:24) (17 - 18)  SpO2: 94% (20 Dec 2022 10:24) (94% - 100%)    Parameters below as of 20 Dec 2022 10:24  Patient On (Oxygen Delivery Method): nasal cannula      Daily     Daily   I&O's Detail    19 Dec 2022 07:01  -  20 Dec 2022 07:00  --------------------------------------------------------  IN:  Total IN: 0 mL    OUT:    Chest Tube (mL): 580 mL    Other (mL): 2000 mL  Total OUT: 2580 mL    Total NET: -2580 mL        I&O's Summary    19 Dec 2022 07:01  -  20 Dec 2022 07:00  --------------------------------------------------------  IN: 0 mL / OUT: 2580 mL / NET: -2580 mL        PHYSICAL EXAM:      GENERAL: Frail, debilitated  HEENT:  Facial edema  NECK: Supple, No JVD  Lungs - Decreased BS at bases L > R  Abd - soft , nontender   EXTREMITIES:  + dependent edema better  .       LABS:                        8.9    15.00 )-----------( 144      ( 20 Dec 2022 05:53 )             29.8     12-20    133<L>  |  96  |  63.4<H>  ----------------------------<  98  4.8   |  26.0  |  3.81<H>    Ca    7.9<L>      20 Dec 2022 05:53                  RADIOLOGY & ADDITIONAL TESTS:

## 2022-12-20 NOTE — DISCHARGE NOTE NURSING/CASE MANAGEMENT/SOCIAL WORK - NSDCPEFALRISK_GEN_ALL_CORE
For information on Fall & Injury Prevention, visit: [email or web address] OR  [email or web address] OR  https://www.cdc.gov/steadi/patient.html

## 2022-12-20 NOTE — DISCHARGE NOTE NURSING/CASE MANAGEMENT/SOCIAL WORK - PATIENT PORTAL LINK FT
You can access the FollowMyHealth Patient Portal offered by  by registering at the following website: [email or web address]. By joining [facility]’s FollowMyHealth portal, you will also be able to view your health information using other applications (apps) compatible with our system.

## 2022-12-20 NOTE — PROGRESS NOTE ADULT - ASSESSMENT
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

## 2022-12-20 NOTE — PROGRESS NOTE ADULT - SUBJECTIVE AND OBJECTIVE BOX
Vascular Surgery follow up  ESRD  HD currently via right groin temporary access  labs/vitals reviewed  elevated WBC, recent placement of pleural catheter     AVF put on hold due to left upper ext trauma/ extensive ecchymosis   Coags, currently corrected  HD performed yesterday    -- Will remove Shiley catheter today  -- Plan for PermCath tomorrow... will need to monitor WBC closely.

## 2022-12-20 NOTE — PROGRESS NOTE ADULT - SUBJECTIVE AND OBJECTIVE BOX
[facility] Division of Hospital Medicine  Sanjay Crews MD    Chief Complaint:  Patient is a 82y old  Female who presents with a chief complaint of CHF exacerbation, COVID pneumonia (19 Dec 2022 07:36)      SUBJECTIVE / OVERNIGHT EVENTS:  Patient seen and examined at bedside. S/p chest tube yesterday. No complaints.     MEDICATIONS  (STANDING):  acetaminophen     Tablet .. 650 milliGRAM(s) Oral every 8 hours  albuterol    90 MICROgram(s) HFA Inhaler 2 Puff(s) Inhalation every 4 hours  budesonide  80 MICROgram(s)/formoterol 4.5 MICROgram(s) Inhaler 2 Puff(s) Inhalation two times a day  ceFAZolin  Injectable. 2000 milliGRAM(s) IV Push once  cefTRIAXone Injectable. 1000 milliGRAM(s) IV Push every 24 hours  chlorhexidine 2% Cloths 1 Application(s) Topical daily  cholecalciferol 1000 Unit(s) Oral daily  dexAMETHasone  Injectable 6 milliGRAM(s) IV Push daily  dextrose 5%. 1000 milliLiter(s) (100 mL/Hr) IV Continuous <Continuous>  dextrose 5%. 1000 milliLiter(s) (50 mL/Hr) IV Continuous <Continuous>  dextrose 50% Injectable 25 Gram(s) IV Push once  dextrose 50% Injectable 12.5 Gram(s) IV Push once  dextrose 50% Injectable 25 Gram(s) IV Push once  diltiazem    milliGRAM(s) Oral daily  epoetin susie-epbx (RETACRIT) Injectable 84481 Unit(s) IV Push <User Schedule>  escitalopram 20 milliGRAM(s) Oral daily  glucagon  Injectable 1 milliGRAM(s) IntraMuscular once  guaiFENesin ER 1200 milliGRAM(s) Oral every 12 hours  heparin   Injectable 5000 Unit(s) SubCutaneous every 12 hours  hydrALAZINE 50 milliGRAM(s) Oral every 8 hours  insulin glargine Injectable (LANTUS) 10 Unit(s) SubCutaneous at bedtime  insulin lispro (ADMELOG) corrective regimen sliding scale   SubCutaneous three times a day before meals  insulin lispro Injectable (ADMELOG) 5 Unit(s) SubCutaneous three times a day before meals  metoprolol tartrate 50 milliGRAM(s) Oral two times a day  pantoprazole    Tablet 40 milliGRAM(s) Oral before breakfast    MEDICATIONS  (PRN):  dextrose Oral Gel 15 Gram(s) Oral once PRN Blood Glucose LESS THAN 70 milliGRAM(s)/deciliter  hydrALAZINE Injectable 10 milliGRAM(s) IV Push every 6 hours PRN SBP > 160  melatonin 1 milliGRAM(s) Oral at bedtime PRN Sleep  sodium chloride 0.9% lock flush 10 milliLiter(s) IV Push every 1 hour PRN Pre/post blood products, medications, blood draw, and to maintain line patency        I&O's Summary    19 Dec 2022 07:01  -  20 Dec 2022 07:00  --------------------------------------------------------  IN: 0 mL / OUT: 2580 mL / NET: -2580 mL        PHYSICAL EXAM:  Vital Signs Last 24 Hrs  T(C): 37.4 (20 Dec 2022 10:24), Max: 37.4 (20 Dec 2022 10:24)  T(F): 99.3 (20 Dec 2022 10:24), Max: 99.3 (20 Dec 2022 10:24)  HR: 67 (20 Dec 2022 10:24) (67 - 90)  BP: 111/59 (20 Dec 2022 10:24) (111/59 - 165/78)  BP(mean): --  RR: 17 (20 Dec 2022 10:24) (17 - 18)  SpO2: 94% (20 Dec 2022 10:24) (94% - 100%)    Parameters below as of 20 Dec 2022 10:24  Patient On (Oxygen Delivery Method): nasal cannula            CONSTITUTIONAL: Elderly female, NAD  HEENT: NC/AT, PERRL, no JVD  RESPIRATORY: Dec breath sounds, +pigtail  CARDIOVASCULAR: RRR, S1/S2+, no m/g/r  ABDOMEN: Nontender to palpation, normoactive bowel sounds, no rebound/guarding  MUSCULOSKELETAL: No edema, cyanosis or deformities.  PSYCH: Calm, affect appropriate.  NEUROLOGY: Awake, alert, no focal neurological deficits.   SKIN: No rashes; no palpable lesions  VASC: Distal pulses palpable    LABS:                        8.9    15.00 )-----------( 144      ( 20 Dec 2022 05:53 )             29.8     12-20    133<L>  |  96  |  63.4<H>  ----------------------------<  98  4.8   |  26.0  |  3.81<H>    Ca    7.9<L>      20 Dec 2022 05:53                Culture - Fungal, Body Fluid (collected 19 Dec 2022 18:00)  Source: Pleural Fl Pleural Fluid  Preliminary Report (20 Dec 2022 07:22):    Testing in progress    Culture - Body Fluid with Gram Stain (collected 19 Dec 2022 18:00)  Source: Pleural Fl Pleural Fluid  Gram Stain (20 Dec 2022 04:37):    No polymorphonuclear leukocytes seen    No organisms seen    by cytocentrifuge      CAPILLARY BLOOD GLUCOSE      POCT Blood Glucose.: 97 mg/dL (20 Dec 2022 07:52)  POCT Blood Glucose.: 326 mg/dL (19 Dec 2022 21:11)  POCT Blood Glucose.: 298 mg/dL (19 Dec 2022 18:55)        RADIOLOGY & ADDITIONAL TESTS:  Results Reviewed:   Imaging Personally Reviewed:  Electrocardiogram Personally Reviewed:                                          [facility] Division of Hospital Medicine  Sanjay Crews MD    Chief Complaint:  Patient is a 82y old  Female who presents with a chief complaint of CHF exacerbation, COVID pneumonia (19 Dec 2022 07:36)      SUBJECTIVE / OVERNIGHT EVENTS:  Patient seen and examined at bedside. No acute events reported overnight. No new complaints.    MEDICATIONS  (STANDING):  acetaminophen     Tablet .. 650 milliGRAM(s) Oral every 8 hours  albuterol    90 MICROgram(s) HFA Inhaler 2 Puff(s) Inhalation every 4 hours  budesonide  80 MICROgram(s)/formoterol 4.5 MICROgram(s) Inhaler 2 Puff(s) Inhalation two times a day  ceFAZolin  Injectable. 2000 milliGRAM(s) IV Push once  cefTRIAXone Injectable. 1000 milliGRAM(s) IV Push every 24 hours  chlorhexidine 2% Cloths 1 Application(s) Topical daily  cholecalciferol 1000 Unit(s) Oral daily  dexAMETHasone  Injectable 6 milliGRAM(s) IV Push daily  dextrose 5%. 1000 milliLiter(s) (100 mL/Hr) IV Continuous <Continuous>  dextrose 5%. 1000 milliLiter(s) (50 mL/Hr) IV Continuous <Continuous>  dextrose 50% Injectable 25 Gram(s) IV Push once  dextrose 50% Injectable 12.5 Gram(s) IV Push once  dextrose 50% Injectable 25 Gram(s) IV Push once  diltiazem    milliGRAM(s) Oral daily  epoetin susie-epbx (RETACRIT) Injectable 09880 Unit(s) IV Push <User Schedule>  escitalopram 20 milliGRAM(s) Oral daily  glucagon  Injectable 1 milliGRAM(s) IntraMuscular once  guaiFENesin ER 1200 milliGRAM(s) Oral every 12 hours  heparin   Injectable 5000 Unit(s) SubCutaneous every 12 hours  hydrALAZINE 50 milliGRAM(s) Oral every 8 hours  insulin glargine Injectable (LANTUS) 10 Unit(s) SubCutaneous at bedtime  insulin lispro (ADMELOG) corrective regimen sliding scale   SubCutaneous three times a day before meals  insulin lispro Injectable (ADMELOG) 5 Unit(s) SubCutaneous three times a day before meals  metoprolol tartrate 50 milliGRAM(s) Oral two times a day  pantoprazole    Tablet 40 milliGRAM(s) Oral before breakfast    MEDICATIONS  (PRN):  dextrose Oral Gel 15 Gram(s) Oral once PRN Blood Glucose LESS THAN 70 milliGRAM(s)/deciliter  hydrALAZINE Injectable 10 milliGRAM(s) IV Push every 6 hours PRN SBP > 160  melatonin 1 milliGRAM(s) Oral at bedtime PRN Sleep  sodium chloride 0.9% lock flush 10 milliLiter(s) IV Push every 1 hour PRN Pre/post blood products, medications, blood draw, and to maintain line patency        I&O's Summary    19 Dec 2022 07:01  -  20 Dec 2022 07:00  --------------------------------------------------------  IN: 0 mL / OUT: 2580 mL / NET: -2580 mL        PHYSICAL EXAM:  Vital Signs Last 24 Hrs  T(C): 37.4 (20 Dec 2022 10:24), Max: 37.4 (20 Dec 2022 10:24)  T(F): 99.3 (20 Dec 2022 10:24), Max: 99.3 (20 Dec 2022 10:24)  HR: 67 (20 Dec 2022 10:24) (67 - 90)  BP: 111/59 (20 Dec 2022 10:24) (111/59 - 165/78)  BP(mean): --  RR: 17 (20 Dec 2022 10:24) (17 - 18)  SpO2: 94% (20 Dec 2022 10:24) (94% - 100%)    Parameters below as of 20 Dec 2022 10:24  Patient On (Oxygen Delivery Method): nasal cannula            CONSTITUTIONAL: NAD  HEENT: NC/AT, PERRL, no JVD  RESPIRATORY: CTA bilaterally, normal effort  CARDIOVASCULAR: RRR, S1/S2+, no m/g/r  ABDOMEN: Nontender to palpation, normoactive bowel sounds, no rebound/guarding  MUSCULOSKELETAL: No edema, cyanosis or deformities.  PSYCH: Calm, affect appropriate.  NEUROLOGY: Awake, alert, no focal neurological deficits.   SKIN: No rashes; no palpable lesions  VASC: Distal pulses palpable    LABS:                        8.9    15.00 )-----------( 144      ( 20 Dec 2022 05:53 )             29.8     12-20    133<L>  |  96  |  63.4<H>  ----------------------------<  98  4.8   |  26.0  |  3.81<H>    Ca    7.9<L>      20 Dec 2022 05:53                Culture - Fungal, Body Fluid (collected 19 Dec 2022 18:00)  Source: Pleural Fl Pleural Fluid  Preliminary Report (20 Dec 2022 07:22):    Testing in progress    Culture - Body Fluid with Gram Stain (collected 19 Dec 2022 18:00)  Source: Pleural Fl Pleural Fluid  Gram Stain (20 Dec 2022 04:37):    No polymorphonuclear leukocytes seen    No organisms seen    by cytocentrifuge      CAPILLARY BLOOD GLUCOSE      POCT Blood Glucose.: 97 mg/dL (20 Dec 2022 07:52)  POCT Blood Glucose.: 326 mg/dL (19 Dec 2022 21:11)  POCT Blood Glucose.: 298 mg/dL (19 Dec 2022 18:55)        RADIOLOGY & ADDITIONAL TESTS:  Results Reviewed:   Imaging Personally Reviewed:  Electrocardiogram Personally Reviewed:

## 2022-12-20 NOTE — PROGRESS NOTE ADULT - ASSESSMENT
82F with PMH asthma, diabetes, hyperlipidemia, atrial fibrillation, hypertension, and hyperlipidemia, who was found to have a right superficial femoral vein thrombosis while on warfarin and acute kidney injury. She was also noted to have been recently diagnosed with COVID-19.    THERESA on CKD IV  - HD initiated 12/13/22 via Right femoral access  - Improvement in renal indices with HD  -  renal- chronic medical disease  - Avoid Nephrotoxin  - cont HD per renal   - Vascular following   - Permacath tomorrow, NPO at midnight  - Eliquis held for procedure.     Leukocytosis  - likely in setting of stress and steroid use  - monitor for signs of infection    Acute on Chronic Diastolic Heart failure  - Resistant to diuresis, HD initiated 12/13/22 via Right femoral access.  - TTE with intact LVF however has grade II diastolic dysfunction and moderate Pulmonary HTN  - Strict I/O, daily weight, Fluid restriction  - Continue Metoprolol and Hydralazine   - CT chest reviewed  - Cardio following    Pleural effusion   - Small to mod L and small R pleural effusion with LLL complete and RLL near complete compressive atelectasis   - Incentive spirometry  - S/p chest tube on 12/19, care per TSx  - Follow fluid studies  - Supplemental oxygen PRN, wean as tolerated.    Emphysematous Cystitis  - On Ceftriaxone  - Urology consult appreciated  - UCx still pending, reordered    Acute Hypoxic respiratory Failure - Multifactorial. Asthma, COVID-19, CHFpEF, Fluid overload.  - Started on HD 12/13/22  - TTE with intact LVF however has grade II diastolic dysfunction and moderate Pulmonary HTN  - Supplemental O2, wean as tolerated  - Bronchodilators  - Pulmonary consult appreciated  - D/c Decadron  - Supplemental oxygen PRN, currently on 1LC    Anemia  - Stable Hgb  - Multifactorial - given kidney disease  - Received PRBC x 1  - Monitor Hgb  - IV Iron    Type 2 Diabetes on insulin  - BGM uncontrolled  - endocrine following   - A1c 11.2  - BGM with SSI, Lantus    Atrial Fibrillation  - Rate controlled, INR returned to normal  - Metoprolol, Diltiazem  - Eliquis held     COVID-19   - CT Chest with bilateral upper lobe opacities  - Started on Dexamethasone for Asthma; will taper once respiratory status improved  - isolation precautions  - Will need repeat outpatient CT chest in 3 months for RLL nodule    HTN-Essential  - Improved  - monitor blood pressure  - Metoprolol to 50mg, continue Diltiazem at current rate  - Hydralazine 25mg q8    DVT ruled out on repeat imaging  - Supra-therapeutic INR normalized  - No bleeding noted  - US Duplex- no evidence of deep venous thrombosis in either lower extremity.    PT  recommendation for [facility]; Spouse wants to take home    Poor prognosis     Code Status: DNR/DNI    Family (Spouse is main decision maker) wants HD     updated at bedside.

## 2022-12-20 NOTE — PROGRESS NOTE ADULT - ASSESSMENT
83 y/o F with PMHx asthma, DM, HLD, atrial fibrillation, and HTN who was found to have a right superficial femoral vein thrombosis while on warfarin and acute kidney injury. Also found to be COVID+.    1. Uncontrolled T2DM, a1c 11.2%  - Did not recieve standing admelog or coverage at breakfast, so bgs elevated at lunch  - Continue Lantus 10 units qhs  - Continue admelog 5 units tid with meals  - Continue sliding scale coverage    2. THERESA/CKD  - Fluid overload  - Started on HD, nephrology following    3. Acute Hypoxic respiratory Failure/Asthma/COVID-19/CHFpEF  - On decadron  - Supplemental oxygen as needed  - Albuterol/Budesonide

## 2022-12-20 NOTE — PROGRESS NOTE ADULT - SUBJECTIVE AND OBJECTIVE BOX
Subjective: Pt resting in bed comfortably. No acute events overnight. Pt denies any headache, syncope, chest pain, palpitations, SOB, difficulty breathing, nausea, vomiting, fevers, chills, abdominal discomfort, urinary retention.     Vital Signs:  Vital Signs Last 24 Hrs  T(C): 37.4 (12-20-22 @ 10:24), Max: 37.4 (12-20-22 @ 10:24)  T(F): 99.3 (12-20-22 @ 10:24), Max: 99.3 (12-20-22 @ 10:24)  HR: 67 (12-20-22 @ 10:24) (67 - 90)  BP: 111/59 (12-20-22 @ 10:24) (111/59 - 165/78)  RR: 17 (12-20-22 @ 10:24) (17 - 18)  SpO2: 94% (12-20-22 @ 10:24) (94% - 100%) on (O2)    Telemetry/Alarms:    Relevant labs, radiology and Medications reviewed    Pertinent Physical Exam      12-19 @ 07:01  -  12-20 @ 07:00  --------------------------------------------------------  IN:  Total IN: 0 mL    OUT:    Chest Tube (mL): 580 mL    Other (mL): 2000 mL  Total OUT: 2580 mL    Total NET: -2580 mL          Assessment  82y Female admitted with complaints of Patient is a 82y old  Female who presents with a chief complaint of CHF exacerbation, COVID pneumonia (19 Dec 2022 07:36)  .    On (Date), patient underwent Insertion of chest tube    .

## 2022-12-20 NOTE — PROGRESS NOTE ADULT - SUBJECTIVE AND OBJECTIVE BOX
INTERVAL EVENTS:  Follow up diabetes management    MEDICATIONS  (STANDING):  acetaminophen     Tablet .. 650 milliGRAM(s) Oral every 8 hours  albuterol    90 MICROgram(s) HFA Inhaler 2 Puff(s) Inhalation every 4 hours  budesonide  80 MICROgram(s)/formoterol 4.5 MICROgram(s) Inhaler 2 Puff(s) Inhalation two times a day  cefTRIAXone Injectable. 1000 milliGRAM(s) IV Push every 24 hours  chlorhexidine 2% Cloths 1 Application(s) Topical daily  cholecalciferol 1000 Unit(s) Oral daily  dexAMETHasone  Injectable 6 milliGRAM(s) IV Push daily  dextrose 5%. 1000 milliLiter(s) (50 mL/Hr) IV Continuous <Continuous>  dextrose 5%. 1000 milliLiter(s) (100 mL/Hr) IV Continuous <Continuous>  dextrose 50% Injectable 25 Gram(s) IV Push once  dextrose 50% Injectable 12.5 Gram(s) IV Push once  dextrose 50% Injectable 25 Gram(s) IV Push once  diltiazem    milliGRAM(s) Oral daily  epoetin susie-epbx (RETACRIT) Injectable 31515 Unit(s) IV Push <User Schedule>  escitalopram 20 milliGRAM(s) Oral daily  glucagon  Injectable 1 milliGRAM(s) IntraMuscular once  guaiFENesin ER 1200 milliGRAM(s) Oral every 12 hours  heparin   Injectable 5000 Unit(s) SubCutaneous every 12 hours  hydrALAZINE 50 milliGRAM(s) Oral every 8 hours  insulin glargine Injectable (LANTUS) 10 Unit(s) SubCutaneous at bedtime  insulin lispro (ADMELOG) corrective regimen sliding scale   SubCutaneous three times a day before meals  insulin lispro Injectable (ADMELOG) 5 Unit(s) SubCutaneous three times a day before meals  metoprolol tartrate 50 milliGRAM(s) Oral two times a day  pantoprazole    Tablet 40 milliGRAM(s) Oral before breakfast    MEDICATIONS  (PRN):  dextrose Oral Gel 15 Gram(s) Oral once PRN Blood Glucose LESS THAN 70 milliGRAM(s)/deciliter  hydrALAZINE Injectable 10 milliGRAM(s) IV Push every 6 hours PRN SBP > 160  melatonin 1 milliGRAM(s) Oral at bedtime PRN Sleep  sodium chloride 0.9% lock flush 10 milliLiter(s) IV Push every 1 hour PRN Pre/post blood products, medications, blood draw, and to maintain line patency    Allergies  No Known Allergies    Vital Signs Last 24 Hrs  T(C): 37.4 (20 Dec 2022 10:24), Max: 37.4 (20 Dec 2022 10:24)  T(F): 99.3 (20 Dec 2022 10:24), Max: 99.3 (20 Dec 2022 10:24)  HR: 67 (20 Dec 2022 10:24) (67 - 90)  BP: 111/59 (20 Dec 2022 10:24) (111/59 - 165/78)  BP(mean): --  RR: 17 (20 Dec 2022 10:24) (17 - 18)  SpO2: 94% (20 Dec 2022 10:24) (94% - 100%)    Parameters below as of 20 Dec 2022 10:24  Patient On (Oxygen Delivery Method): nasal cannula      PHYSICAL EXAM:  General: No apparent distress  Neck: Supple, trachea midline, no thyromegaly  Respiratory: Lungs clear bilaterally, normal rate, effort  Cardiac: +S1, S2, no m/r/g  GI: +BS, soft, non tender, non distended  Extremities: Generalized edema  Neuro: Alert/awake      LABS:                        8.9    15.00 )-----------( 144      ( 20 Dec 2022 05:53 )             29.8     12-20    133<L>  |  96  |  63.4<H>  ----------------------------<  98  4.8   |  26.0  |  3.81<H>    Ca    7.9<L>      20 Dec 2022 05:53      POCT Blood Glucose.: 243 mg/dL (12-20-22 @ 11:43)  POCT Blood Glucose.: 97 mg/dL (12-20-22 @ 07:52)  POCT Blood Glucose.: 326 mg/dL (12-19-22 @ 21:11)  POCT Blood Glucose.: 298 mg/dL (12-19-22 @ 18:55)

## 2022-12-21 ENCOUNTER — TRANSCRIPTION ENCOUNTER (OUTPATIENT)
Age: 82
End: 2022-12-21

## 2022-12-21 LAB
ANION GAP SERPL CALC-SCNC: 13 MMOL/L — SIGNIFICANT CHANGE UP (ref 5–17)
BUN SERPL-MCNC: 83.4 MG/DL — HIGH (ref 8–20)
CALCIUM SERPL-MCNC: 7.7 MG/DL — LOW (ref 8.4–10.5)
CHLORIDE SERPL-SCNC: 96 MMOL/L — SIGNIFICANT CHANGE UP (ref 96–108)
CO2 SERPL-SCNC: 22 MMOL/L — SIGNIFICANT CHANGE UP (ref 22–29)
CREAT SERPL-MCNC: 4.15 MG/DL — HIGH (ref 0.5–1.3)
EGFR: 10 ML/MIN/1.73M2 — LOW
GLUCOSE BLDC GLUCOMTR-MCNC: 157 MG/DL — HIGH (ref 70–99)
GLUCOSE BLDC GLUCOMTR-MCNC: 204 MG/DL — HIGH (ref 70–99)
GLUCOSE BLDC GLUCOMTR-MCNC: 219 MG/DL — HIGH (ref 70–99)
GLUCOSE BLDC GLUCOMTR-MCNC: 233 MG/DL — HIGH (ref 70–99)
GLUCOSE SERPL-MCNC: 261 MG/DL — HIGH (ref 70–99)
HCT VFR BLD CALC: 30.8 % — LOW (ref 34.5–45)
HGB BLD-MCNC: 9.3 G/DL — LOW (ref 11.5–15.5)
LDH SERPL L TO P-CCNC: 258 U/L — HIGH (ref 98–192)
MCHC RBC-ENTMCNC: 22.4 PG — LOW (ref 27–34)
MCHC RBC-ENTMCNC: 30.2 GM/DL — LOW (ref 32–36)
MCV RBC AUTO: 74 FL — LOW (ref 80–100)
NRBC # BLD: 4 /100 WBCS — HIGH (ref 0–0)
PHOSPHATE SERPL-MCNC: 5.5 MG/DL — HIGH (ref 2.4–4.7)
PLATELET # BLD AUTO: 159 K/UL — SIGNIFICANT CHANGE UP (ref 150–400)
POTASSIUM SERPL-MCNC: 5.4 MMOL/L — HIGH (ref 3.5–5.3)
POTASSIUM SERPL-SCNC: 5.4 MMOL/L — HIGH (ref 3.5–5.3)
RBC # BLD: 4.16 M/UL — SIGNIFICANT CHANGE UP (ref 3.8–5.2)
RBC # FLD: 27.8 % — HIGH (ref 10.3–14.5)
SODIUM SERPL-SCNC: 131 MMOL/L — LOW (ref 135–145)
WBC # BLD: 11.26 K/UL — HIGH (ref 3.8–10.5)
WBC # FLD AUTO: 11.26 K/UL — HIGH (ref 3.8–10.5)

## 2022-12-21 PROCEDURE — 99232 SBSQ HOSP IP/OBS MODERATE 35: CPT

## 2022-12-21 PROCEDURE — 99231 SBSQ HOSP IP/OBS SF/LOW 25: CPT

## 2022-12-21 PROCEDURE — 71045 X-RAY EXAM CHEST 1 VIEW: CPT | Mod: 26

## 2022-12-21 PROCEDURE — 99233 SBSQ HOSP IP/OBS HIGH 50: CPT

## 2022-12-21 RX ORDER — SODIUM CHLORIDE 9 MG/ML
10 INJECTION INTRAMUSCULAR; INTRAVENOUS; SUBCUTANEOUS
Refills: 0 | Status: DISCONTINUED | OUTPATIENT
Start: 2022-12-21 | End: 2023-01-06

## 2022-12-21 RX ORDER — CHLORHEXIDINE GLUCONATE 213 G/1000ML
1 SOLUTION TOPICAL
Refills: 0 | Status: DISCONTINUED | OUTPATIENT
Start: 2022-12-21 | End: 2023-01-06

## 2022-12-21 RX ORDER — INSULIN LISPRO 100/ML
7 VIAL (ML) SUBCUTANEOUS
Refills: 0 | Status: DISCONTINUED | OUTPATIENT
Start: 2022-12-21 | End: 2022-12-23

## 2022-12-21 RX ORDER — INSULIN GLARGINE 100 [IU]/ML
12 INJECTION, SOLUTION SUBCUTANEOUS AT BEDTIME
Refills: 0 | Status: DISCONTINUED | OUTPATIENT
Start: 2022-12-21 | End: 2022-12-22

## 2022-12-21 RX ADMIN — Medication 50 MILLIGRAM(S): at 21:36

## 2022-12-21 RX ADMIN — BUDESONIDE AND FORMOTEROL FUMARATE DIHYDRATE 2 PUFF(S): 160; 4.5 AEROSOL RESPIRATORY (INHALATION) at 08:12

## 2022-12-21 RX ADMIN — Medication 50 MILLIGRAM(S): at 05:34

## 2022-12-21 RX ADMIN — ALBUTEROL 2 PUFF(S): 90 AEROSOL, METERED ORAL at 08:12

## 2022-12-21 RX ADMIN — Medication 4: at 12:28

## 2022-12-21 RX ADMIN — PANTOPRAZOLE SODIUM 40 MILLIGRAM(S): 20 TABLET, DELAYED RELEASE ORAL at 05:28

## 2022-12-21 RX ADMIN — CEFTRIAXONE 1000 MILLIGRAM(S): 500 INJECTION, POWDER, FOR SOLUTION INTRAMUSCULAR; INTRAVENOUS at 21:35

## 2022-12-21 RX ADMIN — HEPARIN SODIUM 5000 UNIT(S): 5000 INJECTION INTRAVENOUS; SUBCUTANEOUS at 21:38

## 2022-12-21 RX ADMIN — Medication 1200 MILLIGRAM(S): at 21:36

## 2022-12-21 RX ADMIN — ERYTHROPOIETIN 10000 UNIT(S): 10000 INJECTION, SOLUTION INTRAVENOUS; SUBCUTANEOUS at 16:42

## 2022-12-21 RX ADMIN — Medication 1200 MILLIGRAM(S): at 05:27

## 2022-12-21 RX ADMIN — CHLORHEXIDINE GLUCONATE 1 APPLICATION(S): 213 SOLUTION TOPICAL at 19:38

## 2022-12-21 RX ADMIN — Medication 4: at 08:16

## 2022-12-21 RX ADMIN — Medication 650 MILLIGRAM(S): at 06:55

## 2022-12-21 RX ADMIN — Medication 50 MILLIGRAM(S): at 05:29

## 2022-12-21 RX ADMIN — Medication 650 MILLIGRAM(S): at 05:28

## 2022-12-21 RX ADMIN — Medication 240 MILLIGRAM(S): at 05:28

## 2022-12-21 RX ADMIN — HEPARIN SODIUM 5000 UNIT(S): 5000 INJECTION INTRAVENOUS; SUBCUTANEOUS at 05:29

## 2022-12-21 RX ADMIN — Medication 6 MILLIGRAM(S): at 05:28

## 2022-12-21 RX ADMIN — INSULIN GLARGINE 12 UNIT(S): 100 INJECTION, SOLUTION SUBCUTANEOUS at 21:37

## 2022-12-21 RX ADMIN — Medication 650 MILLIGRAM(S): at 21:36

## 2022-12-21 NOTE — PROGRESS NOTE ADULT - ASSESSMENT
83yo F admitted for CHF exacerbation now needing dialysis access, s/p R fem shiley placement on 12/13, subsequently removed, patient made DNR/DNI overnight with plan to continue HD    Plan:  -Vein mapping completed  -LUE Extremity protected   -eliquis held  -cardiology cleared  -will have DNR/DNI rescinded during procedure and reinstated afterwards

## 2022-12-21 NOTE — PROGRESS NOTE ADULT - SUBJECTIVE AND OBJECTIVE BOX
INTERVAL EVENTS:  Follow up diabetes management  Hyperglycemic  Permacath placement    MEDICATIONS  (STANDING):  acetaminophen     Tablet .. 650 milliGRAM(s) Oral every 8 hours  albuterol    90 MICROgram(s) HFA Inhaler 2 Puff(s) Inhalation every 4 hours  budesonide  80 MICROgram(s)/formoterol 4.5 MICROgram(s) Inhaler 2 Puff(s) Inhalation two times a day  cefTRIAXone Injectable. 1000 milliGRAM(s) IV Push every 24 hours  chlorhexidine 2% Cloths 1 Application(s) Topical daily  chlorhexidine 2% Cloths 1 Application(s) Topical <User Schedule>  cholecalciferol 1000 Unit(s) Oral daily  dexAMETHasone  Injectable 6 milliGRAM(s) IV Push daily  dextrose 5%. 1000 milliLiter(s) (100 mL/Hr) IV Continuous <Continuous>  dextrose 5%. 1000 milliLiter(s) (50 mL/Hr) IV Continuous <Continuous>  dextrose 50% Injectable 25 Gram(s) IV Push once  dextrose 50% Injectable 12.5 Gram(s) IV Push once  dextrose 50% Injectable 25 Gram(s) IV Push once  diltiazem    milliGRAM(s) Oral daily  epoetin susie-epbx (RETACRIT) Injectable 29196 Unit(s) IV Push <User Schedule>  escitalopram 20 milliGRAM(s) Oral daily  glucagon  Injectable 1 milliGRAM(s) IntraMuscular once  guaiFENesin ER 1200 milliGRAM(s) Oral every 12 hours  heparin   Injectable 5000 Unit(s) SubCutaneous every 12 hours  hydrALAZINE 50 milliGRAM(s) Oral every 8 hours  insulin glargine Injectable (LANTUS) 12 Unit(s) SubCutaneous at bedtime  insulin lispro (ADMELOG) corrective regimen sliding scale   SubCutaneous three times a day before meals  insulin lispro Injectable (ADMELOG) 7 Unit(s) SubCutaneous three times a day before meals  metoprolol tartrate 50 milliGRAM(s) Oral two times a day  pantoprazole    Tablet 40 milliGRAM(s) Oral before breakfast    MEDICATIONS  (PRN):  dextrose Oral Gel 15 Gram(s) Oral once PRN Blood Glucose LESS THAN 70 milliGRAM(s)/deciliter  hydrALAZINE Injectable 10 milliGRAM(s) IV Push every 6 hours PRN SBP > 160  melatonin 1 milliGRAM(s) Oral at bedtime PRN Sleep  sodium chloride 0.9% lock flush 10 milliLiter(s) IV Push every 1 hour PRN Pre/post blood products, medications, blood draw, and to maintain line patency  sodium chloride 0.9% lock flush 10 milliLiter(s) IV Push every 1 hour PRN Pre/post blood products, medications, blood draw, and to maintain line patency    Allergies  No Known Allergies    Vital Signs Last 24 Hrs  T(C): 36.4 (21 Dec 2022 10:22), Max: 36.7 (20 Dec 2022 16:41)  T(F): 97.6 (21 Dec 2022 10:22), Max: 98.1 (20 Dec 2022 16:41)  HR: 59 (21 Dec 2022 13:00) (59 - 80)  BP: 157/70 (21 Dec 2022 13:00) (136/60 - 158/77)  BP(mean): --  RR: 16 (21 Dec 2022 13:00) (16 - 20)  SpO2: 100% (21 Dec 2022 13:00) (96% - 100%)    Parameters below as of 21 Dec 2022 13:00  Patient On (Oxygen Delivery Method): nasal cannula  O2 Flow (L/min): 2      PHYSICAL EXAM:  General: No apparent distress  Neck: Supple, trachea midline, no thyromegaly  Respiratory: Lungs clear bilaterally, normal rate, effort  Cardiac: +S1, S2, no m/r/g  GI: +BS, soft, non tender, non distended  Extremities: Mild edema  Neuro: A+O X3, no tremor  Pysch: Affect appropriate   Skin: No acanthosis       LABS:                        9.3    11.26 )-----------( 159      ( 21 Dec 2022 04:35 )             30.8     12-21    131<L>  |  96  |  83.4<H>  ----------------------------<  261<H>  5.4<H>   |  22.0  |  4.15<H>    Ca    7.7<L>      21 Dec 2022 04:35  Phos  5.5     12-21            POCT Blood Glucose.: 204 mg/dL (12-21-22 @ 12:26)  POCT Blood Glucose.: 233 mg/dL (12-21-22 @ 07:27)  POCT Blood Glucose.: 235 mg/dL (12-20-22 @ 21:03)  POCT Blood Glucose.: 277 mg/dL (12-20-22 @ 15:51)

## 2022-12-21 NOTE — PROGRESS NOTE ADULT - SUBJECTIVE AND OBJECTIVE BOX
Patient seen and examined at bedside. No acute events overnight, denies pain SOB, remains on 1L NC    Vitals:  Vital Signs Last 24 Hrs  T(C): 36.7 (21 Dec 2022 04:21), Max: 37.4 (20 Dec 2022 10:24)  T(F): 98 (21 Dec 2022 04:21), Max: 99.3 (20 Dec 2022 10:24)  HR: 66 (21 Dec 2022 04:21) (66 - 80)  BP: 158/77 (21 Dec 2022 04:21) (111/59 - 158/77)  BP(mean): --  RR: 18 (21 Dec 2022 04:21) (17 - 18)  SpO2: 99% (21 Dec 2022 04:21) (94% - 99%)    Parameters below as of 21 Dec 2022 04:21  Patient On (Oxygen Delivery Method): nasal cannula  O2 Flow (L/min): 1      Labs:  12-21    131<L>  |  96  |  83.4<H>  ----------------------------<  261<H>  5.4<H>   |  22.0  |  4.15<H>    Ca    7.7<L>      21 Dec 2022 04:35  Phos  5.5     12-21                              9.3    11.26 )-----------( 159      ( 21 Dec 2022 04:35 )             30.8       Exam:  Gen: pt lying in bed, alert, in NAD  Resp: unlabored on 1L NC, L pigtail remains in place, no air leak  CVS: RRR  Abd: soft, NT, ND  Ext: moving all extremities spontaneously, sensation intact, pulses 2+, LUE ecchymosis

## 2022-12-21 NOTE — PROGRESS NOTE ADULT - SUBJECTIVE AND OBJECTIVE BOX
[facility] NP    Pt presents to holding accompanied by .  VSS  Left pigtail in place    - signed consent for permacath

## 2022-12-21 NOTE — PROGRESS NOTE ADULT - ASSESSMENT
82F with asthma, HLD, DM, afib, HTN, admitted with COVID, pleural effusions, fluid overload, initiated on new dialysis.     #1 Respiratory failure  - off oxygen, improved  - s/p COVID  - fluid overload, s/p HD  - diastolic heart failure, on regimen per primary team  - nebs/ inhalers per pulmonary for asthma     #2 Acute kidney injury on CKD   - supportive measures  - trend creatinine   - avoid nephrotoxins   - getting permacath today      #3 Pleural effusion  - CT surgery following    #4 Encounter or palliative care  - legal surrogate - Tc- (spouse) [phone] (primary)  JACK [phone]-(SON)- support person- [phone]  - Discussed overall medical condition, prognosis, and options for plan of care with patients son Jack. At this time, they have opted for do not resuscitate and do not intubate which was completed with Dr. Crews yesterday. They are deciding to move forward with HD as of now, but son feels that mom is not really going to get that much better and he is planning to have further discussion with patient, father, and siblings regarding long term HD. We discussed hospice care and the what that care would look like. Emotional support provided.

## 2022-12-21 NOTE — PROGRESS NOTE ADULT - ASSESSMENT
83 y/o F with PMHx asthma, DM, HLD, atrial fibrillation, and HTN who was found to have a right superficial femoral vein thrombosis while on warfarin and acute kidney injury. Also found to be COVID+.    1. Uncontrolled T2DM, a1c 11.2%- Bgs elevated  - Increase lantus to 12 units qhs  - Increase admelog to 7 units tid with meals  - Continue sliding scale coverage    2. THERESA/CKD  - HD as per nephrology, permacath placed today    3. Acute Hypoxic respiratory Failure/Asthma/COVID-19/CHFpEF  - On decadron  - Supplemental oxygen as needed  - Albuterol/Budesonide 83 y/o F with PMHx asthma, DM, HLD, atrial fibrillation, and HTN who was found to have a right superficial femoral vein thrombosis while on warfarin and acute kidney injury. Also found to be COVID+.  At home on insulin, takes lantus 12 units daily and humalog 6 units tid with meals.    1. Uncontrolled T2DM, a1c 11.2%- Bgs elevated  - Increase lantus to 12 units qhs  - Increase admelog to 7 units tid with meals  - Continue sliding scale coverage    2. THERESA/CKD  - HD as per nephrology, permacath placed today    3. Acute Hypoxic respiratory Failure/Asthma/COVID-19/CHFpEF  - On decadron  - Supplemental oxygen as needed  - Albuterol/Budesonide

## 2022-12-21 NOTE — PROGRESS NOTE ADULT - SUBJECTIVE AND OBJECTIVE BOX
[facility] Division of Hospital Medicine  Sanjay Crews MD    Chief Complaint:  Patient is a 82y old  Female who presents with a chief complaint of CHF exacerbation, COVID pneumonia (19 Dec 2022 07:36)      SUBJECTIVE / OVERNIGHT EVENTS:  Patient seen and examined at bedside. S/p chest tube yesterday. No complaints.     MEDICATIONS  (STANDING):  acetaminophen     Tablet .. 650 milliGRAM(s) Oral every 8 hours  albuterol    90 MICROgram(s) HFA Inhaler 2 Puff(s) Inhalation every 4 hours  budesonide  80 MICROgram(s)/formoterol 4.5 MICROgram(s) Inhaler 2 Puff(s) Inhalation two times a day  ceFAZolin  Injectable. 2000 milliGRAM(s) IV Push once  cefTRIAXone Injectable. 1000 milliGRAM(s) IV Push every 24 hours  chlorhexidine 2% Cloths 1 Application(s) Topical daily  cholecalciferol 1000 Unit(s) Oral daily  dexAMETHasone  Injectable 6 milliGRAM(s) IV Push daily  dextrose 5%. 1000 milliLiter(s) (100 mL/Hr) IV Continuous <Continuous>  dextrose 5%. 1000 milliLiter(s) (50 mL/Hr) IV Continuous <Continuous>  dextrose 50% Injectable 25 Gram(s) IV Push once  dextrose 50% Injectable 12.5 Gram(s) IV Push once  dextrose 50% Injectable 25 Gram(s) IV Push once  diltiazem    milliGRAM(s) Oral daily  epoetin susie-epbx (RETACRIT) Injectable 25616 Unit(s) IV Push <User Schedule>  escitalopram 20 milliGRAM(s) Oral daily  glucagon  Injectable 1 milliGRAM(s) IntraMuscular once  guaiFENesin ER 1200 milliGRAM(s) Oral every 12 hours  heparin   Injectable 5000 Unit(s) SubCutaneous every 12 hours  hydrALAZINE 50 milliGRAM(s) Oral every 8 hours  insulin glargine Injectable (LANTUS) 10 Unit(s) SubCutaneous at bedtime  insulin lispro (ADMELOG) corrective regimen sliding scale   SubCutaneous three times a day before meals  insulin lispro Injectable (ADMELOG) 5 Unit(s) SubCutaneous three times a day before meals  metoprolol tartrate 50 milliGRAM(s) Oral two times a day  pantoprazole    Tablet 40 milliGRAM(s) Oral before breakfast    MEDICATIONS  (PRN):  dextrose Oral Gel 15 Gram(s) Oral once PRN Blood Glucose LESS THAN 70 milliGRAM(s)/deciliter  hydrALAZINE Injectable 10 milliGRAM(s) IV Push every 6 hours PRN SBP > 160  melatonin 1 milliGRAM(s) Oral at bedtime PRN Sleep  sodium chloride 0.9% lock flush 10 milliLiter(s) IV Push every 1 hour PRN Pre/post blood products, medications, blood draw, and to maintain line patency        I&O's Summary    19 Dec 2022 07:01  -  20 Dec 2022 07:00  --------------------------------------------------------  IN: 0 mL / OUT: 2580 mL / NET: -2580 mL        PHYSICAL EXAM:  Vital Signs Last 24 Hrs  T(C): 37.4 (20 Dec 2022 10:24), Max: 37.4 (20 Dec 2022 10:24)  T(F): 99.3 (20 Dec 2022 10:24), Max: 99.3 (20 Dec 2022 10:24)  HR: 67 (20 Dec 2022 10:24) (67 - 90)  BP: 111/59 (20 Dec 2022 10:24) (111/59 - 165/78)  BP(mean): --  RR: 17 (20 Dec 2022 10:24) (17 - 18)  SpO2: 94% (20 Dec 2022 10:24) (94% - 100%)    Parameters below as of 20 Dec 2022 10:24  Patient On (Oxygen Delivery Method): nasal cannula            CONSTITUTIONAL: Elderly female, NAD  HEENT: NC/AT, PERRL, no JVD  RESPIRATORY: Dec breath sounds, +pigtail  CARDIOVASCULAR: RRR, S1/S2+, no m/g/r  ABDOMEN: Nontender to palpation, normoactive bowel sounds, no rebound/guarding  MUSCULOSKELETAL: No edema, cyanosis or deformities.  PSYCH: Calm, affect appropriate.  NEUROLOGY: Awake, alert, no focal neurological deficits.   SKIN: No rashes; no palpable lesions  VASC: Distal pulses palpable    LABS:                        8.9    15.00 )-----------( 144      ( 20 Dec 2022 05:53 )             29.8     12-20    133<L>  |  96  |  63.4<H>  ----------------------------<  98  4.8   |  26.0  |  3.81<H>    Ca    7.9<L>      20 Dec 2022 05:53                Culture - Fungal, Body Fluid (collected 19 Dec 2022 18:00)  Source: Pleural Fl Pleural Fluid  Preliminary Report (20 Dec 2022 07:22):    Testing in progress    Culture - Body Fluid with Gram Stain (collected 19 Dec 2022 18:00)  Source: Pleural Fl Pleural Fluid  Gram Stain (20 Dec 2022 04:37):    No polymorphonuclear leukocytes seen    No organisms seen    by cytocentrifuge      CAPILLARY BLOOD GLUCOSE      POCT Blood Glucose.: 97 mg/dL (20 Dec 2022 07:52)  POCT Blood Glucose.: 326 mg/dL (19 Dec 2022 21:11)  POCT Blood Glucose.: 298 mg/dL (19 Dec 2022 18:55)        RADIOLOGY & ADDITIONAL TESTS:  Results Reviewed:   Imaging Personally Reviewed:  Electrocardiogram Personally Reviewed:                                          [facility] Division of Hospital Medicine  Sanjay Crews MD    Chief Complaint:  Patient is a 82y old  Female who presents with a chief complaint of CHF exacerbation, COVID pneumonia (19 Dec 2022 07:36)      SUBJECTIVE / OVERNIGHT EVENTS:  Patient seen and examined at bedside. No acute events reported overnight. No new complaints.    MEDICATIONS  (STANDING):  acetaminophen     Tablet .. 650 milliGRAM(s) Oral every 8 hours  albuterol    90 MICROgram(s) HFA Inhaler 2 Puff(s) Inhalation every 4 hours  budesonide  80 MICROgram(s)/formoterol 4.5 MICROgram(s) Inhaler 2 Puff(s) Inhalation two times a day  ceFAZolin  Injectable. 2000 milliGRAM(s) IV Push once  cefTRIAXone Injectable. 1000 milliGRAM(s) IV Push every 24 hours  chlorhexidine 2% Cloths 1 Application(s) Topical daily  cholecalciferol 1000 Unit(s) Oral daily  dexAMETHasone  Injectable 6 milliGRAM(s) IV Push daily  dextrose 5%. 1000 milliLiter(s) (100 mL/Hr) IV Continuous <Continuous>  dextrose 5%. 1000 milliLiter(s) (50 mL/Hr) IV Continuous <Continuous>  dextrose 50% Injectable 25 Gram(s) IV Push once  dextrose 50% Injectable 12.5 Gram(s) IV Push once  dextrose 50% Injectable 25 Gram(s) IV Push once  diltiazem    milliGRAM(s) Oral daily  epoetin susie-epbx (RETACRIT) Injectable 39812 Unit(s) IV Push <User Schedule>  escitalopram 20 milliGRAM(s) Oral daily  glucagon  Injectable 1 milliGRAM(s) IntraMuscular once  guaiFENesin ER 1200 milliGRAM(s) Oral every 12 hours  heparin   Injectable 5000 Unit(s) SubCutaneous every 12 hours  hydrALAZINE 50 milliGRAM(s) Oral every 8 hours  insulin glargine Injectable (LANTUS) 10 Unit(s) SubCutaneous at bedtime  insulin lispro (ADMELOG) corrective regimen sliding scale   SubCutaneous three times a day before meals  insulin lispro Injectable (ADMELOG) 5 Unit(s) SubCutaneous three times a day before meals  metoprolol tartrate 50 milliGRAM(s) Oral two times a day  pantoprazole    Tablet 40 milliGRAM(s) Oral before breakfast    MEDICATIONS  (PRN):  dextrose Oral Gel 15 Gram(s) Oral once PRN Blood Glucose LESS THAN 70 milliGRAM(s)/deciliter  hydrALAZINE Injectable 10 milliGRAM(s) IV Push every 6 hours PRN SBP > 160  melatonin 1 milliGRAM(s) Oral at bedtime PRN Sleep  sodium chloride 0.9% lock flush 10 milliLiter(s) IV Push every 1 hour PRN Pre/post blood products, medications, blood draw, and to maintain line patency        I&O's Summary    19 Dec 2022 07:01  -  20 Dec 2022 07:00  --------------------------------------------------------  IN: 0 mL / OUT: 2580 mL / NET: -2580 mL        PHYSICAL EXAM:  Vital Signs Last 24 Hrs  T(C): 37.4 (20 Dec 2022 10:24), Max: 37.4 (20 Dec 2022 10:24)  T(F): 99.3 (20 Dec 2022 10:24), Max: 99.3 (20 Dec 2022 10:24)  HR: 67 (20 Dec 2022 10:24) (67 - 90)  BP: 111/59 (20 Dec 2022 10:24) (111/59 - 165/78)  BP(mean): --  RR: 17 (20 Dec 2022 10:24) (17 - 18)  SpO2: 94% (20 Dec 2022 10:24) (94% - 100%)    Parameters below as of 20 Dec 2022 10:24  Patient On (Oxygen Delivery Method): nasal cannula            CONSTITUTIONAL: NAD  HEENT: NC/AT, PERRL, no JVD  RESPIRATORY: CTA bilaterally, normal effort  CARDIOVASCULAR: RRR, S1/S2+, no m/g/r  ABDOMEN: Nontender to palpation, normoactive bowel sounds, no rebound/guarding  MUSCULOSKELETAL: No edema, cyanosis or deformities.  PSYCH: Calm, affect appropriate.  NEUROLOGY: Awake, alert, no focal neurological deficits.   SKIN: No rashes; no palpable lesions  VASC: Distal pulses palpable    LABS:                        8.9    15.00 )-----------( 144      ( 20 Dec 2022 05:53 )             29.8     12-20    133<L>  |  96  |  63.4<H>  ----------------------------<  98  4.8   |  26.0  |  3.81<H>    Ca    7.9<L>      20 Dec 2022 05:53                Culture - Fungal, Body Fluid (collected 19 Dec 2022 18:00)  Source: Pleural Fl Pleural Fluid  Preliminary Report (20 Dec 2022 07:22):    Testing in progress    Culture - Body Fluid with Gram Stain (collected 19 Dec 2022 18:00)  Source: Pleural Fl Pleural Fluid  Gram Stain (20 Dec 2022 04:37):    No polymorphonuclear leukocytes seen    No organisms seen    by cytocentrifuge      CAPILLARY BLOOD GLUCOSE      POCT Blood Glucose.: 97 mg/dL (20 Dec 2022 07:52)  POCT Blood Glucose.: 326 mg/dL (19 Dec 2022 21:11)  POCT Blood Glucose.: 298 mg/dL (19 Dec 2022 18:55)        RADIOLOGY & ADDITIONAL TESTS:  Results Reviewed:   Imaging Personally Reviewed:  Electrocardiogram Personally Reviewed:                                           [facility] Division of Hospital Medicine  Sanjay Crews MD    Chief Complaint:  Patient is a 82y old  Female who presents with a chief complaint of CHF exacerbation, COVID pneumonia (19 Dec 2022 07:36)      SUBJECTIVE / OVERNIGHT EVENTS:  Patient seen and examined at bedside. No acute events reported overnight. No new complaints. PC today    MEDICATIONS  (STANDING):  acetaminophen     Tablet .. 650 milliGRAM(s) Oral every 8 hours  albuterol    90 MICROgram(s) HFA Inhaler 2 Puff(s) Inhalation every 4 hours  budesonide  80 MICROgram(s)/formoterol 4.5 MICROgram(s) Inhaler 2 Puff(s) Inhalation two times a day  cefTRIAXone Injectable. 1000 milliGRAM(s) IV Push every 24 hours  chlorhexidine 2% Cloths 1 Application(s) Topical daily  chlorhexidine 2% Cloths 1 Application(s) Topical <User Schedule>  cholecalciferol 1000 Unit(s) Oral daily  dexAMETHasone  Injectable 6 milliGRAM(s) IV Push daily  dextrose 5%. 1000 milliLiter(s) (100 mL/Hr) IV Continuous <Continuous>  dextrose 5%. 1000 milliLiter(s) (50 mL/Hr) IV Continuous <Continuous>  dextrose 50% Injectable 25 Gram(s) IV Push once  dextrose 50% Injectable 12.5 Gram(s) IV Push once  dextrose 50% Injectable 25 Gram(s) IV Push once  diltiazem    milliGRAM(s) Oral daily  epoetin susie-epbx (RETACRIT) Injectable 71202 Unit(s) IV Push <User Schedule>  escitalopram 20 milliGRAM(s) Oral daily  glucagon  Injectable 1 milliGRAM(s) IntraMuscular once  guaiFENesin ER 1200 milliGRAM(s) Oral every 12 hours  heparin   Injectable 5000 Unit(s) SubCutaneous every 12 hours  hydrALAZINE 50 milliGRAM(s) Oral every 8 hours  insulin glargine Injectable (LANTUS) 12 Unit(s) SubCutaneous at bedtime  insulin lispro (ADMELOG) corrective regimen sliding scale   SubCutaneous three times a day before meals  insulin lispro Injectable (ADMELOG) 7 Unit(s) SubCutaneous three times a day before meals  metoprolol tartrate 50 milliGRAM(s) Oral two times a day  pantoprazole    Tablet 40 milliGRAM(s) Oral before breakfast    MEDICATIONS  (PRN):  dextrose Oral Gel 15 Gram(s) Oral once PRN Blood Glucose LESS THAN 70 milliGRAM(s)/deciliter  hydrALAZINE Injectable 10 milliGRAM(s) IV Push every 6 hours PRN SBP > 160  melatonin 1 milliGRAM(s) Oral at bedtime PRN Sleep  sodium chloride 0.9% lock flush 10 milliLiter(s) IV Push every 1 hour PRN Pre/post blood products, medications, blood draw, and to maintain line patency  sodium chloride 0.9% lock flush 10 milliLiter(s) IV Push every 1 hour PRN Pre/post blood products, medications, blood draw, and to maintain line patency        I&O's Summary    20 Dec 2022 07:01  -  21 Dec 2022 07:00  --------------------------------------------------------  IN: 0 mL / OUT: 170 mL / NET: -170 mL        PHYSICAL EXAM:  Vital Signs Last 24 Hrs  T(C): 36.4 (21 Dec 2022 10:22), Max: 36.7 (20 Dec 2022 16:41)  T(F): 97.6 (21 Dec 2022 10:22), Max: 98.1 (20 Dec 2022 16:41)  HR: 59 (21 Dec 2022 13:00) (59 - 80)  BP: 157/70 (21 Dec 2022 13:00) (136/60 - 158/77)  BP(mean): --  RR: 16 (21 Dec 2022 13:00) (16 - 20)  SpO2: 100% (21 Dec 2022 13:00) (96% - 100%)    Parameters below as of 21 Dec 2022 13:00  Patient On (Oxygen Delivery Method): nasal cannula  O2 Flow (L/min): 2      CONSTITUTIONAL: Elderly female, NAD  HEENT: NC/AT, PERRL, no JVD  RESPIRATORY: CTA bl, +chest tube  CARDIOVASCULAR: RRR, S1/S2+, no m/g/r  ABDOMEN: Nontender to palpation, normoactive bowel sounds, no rebound/guarding  MUSCULOSKELETAL: No edema, cyanosis or deformities.  PSYCH: Calm, affect appropriate.  NEUROLOGY: Awake, alert, no focal neurological deficits.   SKIN: No rashes; no palpable lesions  VASC: Distal pulses palpable    LABS:                        9.3    11.26 )-----------( 159      ( 21 Dec 2022 04:35 )             30.8     12-21    131<L>  |  96  |  83.4<H>  ----------------------------<  261<H>  5.4<H>   |  22.0  |  4.15<H>    Ca    7.7<L>      21 Dec 2022 04:35  Phos  5.5     12-21      PT/INR - ( 20 Dec 2022 14:40 )   PT: 10.9 sec;   INR: 0.94 ratio         PTT - ( 20 Dec 2022 14:40 )  PTT:27.5 sec          Culture - Fungal, Body Fluid (collected 19 Dec 2022 18:00)  Source: Pleural Fl Pleural Fluid  Preliminary Report (21 Dec 2022 15:05):    Culture is being performed. Fungal cultures are held for 4 weeks.    Culture - Body Fluid with Gram Stain (collected 19 Dec 2022 18:00)  Source: Pleural Fl Pleural Fluid  Gram Stain (20 Dec 2022 04:37):    No polymorphonuclear leukocytes seen    No organisms seen    by cytocentrifuge  Preliminary Report (20 Dec 2022 20:13):    No growth      CAPILLARY BLOOD GLUCOSE      POCT Blood Glucose.: 204 mg/dL (21 Dec 2022 12:26)  POCT Blood Glucose.: 233 mg/dL (21 Dec 2022 07:27)  POCT Blood Glucose.: 235 mg/dL (20 Dec 2022 21:03)  POCT Blood Glucose.: 277 mg/dL (20 Dec 2022 15:51)        RADIOLOGY & ADDITIONAL TESTS:  Results Reviewed:   Imaging Personally Reviewed:  Electrocardiogram Personally Reviewed:

## 2022-12-21 NOTE — PROGRESS NOTE ADULT - SUBJECTIVE AND OBJECTIVE BOX
[facility] NP    S/P Right IJ permacath.  Pt tolerated procedure well.  VSS    -transfer back to bed when criteria met

## 2022-12-21 NOTE — PROGRESS NOTE ADULT - SUBJECTIVE AND OBJECTIVE BOX
NEPHROLOGY INTERVAL HPI/OVERNIGHT EVENTS:    Pt  just had permacath placed and is in recovery room.    MEDICATIONS  (STANDING):  acetaminophen     Tablet .. 650 milliGRAM(s) Oral every 8 hours  albuterol    90 MICROgram(s) HFA Inhaler 2 Puff(s) Inhalation every 4 hours  budesonide  80 MICROgram(s)/formoterol 4.5 MICROgram(s) Inhaler 2 Puff(s) Inhalation two times a day  cefTRIAXone Injectable. 1000 milliGRAM(s) IV Push every 24 hours  chlorhexidine 2% Cloths 1 Application(s) Topical daily  chlorhexidine 2% Cloths 1 Application(s) Topical <User Schedule>  cholecalciferol 1000 Unit(s) Oral daily  dexAMETHasone  Injectable 6 milliGRAM(s) IV Push daily  dextrose 5%. 1000 milliLiter(s) (100 mL/Hr) IV Continuous <Continuous>  dextrose 5%. 1000 milliLiter(s) (50 mL/Hr) IV Continuous <Continuous>  dextrose 50% Injectable 25 Gram(s) IV Push once  dextrose 50% Injectable 12.5 Gram(s) IV Push once  dextrose 50% Injectable 25 Gram(s) IV Push once  diltiazem    milliGRAM(s) Oral daily  epoetin susie-epbx (RETACRIT) Injectable 56026 Unit(s) IV Push <User Schedule>  escitalopram 20 milliGRAM(s) Oral daily  glucagon  Injectable 1 milliGRAM(s) IntraMuscular once  guaiFENesin ER 1200 milliGRAM(s) Oral every 12 hours  heparin   Injectable 5000 Unit(s) SubCutaneous every 12 hours  hydrALAZINE 50 milliGRAM(s) Oral every 8 hours  insulin glargine Injectable (LANTUS) 10 Unit(s) SubCutaneous at bedtime  insulin lispro (ADMELOG) corrective regimen sliding scale   SubCutaneous three times a day before meals  insulin lispro Injectable (ADMELOG) 5 Unit(s) SubCutaneous three times a day before meals  metoprolol tartrate 50 milliGRAM(s) Oral two times a day  pantoprazole    Tablet 40 milliGRAM(s) Oral before breakfast    MEDICATIONS  (PRN):  dextrose Oral Gel 15 Gram(s) Oral once PRN Blood Glucose LESS THAN 70 milliGRAM(s)/deciliter  hydrALAZINE Injectable 10 milliGRAM(s) IV Push every 6 hours PRN SBP > 160  melatonin 1 milliGRAM(s) Oral at bedtime PRN Sleep  sodium chloride 0.9% lock flush 10 milliLiter(s) IV Push every 1 hour PRN Pre/post blood products, medications, blood draw, and to maintain line patency  sodium chloride 0.9% lock flush 10 milliLiter(s) IV Push every 1 hour PRN Pre/post blood products, medications, blood draw, and to maintain line patency      Allergies    No Known Allergies          Vital Signs Last 24 Hrs  T(C): 36.4 (21 Dec 2022 10:22), Max: 36.7 (20 Dec 2022 16:41)  T(F): 97.6 (21 Dec 2022 10:22), Max: 98.1 (20 Dec 2022 16:41)  HR: 60 (21 Dec 2022 12:45) (60 - 80)  BP: 143/65 (21 Dec 2022 12:45) (136/60 - 158/77)  BP(mean): --  RR: 16 (21 Dec 2022 12:45) (16 - 20)  SpO2: 100% (21 Dec 2022 12:45) (96% - 100%)    Parameters below as of 21 Dec 2022 12:45  Patient On (Oxygen Delivery Method): nasal cannula  O2 Flow (L/min): 2        PHYSICAL EXAM:    GENERAL: Appears  acutely and  chronically ill in bed  HEAD:    EYES: open  ENMT:   NECK: right permacath  site dry, clean  NERVOUS SYSTEM:  awake,  at bed  side  CHEST/LUNG: No wheezes; left chest tube to drainage  HEART: no  rub  ABDOMEN: nt  EXTREMITIES:  muscle  wasting, trace  leg edema  LYMPH:   SKIN: pale    LABS:                        9.3    11.26 )-----------( 159      ( 21 Dec 2022 04:35 )             30.8     12-21    131<L>  |  96  |  83.4<H>  ----------------------------<  261<H>  5.4<H>   |  22.0  |  4.15<H>    Ca    7.7<L>      21 Dec 2022 04:35  Phos  5.5     12-21      PT/INR - ( 20 Dec 2022 14:40 )   PT: 10.9 sec;   INR: 0.94 ratio         PTT - ( 20 Dec 2022 14:40 )  PTT:27.5 sec    Phosphorus Level, Serum: 5.5 mg/dL (12-21 @ 04:35)          RADIOLOGY & ADDITIONAL TESTS:

## 2022-12-21 NOTE — PROGRESS NOTE ADULT - ASSESSMENT
82F with PMH asthma, diabetes, hyperlipidemia, atrial fibrillation, hypertension, and hyperlipidemia, who was found to have a right superficial femoral vein thrombosis while on warfarin and acute kidney injury. She was also noted to have been recently diagnosed with COVID-19.    THERESA on CKD IV  - HD initiated 12/13/22 via Right femoral access  - Improvement in renal indices with HD  -  renal- chronic medical disease  - Avoid Nephrotoxin  - cont HD per renal   - Vascular following   - PC today  - Eliquis held for procedure.     Leukocytosis  - likely in setting of stress and steroid use  - monitor for signs of infection  - improving    Acute on Chronic Diastolic Heart failure  - Resistant to diuresis, HD initiated 12/13/22 via Right femoral access.  - TTE with intact LVF however has grade II diastolic dysfunction and moderate Pulmonary HTN  - Strict I/O, daily weight, Fluid restriction  - Continue Metoprolol and Hydralazine   - CT chest reviewed  - Cardio consult appreciated    Pleural effusion   - Small to mod L and small R pleural effusion with LLL complete and RLL near complete compressive atelectasis   - Incentive spirometry  - S/p chest tube on 12/19, care per TSx  - Fluid studies transudative, follow up cultures/cytology.   - Supplemental oxygen PRN, wean as tolerated.    Emphysematous Cystitis  - On Ceftriaxone  - Urology consult appreciated  - UCx pending    Acute Hypoxic respiratory Failure - Multifactorial. Asthma, COVID-19, CHFpEF, Fluid overload.  - Started on HD 12/13/22  - TTE with intact LVF however has grade II diastolic dysfunction and moderate Pulmonary HTN  - Supplemental O2, wean as tolerated  - Bronchodilators  - Pulmonary consult appreciated  - D/esther Decadron  - Supplemental oxygen PRN, currently on 1LC    Anemia  - Stable Hgb  - Multifactorial - given kidney disease  - Received PRBC x 1  - Monitor Hgb  - IV Iron    Type 2 Diabetes on insulin  - BGM uncontrolled  - endocrine following   - A1c 11.2  - BGM with SSI, Lantus    Atrial Fibrillation  - Rate controlled, INR returned to normal  - Metoprolol, Diltiazem  - Eliquis held     COVID-19   - CT Chest with bilateral upper lobe opacities  - Started on Dexamethasone for Asthma; will taper once respiratory status improved  - isolation precautions  - Will need repeat outpatient CT chest in 3 months for RLL nodule    HTN-Essential  - Improved  - monitor blood pressure  - Metoprolol to 50mg, continue Diltiazem at current rate  - Hydralazine 25mg q8    DVT ruled out on repeat imaging  - Supra-therapeutic INR normalized  - No bleeding noted  - US Duplex- no evidence of deep venous thrombosis in either lower extremity.    PT  recommendation for [facility]; Spouse wants to take home    Poor prognosis     Code Status: DNR/DNI    Family (Spouse is main decision maker) wants HD     updated at bedside.

## 2022-12-21 NOTE — BRIEF OPERATIVE NOTE - NSICDXBRIEFPROCEDURE_GEN_ALL_CORE_FT
PROCEDURES:  Insertion, catheter, hemodialysis, tunneled, percutaneous 21-Dec-2022 12:15:06  Lilia Gilmore

## 2022-12-21 NOTE — PROGRESS NOTE ADULT - SUBJECTIVE AND OBJECTIVE BOX
OVERNIGHT EVENTS: getting PermaCath right now     Present Symptoms:     Dyspnea: Mild   Nausea/Vomiting: No  Anxiety:  No  Depression: unable   Fatigue: unable   Loss of appetite: unable   Constipation: none     Pain: none currently             Character-            Duration-            Effect-            Factors-            Frequency-            Location-            Severity-    Pain AD Score:  [email or web address] (press ctrl + left click to view)    Review of Systems: Reviewed                     Negative:                     Positive:  Unable to obtain due to poor mentation   All others negative    MEDICATIONS  (STANDING):  acetaminophen     Tablet .. 650 milliGRAM(s) Oral every 8 hours  albuterol    90 MICROgram(s) HFA Inhaler 2 Puff(s) Inhalation every 4 hours  budesonide  80 MICROgram(s)/formoterol 4.5 MICROgram(s) Inhaler 2 Puff(s) Inhalation two times a day  cefTRIAXone Injectable. 1000 milliGRAM(s) IV Push every 24 hours  chlorhexidine 2% Cloths 1 Application(s) Topical daily  cholecalciferol 1000 Unit(s) Oral daily  dexAMETHasone  Injectable 6 milliGRAM(s) IV Push daily  dextrose 5%. 1000 milliLiter(s) (100 mL/Hr) IV Continuous <Continuous>  dextrose 5%. 1000 milliLiter(s) (50 mL/Hr) IV Continuous <Continuous>  dextrose 50% Injectable 25 Gram(s) IV Push once  dextrose 50% Injectable 12.5 Gram(s) IV Push once  dextrose 50% Injectable 25 Gram(s) IV Push once  diltiazem    milliGRAM(s) Oral daily  epoetin susie-epbx (RETACRIT) Injectable 79281 Unit(s) IV Push <User Schedule>  escitalopram 20 milliGRAM(s) Oral daily  glucagon  Injectable 1 milliGRAM(s) IntraMuscular once  guaiFENesin ER 1200 milliGRAM(s) Oral every 12 hours  heparin   Injectable 5000 Unit(s) SubCutaneous every 12 hours  hydrALAZINE 50 milliGRAM(s) Oral every 8 hours  insulin glargine Injectable (LANTUS) 10 Unit(s) SubCutaneous at bedtime  insulin lispro (ADMELOG) corrective regimen sliding scale   SubCutaneous three times a day before meals  insulin lispro Injectable (ADMELOG) 5 Unit(s) SubCutaneous three times a day before meals  metoprolol tartrate 50 milliGRAM(s) Oral two times a day  pantoprazole    Tablet 40 milliGRAM(s) Oral before breakfast    MEDICATIONS  (PRN):  dextrose Oral Gel 15 Gram(s) Oral once PRN Blood Glucose LESS THAN 70 milliGRAM(s)/deciliter  hydrALAZINE Injectable 10 milliGRAM(s) IV Push every 6 hours PRN SBP > 160  melatonin 1 milliGRAM(s) Oral at bedtime PRN Sleep  sodium chloride 0.9% lock flush 10 milliLiter(s) IV Push every 1 hour PRN Pre/post blood products, medications, blood draw, and to maintain line patency    PHYSICAL EXAM:    Vital Signs Last 24 Hrs  T(C): 36.4 (21 Dec 2022 10:22), Max: 36.7 (20 Dec 2022 16:41)  T(F): 97.6 (21 Dec 2022 10:22), Max: 98.1 (20 Dec 2022 16:41)  HR: 63 (21 Dec 2022 10:22) (63 - 80)  BP: 140/67 (21 Dec 2022 10:22) (140/67 - 158/77)  BP(mean): --  RR: 20 (21 Dec 2022 10:22) (18 - 20)  SpO2: 99% (21 Dec 2022 10:22) (96% - 99%)    Parameters below as of 21 Dec 2022 10:22  Patient On (Oxygen Delivery Method): nasal cannula  O2 Flow (L/min): 2    General: lethargic     HEENT: normal      Lungs: comfortable     CV: normal      GI: normal      : normal      MSK: weakness     Skin: no rash    LABS:                      9.3    11.26 )-----------( 159      ( 21 Dec 2022 04:35 )             30.8     12-21    131<L>  |  96  |  83.4<H>  ----------------------------<  261<H>  5.4<H>   |  22.0  |  4.15<H>    Ca    7.7<L>      21 Dec 2022 04:35  Phos  5.5     12-21    PT/INR - ( 20 Dec 2022 14:40 )   PT: 10.9 sec;   INR: 0.94 ratio       PTT - ( 20 Dec 2022 14:40 )  PTT:27.5 sec    I&O's Summary    20 Dec 2022 07:01  -  21 Dec 2022 07:00  --------------------------------------------------------  IN: 0 mL / OUT: 170 mL / NET: -170 mL    RADIOLOGY & ADDITIONAL STUDIES:    < from: Xray Chest 1 View- PORTABLE-Routine (Xray Chest 1 View- PORTABLE-Routine in AM.) (12.20.22 @ 07:09) >  IMPRESSION:  Small right pleural effusion with likely associated passive   atelectasis, not significantly changed.    Left-sided pigtail catheter not significantly changed in position.   Minimal left apical pneumothorax is less conspicuous, but probably not   significantly changed.    1.4 cm nodular opacity projecting over the left lower chest may   correspond to the nipple.    New patchy/nodular left mid and lower lung opacities on the most recent   image, possibly post-expansion pulmonary edema, atelectasis, or   developing pneumonia.    New trace left pleural effusion on the most recent image.    --- End of Report ---    GUS ENGLISH MD; Attending Radiologist  This document has been electronically signed. Dec 20 2022 11:15AM    < end of copied text >    ADVANCE DIRECTIVES/TREATMENT PREFERENCES:  do not resuscitate and do not intubate

## 2022-12-21 NOTE — PROGRESS NOTE ADULT - SUBJECTIVE AND OBJECTIVE BOX
Brief summary:  82yFemale seen and assessed at bedside.  Pt laying comfortably in hospital bed in NAD.  S/p L PTC placed 12/19.  Additional 50 cc output at bedside.  Has no physical complaints at this time.  Denies f/c, n/v, chest pain, sob, abdominal pain, d/c, urinary symptoms.  ROS neg x 10 except as indicated in HPI.    Overnight events:  None.  Hospital course progressing as expected.        PAST MEDICAL & SURGICAL HISTORY:  Asthma    Diabetes mellitus    Hypertension    Emphysema    Hyperlipemia    S/P appendectomy    S/P tubal ligation    S/P knee surgery  left        Medications:  acetaminophen     Tablet .. 650 milliGRAM(s) Oral every 8 hours  albuterol    90 MICROgram(s) HFA Inhaler 2 Puff(s) Inhalation every 4 hours  budesonide  80 MICROgram(s)/formoterol 4.5 MICROgram(s) Inhaler 2 Puff(s) Inhalation two times a day  cefTRIAXone Injectable. 1000 milliGRAM(s) IV Push every 24 hours  chlorhexidine 2% Cloths 1 Application(s) Topical daily  cholecalciferol 1000 Unit(s) Oral daily  dexAMETHasone  Injectable 6 milliGRAM(s) IV Push daily  dextrose 5%. 1000 milliLiter(s) IV Continuous <Continuous>  dextrose 5%. 1000 milliLiter(s) IV Continuous <Continuous>  dextrose 50% Injectable 25 Gram(s) IV Push once  dextrose 50% Injectable 12.5 Gram(s) IV Push once  dextrose 50% Injectable 25 Gram(s) IV Push once  dextrose Oral Gel 15 Gram(s) Oral once PRN  diltiazem    milliGRAM(s) Oral daily  epoetin susie-epbx (RETACRIT) Injectable 62491 Unit(s) IV Push <User Schedule>  escitalopram 20 milliGRAM(s) Oral daily  glucagon  Injectable 1 milliGRAM(s) IntraMuscular once  guaiFENesin ER 1200 milliGRAM(s) Oral every 12 hours  heparin   Injectable 5000 Unit(s) SubCutaneous every 12 hours  hydrALAZINE 50 milliGRAM(s) Oral every 8 hours  hydrALAZINE Injectable 10 milliGRAM(s) IV Push every 6 hours PRN  insulin glargine Injectable (LANTUS) 10 Unit(s) SubCutaneous at bedtime  insulin lispro (ADMELOG) corrective regimen sliding scale   SubCutaneous three times a day before meals  insulin lispro Injectable (ADMELOG) 5 Unit(s) SubCutaneous three times a day before meals  melatonin 1 milliGRAM(s) Oral at bedtime PRN  metoprolol tartrate 50 milliGRAM(s) Oral two times a day  pantoprazole    Tablet 40 milliGRAM(s) Oral before breakfast  sodium chloride 0.9% lock flush 10 milliLiter(s) IV Push every 1 hour PRN      MEDICATIONS  (PRN):  dextrose Oral Gel 15 Gram(s) Oral once PRN Blood Glucose LESS THAN 70 milliGRAM(s)/deciliter  hydrALAZINE Injectable 10 milliGRAM(s) IV Push every 6 hours PRN SBP > 160  melatonin 1 milliGRAM(s) Oral at bedtime PRN Sleep  sodium chloride 0.9% lock flush 10 milliLiter(s) IV Push every 1 hour PRN Pre/post blood products, medications, blood draw, and to maintain line patency        Daily Review:    Height (cm): 160 (12-21 @ 10:13)                        9.3    11.26 )-----------( 159      ( 21 Dec 2022 04:35 )             30.8   12-21    131<L>  |  96  |  83.4<H>  ----------------------------<  261<H>  5.4<H>   |  22.0  |  4.15<H>    Ca    7.7<L>      21 Dec 2022 04:35  Phos  5.5     12-21    PT/INR - ( 20 Dec 2022 14:40 )   PT: 10.9 sec;   INR: 0.94 ratio       PTT - ( 20 Dec 2022 14:40 )  PTT:27.5 sec    T(C): 36.4 (12-21-22 @ 10:22), Max: 36.7 (12-20-22 @ 16:41)  HR: 63 (12-21-22 @ 10:22) (63 - 80)  BP: 140/67 (12-21-22 @ 10:22) (140/67 - 158/77)  RR: 20 (12-21-22 @ 10:22) (18 - 20)  SpO2: 99% (12-21-22 @ 10:22) (96% - 99%)      CAPILLARY BLOOD GLUCOSE    POCT Blood Glucose.: 233 mg/dL (21 Dec 2022 07:27)  POCT Blood Glucose.: 235 mg/dL (20 Dec 2022 21:03)  POCT Blood Glucose.: 277 mg/dL (20 Dec 2022 15:51)      I&O's Summary    20 Dec 2022 07:01  -  21 Dec 2022 07:00  --------------------------------------------------------  IN: 0 mL / OUT: 170 mL / NET: -170 mL        Physical Exam    Neuro: A+O x 3, non-focal, speech clear and intact  Pulm: coarse breath sounds bilaterally, no wheezing or rales  CV: RRR, +S1S2  Abd: soft, NT, ND, normoactive bowel sounds  Ext: +DP Pulses b/l, +PT pulses, no edema  Chest tubes: left chest tube to WS, - air leak; additional 50 cc output with coughing       Brief summary:  82yFemale seen and assessed at bedside.  Pt laying comfortably in hospital bed in NAD.  S/p L PTC placed 12/19.  Additional 50 cc output at bedside.  Has no physical complaints at this time.  Denies f/c, n/v, chest pain, sob, abdominal pain, d/c, urinary symptoms.  ROS neg x 10 except as indicated in HPI.    Overnight events:  None.  Hospital course progressing as expected.        PAST MEDICAL & SURGICAL HISTORY:  Asthma    Diabetes mellitus    Hypertension    Emphysema    Hyperlipemia    S/P appendectomy    S/P tubal ligation    S/P knee surgery  left        Medications:  acetaminophen     Tablet .. 650 milliGRAM(s) Oral every 8 hours  albuterol    90 MICROgram(s) HFA Inhaler 2 Puff(s) Inhalation every 4 hours  budesonide  80 MICROgram(s)/formoterol 4.5 MICROgram(s) Inhaler 2 Puff(s) Inhalation two times a day  cefTRIAXone Injectable. 1000 milliGRAM(s) IV Push every 24 hours  chlorhexidine 2% Cloths 1 Application(s) Topical daily  cholecalciferol 1000 Unit(s) Oral daily  dexAMETHasone  Injectable 6 milliGRAM(s) IV Push daily  dextrose 5%. 1000 milliLiter(s) IV Continuous <Continuous>  dextrose 5%. 1000 milliLiter(s) IV Continuous <Continuous>  dextrose 50% Injectable 25 Gram(s) IV Push once  dextrose 50% Injectable 12.5 Gram(s) IV Push once  dextrose 50% Injectable 25 Gram(s) IV Push once  dextrose Oral Gel 15 Gram(s) Oral once PRN  diltiazem    milliGRAM(s) Oral daily  epoetin susie-epbx (RETACRIT) Injectable 78685 Unit(s) IV Push <User Schedule>  escitalopram 20 milliGRAM(s) Oral daily  glucagon  Injectable 1 milliGRAM(s) IntraMuscular once  guaiFENesin ER 1200 milliGRAM(s) Oral every 12 hours  heparin   Injectable 5000 Unit(s) SubCutaneous every 12 hours  hydrALAZINE 50 milliGRAM(s) Oral every 8 hours  hydrALAZINE Injectable 10 milliGRAM(s) IV Push every 6 hours PRN  insulin glargine Injectable (LANTUS) 10 Unit(s) SubCutaneous at bedtime  insulin lispro (ADMELOG) corrective regimen sliding scale   SubCutaneous three times a day before meals  insulin lispro Injectable (ADMELOG) 5 Unit(s) SubCutaneous three times a day before meals  melatonin 1 milliGRAM(s) Oral at bedtime PRN  metoprolol tartrate 50 milliGRAM(s) Oral two times a day  pantoprazole    Tablet 40 milliGRAM(s) Oral before breakfast  sodium chloride 0.9% lock flush 10 milliLiter(s) IV Push every 1 hour PRN      MEDICATIONS  (PRN):  dextrose Oral Gel 15 Gram(s) Oral once PRN Blood Glucose LESS THAN 70 milliGRAM(s)/deciliter  hydrALAZINE Injectable 10 milliGRAM(s) IV Push every 6 hours PRN SBP > 160  melatonin 1 milliGRAM(s) Oral at bedtime PRN Sleep  sodium chloride 0.9% lock flush 10 milliLiter(s) IV Push every 1 hour PRN Pre/post blood products, medications, blood draw, and to maintain line patency        Daily Review:    Height (cm): 160 (12-21 @ 10:13)                        9.3    11.26 )-----------( 159      ( 21 Dec 2022 04:35 )             30.8   12-21    131<L>  |  96  |  83.4<H>  ----------------------------<  261<H>  5.4<H>   |  22.0  |  4.15<H>    Ca    7.7<L>      21 Dec 2022 04:35  Phos  5.5     12-21    PT/INR - ( 20 Dec 2022 14:40 )   PT: 10.9 sec;   INR: 0.94 ratio       PTT - ( 20 Dec 2022 14:40 )  PTT:27.5 sec    T(C): 36.4 (12-21-22 @ 10:22), Max: 36.7 (12-20-22 @ 16:41)  HR: 63 (12-21-22 @ 10:22) (63 - 80)  BP: 140/67 (12-21-22 @ 10:22) (140/67 - 158/77)  RR: 20 (12-21-22 @ 10:22) (18 - 20)  SpO2: 99% (12-21-22 @ 10:22) (96% - 99%)      CAPILLARY BLOOD GLUCOSE    POCT Blood Glucose.: 233 mg/dL (21 Dec 2022 07:27)  POCT Blood Glucose.: 235 mg/dL (20 Dec 2022 21:03)  POCT Blood Glucose.: 277 mg/dL (20 Dec 2022 15:51)      I&O's Summary    20 Dec 2022 07:01  -  21 Dec 2022 07:00  --------------------------------------------------------  IN: 0 mL / OUT: 170 mL / NET: -170 mL        Physical Exam    Neuro: A+O x 3, non-focal, speech clear and intact  Pulm: coarse breath sounds bilaterally, no wheezing or rales  CV: RRR, +S1S2  Abd: soft, NT, ND, normoactive bowel sounds  Ext: +DP Pulses b/l, +PT pulses, +1 BL pitting edema  Chest tubes: left chest tube to WS, - air leak; additional 50 cc output with coughing

## 2022-12-21 NOTE — PROGRESS NOTE ADULT - ASSESSMENT
82F with PMH asthma, diabetes, hyperlipidemia, atrial fibrillation, hypertension, and hyperlipidemia, who was found to have a right superficial femoral vein thrombosis while on warfarin and acute kidney injury. She was also noted to have been recently diagnosed with COVID-19. She has had acute on chronic diastolic heart failure, progressive THERESA requiring dialysis, and hypoxic respiratory failure.  She now has increased bilateral pleural effusions, L>R.    12/19 left pigtail catheter inserted 600 cc removed   12/20 - 12/21 chest tube still with high output

## 2022-12-21 NOTE — BRIEF OPERATIVE NOTE - OPERATION/FINDINGS
R IJ tunneled permacath placed under fluoroscopic guidance and under ultrasound, placement confirmed at end of procedure

## 2022-12-22 LAB
ANION GAP SERPL CALC-SCNC: 13 MMOL/L — SIGNIFICANT CHANGE UP (ref 5–17)
BUN SERPL-MCNC: 61.7 MG/DL — HIGH (ref 8–20)
CALCIUM SERPL-MCNC: 7.4 MG/DL — LOW (ref 8.4–10.5)
CHLORIDE SERPL-SCNC: 97 MMOL/L — SIGNIFICANT CHANGE UP (ref 96–108)
CO2 SERPL-SCNC: 23 MMOL/L — SIGNIFICANT CHANGE UP (ref 22–29)
CREAT SERPL-MCNC: 3.56 MG/DL — HIGH (ref 0.5–1.3)
EGFR: 12 ML/MIN/1.73M2 — LOW
GLUCOSE BLDC GLUCOMTR-MCNC: 206 MG/DL — HIGH (ref 70–99)
GLUCOSE BLDC GLUCOMTR-MCNC: 276 MG/DL — HIGH (ref 70–99)
GLUCOSE BLDC GLUCOMTR-MCNC: 281 MG/DL — HIGH (ref 70–99)
GLUCOSE BLDC GLUCOMTR-MCNC: 302 MG/DL — HIGH (ref 70–99)
GLUCOSE SERPL-MCNC: 300 MG/DL — HIGH (ref 70–99)
HCT VFR BLD CALC: 31.4 % — LOW (ref 34.5–45)
HGB BLD-MCNC: 9.5 G/DL — LOW (ref 11.5–15.5)
MCHC RBC-ENTMCNC: 22.6 PG — LOW (ref 27–34)
MCHC RBC-ENTMCNC: 30.3 GM/DL — LOW (ref 32–36)
MCV RBC AUTO: 74.6 FL — LOW (ref 80–100)
NRBC # BLD: 5 /100 WBCS — HIGH (ref 0–0)
PLATELET # BLD AUTO: 145 K/UL — LOW (ref 150–400)
POTASSIUM SERPL-MCNC: 5.4 MMOL/L — HIGH (ref 3.5–5.3)
POTASSIUM SERPL-SCNC: 5.4 MMOL/L — HIGH (ref 3.5–5.3)
RBC # BLD: 4.21 M/UL — SIGNIFICANT CHANGE UP (ref 3.8–5.2)
RBC # FLD: 28.9 % — HIGH (ref 10.3–14.5)
SODIUM SERPL-SCNC: 133 MMOL/L — LOW (ref 135–145)
WBC # BLD: 11.7 K/UL — HIGH (ref 3.8–10.5)
WBC # FLD AUTO: 11.7 K/UL — HIGH (ref 3.8–10.5)

## 2022-12-22 PROCEDURE — 71045 X-RAY EXAM CHEST 1 VIEW: CPT | Mod: 26

## 2022-12-22 PROCEDURE — 71045 X-RAY EXAM CHEST 1 VIEW: CPT | Mod: 26,77

## 2022-12-22 PROCEDURE — 99232 SBSQ HOSP IP/OBS MODERATE 35: CPT

## 2022-12-22 PROCEDURE — 99233 SBSQ HOSP IP/OBS HIGH 50: CPT

## 2022-12-22 RX ORDER — INSULIN GLARGINE 100 [IU]/ML
15 INJECTION, SOLUTION SUBCUTANEOUS AT BEDTIME
Refills: 0 | Status: DISCONTINUED | OUTPATIENT
Start: 2022-12-22 | End: 2022-12-23

## 2022-12-22 RX ORDER — APIXABAN 2.5 MG/1
2.5 TABLET, FILM COATED ORAL
Refills: 0 | Status: DISCONTINUED | OUTPATIENT
Start: 2022-12-22 | End: 2023-01-06

## 2022-12-22 RX ORDER — SODIUM ZIRCONIUM CYCLOSILICATE 10 G/10G
10 POWDER, FOR SUSPENSION ORAL ONCE
Refills: 0 | Status: COMPLETED | OUTPATIENT
Start: 2022-12-22 | End: 2022-12-22

## 2022-12-22 RX ADMIN — Medication 50 MILLIGRAM(S): at 12:43

## 2022-12-22 RX ADMIN — Medication 4: at 12:44

## 2022-12-22 RX ADMIN — HEPARIN SODIUM 5000 UNIT(S): 5000 INJECTION INTRAVENOUS; SUBCUTANEOUS at 05:26

## 2022-12-22 RX ADMIN — SODIUM ZIRCONIUM CYCLOSILICATE 10 GRAM(S): 10 POWDER, FOR SUSPENSION ORAL at 10:14

## 2022-12-22 RX ADMIN — Medication 6: at 08:05

## 2022-12-22 RX ADMIN — Medication 650 MILLIGRAM(S): at 05:26

## 2022-12-22 RX ADMIN — Medication 7 UNIT(S): at 17:22

## 2022-12-22 RX ADMIN — Medication 1200 MILLIGRAM(S): at 17:21

## 2022-12-22 RX ADMIN — Medication 50 MILLIGRAM(S): at 23:58

## 2022-12-22 RX ADMIN — INSULIN GLARGINE 15 UNIT(S): 100 INJECTION, SOLUTION SUBCUTANEOUS at 23:16

## 2022-12-22 RX ADMIN — Medication 50 MILLIGRAM(S): at 05:27

## 2022-12-22 RX ADMIN — PANTOPRAZOLE SODIUM 40 MILLIGRAM(S): 20 TABLET, DELAYED RELEASE ORAL at 05:27

## 2022-12-22 RX ADMIN — Medication 7 UNIT(S): at 08:06

## 2022-12-22 RX ADMIN — Medication 1 MILLIGRAM(S): at 23:58

## 2022-12-22 RX ADMIN — Medication 650 MILLIGRAM(S): at 23:58

## 2022-12-22 RX ADMIN — BUDESONIDE AND FORMOTEROL FUMARATE DIHYDRATE 2 PUFF(S): 160; 4.5 AEROSOL RESPIRATORY (INHALATION) at 08:07

## 2022-12-22 RX ADMIN — CEFTRIAXONE 1000 MILLIGRAM(S): 500 INJECTION, POWDER, FOR SOLUTION INTRAMUSCULAR; INTRAVENOUS at 17:21

## 2022-12-22 RX ADMIN — Medication 7 UNIT(S): at 12:44

## 2022-12-22 RX ADMIN — CHLORHEXIDINE GLUCONATE 1 APPLICATION(S): 213 SOLUTION TOPICAL at 08:32

## 2022-12-22 RX ADMIN — ESCITALOPRAM OXALATE 20 MILLIGRAM(S): 10 TABLET, FILM COATED ORAL at 12:44

## 2022-12-22 RX ADMIN — Medication 1000 UNIT(S): at 12:43

## 2022-12-22 RX ADMIN — Medication 1200 MILLIGRAM(S): at 12:32

## 2022-12-22 RX ADMIN — Medication 650 MILLIGRAM(S): at 12:43

## 2022-12-22 RX ADMIN — ALBUTEROL 2 PUFF(S): 90 AEROSOL, METERED ORAL at 17:26

## 2022-12-22 RX ADMIN — Medication 6: at 17:21

## 2022-12-22 RX ADMIN — Medication 650 MILLIGRAM(S): at 14:14

## 2022-12-22 RX ADMIN — Medication 50 MILLIGRAM(S): at 17:21

## 2022-12-22 RX ADMIN — ALBUTEROL 2 PUFF(S): 90 AEROSOL, METERED ORAL at 12:45

## 2022-12-22 RX ADMIN — ALBUTEROL 2 PUFF(S): 90 AEROSOL, METERED ORAL at 08:06

## 2022-12-22 RX ADMIN — Medication 6 MILLIGRAM(S): at 05:26

## 2022-12-22 RX ADMIN — Medication 30 MILLILITER(S): at 00:14

## 2022-12-22 RX ADMIN — APIXABAN 2.5 MILLIGRAM(S): 2.5 TABLET, FILM COATED ORAL at 17:22

## 2022-12-22 RX ADMIN — CHLORHEXIDINE GLUCONATE 1 APPLICATION(S): 213 SOLUTION TOPICAL at 05:32

## 2022-12-22 RX ADMIN — Medication 240 MILLIGRAM(S): at 05:27

## 2022-12-22 NOTE — PROGRESS NOTE ADULT - SUBJECTIVE AND OBJECTIVE BOX
OVERNIGHT EVENTS:    Present Symptoms:     Dyspnea: Mild Moderate Severe  Nausea/Vomiting: Yes No  Anxiety:  Yes No  Depression: Yes No  Fatigue: Yes No  Loss of appetite: Yes No  Constipation:     Pain:             Character-            Duration-            Effect-            Factors-            Frequency-            Location-            Severity-    Pain AD Score:  [email or web address] (press ctrl + left click to view)    Review of Systems: Reviewed                     Negative:                     Positive:  Unable to obtain due to poor mentation   All others negative    MEDICATIONS  (STANDING):  acetaminophen     Tablet .. 650 milliGRAM(s) Oral every 8 hours  albuterol    90 MICROgram(s) HFA Inhaler 2 Puff(s) Inhalation every 4 hours  apixaban 2.5 milliGRAM(s) Oral two times a day  budesonide  80 MICROgram(s)/formoterol 4.5 MICROgram(s) Inhaler 2 Puff(s) Inhalation two times a day  cefTRIAXone Injectable. 1000 milliGRAM(s) IV Push every 24 hours  chlorhexidine 2% Cloths 1 Application(s) Topical daily  chlorhexidine 2% Cloths 1 Application(s) Topical <User Schedule>  cholecalciferol 1000 Unit(s) Oral daily  dextrose 5%. 1000 milliLiter(s) (100 mL/Hr) IV Continuous <Continuous>  dextrose 5%. 1000 milliLiter(s) (50 mL/Hr) IV Continuous <Continuous>  dextrose 50% Injectable 25 Gram(s) IV Push once  dextrose 50% Injectable 12.5 Gram(s) IV Push once  dextrose 50% Injectable 25 Gram(s) IV Push once  diltiazem    milliGRAM(s) Oral daily  epoetin susie-epbx (RETACRIT) Injectable 07306 Unit(s) IV Push <User Schedule>  escitalopram 20 milliGRAM(s) Oral daily  glucagon  Injectable 1 milliGRAM(s) IntraMuscular once  guaiFENesin ER 1200 milliGRAM(s) Oral every 12 hours  hydrALAZINE 50 milliGRAM(s) Oral every 8 hours  insulin glargine Injectable (LANTUS) 12 Unit(s) SubCutaneous at bedtime  insulin lispro (ADMELOG) corrective regimen sliding scale   SubCutaneous three times a day before meals  insulin lispro Injectable (ADMELOG) 7 Unit(s) SubCutaneous three times a day before meals  metoprolol tartrate 50 milliGRAM(s) Oral two times a day  pantoprazole    Tablet 40 milliGRAM(s) Oral before breakfast    MEDICATIONS  (PRN):  dextrose Oral Gel 15 Gram(s) Oral once PRN Blood Glucose LESS THAN 70 milliGRAM(s)/deciliter  hydrALAZINE Injectable 10 milliGRAM(s) IV Push every 6 hours PRN SBP > 160  melatonin 1 milliGRAM(s) Oral at bedtime PRN Sleep  sodium chloride 0.9% lock flush 10 milliLiter(s) IV Push every 1 hour PRN Pre/post blood products, medications, blood draw, and to maintain line patency  sodium chloride 0.9% lock flush 10 milliLiter(s) IV Push every 1 hour PRN Pre/post blood products, medications, blood draw, and to maintain line patency      PHYSICAL EXAM:    Vital Signs Last 24 Hrs  T(C): 36.7 (22 Dec 2022 09:19), Max: 37.1 (21 Dec 2022 16:01)  T(F): 98.1 (22 Dec 2022 09:19), Max: 98.7 (21 Dec 2022 16:01)  HR: 60 (22 Dec 2022 09:19) (59 - 86)  BP: 146/64 (22 Dec 2022 09:19) (115/52 - 159/77)  BP(mean): --  RR: 18 (22 Dec 2022 09:19) (16 - 18)  SpO2: 100% (22 Dec 2022 09:19) (92% - 100%)    Parameters below as of 22 Dec 2022 09:19  Patient On (Oxygen Delivery Method): nasal cannula        General: alert  oriented x ____ lethargic agitated                  cachexia  nonverbal  coma    Karnofsky:  %    HEENT: normal  dry mouth  ET tube/trach    Lungs: comfortable tachypnea/labored breathing  excessive secretions    CV: normal  tachycardia    GI: normal  distended  tender  no BS               PEG/NG/OG tube  constipation  last BM:     : normal  incontinent  oliguria/anuria  leigh    MSK: normal  weakness  edema             ambulatory  bedbound/wheelchair bound    Skin: normal  pressure ulcers- Stage_____  no rash    LABS:                          9.5    11.70 )-----------( 145      ( 22 Dec 2022 06:20 )             31.4     12-22    133<L>  |  97  |  61.7<H>  ----------------------------<  300<H>  5.4<H>   |  23.0  |  3.56<H>    Ca    7.4<L>      22 Dec 2022 06:20  Phos  5.5     12-21    PT/INR - ( 20 Dec 2022 14:40 )   PT: 10.9 sec;   INR: 0.94 ratio         PTT - ( 20 Dec 2022 14:40 )  PTT:27.5 sec    I&O's Summary    21 Dec 2022 07:01  -  22 Dec 2022 07:00  --------------------------------------------------------  IN: 0 mL / OUT: 2180 mL / NET: -2180 mL    22 Dec 2022 07:01  -  22 Dec 2022 12:51  --------------------------------------------------------  IN: 0 mL / OUT: 10 mL / NET: -10 mL        RADIOLOGY & ADDITIONAL STUDIES:    ADVANCE DIRECTIVES/TREATMENT PREFERENCES:  DNR YES NO  Completed on:                     MOLST  YES NO   Completed on:  Living Will  YES NO   Completed on: OVERNIGHT EVENTS: patient sitting in bedside chair, appears comfortable     Present Symptoms:     Dyspnea: none   Nausea/Vomiting: No  Anxiety:  No  Depression: no   Fatigue: Yes   Loss of appetite: No  Constipation: none     Pain: none             Character-            Duration-            Effect-            Factors-            Frequency-            Location-            Severity-    Pain AD Score:  [email or web address] (press ctrl + left click to view)    Review of Systems: Reviewed                     Negative: no chest pain                     All others negative    MEDICATIONS  (STANDING):  acetaminophen     Tablet .. 650 milliGRAM(s) Oral every 8 hours  albuterol    90 MICROgram(s) HFA Inhaler 2 Puff(s) Inhalation every 4 hours  apixaban 2.5 milliGRAM(s) Oral two times a day  budesonide  80 MICROgram(s)/formoterol 4.5 MICROgram(s) Inhaler 2 Puff(s) Inhalation two times a day  cefTRIAXone Injectable. 1000 milliGRAM(s) IV Push every 24 hours  chlorhexidine 2% Cloths 1 Application(s) Topical daily  chlorhexidine 2% Cloths 1 Application(s) Topical <User Schedule>  cholecalciferol 1000 Unit(s) Oral daily  dextrose 5%. 1000 milliLiter(s) (100 mL/Hr) IV Continuous <Continuous>  dextrose 5%. 1000 milliLiter(s) (50 mL/Hr) IV Continuous <Continuous>  dextrose 50% Injectable 25 Gram(s) IV Push once  dextrose 50% Injectable 12.5 Gram(s) IV Push once  dextrose 50% Injectable 25 Gram(s) IV Push once  diltiazem    milliGRAM(s) Oral daily  epoetin susie-epbx (RETACRIT) Injectable 04419 Unit(s) IV Push <User Schedule>  escitalopram 20 milliGRAM(s) Oral daily  glucagon  Injectable 1 milliGRAM(s) IntraMuscular once  guaiFENesin ER 1200 milliGRAM(s) Oral every 12 hours  hydrALAZINE 50 milliGRAM(s) Oral every 8 hours  insulin glargine Injectable (LANTUS) 12 Unit(s) SubCutaneous at bedtime  insulin lispro (ADMELOG) corrective regimen sliding scale   SubCutaneous three times a day before meals  insulin lispro Injectable (ADMELOG) 7 Unit(s) SubCutaneous three times a day before meals  metoprolol tartrate 50 milliGRAM(s) Oral two times a day  pantoprazole    Tablet 40 milliGRAM(s) Oral before breakfast    MEDICATIONS  (PRN):  dextrose Oral Gel 15 Gram(s) Oral once PRN Blood Glucose LESS THAN 70 milliGRAM(s)/deciliter  hydrALAZINE Injectable 10 milliGRAM(s) IV Push every 6 hours PRN SBP > 160  melatonin 1 milliGRAM(s) Oral at bedtime PRN Sleep  sodium chloride 0.9% lock flush 10 milliLiter(s) IV Push every 1 hour PRN Pre/post blood products, medications, blood draw, and to maintain line patency  sodium chloride 0.9% lock flush 10 milliLiter(s) IV Push every 1 hour PRN Pre/post blood products, medications, blood draw, and to maintain line patency    PHYSICAL EXAM:    Vital Signs Last 24 Hrs  T(C): 36.7 (22 Dec 2022 09:19), Max: 37.1 (21 Dec 2022 16:01)  T(F): 98.1 (22 Dec 2022 09:19), Max: 98.7 (21 Dec 2022 16:01)  HR: 60 (22 Dec 2022 09:19) (59 - 86)  BP: 146/64 (22 Dec 2022 09:19) (115/52 - 159/77)  BP(mean): --  RR: 18 (22 Dec 2022 09:19) (16 - 18)  SpO2: 100% (22 Dec 2022 09:19) (92% - 100%)    Parameters below as of 22 Dec 2022 09:19  Patient On (Oxygen Delivery Method): nasal cannula    General: lethargic     HEENT: normal      Lungs: comfortable     CV: normal      GI: normal      : normal      MSK: weakness     Skin: no rash    LABS:                      9.5    11.70 )-----------( 145      ( 22 Dec 2022 06:20 )             31.4     12-22    133<L>  |  97  |  61.7<H>  ----------------------------<  300<H>  5.4<H>   |  23.0  |  3.56<H>    Ca    7.4<L>      22 Dec 2022 06:20  Phos  5.5     12-21    PT/INR - ( 20 Dec 2022 14:40 )   PT: 10.9 sec;   INR: 0.94 ratio         PTT - ( 20 Dec 2022 14:40 )  PTT:27.5 sec    I&O's Summary    21 Dec 2022 07:01  -  22 Dec 2022 07:00  --------------------------------------------------------  IN: 0 mL / OUT: 2180 mL / NET: -2180 mL    22 Dec 2022 07:01  -  22 Dec 2022 12:51  --------------------------------------------------------  IN: 0 mL / OUT: 10 mL / NET: -10 mL    RADIOLOGY & ADDITIONAL STUDIES:    ADVANCE DIRECTIVES/TREATMENT PREFERENCES:  do not resuscitate and do not intubate

## 2022-12-22 NOTE — PROGRESS NOTE ADULT - ASSESSMENT
A) DM  with CRF 5, Anemia, Covid 19 +, Left  chest tube, high  glucose on steroid, high K      P) HD tomorrow, Lokelma today, 2g K  diet. Labs on HD.

## 2022-12-22 NOTE — PROGRESS NOTE ADULT - ASSESSMENT
82F with PMH asthma, diabetes, hyperlipidemia, atrial fibrillation, hypertension, and hyperlipidemia, who was found to have a right superficial femoral vein thrombosis while on warfarin and acute kidney injury. She was also noted to have been recently diagnosed with COVID-19.    THERESA on CKD IV  - HD initiated 12/13/22 via Right femoral access  - Improvement in renal indices with HD  -  renal- chronic medical disease  - Avoid Nephrotoxin  - cont HD per renal   - Vascular following   - S/p PC on 12/21  - SW for HD being instated    Leukocytosis  - likely in setting of stress and steroid use  - monitor for signs of infection  - improving    Acute on Chronic Diastolic Heart failure  - Resistant to diuresis, HD initiated 12/13/22 via Right femoral access.  - TTE with intact LVF however has grade II diastolic dysfunction and moderate Pulmonary HTN  - Strict I/O, daily weight, Fluid restriction  - Continue Metoprolol and Hydralazine   - CT chest reviewed  - Cardio consult appreciated    Pleural effusion   - Small to mod L and small R pleural effusion with LLL complete and RLL near complete compressive atelectasis   - Incentive spirometry  - S/p chest tube on 12/19, care per TSx  - Fluid studies transudative, follow up cultures/cytology.   - Supplemental oxygen PRN, wean as tolerated.    Emphysematous Cystitis  - UCx never collected, re ordered and still pending   - On Ceftriaxone until 12/25  - Urology consult appreciated    Acute Hypoxic respiratory Failure - Multifactorial. Asthma, COVID-19, CHFpEF, Fluid overload.  - Started on HD 12/13/22  - TTE with intact LVF however has grade II diastolic dysfunction and moderate Pulmonary HTN  - Supplemental O2, wean as tolerated  - Bronchodilators  - Pulmonary consult appreciated  - D/esther Decadron  - Supplemental oxygen PRN, currently on 1LC    Anemia  - Stable Hgb  - Multifactorial - given kidney disease  - Received PRBC x 1  - Monitor Hgb  - IV Iron    Type 2 Diabetes on insulin  - BGM uncontrolled  - endocrine following   - A1c 11.2  - BGM with SSI, Lantus    Atrial Fibrillation  - Rate controlled, INR returned to normal  - Metoprolol, Diltiazem  - Eliquis held     COVID-19   - CT Chest with bilateral upper lobe opacities  - Started on Dexamethasone for Asthma; will taper once respiratory status improved  - isolation precautions  - Will need repeat outpatient CT chest in 3 months for RLL nodule    HTN-Essential  - Improved  - monitor blood pressure  - Metoprolol to 50mg, continue Diltiazem at current rate  - Hydralazine 25mg q8    DVT ruled out on repeat imaging  - Supra-therapeutic INR normalized  - No bleeding noted  - US Duplex- no evidence of deep venous thrombosis in either lower extremity.    PT  recommendation for [facility]; Spouse wants to take home    Poor prognosis     Code Status: DNR/DNI    Family (Spouse is main decision maker) wants HD     updated at bedside.

## 2022-12-22 NOTE — PROGRESS NOTE ADULT - ASSESSMENT
82F with PMH asthma, diabetes, hyperlipidemia, atrial fibrillation, hypertension, and hyperlipidemia, who was found to have a right superficial femoral vein thrombosis while on warfarin and acute kidney injury. She was also noted to have been recently diagnosed with COVID-19. She has had acute on chronic diastolic heart failure, progressive THERESA requiring dialysis, and hypoxic respiratory failure.  She now has increased bilateral pleural effusions, L>R.    12/19 left pigtail catheter inserted 600 cc removed   12/20 - 12/21 chest tube still with high output   12/22 Left chest tube removed without difficulty

## 2022-12-22 NOTE — PROGRESS NOTE ADULT - SUBJECTIVE AND OBJECTIVE BOX
INTERVAL EVENTS:  Follow up diabetes management    ROS: No signs of pain, tolerating diet.    MEDICATIONS  (STANDING):  acetaminophen     Tablet .. 650 milliGRAM(s) Oral every 8 hours  albuterol    90 MICROgram(s) HFA Inhaler 2 Puff(s) Inhalation every 4 hours  apixaban 2.5 milliGRAM(s) Oral two times a day  budesonide  80 MICROgram(s)/formoterol 4.5 MICROgram(s) Inhaler 2 Puff(s) Inhalation two times a day  cefTRIAXone Injectable. 1000 milliGRAM(s) IV Push every 24 hours  chlorhexidine 2% Cloths 1 Application(s) Topical daily  chlorhexidine 2% Cloths 1 Application(s) Topical <User Schedule>  cholecalciferol 1000 Unit(s) Oral daily  dextrose 5%. 1000 milliLiter(s) (100 mL/Hr) IV Continuous <Continuous>  dextrose 5%. 1000 milliLiter(s) (50 mL/Hr) IV Continuous <Continuous>  dextrose 50% Injectable 25 Gram(s) IV Push once  dextrose 50% Injectable 12.5 Gram(s) IV Push once  dextrose 50% Injectable 25 Gram(s) IV Push once  diltiazem    milliGRAM(s) Oral daily  epoetin susie-epbx (RETACRIT) Injectable 77478 Unit(s) IV Push <User Schedule>  escitalopram 20 milliGRAM(s) Oral daily  glucagon  Injectable 1 milliGRAM(s) IntraMuscular once  guaiFENesin ER 1200 milliGRAM(s) Oral every 12 hours  hydrALAZINE 50 milliGRAM(s) Oral every 8 hours  insulin glargine Injectable (LANTUS) 15 Unit(s) SubCutaneous at bedtime  insulin lispro (ADMELOG) corrective regimen sliding scale   SubCutaneous three times a day before meals  insulin lispro Injectable (ADMELOG) 7 Unit(s) SubCutaneous three times a day before meals  metoprolol tartrate 50 milliGRAM(s) Oral two times a day  pantoprazole    Tablet 40 milliGRAM(s) Oral before breakfast    MEDICATIONS  (PRN):  dextrose Oral Gel 15 Gram(s) Oral once PRN Blood Glucose LESS THAN 70 milliGRAM(s)/deciliter  hydrALAZINE Injectable 10 milliGRAM(s) IV Push every 6 hours PRN SBP > 160  melatonin 1 milliGRAM(s) Oral at bedtime PRN Sleep  sodium chloride 0.9% lock flush 10 milliLiter(s) IV Push every 1 hour PRN Pre/post blood products, medications, blood draw, and to maintain line patency  sodium chloride 0.9% lock flush 10 milliLiter(s) IV Push every 1 hour PRN Pre/post blood products, medications, blood draw, and to maintain line patency    Allergies  No Known Allergies    Vital Signs Last 24 Hrs  T(C): 36.7 (22 Dec 2022 09:19), Max: 37.1 (21 Dec 2022 16:01)  T(F): 98.1 (22 Dec 2022 09:19), Max: 98.7 (21 Dec 2022 16:01)  HR: 60 (22 Dec 2022 09:19) (60 - 86)  BP: 146/64 (22 Dec 2022 09:19) (115/52 - 159/77)  BP(mean): --  RR: 18 (22 Dec 2022 09:19) (18 - 18)  SpO2: 100% (22 Dec 2022 09:19) (92% - 100%)    Parameters below as of 22 Dec 2022 09:19  Patient On (Oxygen Delivery Method): nasal cannula      PHYSICAL EXAM:  General: No apparent distress  Neck: Supple, trachea midline, no thyromegaly  Respiratory: Lungs clear bilaterally, normal rate, effort  Cardiac: +S1, S2, no m/r/g  GI: +BS, soft, non tender, non distended  Extremities: Mild edema  Neuro: Alert, awake      LABS:                        9.5    11.70 )-----------( 145      ( 22 Dec 2022 06:20 )             31.4     12-22    133<L>  |  97  |  61.7<H>  ----------------------------<  300<H>  5.4<H>   |  23.0  |  3.56<H>    Ca    7.4<L>      22 Dec 2022 06:20  Phos  5.5     12-21      POCT Blood Glucose.: 206 mg/dL (12-22-22 @ 11:50)  POCT Blood Glucose.: 276 mg/dL (12-22-22 @ 07:31)  POCT Blood Glucose.: 219 mg/dL (12-21-22 @ 21:34)  POCT Blood Glucose.: 157 mg/dL (12-21-22 @ 19:54)

## 2022-12-22 NOTE — PROGRESS NOTE ADULT - SUBJECTIVE AND OBJECTIVE BOX
Patient seen and examined at bedside. No acute events overnight, received HD yesterday evening through permacath without issues. Denies pain, chest pain/SOB    Vitals:  Vital Signs Last 24 Hrs  T(C): 36.7 (22 Dec 2022 04:17), Max: 37.1 (21 Dec 2022 16:01)  T(F): 98 (22 Dec 2022 04:17), Max: 98.7 (21 Dec 2022 16:01)  HR: 60 (22 Dec 2022 04:17) (59 - 86)  BP: 115/52 (22 Dec 2022 04:17) (115/52 - 159/77)  BP(mean): --  RR: 18 (22 Dec 2022 04:17) (16 - 20)  SpO2: 98% (22 Dec 2022 04:17) (98% - 100%)    Parameters below as of 22 Dec 2022 04:17  Patient On (Oxygen Delivery Method): nasal cannula  O2 Flow (L/min): 2    Labs:  12-22    133<L>  |  97  |  61.7<H>  ----------------------------<  300<H>  5.4<H>   |  23.0  |  3.56<H>    Ca    7.4<L>      22 Dec 2022 06:20  Phos  5.5     12-21                          9.5    11.70 )-----------( 145      ( 22 Dec 2022 06:20 )             31.4     Exam:  Gen: pt lying in bed, alert, in NAD  Resp: unlabored on 1L NC, L pigtail remains in place, no air leak  CVS: RRR  Abd: soft, NT, ND  Ext: moving all extremities spontaneously, sensation intact, pulses 2+, LUE ecchymosis

## 2022-12-22 NOTE — PROGRESS NOTE ADULT - ASSESSMENT
81yo F admitted for CHF exacerbation now needing dialysis access, s/p R fem shiley placement on 12/13, subsequently removed, patient made DNR/DNI with plan to continue HD, POD1 from R IJ permacath placement    Plan:  -s/p R IJ permacath placement, received HD without issues  -continue HD as per nephro  -continue care per primary team  -vascular surgery signing off, re-consult as needed

## 2022-12-22 NOTE — PROGRESS NOTE ADULT - SUBJECTIVE AND OBJECTIVE BOX
[facility] Division of Hospital Medicine  Sanjay Crews MD    Chief Complaint:  Patient is a 82y old  Female who presents with a chief complaint of CHF exacerbation, COVID pneumonia (19 Dec 2022 07:36)      SUBJECTIVE / OVERNIGHT EVENTS:  Patient seen and examined at bedside. No acute events reported overnight. No new complaints.    MEDICATIONS  (STANDING):  acetaminophen     Tablet .. 650 milliGRAM(s) Oral every 8 hours  albuterol    90 MICROgram(s) HFA Inhaler 2 Puff(s) Inhalation every 4 hours  apixaban 2.5 milliGRAM(s) Oral two times a day  budesonide  80 MICROgram(s)/formoterol 4.5 MICROgram(s) Inhaler 2 Puff(s) Inhalation two times a day  cefTRIAXone Injectable. 1000 milliGRAM(s) IV Push every 24 hours  chlorhexidine 2% Cloths 1 Application(s) Topical daily  chlorhexidine 2% Cloths 1 Application(s) Topical <User Schedule>  cholecalciferol 1000 Unit(s) Oral daily  dexAMETHasone  Injectable 6 milliGRAM(s) IV Push daily  dextrose 5%. 1000 milliLiter(s) (50 mL/Hr) IV Continuous <Continuous>  dextrose 5%. 1000 milliLiter(s) (100 mL/Hr) IV Continuous <Continuous>  dextrose 50% Injectable 25 Gram(s) IV Push once  dextrose 50% Injectable 12.5 Gram(s) IV Push once  dextrose 50% Injectable 25 Gram(s) IV Push once  diltiazem    milliGRAM(s) Oral daily  epoetin susie-epbx (RETACRIT) Injectable 38720 Unit(s) IV Push <User Schedule>  escitalopram 20 milliGRAM(s) Oral daily  glucagon  Injectable 1 milliGRAM(s) IntraMuscular once  guaiFENesin ER 1200 milliGRAM(s) Oral every 12 hours  hydrALAZINE 50 milliGRAM(s) Oral every 8 hours  insulin glargine Injectable (LANTUS) 12 Unit(s) SubCutaneous at bedtime  insulin lispro (ADMELOG) corrective regimen sliding scale   SubCutaneous three times a day before meals  insulin lispro Injectable (ADMELOG) 7 Unit(s) SubCutaneous three times a day before meals  metoprolol tartrate 50 milliGRAM(s) Oral two times a day  pantoprazole    Tablet 40 milliGRAM(s) Oral before breakfast    MEDICATIONS  (PRN):  dextrose Oral Gel 15 Gram(s) Oral once PRN Blood Glucose LESS THAN 70 milliGRAM(s)/deciliter  hydrALAZINE Injectable 10 milliGRAM(s) IV Push every 6 hours PRN SBP > 160  melatonin 1 milliGRAM(s) Oral at bedtime PRN Sleep  sodium chloride 0.9% lock flush 10 milliLiter(s) IV Push every 1 hour PRN Pre/post blood products, medications, blood draw, and to maintain line patency  sodium chloride 0.9% lock flush 10 milliLiter(s) IV Push every 1 hour PRN Pre/post blood products, medications, blood draw, and to maintain line patency        I&O's Summary    21 Dec 2022 07:01  -  22 Dec 2022 07:00  --------------------------------------------------------  IN: 0 mL / OUT: 2180 mL / NET: -2180 mL    22 Dec 2022 07:01  -  22 Dec 2022 11:50  --------------------------------------------------------  IN: 0 mL / OUT: 10 mL / NET: -10 mL        PHYSICAL EXAM:  Vital Signs Last 24 Hrs  T(C): 36.7 (22 Dec 2022 09:19), Max: 37.1 (21 Dec 2022 16:01)  T(F): 98.1 (22 Dec 2022 09:19), Max: 98.7 (21 Dec 2022 16:01)  HR: 60 (22 Dec 2022 09:19) (59 - 86)  BP: 146/64 (22 Dec 2022 09:19) (115/52 - 159/77)  BP(mean): --  RR: 18 (22 Dec 2022 09:19) (16 - 18)  SpO2: 100% (22 Dec 2022 09:19) (92% - 100%)    Parameters below as of 22 Dec 2022 09:19  Patient On (Oxygen Delivery Method): nasal cannula            CONSTITUTIONAL: NAD  HEENT: NC/AT, PERRL, no JVD  CHEST WALL: +Permacath  RESPIRATORY: CTA bl, +chest tube w/ serosanguineous drainage  CARDIOVASCULAR: RRR, S1/S2+, no m/g/r  ABDOMEN: Nontender to palpation, normoactive bowel sounds, no rebound/guarding  MUSCULOSKELETAL: No edema, cyanosis or deformities.  PSYCH: Calm, affect appropriate.  NEUROLOGY: Awake, alert, no focal neurological deficits.   SKIN: No rashes; no palpable lesions  VASC: Distal pulses palpable    LABS:                        9.5    11.70 )-----------( 145      ( 22 Dec 2022 06:20 )             31.4     12-22    133<L>  |  97  |  61.7<H>  ----------------------------<  300<H>  5.4<H>   |  23.0  |  3.56<H>    Ca    7.4<L>      22 Dec 2022 06:20  Phos  5.5     12-21      PT/INR - ( 20 Dec 2022 14:40 )   PT: 10.9 sec;   INR: 0.94 ratio         PTT - ( 20 Dec 2022 14:40 )  PTT:27.5 sec          Culture - Fungal, Body Fluid (collected 19 Dec 2022 18:00)  Source: Pleural Fl Pleural Fluid  Preliminary Report (21 Dec 2022 15:05):    Culture is being performed. Fungal cultures are held for 4 weeks.    Culture - Body Fluid with Gram Stain (collected 19 Dec 2022 18:00)  Source: Pleural Fl Pleural Fluid  Gram Stain (20 Dec 2022 04:37):    No polymorphonuclear leukocytes seen    No organisms seen    by cytocentrifuge  Preliminary Report (20 Dec 2022 20:13):    No growth      CAPILLARY BLOOD GLUCOSE      POCT Blood Glucose.: 276 mg/dL (22 Dec 2022 07:31)  POCT Blood Glucose.: 219 mg/dL (21 Dec 2022 21:34)  POCT Blood Glucose.: 157 mg/dL (21 Dec 2022 19:54)  POCT Blood Glucose.: 204 mg/dL (21 Dec 2022 12:26)        RADIOLOGY & ADDITIONAL TESTS:  Results Reviewed:   Imaging Personally Reviewed:  Electrocardiogram Personally Reviewed:

## 2022-12-22 NOTE — PROGRESS NOTE ADULT - TIME BILLING
discussion with son and Dr. Crews regarding plan fo care, goals of therapy, advancing illnesses, long term HD, code status.
chest tube management

## 2022-12-22 NOTE — PROGRESS NOTE ADULT - ASSESSMENT
82F with asthma, HLD, DM, afib, HTN, admitted with COVID, pleural effusions, fluid overload, initiated on new dialysis.     #1 Respiratory failure  - off oxygen, improved  - s/p COVID  - fluid overload, s/p HD  - diastolic heart failure, on regimen per primary team  - nebs/ inhalers per pulmonary for asthma     #2 Acute kidney injury on CKD   - supportive measures  - trend creatinine   - avoid nephrotoxins   - s/p permacath 12/21      #3 Pleural effusion  - CT surgery following    #4 Encounter or palliative care  - legal surrogate - Tc- (spouse) [phone] (primary)  JESSICA [phone]-(SON)- support person- [phone]  - available for further discussions if needed, at this time, continue HD

## 2022-12-22 NOTE — PROGRESS NOTE ADULT - SUBJECTIVE AND OBJECTIVE BOX
Patient seen and examined.  Denies CP, SOB, N/V. Left chest tube removed without difficulty.  Repeat xray with no pneimothorax    T(C): 36.6 (12-22-22 @ 16:16)  T(F): 97.8 (12-22-22 @ 16:16)  HR: 61 (12-22-22 @ 16:16)  BP: 168/67 (12-22-22 @ 16:16)    RR: 18 (12-22-22 @ 16:16)  SpO2: 98% (12-22-22 @ 16:16)        Physical Exam:  Gen: A&Ox3  Pulm:  CTA b/l, no r/r/w  CV:  S1S2, RRR, no m/r/g  Abd: +BS, soft, NT, ND  Ext:  +DP b/l, no c/c/e      I&O's Detail    21 Dec 2022 07:01  -  22 Dec 2022 07:00  --------------------------------------------------------  IN:  Total IN: 0 mL    OUT:    Chest Tube (mL): 180 mL    Other (mL): 2000 mL  Total OUT: 2180 mL    Total NET: -2180 mL      22 Dec 2022 07:01  -  22 Dec 2022 16:55  --------------------------------------------------------  IN:  Total IN: 0 mL    OUT:    Chest Tube (mL): 10 mL  Total OUT: 10 mL    Total NET: -10 mL                              9.5    11.70 )-----------( 145      ( 22 Dec 2022 06:20 )             31.4   12-22    133<L>  |  97  |  61.7<H>  ----------------------------<  300<H>  5.4<H>   |  23.0  |  3.56<H>    Ca    7.4<L>      22 Dec 2022 06:20  Phos  5.5     12-21        CAPILLARY BLOOD GLUCOSE      POCT Blood Glucose.: 206 mg/dL (22 Dec 2022 11:50)        Medications:  acetaminophen     Tablet .. 650 milliGRAM(s) Oral every 8 hours  albuterol    90 MICROgram(s) HFA Inhaler 2 Puff(s) Inhalation every 4 hours  apixaban 2.5 milliGRAM(s) Oral two times a day  budesonide  80 MICROgram(s)/formoterol 4.5 MICROgram(s) Inhaler 2 Puff(s) Inhalation two times a day  cefTRIAXone Injectable. 1000 milliGRAM(s) IV Push every 24 hours  chlorhexidine 2% Cloths 1 Application(s) Topical daily  chlorhexidine 2% Cloths 1 Application(s) Topical <User Schedule>  cholecalciferol 1000 Unit(s) Oral daily  dextrose 5%. 1000 milliLiter(s) IV Continuous <Continuous>  dextrose 5%. 1000 milliLiter(s) IV Continuous <Continuous>  dextrose 50% Injectable 25 Gram(s) IV Push once  dextrose 50% Injectable 12.5 Gram(s) IV Push once  dextrose 50% Injectable 25 Gram(s) IV Push once  dextrose Oral Gel 15 Gram(s) Oral once PRN  diltiazem    milliGRAM(s) Oral daily  epoetin susie-epbx (RETACRIT) Injectable 35279 Unit(s) IV Push <User Schedule>  escitalopram 20 milliGRAM(s) Oral daily  glucagon  Injectable 1 milliGRAM(s) IntraMuscular once  guaiFENesin ER 1200 milliGRAM(s) Oral every 12 hours  hydrALAZINE 50 milliGRAM(s) Oral every 8 hours  hydrALAZINE Injectable 10 milliGRAM(s) IV Push every 6 hours PRN  insulin glargine Injectable (LANTUS) 15 Unit(s) SubCutaneous at bedtime  insulin lispro (ADMELOG) corrective regimen sliding scale   SubCutaneous three times a day before meals  insulin lispro Injectable (ADMELOG) 7 Unit(s) SubCutaneous three times a day before meals  melatonin 1 milliGRAM(s) Oral at bedtime PRN  metoprolol tartrate 50 milliGRAM(s) Oral two times a day  pantoprazole    Tablet 40 milliGRAM(s) Oral before breakfast  sodium chloride 0.9% lock flush 10 milliLiter(s) IV Push every 1 hour PRN  sodium chloride 0.9% lock flush 10 milliLiter(s) IV Push every 1 hour PRN

## 2022-12-22 NOTE — PROGRESS NOTE ADULT - SUBJECTIVE AND OBJECTIVE BOX
NEPHROLOGY INTERVAL HPI/OVERNIGHT EVENTS:    No new events.    MEDICATIONS  (STANDING):  acetaminophen     Tablet .. 650 milliGRAM(s) Oral every 8 hours  albuterol    90 MICROgram(s) HFA Inhaler 2 Puff(s) Inhalation every 4 hours  budesonide  80 MICROgram(s)/formoterol 4.5 MICROgram(s) Inhaler 2 Puff(s) Inhalation two times a day  cefTRIAXone Injectable. 1000 milliGRAM(s) IV Push every 24 hours  chlorhexidine 2% Cloths 1 Application(s) Topical daily  chlorhexidine 2% Cloths 1 Application(s) Topical <User Schedule>  cholecalciferol 1000 Unit(s) Oral daily  dexAMETHasone  Injectable 6 milliGRAM(s) IV Push daily  dextrose 5%. 1000 milliLiter(s) (100 mL/Hr) IV Continuous <Continuous>  dextrose 5%. 1000 milliLiter(s) (50 mL/Hr) IV Continuous <Continuous>  dextrose 50% Injectable 25 Gram(s) IV Push once  dextrose 50% Injectable 12.5 Gram(s) IV Push once  dextrose 50% Injectable 25 Gram(s) IV Push once  diltiazem    milliGRAM(s) Oral daily  epoetin susie-epbx (RETACRIT) Injectable 52292 Unit(s) IV Push <User Schedule>  escitalopram 20 milliGRAM(s) Oral daily  glucagon  Injectable 1 milliGRAM(s) IntraMuscular once  guaiFENesin ER 1200 milliGRAM(s) Oral every 12 hours  heparin   Injectable 5000 Unit(s) SubCutaneous every 12 hours  hydrALAZINE 50 milliGRAM(s) Oral every 8 hours  insulin glargine Injectable (LANTUS) 10 Unit(s) SubCutaneous at bedtime  insulin lispro (ADMELOG) corrective regimen sliding scale   SubCutaneous three times a day before meals  insulin lispro Injectable (ADMELOG) 5 Unit(s) SubCutaneous three times a day before meals  metoprolol tartrate 50 milliGRAM(s) Oral two times a day  pantoprazole    Tablet 40 milliGRAM(s) Oral before breakfast    MEDICATIONS  (PRN):  dextrose Oral Gel 15 Gram(s) Oral once PRN Blood Glucose LESS THAN 70 milliGRAM(s)/deciliter  hydrALAZINE Injectable 10 milliGRAM(s) IV Push every 6 hours PRN SBP > 160  melatonin 1 milliGRAM(s) Oral at bedtime PRN Sleep  sodium chloride 0.9% lock flush 10 milliLiter(s) IV Push every 1 hour PRN Pre/post blood products, medications, blood draw, and to maintain line patency  sodium chloride 0.9% lock flush 10 milliLiter(s) IV Push every 1 hour PRN Pre/post blood products, medications, blood draw, and to maintain line patency      Allergies    No Known Allergies          Vital Signs Last 24 Hrs  T(C): 36.7 (22 Dec 2022 04:17), Max: 37.1 (21 Dec 2022 16:01)  T(F): 98 (22 Dec 2022 04:17), Max: 98.7 (21 Dec 2022 16:01)  HR: 60 (22 Dec 2022 04:17) (59 - 86)  BP: 115/52 (22 Dec 2022 04:17) (115/52 - 159/77)  BP(mean): --  RR: 18 (22 Dec 2022 04:17) (16 - 20)  SpO2: 98% (22 Dec 2022 04:17) (98% - 100%)    Parameters below as of 22 Dec 2022 04:17  Patient On (Oxygen Delivery Method): nasal cannula  O2 Flow (L/min): 2    T(C): 36.4 (21 Dec 2022 10:22), Max: 36.7 (20 Dec 2022 16:41)  T(F): 97.6 (21 Dec 2022 10:22), Max: 98.1 (20 Dec 2022 16:41)  HR: 60 (21 Dec 2022 12:45) (60 - 80)  BP: 143/65 (21 Dec 2022 12:45) (136/60 - 158/77)  BP(mean): --  RR: 16 (21 Dec 2022 12:45) (16 - 20)  SpO2: 100% (21 Dec 2022 12:45) (96% - 100%)          PHYSICAL EXAM:    GENERAL: Appears  acutely and  chronically ill in bed, family member at bedside  HEAD:    EYES: open  ENMT:   NECK: right permacath  site dry, clean  NERVOUS SYSTEM:  awake, verbal  CHEST/LUNG: No wheezes; left chest tube to drainage  HEART: no  rub  ABDOMEN: nt  EXTREMITIES:  muscle  wasting, trace  leg edema  LYMPH:   SKIN: pale    LABS:    12-22    133<L>  |  97  |  61.7<H>  ----------------------------<  300<H>  5.4<H>   |  23.0  |  3.56<H>    Ca    7.4<L>      22 Dec 2022 06:20  Phos  5.5     12-21                            9.3    11.26 )-----------( 159      ( 21 Dec 2022 04:35 )             30.8     12-21    131<L>  |  96  |  83.4<H>  ----------------------------<  261<H>  5.4<H>   |  22.0  |  4.15<H>    Ca    7.7<L>      21 Dec 2022 04:35  Phos  5.5     12-21      PT/INR - ( 20 Dec 2022 14:40 )   PT: 10.9 sec;   INR: 0.94 ratio         PTT - ( 20 Dec 2022 14:40 )  PTT:27.5 sec    Phosphorus Level, Serum: 5.5 mg/dL (12-21 @ 04:35)          RADIOLOGY & ADDITIONAL TESTS:

## 2022-12-22 NOTE — PROGRESS NOTE ADULT - PROBLEM SELECTOR PROBLEM 1
Pleural effusion
SOB (shortness of breath)
Pleural effusion
Pleural effusion
Acute respiratory failure with hypoxia
Acute diastolic congestive heart failure
Acute respiratory failure with hypoxia

## 2022-12-22 NOTE — PROGRESS NOTE ADULT - PROBLEM SELECTOR PLAN 1
Chest tube removed without difficulty.    May remove dressing 12/25 then may shower.    Thoracic surgery will sign off.      Please call back if effusion reaccumulates or any new concerns.        Plan discussed with attending Thoracic Surgeons in AM rounds
maintain chest tube continues to have high chest tube output  daily cxr  thoracic will continue to follow along
maintain chest tube to water seal  continues to have high chest tube output  daily cxr each AM while chest tube is in place  f/u pleural fluid results  Pt noted to be COVID + on 12/20, previously positive on 12/5 as well  thoracic will continue to follow    Plan discussed with attending Thoracic Surgeons in AM rounds
Recommendation: -Multifactorial (asthma, COVID, AoC Diastolic HF, pHTN). Audible wheezing without auscultation.   03/2022 65-70%, DDI, pulm artery pressure 55.1  03/2022 + amyloid  BNP 31K  Bumex 1mg po bid  + pedal edema and crackles noted bilaterally bases.    Srict I and O's ordered.  neb treatments.
Still with significant effusion  Low na diet  would get non contrast ct scan of the chest to evaluate effusion may need thoracentesis  keep k >4 and Mg >2  Once effusion is addressed no further work up required prior to perma cath

## 2022-12-23 ENCOUNTER — TRANSCRIPTION ENCOUNTER (OUTPATIENT)
Age: 82
End: 2022-12-23

## 2022-12-23 LAB
ALBUMIN SERPL ELPH-MCNC: 2.3 G/DL — LOW (ref 3.3–5.2)
ALP SERPL-CCNC: 72 U/L — SIGNIFICANT CHANGE UP (ref 40–120)
ALT FLD-CCNC: 5 U/L — SIGNIFICANT CHANGE UP
ANION GAP SERPL CALC-SCNC: 13 MMOL/L — SIGNIFICANT CHANGE UP (ref 5–17)
AST SERPL-CCNC: 8 U/L — SIGNIFICANT CHANGE UP
BILIRUB SERPL-MCNC: <0.2 MG/DL — LOW (ref 0.4–2)
BUN SERPL-MCNC: 85.7 MG/DL — HIGH (ref 8–20)
CALCIUM SERPL-MCNC: 7.5 MG/DL — LOW (ref 8.4–10.5)
CHLORIDE SERPL-SCNC: 95 MMOL/L — LOW (ref 96–108)
CO2 SERPL-SCNC: 24 MMOL/L — SIGNIFICANT CHANGE UP (ref 22–29)
CREAT SERPL-MCNC: 4.57 MG/DL — HIGH (ref 0.5–1.3)
EGFR: 9 ML/MIN/1.73M2 — LOW
GLUCOSE BLDC GLUCOMTR-MCNC: 163 MG/DL — HIGH (ref 70–99)
GLUCOSE BLDC GLUCOMTR-MCNC: 234 MG/DL — HIGH (ref 70–99)
GLUCOSE BLDC GLUCOMTR-MCNC: 277 MG/DL — HIGH (ref 70–99)
GLUCOSE BLDC GLUCOMTR-MCNC: 304 MG/DL — HIGH (ref 70–99)
GLUCOSE SERPL-MCNC: 346 MG/DL — HIGH (ref 70–99)
HCT VFR BLD CALC: 28.9 % — LOW (ref 34.5–45)
HGB BLD-MCNC: 8.7 G/DL — LOW (ref 11.5–15.5)
MAGNESIUM SERPL-MCNC: 2 MG/DL — SIGNIFICANT CHANGE UP (ref 1.6–2.6)
MCHC RBC-ENTMCNC: 22.1 PG — LOW (ref 27–34)
MCHC RBC-ENTMCNC: 30.1 GM/DL — LOW (ref 32–36)
MCV RBC AUTO: 73.4 FL — LOW (ref 80–100)
NRBC # BLD: 2 /100 WBCS — HIGH (ref 0–0)
PHOSPHATE SERPL-MCNC: 5.3 MG/DL — HIGH (ref 2.4–4.7)
PLATELET # BLD AUTO: 156 K/UL — SIGNIFICANT CHANGE UP (ref 150–400)
POTASSIUM SERPL-MCNC: 5.5 MMOL/L — HIGH (ref 3.5–5.3)
POTASSIUM SERPL-SCNC: 5.5 MMOL/L — HIGH (ref 3.5–5.3)
PROT SERPL-MCNC: 4.4 G/DL — LOW (ref 6.6–8.7)
RBC # BLD: 3.94 M/UL — SIGNIFICANT CHANGE UP (ref 3.8–5.2)
RBC # FLD: 27.8 % — HIGH (ref 10.3–14.5)
SODIUM SERPL-SCNC: 132 MMOL/L — LOW (ref 135–145)
WBC # BLD: 11.8 K/UL — HIGH (ref 3.8–10.5)
WBC # FLD AUTO: 11.8 K/UL — HIGH (ref 3.8–10.5)

## 2022-12-23 PROCEDURE — 71045 X-RAY EXAM CHEST 1 VIEW: CPT | Mod: 26

## 2022-12-23 PROCEDURE — 99232 SBSQ HOSP IP/OBS MODERATE 35: CPT

## 2022-12-23 RX ORDER — INSULIN GLARGINE 100 [IU]/ML
18 INJECTION, SOLUTION SUBCUTANEOUS AT BEDTIME
Refills: 0 | Status: DISCONTINUED | OUTPATIENT
Start: 2022-12-23 | End: 2022-12-27

## 2022-12-23 RX ORDER — INSULIN LISPRO 100/ML
9 VIAL (ML) SUBCUTANEOUS
Refills: 0 | Status: DISCONTINUED | OUTPATIENT
Start: 2022-12-23 | End: 2022-12-27

## 2022-12-23 RX ADMIN — Medication 50 MILLIGRAM(S): at 14:05

## 2022-12-23 RX ADMIN — Medication 50 MILLIGRAM(S): at 06:09

## 2022-12-23 RX ADMIN — Medication 1000 UNIT(S): at 14:05

## 2022-12-23 RX ADMIN — Medication 50 MILLIGRAM(S): at 06:10

## 2022-12-23 RX ADMIN — Medication 650 MILLIGRAM(S): at 22:00

## 2022-12-23 RX ADMIN — Medication 1200 MILLIGRAM(S): at 19:39

## 2022-12-23 RX ADMIN — Medication 9 UNIT(S): at 18:13

## 2022-12-23 RX ADMIN — ESCITALOPRAM OXALATE 20 MILLIGRAM(S): 10 TABLET, FILM COATED ORAL at 14:04

## 2022-12-23 RX ADMIN — CHLORHEXIDINE GLUCONATE 1 APPLICATION(S): 213 SOLUTION TOPICAL at 13:38

## 2022-12-23 RX ADMIN — APIXABAN 2.5 MILLIGRAM(S): 2.5 TABLET, FILM COATED ORAL at 18:12

## 2022-12-23 RX ADMIN — ERYTHROPOIETIN 10000 UNIT(S): 10000 INJECTION, SOLUTION INTRAVENOUS; SUBCUTANEOUS at 10:21

## 2022-12-23 RX ADMIN — ALBUTEROL 2 PUFF(S): 90 AEROSOL, METERED ORAL at 14:06

## 2022-12-23 RX ADMIN — Medication 30 MILLILITER(S): at 02:37

## 2022-12-23 RX ADMIN — Medication 1200 MILLIGRAM(S): at 06:19

## 2022-12-23 RX ADMIN — Medication 50 MILLIGRAM(S): at 21:52

## 2022-12-23 RX ADMIN — Medication 4: at 08:31

## 2022-12-23 RX ADMIN — Medication 650 MILLIGRAM(S): at 06:39

## 2022-12-23 RX ADMIN — Medication 650 MILLIGRAM(S): at 15:04

## 2022-12-23 RX ADMIN — Medication 6: at 18:12

## 2022-12-23 RX ADMIN — Medication 240 MILLIGRAM(S): at 06:10

## 2022-12-23 RX ADMIN — Medication 650 MILLIGRAM(S): at 00:28

## 2022-12-23 RX ADMIN — Medication 50 MILLIGRAM(S): at 18:11

## 2022-12-23 RX ADMIN — ALBUTEROL 2 PUFF(S): 90 AEROSOL, METERED ORAL at 18:14

## 2022-12-23 RX ADMIN — PANTOPRAZOLE SODIUM 40 MILLIGRAM(S): 20 TABLET, DELAYED RELEASE ORAL at 06:10

## 2022-12-23 RX ADMIN — APIXABAN 2.5 MILLIGRAM(S): 2.5 TABLET, FILM COATED ORAL at 06:09

## 2022-12-23 RX ADMIN — Medication 650 MILLIGRAM(S): at 14:04

## 2022-12-23 RX ADMIN — Medication 8: at 14:13

## 2022-12-23 RX ADMIN — Medication 650 MILLIGRAM(S): at 21:41

## 2022-12-23 RX ADMIN — CHLORHEXIDINE GLUCONATE 1 APPLICATION(S): 213 SOLUTION TOPICAL at 06:20

## 2022-12-23 RX ADMIN — Medication 650 MILLIGRAM(S): at 06:09

## 2022-12-23 RX ADMIN — INSULIN GLARGINE 18 UNIT(S): 100 INJECTION, SOLUTION SUBCUTANEOUS at 21:41

## 2022-12-23 RX ADMIN — CEFTRIAXONE 1000 MILLIGRAM(S): 500 INJECTION, POWDER, FOR SOLUTION INTRAMUSCULAR; INTRAVENOUS at 18:11

## 2022-12-23 NOTE — DISCHARGE NOTE PROVIDER - CARE PROVIDERS DIRECT ADDRESSES
[email or web address]_Unknown,[email or web address],DirectAddress_Unknown

## 2022-12-23 NOTE — DISCHARGE NOTE PROVIDER - NSDCMRMEDTOKEN_GEN_ALL_CORE_FT
calcitriol 0.25 mcg oral capsule: 1 cap(s) orally once a day  dilTIAZem 240 mg/24 hours oral capsule, extended release: 1 cap(s) orally once a day  furosemide 40 mg oral tablet: 1 tab(s) orally 2 times a day  insulin detemir 100 units/mL subcutaneous solution: 12 unit(s) subcutaneous once a day (at bedtime)  magnesium oxide 400 mg (241.3 mg elemental magnesium) oral tablet: 1 tab(s) orally once a day  metoprolol tartrate 25 mg oral tablet: 1 tab(s) orally 2 times a day  pantoprazole 40 mg oral delayed release tablet: 1 tab(s) orally once a day (before a meal)  warfarin 5 mg oral tablet: 1 tab(s) orally once a day (at bedtime) MDD:Max 1 tab a day   albuterol 90 mcg/inh inhalation aerosol: 2 puff(s) inhaled every 4 hours  aluminum hydroxide-magnesium hydroxide 200 mg-200 mg/5 mL oral suspension: 30 milliliter(s) orally every 6 hours, As needed, Dyspepsia  apixaban 2.5 mg oral tablet: 1 tab(s) orally 2 times a day  calcitriol 0.25 mcg oral capsule: 1 cap(s) orally once a day  dilTIAZem 240 mg/24 hours oral capsule, extended release: 1 cap(s) orally once a day  escitalopram 10 mg oral tablet: 1 tab(s) orally once a day  hydrALAZINE 50 mg oral tablet: 1 tab(s) orally every 8 hours  insulin glargine 100 units/mL subcutaneous solution: 10 unit(s) subcutaneous once a day (at bedtime)  insulin lispro 100 units/mL injectable solution: 4 unit(s) injectable 3 times a day (before meals)  metoprolol tartrate 50 mg oral tablet: 1 tab(s) orally 2 times a day  pantoprazole 40 mg oral delayed release tablet: 1 tab(s) orally once a day (before a meal)   albuterol 90 mcg/inh inhalation aerosol: 2 puff(s) inhaled every 4 hours  aluminum hydroxide-magnesium hydroxide 200 mg-200 mg/5 mL oral suspension: 30 milliliter(s) orally every 6 hours, As needed, Dyspepsia  apixaban 2.5 mg oral tablet: 1 tab(s) orally 2 times a day  ascorbic acid 500 mg oral tablet: 1 tab(s) orally once a day  calcitriol 0.25 mcg oral capsule: 1 cap(s) orally once a day  calcium carbonate 1250 mg (500 mg elemental calcium) oral tablet: 1 tab(s) orally once a day  dilTIAZem 240 mg/24 hours oral capsule, extended release: 1 cap(s) orally once a day  escitalopram 10 mg oral tablet: 1 tab(s) orally once a day  ferrous sulfate 325 mg (65 mg elemental iron) oral tablet: 1 tab(s) orally once a day  hydrALAZINE 50 mg oral tablet: 1 tab(s) orally every 8 hours  insulin glargine 100 units/mL subcutaneous solution: 10 unit(s) subcutaneous once a day (at bedtime)  insulin lispro 100 units/mL injectable solution: 4 unit(s) injectable 3 times a day (before meals)  metoprolol tartrate 50 mg oral tablet: 1 tab(s) orally 2 times a day  pantoprazole 40 mg oral delayed release tablet: 1 tab(s) orally once a day (before a meal)

## 2022-12-23 NOTE — DISCHARGE NOTE PROVIDER - NSDCCPCAREPLAN_GEN_ALL_CORE_FT
PRINCIPAL DISCHARGE DIAGNOSIS  Diagnosis: THERESA (acute kidney injury)  Assessment and Plan of Treatment: With no improvement. Started on HD      SECONDARY DISCHARGE DIAGNOSES  Diagnosis: HTN (hypertension)  Assessment and Plan of Treatment: Continue with prescribed medications    Diagnosis: Acute respiratory failure with hypoxia  Assessment and Plan of Treatment:     Diagnosis: COVID-19 virus infection  Assessment and Plan of Treatment:     Diagnosis: Pleural effusion  Assessment and Plan of Treatment:     Diagnosis: Type 2 diabetes mellitus with hyperglycemia  Assessment and Plan of Treatment:     Diagnosis: Atrial fibrillation  Assessment and Plan of Treatment:     Diagnosis: DVT, lower extremity  Assessment and Plan of Treatment: c/w Eliquis     PRINCIPAL DISCHARGE DIAGNOSIS  Diagnosis: THERESA (acute kidney injury)  Assessment and Plan of Treatment: With no improvement. Started on HD      SECONDARY DISCHARGE DIAGNOSES  Diagnosis: DVT, lower extremity  Assessment and Plan of Treatment: c/w Eliquis    Diagnosis: HTN (hypertension)  Assessment and Plan of Treatment: Continue with prescribed medications    Diagnosis: COVID-19 virus infection  Assessment and Plan of Treatment:     Diagnosis: Pleural effusion  Assessment and Plan of Treatment:     Diagnosis: Acute respiratory failure with hypoxia  Assessment and Plan of Treatment:     Diagnosis: Type 2 diabetes mellitus with hyperglycemia  Assessment and Plan of Treatment:     Diagnosis: Atrial fibrillation  Assessment and Plan of Treatment:      PRINCIPAL DISCHARGE DIAGNOSIS  Diagnosis: THERESA (acute kidney injury)  Assessment and Plan of Treatment: With no improvement. Started on HD      SECONDARY DISCHARGE DIAGNOSES  Diagnosis: DVT, lower extremity  Assessment and Plan of Treatment: c/w Eliquis    Diagnosis: HTN (hypertension)  Assessment and Plan of Treatment: Continue with prescribed medications    Diagnosis: COVID-19 virus infection  Assessment and Plan of Treatment:     Diagnosis: Pleural effusion  Assessment and Plan of Treatment:     Diagnosis: Acute respiratory failure with hypoxia  Assessment and Plan of Treatment:     Diagnosis: Type 2 diabetes mellitus with hyperglycemia  Assessment and Plan of Treatment:     Diagnosis: Atrial fibrillation  Assessment and Plan of Treatment:     Diagnosis: Pulmonary nodule  Assessment and Plan of Treatment: outpatient CT chest in 3 months

## 2022-12-23 NOTE — DISCHARGE NOTE PROVIDER - NSDCFUADDAPPT_GEN_ALL_CORE_FT
Discharge to [facility] [address], [facility] [address]

## 2022-12-23 NOTE — DISCHARGE NOTE PROVIDER - CARE PROVIDER_API CALL
Gutierrez Valenzuela)  EndocrinologyMetabDiabetes; Internal Medicine  [address] [place] [address]  Phone: [phone]  Fax: [phone]  Follow Up Time: 1 month    Mundo Sotelo)  Surgery Vascular  [address] [place] [address]  Phone: [phone]  Fax: [phone]  Follow Up Time: 1 month    Johnna Vee)  Cardiology; Cardiovascular Disease; Internal Medicine  [address] [place] [address]  Phone: [phone]  Fax: [phone]  Follow Up Time: 2 weeks    Sergio Salgado (DO)  Internal Medicine; Nephrology  [address] [place] [address]  Phone: [phone]  Fax: [phone]  Follow Up Time:

## 2022-12-23 NOTE — PROGRESS NOTE ADULT - ASSESSMENT
83 y/o F with PMHx asthma, DM, HLD, atrial fibrillation, and HTN who was found to have a right superficial femoral vein thrombosis while on warfarin and acute kidney injury. Also found to be COVID+.  At home on insulin, takes lantus 12 units daily and humalog 6 units tid with meals.    1. Uncontrolled T2DM, a1c 11.2%- Bgs elevated  - Increase lantus to 18 units qhs   -and go up on admelog to 9  units tid with meals  - Continue sliding scale coverage    2. THERESA/CKD  - HD as per nephrology, permacath placed    3. Acute Hypoxic respiratory Failure/Asthma/COVID-19/CHFpEF  - Completed decadron  - Supplemental oxygen as needed  - Albuterol/Budesonide

## 2022-12-23 NOTE — PROGRESS NOTE ADULT - SUBJECTIVE AND OBJECTIVE BOX
INTERVAL EVENTS:  Follow up diabetes management    ROS: No signs of pain, tolerating diet.    MEDICATIONS  (STANDING):  acetaminophen     Tablet .. 650 milliGRAM(s) Oral every 8 hours  albuterol    90 MICROgram(s) HFA Inhaler 2 Puff(s) Inhalation every 4 hours  apixaban 2.5 milliGRAM(s) Oral two times a day  budesonide  80 MICROgram(s)/formoterol 4.5 MICROgram(s) Inhaler 2 Puff(s) Inhalation two times a day  cefTRIAXone Injectable. 1000 milliGRAM(s) IV Push every 24 hours  chlorhexidine 2% Cloths 1 Application(s) Topical daily  chlorhexidine 2% Cloths 1 Application(s) Topical <User Schedule>  cholecalciferol 1000 Unit(s) Oral daily  dextrose 5%. 1000 milliLiter(s) (100 mL/Hr) IV Continuous <Continuous>  dextrose 5%. 1000 milliLiter(s) (50 mL/Hr) IV Continuous <Continuous>  dextrose 50% Injectable 25 Gram(s) IV Push once  dextrose 50% Injectable 12.5 Gram(s) IV Push once  dextrose 50% Injectable 25 Gram(s) IV Push once  diltiazem    milliGRAM(s) Oral daily  epoetin susie-epbx (RETACRIT) Injectable 27254 Unit(s) IV Push <User Schedule>  escitalopram 20 milliGRAM(s) Oral daily  glucagon  Injectable 1 milliGRAM(s) IntraMuscular once  guaiFENesin ER 1200 milliGRAM(s) Oral every 12 hours  hydrALAZINE 50 milliGRAM(s) Oral every 8 hours  insulin glargine Injectable (LANTUS) 15 Unit(s) SubCutaneous at bedtime  insulin lispro (ADMELOG) corrective regimen sliding scale   SubCutaneous three times a day before meals  insulin lispro Injectable (ADMELOG) 7 Unit(s) SubCutaneous three times a day before meals  metoprolol tartrate 50 milliGRAM(s) Oral two times a day  pantoprazole    Tablet 40 milliGRAM(s) Oral before breakfast    MEDICATIONS  (PRN):  dextrose Oral Gel 15 Gram(s) Oral once PRN Blood Glucose LESS THAN 70 milliGRAM(s)/deciliter  hydrALAZINE Injectable 10 milliGRAM(s) IV Push every 6 hours PRN SBP > 160  melatonin 1 milliGRAM(s) Oral at bedtime PRN Sleep  sodium chloride 0.9% lock flush 10 milliLiter(s) IV Push every 1 hour PRN Pre/post blood products, medications, blood draw, and to maintain line patency  sodium chloride 0.9% lock flush 10 milliLiter(s) IV Push every 1 hour PRN Pre/post blood products, medications, blood draw, and to maintain line patency    Allergies  No Known Allergies    Vital Signs Last 24 Hrs  T(C): 36.7 (22 Dec 2022 09:19), Max: 37.1 (21 Dec 2022 16:01)  T(F): 98.1 (22 Dec 2022 09:19), Max: 98.7 (21 Dec 2022 16:01)  HR: 60 (22 Dec 2022 09:19) (60 - 86)  BP: 146/64 (22 Dec 2022 09:19) (115/52 - 159/77)  BP(mean): --  RR: 18 (22 Dec 2022 09:19) (18 - 18)  SpO2: 100% (22 Dec 2022 09:19) (92% - 100%)    Parameters below as of 22 Dec 2022 09:19  Patient On (Oxygen Delivery Method): nasal cannula      PHYSICAL EXAM:  General: No apparent distress  Neck: Supple, trachea midline, no thyromegaly  Respiratory: Lungs clear bilaterally, normal rate, effort  Cardiac: +S1, S2, no m/r/g  GI: +BS, soft, non tender, non distended  Extremities: Mild edema  Neuro: Alert, awake      LABS:                        9.5    11.70 )-----------( 145      ( 22 Dec 2022 06:20 )             31.4     12-22    133<L>  |  97  |  61.7<H>  ----------------------------<  300<H>  5.4<H>   |  23.0  |  3.56<H>    Ca    7.4<L>      22 Dec 2022 06:20  Phos  5.5     12-21      POCT Blood Glucose.: 206 mg/dL (12-22-22 @ 11:50)  POCT Blood Glucose.: 276 mg/dL (12-22-22 @ 07:31)  POCT Blood Glucose.: 219 mg/dL (12-21-22 @ 21:34)  POCT Blood Glucose.: 157 mg/dL (12-21-22 @ 19:54)         INTERVAL EVENTS:  Follow up diabetes management    ROS: No signs of pain, tolerating diet.    MEDICATIONS  (STANDING):  acetaminophen     Tablet .. 650 milliGRAM(s) Oral every 8 hours  albuterol    90 MICROgram(s) HFA Inhaler 2 Puff(s) Inhalation every 4 hours  apixaban 2.5 milliGRAM(s) Oral two times a day  budesonide  80 MICROgram(s)/formoterol 4.5 MICROgram(s) Inhaler 2 Puff(s) Inhalation two times a day  cefTRIAXone Injectable. 1000 milliGRAM(s) IV Push every 24 hours  chlorhexidine 2% Cloths 1 Application(s) Topical daily  chlorhexidine 2% Cloths 1 Application(s) Topical <User Schedule>  cholecalciferol 1000 Unit(s) Oral daily  dextrose 5%. 1000 milliLiter(s) (100 mL/Hr) IV Continuous <Continuous>  dextrose 5%. 1000 milliLiter(s) (50 mL/Hr) IV Continuous <Continuous>  dextrose 50% Injectable 25 Gram(s) IV Push once  dextrose 50% Injectable 12.5 Gram(s) IV Push once  dextrose 50% Injectable 25 Gram(s) IV Push once  diltiazem    milliGRAM(s) Oral daily  epoetin susie-epbx (RETACRIT) Injectable 18630 Unit(s) IV Push <User Schedule>  escitalopram 20 milliGRAM(s) Oral daily  glucagon  Injectable 1 milliGRAM(s) IntraMuscular once  guaiFENesin ER 1200 milliGRAM(s) Oral every 12 hours  hydrALAZINE 50 milliGRAM(s) Oral every 8 hours  insulin glargine Injectable (LANTUS) 15 Unit(s) SubCutaneous at bedtime  insulin lispro (ADMELOG) corrective regimen sliding scale   SubCutaneous three times a day before meals  insulin lispro Injectable (ADMELOG) 7 Unit(s) SubCutaneous three times a day before meals  metoprolol tartrate 50 milliGRAM(s) Oral two times a day  pantoprazole    Tablet 40 milliGRAM(s) Oral before breakfast    MEDICATIONS  (PRN):  dextrose Oral Gel 15 Gram(s) Oral once PRN Blood Glucose LESS THAN 70 milliGRAM(s)/deciliter  hydrALAZINE Injectable 10 milliGRAM(s) IV Push every 6 hours PRN SBP > 160  melatonin 1 milliGRAM(s) Oral at bedtime PRN Sleep  sodium chloride 0.9% lock flush 10 milliLiter(s) IV Push every 1 hour PRN Pre/post blood products, medications, blood draw, and to maintain line patency  sodium chloride 0.9% lock flush 10 milliLiter(s) IV Push every 1 hour PRN Pre/post blood products, medications, blood draw, and to maintain line patency    Allergies  No Known Allergies    Vital Signs Last 24 Hrs  T(C): 36.7 (22 Dec 2022 09:19), Max: 37.1 (21 Dec 2022 16:01)  T(F): 98.1 (22 Dec 2022 09:19), Max: 98.7 (21 Dec 2022 16:01)  HR: 60 (22 Dec 2022 09:19) (60 - 86)  BP: 146/64 (22 Dec 2022 09:19) (115/52 - 159/77)  BP(mean): --  RR: 18 (22 Dec 2022 09:19) (18 - 18)  SpO2: 100% (22 Dec 2022 09:19) (92% - 100%)    Parameters below as of 22 Dec 2022 09:19  Patient On (Oxygen Delivery Method): nasal cannula      PHYSICAL EXAM:  General: No apparent distress  Respiratory: Lungs clear bilaterally, normal rate, effort  Cardiac: +S1, S2, no m/r/g  GI: +BS, soft, non tender, non distended  Neuro: Alert, awake      LABS:                        9.5    11.70 )-----------( 145      ( 22 Dec 2022 06:20 )             31.4     12-22    133<L>  |  97  |  61.7<H>  ----------------------------<  300<H>  5.4<H>   |  23.0  |  3.56<H>    Ca    7.4<L>      22 Dec 2022 06:20  Phos  5.5     12-21      POCT Blood Glucose.: 206 mg/dL (12-22-22 @ 11:50)  POCT Blood Glucose.: 276 mg/dL (12-22-22 @ 07:31)  POCT Blood Glucose.: 219 mg/dL (12-21-22 @ 21:34)  POCT Blood Glucose.: 157 mg/dL (12-21-22 @ 19:54)

## 2022-12-23 NOTE — DISCHARGE NOTE PROVIDER - PROVIDER TOKENS
PROVIDER:[TOKEN:[[id number]],FOLLOWUP:[1 month]],PROVIDER:[TOKEN:[[id number]],FOLLOWUP:[1 month]],PROVIDER:[TOKEN:[[id number]],FOLLOWUP:[2 weeks]],PROVIDER:[TOKEN:[[id number]]]

## 2022-12-23 NOTE — DISCHARGE NOTE PROVIDER - HOSPITAL COURSE
82F with PMH asthma, diabetes, hyperlipidemia, atrial fibrillation, hypertension, and hyperlipidemia, who was found to have a right superficial femoral vein thrombosis while on warfarin and acute kidney injury. She was also noted to have been recently diagnosed with COVID-19. Patient was admitted for acute hypoxemic respiratory failure, likely multifactorial, due to volume overload, acute HFpEF exacerbation, pleural effusions, Covid-19 PNA. Patient was provided supplemental oxygen PRN, bronchodilators. She was started on Decadron. She was seen by Cardiology, Pulmonary, Thoracic Surgery and Nephrology. She was started on HD via [facility]. Eliquis was held and PC placed by Vascular. AC was then resumed. Patient had L chest tube placed which was later removed. Fluid studies did not meet Lights criteria. 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 Palliative was consulted for assistance with GOC and patient was made DNR/DNI per family wishes. Patient was seen by PT who recommended [facility] however family elected to take patient home.       82F with PMH asthma, diabetes, hyperlipidemia, atrial fibrillation, hypertension, and hyperlipidemia, who was found to have a right superficial femoral vein thrombosis while on warfarin and acute kidney injury. She was also noted to have been recently diagnosed with COVID-19. Patient was admitted for acute hypoxemic respiratory failure, likely multifactorial, due to volume overload, acute HFpEF exacerbation, pleural effusions, Covid-19 PNA. Patient was provided supplemental oxygen PRN, bronchodilators. She was started on Decadron. She was seen by Cardiology, Pulmonary, Thoracic Surgery and Nephrology. She was started on HD via [facility]. Eliquis was held and PC placed by Vascular. AC was then resumed. Patient had L chest tube placed which was later removed. Fluid studies did not meet Lights criteria. 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 Palliative was consulted for assistance with GOC and patient was made DNR/DNI per family wishes. Patient was seen by PT who recommended [facility] .

## 2022-12-23 NOTE — DISCHARGE NOTE PROVIDER - ATTENDING DISCHARGE PHYSICAL EXAMINATION:
Vital Signs Last 24 Hrs  T(F): 97.5 (06 Jan 2023 15:30), Max: 98.9 (05 Jan 2023 20:37)  HR: 75 (06 Jan 2023 15:30) (68 - 85)  BP: 163/69 (06 Jan 2023 15:30) (150/102 - 177/69)  RR: 18 (06 Jan 2023 15:30) (17 - 18)  SpO2: 99% (06 Jan 2023 15:30) (99% - 100%)    Physical Exam:  Constitutional: NAD  HEENT: NC/AT, PERRL, EOMI, trachea midline, no JVD  Chest wall: PC+  Respiratory: CTA bilaterally, symmetrical chest rise  Cardiovascular: RRR, no m/g/r  Gastrointestinal: Soft, NT/ND, BS+  Vascular: 2+ peripheral pulses  Neurological: A/O x 3, no focal neurological deficits  Psych: Fair mood/affect  Musculoskeletal: No edema, cyanosis, deformities. ROM normal  Skin: No obvious rash, lesions. No jaundice.

## 2022-12-23 NOTE — PROGRESS NOTE ADULT - SUBJECTIVE AND OBJECTIVE BOX
Patient was seen and evaluated on dialysis.   No c/o CP SOB NV  no F/C  no swelling    T(C): 36.6 (12-23-22 @ 10:58), Max: 36.9 (12-23-22 @ 04:43)  HR: 65 (12-23-22 @ 10:58) (58 - 65)  BP: 161/67 (12-23-22 @ 10:58) (138/58 - 172/68)  Wt(kg): --  PE ;  NAD  lungs - CTA  CV gr 1 murmer,  No gallop or rub  Abd : soft, NT BS +, No masses  Ext- No edema  Neuro : Grossly intact, moving extremities                             8.7    11.80 )-----------( 156      ( 23 Dec 2022 07:55 )             28.9        12-23    132<L>  |  95<L>  |  85.7<H>  ----------------------------<  346<H>  5.5<H>   |  24.0  |  4.57<H>    Ca    7.5<L>      23 Dec 2022 07:55  Phos  5.3     12-23  Mg     2.0     12-23    TPro  4.4<L>  /  Alb  2.3<L>  /  TBili  <0.2<L>  /  DBili  x   /  AST  8   /  ALT  5   /  AlkPhos  72  12-23      MEDICATIONS  (STANDING):  acetaminophen     Tablet ..  albuterol    90 MICROgram(s) HFA Inhaler  aluminum hydroxide/magnesium hydroxide/simethicone Suspension PRN  apixaban  budesonide  80 MICROgram(s)/formoterol 4.5 MICROgram(s) Inhaler  cefTRIAXone Injectable.  chlorhexidine 2% Cloths  chlorhexidine 2% Cloths  cholecalciferol  dextrose 5%.  dextrose 5%.  dextrose 50% Injectable  dextrose 50% Injectable  dextrose 50% Injectable  dextrose Oral Gel PRN  diltiazem   CD  epoetin susie-epbx (RETACRIT) Injectable  escitalopram  glucagon  Injectable  guaiFENesin ER  hydrALAZINE  hydrALAZINE Injectable PRN  insulin glargine Injectable (LANTUS)  insulin lispro (ADMELOG) corrective regimen sliding scale  insulin lispro Injectable (ADMELOG)  melatonin PRN  metoprolol tartrate  pantoprazole    Tablet  sodium chloride 0.9% lock flush PRN  sodium chloride 0.9% lock flush PRN      Patient stable  [facility]ara HD easily  Continue

## 2022-12-23 NOTE — PROGRESS NOTE ADULT - SUBJECTIVE AND OBJECTIVE BOX
[facility] Division of Hospital Medicine  Sanjay Crews MD    Chief Complaint:  Patient is a 82y old  Female who presents with a chief complaint of CHF exacerbation, COVID pneumonia (19 Dec 2022 07:36)      SUBJECTIVE / OVERNIGHT EVENTS:  Patient seen and examined at bedside. Chest tube removed yesterday. No new complaints.    MEDICATIONS  (STANDING):  acetaminophen     Tablet .. 650 milliGRAM(s) Oral every 8 hours  albuterol    90 MICROgram(s) HFA Inhaler 2 Puff(s) Inhalation every 4 hours  apixaban 2.5 milliGRAM(s) Oral two times a day  budesonide  80 MICROgram(s)/formoterol 4.5 MICROgram(s) Inhaler 2 Puff(s) Inhalation two times a day  cefTRIAXone Injectable. 1000 milliGRAM(s) IV Push every 24 hours  chlorhexidine 2% Cloths 1 Application(s) Topical daily  chlorhexidine 2% Cloths 1 Application(s) Topical <User Schedule>  cholecalciferol 1000 Unit(s) Oral daily  dextrose 5%. 1000 milliLiter(s) (100 mL/Hr) IV Continuous <Continuous>  dextrose 5%. 1000 milliLiter(s) (50 mL/Hr) IV Continuous <Continuous>  dextrose 50% Injectable 25 Gram(s) IV Push once  dextrose 50% Injectable 12.5 Gram(s) IV Push once  dextrose 50% Injectable 25 Gram(s) IV Push once  diltiazem    milliGRAM(s) Oral daily  epoetin susie-epbx (RETACRIT) Injectable 05308 Unit(s) IV Push <User Schedule>  escitalopram 20 milliGRAM(s) Oral daily  glucagon  Injectable 1 milliGRAM(s) IntraMuscular once  guaiFENesin ER 1200 milliGRAM(s) Oral every 12 hours  hydrALAZINE 50 milliGRAM(s) Oral every 8 hours  insulin glargine Injectable (LANTUS) 15 Unit(s) SubCutaneous at bedtime  insulin lispro (ADMELOG) corrective regimen sliding scale   SubCutaneous three times a day before meals  insulin lispro Injectable (ADMELOG) 7 Unit(s) SubCutaneous three times a day before meals  metoprolol tartrate 50 milliGRAM(s) Oral two times a day  pantoprazole    Tablet 40 milliGRAM(s) Oral before breakfast    MEDICATIONS  (PRN):  dextrose Oral Gel 15 Gram(s) Oral once PRN Blood Glucose LESS THAN 70 milliGRAM(s)/deciliter  hydrALAZINE Injectable 10 milliGRAM(s) IV Push every 6 hours PRN SBP > 160  melatonin 1 milliGRAM(s) Oral at bedtime PRN Sleep  sodium chloride 0.9% lock flush 10 milliLiter(s) IV Push every 1 hour PRN Pre/post blood products, medications, blood draw, and to maintain line patency  sodium chloride 0.9% lock flush 10 milliLiter(s) IV Push every 1 hour PRN Pre/post blood products, medications, blood draw, and to maintain line patency        I&O's Summary    22 Dec 2022 07:01  -  23 Dec 2022 07:00  --------------------------------------------------------  IN: 120 mL / OUT: 10 mL / NET: 110 mL    23 Dec 2022 07:01  -  23 Dec 2022 11:23  --------------------------------------------------------  IN: 0 mL / OUT: 1000 mL / NET: -1000 mL        PHYSICAL EXAM:  Vital Signs Last 24 Hrs  T(C): 36.6 (23 Dec 2022 10:58), Max: 36.9 (23 Dec 2022 04:43)  T(F): 97.9 (23 Dec 2022 10:58), Max: 98.4 (23 Dec 2022 04:43)  HR: 65 (23 Dec 2022 10:58) (58 - 65)  BP: 161/67 (23 Dec 2022 10:58) (138/58 - 172/68)  BP(mean): --  RR: 18 (23 Dec 2022 10:58) (18 - 18)  SpO2: 100% (23 Dec 2022 10:58) (98% - 100%)    Parameters below as of 23 Dec 2022 10:58  Patient On (Oxygen Delivery Method): nasal cannula            CONSTITUTIONAL: NAD  Chest wall: + Permacath  HEENT: NC/AT, PERRL, no JVD  RESPIRATORY: CTA bilaterally, normal effort  CARDIOVASCULAR: RRR, S1/S2+, no m/g/r  ABDOMEN: Nontender to palpation, normoactive bowel sounds, no rebound/guarding  MUSCULOSKELETAL: No edema, cyanosis or deformities.  PSYCH: Calm, affect appropriate.  NEUROLOGY: Awake, alert, no focal neurological deficits.   SKIN: No rashes; no palpable lesions  VASC: Distal pulses palpable    LABS:                        8.7    11.80 )-----------( 156      ( 23 Dec 2022 07:55 )             28.9     12-23    132<L>  |  95<L>  |  85.7<H>  ----------------------------<  346<H>  5.5<H>   |  24.0  |  4.57<H>    Ca    7.5<L>      23 Dec 2022 07:55  Phos  5.3     12-23  Mg     2.0     12-23    TPro  4.4<L>  /  Alb  2.3<L>  /  TBili  <0.2<L>  /  DBili  x   /  AST  8   /  ALT  5   /  AlkPhos  72  12-23              CAPILLARY BLOOD GLUCOSE      POCT Blood Glucose.: 234 mg/dL (23 Dec 2022 08:16)  POCT Blood Glucose.: 302 mg/dL (22 Dec 2022 23:50)  POCT Blood Glucose.: 281 mg/dL (22 Dec 2022 16:52)  POCT Blood Glucose.: 206 mg/dL (22 Dec 2022 11:50)        RADIOLOGY & ADDITIONAL TESTS:  Results Reviewed:   Imaging Personally Reviewed:  Electrocardiogram Personally Reviewed:

## 2022-12-23 NOTE — DISCHARGE NOTE PROVIDER - DETAILS OF MALNUTRITION DIAGNOSIS/DIAGNOSES
This patient has been assessed with a concern for Malnutrition and was treated during this hospitalization for the following Nutrition diagnosis/diagnoses:     -  12/13/2022: Severe protein-calorie malnutrition

## 2022-12-23 NOTE — DISCHARGE NOTE PROVIDER - DISCHARGE DIET
Consistent Carbohydrate Diabetic Diets Consistent Carbohydrate Diabetic Diets/Renal Diets (for dialysis)

## 2022-12-23 NOTE — PROGRESS NOTE ADULT - ASSESSMENT
82F with PMH asthma, diabetes, hyperlipidemia, atrial fibrillation, hypertension, and hyperlipidemia, who was found to have a right superficial femoral vein thrombosis while on warfarin and acute kidney injury. She was also noted to have been recently diagnosed with COVID-19.    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  - TTE with intact LVF however has grade II diastolic dysfunction and moderate Pulmonary HTN  - Strict I/O, daily weight, Fluid restriction  - Continue Metoprolol and Hydralazine   - CT chest reviewed  - Cardio consult appreciated    Pleural effusion   - Small to mod L and small R pleural effusion with LLL complete and RLL near complete compressive atelectasis   - Incentive spirometry  - S/p chest tube on 12/19, care per TSx  - Fluid studies transudative, follow up cytology  - Supplemental oxygen PRN, wean as tolerated.    Emphysematous Cystitis  - UCx never collected, re ordered and still pending   - Urology consult appreciated  - On Ceftriaxone until 12/25, if discharged sooner will discharge on PO antibiotics     Acute Hypoxic respiratory Failure - Multifactorial. Asthma, COVID-19, CHFpEF, Fluid overload.  - Started on HD 12/13/22  - TTE with intact LVF however has grade II diastolic dysfunction and moderate Pulmonary HTN  - Supplemental O2, wean as tolerated  - Bronchodilators  - Pulmonary consult appreciated  - D/esther Decadron  - Supplemental oxygen PRN, wean as tolerated    Anemia  - Stable Hgb  - Multifactorial - given kidney disease  - Received PRBC x 1  - Monitor Hgb, transfuse PRN    Type 2 Diabetes on insulin  - BGM uncontrolled  - endocrine following   - A1c 11.2  - BGM with SSI, Lantus    Atrial Fibrillation  - Rate controlled, INR returned to normal  - Metoprolol, Diltiazem  - Eliquis resumed    COVID-19   - CT Chest with bilateral upper lobe opacities  - Started on Dexamethasone for Asthma; will taper once respiratory status improved  - isolation precautions  - Will need repeat outpatient CT chest in 3 months for RLL nodule    HTN-Essential  - Improved  - Metoprolol to 50mg, continue Diltiazem at current rate  - Hydralazine 25mg q8    DVT ruled out on repeat imaging  - Supra-therapeutic INR normalized  - No bleeding noted  - US Duplex- no evidence of deep venous thrombosis in either lower extremity.    Code Status: DNR/DNI     updated at bedside.    PT  recommendation for [facility]; Spouse wants to take home. Discharge planning for home w/ home care.

## 2022-12-23 NOTE — PROGRESS NOTE ADULT - ASSESSMENT
82F with asthma, HLD, DM, afib, HTN, admitted with COVID, pleural effusions, fluid overload, initiated on new dialysis.     #1 Respiratory failure  - off oxygen, improved  - s/p COVID  - fluid overload, s/p HD  - diastolic heart failure, on regimen per primary team  - nebs/ inhalers per pulmonary for asthma     #2 Acute kidney injury on CKD   - supportive measures  - trend creatinine   - avoid nephrotoxins   - s/p permacath 12/21      #3 Pleural effusion  - CT surgery following    #4 Encounter or palliative care  - legal surrogate - Tc- (spouse) [phone] (primary)  JESSICA [phone]-(SON)- support person- [phone]  - available for further discussions if needed, at this time, continue HD

## 2022-12-23 NOTE — PROGRESS NOTE ADULT - ASSESSMENT
CKD(IV): +COVID  THERESA, progressive + fluid overload + hyperkalemia  Hx NS (3g) +DM, Baseline creat 5/21 was 2.9   Hyperkalemia and Metabolic Acidosis better with HD - still high pre HD  No hydro , emphysematous cystitis on CT 12/15   ECHO 12/12 noted , reasonable   Hep B and C negative    is in favor of continuation of HD if she is clinically improving (  x 62 years )       Anemia: +CKD; Tsat 36%  - cont DILAN and Venofer   - target Hgb > 10 , getting better     HTN - Can adjust Hydralazine as needed     Effusions - Thoracic Sx placed L CT

## 2022-12-24 LAB
ALBUMIN SERPL ELPH-MCNC: 2.2 G/DL — LOW (ref 3.3–5.2)
ALP SERPL-CCNC: 80 U/L — SIGNIFICANT CHANGE UP (ref 40–120)
ALT FLD-CCNC: 9 U/L — SIGNIFICANT CHANGE UP
ANION GAP SERPL CALC-SCNC: 12 MMOL/L — SIGNIFICANT CHANGE UP (ref 5–17)
AST SERPL-CCNC: 11 U/L — SIGNIFICANT CHANGE UP
BILIRUB SERPL-MCNC: <0.2 MG/DL — LOW (ref 0.4–2)
BUN SERPL-MCNC: 57.6 MG/DL — HIGH (ref 8–20)
CALCIUM SERPL-MCNC: 7.5 MG/DL — LOW (ref 8.4–10.5)
CHLORIDE SERPL-SCNC: 96 MMOL/L — SIGNIFICANT CHANGE UP (ref 96–108)
CO2 SERPL-SCNC: 24 MMOL/L — SIGNIFICANT CHANGE UP (ref 22–29)
CREAT SERPL-MCNC: 2.99 MG/DL — HIGH (ref 0.5–1.3)
CULTURE RESULTS: SIGNIFICANT CHANGE UP
EGFR: 15 ML/MIN/1.73M2 — LOW
GLUCOSE BLDC GLUCOMTR-MCNC: 105 MG/DL — HIGH (ref 70–99)
GLUCOSE BLDC GLUCOMTR-MCNC: 117 MG/DL — HIGH (ref 70–99)
GLUCOSE BLDC GLUCOMTR-MCNC: 119 MG/DL — HIGH (ref 70–99)
GLUCOSE BLDC GLUCOMTR-MCNC: 134 MG/DL — HIGH (ref 70–99)
GLUCOSE BLDC GLUCOMTR-MCNC: 56 MG/DL — LOW (ref 70–99)
GLUCOSE BLDC GLUCOMTR-MCNC: 58 MG/DL — LOW (ref 70–99)
GLUCOSE SERPL-MCNC: 256 MG/DL — HIGH (ref 70–99)
HCT VFR BLD CALC: 32.5 % — LOW (ref 34.5–45)
HGB BLD-MCNC: 9.4 G/DL — LOW (ref 11.5–15.5)
MAGNESIUM SERPL-MCNC: 1.9 MG/DL — SIGNIFICANT CHANGE UP (ref 1.6–2.6)
MCHC RBC-ENTMCNC: 22.1 PG — LOW (ref 27–34)
MCHC RBC-ENTMCNC: 28.9 GM/DL — LOW (ref 32–36)
MCV RBC AUTO: 76.5 FL — LOW (ref 80–100)
NRBC # BLD: 4 /100 WBCS — HIGH (ref 0–0)
PHOSPHATE SERPL-MCNC: 3.2 MG/DL — SIGNIFICANT CHANGE UP (ref 2.4–4.7)
PLATELET # BLD AUTO: 171 K/UL — SIGNIFICANT CHANGE UP (ref 150–400)
POTASSIUM SERPL-MCNC: 4.7 MMOL/L — SIGNIFICANT CHANGE UP (ref 3.5–5.3)
POTASSIUM SERPL-SCNC: 4.7 MMOL/L — SIGNIFICANT CHANGE UP (ref 3.5–5.3)
PROT SERPL-MCNC: 4.4 G/DL — LOW (ref 6.6–8.7)
RBC # BLD: 4.25 M/UL — SIGNIFICANT CHANGE UP (ref 3.8–5.2)
RBC # FLD: 28.8 % — HIGH (ref 10.3–14.5)
SARS-COV-2 RNA SPEC QL NAA+PROBE: DETECTED
SODIUM SERPL-SCNC: 132 MMOL/L — LOW (ref 135–145)
SPECIMEN SOURCE: SIGNIFICANT CHANGE UP
WBC # BLD: 10.69 K/UL — HIGH (ref 3.8–10.5)
WBC # FLD AUTO: 10.69 K/UL — HIGH (ref 3.8–10.5)

## 2022-12-24 PROCEDURE — 99497 ADVNCD CARE PLAN 30 MIN: CPT | Mod: 25

## 2022-12-24 PROCEDURE — 99232 SBSQ HOSP IP/OBS MODERATE 35: CPT

## 2022-12-24 RX ORDER — DEXTROSE 50 % IN WATER 50 %
15 SYRINGE (ML) INTRAVENOUS ONCE
Refills: 0 | Status: COMPLETED | OUTPATIENT
Start: 2022-12-24 | End: 2022-12-24

## 2022-12-24 RX ADMIN — Medication 9 UNIT(S): at 08:27

## 2022-12-24 RX ADMIN — Medication 50 MILLIGRAM(S): at 17:24

## 2022-12-24 RX ADMIN — Medication 50 MILLIGRAM(S): at 21:58

## 2022-12-24 RX ADMIN — CEFTRIAXONE 1000 MILLIGRAM(S): 500 INJECTION, POWDER, FOR SOLUTION INTRAMUSCULAR; INTRAVENOUS at 17:29

## 2022-12-24 RX ADMIN — Medication 9 UNIT(S): at 12:27

## 2022-12-24 RX ADMIN — Medication 50 MILLIGRAM(S): at 05:56

## 2022-12-24 RX ADMIN — Medication 650 MILLIGRAM(S): at 21:58

## 2022-12-24 RX ADMIN — Medication 50 MILLIGRAM(S): at 12:28

## 2022-12-24 RX ADMIN — BUDESONIDE AND FORMOTEROL FUMARATE DIHYDRATE 2 PUFF(S): 160; 4.5 AEROSOL RESPIRATORY (INHALATION) at 08:30

## 2022-12-24 RX ADMIN — ALBUTEROL 2 PUFF(S): 90 AEROSOL, METERED ORAL at 12:30

## 2022-12-24 RX ADMIN — Medication 1200 MILLIGRAM(S): at 17:28

## 2022-12-24 RX ADMIN — Medication 650 MILLIGRAM(S): at 12:58

## 2022-12-24 RX ADMIN — CHLORHEXIDINE GLUCONATE 1 APPLICATION(S): 213 SOLUTION TOPICAL at 05:55

## 2022-12-24 RX ADMIN — Medication 650 MILLIGRAM(S): at 12:28

## 2022-12-24 RX ADMIN — Medication 240 MILLIGRAM(S): at 05:56

## 2022-12-24 RX ADMIN — CHLORHEXIDINE GLUCONATE 1 APPLICATION(S): 213 SOLUTION TOPICAL at 12:07

## 2022-12-24 RX ADMIN — Medication 50 MILLIGRAM(S): at 05:55

## 2022-12-24 RX ADMIN — APIXABAN 2.5 MILLIGRAM(S): 2.5 TABLET, FILM COATED ORAL at 05:55

## 2022-12-24 RX ADMIN — Medication 15 GRAM(S): at 21:58

## 2022-12-24 RX ADMIN — Medication 1000 UNIT(S): at 12:27

## 2022-12-24 RX ADMIN — PANTOPRAZOLE SODIUM 40 MILLIGRAM(S): 20 TABLET, DELAYED RELEASE ORAL at 05:55

## 2022-12-24 RX ADMIN — ALBUTEROL 2 PUFF(S): 90 AEROSOL, METERED ORAL at 17:30

## 2022-12-24 RX ADMIN — Medication 650 MILLIGRAM(S): at 05:55

## 2022-12-24 RX ADMIN — Medication 650 MILLIGRAM(S): at 06:29

## 2022-12-24 RX ADMIN — Medication 9 UNIT(S): at 17:29

## 2022-12-24 RX ADMIN — Medication 1200 MILLIGRAM(S): at 06:03

## 2022-12-24 RX ADMIN — ALBUTEROL 2 PUFF(S): 90 AEROSOL, METERED ORAL at 08:29

## 2022-12-24 RX ADMIN — Medication 650 MILLIGRAM(S): at 22:50

## 2022-12-24 RX ADMIN — ESCITALOPRAM OXALATE 20 MILLIGRAM(S): 10 TABLET, FILM COATED ORAL at 12:28

## 2022-12-24 RX ADMIN — APIXABAN 2.5 MILLIGRAM(S): 2.5 TABLET, FILM COATED ORAL at 17:29

## 2022-12-24 NOTE — PROGRESS NOTE ADULT - ASSESSMENT
82F with PMH asthma, diabetes, hyperlipidemia, atrial fibrillation, hypertension, and hyperlipidemia, who was found to have a right superficial femoral vein thrombosis while on warfarin and acute kidney injury. She was also noted to have been recently diagnosed with COVID-19.    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  - TTE with intact LVF however has grade II diastolic dysfunction and moderate Pulmonary HTN  - Strict I/O, daily weight, Fluid restriction  - Continue Metoprolol and Hydralazine   - Cardio consult appreciated    Pleural effusion   - Small to mod L and small R pleural effusion with LLL complete and RLL near complete compressive atelectasis   - Incentive spirometry  - S/p chest tube on 12/19, care per TSx  - Fluid studies transudative, follow up cytology, fungal cxs   - Supplemental oxygen PRN, wean as tolerated.    Emphysematous Cystitis  - UCx never collected, re ordered and still pending   - Urology consult appreciated  - On Ceftriaxone until 12/27, if discharged sooner will discharge on PO antibiotics   - leukocytosis trending down, might also have component of steroids     Acute Hypoxic respiratory Failure - Multifactorial. Asthma, COVID-19, CHFpEF, Fluid overload.  - Started on HD 12/13/22  - TTE with intact LVF however has grade II diastolic dysfunction and moderate Pulmonary HTN  - Supplemental O2, wean as tolerated  - Bronchodilators  - Pulmonary consult appreciated  - D/esther Decadron  - Supplemental oxygen PRN, wean as tolerated    Anemia  - Stable Hgb  - Multifactorial - given kidney disease  - Received PRBC x 1  - Monitor Hgb, transfuse PRN    Type 2 Diabetes on insulin  - BGM uncontrolled  - endocrine following   - A1c 11.2  - BGM with SSI, Lantus    Atrial Fibrillation  - Rate controlled, INR returned to normal  - Metoprolol, Diltiazem  - Eliquis resumed    COVID-19   - CT Chest with bilateral upper lobe opacities  - isolation precautions  - repeat covid   - completed decadron : now dcd   - Will need repeat outpatient CT chest in 3 months for RLL nodule    HTN-Essential  - Improved  - Metoprolol to 50mg, continue Diltiazem at current rate  - Hydralazine 25mg q8    DVT ruled out on repeat imaging  - Supra-therapeutic INR normalized  - No bleeding noted  - US Duplex- no evidence of deep venous thrombosis in either lower extremity.    Code Status: DNR/DNI     updated at bedside.    PT  recommendation for [facility]; family wants to take home. Discharge planning for home w/ home care. awating hd set up as op

## 2022-12-24 NOTE — PROGRESS NOTE ADULT - NS ATTEST RISK PROBLEM GEN_ALL_CORE FT
THERESA, worsening, CHF now requiring CRRT
Fluid overloaded, uncontrolled HTN and DM
new hd   leukocytosis   pleural effusion
Interval development of hypoxia requiring supplemental O2, worsening renal failure

## 2022-12-24 NOTE — PROGRESS NOTE ADULT - SUBJECTIVE AND OBJECTIVE BOX
Patient seen and examined    I&O's Summary    23 Dec 2022 07:01  -  24 Dec 2022 07:00  --------------------------------------------------------  IN: 0 mL / OUT: 1000 mL / NET: -1000 mL        REVIEW OF SYSTEMS:    CONSTITUTIONAL: No F/C  RESPIRATORY: No cough,  No SOB  CARDIOVASCULAR: No CP/palpitations,    GASTROINTESTINAL: No abdominal pain  or NVD   GENITOURINARY: No UTI sx  NEUROLOGICAL: No headaches, NO wk/numbness  MUSCULOSKELETAL:   EXTREMITIES : no swelling,    Vital Signs Last 24 Hrs  T(C): 36.7 (24 Dec 2022 15:21), Max: 36.9 (24 Dec 2022 08:51)  T(F): 98.1 (24 Dec 2022 15:21), Max: 98.5 (24 Dec 2022 08:51)  HR: 58 (24 Dec 2022 15:21) (53 - 62)  BP: 165/53 (24 Dec 2022 15:21) (121/- - 165/53)  BP(mean): --  RR: 18 (24 Dec 2022 15:21) (18 - 18)  SpO2: 100% (24 Dec 2022 15:21) (99% - 100%)    Parameters below as of 24 Dec 2022 15:21  Patient On (Oxygen Delivery Method): nasal cannula  O2 Flow (L/min): 2      PHYSICAL EXAM:    GENERAL: NAD,   EYES:  conjunctiva and sclera clear  NECK: Supple, No JVD/Bruit  NERVOUS SYSTEM:  A/O x3,   CHEST:  No rales or rhonchi  HEART:  RRR, No murmur  ABDOMEN: Soft, NT/ND BS+  EXTREMITIES:  No Edema;  SKIN: No rashes    LABS:                          9.4    10.69 )-----------( 171      ( 24 Dec 2022 02:46 )             32.5     12-24    132<L>  |  96  |  57.6<H>  ----------------------------<  256<H>  4.7   |  24.0  |  2.99<H>    Ca    7.5<L>      24 Dec 2022 02:46  Phos  3.2     12-24  Mg     1.9     12-24    TPro  4.4<L>  /  Alb  2.2<L>  /  TBili  <0.2<L>  /  DBili  x   /  AST  11  /  ALT  9   /  AlkPhos  80  12-24      MEDICATIONS  (STANDING):  acetaminophen     Tablet ..  albuterol    90 MICROgram(s) HFA Inhaler  aluminum hydroxide/magnesium hydroxide/simethicone Suspension PRN  apixaban  budesonide  80 MICROgram(s)/formoterol 4.5 MICROgram(s) Inhaler  chlorhexidine 2% Cloths  chlorhexidine 2% Cloths  cholecalciferol  dextrose 5%.  dextrose 5%.  dextrose 50% Injectable  dextrose 50% Injectable  dextrose 50% Injectable  dextrose Oral Gel PRN  diltiazem   CD  epoetin susie-epbx (RETACRIT) Injectable  escitalopram  glucagon  Injectable  guaiFENesin ER  hydrALAZINE  hydrALAZINE Injectable PRN  insulin glargine Injectable (LANTUS)  insulin lispro (ADMELOG) corrective regimen sliding scale  insulin lispro Injectable (ADMELOG)  melatonin PRN  metoprolol tartrate  pantoprazole    Tablet  sodium chloride 0.9% lock flush PRN  sodium chloride 0.9% lock flush PRN

## 2022-12-24 NOTE — PROGRESS NOTE ADULT - ASSESSMENT
CKD(IV): +COVID  THERESA, progressive + fluid overload + hyperkalemia  Hx NS (3g) +DM, Baseline creat 5/21 was 2.9   Hyperkalemia and Metabolic Acidosis better with HD - still high pre HD  No hydro , emphysematous cystitis on CT 12/15 - on Rocephine emp; Urology following  Resp failure - MF - Hx asthma  ECHO 12/12 noted , reasonable   Hep B and C negative    is in favor of continuation of HD if she is clinically improving (  x 62 years )       Anemia: +CKD; Tsat 36%  - cont DILAN and Venofer   - target Hgb > 10 , getting better     HTN - Can adjust Hydralazine as needed     Effusions - Thoracic Sx placed L CT     Feels overall better, son present. Ate sl better

## 2022-12-24 NOTE — PROGRESS NOTE ADULT - SUBJECTIVE AND OBJECTIVE BOX
cc: new hd       interval hx: patient seen and eval. awake, comfortable , in no acute distress. son is at bedside     Vital Signs Last 24 Hrs  T(C): 36.9 (24 Dec 2022 08:51), Max: 36.9 (24 Dec 2022 08:51)  T(F): 98.5 (24 Dec 2022 08:51), Max: 98.5 (24 Dec 2022 08:51)  HR: 53 (24 Dec 2022 08:51) (53 - 63)  BP: 121/- (24 Dec 2022 08:51) (121/- - 164/54)  BP(mean): --  RR: 18 (24 Dec 2022 08:51) (18 - 18)  SpO2: 99% (24 Dec 2022 08:51) (99% - 100%)    Parameters below as of 24 Dec 2022 08:51  Patient On (Oxygen Delivery Method): nasal cannula  O2 Flow (L/min): 2    CONSTITUTIONAL: NAD  Chest wall: + Permacath  HEENT: NC/AT, PERRL, no JVD  RESPIRATORY: CTA bilaterally, normal effort  CARDIOVASCULAR: RRR, S1/S2+, no m/g/r  ABDOMEN: Nontender to palpation, normoactive bowel sounds, no rebound/guarding  MUSCULOSKELETAL: No edema, cyanosis or deformities.  PSYCH: Calm, affect appropriate.  NEUROLOGY: Awake, alert, no focal neurological deficits.   SKIN: No rashes; no palpable lesions  VASC: Distal pulses palpable                          9.4    10.69 )-----------( 171      ( 24 Dec 2022 02:46 )             32.5   12-24    132<L>  |  96  |  57.6<H>  ----------------------------<  256<H>  4.7   |  24.0  |  2.99<H>    Ca    7.5<L>      24 Dec 2022 02:46  Phos  3.2     12-24  Mg     1.9     12-24    TPro  4.4<L>  /  Alb  2.2<L>  /  TBili  <0.2<L>  /  DBili  x   /  AST  11  /  ALT  9   /  AlkPhos  80  12-24      MEDICATIONS  (STANDING):  acetaminophen     Tablet .. 650 milliGRAM(s) Oral every 8 hours  albuterol    90 MICROgram(s) HFA Inhaler 2 Puff(s) Inhalation every 4 hours  apixaban 2.5 milliGRAM(s) Oral two times a day  budesonide  80 MICROgram(s)/formoterol 4.5 MICROgram(s) Inhaler 2 Puff(s) Inhalation two times a day  cefTRIAXone Injectable. 1000 milliGRAM(s) IV Push every 24 hours  chlorhexidine 2% Cloths 1 Application(s) Topical daily  chlorhexidine 2% Cloths 1 Application(s) Topical <User Schedule>  cholecalciferol 1000 Unit(s) Oral daily  dextrose 5%. 1000 milliLiter(s) (50 mL/Hr) IV Continuous <Continuous>  dextrose 5%. 1000 milliLiter(s) (100 mL/Hr) IV Continuous <Continuous>  dextrose 50% Injectable 25 Gram(s) IV Push once  dextrose 50% Injectable 12.5 Gram(s) IV Push once  dextrose 50% Injectable 25 Gram(s) IV Push once  diltiazem    milliGRAM(s) Oral daily  epoetin susie-epbx (RETACRIT) Injectable 07155 Unit(s) IV Push <User Schedule>  escitalopram 20 milliGRAM(s) Oral daily  glucagon  Injectable 1 milliGRAM(s) IntraMuscular once  guaiFENesin ER 1200 milliGRAM(s) Oral every 12 hours  hydrALAZINE 50 milliGRAM(s) Oral every 8 hours  insulin glargine Injectable (LANTUS) 18 Unit(s) SubCutaneous at bedtime  insulin lispro (ADMELOG) corrective regimen sliding scale   SubCutaneous three times a day before meals  insulin lispro Injectable (ADMELOG) 9 Unit(s) SubCutaneous three times a day before meals  metoprolol tartrate 50 milliGRAM(s) Oral two times a day  pantoprazole    Tablet 40 milliGRAM(s) Oral before breakfast    MEDICATIONS  (PRN):  aluminum hydroxide/magnesium hydroxide/simethicone Suspension 30 milliLiter(s) Oral every 6 hours PRN Dyspepsia  dextrose Oral Gel 15 Gram(s) Oral once PRN Blood Glucose LESS THAN 70 milliGRAM(s)/deciliter  hydrALAZINE Injectable 10 milliGRAM(s) IV Push every 6 hours PRN SBP > 160  melatonin 1 milliGRAM(s) Oral at bedtime PRN Sleep  sodium chloride 0.9% lock flush 10 milliLiter(s) IV Push every 1 hour PRN Pre/post blood products, medications, blood draw, and to maintain line patency  sodium chloride 0.9% lock flush 10 milliLiter(s) IV Push every 1 hour PRN Pre/post blood products, medications, blood draw, and to maintain line patency

## 2022-12-24 NOTE — PROGRESS NOTE ADULT - CONVERSATION DETAILS
Discussed diagnosis and prognosis. Patient and  deferred decision-making to son, Richard
Met with family at bedside - spoke to Son Jessica,  Jr and son in law regarding options in the event dialysis is not a route they want to pursue- Social work marilee natarajan also present during this meeting   Answered all questions and concerns   Discussed continuation of dialysis with potential for discharge to a [facility] on dialysis vs home on dialysis - we also discussed alternative options in the realms of hospice support at home or inpatient depending on severity of symptoms when dialysis is stopped) Family receptive to information - At the end ofour conversation patient's  expressed that he would like to currently continue with dialysis in the hospital but that over the weekend he will speak with his adult children to see their input on his wife's care. The patient herself mentation waxes and wanes, she falls to sleep mid conversation, she is able to identify her  as the person to speak with and provides permission to speak with her son Jessica.  Updated Dr. Pathak/HCP/Guardian: Phone#:Tc- (spouse) [phone] (primary)  JESSICA [phone]-(SON)- support person- [phone]  Code status: Full Code
Called and spoke with son Jack.    Updated on current condition which shows worsening of renal failure, CHF and hypoxic respiratory failure  Discussed current treatments (O2, Steroids, Diuretics)  Fragile at risk of decompensation given multi organ system dysfunction    Discussed advanced directives - currently full code; will discuss with patient spouse
patient new to hd.   patient wishes to be dnr/ dni

## 2022-12-25 LAB
ALBUMIN SERPL ELPH-MCNC: 2.1 G/DL — LOW (ref 3.3–5.2)
ALP SERPL-CCNC: 69 U/L — SIGNIFICANT CHANGE UP (ref 40–120)
ALT FLD-CCNC: 7 U/L — SIGNIFICANT CHANGE UP
ANION GAP SERPL CALC-SCNC: 12 MMOL/L — SIGNIFICANT CHANGE UP (ref 5–17)
AST SERPL-CCNC: 11 U/L — SIGNIFICANT CHANGE UP
BILIRUB SERPL-MCNC: <0.2 MG/DL — LOW (ref 0.4–2)
BUN SERPL-MCNC: 72.1 MG/DL — HIGH (ref 8–20)
CALCIUM SERPL-MCNC: 7.5 MG/DL — LOW (ref 8.4–10.5)
CHLORIDE SERPL-SCNC: 97 MMOL/L — SIGNIFICANT CHANGE UP (ref 96–108)
CO2 SERPL-SCNC: 25 MMOL/L — SIGNIFICANT CHANGE UP (ref 22–29)
CREAT SERPL-MCNC: 3.51 MG/DL — HIGH (ref 0.5–1.3)
EGFR: 12 ML/MIN/1.73M2 — LOW
GLUCOSE BLDC GLUCOMTR-MCNC: 157 MG/DL — HIGH (ref 70–99)
GLUCOSE BLDC GLUCOMTR-MCNC: 167 MG/DL — HIGH (ref 70–99)
GLUCOSE BLDC GLUCOMTR-MCNC: 181 MG/DL — HIGH (ref 70–99)
GLUCOSE BLDC GLUCOMTR-MCNC: 228 MG/DL — HIGH (ref 70–99)
GLUCOSE SERPL-MCNC: 191 MG/DL — HIGH (ref 70–99)
HCT VFR BLD CALC: 31.8 % — LOW (ref 34.5–45)
HGB BLD-MCNC: 9.3 G/DL — LOW (ref 11.5–15.5)
MAGNESIUM SERPL-MCNC: 1.9 MG/DL — SIGNIFICANT CHANGE UP (ref 1.6–2.6)
MCHC RBC-ENTMCNC: 22.2 PG — LOW (ref 27–34)
MCHC RBC-ENTMCNC: 29.2 GM/DL — LOW (ref 32–36)
MCV RBC AUTO: 75.9 FL — LOW (ref 80–100)
NRBC # BLD: 1 /100 WBCS — HIGH (ref 0–0)
PLATELET # BLD AUTO: 172 K/UL — SIGNIFICANT CHANGE UP (ref 150–400)
POTASSIUM SERPL-MCNC: 5.1 MMOL/L — SIGNIFICANT CHANGE UP (ref 3.5–5.3)
POTASSIUM SERPL-SCNC: 5.1 MMOL/L — SIGNIFICANT CHANGE UP (ref 3.5–5.3)
PROT SERPL-MCNC: 4.2 G/DL — LOW (ref 6.6–8.7)
RBC # BLD: 4.19 M/UL — SIGNIFICANT CHANGE UP (ref 3.8–5.2)
RBC # FLD: 28.2 % — HIGH (ref 10.3–14.5)
SODIUM SERPL-SCNC: 134 MMOL/L — LOW (ref 135–145)
WBC # BLD: 8.42 K/UL — SIGNIFICANT CHANGE UP (ref 3.8–10.5)
WBC # FLD AUTO: 8.42 K/UL — SIGNIFICANT CHANGE UP (ref 3.8–10.5)

## 2022-12-25 PROCEDURE — 99233 SBSQ HOSP IP/OBS HIGH 50: CPT

## 2022-12-25 RX ADMIN — Medication 50 MILLIGRAM(S): at 05:17

## 2022-12-25 RX ADMIN — BUDESONIDE AND FORMOTEROL FUMARATE DIHYDRATE 2 PUFF(S): 160; 4.5 AEROSOL RESPIRATORY (INHALATION) at 22:13

## 2022-12-25 RX ADMIN — Medication 4: at 09:00

## 2022-12-25 RX ADMIN — PANTOPRAZOLE SODIUM 40 MILLIGRAM(S): 20 TABLET, DELAYED RELEASE ORAL at 05:18

## 2022-12-25 RX ADMIN — Medication 50 MILLIGRAM(S): at 18:36

## 2022-12-25 RX ADMIN — Medication 1200 MILLIGRAM(S): at 05:16

## 2022-12-25 RX ADMIN — ALBUTEROL 2 PUFF(S): 90 AEROSOL, METERED ORAL at 12:30

## 2022-12-25 RX ADMIN — APIXABAN 2.5 MILLIGRAM(S): 2.5 TABLET, FILM COATED ORAL at 05:17

## 2022-12-25 RX ADMIN — ALBUTEROL 2 PUFF(S): 90 AEROSOL, METERED ORAL at 09:01

## 2022-12-25 RX ADMIN — Medication 2: at 18:37

## 2022-12-25 RX ADMIN — ESCITALOPRAM OXALATE 20 MILLIGRAM(S): 10 TABLET, FILM COATED ORAL at 12:25

## 2022-12-25 RX ADMIN — Medication 2: at 12:26

## 2022-12-25 RX ADMIN — CHLORHEXIDINE GLUCONATE 1 APPLICATION(S): 213 SOLUTION TOPICAL at 12:06

## 2022-12-25 RX ADMIN — Medication 9 UNIT(S): at 09:01

## 2022-12-25 RX ADMIN — Medication 650 MILLIGRAM(S): at 12:55

## 2022-12-25 RX ADMIN — Medication 9 UNIT(S): at 12:27

## 2022-12-25 RX ADMIN — Medication 50 MILLIGRAM(S): at 05:18

## 2022-12-25 RX ADMIN — Medication 1000 UNIT(S): at 12:26

## 2022-12-25 RX ADMIN — Medication 650 MILLIGRAM(S): at 06:10

## 2022-12-25 RX ADMIN — Medication 240 MILLIGRAM(S): at 05:18

## 2022-12-25 RX ADMIN — Medication 50 MILLIGRAM(S): at 12:26

## 2022-12-25 RX ADMIN — Medication 650 MILLIGRAM(S): at 22:01

## 2022-12-25 RX ADMIN — ALBUTEROL 2 PUFF(S): 90 AEROSOL, METERED ORAL at 18:38

## 2022-12-25 RX ADMIN — CHLORHEXIDINE GLUCONATE 1 APPLICATION(S): 213 SOLUTION TOPICAL at 05:18

## 2022-12-25 RX ADMIN — Medication 650 MILLIGRAM(S): at 12:25

## 2022-12-25 RX ADMIN — Medication 650 MILLIGRAM(S): at 23:05

## 2022-12-25 RX ADMIN — APIXABAN 2.5 MILLIGRAM(S): 2.5 TABLET, FILM COATED ORAL at 18:37

## 2022-12-25 RX ADMIN — Medication 50 MILLIGRAM(S): at 22:02

## 2022-12-25 RX ADMIN — Medication 650 MILLIGRAM(S): at 05:17

## 2022-12-25 RX ADMIN — INSULIN GLARGINE 18 UNIT(S): 100 INJECTION, SOLUTION SUBCUTANEOUS at 22:02

## 2022-12-25 RX ADMIN — BUDESONIDE AND FORMOTEROL FUMARATE DIHYDRATE 2 PUFF(S): 160; 4.5 AEROSOL RESPIRATORY (INHALATION) at 09:02

## 2022-12-25 RX ADMIN — Medication 1200 MILLIGRAM(S): at 19:30

## 2022-12-25 RX ADMIN — Medication 9 UNIT(S): at 18:38

## 2022-12-25 NOTE — PROGRESS NOTE ADULT - SUBJECTIVE AND OBJECTIVE BOX
Patient is a 82y old  Female who presents with a chief complaint of CHF exacerbation, COVID pneumonia (19 Dec 2022 07:36)      INTERVAL HPI/OVERNIGHT EVENTS: pt seen and examined. Son and  at bedside  Pt herself had no complains, looks very fragile and deconditioned  Per son, they encouraging her to get out of bed and to move her extremities       MEDICATIONS  (STANDING):  acetaminophen     Tablet .. 650 milliGRAM(s) Oral every 8 hours  albuterol    90 MICROgram(s) HFA Inhaler 2 Puff(s) Inhalation every 4 hours  apixaban 2.5 milliGRAM(s) Oral two times a day  budesonide  80 MICROgram(s)/formoterol 4.5 MICROgram(s) Inhaler 2 Puff(s) Inhalation two times a day  chlorhexidine 2% Cloths 1 Application(s) Topical daily  chlorhexidine 2% Cloths 1 Application(s) Topical <User Schedule>  cholecalciferol 1000 Unit(s) Oral daily  dextrose 5%. 1000 milliLiter(s) (100 mL/Hr) IV Continuous <Continuous>  dextrose 5%. 1000 milliLiter(s) (50 mL/Hr) IV Continuous <Continuous>  dextrose 50% Injectable 25 Gram(s) IV Push once  dextrose 50% Injectable 12.5 Gram(s) IV Push once  dextrose 50% Injectable 25 Gram(s) IV Push once  diltiazem    milliGRAM(s) Oral daily  epoetin susie-epbx (RETACRIT) Injectable 37342 Unit(s) IV Push <User Schedule>  escitalopram 20 milliGRAM(s) Oral daily  glucagon  Injectable 1 milliGRAM(s) IntraMuscular once  guaiFENesin ER 1200 milliGRAM(s) Oral every 12 hours  hydrALAZINE 50 milliGRAM(s) Oral every 8 hours  insulin glargine Injectable (LANTUS) 18 Unit(s) SubCutaneous at bedtime  insulin lispro (ADMELOG) corrective regimen sliding scale   SubCutaneous three times a day before meals  insulin lispro Injectable (ADMELOG) 9 Unit(s) SubCutaneous three times a day before meals  metoprolol tartrate 50 milliGRAM(s) Oral two times a day  pantoprazole    Tablet 40 milliGRAM(s) Oral before breakfast    MEDICATIONS  (PRN):  aluminum hydroxide/magnesium hydroxide/simethicone Suspension 30 milliLiter(s) Oral every 6 hours PRN Dyspepsia  dextrose Oral Gel 15 Gram(s) Oral once PRN Blood Glucose LESS THAN 70 milliGRAM(s)/deciliter  hydrALAZINE Injectable 10 milliGRAM(s) IV Push every 6 hours PRN SBP > 160  melatonin 1 milliGRAM(s) Oral at bedtime PRN Sleep  sodium chloride 0.9% lock flush 10 milliLiter(s) IV Push every 1 hour PRN Pre/post blood products, medications, blood draw, and to maintain line patency  sodium chloride 0.9% lock flush 10 milliLiter(s) IV Push every 1 hour PRN Pre/post blood products, medications, blood draw, and to maintain line patency      Allergies    No Known Allergies    Intolerances        REVIEW OF SYSTEMS:  CONSTITUTIONAL: No fever, weight loss, or fatigue  RESPIRATORY: No cough, wheezing, chills or hemoptysis; No shortness of breath  CARDIOVASCULAR: No chest pain, palpitations, dizziness, or leg swelling  GASTROINTESTINAL: No abdominal or epigastric pain. No nausea, vomiting, or hematemesis; No diarrhea or constipation. No melena or hematochezia.  NEUROLOGICAL: No headaches, memory loss, loss of strength, numbness, or tremors  MUSCULOSKELETAL: No joint pain or swelling; No muscle, back, or extremity pain      Vital Signs Last 24 Hrs  T(C): 36.7 (25 Dec 2022 11:46), Max: 36.8 (25 Dec 2022 04:11)  T(F): 98.1 (25 Dec 2022 11:46), Max: 98.2 (25 Dec 2022 04:11)  HR: 67 (25 Dec 2022 11:46) (58 - 67)  BP: 150/63 (25 Dec 2022 11:46) (150/63 - 166/80)  BP(mean): --  RR: 18 (25 Dec 2022 11:46) (18 - 18)  SpO2: 98% (25 Dec 2022 11:46) (98% - 100%)    Parameters below as of 25 Dec 2022 11:46  Patient On (Oxygen Delivery Method): nasal cannula        PHYSICAL EXAM:  GENERAL: ill looking, lady, laying in bed   HEAD:  Atraumatic, Normocephalic  EYES: EOMI, PERRLA, conjunctiva and sclera clear  NECK: Supple, No JVD, Normal thyroid  NERVOUS SYSTEM:  Alert & Oriented X2, No gross focal deficits  CHEST/LUNG: Clear to percussion bilaterally; No rales, rhonchi, wheezing, or rubs, perma cath in place   HEART: Regular rate and rhythm; No murmurs, rubs, or gallops  ABDOMEN: Soft, Nontender, Nondistended; Bowel sounds present  EXTREMITIES: swollen arms and edema LE   SKIN: No rashes or lesions    LABS:                        9.3    8.42  )-----------( 172      ( 25 Dec 2022 04:13 )             31.8     12-25    134<L>  |  97  |  72.1<H>  ----------------------------<  191<H>  5.1   |  25.0  |  3.51<H>    Ca    7.5<L>      25 Dec 2022 04:13  Phos  3.2     12-24  Mg     1.9     12-25    TPro  4.2<L>  /  Alb  2.1<L>  /  TBili  <0.2<L>  /  DBili  x   /  AST  11  /  ALT  7   /  AlkPhos  69  12-25        CAPILLARY BLOOD GLUCOSE      POCT Blood Glucose.: 181 mg/dL (25 Dec 2022 11:01)  POCT Blood Glucose.: 228 mg/dL (25 Dec 2022 07:35)  POCT Blood Glucose.: 117 mg/dL (24 Dec 2022 22:26)  POCT Blood Glucose.: 56 mg/dL (24 Dec 2022 21:43)  POCT Blood Glucose.: 58 mg/dL (24 Dec 2022 21:41)  POCT Blood Glucose.: 105 mg/dL (24 Dec 2022 16:29)      RADIOLOGY & ADDITIONAL TESTS:    Imaging Personally Reviewed:  [ ] YES  [ ] NO    Consultant(s) Notes Reviewed:  [x ] YES  [ ] NO    Care Discussed with Consultants/Other Providers [ ] YES  [ ] NO    Plan of Care discussed with Housestaff [x ]YES [ ] NO

## 2022-12-25 NOTE — PROGRESS NOTE ADULT - ASSESSMENT
CKD(IV): +COVID  THERESA, progressive + fluid overload + hyperkalemia  Hx NS (3g) +DM, Baseline creat 5/21 was 2.9, today creat 3.4; Check pre [facility] creatinine am  Strict I/O chart  Hyperkalemia and Metabolic Acidosis better with HD - still high pre HD    No hydro , emphysematous cystitis on CT 12/15 - on Rocephine emp; Urology following    Resp failure - MF - Hx asthma  ECHO 12/12 noted , reasonable Afib    Hep B and C negative    is in favor of continuation of HD if she is clinically improving (  x 62 years )       Anemia: +CKD; Tsat 36%  - cont DILAN and Venofer   - target Hgb > 10 , getting better     HTN - Can adjust Hydralazine as needed     Effusions - Thoracic Sx placed L CT     Feels overall better, son present. Ate sl better

## 2022-12-25 NOTE — PROGRESS NOTE ADULT - ASSESSMENT
82F with PMH asthma, diabetes, hyperlipidemia, atrial fibrillation, hypertension, and hyperlipidemia, who was found to have a right superficial femoral vein thrombosis while on warfarin and acute kidney injury. She was also noted to have been recently diagnosed with COVID-19.    THERESA on CKD IV  - HD initiated 12/13/22 via Right femoral access,    - Avoid Nephrotoxin  - cont HD per renal  - S/p PC on 12/21 will eventually need avf  - SW for HD set up as op       Acute on Chronic Diastolic Heart failure  - TTE with intact LVF however has grade II diastolic dysfunction and moderate Pulmonary HTN  - Resistant to diuresis, HD initiated 12/13/22 via Right femoral access.  - Strict I/O, daily weight, Fluid restriction  - Continue Metoprolol and Hydralazine   - Cardio consult appreciated    Pleural effusion   - Small to mod L and small R pleural effusion with LLL complete and RLL near complete compressive atelectasis   - Incentive spirometry  - S/p chest tube on 12/19, care per TSx  - Fluid studies transudative, follow up cytology, fungal cxs   - Supplemental oxygen PRN, wean as tolerated.    Emphysematous Cystitis  - UCx never collected, re ordered and still pending   - Urology consult appreciated  - On Ceftriaxone until 12/27, if discharged sooner will discharge on PO antibiotics   - leukocytosis trending down, might also have component of steroids     Acute Hypoxic respiratory Failure - Multifactorial. Asthma, COVID-19, CHFpEF, Fluid overload.  - Started on HD 12/13/22  - Supplemental O2, wean as tolerated  - Bronchodilators  - D/esther Decadron  - Supplemental oxygen PRN, wean as tolerated    Anemia likely multifactorial - chronic disease and  kidney disease  - Stable Hgb  - Received PRBC x 1 during this admission   - Monitor Hgb, transfuse PRN    Uncontrolled Type 2 Diabetes on insulin  - A1c 11.2, BG better  controlled   - BGM uncontrolled  - BGM with SSI, Lantus    Atrial Fibrillation  - Rate controlled, INR returned to normal  - Metoprolol, Diltiazem  - Eliquis resumed    COVID-19   - CT Chest with bilateral upper lobe opacities  - isolation precautions  - repeat covid   - completed decadron : now dcd   - Will need repeat outpatient CT chest in 3 months for RLL nodule    HTN-Essential  - Improved  - Metoprolol to 50mg, continue Diltiazem at current rate  - Hydralazine 25mg q8    DVT ruled out on repeat imaging  - Supra-therapeutic INR normalized  - No bleeding noted  - US Duplex- no evidence of deep venous thrombosis in either lower extremity.    Code Status: DNR/DNI     and son  updated at bedside.    PT  recommendation for [facility]; family wants to take home. Discharge planning for home w/ home care. awating hd set up as op

## 2022-12-25 NOTE — PROGRESS NOTE ADULT - SUBJECTIVE AND OBJECTIVE BOX
Patient seen and examined    More tired cf yest  Ate decent amount of food, son present    REVIEW OF SYSTEMS:    CONSTITUTIONAL: No F/C  RESPIRATORY: No cough,  No SOB  CARDIOVASCULAR: No CP/palpitations,    GASTROINTESTINAL: No abdominal pain  or NVD   GENITOURINARY: No UTI sx  NEUROLOGICAL: No headaches, NO wk/numbness  MUSCULOSKELETAL:   EXTREMITIES : no swelling,    Vital Signs Last 24 Hrs  T(C): 36.8 (25 Dec 2022 15:53), Max: 36.8 (25 Dec 2022 04:11)  T(F): 98.2 (25 Dec 2022 15:53), Max: 98.2 (25 Dec 2022 04:11)  HR: 65 (25 Dec 2022 15:53) (64 - 67)  BP: 154/85 (25 Dec 2022 15:53) (150/63 - 166/80)  BP(mean): --  RR: 18 (25 Dec 2022 15:53) (18 - 18)  SpO2: 99% (25 Dec 2022 15:53) (98% - 99%)    Parameters below as of 25 Dec 2022 15:53  Patient On (Oxygen Delivery Method): nasal cannula  O2 Flow (L/min): 2      PHYSICAL EXAM:    GENERAL: NAD,   EYES:  conjunctiva and sclera clear  NECK: Supple, No JVD/Bruit  NERVOUS SYSTEM:  A/O x3,   CHEST:  No rales or rhonchi  HEART:  RRR, No murmur  ABDOMEN: Soft, NT/ND BS+  EXTREMITIES:  No Edema;  SKIN: No rashes    LABS:                          9.3    8.42  )-----------( 172      ( 25 Dec 2022 04:13 )             31.8     12-25    134<L>  |  97  |  72.1<H>  ----------------------------<  191<H>  5.1   |  25.0  |  3.51<H>    Ca    7.5<L>      25 Dec 2022 04:13  Phos  3.2     12-24  Mg     1.9     12-25    TPro  4.2<L>  /  Alb  2.1<L>  /  TBili  <0.2<L>  /  DBili  x   /  AST  11  /  ALT  7   /  AlkPhos  69  12-25      MEDICATIONS  (STANDING):  acetaminophen     Tablet ..  albuterol    90 MICROgram(s) HFA Inhaler  aluminum hydroxide/magnesium hydroxide/simethicone Suspension PRN  apixaban  budesonide  80 MICROgram(s)/formoterol 4.5 MICROgram(s) Inhaler  chlorhexidine 2% Cloths  chlorhexidine 2% Cloths  cholecalciferol  dextrose 5%.  dextrose 5%.  dextrose 50% Injectable  dextrose 50% Injectable  dextrose 50% Injectable  dextrose Oral Gel PRN  diltiazem   CD  epoetin susie-epbx (RETACRIT) Injectable  escitalopram  glucagon  Injectable  guaiFENesin ER  hydrALAZINE  hydrALAZINE Injectable PRN  insulin glargine Injectable (LANTUS)  insulin lispro (ADMELOG) corrective regimen sliding scale  insulin lispro Injectable (ADMELOG)  melatonin PRN  metoprolol tartrate  pantoprazole    Tablet  sodium chloride 0.9% lock flush PRN  sodium chloride 0.9% lock flush PRN

## 2022-12-26 LAB
ANION GAP SERPL CALC-SCNC: 12 MMOL/L — SIGNIFICANT CHANGE UP (ref 5–17)
BUN SERPL-MCNC: 87.1 MG/DL — HIGH (ref 8–20)
CALCIUM SERPL-MCNC: 7.3 MG/DL — LOW (ref 8.4–10.5)
CHLORIDE SERPL-SCNC: 96 MMOL/L — SIGNIFICANT CHANGE UP (ref 96–108)
CO2 SERPL-SCNC: 22 MMOL/L — SIGNIFICANT CHANGE UP (ref 22–29)
CREAT SERPL-MCNC: 4.08 MG/DL — HIGH (ref 0.5–1.3)
EGFR: 10 ML/MIN/1.73M2 — LOW
GLUCOSE BLDC GLUCOMTR-MCNC: 163 MG/DL — HIGH (ref 70–99)
GLUCOSE BLDC GLUCOMTR-MCNC: 172 MG/DL — HIGH (ref 70–99)
GLUCOSE BLDC GLUCOMTR-MCNC: 174 MG/DL — HIGH (ref 70–99)
GLUCOSE BLDC GLUCOMTR-MCNC: 183 MG/DL — HIGH (ref 70–99)
GLUCOSE BLDC GLUCOMTR-MCNC: 242 MG/DL — HIGH (ref 70–99)
GLUCOSE SERPL-MCNC: 215 MG/DL — HIGH (ref 70–99)
HCT VFR BLD CALC: 34.7 % — SIGNIFICANT CHANGE UP (ref 34.5–45)
HGB BLD-MCNC: 10.3 G/DL — LOW (ref 11.5–15.5)
MCHC RBC-ENTMCNC: 22.5 PG — LOW (ref 27–34)
MCHC RBC-ENTMCNC: 29.7 GM/DL — LOW (ref 32–36)
MCV RBC AUTO: 75.9 FL — LOW (ref 80–100)
PLATELET # BLD AUTO: 215 K/UL — SIGNIFICANT CHANGE UP (ref 150–400)
POTASSIUM SERPL-MCNC: 5.3 MMOL/L — SIGNIFICANT CHANGE UP (ref 3.5–5.3)
POTASSIUM SERPL-SCNC: 5.3 MMOL/L — SIGNIFICANT CHANGE UP (ref 3.5–5.3)
RBC # BLD: 4.57 M/UL — SIGNIFICANT CHANGE UP (ref 3.8–5.2)
RBC # FLD: 27.9 % — HIGH (ref 10.3–14.5)
SODIUM SERPL-SCNC: 130 MMOL/L — LOW (ref 135–145)
WBC # BLD: 9.22 K/UL — SIGNIFICANT CHANGE UP (ref 3.8–10.5)
WBC # FLD AUTO: 9.22 K/UL — SIGNIFICANT CHANGE UP (ref 3.8–10.5)

## 2022-12-26 PROCEDURE — 99233 SBSQ HOSP IP/OBS HIGH 50: CPT

## 2022-12-26 PROCEDURE — 99232 SBSQ HOSP IP/OBS MODERATE 35: CPT

## 2022-12-26 RX ORDER — ESCITALOPRAM OXALATE 10 MG/1
10 TABLET, FILM COATED ORAL DAILY
Refills: 0 | Status: DISCONTINUED | OUTPATIENT
Start: 2022-12-26 | End: 2023-01-06

## 2022-12-26 RX ORDER — FUROSEMIDE 40 MG
40 TABLET ORAL ONCE
Refills: 0 | Status: COMPLETED | OUTPATIENT
Start: 2022-12-26 | End: 2022-12-26

## 2022-12-26 RX ADMIN — ALBUTEROL 2 PUFF(S): 90 AEROSOL, METERED ORAL at 16:35

## 2022-12-26 RX ADMIN — BUDESONIDE AND FORMOTEROL FUMARATE DIHYDRATE 2 PUFF(S): 160; 4.5 AEROSOL RESPIRATORY (INHALATION) at 22:08

## 2022-12-26 RX ADMIN — ALBUTEROL 2 PUFF(S): 90 AEROSOL, METERED ORAL at 22:08

## 2022-12-26 RX ADMIN — Medication 50 MILLIGRAM(S): at 16:58

## 2022-12-26 RX ADMIN — Medication 1 MILLIGRAM(S): at 22:09

## 2022-12-26 RX ADMIN — Medication 50 MILLIGRAM(S): at 22:09

## 2022-12-26 RX ADMIN — CHLORHEXIDINE GLUCONATE 1 APPLICATION(S): 213 SOLUTION TOPICAL at 06:02

## 2022-12-26 RX ADMIN — CHLORHEXIDINE GLUCONATE 1 APPLICATION(S): 213 SOLUTION TOPICAL at 12:08

## 2022-12-26 RX ADMIN — PANTOPRAZOLE SODIUM 40 MILLIGRAM(S): 20 TABLET, DELAYED RELEASE ORAL at 06:02

## 2022-12-26 RX ADMIN — Medication 650 MILLIGRAM(S): at 23:00

## 2022-12-26 RX ADMIN — APIXABAN 2.5 MILLIGRAM(S): 2.5 TABLET, FILM COATED ORAL at 06:02

## 2022-12-26 RX ADMIN — Medication 650 MILLIGRAM(S): at 15:23

## 2022-12-26 RX ADMIN — ALBUTEROL 2 PUFF(S): 90 AEROSOL, METERED ORAL at 11:27

## 2022-12-26 RX ADMIN — ESCITALOPRAM OXALATE 10 MILLIGRAM(S): 10 TABLET, FILM COATED ORAL at 22:13

## 2022-12-26 RX ADMIN — Medication 650 MILLIGRAM(S): at 22:09

## 2022-12-26 RX ADMIN — APIXABAN 2.5 MILLIGRAM(S): 2.5 TABLET, FILM COATED ORAL at 16:58

## 2022-12-26 RX ADMIN — Medication 50 MILLIGRAM(S): at 13:49

## 2022-12-26 RX ADMIN — ESCITALOPRAM OXALATE 20 MILLIGRAM(S): 10 TABLET, FILM COATED ORAL at 11:26

## 2022-12-26 RX ADMIN — Medication 1000 UNIT(S): at 11:26

## 2022-12-26 RX ADMIN — Medication 2: at 16:33

## 2022-12-26 RX ADMIN — Medication 9 UNIT(S): at 11:59

## 2022-12-26 RX ADMIN — Medication 50 MILLIGRAM(S): at 06:02

## 2022-12-26 RX ADMIN — INSULIN GLARGINE 18 UNIT(S): 100 INJECTION, SOLUTION SUBCUTANEOUS at 22:09

## 2022-12-26 RX ADMIN — Medication 2: at 08:05

## 2022-12-26 RX ADMIN — Medication 2: at 11:26

## 2022-12-26 RX ADMIN — Medication 50 MILLIGRAM(S): at 06:01

## 2022-12-26 RX ADMIN — Medication 650 MILLIGRAM(S): at 17:02

## 2022-12-26 RX ADMIN — Medication 9 UNIT(S): at 09:25

## 2022-12-26 RX ADMIN — Medication 30 MILLILITER(S): at 22:14

## 2022-12-26 RX ADMIN — Medication 40 MILLIGRAM(S): at 15:24

## 2022-12-26 RX ADMIN — Medication 9 UNIT(S): at 16:52

## 2022-12-26 RX ADMIN — Medication 240 MILLIGRAM(S): at 06:02

## 2022-12-26 NOTE — PROGRESS NOTE ADULT - SUBJECTIVE AND OBJECTIVE BOX
INTERVAL HPI/OVERNIGHT EVENTS:  Pt seen and examined at bedside   reports good appetite       MEDICATIONS  (STANDING):  acetaminophen     Tablet .. 650 milliGRAM(s) Oral every 8 hours  albuterol    90 MICROgram(s) HFA Inhaler 2 Puff(s) Inhalation every 4 hours  apixaban 2.5 milliGRAM(s) Oral two times a day  budesonide  80 MICROgram(s)/formoterol 4.5 MICROgram(s) Inhaler 2 Puff(s) Inhalation two times a day  chlorhexidine 2% Cloths 1 Application(s) Topical daily  chlorhexidine 2% Cloths 1 Application(s) Topical <User Schedule>  cholecalciferol 1000 Unit(s) Oral daily  dextrose 5%. 1000 milliLiter(s) (50 mL/Hr) IV Continuous <Continuous>  dextrose 5%. 1000 milliLiter(s) (100 mL/Hr) IV Continuous <Continuous>  dextrose 50% Injectable 25 Gram(s) IV Push once  dextrose 50% Injectable 12.5 Gram(s) IV Push once  dextrose 50% Injectable 25 Gram(s) IV Push once  diltiazem    milliGRAM(s) Oral daily  epoetin susie-epbx (RETACRIT) Injectable 09386 Unit(s) IV Push <User Schedule>  escitalopram 10 milliGRAM(s) Oral daily  glucagon  Injectable 1 milliGRAM(s) IntraMuscular once  guaiFENesin ER 1200 milliGRAM(s) Oral every 12 hours  hydrALAZINE 50 milliGRAM(s) Oral every 8 hours  insulin glargine Injectable (LANTUS) 18 Unit(s) SubCutaneous at bedtime  insulin lispro (ADMELOG) corrective regimen sliding scale   SubCutaneous three times a day before meals  insulin lispro Injectable (ADMELOG) 9 Unit(s) SubCutaneous three times a day before meals  metoprolol tartrate 50 milliGRAM(s) Oral two times a day  pantoprazole    Tablet 40 milliGRAM(s) Oral before breakfast    MEDICATIONS  (PRN):  aluminum hydroxide/magnesium hydroxide/simethicone Suspension 30 milliLiter(s) Oral every 6 hours PRN Dyspepsia  dextrose Oral Gel 15 Gram(s) Oral once PRN Blood Glucose LESS THAN 70 milliGRAM(s)/deciliter  hydrALAZINE Injectable 10 milliGRAM(s) IV Push every 6 hours PRN SBP > 160  melatonin 1 milliGRAM(s) Oral at bedtime PRN Sleep  sodium chloride 0.9% lock flush 10 milliLiter(s) IV Push every 1 hour PRN Pre/post blood products, medications, blood draw, and to maintain line patency  sodium chloride 0.9% lock flush 10 milliLiter(s) IV Push every 1 hour PRN Pre/post blood products, medications, blood draw, and to maintain line patency      Allergies    No Known Allergies    Intolerances        Review of systems:  NO CP no pressure no Dyspnea     Vital Signs Last 24 Hrs  T(C): 37.1 (26 Dec 2022 20:45), Max: 37.1 (26 Dec 2022 20:45)  T(F): 98.7 (26 Dec 2022 20:45), Max: 98.7 (26 Dec 2022 20:45)  HR: 66 (26 Dec 2022 20:45) (59 - 66)  BP: 148/55 (26 Dec 2022 20:45) (148/55 - 169/72)  BP(mean): 98 (26 Dec 2022 16:56) (97 - 98)  RR: 18 (26 Dec 2022 20:45) (17 - 18)  SpO2: 100% (26 Dec 2022 20:45) (98% - 100%)    Parameters below as of 26 Dec 2022 20:45    O2 Flow (L/min): 2      PHYSICAL EXAM:      Constitutional: NAD,   HEENT: PERRLA, EOMI, no exophalmos    Respiratory: CTAB  Cardiovascular: S1 and S2, RRR, no M/G/R    Extremities: No peripheral edema, no pedal lesions    Neurological: A/O x 3, no focal deficits          LABS:                        10.3   9.22  )-----------( 215      ( 26 Dec 2022 11:42 )             34.7     12-26    130<L>  |  96  |  87.1<H>  ----------------------------<  215<H>  5.3   |  22.0  |  4.08<H>    Ca    7.3<L>      26 Dec 2022 11:42  Mg     1.9     12-25    TPro  4.2<L>  /  Alb  2.1<L>  /  TBili  <0.2<L>  /  DBili  x   /  AST  11  /  ALT  7   /  AlkPhos  69  12-25          CAPILLARY BLOOD GLUCOSE  CAPILLARY BLOOD GLUCOSE      POCT Blood Glucose.: 242 mg/dL (26 Dec 2022 22:06)  POCT Blood Glucose.: 183 mg/dL (26 Dec 2022 16:21)  POCT Blood Glucose.: 163 mg/dL (26 Dec 2022 11:24)  POCT Blood Glucose.: 172 mg/dL (26 Dec 2022 09:24)  POCT Blood Glucose.: 174 mg/dL (26 Dec 2022 07:54)        RADIOLOGY & ADDITIONAL TESTS:

## 2022-12-26 NOTE — PROGRESS NOTE ADULT - ASSESSMENT
82F with PMH asthma, diabetes, hyperlipidemia, atrial fibrillation, hypertension, and hyperlipidemia, who was found to have a right superficial femoral vein thrombosis while on warfarin and acute kidney injury. She was also noted to have been recently diagnosed with COVID-19.    THERESA on CKD IV  - HD initiated 12/13/22 via Right femoral access,    - Avoid Nephrotoxin  - cont HD per renal  - S/p PC on 12/21 will eventually need avf  - SW for HD set up as op       Acute on Chronic Diastolic Heart failure  - TTE with intact LVF however has grade II diastolic dysfunction and moderate Pulmonary HTN  - Resistant to diuresis, HD initiated 12/13/22 via Right femoral access.  - Strict I/O, daily weight, Fluid restriction  - Continue Metoprolol and Hydralazine   - Cardio consult appreciated    Pleural effusion   - Small to mod L and small R pleural effusion with LLL complete and RLL near complete compressive atelectasis   - Incentive spirometry  - S/p chest tube on 12/19, care per TSx  - Fluid studies transudative, follow up cytology, fungal cxs   - Supplemental oxygen PRN, wean as tolerated.    Emphysematous Cystitis  - UCx never collected, re ordered and still pending   - Urology consult appreciated  - On Ceftriaxone until 12/27, if discharged sooner will discharge on PO antibiotics   - leukocytosis trending down, might also have component of steroids     Acute Hypoxic respiratory Failure - Multifactorial. Asthma, COVID-19, CHFpEF, Fluid overload.  - Started on HD 12/13/22  - Supplemental O2, wean as tolerated  - Bronchodilators  - D/esther Decadron  - Supplemental oxygen PRN, wean as tolerated    Anemia likely multifactorial - chronic disease and  kidney disease  - Stable Hgb  - Received PRBC x 1 during this admission   - Monitor Hgb, transfuse PRN    Uncontrolled Type 2 Diabetes on insulin  - A1c 11.2, BG better  controlled   - BGM uncontrolled  - BGM with SSI, Lantus    Atrial Fibrillation  - Rate controlled, INR returned to normal  - Metoprolol, Diltiazem  - Eliquis resumed    COVID-19   - CT Chest with bilateral upper lobe opacities  - isolation precautions  - repeat covid   - completed decadron : now dcd   - Will need repeat outpatient CT chest in 3 months for RLL nodule    HTN-Essential  - Improved  - Metoprolol to 50mg, continue Diltiazem at current rate  - Hydralazine 25mg q8    DVT ruled out on repeat imaging  - Supra-therapeutic INR normalized  - No bleeding noted  - US Duplex- no evidence of deep venous thrombosis in either lower extremity.    Code Status: DNR/DNI     and son  updated at bedside.    PT  recommendation for [facility]; family wants to take home. Discussed with patients son, pt is very deconditioned and needs full assist, family will discuss today regarding discharge planing.   Leaning toward [facility]. Pt lives alone with

## 2022-12-26 NOTE — PROGRESS NOTE ADULT - ASSESSMENT
CKD(IV): +COVID  THERESA, progressive + fluid overload + hyperkalemia  Hx NS (3g) +DM, Baseline creat 5/21 was 2.9, today creat 3.4;  pre HD creatinine today 4.08 - hold HD today and watch labs am  May get away with 2/week HD  Strict I/O chart -200cc + inc  Hyperkalemia and Metabolic Acidosis better with HD   Na sl low 130 - decrease Lexapro to 10 mg    No hydro , emphysematous cystitis on CT 12/15 - on Rocephine emp; Urology following    Resp failure - MF - Hx asthma  ECHO 12/12 noted , reasonable Afib  Covid rpt test + 12/24 but likely not infectious    Hep B and C negative    is in favor of continuation of HD if she is clinically improving (  x 62 years )       Anemia: +CKD; Tsat 36%  - cont DILAN and Venofer   - target Hgb > 10 , getting better     HTN - Can adjust Hydralazine as needed     Effusions - Thoracic Sx placed L CT     Feels overall better, son present. Ate sl better

## 2022-12-26 NOTE — PROGRESS NOTE ADULT - ASSESSMENT
T2DM with THERESA    Cont curent insulin dosing     goal BS given THERESA  is 160-220     THERESA  creatinien holding at 4

## 2022-12-26 NOTE — PROGRESS NOTE ADULT - SUBJECTIVE AND OBJECTIVE BOX
Patient seen and examined    Feels fine, comfortable  no c/o CP SOB NV   No swelling feet    Vital Signs Last 24 Hrs  T(C): 37 (26 Dec 2022 10:00), Max: 37 (25 Dec 2022 20:15)  T(F): 98.6 (26 Dec 2022 10:00), Max: 98.6 (25 Dec 2022 20:15)  HR: 59 (26 Dec 2022 13:48) (59 - 65)  BP: 156/68 (26 Dec 2022 13:48) (154/85 - 169/72)  BP(mean): 97 (26 Dec 2022 13:48) (97 - 97)  RR: 17 (26 Dec 2022 10:00) (16 - 18)  SpO2: 98% (26 Dec 2022 10:00) (98% - 100%)    Parameters below as of 26 Dec 2022 10:40  Patient On (Oxygen Delivery Method): nasal cannula        PHYSICAL EXAM    GENERAL: NAD,   EYES:  conjunctiva and sclera clear  NECK: Supple, No JVD/Bruit  NERVOUS SYSTEM:  A/O x3,   CHEST:  No rales, No rhonchi  HEART:  RRR, No murmur  ABDOMEN: Soft, NT/ND BS+  EXTREMITIES:  mild Edema;  SKIN: No rashes    26 Dec 2022 11:42    130    |  96     |  87.1   ----------------------------<  215    5.3     |  22.0   |  4.08     Ca    7.3        26 Dec 2022 11:42  Mg     1.9       25 Dec 2022 04:13    TPro  4.2    /  Alb  2.1    /  TBili  <0.2   /  DBili  x      /  AST  11     /  ALT  7      /  AlkPhos  69     25 Dec 2022 04:13                          10.3   9.22  )-----------( 215      ( 26 Dec 2022 11:42 )             34.7         Continue present treatment

## 2022-12-26 NOTE — PROGRESS NOTE ADULT - SUBJECTIVE AND OBJECTIVE BOX
Patient is a 82y old  Female who presents with a chief complaint of CHF exacerbation, COVID pneumonia (19 Dec 2022 07:36)      INTERVAL HPI/OVERNIGHT EVENTS: pt seen and examined. Son at bedside  Pt herself had no complains, looks very fragile and deconditioned        MEDICATIONS  (STANDING):  acetaminophen     Tablet .. 650 milliGRAM(s) Oral every 8 hours  albuterol    90 MICROgram(s) HFA Inhaler 2 Puff(s) Inhalation every 4 hours  apixaban 2.5 milliGRAM(s) Oral two times a day  budesonide  80 MICROgram(s)/formoterol 4.5 MICROgram(s) Inhaler 2 Puff(s) Inhalation two times a day  chlorhexidine 2% Cloths 1 Application(s) Topical daily  chlorhexidine 2% Cloths 1 Application(s) Topical <User Schedule>  cholecalciferol 1000 Unit(s) Oral daily  dextrose 5%. 1000 milliLiter(s) (100 mL/Hr) IV Continuous <Continuous>  dextrose 5%. 1000 milliLiter(s) (50 mL/Hr) IV Continuous <Continuous>  dextrose 50% Injectable 25 Gram(s) IV Push once  dextrose 50% Injectable 12.5 Gram(s) IV Push once  dextrose 50% Injectable 25 Gram(s) IV Push once  diltiazem    milliGRAM(s) Oral daily  epoetin susie-epbx (RETACRIT) Injectable 66055 Unit(s) IV Push <User Schedule>  escitalopram 20 milliGRAM(s) Oral daily  glucagon  Injectable 1 milliGRAM(s) IntraMuscular once  guaiFENesin ER 1200 milliGRAM(s) Oral every 12 hours  hydrALAZINE 50 milliGRAM(s) Oral every 8 hours  insulin glargine Injectable (LANTUS) 18 Unit(s) SubCutaneous at bedtime  insulin lispro (ADMELOG) corrective regimen sliding scale   SubCutaneous three times a day before meals  insulin lispro Injectable (ADMELOG) 9 Unit(s) SubCutaneous three times a day before meals  metoprolol tartrate 50 milliGRAM(s) Oral two times a day  pantoprazole    Tablet 40 milliGRAM(s) Oral before breakfast    MEDICATIONS  (PRN):  aluminum hydroxide/magnesium hydroxide/simethicone Suspension 30 milliLiter(s) Oral every 6 hours PRN Dyspepsia  dextrose Oral Gel 15 Gram(s) Oral once PRN Blood Glucose LESS THAN 70 milliGRAM(s)/deciliter  hydrALAZINE Injectable 10 milliGRAM(s) IV Push every 6 hours PRN SBP > 160  melatonin 1 milliGRAM(s) Oral at bedtime PRN Sleep  sodium chloride 0.9% lock flush 10 milliLiter(s) IV Push every 1 hour PRN Pre/post blood products, medications, blood draw, and to maintain line patency  sodium chloride 0.9% lock flush 10 milliLiter(s) IV Push every 1 hour PRN Pre/post blood products, medications, blood draw, and to maintain line patency      Allergies    No Known Allergies    Intolerances        REVIEW OF SYSTEMS:  CONSTITUTIONAL: No fever, weight loss, or fatigue  RESPIRATORY: No cough, wheezing, chills or hemoptysis; No shortness of breath  CARDIOVASCULAR: No chest pain, palpitations, dizziness, or leg swelling  GASTROINTESTINAL: No abdominal or epigastric pain. No nausea, vomiting, or hematemesis; No diarrhea or constipation. No melena or hematochezia.  NEUROLOGICAL: No headaches, memory loss, loss of strength, numbness, or tremors  MUSCULOSKELETAL: No joint pain or swelling; No muscle, back, or extremity pain      Vital Signs Last 24 Hrs  T(C): 36.7 (25 Dec 2022 11:46), Max: 36.8 (25 Dec 2022 04:11)  T(F): 98.1 (25 Dec 2022 11:46), Max: 98.2 (25 Dec 2022 04:11)  HR: 67 (25 Dec 2022 11:46) (58 - 67)  BP: 150/63 (25 Dec 2022 11:46) (150/63 - 166/80)  BP(mean): --  RR: 18 (25 Dec 2022 11:46) (18 - 18)  SpO2: 98% (25 Dec 2022 11:46) (98% - 100%)    Parameters below as of 25 Dec 2022 11:46  Patient On (Oxygen Delivery Method): nasal cannula        PHYSICAL EXAM:  GENERAL: ill looking, lady, laying in bed   HEAD:  Atraumatic, Normocephalic  EYES: EOMI, PERRLA, conjunctiva and sclera clear  NECK: Supple, No JVD, Normal thyroid  NERVOUS SYSTEM:  Alert & Oriented X2, No gross focal deficits  CHEST/LUNG: Clear to percussion bilaterally; No rales, rhonchi, wheezing, or rubs, perma cath in place   HEART: Regular rate and rhythm; No murmurs, rubs, or gallops  ABDOMEN: Soft, Nontender, Nondistended; Bowel sounds present  EXTREMITIES: swollen arms and edema LE   SKIN: No rashes or lesions    LABS:                        9.3    8.42  )-----------( 172      ( 25 Dec 2022 04:13 )             31.8     12-25    134<L>  |  97  |  72.1<H>  ----------------------------<  191<H>  5.1   |  25.0  |  3.51<H>    Ca    7.5<L>      25 Dec 2022 04:13  Phos  3.2     12-24  Mg     1.9     12-25    TPro  4.2<L>  /  Alb  2.1<L>  /  TBili  <0.2<L>  /  DBili  x   /  AST  11  /  ALT  7   /  AlkPhos  69  12-25        CAPILLARY BLOOD GLUCOSE      POCT Blood Glucose.: 181 mg/dL (25 Dec 2022 11:01)  POCT Blood Glucose.: 228 mg/dL (25 Dec 2022 07:35)  POCT Blood Glucose.: 117 mg/dL (24 Dec 2022 22:26)  POCT Blood Glucose.: 56 mg/dL (24 Dec 2022 21:43)  POCT Blood Glucose.: 58 mg/dL (24 Dec 2022 21:41)  POCT Blood Glucose.: 105 mg/dL (24 Dec 2022 16:29)      RADIOLOGY & ADDITIONAL TESTS:    Imaging Personally Reviewed:  [ ] YES  [ ] NO    Consultant(s) Notes Reviewed:  [x ] YES  [ ] NO    Care Discussed with Consultants/Other Providers [ ] YES  [ ] NO    Plan of Care discussed with Housestaff [x ]YES [ ] NO

## 2022-12-27 LAB
ALBUMIN SERPL ELPH-MCNC: 2.1 G/DL — LOW (ref 3.3–5.2)
ALP SERPL-CCNC: 74 U/L — SIGNIFICANT CHANGE UP (ref 40–120)
ALT FLD-CCNC: 7 U/L — SIGNIFICANT CHANGE UP
ANION GAP SERPL CALC-SCNC: 12 MMOL/L — SIGNIFICANT CHANGE UP (ref 5–17)
AST SERPL-CCNC: 9 U/L — SIGNIFICANT CHANGE UP
BILIRUB SERPL-MCNC: <0.2 MG/DL — LOW (ref 0.4–2)
BUN SERPL-MCNC: 95.3 MG/DL — HIGH (ref 8–20)
CALCIUM SERPL-MCNC: 7.7 MG/DL — LOW (ref 8.4–10.5)
CHLORIDE SERPL-SCNC: 99 MMOL/L — SIGNIFICANT CHANGE UP (ref 96–108)
CO2 SERPL-SCNC: 24 MMOL/L — SIGNIFICANT CHANGE UP (ref 22–29)
CREAT SERPL-MCNC: 4.52 MG/DL — HIGH (ref 0.5–1.3)
EGFR: 9 ML/MIN/1.73M2 — LOW
GLUCOSE BLDC GLUCOMTR-MCNC: 103 MG/DL — HIGH (ref 70–99)
GLUCOSE BLDC GLUCOMTR-MCNC: 106 MG/DL — HIGH (ref 70–99)
GLUCOSE BLDC GLUCOMTR-MCNC: 108 MG/DL — HIGH (ref 70–99)
GLUCOSE BLDC GLUCOMTR-MCNC: 113 MG/DL — HIGH (ref 70–99)
GLUCOSE BLDC GLUCOMTR-MCNC: 41 MG/DL — CRITICAL LOW (ref 70–99)
GLUCOSE BLDC GLUCOMTR-MCNC: 48 MG/DL — CRITICAL LOW (ref 70–99)
GLUCOSE BLDC GLUCOMTR-MCNC: 74 MG/DL — SIGNIFICANT CHANGE UP (ref 70–99)
GLUCOSE SERPL-MCNC: 177 MG/DL — HIGH (ref 70–99)
NON-GYNECOLOGICAL CYTOLOGY STUDY: SIGNIFICANT CHANGE UP
PHOSPHATE SERPL-MCNC: 4.6 MG/DL — SIGNIFICANT CHANGE UP (ref 2.4–4.7)
POTASSIUM SERPL-MCNC: 5.5 MMOL/L — HIGH (ref 3.5–5.3)
POTASSIUM SERPL-SCNC: 5.5 MMOL/L — HIGH (ref 3.5–5.3)
PROT SERPL-MCNC: 4.4 G/DL — LOW (ref 6.6–8.7)
SODIUM SERPL-SCNC: 135 MMOL/L — SIGNIFICANT CHANGE UP (ref 135–145)

## 2022-12-27 PROCEDURE — 99233 SBSQ HOSP IP/OBS HIGH 50: CPT

## 2022-12-27 PROCEDURE — 99232 SBSQ HOSP IP/OBS MODERATE 35: CPT

## 2022-12-27 RX ORDER — INSULIN GLARGINE 100 [IU]/ML
10 INJECTION, SOLUTION SUBCUTANEOUS AT BEDTIME
Refills: 0 | Status: DISCONTINUED | OUTPATIENT
Start: 2022-12-27 | End: 2022-12-28

## 2022-12-27 RX ORDER — INSULIN GLARGINE 100 [IU]/ML
14 INJECTION, SOLUTION SUBCUTANEOUS AT BEDTIME
Refills: 0 | Status: DISCONTINUED | OUTPATIENT
Start: 2022-12-27 | End: 2022-12-27

## 2022-12-27 RX ORDER — INSULIN LISPRO 100/ML
6 VIAL (ML) SUBCUTANEOUS
Refills: 0 | Status: DISCONTINUED | OUTPATIENT
Start: 2022-12-27 | End: 2022-12-27

## 2022-12-27 RX ORDER — INSULIN LISPRO 100/ML
4 VIAL (ML) SUBCUTANEOUS
Refills: 0 | Status: DISCONTINUED | OUTPATIENT
Start: 2022-12-27 | End: 2022-12-28

## 2022-12-27 RX ADMIN — Medication 240 MILLIGRAM(S): at 05:18

## 2022-12-27 RX ADMIN — APIXABAN 2.5 MILLIGRAM(S): 2.5 TABLET, FILM COATED ORAL at 18:07

## 2022-12-27 RX ADMIN — Medication 650 MILLIGRAM(S): at 06:32

## 2022-12-27 RX ADMIN — APIXABAN 2.5 MILLIGRAM(S): 2.5 TABLET, FILM COATED ORAL at 05:19

## 2022-12-27 RX ADMIN — PANTOPRAZOLE SODIUM 40 MILLIGRAM(S): 20 TABLET, DELAYED RELEASE ORAL at 05:19

## 2022-12-27 RX ADMIN — Medication 1200 MILLIGRAM(S): at 05:19

## 2022-12-27 RX ADMIN — ALBUTEROL 2 PUFF(S): 90 AEROSOL, METERED ORAL at 11:49

## 2022-12-27 RX ADMIN — ALBUTEROL 2 PUFF(S): 90 AEROSOL, METERED ORAL at 07:53

## 2022-12-27 RX ADMIN — Medication 650 MILLIGRAM(S): at 23:30

## 2022-12-27 RX ADMIN — INSULIN GLARGINE 10 UNIT(S): 100 INJECTION, SOLUTION SUBCUTANEOUS at 22:33

## 2022-12-27 RX ADMIN — Medication 50 MILLIGRAM(S): at 13:08

## 2022-12-27 RX ADMIN — Medication 50 MILLIGRAM(S): at 05:18

## 2022-12-27 RX ADMIN — Medication 650 MILLIGRAM(S): at 14:29

## 2022-12-27 RX ADMIN — Medication 1000 UNIT(S): at 09:31

## 2022-12-27 RX ADMIN — Medication 50 MILLIGRAM(S): at 22:31

## 2022-12-27 RX ADMIN — Medication 30 MILLILITER(S): at 22:33

## 2022-12-27 RX ADMIN — CHLORHEXIDINE GLUCONATE 1 APPLICATION(S): 213 SOLUTION TOPICAL at 11:32

## 2022-12-27 RX ADMIN — Medication 650 MILLIGRAM(S): at 22:30

## 2022-12-27 RX ADMIN — BUDESONIDE AND FORMOTEROL FUMARATE DIHYDRATE 2 PUFF(S): 160; 4.5 AEROSOL RESPIRATORY (INHALATION) at 07:53

## 2022-12-27 RX ADMIN — Medication 4 UNIT(S): at 18:34

## 2022-12-27 RX ADMIN — Medication 9 UNIT(S): at 09:27

## 2022-12-27 RX ADMIN — CHLORHEXIDINE GLUCONATE 1 APPLICATION(S): 213 SOLUTION TOPICAL at 06:33

## 2022-12-27 RX ADMIN — ERYTHROPOIETIN 10000 UNIT(S): 10000 INJECTION, SOLUTION INTRAVENOUS; SUBCUTANEOUS at 16:07

## 2022-12-27 RX ADMIN — ESCITALOPRAM OXALATE 10 MILLIGRAM(S): 10 TABLET, FILM COATED ORAL at 11:49

## 2022-12-27 RX ADMIN — Medication 650 MILLIGRAM(S): at 13:08

## 2022-12-27 RX ADMIN — ALBUTEROL 2 PUFF(S): 90 AEROSOL, METERED ORAL at 18:06

## 2022-12-27 RX ADMIN — Medication 50 MILLIGRAM(S): at 18:07

## 2022-12-27 RX ADMIN — Medication 650 MILLIGRAM(S): at 05:19

## 2022-12-27 NOTE — PROGRESS NOTE ADULT - ASSESSMENT
81 y/o F with PMHx asthma, DM, HLD, atrial fibrillation, HTN, who was found to have a right superficial femoral vein thrombosis while on warfarin and acute kidney injury. Hemodialysis was initiated by nephrology.    1. Uncontrolled T2DM, a1c 11.2%  - Will decrease insulin due to worsening THERESA and bgs in low 100's  - Decrease lantus to 14 units qhs  - Decrease admelog to 6 units tid with meals  - Sliding scale coverage    2. THERESA/CKD  - HD via permacath  - Plan as per nephrology    3. Acute Hypoxic respiratory Failure - Multifactorial  - Likely related to Asthma, COVID-19, CHFpEF, Fluid overload  - Started on HD 12  - Supplemental O2 as needed  - Bronchodilators  - S/p decadron   83 y/o F with PMHx asthma, DM, HLD, atrial fibrillation, HTN, who was found to have a right superficial femoral vein thrombosis while on warfarin and acute kidney injury. Hemodialysis was initiated by nephrology.    1. Uncontrolled T2DM, a1c 11.2%  - Will decrease insulin due to worsening THERESA and bgs in low 100's  - Hypoglycemic at lunch  - Decrease lantus to 10 units qhs  - Decrease admelog to 4 units tid with meals  - Sliding scale coverage    2. THERESA/CKD  - HD via permacath  - Plan as per nephrology    3. Acute Hypoxic respiratory Failure - Multifactorial  - Likely related to Asthma, COVID-19, CHFpEF, Fluid overload  - Started on HD 12  - Supplemental O2 as needed  - Bronchodilators  - S/p decadron

## 2022-12-27 NOTE — PROGRESS NOTE ADULT - ASSESSMENT
CKD(IV): +COVID  THERESA, progressive + fluid overload + hyperkalemia  Hx NS (3g) +DM, Baseline creat 5/21 was 2.9, today creat 3.4;  pre HD creatinine today 4.08 - held HD yesterday  HD now, take off more fluid s rkystal  Strict I/O chart -200cc + inc  Hyperkalemia again noted  Na sl low 130 - decrease Lexapro to 10 mg    No hydro , emphysematous cystitis on CT 12/15 - on Rocephine emp; Urology following    Resp failure - MF - Hx asthma  ECHO 12/12 noted , reasonable Afib  Covid rpt test + 12/24 but likely not infectious    Hep B and C negative    is in favor of continuation of HD if she is clinically improving (  x 62 years )     DM - A1C still very high - Endocrine noted    Anemia: +CKD; Tsat 36%  - cont DILAN and Venofer   - target Hgb > 10 , getting better     HTN - Can adjust Hydralazine as needed     Effusions - Thoracic Sx placed L CT     Feels overall better, son present. Ate sl better

## 2022-12-27 NOTE — PROGRESS NOTE ADULT - ASSESSMENT
82F with PMH asthma, diabetes, hyperlipidemia, atrial fibrillation, hypertension, and hyperlipidemia, who was found to have a right superficial femoral vein thrombosis while on warfarin and acute kidney injury. She was also noted to have been recently diagnosed with COVID-19.    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  - Strict I/O, daily weight, Fluid restriction  - Continue Metoprolol and Hydralazine   - Cardio consult appreciated    Pleural effusion   - Small to mod L and small R pleural effusion with LLL complete and RLL near complete compressive atelectasis   - Incentive spirometry  - S/p chest tube on 12/19, care per TSx  - Fluid studies transudative, follow up cytology, fungal cxs   - Supplemental oxygen PRN, wean as tolerated.    Emphysematous Cystitis  - UCx never collected, re ordered and still pending   - Urology consult appreciated  - On Ceftriaxone until 12/27, if discharged sooner will discharge on PO antibiotics   - leukocytosis trending down, might also have component of steroids     Acute Hypoxic respiratory Failure - Multifactorial. Asthma, COVID-19, CHFpEF, Fluid overload.  - Started on HD 12/13/22  - Supplemental O2, wean as tolerated  - Bronchodilators  - D/esther Decadron  - Supplemental oxygen PRN, wean as tolerated    Anemia likely multifactorial - chronic disease and  kidney disease  - Stable Hgb  - Received PRBC x 1 during this admission   - Monitor Hgb, transfuse PRN    Uncontrolled Type 2 Diabetes on insulin  - A1c 11.2, BG better  controlled   - BGM uncontrolled  - BGM with SSI, Lantus    Atrial Fibrillation  - Rate controlled, INR returned to normal  - Metoprolol, Diltiazem  - Eliquis resumed    COVID-19   - CT Chest with bilateral upper lobe opacities  - isolation precautions  - repeat covid   - completed decadron : now dcd   - Will need repeat outpatient CT chest in 3 months for RLL nodule    HTN-Essential  - Improved  - Metoprolol to 50mg, continue Diltiazem at current rate  - Hydralazine 25mg q8    DVT ruled out on repeat imaging  - Supra-therapeutic INR normalized  - No bleeding noted  - US Duplex- no evidence of deep venous thrombosis in either lower extremity.    Code Status: DNR/DNI    Son updated at bedside.    PT  recommendation for [facility]; family wants to take home. Discussed with patients son, pt is very deconditioned and needs full assist, family will discuss today regarding discharge planing.   Leaning toward [facility]. Pt lives alone with . Dispo planning. 82F with PMH asthma, diabetes, hyperlipidemia, atrial fibrillation, hypertension, and hyperlipidemia, who was found to have a right superficial femoral vein thrombosis while on warfarin and acute kidney injury. She was also noted to have been recently diagnosed with COVID-19.    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  - Strict I/O, daily weight, Fluid restriction  - Continue Metoprolol and Hydralazine   - Cardio consult appreciated    Pleural effusion   - Small to mod L and small R pleural effusion with LLL complete and RLL near complete compressive atelectasis   - Incentive spirometry  - S/p chest tube on 12/19, care per TSx  - Fluid studies transudative, follow up cytology, fungal cxs   - Supplemental oxygen PRN, wean as tolerated.    Emphysematous Cystitis  - UCx never collected, re ordered and still pending   - Urology consult appreciated  - On Ceftriaxone until 12/27, if discharged sooner will discharge on PO antibiotics   - leukocytosis trending down, might also have component of steroids     Acute Hypoxic respiratory Failure - Multifactorial. Asthma, COVID-19, CHFpEF, Fluid overload.  - Started on HD 12/13/22  - Supplemental O2, wean as tolerated  - Bronchodilators  - D/esther Decadron  - Supplemental oxygen PRN, wean as tolerated    Anemia likely multifactorial - chronic disease and  kidney disease  - Stable Hgb  - Received PRBC x 1 during this admission   - Monitor Hgb, transfuse PRN    Uncontrolled Type 2 Diabetes on insulin  - A1c 11.2, BG better  controlled   - BGM uncontrolled  - BGM with SSI, Lantus    Atrial Fibrillation  - Rate controlled, INR returned to normal  - Metoprolol, Diltiazem  - Eliquis resumed    COVID-19   - CT Chest with bilateral upper lobe opacities  - isolation precautions  - repeat covid   - completed decadron  - Will need repeat outpatient CT chest in 3 months for RLL nodule    HTN-Essential  - Improved  - Metoprolol to 50mg, continue Diltiazem at current rate  - Hydralazine 25mg q8    DVT ruled out on repeat imaging  - Supra-therapeutic INR normalized  - No bleeding noted  - US Duplex- no evidence of deep venous thrombosis in either lower extremity.    Code Status: DNR/DNI    Son updated at bedside.    PT  recommendation for [facility]; dispo planning

## 2022-12-27 NOTE — PROGRESS NOTE ADULT - SUBJECTIVE AND OBJECTIVE BOX
INTERVAL EVENTS:  Follow up diabetes management  Son at bedside    MEDICATIONS  (STANDING):  acetaminophen     Tablet .. 650 milliGRAM(s) Oral every 8 hours  albuterol    90 MICROgram(s) HFA Inhaler 2 Puff(s) Inhalation every 4 hours  apixaban 2.5 milliGRAM(s) Oral two times a day  budesonide  80 MICROgram(s)/formoterol 4.5 MICROgram(s) Inhaler 2 Puff(s) Inhalation two times a day  chlorhexidine 2% Cloths 1 Application(s) Topical daily  chlorhexidine 2% Cloths 1 Application(s) Topical <User Schedule>  cholecalciferol 1000 Unit(s) Oral daily  dextrose 5%. 1000 milliLiter(s) (50 mL/Hr) IV Continuous <Continuous>  dextrose 5%. 1000 milliLiter(s) (100 mL/Hr) IV Continuous <Continuous>  dextrose 50% Injectable 25 Gram(s) IV Push once  dextrose 50% Injectable 12.5 Gram(s) IV Push once  dextrose 50% Injectable 25 Gram(s) IV Push once  diltiazem    milliGRAM(s) Oral daily  epoetin susie-epbx (RETACRIT) Injectable 50084 Unit(s) IV Push <User Schedule>  escitalopram 10 milliGRAM(s) Oral daily  glucagon  Injectable 1 milliGRAM(s) IntraMuscular once  hydrALAZINE 50 milliGRAM(s) Oral every 8 hours  insulin glargine Injectable (LANTUS) 18 Unit(s) SubCutaneous at bedtime  insulin lispro (ADMELOG) corrective regimen sliding scale   SubCutaneous three times a day before meals  insulin lispro Injectable (ADMELOG) 9 Unit(s) SubCutaneous three times a day before meals  metoprolol tartrate 50 milliGRAM(s) Oral two times a day  pantoprazole    Tablet 40 milliGRAM(s) Oral before breakfast    MEDICATIONS  (PRN):  aluminum hydroxide/magnesium hydroxide/simethicone Suspension 30 milliLiter(s) Oral every 6 hours PRN Dyspepsia  dextrose Oral Gel 15 Gram(s) Oral once PRN Blood Glucose LESS THAN 70 milliGRAM(s)/deciliter  hydrALAZINE Injectable 10 milliGRAM(s) IV Push every 6 hours PRN SBP > 160  melatonin 1 milliGRAM(s) Oral at bedtime PRN Sleep  sodium chloride 0.9% lock flush 10 milliLiter(s) IV Push every 1 hour PRN Pre/post blood products, medications, blood draw, and to maintain line patency  sodium chloride 0.9% lock flush 10 milliLiter(s) IV Push every 1 hour PRN Pre/post blood products, medications, blood draw, and to maintain line patency    Allergies  No Known Allergies    Vital Signs Last 24 Hrs  T(C): 36.6 (27 Dec 2022 10:00), Max: 37.1 (26 Dec 2022 20:45)  T(F): 97.8 (27 Dec 2022 10:00), Max: 98.7 (26 Dec 2022 20:45)  HR: 56 (27 Dec 2022 10:00) (56 - 66)  BP: 159/79 (27 Dec 2022 10:00) (148/55 - 166/61)  BP(mean): 98 (26 Dec 2022 16:56) (97 - 98)  RR: 18 (27 Dec 2022 10:00) (16 - 18)  SpO2: 99% (27 Dec 2022 10:00) (99% - 100%)    Parameters below as of 27 Dec 2022 10:00  Patient On (Oxygen Delivery Method): nasal cannula      PHYSICAL EXAM:  General: No apparent distress  Neck: Supple, trachea midline, no thyromegaly  Respiratory: Lungs clear bilaterally, normal rate, effort  Cardiac: +S1, S2, no m/r/g  GI: +BS, soft, non tender, non distended  Extremities: Mild LE edema  Neuro: Alert/awake      LABS:                        10.3   9.22  )-----------( 215      ( 26 Dec 2022 11:42 )             34.7     12-27    135  |  99  |  95.3<H>  ----------------------------<  177<H>  5.5<H>   |  24.0  |  4.52<H>    Ca    7.7<L>      27 Dec 2022 05:34  Phos  4.6     12-27    TPro  4.4<L>  /  Alb  2.1<L>  /  TBili  <0.2<L>  /  DBili  x   /  AST  9   /  ALT  7   /  AlkPhos  74  12-27      POCT Blood Glucose.: 113 mg/dL (12-27-22 @ 07:39)  POCT Blood Glucose.: 242 mg/dL (12-26-22 @ 22:06)  POCT Blood Glucose.: 183 mg/dL (12-26-22 @ 16:21)  POCT Blood Glucose.: 163 mg/dL (12-26-22 @ 11:24)

## 2022-12-27 NOTE — PROGRESS NOTE ADULT - SUBJECTIVE AND OBJECTIVE BOX
[facility] Division of Hospital Medicine  Sanjay Crews MD    Chief Complaint:  Patient is a 82y old  Female who presents with a chief complaint of THERESA (26 Dec 2022 17:50)      SUBJECTIVE / OVERNIGHT EVENTS:  Patient seen and examined at bedside. No acute events reported overnight. No new complaints.    MEDICATIONS  (STANDING):  acetaminophen     Tablet .. 650 milliGRAM(s) Oral every 8 hours  albuterol    90 MICROgram(s) HFA Inhaler 2 Puff(s) Inhalation every 4 hours  apixaban 2.5 milliGRAM(s) Oral two times a day  budesonide  80 MICROgram(s)/formoterol 4.5 MICROgram(s) Inhaler 2 Puff(s) Inhalation two times a day  chlorhexidine 2% Cloths 1 Application(s) Topical daily  chlorhexidine 2% Cloths 1 Application(s) Topical <User Schedule>  cholecalciferol 1000 Unit(s) Oral daily  dextrose 5%. 1000 milliLiter(s) (50 mL/Hr) IV Continuous <Continuous>  dextrose 5%. 1000 milliLiter(s) (100 mL/Hr) IV Continuous <Continuous>  dextrose 50% Injectable 25 Gram(s) IV Push once  dextrose 50% Injectable 12.5 Gram(s) IV Push once  dextrose 50% Injectable 25 Gram(s) IV Push once  diltiazem    milliGRAM(s) Oral daily  epoetin susie-epbx (RETACRIT) Injectable 15302 Unit(s) IV Push <User Schedule>  escitalopram 10 milliGRAM(s) Oral daily  glucagon  Injectable 1 milliGRAM(s) IntraMuscular once  hydrALAZINE 50 milliGRAM(s) Oral every 8 hours  insulin glargine Injectable (LANTUS) 18 Unit(s) SubCutaneous at bedtime  insulin lispro (ADMELOG) corrective regimen sliding scale   SubCutaneous three times a day before meals  insulin lispro Injectable (ADMELOG) 9 Unit(s) SubCutaneous three times a day before meals  metoprolol tartrate 50 milliGRAM(s) Oral two times a day  pantoprazole    Tablet 40 milliGRAM(s) Oral before breakfast    MEDICATIONS  (PRN):  aluminum hydroxide/magnesium hydroxide/simethicone Suspension 30 milliLiter(s) Oral every 6 hours PRN Dyspepsia  dextrose Oral Gel 15 Gram(s) Oral once PRN Blood Glucose LESS THAN 70 milliGRAM(s)/deciliter  hydrALAZINE Injectable 10 milliGRAM(s) IV Push every 6 hours PRN SBP > 160  melatonin 1 milliGRAM(s) Oral at bedtime PRN Sleep  sodium chloride 0.9% lock flush 10 milliLiter(s) IV Push every 1 hour PRN Pre/post blood products, medications, blood draw, and to maintain line patency  sodium chloride 0.9% lock flush 10 milliLiter(s) IV Push every 1 hour PRN Pre/post blood products, medications, blood draw, and to maintain line patency        I&O's Summary      PHYSICAL EXAM:  Vital Signs Last 24 Hrs  T(C): 36.6 (27 Dec 2022 05:15), Max: 37.1 (26 Dec 2022 20:45)  T(F): 97.9 (27 Dec 2022 05:15), Max: 98.7 (26 Dec 2022 20:45)  HR: 62 (27 Dec 2022 05:15) (59 - 66)  BP: 166/61 (27 Dec 2022 05:15) (148/55 - 166/61)  BP(mean): 98 (26 Dec 2022 16:56) (97 - 98)  RR: 18 (27 Dec 2022 05:15) (16 - 18)  SpO2: 100% (27 Dec 2022 05:15) (98% - 100%)    Parameters below as of 27 Dec 2022 05:15  Patient On (Oxygen Delivery Method): nasal cannula  O2 Flow (L/min): 2          CONSTITUTIONAL: Elderly female, frail, NAD  HEENT: NC/AT, PERRL, no JVD  RESPIRATORY: CTA bilaterally, normal effort  CARDIOVASCULAR: RRR, S1/S2+, no m/g/r  ABDOMEN: Nontender to palpation, normoactive bowel sounds, no rebound/guarding  MUSCULOSKELETAL: No edema, cyanosis or deformities.  PSYCH: Calm, affect appropriate.  NEUROLOGY: Awake, alert, no focal neurological deficits.   SKIN: No rashes; no palpable lesions  VASC: Distal pulses palpable    LABS:                        10.3   9.22  )-----------( 215      ( 26 Dec 2022 11:42 )             34.7     12-27    135  |  99  |  95.3<H>  ----------------------------<  177<H>  5.5<H>   |  24.0  |  4.52<H>    Ca    7.7<L>      27 Dec 2022 05:34  Phos  4.6     12-27    TPro  4.4<L>  /  Alb  2.1<L>  /  TBili  <0.2<L>  /  DBili  x   /  AST  9   /  ALT  7   /  AlkPhos  74  12-27              CAPILLARY BLOOD GLUCOSE      POCT Blood Glucose.: 113 mg/dL (27 Dec 2022 07:39)  POCT Blood Glucose.: 242 mg/dL (26 Dec 2022 22:06)  POCT Blood Glucose.: 183 mg/dL (26 Dec 2022 16:21)  POCT Blood Glucose.: 163 mg/dL (26 Dec 2022 11:24)        RADIOLOGY & ADDITIONAL TESTS:  Results Reviewed:   Imaging Personally Reviewed:  Electrocardiogram Personally Reviewed:

## 2022-12-27 NOTE — PROGRESS NOTE ADULT - SUBJECTIVE AND OBJECTIVE BOX
Patient seen and examined    Feels tired, Doesn't speak much, comfortable  no c/o CP SOB NV   Has more swelling feet    Vital Signs Last 24 Hrs  T(C): 36.6 (27 Dec 2022 10:00), Max: 37.1 (26 Dec 2022 20:45)  T(F): 97.8 (27 Dec 2022 10:00), Max: 98.7 (26 Dec 2022 20:45)  HR: 60 (27 Dec 2022 13:02) (56 - 66)  BP: 152/53 (27 Dec 2022 13:02) (148/55 - 166/61)  BP(mean): 86 (27 Dec 2022 13:02) (86 - 98)  RR: 18 (27 Dec 2022 10:00) (16 - 18)  SpO2: 99% (27 Dec 2022 10:00) (99% - 100%)    Parameters below as of 27 Dec 2022 10:00  Patient On (Oxygen Delivery Method): nasal cannula        PHYSICAL EXAM    GENERAL: NAD,   EYES:  conjunctiva and sclera clear  NECK: Supple, No JVD/Bruit  NERVOUS SYSTEM:  A/O, Slow response  CHEST:  No rales, No rhonchi  HEART:  RRR, No murmur  ABDOMEN: Soft, NT/ND BS+  EXTREMITIES:  + pitting Edema;  SKIN: No rashes    27 Dec 2022 05:34    135    |  99     |  95.3   ----------------------------<  177    5.5     |  24.0   |  4.52     Ca    7.7        27 Dec 2022 05:34  Phos  4.6       27 Dec 2022 05:34    TPro  4.4    /  Alb  2.1    /  TBili  <0.2   /  DBili  x      /  AST  9      /  ALT  7      /  AlkPhos  74     27 Dec 2022 05:34                          10.3   9.22  )-----------( 215      ( 26 Dec 2022 11:42 )             34.7     MEDICATIONS  (STANDING):  acetaminophen     Tablet .. 650 milliGRAM(s) Oral every 8 hours  albuterol    90 MICROgram(s) HFA Inhaler 2 Puff(s) Inhalation every 4 hours  apixaban 2.5 milliGRAM(s) Oral two times a day  budesonide  80 MICROgram(s)/formoterol 4.5 MICROgram(s) Inhaler 2 Puff(s) Inhalation two times a day  chlorhexidine 2% Cloths 1 Application(s) Topical daily  chlorhexidine 2% Cloths 1 Application(s) Topical <User Schedule>  cholecalciferol 1000 Unit(s) Oral daily  dextrose 5%. 1000 milliLiter(s) (100 mL/Hr) IV Continuous <Continuous>  dextrose 5%. 1000 milliLiter(s) (50 mL/Hr) IV Continuous <Continuous>  dextrose 50% Injectable 25 Gram(s) IV Push once  dextrose 50% Injectable 12.5 Gram(s) IV Push once  dextrose 50% Injectable 25 Gram(s) IV Push once  diltiazem    milliGRAM(s) Oral daily  epoetin susie-epbx (RETACRIT) Injectable 93446 Unit(s) IV Push <User Schedule>  escitalopram 10 milliGRAM(s) Oral daily  glucagon  Injectable 1 milliGRAM(s) IntraMuscular once  hydrALAZINE 50 milliGRAM(s) Oral every 8 hours  insulin glargine Injectable (LANTUS) 10 Unit(s) SubCutaneous at bedtime  insulin lispro (ADMELOG) corrective regimen sliding scale   SubCutaneous three times a day before meals  insulin lispro Injectable (ADMELOG) 4 Unit(s) SubCutaneous three times a day with meals  metoprolol tartrate 50 milliGRAM(s) Oral two times a day  pantoprazole    Tablet 40 milliGRAM(s) Oral before breakfast

## 2022-12-28 LAB
ALBUMIN SERPL ELPH-MCNC: 2.1 G/DL — LOW (ref 3.3–5.2)
ALP SERPL-CCNC: 65 U/L — SIGNIFICANT CHANGE UP (ref 40–120)
ALT FLD-CCNC: 7 U/L — SIGNIFICANT CHANGE UP
ANION GAP SERPL CALC-SCNC: 9 MMOL/L — SIGNIFICANT CHANGE UP (ref 5–17)
AST SERPL-CCNC: 12 U/L — SIGNIFICANT CHANGE UP
BILIRUB SERPL-MCNC: <0.2 MG/DL — LOW (ref 0.4–2)
BUN SERPL-MCNC: 65.5 MG/DL — HIGH (ref 8–20)
CALCIUM SERPL-MCNC: 7.6 MG/DL — LOW (ref 8.4–10.5)
CHLORIDE SERPL-SCNC: 101 MMOL/L — SIGNIFICANT CHANGE UP (ref 96–108)
CO2 SERPL-SCNC: 27 MMOL/L — SIGNIFICANT CHANGE UP (ref 22–29)
CREAT SERPL-MCNC: 3.19 MG/DL — HIGH (ref 0.5–1.3)
EGFR: 14 ML/MIN/1.73M2 — LOW
GLUCOSE BLDC GLUCOMTR-MCNC: 131 MG/DL — HIGH (ref 70–99)
GLUCOSE BLDC GLUCOMTR-MCNC: 148 MG/DL — HIGH (ref 70–99)
GLUCOSE BLDC GLUCOMTR-MCNC: 333 MG/DL — HIGH (ref 70–99)
GLUCOSE BLDC GLUCOMTR-MCNC: 82 MG/DL — SIGNIFICANT CHANGE UP (ref 70–99)
GLUCOSE SERPL-MCNC: 79 MG/DL — SIGNIFICANT CHANGE UP (ref 70–99)
HCT VFR BLD CALC: 31.6 % — LOW (ref 34.5–45)
HGB BLD-MCNC: 9.2 G/DL — LOW (ref 11.5–15.5)
MCHC RBC-ENTMCNC: 22 PG — LOW (ref 27–34)
MCHC RBC-ENTMCNC: 29.1 GM/DL — LOW (ref 32–36)
MCV RBC AUTO: 75.6 FL — LOW (ref 80–100)
PLATELET # BLD AUTO: 206 K/UL — SIGNIFICANT CHANGE UP (ref 150–400)
POTASSIUM SERPL-MCNC: 4.7 MMOL/L — SIGNIFICANT CHANGE UP (ref 3.5–5.3)
POTASSIUM SERPL-SCNC: 4.7 MMOL/L — SIGNIFICANT CHANGE UP (ref 3.5–5.3)
PROT SERPL-MCNC: 4.2 G/DL — LOW (ref 6.6–8.7)
RBC # BLD: 4.18 M/UL — SIGNIFICANT CHANGE UP (ref 3.8–5.2)
RBC # FLD: 27.1 % — HIGH (ref 10.3–14.5)
SODIUM SERPL-SCNC: 137 MMOL/L — SIGNIFICANT CHANGE UP (ref 135–145)
WBC # BLD: 6.81 K/UL — SIGNIFICANT CHANGE UP (ref 3.8–10.5)
WBC # FLD AUTO: 6.81 K/UL — SIGNIFICANT CHANGE UP (ref 3.8–10.5)

## 2022-12-28 PROCEDURE — 99233 SBSQ HOSP IP/OBS HIGH 50: CPT

## 2022-12-28 PROCEDURE — 99232 SBSQ HOSP IP/OBS MODERATE 35: CPT

## 2022-12-28 RX ORDER — INSULIN LISPRO 100/ML
2 VIAL (ML) SUBCUTANEOUS
Refills: 0 | Status: DISCONTINUED | OUTPATIENT
Start: 2022-12-28 | End: 2022-12-30

## 2022-12-28 RX ORDER — HYDRALAZINE HCL 50 MG
1 TABLET ORAL
Qty: 0 | Refills: 0 | DISCHARGE
Start: 2022-12-28

## 2022-12-28 RX ORDER — ALBUTEROL 90 UG/1
2 AEROSOL, METERED ORAL
Qty: 0 | Refills: 0 | DISCHARGE
Start: 2022-12-28

## 2022-12-28 RX ORDER — METOPROLOL TARTRATE 50 MG
1 TABLET ORAL
Qty: 0 | Refills: 0 | DISCHARGE
Start: 2022-12-28

## 2022-12-28 RX ORDER — INSULIN GLARGINE 100 [IU]/ML
8 INJECTION, SOLUTION SUBCUTANEOUS AT BEDTIME
Refills: 0 | Status: DISCONTINUED | OUTPATIENT
Start: 2022-12-28 | End: 2022-12-29

## 2022-12-28 RX ORDER — INSULIN GLARGINE 100 [IU]/ML
10 INJECTION, SOLUTION SUBCUTANEOUS
Qty: 0 | Refills: 0 | DISCHARGE
Start: 2022-12-28

## 2022-12-28 RX ORDER — INSULIN LISPRO 100/ML
4 VIAL (ML) SUBCUTANEOUS
Qty: 0 | Refills: 0 | DISCHARGE
Start: 2022-12-28

## 2022-12-28 RX ORDER — APIXABAN 2.5 MG/1
1 TABLET, FILM COATED ORAL
Qty: 0 | Refills: 0 | DISCHARGE
Start: 2022-12-28

## 2022-12-28 RX ORDER — ESCITALOPRAM OXALATE 10 MG/1
1 TABLET, FILM COATED ORAL
Qty: 0 | Refills: 0 | DISCHARGE
Start: 2022-12-28

## 2022-12-28 RX ADMIN — BUDESONIDE AND FORMOTEROL FUMARATE DIHYDRATE 2 PUFF(S): 160; 4.5 AEROSOL RESPIRATORY (INHALATION) at 11:45

## 2022-12-28 RX ADMIN — APIXABAN 2.5 MILLIGRAM(S): 2.5 TABLET, FILM COATED ORAL at 05:19

## 2022-12-28 RX ADMIN — Medication 650 MILLIGRAM(S): at 05:18

## 2022-12-28 RX ADMIN — Medication 50 MILLIGRAM(S): at 18:01

## 2022-12-28 RX ADMIN — INSULIN GLARGINE 8 UNIT(S): 100 INJECTION, SOLUTION SUBCUTANEOUS at 22:47

## 2022-12-28 RX ADMIN — CHLORHEXIDINE GLUCONATE 1 APPLICATION(S): 213 SOLUTION TOPICAL at 05:18

## 2022-12-28 RX ADMIN — Medication 50 MILLIGRAM(S): at 13:52

## 2022-12-28 RX ADMIN — Medication 2 UNIT(S): at 16:42

## 2022-12-28 RX ADMIN — Medication 30 MILLILITER(S): at 19:46

## 2022-12-28 RX ADMIN — Medication 1000 UNIT(S): at 13:52

## 2022-12-28 RX ADMIN — CHLORHEXIDINE GLUCONATE 1 APPLICATION(S): 213 SOLUTION TOPICAL at 16:41

## 2022-12-28 RX ADMIN — PANTOPRAZOLE SODIUM 40 MILLIGRAM(S): 20 TABLET, DELAYED RELEASE ORAL at 05:19

## 2022-12-28 RX ADMIN — Medication 50 MILLIGRAM(S): at 22:47

## 2022-12-28 RX ADMIN — Medication 650 MILLIGRAM(S): at 05:48

## 2022-12-28 RX ADMIN — Medication 650 MILLIGRAM(S): at 13:52

## 2022-12-28 RX ADMIN — APIXABAN 2.5 MILLIGRAM(S): 2.5 TABLET, FILM COATED ORAL at 18:00

## 2022-12-28 RX ADMIN — ALBUTEROL 2 PUFF(S): 90 AEROSOL, METERED ORAL at 18:01

## 2022-12-28 RX ADMIN — ALBUTEROL 2 PUFF(S): 90 AEROSOL, METERED ORAL at 11:44

## 2022-12-28 RX ADMIN — Medication 240 MILLIGRAM(S): at 05:19

## 2022-12-28 RX ADMIN — Medication 650 MILLIGRAM(S): at 22:47

## 2022-12-28 RX ADMIN — ESCITALOPRAM OXALATE 10 MILLIGRAM(S): 10 TABLET, FILM COATED ORAL at 13:52

## 2022-12-28 RX ADMIN — Medication 4 UNIT(S): at 11:43

## 2022-12-28 RX ADMIN — Medication 50 MILLIGRAM(S): at 05:19

## 2022-12-28 RX ADMIN — Medication 650 MILLIGRAM(S): at 14:52

## 2022-12-28 NOTE — PROGRESS NOTE ADULT - SUBJECTIVE AND OBJECTIVE BOX
NEPHROLOGY INTERVAL HPI/OVERNIGHT EVENTS:  pt resting comfortably  no acute distress noted  tolerated HD yesterday  son at bedside    MEDICATIONS  (STANDING):  acetaminophen     Tablet .. 650 milliGRAM(s) Oral every 8 hours  albuterol    90 MICROgram(s) HFA Inhaler 2 Puff(s) Inhalation every 4 hours  apixaban 2.5 milliGRAM(s) Oral two times a day  budesonide  80 MICROgram(s)/formoterol 4.5 MICROgram(s) Inhaler 2 Puff(s) Inhalation two times a day  chlorhexidine 2% Cloths 1 Application(s) Topical daily  chlorhexidine 2% Cloths 1 Application(s) Topical <User Schedule>  cholecalciferol 1000 Unit(s) Oral daily  dextrose 5%. 1000 milliLiter(s) (100 mL/Hr) IV Continuous <Continuous>  dextrose 5%. 1000 milliLiter(s) (50 mL/Hr) IV Continuous <Continuous>  dextrose 50% Injectable 25 Gram(s) IV Push once  dextrose 50% Injectable 12.5 Gram(s) IV Push once  dextrose 50% Injectable 25 Gram(s) IV Push once  diltiazem    milliGRAM(s) Oral daily  epoetin susie-epbx (RETACRIT) Injectable 88243 Unit(s) IV Push <User Schedule>  escitalopram 10 milliGRAM(s) Oral daily  glucagon  Injectable 1 milliGRAM(s) IntraMuscular once  hydrALAZINE 50 milliGRAM(s) Oral every 8 hours  insulin glargine Injectable (LANTUS) 10 Unit(s) SubCutaneous at bedtime  insulin lispro (ADMELOG) corrective regimen sliding scale   SubCutaneous three times a day before meals  insulin lispro Injectable (ADMELOG) 4 Unit(s) SubCutaneous three times a day with meals  metoprolol tartrate 50 milliGRAM(s) Oral two times a day  pantoprazole    Tablet 40 milliGRAM(s) Oral before breakfast    MEDICATIONS  (PRN):  aluminum hydroxide/magnesium hydroxide/simethicone Suspension 30 milliLiter(s) Oral every 6 hours PRN Dyspepsia  dextrose Oral Gel 15 Gram(s) Oral once PRN Blood Glucose LESS THAN 70 milliGRAM(s)/deciliter  hydrALAZINE Injectable 10 milliGRAM(s) IV Push every 6 hours PRN SBP > 160  melatonin 1 milliGRAM(s) Oral at bedtime PRN Sleep  sodium chloride 0.9% lock flush 10 milliLiter(s) IV Push every 1 hour PRN Pre/post blood products, medications, blood draw, and to maintain line patency  sodium chloride 0.9% lock flush 10 milliLiter(s) IV Push every 1 hour PRN Pre/post blood products, medications, blood draw, and to maintain line patency      Allergies    No Known Allergies          Vital Signs Last 24 Hrs  T(C): 36.8 (28 Dec 2022 10:05), Max: 37.1 (28 Dec 2022 04:21)  T(F): 98.2 (28 Dec 2022 10:05), Max: 98.8 (28 Dec 2022 04:21)  HR: 61 (28 Dec 2022 10:05) (60 - 75)  BP: 121/53 (28 Dec 2022 10:05) (121/53 - 173/59)  BP(mean): 86 (27 Dec 2022 13:02) (86 - 86)  RR: 18 (28 Dec 2022 10:05) (18 - 19)  SpO2: 98% (28 Dec 2022 10:05) (98% - 100%)    Parameters below as of 28 Dec 2022 10:05  Patient On (Oxygen Delivery Method): nasal cannula        PHYSICAL EXAM:  GENERAL: Elderly, frail  HEENT:  Facial edema  NECK: Supple, No JVD  NERVOUS SYSTEM:  Awake, alert  EXTREMITIES:  + dependent edema improved  SKIN: No rashes    LABS:                        9.2    6.81  )-----------( 206      ( 28 Dec 2022 06:36 )             31.6     12-28    137  |  101  |  65.5<H>  ----------------------------<  79  4.7   |  27.0  |  3.19<H>    Ca    7.6<L>      28 Dec 2022 06:36  Phos  4.6     12-27    TPro  4.2<L>  /  Alb  2.1<L>  /  TBili  <0.2<L>  /  DBili  x   /  AST  12  /  ALT  7   /  AlkPhos  65  12-28            RADIOLOGY & ADDITIONAL TESTS:

## 2022-12-28 NOTE — PROGRESS NOTE ADULT - ASSESSMENT
CKD(IV): +COVID  THERESA, progressive + fluid overload + hyperkalemia  Hx NS (3g) +DM  Remains HD dependent  - HD tomorrow  - will cont to monitor for signs of renal recovery    Anemia: +CKD  - cont DILAN  - target Hgb > 10 Patient settled in room 3027. Report received from RODRÍGUEZ Amaral. Room orientation completed, IPOC and education initiated.

## 2022-12-28 NOTE — ADVANCED PRACTICE NURSE CONSULT - ASSESSMENT
Patient sitting in chair with spouse present at bedside Pt awake, A&Ox3, made aware of purpose of WOCN visit, agreeable to consult.  Patient with limited mobility. Documented history of Incontinence of stool. Ordered for DASH/TLC diet, current Julian score of 16. With assistance, patient turned patient to side for skin assessment. Skin assessment as below:    ·	Right Groin - Cath Insertion Site inflammation with biofilm (0.2x0.2cm) - insertion site with white moist wound bed with reddened inflamed edges; small scattered areas to periphery, scant serosanguinous drainage.  Periwound skin with blanching erythema, moist intact.

## 2022-12-28 NOTE — PROGRESS NOTE ADULT - ASSESSMENT
81 y/o F with PMHx asthma, DM, HLD, atrial fibrillation, HTN, who was found to have a right superficial femoral vein thrombosis while on warfarin and acute kidney injury. Hemodialysis was initiated by nephrology.    1. Uncontrolled T2DM, a1c 11.2%  - Bgs 82 this morning  - Decrease lantus to 8 units qhs  - Decrease admelog to 2 units tid with meals  - Sliding scale coverage    2. THERESA/CKD  - HD via permacath  - Plan as per nephrology    3. Acute Hypoxic respiratory Failure - Multifactorial  - Likely related to Asthma, COVID-19, CHFpEF, Fluid overload  - Started on HD 12  - Supplemental O2 as needed  - Bronchodilators  - S/p decadron     81 y/o F with PMHx asthma, DM, HLD, atrial fibrillation, HTN, who was found to have a right superficial femoral vein thrombosis while on warfarin and acute kidney injury. Hemodialysis was initiated by nephrology.    1. Uncontrolled T2DM, a1c 11.2%  - Bgs 82 this morning  - Decrease lantus to 8 units qhs  - Decrease admelog to 2 units tid with meals  - Sliding scale coverage    2. THERESA/CKD  - HD via permacath  - Plan as per nephrology    3. Acute Hypoxic respiratory Failure - Multifactorial  - Likely related to Asthma, COVID-19, CHFpEF, Fluid overload  - Started on HD  - Supplemental O2 as needed  - Bronchodilators  - S/p decadron

## 2022-12-28 NOTE — PROGRESS NOTE ADULT - SUBJECTIVE AND OBJECTIVE BOX
Patient is a 82y old  Female who presents with a chief complaint of CHF exacerbation, COVID pneumonia (19 Dec 2022 07:36)      INTERVAL HPI/OVERNIGHT EVENTS: pt seen and examined. Son at bedside  Pt herself had no complains, looks very fragile and deconditioned  Awaiting discharge to [facility]      MEDICATIONS  (STANDING):  acetaminophen     Tablet .. 650 milliGRAM(s) Oral every 8 hours  albuterol    90 MICROgram(s) HFA Inhaler 2 Puff(s) Inhalation every 4 hours  apixaban 2.5 milliGRAM(s) Oral two times a day  budesonide  80 MICROgram(s)/formoterol 4.5 MICROgram(s) Inhaler 2 Puff(s) Inhalation two times a day  chlorhexidine 2% Cloths 1 Application(s) Topical daily  chlorhexidine 2% Cloths 1 Application(s) Topical <User Schedule>  cholecalciferol 1000 Unit(s) Oral daily  dextrose 5%. 1000 milliLiter(s) (100 mL/Hr) IV Continuous <Continuous>  dextrose 5%. 1000 milliLiter(s) (50 mL/Hr) IV Continuous <Continuous>  dextrose 50% Injectable 25 Gram(s) IV Push once  dextrose 50% Injectable 12.5 Gram(s) IV Push once  dextrose 50% Injectable 25 Gram(s) IV Push once  diltiazem    milliGRAM(s) Oral daily  epoetin susie-epbx (RETACRIT) Injectable 79847 Unit(s) IV Push <User Schedule>  escitalopram 20 milliGRAM(s) Oral daily  glucagon  Injectable 1 milliGRAM(s) IntraMuscular once  guaiFENesin ER 1200 milliGRAM(s) Oral every 12 hours  hydrALAZINE 50 milliGRAM(s) Oral every 8 hours  insulin glargine Injectable (LANTUS) 18 Unit(s) SubCutaneous at bedtime  insulin lispro (ADMELOG) corrective regimen sliding scale   SubCutaneous three times a day before meals  insulin lispro Injectable (ADMELOG) 9 Unit(s) SubCutaneous three times a day before meals  metoprolol tartrate 50 milliGRAM(s) Oral two times a day  pantoprazole    Tablet 40 milliGRAM(s) Oral before breakfast    MEDICATIONS  (PRN):  aluminum hydroxide/magnesium hydroxide/simethicone Suspension 30 milliLiter(s) Oral every 6 hours PRN Dyspepsia  dextrose Oral Gel 15 Gram(s) Oral once PRN Blood Glucose LESS THAN 70 milliGRAM(s)/deciliter  hydrALAZINE Injectable 10 milliGRAM(s) IV Push every 6 hours PRN SBP > 160  melatonin 1 milliGRAM(s) Oral at bedtime PRN Sleep  sodium chloride 0.9% lock flush 10 milliLiter(s) IV Push every 1 hour PRN Pre/post blood products, medications, blood draw, and to maintain line patency  sodium chloride 0.9% lock flush 10 milliLiter(s) IV Push every 1 hour PRN Pre/post blood products, medications, blood draw, and to maintain line patency      Allergies    No Known Allergies    Intolerances        REVIEW OF SYSTEMS:  CONSTITUTIONAL: No fever, weight loss, or fatigue  RESPIRATORY: No cough, wheezing, chills or hemoptysis; No shortness of breath  CARDIOVASCULAR: No chest pain, palpitations, dizziness, or leg swelling  GASTROINTESTINAL: No abdominal or epigastric pain. No nausea, vomiting, or hematemesis; No diarrhea or constipation. No melena or hematochezia.  NEUROLOGICAL: No headaches, memory loss, loss of strength, numbness, or tremors  MUSCULOSKELETAL: No joint pain or swelling; No muscle, back, or extremity pain      Vital Signs Last 24 Hrs  T(C): 36.7 (25 Dec 2022 11:46), Max: 36.8 (25 Dec 2022 04:11)  T(F): 98.1 (25 Dec 2022 11:46), Max: 98.2 (25 Dec 2022 04:11)  HR: 67 (25 Dec 2022 11:46) (58 - 67)  BP: 150/63 (25 Dec 2022 11:46) (150/63 - 166/80)  BP(mean): --  RR: 18 (25 Dec 2022 11:46) (18 - 18)  SpO2: 98% (25 Dec 2022 11:46) (98% - 100%)    Parameters below as of 25 Dec 2022 11:46  Patient On (Oxygen Delivery Method): nasal cannula        PHYSICAL EXAM:  GENERAL: ill looking, lady, laying in bed   HEAD:  Atraumatic, Normocephalic  EYES: EOMI, PERRLA, conjunctiva and sclera clear  NECK: Supple, No JVD, Normal thyroid  NERVOUS SYSTEM:  Alert & Oriented X2, No gross focal deficits  CHEST/LUNG: Clear to percussion bilaterally; No rales, rhonchi, wheezing, or rubs, perma cath in place   HEART: Regular rate and rhythm; No murmurs, rubs, or gallops  ABDOMEN: Soft, Nontender, Nondistended; Bowel sounds present  EXTREMITIES: swollen arms and edema LE   SKIN: No rashes or lesions    LABS:                        9.3    8.42  )-----------( 172      ( 25 Dec 2022 04:13 )             31.8     12-25    134<L>  |  97  |  72.1<H>  ----------------------------<  191<H>  5.1   |  25.0  |  3.51<H>    Ca    7.5<L>      25 Dec 2022 04:13  Phos  3.2     12-24  Mg     1.9     12-25    TPro  4.2<L>  /  Alb  2.1<L>  /  TBili  <0.2<L>  /  DBili  x   /  AST  11  /  ALT  7   /  AlkPhos  69  12-25        CAPILLARY BLOOD GLUCOSE      POCT Blood Glucose.: 181 mg/dL (25 Dec 2022 11:01)  POCT Blood Glucose.: 228 mg/dL (25 Dec 2022 07:35)  POCT Blood Glucose.: 117 mg/dL (24 Dec 2022 22:26)  POCT Blood Glucose.: 56 mg/dL (24 Dec 2022 21:43)  POCT Blood Glucose.: 58 mg/dL (24 Dec 2022 21:41)  POCT Blood Glucose.: 105 mg/dL (24 Dec 2022 16:29)      RADIOLOGY & ADDITIONAL TESTS:    Imaging Personally Reviewed:  [ ] YES  [ ] NO    Consultant(s) Notes Reviewed:  [x ] YES  [ ] NO    Care Discussed with Consultants/Other Providers [ ] YES  [ ] NO    Plan of Care discussed with Housestaff [x ]YES [ ] NO

## 2022-12-28 NOTE — PROGRESS NOTE ADULT - ASSESSMENT
82F with PMH asthma, diabetes, hyperlipidemia, atrial fibrillation, hypertension, and hyperlipidemia, who was found to have a right superficial femoral vein thrombosis while on warfarin and acute kidney injury. She was also noted to have been recently diagnosed with COVID-19.    THERESA on CKD IV  - HD initiated 12/13/22 via Right femoral access,    - Avoid Nephrotoxin  - cont HD per renal  - S/p PC on 12/21 will eventually need avf  - SW for HD set up as op       Acute on Chronic Diastolic Heart failure  - TTE with intact LVF however has grade II diastolic dysfunction and moderate Pulmonary HTN  - Resistant to diuresis, HD initiated 12/13/22 via Right femoral access.  - Strict I/O, daily weight, Fluid restriction  - Continue Metoprolol and Hydralazine   - Cardio consult appreciated    Pleural effusion   - Small to mod L and small R pleural effusion with LLL complete and RLL near complete compressive atelectasis   - Incentive spirometry  - S/p chest tube on 12/19, care per TSx  - Fluid studies transudative, follow up cytology, fungal cxs   - Supplemental oxygen PRN, wean as tolerated.    Emphysematous Cystitis  - UCx never collected, re ordered and still pending   - Urology consult appreciated  - On Ceftriaxone until 12/27, if discharged sooner will discharge on PO antibiotics   - leukocytosis trending down, might also have component of steroids     Acute Hypoxic respiratory Failure - Multifactorial. Asthma, COVID-19, CHFpEF, Fluid overload.  - Started on HD 12/13/22  - Supplemental O2, wean as tolerated  - Bronchodilators  - D/esther Decadron  - Supplemental oxygen PRN, wean as tolerated    Anemia likely multifactorial - chronic disease and  kidney disease  - Stable Hgb  - Received PRBC x 1 during this admission   - Monitor Hgb, transfuse PRN    Uncontrolled Type 2 Diabetes on insulin  - A1c 11.2, BG better  controlled   - BGM uncontrolled  - BGM with SSI, Lantus    Atrial Fibrillation  - Rate controlled, INR returned to normal  - Metoprolol, Diltiazem  - Eliquis resumed    COVID-19   - CT Chest with bilateral upper lobe opacities  - isolation precautions  - repeat covid   - completed decadron : now dcd   - Will need repeat outpatient CT chest in 3 months for RLL nodule    HTN-Essential  - Improved  - Metoprolol to 50mg, continue Diltiazem at current rate  - Hydralazine 25mg q8    DVT ruled out on repeat imaging  - Supra-therapeutic INR normalized  - No bleeding noted  - US Duplex- no evidence of deep venous thrombosis in either lower extremity.    Code Status: DNR/DNI    Son  updated at bedside.    Stable for discharge to [facility]

## 2022-12-28 NOTE — PROGRESS NOTE ADULT - SUBJECTIVE AND OBJECTIVE BOX
INTERVAL EVENTS:  Follow up diabetes management    Denies pain, ROS negative    MEDICATIONS  (STANDING):  acetaminophen     Tablet .. 650 milliGRAM(s) Oral every 8 hours  albuterol    90 MICROgram(s) HFA Inhaler 2 Puff(s) Inhalation every 4 hours  apixaban 2.5 milliGRAM(s) Oral two times a day  budesonide  80 MICROgram(s)/formoterol 4.5 MICROgram(s) Inhaler 2 Puff(s) Inhalation two times a day  chlorhexidine 2% Cloths 1 Application(s) Topical daily  chlorhexidine 2% Cloths 1 Application(s) Topical <User Schedule>  cholecalciferol 1000 Unit(s) Oral daily  dextrose 5%. 1000 milliLiter(s) (100 mL/Hr) IV Continuous <Continuous>  dextrose 5%. 1000 milliLiter(s) (50 mL/Hr) IV Continuous <Continuous>  dextrose 50% Injectable 25 Gram(s) IV Push once  dextrose 50% Injectable 12.5 Gram(s) IV Push once  dextrose 50% Injectable 25 Gram(s) IV Push once  diltiazem    milliGRAM(s) Oral daily  epoetin susie-epbx (RETACRIT) Injectable 65683 Unit(s) IV Push <User Schedule>  escitalopram 10 milliGRAM(s) Oral daily  glucagon  Injectable 1 milliGRAM(s) IntraMuscular once  hydrALAZINE 50 milliGRAM(s) Oral every 8 hours  insulin glargine Injectable (LANTUS) 8 Unit(s) SubCutaneous at bedtime  insulin lispro (ADMELOG) corrective regimen sliding scale   SubCutaneous three times a day before meals  insulin lispro Injectable (ADMELOG) 2 Unit(s) SubCutaneous three times a day before meals  metoprolol tartrate 50 milliGRAM(s) Oral two times a day  pantoprazole    Tablet 40 milliGRAM(s) Oral before breakfast    MEDICATIONS  (PRN):  aluminum hydroxide/magnesium hydroxide/simethicone Suspension 30 milliLiter(s) Oral every 6 hours PRN Dyspepsia  dextrose Oral Gel 15 Gram(s) Oral once PRN Blood Glucose LESS THAN 70 milliGRAM(s)/deciliter  hydrALAZINE Injectable 10 milliGRAM(s) IV Push every 6 hours PRN SBP > 160  melatonin 1 milliGRAM(s) Oral at bedtime PRN Sleep  sodium chloride 0.9% lock flush 10 milliLiter(s) IV Push every 1 hour PRN Pre/post blood products, medications, blood draw, and to maintain line patency  sodium chloride 0.9% lock flush 10 milliLiter(s) IV Push every 1 hour PRN Pre/post blood products, medications, blood draw, and to maintain line patency    Allergies  No Known Allergies    Vital Signs Last 24 Hrs  T(C): 36.8 (28 Dec 2022 10:05), Max: 37.1 (28 Dec 2022 04:21)  T(F): 98.2 (28 Dec 2022 10:05), Max: 98.8 (28 Dec 2022 04:21)  HR: 61 (28 Dec 2022 10:05) (61 - 75)  BP: 121/53 (28 Dec 2022 10:05) (121/53 - 173/59)  BP(mean): --  RR: 18 (28 Dec 2022 10:05) (18 - 19)  SpO2: 98% (28 Dec 2022 10:05) (98% - 100%)    Parameters below as of 28 Dec 2022 10:05  Patient On (Oxygen Delivery Method): nasal cannula      PHYSICAL EXAM:  General: No apparent distress  Neck: Supple, trachea midline, no thyromegaly  Respiratory: Lungs clear bilaterally, normal rate, effort  Cardiac: +S1, S2, no m/r/g  GI: +BS, soft, non tender, non distended  Extremities: No peripheral edema, no pedal lesions  Neuro: A+O X3, no tremor  Pysch: Affect appropriate   Skin: No acanthosis     LABS:                        9.2    6.81  )-----------( 206      ( 28 Dec 2022 06:36 )             31.6     12-28    137  |  101  |  65.5<H>  ----------------------------<  79  4.7   |  27.0  |  3.19<H>    Ca    7.6<L>      28 Dec 2022 06:36  Phos  4.6     12-27    TPro  4.2<L>  /  Alb  2.1<L>  /  TBili  <0.2<L>  /  DBili  x   /  AST  12  /  ALT  7   /  AlkPhos  65  12-28      POCT Blood Glucose.: 131 mg/dL (12-28-22 @ 11:28)  POCT Blood Glucose.: 82 mg/dL (12-28-22 @ 07:39)  POCT Blood Glucose.: 103 mg/dL (12-27-22 @ 22:28)  POCT Blood Glucose.: 108 mg/dL (12-27-22 @ 18:05)

## 2022-12-28 NOTE — ADVANCED PRACTICE NURSE CONSULT - RECOMMEDATIONS
·	Right Groin - cleanse with NS, pat dry, cover wound with Aquacel and 4x4 gauze dressing folded into groin and secure with tape     -Continue turning/positioning patient from side-to-side q2h while in bed, q1h when/if OOB chair, or in accordance w/ pt's plan of care. Utilize pillows and/or Spry positioner pillow to assist w/ turning/positioning. When/if OOB chair, utilize pillows or chair cushion to offload pressure.   -Continue to offload heels from bed surface with soft pillow under calfs or by applying offloading boots to BLEs.   -Consider applying Coloplast Traci Protect moisture barrier cream to buttock and perineal area daily and prn after each incontinent episode.    -Continue utilizing one underpad underneath patient to contain incontinence episodes; change pad when saturated/soiled.   -Consider utilizing female incontinence device/Purewick (if patient candidate) to contain urinary incontinence.   -Consider utilizing external fecal  (if patient candidate) or fecal management system/rectal tube/DigniShield (if patient candidate; MD order required) to contain loose fecal incontinence.   -Continue nutrition consult for optimal wound healing & nutritional status.   -Assess skin/wound qshift, report changes to primary provider.     Plan of Care: Primary RN made aware of above recs. Spoke w/ provider in regards to above. No further needs/recs from [facility] service at this time. Staff RN to perform routine skin/wound assessment and manage wound care. Questions or concerns or if wound worsens and reconsult needed, please contact [facility]

## 2022-12-29 LAB
ALBUMIN SERPL ELPH-MCNC: 2.2 G/DL — LOW (ref 3.3–5.2)
ALP SERPL-CCNC: 94 U/L — SIGNIFICANT CHANGE UP (ref 40–120)
ALT FLD-CCNC: 7 U/L — SIGNIFICANT CHANGE UP
ANION GAP SERPL CALC-SCNC: 11 MMOL/L — SIGNIFICANT CHANGE UP (ref 5–17)
AST SERPL-CCNC: 9 U/L — SIGNIFICANT CHANGE UP
BILIRUB SERPL-MCNC: <0.2 MG/DL — LOW (ref 0.4–2)
BUN SERPL-MCNC: 85.1 MG/DL — HIGH (ref 8–20)
CALCIUM SERPL-MCNC: 7.4 MG/DL — LOW (ref 8.4–10.5)
CHLORIDE SERPL-SCNC: 101 MMOL/L — SIGNIFICANT CHANGE UP (ref 96–108)
CO2 SERPL-SCNC: 25 MMOL/L — SIGNIFICANT CHANGE UP (ref 22–29)
CREAT SERPL-MCNC: 4.13 MG/DL — HIGH (ref 0.5–1.3)
EGFR: 10 ML/MIN/1.73M2 — LOW
GLUCOSE BLDC GLUCOMTR-MCNC: 186 MG/DL — HIGH (ref 70–99)
GLUCOSE BLDC GLUCOMTR-MCNC: 234 MG/DL — HIGH (ref 70–99)
GLUCOSE BLDC GLUCOMTR-MCNC: 236 MG/DL — HIGH (ref 70–99)
GLUCOSE BLDC GLUCOMTR-MCNC: 279 MG/DL — HIGH (ref 70–99)
GLUCOSE SERPL-MCNC: 357 MG/DL — HIGH (ref 70–99)
HCT VFR BLD CALC: 28.7 % — LOW (ref 34.5–45)
HGB BLD-MCNC: 8.4 G/DL — LOW (ref 11.5–15.5)
MAGNESIUM SERPL-MCNC: 2.2 MG/DL — SIGNIFICANT CHANGE UP (ref 1.6–2.6)
MCHC RBC-ENTMCNC: 22.7 PG — LOW (ref 27–34)
MCHC RBC-ENTMCNC: 29.3 GM/DL — LOW (ref 32–36)
MCV RBC AUTO: 77.6 FL — LOW (ref 80–100)
PHOSPHATE SERPL-MCNC: 3.3 MG/DL — SIGNIFICANT CHANGE UP (ref 2.4–4.7)
PLATELET # BLD AUTO: 187 K/UL — SIGNIFICANT CHANGE UP (ref 150–400)
POTASSIUM SERPL-MCNC: 5.4 MMOL/L — HIGH (ref 3.5–5.3)
POTASSIUM SERPL-SCNC: 5.4 MMOL/L — HIGH (ref 3.5–5.3)
PROT SERPL-MCNC: 4.3 G/DL — LOW (ref 6.6–8.7)
RBC # BLD: 3.7 M/UL — LOW (ref 3.8–5.2)
RBC # FLD: 26.9 % — HIGH (ref 10.3–14.5)
SODIUM SERPL-SCNC: 137 MMOL/L — SIGNIFICANT CHANGE UP (ref 135–145)
TROPONIN T SERPL-MCNC: 0.04 NG/ML — SIGNIFICANT CHANGE UP (ref 0–0.06)
WBC # BLD: 6.9 K/UL — SIGNIFICANT CHANGE UP (ref 3.8–10.5)
WBC # FLD AUTO: 6.9 K/UL — SIGNIFICANT CHANGE UP (ref 3.8–10.5)

## 2022-12-29 PROCEDURE — 93010 ELECTROCARDIOGRAM REPORT: CPT

## 2022-12-29 PROCEDURE — 71045 X-RAY EXAM CHEST 1 VIEW: CPT | Mod: 26

## 2022-12-29 PROCEDURE — 99232 SBSQ HOSP IP/OBS MODERATE 35: CPT

## 2022-12-29 RX ORDER — INSULIN GLARGINE 100 [IU]/ML
9 INJECTION, SOLUTION SUBCUTANEOUS AT BEDTIME
Refills: 0 | Status: DISCONTINUED | OUTPATIENT
Start: 2022-12-29 | End: 2022-12-30

## 2022-12-29 RX ORDER — MORPHINE SULFATE 50 MG/1
0.5 CAPSULE, EXTENDED RELEASE ORAL ONCE
Refills: 0 | Status: DISCONTINUED | OUTPATIENT
Start: 2022-12-29 | End: 2022-12-29

## 2022-12-29 RX ADMIN — Medication 50 MILLIGRAM(S): at 05:54

## 2022-12-29 RX ADMIN — BUDESONIDE AND FORMOTEROL FUMARATE DIHYDRATE 2 PUFF(S): 160; 4.5 AEROSOL RESPIRATORY (INHALATION) at 20:00

## 2022-12-29 RX ADMIN — Medication 6: at 08:06

## 2022-12-29 RX ADMIN — CHLORHEXIDINE GLUCONATE 1 APPLICATION(S): 213 SOLUTION TOPICAL at 17:09

## 2022-12-29 RX ADMIN — Medication 2: at 11:37

## 2022-12-29 RX ADMIN — ESCITALOPRAM OXALATE 10 MILLIGRAM(S): 10 TABLET, FILM COATED ORAL at 17:09

## 2022-12-29 RX ADMIN — ALBUTEROL 2 PUFF(S): 90 AEROSOL, METERED ORAL at 17:10

## 2022-12-29 RX ADMIN — Medication 650 MILLIGRAM(S): at 05:55

## 2022-12-29 RX ADMIN — Medication 50 MILLIGRAM(S): at 21:41

## 2022-12-29 RX ADMIN — Medication 1000 UNIT(S): at 17:08

## 2022-12-29 RX ADMIN — BUDESONIDE AND FORMOTEROL FUMARATE DIHYDRATE 2 PUFF(S): 160; 4.5 AEROSOL RESPIRATORY (INHALATION) at 08:08

## 2022-12-29 RX ADMIN — PANTOPRAZOLE SODIUM 40 MILLIGRAM(S): 20 TABLET, DELAYED RELEASE ORAL at 05:54

## 2022-12-29 RX ADMIN — Medication 2 UNIT(S): at 17:11

## 2022-12-29 RX ADMIN — Medication 650 MILLIGRAM(S): at 23:46

## 2022-12-29 RX ADMIN — Medication 30 MILLILITER(S): at 01:54

## 2022-12-29 RX ADMIN — Medication 650 MILLIGRAM(S): at 22:46

## 2022-12-29 RX ADMIN — APIXABAN 2.5 MILLIGRAM(S): 2.5 TABLET, FILM COATED ORAL at 05:54

## 2022-12-29 RX ADMIN — Medication 650 MILLIGRAM(S): at 17:09

## 2022-12-29 RX ADMIN — Medication 50 MILLIGRAM(S): at 17:09

## 2022-12-29 RX ADMIN — CHLORHEXIDINE GLUCONATE 1 APPLICATION(S): 213 SOLUTION TOPICAL at 05:46

## 2022-12-29 RX ADMIN — MORPHINE SULFATE 0.5 MILLIGRAM(S): 50 CAPSULE, EXTENDED RELEASE ORAL at 02:07

## 2022-12-29 RX ADMIN — APIXABAN 2.5 MILLIGRAM(S): 2.5 TABLET, FILM COATED ORAL at 17:09

## 2022-12-29 RX ADMIN — Medication 240 MILLIGRAM(S): at 05:54

## 2022-12-29 RX ADMIN — Medication 0.5 MILLIGRAM(S): at 01:54

## 2022-12-29 RX ADMIN — ERYTHROPOIETIN 10000 UNIT(S): 10000 INJECTION, SOLUTION INTRAVENOUS; SUBCUTANEOUS at 14:09

## 2022-12-29 RX ADMIN — INSULIN GLARGINE 9 UNIT(S): 100 INJECTION, SOLUTION SUBCUTANEOUS at 21:40

## 2022-12-29 RX ADMIN — Medication 4: at 17:10

## 2022-12-29 RX ADMIN — ALBUTEROL 2 PUFF(S): 90 AEROSOL, METERED ORAL at 08:08

## 2022-12-29 RX ADMIN — Medication 2 UNIT(S): at 08:07

## 2022-12-29 RX ADMIN — ALBUTEROL 2 PUFF(S): 90 AEROSOL, METERED ORAL at 20:00

## 2022-12-29 NOTE — PROGRESS NOTE ADULT - SUBJECTIVE AND OBJECTIVE BOX
Hospitalist Daily Progress Note    Chief Complaint:  Patient is a 82y old  Female who presents with a chief complaint of THERESA (26 Dec 2022 17:50)      SUBJECTIVE / OVERNIGHT EVENTS:  Patient was seen and examined at bedside in HD site. No complaints.   Patient denies chest pain, SOB, abd pain, N/V, fever, chills, dysuria or any other complaints. All remainder ROS negative.     MEDICATIONS  (STANDING):  acetaminophen     Tablet .. 650 milliGRAM(s) Oral every 8 hours  albuterol    90 MICROgram(s) HFA Inhaler 2 Puff(s) Inhalation every 4 hours  apixaban 2.5 milliGRAM(s) Oral two times a day  budesonide  80 MICROgram(s)/formoterol 4.5 MICROgram(s) Inhaler 2 Puff(s) Inhalation two times a day  chlorhexidine 2% Cloths 1 Application(s) Topical <User Schedule>  chlorhexidine 2% Cloths 1 Application(s) Topical daily  cholecalciferol 1000 Unit(s) Oral daily  dextrose 5%. 1000 milliLiter(s) (100 mL/Hr) IV Continuous <Continuous>  dextrose 5%. 1000 milliLiter(s) (50 mL/Hr) IV Continuous <Continuous>  dextrose 50% Injectable 25 Gram(s) IV Push once  dextrose 50% Injectable 12.5 Gram(s) IV Push once  dextrose 50% Injectable 25 Gram(s) IV Push once  diltiazem    milliGRAM(s) Oral daily  epoetin susie-epbx (RETACRIT) Injectable 14133 Unit(s) IV Push <User Schedule>  escitalopram 10 milliGRAM(s) Oral daily  glucagon  Injectable 1 milliGRAM(s) IntraMuscular once  hydrALAZINE 50 milliGRAM(s) Oral every 8 hours  insulin glargine Injectable (LANTUS) 9 Unit(s) SubCutaneous at bedtime  insulin lispro (ADMELOG) corrective regimen sliding scale   SubCutaneous three times a day before meals  insulin lispro Injectable (ADMELOG) 2 Unit(s) SubCutaneous three times a day before meals  metoprolol tartrate 50 milliGRAM(s) Oral two times a day  pantoprazole    Tablet 40 milliGRAM(s) Oral before breakfast    MEDICATIONS  (PRN):  aluminum hydroxide/magnesium hydroxide/simethicone Suspension 30 milliLiter(s) Oral every 6 hours PRN Dyspepsia  dextrose Oral Gel 15 Gram(s) Oral once PRN Blood Glucose LESS THAN 70 milliGRAM(s)/deciliter  hydrALAZINE Injectable 10 milliGRAM(s) IV Push every 6 hours PRN SBP > 160  LORazepam     Tablet 0.5 milliGRAM(s) Oral every 8 hours PRN Anxiety  melatonin 1 milliGRAM(s) Oral at bedtime PRN Sleep  sodium chloride 0.9% lock flush 10 milliLiter(s) IV Push every 1 hour PRN Pre/post blood products, medications, blood draw, and to maintain line patency  sodium chloride 0.9% lock flush 10 milliLiter(s) IV Push every 1 hour PRN Pre/post blood products, medications, blood draw, and to maintain line patency        I&O's Summary      PHYSICAL EXAM:  Vital Signs Last 24 Hrs  T(C): 36.8 (29 Dec 2022 07:36), Max: 36.9 (28 Dec 2022 14:02)  T(F): 98.2 (29 Dec 2022 07:36), Max: 98.5 (28 Dec 2022 14:02)  HR: 66 (29 Dec 2022 07:36) (59 - 73)  BP: 168/62 (29 Dec 2022 07:36) (129/61 - 168/62)  BP(mean): --  RR: 18 (29 Dec 2022 07:36) (17 - 18)  SpO2: 99% (29 Dec 2022 07:36) (98% - 100%)    Parameters below as of 29 Dec 2022 09:52  Patient On (Oxygen Delivery Method): nasal cannula          Constitutional: NAD, Resting, chronically ill appearing female   ENT: Supple, No JVD  Lungs: CTA B/L, Non-labored breathing  Cardio: RRR, S1/S2, No murmur  Abdomen: Soft, Nontender, Nondistended; Bowel sounds present  Extremities: No calf tenderness, LE Edema   Musculoskeletal:   No clubbing or cyanosis of digits; no joint swelling or tenderness to palpation  Psych: Calm, cooperative affect appropriate  Neuro: Awake and alert, oriented to name and location, states year is 2020  Skin: No rashes; no palpable lesions    LABS:                        8.4    6.90  )-----------( 187      ( 29 Dec 2022 05:32 )             28.7     12-29    137  |  101  |  85.1<H>  ----------------------------<  357<H>  5.4<H>   |  25.0  |  4.13<H>    Ca    7.4<L>      29 Dec 2022 05:32  Phos  3.3     12-29  Mg     2.2     12-29    TPro  4.3<L>  /  Alb  2.2<L>  /  TBili  <0.2<L>  /  DBili  x   /  AST  9   /  ALT  7   /  AlkPhos  94  12-29      CARDIAC MARKERS ( 29 Dec 2022 05:32 )  x     / 0.04 ng/mL / x     / x     / x              CAPILLARY BLOOD GLUCOSE      POCT Blood Glucose.: 186 mg/dL (29 Dec 2022 11:35)  POCT Blood Glucose.: 279 mg/dL (29 Dec 2022 07:47)  POCT Blood Glucose.: 333 mg/dL (28 Dec 2022 22:45)  POCT Blood Glucose.: 148 mg/dL (28 Dec 2022 16:21)        RADIOLOGY REVIEWED

## 2022-12-29 NOTE — PROGRESS NOTE ADULT - SUBJECTIVE AND OBJECTIVE BOX
INTERVAL EVENTS:  Follow up diabetes management   at bedside, feeding pt breakfast    MEDICATIONS  (STANDING):  acetaminophen     Tablet .. 650 milliGRAM(s) Oral every 8 hours  albuterol    90 MICROgram(s) HFA Inhaler 2 Puff(s) Inhalation every 4 hours  apixaban 2.5 milliGRAM(s) Oral two times a day  budesonide  80 MICROgram(s)/formoterol 4.5 MICROgram(s) Inhaler 2 Puff(s) Inhalation two times a day  chlorhexidine 2% Cloths 1 Application(s) Topical daily  chlorhexidine 2% Cloths 1 Application(s) Topical <User Schedule>  cholecalciferol 1000 Unit(s) Oral daily  dextrose 5%. 1000 milliLiter(s) (100 mL/Hr) IV Continuous <Continuous>  dextrose 5%. 1000 milliLiter(s) (50 mL/Hr) IV Continuous <Continuous>  dextrose 50% Injectable 25 Gram(s) IV Push once  dextrose 50% Injectable 12.5 Gram(s) IV Push once  dextrose 50% Injectable 25 Gram(s) IV Push once  diltiazem    milliGRAM(s) Oral daily  epoetin susie-epbx (RETACRIT) Injectable 34176 Unit(s) IV Push <User Schedule>  escitalopram 10 milliGRAM(s) Oral daily  glucagon  Injectable 1 milliGRAM(s) IntraMuscular once  hydrALAZINE 50 milliGRAM(s) Oral every 8 hours  insulin glargine Injectable (LANTUS) 9 Unit(s) SubCutaneous at bedtime  insulin lispro (ADMELOG) corrective regimen sliding scale   SubCutaneous three times a day before meals  insulin lispro Injectable (ADMELOG) 2 Unit(s) SubCutaneous three times a day before meals  metoprolol tartrate 50 milliGRAM(s) Oral two times a day  pantoprazole    Tablet 40 milliGRAM(s) Oral before breakfast    MEDICATIONS  (PRN):  aluminum hydroxide/magnesium hydroxide/simethicone Suspension 30 milliLiter(s) Oral every 6 hours PRN Dyspepsia  dextrose Oral Gel 15 Gram(s) Oral once PRN Blood Glucose LESS THAN 70 milliGRAM(s)/deciliter  hydrALAZINE Injectable 10 milliGRAM(s) IV Push every 6 hours PRN SBP > 160  LORazepam     Tablet 0.5 milliGRAM(s) Oral every 8 hours PRN Anxiety  melatonin 1 milliGRAM(s) Oral at bedtime PRN Sleep  sodium chloride 0.9% lock flush 10 milliLiter(s) IV Push every 1 hour PRN Pre/post blood products, medications, blood draw, and to maintain line patency  sodium chloride 0.9% lock flush 10 milliLiter(s) IV Push every 1 hour PRN Pre/post blood products, medications, blood draw, and to maintain line patency    Allergies  No Known Allergies    Vital Signs Last 24 Hrs  T(C): 36.8 (29 Dec 2022 07:36), Max: 36.9 (28 Dec 2022 14:02)  T(F): 98.2 (29 Dec 2022 07:36), Max: 98.5 (28 Dec 2022 14:02)  HR: 66 (29 Dec 2022 07:36) (59 - 73)  BP: 168/62 (29 Dec 2022 07:36) (129/61 - 168/62)  BP(mean): --  RR: 18 (29 Dec 2022 07:36) (17 - 18)  SpO2: 99% (29 Dec 2022 07:36) (98% - 100%)    Parameters below as of 29 Dec 2022 09:52  Patient On (Oxygen Delivery Method): nasal cannula      PHYSICAL EXAM:  General: No apparent distress  Neck: Supple, trachea midline, no thyromegaly  Respiratory: Lungs clear bilaterally, normal rate, effort  Cardiac: +S1, S2, no m/r/g  GI: +BS, soft, non tender, non distended  Extremities: LE edema  Neuro: Alert/awake      LABS:                        8.4    6.90  )-----------( 187      ( 29 Dec 2022 05:32 )             28.7     12-29    137  |  101  |  85.1<H>  ----------------------------<  357<H>  5.4<H>   |  25.0  |  4.13<H>    Ca    7.4<L>      29 Dec 2022 05:32  Phos  3.3     12-29  Mg     2.2     12-29    TPro  4.3<L>  /  Alb  2.2<L>  /  TBili  <0.2<L>  /  DBili  x   /  AST  9   /  ALT  7   /  AlkPhos  94  12-29      POCT Blood Glucose.: 279 mg/dL (12-29-22 @ 07:47)  POCT Blood Glucose.: 333 mg/dL (12-28-22 @ 22:45)  POCT Blood Glucose.: 148 mg/dL (12-28-22 @ 16:21)

## 2022-12-29 NOTE — PROGRESS NOTE ADULT - ASSESSMENT
82F with PMH asthma, diabetes, hyperlipidemia, atrial fibrillation, hypertension, and hyperlipidemia, who was found to have a right superficial femoral vein thrombosis while on warfarin and acute kidney injury. She was also noted to have been recently diagnosed with COVID-19.    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  - Strict I/O, daily weight, Fluid restriction  - Continue Metoprolol and Hydralazine   - Cardio consult appreciated    Pleural effusion   - Small to mod L and small R pleural effusion with LLL complete and RLL near complete compressive atelectasis   - Incentive spirometry  - S/p chest tube on 12/19, care per TSx  - Fluid studies transudative, follow up cytology, fungal cxs   - Supplemental oxygen PRN, wean as tolerated.    Emphysematous Cystitis  - UCx never collected, re ordered and still pending   - Urology consult appreciated  - On Ceftriaxone until 12/27, if discharged sooner will discharge on PO antibiotics   - leukocytosis trending down, might also have component of steroids     Acute Hypoxic respiratory Failure - Multifactorial. Asthma, COVID-19, CHFpEF, Fluid overload.  - Started on HD 12/13/22  - Supplemental O2, wean as tolerated  - Bronchodilators  - D/esther Decadron  - Supplemental oxygen PRN, wean as tolerated    Anemia likely multifactorial - chronic disease and  kidney disease  - Stable Hgb  - Received PRBC x 1 during this admission   - Monitor Hgb, transfuse PRN    Uncontrolled Type 2 Diabetes on insulin  - A1c 11.2, BG better  controlled   - BGM uncontrolled  - BGM with SSI, Lantus    Atrial Fibrillation  - Rate controlled, INR returned to normal  - Metoprolol, Diltiazem  - Eliquis resumed    COVID-19   - CT Chest with bilateral upper lobe opacities  - isolation precautions  - repeat covid   - completed decadron : now dcd   - Will need repeat outpatient CT chest in 3 months for RLL nodule    HTN-Essential  - Improved  - Metoprolol to 50mg, continue Diltiazem at current rate  - Hydralazine 25mg q8    DVT ruled out on repeat imaging  - Supra-therapeutic INR normalized  - No bleeding noted  - US Duplex- no evidence of deep venous thrombosis in either lower extremity.    Code Status: DNR/DNI     updated at bedside    Stable for discharge to [facility]

## 2022-12-29 NOTE — PROGRESS NOTE ADULT - ASSESSMENT
81 y/o F with PMHx asthma, DM, HLD, atrial fibrillation, HTN, who was found to have a right superficial femoral vein thrombosis while on warfarin and acute kidney injury. Hemodialysis was initiated by nephrology.    1. Uncontrolled T2DM, a1c 11.2%  - Bgs elevated  - Increase lantus to 9 units qhs  - Continue admelog to 2 units tid with meals  - Sliding scale coverage    2. THERESA/CKD  - HD via permacath  - Plan as per nephrology    3. Acute Hypoxic respiratory Failure - Multifactorial  - Likely related to Asthma, COVID-19, CHFpEF, Fluid overload  - Started on HD  - Supplemental O2 as needed  - Bronchodilators  - S/p decadron

## 2022-12-30 LAB
ALBUMIN SERPL ELPH-MCNC: 2 G/DL — LOW (ref 3.3–5.2)
ALP SERPL-CCNC: 80 U/L — SIGNIFICANT CHANGE UP (ref 40–120)
ALT FLD-CCNC: 7 U/L — SIGNIFICANT CHANGE UP
ANION GAP SERPL CALC-SCNC: 8 MMOL/L — SIGNIFICANT CHANGE UP (ref 5–17)
ANISOCYTOSIS BLD QL: SIGNIFICANT CHANGE UP
AST SERPL-CCNC: 9 U/L — SIGNIFICANT CHANGE UP
BASOPHILS # BLD AUTO: 0.13 K/UL — SIGNIFICANT CHANGE UP (ref 0–0.2)
BASOPHILS NFR BLD AUTO: 1.7 % — SIGNIFICANT CHANGE UP (ref 0–2)
BILIRUB SERPL-MCNC: 0.2 MG/DL — LOW (ref 0.4–2)
BUN SERPL-MCNC: 51.6 MG/DL — HIGH (ref 8–20)
CALCIUM SERPL-MCNC: 7.5 MG/DL — LOW (ref 8.4–10.5)
CHLORIDE SERPL-SCNC: 99 MMOL/L — SIGNIFICANT CHANGE UP (ref 96–108)
CO2 SERPL-SCNC: 29 MMOL/L — SIGNIFICANT CHANGE UP (ref 22–29)
CREAT SERPL-MCNC: 2.48 MG/DL — HIGH (ref 0.5–1.3)
EGFR: 19 ML/MIN/1.73M2 — LOW
ELLIPTOCYTES BLD QL SMEAR: SLIGHT — SIGNIFICANT CHANGE UP
EOSINOPHIL # BLD AUTO: 0.2 K/UL — SIGNIFICANT CHANGE UP (ref 0–0.5)
EOSINOPHIL NFR BLD AUTO: 2.6 % — SIGNIFICANT CHANGE UP (ref 0–6)
GIANT PLATELETS BLD QL SMEAR: PRESENT — SIGNIFICANT CHANGE UP
GLUCOSE BLDC GLUCOMTR-MCNC: 151 MG/DL — HIGH (ref 70–99)
GLUCOSE BLDC GLUCOMTR-MCNC: 236 MG/DL — HIGH (ref 70–99)
GLUCOSE BLDC GLUCOMTR-MCNC: 257 MG/DL — HIGH (ref 70–99)
GLUCOSE BLDC GLUCOMTR-MCNC: 62 MG/DL — LOW (ref 70–99)
GLUCOSE BLDC GLUCOMTR-MCNC: 64 MG/DL — LOW (ref 70–99)
GLUCOSE BLDC GLUCOMTR-MCNC: 99 MG/DL — SIGNIFICANT CHANGE UP (ref 70–99)
GLUCOSE SERPL-MCNC: 74 MG/DL — SIGNIFICANT CHANGE UP (ref 70–99)
HCT VFR BLD CALC: 29.6 % — LOW (ref 34.5–45)
HGB BLD-MCNC: 8.5 G/DL — LOW (ref 11.5–15.5)
HYPOCHROMIA BLD QL: SIGNIFICANT CHANGE UP
LYMPHOCYTES # BLD AUTO: 0.14 K/UL — LOW (ref 1–3.3)
LYMPHOCYTES # BLD AUTO: 1.8 % — LOW (ref 13–44)
MACROCYTES BLD QL: SLIGHT — SIGNIFICANT CHANGE UP
MANUAL SMEAR VERIFICATION: SIGNIFICANT CHANGE UP
MCHC RBC-ENTMCNC: 22 PG — LOW (ref 27–34)
MCHC RBC-ENTMCNC: 28.7 GM/DL — LOW (ref 32–36)
MCV RBC AUTO: 76.5 FL — LOW (ref 80–100)
MICROCYTES BLD QL: SIGNIFICANT CHANGE UP
MONOCYTES # BLD AUTO: 0.34 K/UL — SIGNIFICANT CHANGE UP (ref 0–0.9)
MONOCYTES NFR BLD AUTO: 4.4 % — SIGNIFICANT CHANGE UP (ref 2–14)
NEUTROPHILS # BLD AUTO: 6.28 K/UL — SIGNIFICANT CHANGE UP (ref 1.8–7.4)
NEUTROPHILS NFR BLD AUTO: 81.6 % — HIGH (ref 43–77)
NRBC # BLD: 1 /100 — HIGH (ref 0–0)
PLAT MORPH BLD: NORMAL — SIGNIFICANT CHANGE UP
PLATELET # BLD AUTO: 200 K/UL — SIGNIFICANT CHANGE UP (ref 150–400)
POIKILOCYTOSIS BLD QL AUTO: SLIGHT — SIGNIFICANT CHANGE UP
POLYCHROMASIA BLD QL SMEAR: SIGNIFICANT CHANGE UP
POTASSIUM SERPL-MCNC: 4.5 MMOL/L — SIGNIFICANT CHANGE UP (ref 3.5–5.3)
POTASSIUM SERPL-SCNC: 4.5 MMOL/L — SIGNIFICANT CHANGE UP (ref 3.5–5.3)
PROT SERPL-MCNC: 4.2 G/DL — LOW (ref 6.6–8.7)
RBC # BLD: 3.87 M/UL — SIGNIFICANT CHANGE UP (ref 3.8–5.2)
RBC # FLD: 26 % — HIGH (ref 10.3–14.5)
RBC BLD AUTO: ABNORMAL
SODIUM SERPL-SCNC: 136 MMOL/L — SIGNIFICANT CHANGE UP (ref 135–145)
TARGETS BLD QL SMEAR: SLIGHT — SIGNIFICANT CHANGE UP
VARIANT LYMPHS # BLD: 7.9 % — HIGH (ref 0–6)
WBC # BLD: 7.7 K/UL — SIGNIFICANT CHANGE UP (ref 3.8–10.5)
WBC # FLD AUTO: 7.7 K/UL — SIGNIFICANT CHANGE UP (ref 3.8–10.5)

## 2022-12-30 PROCEDURE — 74018 RADEX ABDOMEN 1 VIEW: CPT | Mod: 26

## 2022-12-30 PROCEDURE — 99232 SBSQ HOSP IP/OBS MODERATE 35: CPT

## 2022-12-30 PROCEDURE — 99233 SBSQ HOSP IP/OBS HIGH 50: CPT

## 2022-12-30 RX ORDER — ERGOCALCIFEROL 1.25 MG/1
50000 CAPSULE ORAL
Refills: 0 | Status: DISCONTINUED | OUTPATIENT
Start: 2022-12-30 | End: 2023-01-06

## 2022-12-30 RX ORDER — INSULIN GLARGINE 100 [IU]/ML
8 INJECTION, SOLUTION SUBCUTANEOUS AT BEDTIME
Refills: 0 | Status: DISCONTINUED | OUTPATIENT
Start: 2022-12-30 | End: 2023-01-06

## 2022-12-30 RX ORDER — ASCORBIC ACID 60 MG
500 TABLET,CHEWABLE ORAL DAILY
Refills: 0 | Status: DISCONTINUED | OUTPATIENT
Start: 2022-12-30 | End: 2023-01-06

## 2022-12-30 RX ORDER — DEXTROSE 50 % IN WATER 50 %
15 SYRINGE (ML) INTRAVENOUS ONCE
Refills: 0 | Status: DISCONTINUED | OUTPATIENT
Start: 2022-12-30 | End: 2023-01-06

## 2022-12-30 RX ORDER — FERROUS SULFATE 325(65) MG
325 TABLET ORAL DAILY
Refills: 0 | Status: DISCONTINUED | OUTPATIENT
Start: 2022-12-30 | End: 2023-01-06

## 2022-12-30 RX ORDER — CALCIUM CARBONATE 500(1250)
1 TABLET ORAL DAILY
Refills: 0 | Status: DISCONTINUED | OUTPATIENT
Start: 2022-12-30 | End: 2023-01-06

## 2022-12-30 RX ORDER — MORPHINE SULFATE 50 MG/1
0.5 CAPSULE, EXTENDED RELEASE ORAL ONCE
Refills: 0 | Status: DISCONTINUED | OUTPATIENT
Start: 2022-12-30 | End: 2022-12-30

## 2022-12-30 RX ADMIN — Medication 650 MILLIGRAM(S): at 15:36

## 2022-12-30 RX ADMIN — Medication 1000 UNIT(S): at 15:35

## 2022-12-30 RX ADMIN — ALBUTEROL 2 PUFF(S): 90 AEROSOL, METERED ORAL at 08:51

## 2022-12-30 RX ADMIN — CHLORHEXIDINE GLUCONATE 1 APPLICATION(S): 213 SOLUTION TOPICAL at 10:57

## 2022-12-30 RX ADMIN — Medication 50 MILLIGRAM(S): at 16:58

## 2022-12-30 RX ADMIN — APIXABAN 2.5 MILLIGRAM(S): 2.5 TABLET, FILM COATED ORAL at 05:08

## 2022-12-30 RX ADMIN — ALBUTEROL 2 PUFF(S): 90 AEROSOL, METERED ORAL at 16:00

## 2022-12-30 RX ADMIN — ALBUTEROL 2 PUFF(S): 90 AEROSOL, METERED ORAL at 12:00

## 2022-12-30 RX ADMIN — Medication 50 MILLIGRAM(S): at 15:39

## 2022-12-30 RX ADMIN — APIXABAN 2.5 MILLIGRAM(S): 2.5 TABLET, FILM COATED ORAL at 17:01

## 2022-12-30 RX ADMIN — Medication 30 MILLILITER(S): at 21:15

## 2022-12-30 RX ADMIN — Medication 4: at 16:58

## 2022-12-30 RX ADMIN — Medication 240 MILLIGRAM(S): at 05:08

## 2022-12-30 RX ADMIN — INSULIN GLARGINE 8 UNIT(S): 100 INJECTION, SOLUTION SUBCUTANEOUS at 22:30

## 2022-12-30 RX ADMIN — Medication 650 MILLIGRAM(S): at 06:08

## 2022-12-30 RX ADMIN — Medication 50 MILLIGRAM(S): at 05:08

## 2022-12-30 RX ADMIN — Medication 650 MILLIGRAM(S): at 16:38

## 2022-12-30 RX ADMIN — Medication 650 MILLIGRAM(S): at 05:08

## 2022-12-30 RX ADMIN — ESCITALOPRAM OXALATE 10 MILLIGRAM(S): 10 TABLET, FILM COATED ORAL at 15:35

## 2022-12-30 RX ADMIN — Medication 50 MILLIGRAM(S): at 22:29

## 2022-12-30 RX ADMIN — PANTOPRAZOLE SODIUM 40 MILLIGRAM(S): 20 TABLET, DELAYED RELEASE ORAL at 05:08

## 2022-12-30 RX ADMIN — Medication 0.5 MILLIGRAM(S): at 21:15

## 2022-12-30 RX ADMIN — Medication 500 MILLIGRAM(S): at 15:35

## 2022-12-30 RX ADMIN — Medication 325 MILLIGRAM(S): at 15:36

## 2022-12-30 RX ADMIN — Medication 650 MILLIGRAM(S): at 21:14

## 2022-12-30 RX ADMIN — MORPHINE SULFATE 0.5 MILLIGRAM(S): 50 CAPSULE, EXTENDED RELEASE ORAL at 02:06

## 2022-12-30 RX ADMIN — MORPHINE SULFATE 0.5 MILLIGRAM(S): 50 CAPSULE, EXTENDED RELEASE ORAL at 02:25

## 2022-12-30 RX ADMIN — Medication 0.5 MILLIGRAM(S): at 02:06

## 2022-12-30 RX ADMIN — BUDESONIDE AND FORMOTEROL FUMARATE DIHYDRATE 2 PUFF(S): 160; 4.5 AEROSOL RESPIRATORY (INHALATION) at 16:51

## 2022-12-30 RX ADMIN — CHLORHEXIDINE GLUCONATE 1 APPLICATION(S): 213 SOLUTION TOPICAL at 05:07

## 2022-12-30 RX ADMIN — ALBUTEROL 2 PUFF(S): 90 AEROSOL, METERED ORAL at 00:00

## 2022-12-30 NOTE — PROGRESS NOTE ADULT - SUBJECTIVE AND OBJECTIVE BOX
NEPHROLOGY INTERVAL HPI/OVERNIGHT EVENTS:    Examined  No distress  feeling better   at bedside  Denies HA CP no SOB    MEDICATIONS  (STANDING):  acetaminophen     Tablet .. 650 milliGRAM(s) Oral every 8 hours  albuterol    90 MICROgram(s) HFA Inhaler 2 Puff(s) Inhalation every 4 hours  apixaban 2.5 milliGRAM(s) Oral two times a day  ascorbic acid 500 milliGRAM(s) Oral daily  budesonide  80 MICROgram(s)/formoterol 4.5 MICROgram(s) Inhaler 2 Puff(s) Inhalation two times a day  calcium carbonate   1250 mG (OsCal) 1 Tablet(s) Oral daily  chlorhexidine 2% Cloths 1 Application(s) Topical <User Schedule>  chlorhexidine 2% Cloths 1 Application(s) Topical daily  cholecalciferol 1000 Unit(s) Oral daily  dextrose 5%. 1000 milliLiter(s) (100 mL/Hr) IV Continuous <Continuous>  dextrose 5%. 1000 milliLiter(s) (50 mL/Hr) IV Continuous <Continuous>  dextrose 50% Injectable 25 Gram(s) IV Push once  dextrose 50% Injectable 12.5 Gram(s) IV Push once  dextrose 50% Injectable 25 Gram(s) IV Push once  diltiazem    milliGRAM(s) Oral daily  epoetin susie-epbx (RETACRIT) Injectable 78498 Unit(s) IV Push <User Schedule>  ergocalciferol 30946 Unit(s) Oral every week  escitalopram 10 milliGRAM(s) Oral daily  ferrous    sulfate 325 milliGRAM(s) Oral daily  glucagon  Injectable 1 milliGRAM(s) IntraMuscular once  hydrALAZINE 50 milliGRAM(s) Oral every 8 hours  insulin glargine Injectable (LANTUS) 9 Unit(s) SubCutaneous at bedtime  insulin lispro (ADMELOG) corrective regimen sliding scale   SubCutaneous three times a day before meals  metoprolol tartrate 50 milliGRAM(s) Oral two times a day  pantoprazole    Tablet 40 milliGRAM(s) Oral before breakfast    MEDICATIONS  (PRN):  aluminum hydroxide/magnesium hydroxide/simethicone Suspension 30 milliLiter(s) Oral every 6 hours PRN Dyspepsia  dextrose Oral Gel 15 Gram(s) Oral once PRN Blood Glucose LESS THAN 70 milliGRAM(s)/deciliter  dextrose Oral Gel 15 Gram(s) Oral once PRN Blood Glucose LESS THAN 70 milliGRAM(s)/deciliter  hydrALAZINE Injectable 10 milliGRAM(s) IV Push every 6 hours PRN SBP > 160  LORazepam     Tablet 0.5 milliGRAM(s) Oral every 8 hours PRN Anxiety  melatonin 1 milliGRAM(s) Oral at bedtime PRN Sleep  sodium chloride 0.9% lock flush 10 milliLiter(s) IV Push every 1 hour PRN Pre/post blood products, medications, blood draw, and to maintain line patency  sodium chloride 0.9% lock flush 10 milliLiter(s) IV Push every 1 hour PRN Pre/post blood products, medications, blood draw, and to maintain line patency      Allergies    No Known Allergies    Intolerances        Vital Signs Last 24 Hrs  T(C): 36.8 (30 Dec 2022 05:00), Max: 37.3 (29 Dec 2022 21:00)  T(F): 98.3 (30 Dec 2022 05:00), Max: 99.2 (29 Dec 2022 21:00)  HR: 67 (30 Dec 2022 07:56) (65 - 69)  BP: 162/55 (30 Dec 2022 07:56) (128/52 - 162/55)  BP(mean): --  RR: 17 (30 Dec 2022 07:56) (17 - 18)  SpO2: 98% (30 Dec 2022 07:56) (98% - 100%)    Parameters below as of 30 Dec 2022 09:10  Patient On (Oxygen Delivery Method): nasal cannula      PHYSICAL EXAM:  GENERAL: Elderly, frail  HEENT:  Facial edema  NECK: Supple, No JVD  NERVOUS SYSTEM:  Awake, alert  EXTREMITIES:  + dependent edema improved    LABS:                        8.5    7.70  )-----------( 200      ( 30 Dec 2022 04:00 )             29.6     12-30    136  |  99  |  51.6<H>  ----------------------------<  74  4.5   |  29.0  |  2.48<H>    Ca    7.5<L>      30 Dec 2022 04:00  Phos  3.3     12-29  Mg     2.2     12-29    TPro  4.2<L>  /  Alb  2.0<L>  /  TBili  0.2<L>  /  DBili  x   /  AST  9   /  ALT  7   /  AlkPhos  80  12-30                RADIOLOGY & ADDITIONAL TESTS:

## 2022-12-30 NOTE — PROGRESS NOTE ADULT - ASSESSMENT
82F with PMH asthma, diabetes, hyperlipidemia, atrial fibrillation, hypertension, and hyperlipidemia, who was found to have a right superficial femoral vein thrombosis while on warfarin and acute kidney injury. She was also noted to have been recently diagnosed with COVID-19.    1-THERESA on CKD  - HD initiated  - cont HD per renal team   - S/p PC on 12/21 will eventually need avf  - SW for HD set up as op   [facility] with HD seat     2-Acute on Chronic Diastolic Heart failure  - Resistant to diuresis, now on HD initiated 12/13/22   fluid removal with HD   - Continue Metoprolol    3- HTN   on metoprolol and hydralazine   controlled     4-Pleural effusion   - Small to mod L and small R pleural effusion with LLL complete and RLL near complete compressive atelectasis   - Incentive spirometry  - S/p chest tube on 12/19, removed on 12/22 per TS team   - Fluid studies transudative, result reviewed cx , gram stain and fungal cxs are neg   - Supplemental oxygen PRN, wean as tolerate     5-Emphysematous Cystitis  - UCx never collected  - Urology consult appreciated  - s/p rocephin iv completed 12/24   - leukocytosis trending down, might also have component of steroids     6-Acute Hypoxic respiratory Failure - Multifactorial due to  Asthma, COVID-19, CHFpEF, Fluid overload.  - Started on HD 12/13/22  - Bronchodilators prn   - Supplemental oxygen PRN, wean as tolerated    7-Anemia likely due to  chronic disease/ CKD   hb stable   iron studies reviewed   add ferrous sulfate , folic acid , vit c   cont epogen with HD       8-Uncontrolled Type 2 Diabetes on insulin  BG is low  this am ,hyperglycemia improved with insulin regimen   order bedtime snack , cont lantus ans ISS   will Dc admelog 2 unit before meal and cont to monitor to avoid hypoglycemia   - A1c 11.2,    9-Atrial Fibrillation  - Rate controlled, INR returned to normal  on cardizem and metoprolol    on eliquis renal dose    10- COVID-19   - CT Chest with bilateral upper lobe opacities  - completed decadron  cont oxygen   - Will need repeat outpatient CT chest in 3 months for RLL nodule      Code Status: DNR/DNI     at bedside updated re DC     pending HD / [facility] bed and insurance auth per CM / SW       Stable for discharge to [facility]

## 2022-12-30 NOTE — PROGRESS NOTE ADULT - SUBJECTIVE AND OBJECTIVE BOX
INTERVAL HPI/OVERNIGHT EVENTS:  some AM hypoglcyemia to 64   pt reports to be eatign    no discomfort   no n/v no abd pain   [facility]   MEDICATIONS  (STANDING):  acetaminophen     Tablet .. 650 milliGRAM(s) Oral every 8 hours  albuterol    90 MICROgram(s) HFA Inhaler 2 Puff(s) Inhalation every 4 hours  apixaban 2.5 milliGRAM(s) Oral two times a day  ascorbic acid 500 milliGRAM(s) Oral daily  budesonide  80 MICROgram(s)/formoterol 4.5 MICROgram(s) Inhaler 2 Puff(s) Inhalation two times a day  calcium carbonate   1250 mG (OsCal) 1 Tablet(s) Oral daily  chlorhexidine 2% Cloths 1 Application(s) Topical <User Schedule>  chlorhexidine 2% Cloths 1 Application(s) Topical daily  cholecalciferol 1000 Unit(s) Oral daily  dextrose 5%. 1000 milliLiter(s) (50 mL/Hr) IV Continuous <Continuous>  dextrose 5%. 1000 milliLiter(s) (100 mL/Hr) IV Continuous <Continuous>  dextrose 50% Injectable 25 Gram(s) IV Push once  dextrose 50% Injectable 12.5 Gram(s) IV Push once  dextrose 50% Injectable 25 Gram(s) IV Push once  diltiazem    milliGRAM(s) Oral daily  epoetin susie-epbx (RETACRIT) Injectable 44692 Unit(s) IV Push <User Schedule>  ergocalciferol 89344 Unit(s) Oral every week  escitalopram 10 milliGRAM(s) Oral daily  ferrous    sulfate 325 milliGRAM(s) Oral daily  glucagon  Injectable 1 milliGRAM(s) IntraMuscular once  hydrALAZINE 50 milliGRAM(s) Oral every 8 hours  insulin glargine Injectable (LANTUS) 8 Unit(s) SubCutaneous at bedtime  insulin lispro (ADMELOG) corrective regimen sliding scale   SubCutaneous three times a day before meals  metoprolol tartrate 50 milliGRAM(s) Oral two times a day  pantoprazole    Tablet 40 milliGRAM(s) Oral before breakfast    MEDICATIONS  (PRN):  aluminum hydroxide/magnesium hydroxide/simethicone Suspension 30 milliLiter(s) Oral every 6 hours PRN Dyspepsia  dextrose Oral Gel 15 Gram(s) Oral once PRN Blood Glucose LESS THAN 70 milliGRAM(s)/deciliter  dextrose Oral Gel 15 Gram(s) Oral once PRN Blood Glucose LESS THAN 70 milliGRAM(s)/deciliter  hydrALAZINE Injectable 10 milliGRAM(s) IV Push every 6 hours PRN SBP > 160  LORazepam     Tablet 0.5 milliGRAM(s) Oral every 8 hours PRN Anxiety  melatonin 1 milliGRAM(s) Oral at bedtime PRN Sleep  sodium chloride 0.9% lock flush 10 milliLiter(s) IV Push every 1 hour PRN Pre/post blood products, medications, blood draw, and to maintain line patency  sodium chloride 0.9% lock flush 10 milliLiter(s) IV Push every 1 hour PRN Pre/post blood products, medications, blood draw, and to maintain line patency      Allergies    No Known Allergies    Intolerances        Review of systems:    Vital Signs Last 24 Hrs  T(C): 36.8 (31 Dec 2022 04:30), Max: 36.8 (30 Dec 2022 15:11)  T(F): 98.2 (31 Dec 2022 04:30), Max: 98.2 (30 Dec 2022 15:11)  HR: 64 (31 Dec 2022 04:30) (64 - 73)  BP: 138/68 (31 Dec 2022 04:30) (138/68 - 168/72)  BP(mean): --  RR: 17 (31 Dec 2022 04:30) (17 - 18)  SpO2: 100% (31 Dec 2022 04:30) (98% - 100%)    Parameters below as of 31 Dec 2022 04:30  Patient On (Oxygen Delivery Method): nasal cannula  O2 Flow (L/min): 2      PHYSICAL EXAM:      Constitutional: NAD, well-groomed, well-developed    Neck: No LAD, No JVD, trachea midline, no thyroid enlargement    Respiratory: CTAB  Cardiovascular: S1 and S2, RRR, no M/G/R  Gastrointestinal: BS+, soft, no organomeglag or mass  Extremities: No peripheral edema, no pedal lesions  Vascular: 2+ peripheral pulses  Neurological: A/O x 3, no focal deficits  Psychiatric: Normal mood, normal affect            LABS:                        8.5    7.70  )-----------( 200      ( 30 Dec 2022 04:00 )             29.6     12-30    136  |  99  |  51.6<H>  ----------------------------<  74  4.5   |  29.0  |  2.48<H>    Ca    7.5<L>      30 Dec 2022 04:00    TPro  4.2<L>  /  Alb  2.0<L>  /  TBili  0.2<L>  /  DBili  x   /  AST  9   /  ALT  7   /  AlkPhos  80  12-30          CAPILLARY BLOOD GLUCOSE  CAPILLARY BLOOD GLUCOSE      POCT Blood Glucose.: 257 mg/dL (30 Dec 2022 21:50)  POCT Blood Glucose.: 236 mg/dL (30 Dec 2022 16:41)  POCT Blood Glucose.: 151 mg/dL (30 Dec 2022 11:31)  POCT Blood Glucose.: 99 mg/dL (30 Dec 2022 09:00)  POCT Blood Glucose.: 64 mg/dL (30 Dec 2022 07:57)  POCT Blood Glucose.: 62 mg/dL (30 Dec 2022 07:42)        RADIOLOGY & ADDITIONAL TESTS:

## 2022-12-30 NOTE — PROGRESS NOTE ADULT - ASSESSMENT
CKD(IV): +COVID  THERESA, progressive + fluid overload + hyperkalemia  has h/o Nephrotic syndrome 2/2 DM w 3 gm proteinuria in past  Remains HD dependent  - HD tomorrow- Sat  - will cont to monitor for signs of renal recovery    Anemia: +CKD  - cont DILAN  - target Hgb > 10    Will follow

## 2022-12-30 NOTE — PROGRESS NOTE ADULT - SUBJECTIVE AND OBJECTIVE BOX
Chief Complaint: renal failure     pt is seen in am sitting in the chair , her  at the bedside     Pt denies any complaints   BG is low this am 62   d/w nurse , insulin regimen reviewed     MEDICATIONS  (STANDING):  acetaminophen     Tablet .. 650 milliGRAM(s) Oral every 8 hours  albuterol    90 MICROgram(s) HFA Inhaler 2 Puff(s) Inhalation every 4 hours  apixaban 2.5 milliGRAM(s) Oral two times a day  budesonide  80 MICROgram(s)/formoterol 4.5 MICROgram(s) Inhaler 2 Puff(s) Inhalation two times a day  chlorhexidine 2% Cloths 1 Application(s) Topical daily  chlorhexidine 2% Cloths 1 Application(s) Topical <User Schedule>  cholecalciferol 1000 Unit(s) Oral daily  dextrose 5%. 1000 milliLiter(s) (100 mL/Hr) IV Continuous <Continuous>  dextrose 5%. 1000 milliLiter(s) (50 mL/Hr) IV Continuous <Continuous>  dextrose 50% Injectable 25 Gram(s) IV Push once  dextrose 50% Injectable 12.5 Gram(s) IV Push once  dextrose 50% Injectable 25 Gram(s) IV Push once  diltiazem    milliGRAM(s) Oral daily  epoetin susie-epbx (RETACRIT) Injectable 62907 Unit(s) IV Push <User Schedule>  escitalopram 10 milliGRAM(s) Oral daily  glucagon  Injectable 1 milliGRAM(s) IntraMuscular once  hydrALAZINE 50 milliGRAM(s) Oral every 8 hours  insulin glargine Injectable (LANTUS) 9 Unit(s) SubCutaneous at bedtime  insulin lispro (ADMELOG) corrective regimen sliding scale   SubCutaneous three times a day before meals  metoprolol tartrate 50 milliGRAM(s) Oral two times a day  pantoprazole    Tablet 40 milliGRAM(s) Oral before breakfast    Vital Signs Last 24 Hrs  T(C): 36.8 (30 Dec 2022 05:00), Max: 37.3 (29 Dec 2022 21:00)  T(F): 98.3 (30 Dec 2022 05:00), Max: 99.2 (29 Dec 2022 21:00)  HR: 67 (30 Dec 2022 07:56) (65 - 69)  BP: 162/55 (30 Dec 2022 07:56) (128/52 - 162/55)  BP(mean): --  RR: 17 (30 Dec 2022 07:56) (17 - 18)  SpO2: 98% (30 Dec 2022 07:56) (98% - 100%)    Parameters below as of 30 Dec 2022 09:10  Patient On (Oxygen Delivery Method): nasal cannula        Constitutional: NAD, Resting in the chair   Lungs: CTA B/L, no rhonchi ,  Cardio: RRR, S1/S2, No murmur  Abdomen: Soft, Nontender, Nondistended; Bowel sounds present  Extremities: No calf tenderness, LE Edema +   Musculoskeletal:   No clubbing or cyanosis of digits; no joint swelling or tenderness to palpation  Psych: Calm, cooperative affect appropriate  Neuro: Awake and alert, oriented to name and location, no focal deficits   Skin: bruises in upper ext noted   CAPILLARY BLOOD GLUCOSE      POCT Blood Glucose.: 151 mg/dL (30 Dec 2022 11:31)  POCT Blood Glucose.: 99 mg/dL (30 Dec 2022 09:00)  POCT Blood Glucose.: 64 mg/dL (30 Dec 2022 07:57)  POCT Blood Glucose.: 62 mg/dL (30 Dec 2022 07:42)  POCT Blood Glucose.: 234 mg/dL (29 Dec 2022 21:37)  POCT Blood Glucose.: 236 mg/dL (29 Dec 2022 16:50)                          8.5    7.70  )-----------( 200      ( 30 Dec 2022 04:00 )             29.6   12-30    136  |  99  |  51.6<H>  ----------------------------<  74  4.5   |  29.0  |  2.48<H>    Ca    7.5<L>      30 Dec 2022 04:00  Phos  3.3     12-29  Mg     2.2     12-29    TPro  4.2<L>  /  Alb  2.0<L>  /  TBili  0.2<L>  /  DBili  x   /  AST  9   /  ALT  7   /  AlkPhos  80  12-30

## 2022-12-31 LAB
GLUCOSE BLDC GLUCOMTR-MCNC: 137 MG/DL — HIGH (ref 70–99)
GLUCOSE BLDC GLUCOMTR-MCNC: 148 MG/DL — HIGH (ref 70–99)
GLUCOSE BLDC GLUCOMTR-MCNC: 249 MG/DL — HIGH (ref 70–99)
GLUCOSE BLDC GLUCOMTR-MCNC: 273 MG/DL — HIGH (ref 70–99)

## 2022-12-31 PROCEDURE — 99232 SBSQ HOSP IP/OBS MODERATE 35: CPT

## 2022-12-31 RX ADMIN — Medication 325 MILLIGRAM(S): at 14:03

## 2022-12-31 RX ADMIN — Medication 50 MILLIGRAM(S): at 05:13

## 2022-12-31 RX ADMIN — ALBUTEROL 2 PUFF(S): 90 AEROSOL, METERED ORAL at 14:05

## 2022-12-31 RX ADMIN — ERGOCALCIFEROL 50000 UNIT(S): 1.25 CAPSULE ORAL at 14:05

## 2022-12-31 RX ADMIN — Medication 50 MILLIGRAM(S): at 17:24

## 2022-12-31 RX ADMIN — ERYTHROPOIETIN 10000 UNIT(S): 10000 INJECTION, SOLUTION INTRAVENOUS; SUBCUTANEOUS at 10:11

## 2022-12-31 RX ADMIN — Medication 1000 UNIT(S): at 17:05

## 2022-12-31 RX ADMIN — Medication 650 MILLIGRAM(S): at 21:44

## 2022-12-31 RX ADMIN — Medication 50 MILLIGRAM(S): at 05:12

## 2022-12-31 RX ADMIN — Medication 50 MILLIGRAM(S): at 21:44

## 2022-12-31 RX ADMIN — Medication 240 MILLIGRAM(S): at 05:12

## 2022-12-31 RX ADMIN — Medication 500 MILLIGRAM(S): at 14:03

## 2022-12-31 RX ADMIN — BUDESONIDE AND FORMOTEROL FUMARATE DIHYDRATE 2 PUFF(S): 160; 4.5 AEROSOL RESPIRATORY (INHALATION) at 21:54

## 2022-12-31 RX ADMIN — ALBUTEROL 2 PUFF(S): 90 AEROSOL, METERED ORAL at 21:54

## 2022-12-31 RX ADMIN — Medication 6: at 17:25

## 2022-12-31 RX ADMIN — Medication 50 MILLIGRAM(S): at 17:25

## 2022-12-31 RX ADMIN — CHLORHEXIDINE GLUCONATE 1 APPLICATION(S): 213 SOLUTION TOPICAL at 17:26

## 2022-12-31 RX ADMIN — Medication 650 MILLIGRAM(S): at 22:07

## 2022-12-31 RX ADMIN — ALBUTEROL 2 PUFF(S): 90 AEROSOL, METERED ORAL at 17:26

## 2022-12-31 RX ADMIN — Medication 1 TABLET(S): at 14:03

## 2022-12-31 RX ADMIN — PANTOPRAZOLE SODIUM 40 MILLIGRAM(S): 20 TABLET, DELAYED RELEASE ORAL at 05:12

## 2022-12-31 RX ADMIN — Medication 650 MILLIGRAM(S): at 05:12

## 2022-12-31 RX ADMIN — ALBUTEROL 2 PUFF(S): 90 AEROSOL, METERED ORAL at 22:20

## 2022-12-31 RX ADMIN — ESCITALOPRAM OXALATE 10 MILLIGRAM(S): 10 TABLET, FILM COATED ORAL at 14:04

## 2022-12-31 RX ADMIN — APIXABAN 2.5 MILLIGRAM(S): 2.5 TABLET, FILM COATED ORAL at 17:25

## 2022-12-31 RX ADMIN — Medication 650 MILLIGRAM(S): at 17:00

## 2022-12-31 RX ADMIN — APIXABAN 2.5 MILLIGRAM(S): 2.5 TABLET, FILM COATED ORAL at 05:12

## 2022-12-31 RX ADMIN — INSULIN GLARGINE 8 UNIT(S): 100 INJECTION, SOLUTION SUBCUTANEOUS at 21:43

## 2022-12-31 RX ADMIN — CHLORHEXIDINE GLUCONATE 1 APPLICATION(S): 213 SOLUTION TOPICAL at 03:30

## 2022-12-31 RX ADMIN — Medication 650 MILLIGRAM(S): at 17:25

## 2022-12-31 NOTE — PROGRESS NOTE ADULT - ASSESSMENT
82F with PMH asthma, diabetes, hyperlipidemia, atrial fibrillation, hypertension, and hyperlipidemia, who was found to have a right superficial femoral vein thrombosis while on warfarin and acute kidney injury. She was also noted to have been recently diagnosed with COVID-19.    *THERESA on CKD  Cont HD, initiated this [facility]   cont HD per renal team   S/p PC on 12/21 will eventually need avf  SW for HD set up as op   [facility] with HD seat pending     *Acute on Chronic Diastolic Heart failure   Resistant to diuresis, now on HD initiated 12/13/22   fluid removal with HD   Continue Metoprolol    *HTN   on metoprolol and hydralazine   controlled     *Pleural effusion   Small to mod L and small R pleural effusion with LLL complete and RLL near complete compressive atelectasis   Incentive spirometry  S/p chest tube on 12/19, removed on 12/22 per TS team   Fluid studies transudative, result reviewed cx , gram stain and fungal cxs are neg   Supplemental oxygen PRN, wean as tolerate     *Emphysematous Cystitis  UCx never collected  Urology consult appreciated  s/p rocephin iv completed 12/24     *Acute Hypoxic respiratory Failure - Multifactorial due to  Asthma, COVID-19, CHFpEF, Fluid overload.  Started on HD 12/13/22  Bronchodilators prn   Supplemental oxygen PRN, wean as tolerated    *Anemia likely due to  chronic disease/ CKD   hb stable   iron studies reviewed   add ferrous sulfate , folic acid , vit c   cont epogen with HD     *Uncontrolled Type 2 Diabetes on insulin  BG is low  this am ,hyperglycemia improved with insulin regimen   order bedtime snack , cont lantus ans ISS   will Dc admelog 2 unit before meal and cont to monitor to avoid hypoglycemia   A1c 11.2,    *Atrial Fibrillation  Rate controlled, INR returned to normal  on cardizem and metoprolol    on eliquis renal dose    *COVID-19   CT Chest with bilateral upper lobe opacities  completed decadron  cont oxygen   Will need repeat outpatient CT chest in 3 months for RLL nodule      Code Status: DNR/DNI      Dispo: pending HD / [facility] bed and insurance auth per CM / SW       Stable for discharge to [facility]

## 2022-12-31 NOTE — PROGRESS NOTE ADULT - SUBJECTIVE AND OBJECTIVE BOX
NEPHROLOGY INTERVAL HPI/OVERNIGHT EVENTS:    Examined on HD tolerating ok  Ha spoor UOP past 24 hrs approx 150- 200 cc    MEDICATIONS  (STANDING):  acetaminophen     Tablet .. 650 milliGRAM(s) Oral every 8 hours  albuterol    90 MICROgram(s) HFA Inhaler 2 Puff(s) Inhalation every 4 hours  apixaban 2.5 milliGRAM(s) Oral two times a day  ascorbic acid 500 milliGRAM(s) Oral daily  budesonide  80 MICROgram(s)/formoterol 4.5 MICROgram(s) Inhaler 2 Puff(s) Inhalation two times a day  calcium carbonate   1250 mG (OsCal) 1 Tablet(s) Oral daily  chlorhexidine 2% Cloths 1 Application(s) Topical daily  chlorhexidine 2% Cloths 1 Application(s) Topical <User Schedule>  cholecalciferol 1000 Unit(s) Oral daily  dextrose 5%. 1000 milliLiter(s) (100 mL/Hr) IV Continuous <Continuous>  dextrose 5%. 1000 milliLiter(s) (50 mL/Hr) IV Continuous <Continuous>  dextrose 50% Injectable 25 Gram(s) IV Push once  dextrose 50% Injectable 12.5 Gram(s) IV Push once  dextrose 50% Injectable 25 Gram(s) IV Push once  diltiazem    milliGRAM(s) Oral daily  epoetin susie-epbx (RETACRIT) Injectable 72888 Unit(s) IV Push <User Schedule>  ergocalciferol 75806 Unit(s) Oral every week  escitalopram 10 milliGRAM(s) Oral daily  ferrous    sulfate 325 milliGRAM(s) Oral daily  glucagon  Injectable 1 milliGRAM(s) IntraMuscular once  hydrALAZINE 50 milliGRAM(s) Oral every 8 hours  insulin glargine Injectable (LANTUS) 8 Unit(s) SubCutaneous at bedtime  insulin lispro (ADMELOG) corrective regimen sliding scale   SubCutaneous three times a day before meals  metoprolol tartrate 50 milliGRAM(s) Oral two times a day  pantoprazole    Tablet 40 milliGRAM(s) Oral before breakfast    MEDICATIONS  (PRN):  aluminum hydroxide/magnesium hydroxide/simethicone Suspension 30 milliLiter(s) Oral every 6 hours PRN Dyspepsia  dextrose Oral Gel 15 Gram(s) Oral once PRN Blood Glucose LESS THAN 70 milliGRAM(s)/deciliter  dextrose Oral Gel 15 Gram(s) Oral once PRN Blood Glucose LESS THAN 70 milliGRAM(s)/deciliter  hydrALAZINE Injectable 10 milliGRAM(s) IV Push every 6 hours PRN SBP > 160  LORazepam     Tablet 0.5 milliGRAM(s) Oral every 8 hours PRN Anxiety  melatonin 1 milliGRAM(s) Oral at bedtime PRN Sleep  sodium chloride 0.9% lock flush 10 milliLiter(s) IV Push every 1 hour PRN Pre/post blood products, medications, blood draw, and to maintain line patency  sodium chloride 0.9% lock flush 10 milliLiter(s) IV Push every 1 hour PRN Pre/post blood products, medications, blood draw, and to maintain line patency      Allergies    No Known Allergies    Intolerances        Vital Signs Last 24 Hrs  T(C): 36.8 (31 Dec 2022 10:20), Max: 36.8 (30 Dec 2022 15:11)  T(F): 98.2 (31 Dec 2022 10:20), Max: 98.3 (31 Dec 2022 07:10)  HR: 64 (31 Dec 2022 10:20) (64 - 73)  BP: 164/70 (31 Dec 2022 10:20) (138/68 - 168/72)  BP(mean): --  RR: 18 (31 Dec 2022 10:20) (17 - 18)  SpO2: 99% (31 Dec 2022 10:20) (99% - 100%)    Parameters below as of 31 Dec 2022 10:20  Patient On (Oxygen Delivery Method): nasal cannula  O2 Flow (L/min): 2      PHYSICAL EXAM:  GENERAL: Elderly, frail  HEENT:  Facial edema  NECK: Supple, No JVD  NERVOUS SYSTEM:  Awake, alert  EXTREMITIES:  + dependent edema improved    LABS:                        8.5    7.70  )-----------( 200      ( 30 Dec 2022 04:00 )             29.6     12-30    136  |  99  |  51.6<H>  ----------------------------<  74  4.5   |  29.0  |  2.48<H>    Ca    7.5<L>      30 Dec 2022 04:00    TPro  4.2<L>  /  Alb  2.0<L>  /  TBili  0.2<L>  /  DBili  x   /  AST  9   /  ALT  7   /  AlkPhos  80  12-30                RADIOLOGY & ADDITIONAL TESTS:

## 2022-12-31 NOTE — PROGRESS NOTE ADULT - ASSESSMENT
CKD(IV): +COVID  THERESA, progressive + fluid overload + hyperkalemia  has h/o Nephrotic syndrome 2/2 DM w 3 gm proteinuria in past  Remains HD dependent  - HD today  - will cont to monitor for signs of renal recovery    Anemia: +CKD  - cont DILAN  - target Hgb > 10    Will follow

## 2023-01-01 LAB
ANION GAP SERPL CALC-SCNC: 9 MMOL/L — SIGNIFICANT CHANGE UP (ref 5–17)
BUN SERPL-MCNC: 46.4 MG/DL — HIGH (ref 8–20)
CALCIUM SERPL-MCNC: 7.9 MG/DL — LOW (ref 8.4–10.5)
CHLORIDE SERPL-SCNC: 96 MMOL/L — SIGNIFICANT CHANGE UP (ref 96–108)
CO2 SERPL-SCNC: 26 MMOL/L — SIGNIFICANT CHANGE UP (ref 22–29)
CREAT SERPL-MCNC: 2.73 MG/DL — HIGH (ref 0.5–1.3)
EGFR: 17 ML/MIN/1.73M2 — LOW
GLUCOSE BLDC GLUCOMTR-MCNC: 159 MG/DL — HIGH (ref 70–99)
GLUCOSE BLDC GLUCOMTR-MCNC: 214 MG/DL — HIGH (ref 70–99)
GLUCOSE BLDC GLUCOMTR-MCNC: 239 MG/DL — HIGH (ref 70–99)
GLUCOSE BLDC GLUCOMTR-MCNC: 273 MG/DL — HIGH (ref 70–99)
GLUCOSE SERPL-MCNC: 256 MG/DL — HIGH (ref 70–99)
HCT VFR BLD CALC: 31.3 % — LOW (ref 34.5–45)
HGB BLD-MCNC: 9.2 G/DL — LOW (ref 11.5–15.5)
MCHC RBC-ENTMCNC: 22.6 PG — LOW (ref 27–34)
MCHC RBC-ENTMCNC: 29.4 GM/DL — LOW (ref 32–36)
MCV RBC AUTO: 76.9 FL — LOW (ref 80–100)
PLATELET # BLD AUTO: 273 K/UL — SIGNIFICANT CHANGE UP (ref 150–400)
POTASSIUM SERPL-MCNC: 4.8 MMOL/L — SIGNIFICANT CHANGE UP (ref 3.5–5.3)
POTASSIUM SERPL-SCNC: 4.8 MMOL/L — SIGNIFICANT CHANGE UP (ref 3.5–5.3)
RBC # BLD: 4.07 M/UL — SIGNIFICANT CHANGE UP (ref 3.8–5.2)
RBC # FLD: 24.8 % — HIGH (ref 10.3–14.5)
SODIUM SERPL-SCNC: 131 MMOL/L — LOW (ref 135–145)
WBC # BLD: 6.36 K/UL — SIGNIFICANT CHANGE UP (ref 3.8–10.5)
WBC # FLD AUTO: 6.36 K/UL — SIGNIFICANT CHANGE UP (ref 3.8–10.5)

## 2023-01-01 PROCEDURE — 99233 SBSQ HOSP IP/OBS HIGH 50: CPT

## 2023-01-01 PROCEDURE — 99232 SBSQ HOSP IP/OBS MODERATE 35: CPT

## 2023-01-01 RX ORDER — SIMETHICONE 80 MG/1
80 TABLET, CHEWABLE ORAL EVERY 6 HOURS
Refills: 0 | Status: DISCONTINUED | OUTPATIENT
Start: 2023-01-01 | End: 2023-01-06

## 2023-01-01 RX ORDER — IPRATROPIUM/ALBUTEROL SULFATE 18-103MCG
3 AEROSOL WITH ADAPTER (GRAM) INHALATION ONCE
Refills: 0 | Status: COMPLETED | OUTPATIENT
Start: 2023-01-01 | End: 2023-01-01

## 2023-01-01 RX ADMIN — ESCITALOPRAM OXALATE 10 MILLIGRAM(S): 10 TABLET, FILM COATED ORAL at 12:12

## 2023-01-01 RX ADMIN — Medication 240 MILLIGRAM(S): at 07:26

## 2023-01-01 RX ADMIN — Medication 650 MILLIGRAM(S): at 13:42

## 2023-01-01 RX ADMIN — ALBUTEROL 2 PUFF(S): 90 AEROSOL, METERED ORAL at 12:15

## 2023-01-01 RX ADMIN — BUDESONIDE AND FORMOTEROL FUMARATE DIHYDRATE 2 PUFF(S): 160; 4.5 AEROSOL RESPIRATORY (INHALATION) at 08:16

## 2023-01-01 RX ADMIN — Medication 3 MILLILITER(S): at 20:56

## 2023-01-01 RX ADMIN — ALBUTEROL 2 PUFF(S): 90 AEROSOL, METERED ORAL at 20:20

## 2023-01-01 RX ADMIN — APIXABAN 2.5 MILLIGRAM(S): 2.5 TABLET, FILM COATED ORAL at 07:25

## 2023-01-01 RX ADMIN — Medication 650 MILLIGRAM(S): at 22:12

## 2023-01-01 RX ADMIN — Medication 50 MILLIGRAM(S): at 16:45

## 2023-01-01 RX ADMIN — Medication 1 TABLET(S): at 13:16

## 2023-01-01 RX ADMIN — APIXABAN 2.5 MILLIGRAM(S): 2.5 TABLET, FILM COATED ORAL at 16:45

## 2023-01-01 RX ADMIN — SIMETHICONE 80 MILLIGRAM(S): 80 TABLET, CHEWABLE ORAL at 16:45

## 2023-01-01 RX ADMIN — CHLORHEXIDINE GLUCONATE 1 APPLICATION(S): 213 SOLUTION TOPICAL at 12:26

## 2023-01-01 RX ADMIN — Medication 500 MILLIGRAM(S): at 12:12

## 2023-01-01 RX ADMIN — Medication 6: at 08:15

## 2023-01-01 RX ADMIN — Medication 50 MILLIGRAM(S): at 22:12

## 2023-01-01 RX ADMIN — Medication 325 MILLIGRAM(S): at 12:12

## 2023-01-01 RX ADMIN — Medication 0.5 MILLIGRAM(S): at 22:12

## 2023-01-01 RX ADMIN — ALBUTEROL 2 PUFF(S): 90 AEROSOL, METERED ORAL at 08:16

## 2023-01-01 RX ADMIN — Medication 2: at 12:20

## 2023-01-01 RX ADMIN — ALBUTEROL 2 PUFF(S): 90 AEROSOL, METERED ORAL at 06:15

## 2023-01-01 RX ADMIN — Medication 30 MILLILITER(S): at 22:13

## 2023-01-01 RX ADMIN — ALBUTEROL 2 PUFF(S): 90 AEROSOL, METERED ORAL at 16:50

## 2023-01-01 RX ADMIN — Medication 50 MILLIGRAM(S): at 07:26

## 2023-01-01 RX ADMIN — BUDESONIDE AND FORMOTEROL FUMARATE DIHYDRATE 2 PUFF(S): 160; 4.5 AEROSOL RESPIRATORY (INHALATION) at 20:30

## 2023-01-01 RX ADMIN — Medication 50 MILLIGRAM(S): at 12:15

## 2023-01-01 RX ADMIN — INSULIN GLARGINE 8 UNIT(S): 100 INJECTION, SOLUTION SUBCUTANEOUS at 22:12

## 2023-01-01 RX ADMIN — Medication 650 MILLIGRAM(S): at 23:05

## 2023-01-01 RX ADMIN — CHLORHEXIDINE GLUCONATE 1 APPLICATION(S): 213 SOLUTION TOPICAL at 05:25

## 2023-01-01 RX ADMIN — Medication 1000 UNIT(S): at 12:12

## 2023-01-01 RX ADMIN — PANTOPRAZOLE SODIUM 40 MILLIGRAM(S): 20 TABLET, DELAYED RELEASE ORAL at 07:26

## 2023-01-01 RX ADMIN — Medication 650 MILLIGRAM(S): at 05:25

## 2023-01-01 RX ADMIN — Medication 4: at 16:45

## 2023-01-01 RX ADMIN — Medication 650 MILLIGRAM(S): at 12:12

## 2023-01-01 NOTE — PROGRESS NOTE ADULT - SUBJECTIVE AND OBJECTIVE BOX
HPI  Pt is a 81yo F admitted to [facility] for HF and renal failure.   Pt was seen and examined with  at bedside. No overnight complaints. Denies of any CP, SOB, palpitation.     Vital Signs Last 24 Hrs  T(C): 36.6 (01 Jan 2023 04:42), Max: 37.1 (31 Dec 2022 15:05)  T(F): 97.8 (01 Jan 2023 04:42), Max: 98.8 (31 Dec 2022 15:05)  HR: 71 (01 Jan 2023 04:42) (71 - 85)  BP: 165/64 (01 Jan 2023 04:42) (125/56 - 165/64)  BP(mean): --  RR: 17 (01 Jan 2023 04:42) (17 - 17)  SpO2: 100% (01 Jan 2023 04:42) (98% - 100%)    Parameters below as of 01 Jan 2023 08:35  Patient On (Oxygen Delivery Method): nasal cannula,02 2L        I&O's Summary    31 Dec 2022 07:01  -  01 Jan 2023 07:00  --------------------------------------------------------  IN: 0 mL / OUT: 1000 mL / NET: -1000 mL        CAPILLARY BLOOD GLUCOSE      POCT Blood Glucose.: 273 mg/dL (01 Jan 2023 07:34)  POCT Blood Glucose.: 249 mg/dL (31 Dec 2022 21:42)  POCT Blood Glucose.: 273 mg/dL (31 Dec 2022 16:30)  POCT Blood Glucose.: 148 mg/dL (31 Dec 2022 12:03)      PHYSICAL EXAM:  Constitutional: NAD, awake and alert, well-developed  HEENT: PERR, EOMI, Normal Hearing, MMM  Neck: Soft and supple, No LAD, No JVD  Respiratory: Breath sounds are clear bilaterally, No wheezing, rales or rhonchi  Cardiovascular: S1 and S2, regular rate and rhythm, no Murmurs, gallops or rubs  Gastrointestinal: Bowel Sounds present, soft, nontender, nondistended, no guarding, no rebound  Extremities: +1 peripheral edema  Vascular: 2+ peripheral pulses  Neurological: A/O x 3,   Skin: ecchymosis bilateral upper ext noted       MEDICATIONS:  MEDICATIONS  (STANDING):  acetaminophen     Tablet .. 650 milliGRAM(s) Oral every 8 hours  albuterol    90 MICROgram(s) HFA Inhaler 2 Puff(s) Inhalation every 4 hours  apixaban 2.5 milliGRAM(s) Oral two times a day  ascorbic acid 500 milliGRAM(s) Oral daily  budesonide  80 MICROgram(s)/formoterol 4.5 MICROgram(s) Inhaler 2 Puff(s) Inhalation two times a day  calcium carbonate   1250 mG (OsCal) 1 Tablet(s) Oral daily  chlorhexidine 2% Cloths 1 Application(s) Topical daily  chlorhexidine 2% Cloths 1 Application(s) Topical <User Schedule>  cholecalciferol 1000 Unit(s) Oral daily  dextrose 5%. 1000 milliLiter(s) (100 mL/Hr) IV Continuous <Continuous>  dextrose 5%. 1000 milliLiter(s) (50 mL/Hr) IV Continuous <Continuous>  dextrose 50% Injectable 25 Gram(s) IV Push once  dextrose 50% Injectable 12.5 Gram(s) IV Push once  dextrose 50% Injectable 25 Gram(s) IV Push once  diltiazem    milliGRAM(s) Oral daily  epoetin susie-epbx (RETACRIT) Injectable 19659 Unit(s) IV Push <User Schedule>  ergocalciferol 77678 Unit(s) Oral every week  escitalopram 10 milliGRAM(s) Oral daily  ferrous    sulfate 325 milliGRAM(s) Oral daily  glucagon  Injectable 1 milliGRAM(s) IntraMuscular once  hydrALAZINE 50 milliGRAM(s) Oral every 8 hours  insulin glargine Injectable (LANTUS) 8 Unit(s) SubCutaneous at bedtime  insulin lispro (ADMELOG) corrective regimen sliding scale   SubCutaneous three times a day before meals  metoprolol tartrate 50 milliGRAM(s) Oral two times a day  pantoprazole    Tablet 40 milliGRAM(s) Oral before breakfast      LABS: All Labs Reviewed:                        9.2    6.36  )-----------( 273      ( 01 Jan 2023 07:56 )             31.3     01-01    131<L>  |  96  |  46.4<H>  ----------------------------<  256<H>  4.8   |  26.0  |  2.73<H>    Ca    7.9<L>      01 Jan 2023 07:56            Blood Culture:     RADIOLOGY/EKG:    DVT PPX:    ADVANCED DIRECTIVE:    DISPOSITION:

## 2023-01-01 NOTE — PROGRESS NOTE ADULT - ASSESSMENT
CKD(IV): +COVID  THERESA, progressive + fluid overload + hyperkalemia  has h/o Nephrotic syndrome 2/2 DM w 3 gm proteinuria in past  Remains HD dependent  - HD yest tolerated ok will cont on TTS schedule  - has poor UOP no signs of renal recovery    Anemia: +CKD  - cont DILAN  - target Hgb > 10    Will follow

## 2023-01-01 NOTE — PROGRESS NOTE ADULT - ASSESSMENT
82F with PMH asthma, diabetes, hyperlipidemia, atrial fibrillation, hypertension, and hyperlipidemia, who was found to have a right superficial femoral vein thrombosis while on warfarin and acute kidney injury. She was also noted to have been recently diagnosed with COVID-19.    *THERESA on CKD  Cont HD, initiated this [facility]   cont HD per renal team on T,Thr,Sat   S/p PC on 12/21 will eventually need avf  SW for HD set up as op   [facility] with HD seat pending     *Acute on Chronic Diastolic Heart failure   Resistant to diuresis, now on HD initiated 12/13/22   fluid removal with HD   Continue Metoprolol    *HTN   on metoprolol and hydralazine   controlled     *Pleural effusion   Small to mod L and small R pleural effusion with LLL complete and RLL near complete compressive atelectasis   Incentive spirometry  S/p chest tube on 12/19, removed on 12/22 per TS team   Fluid studies transudative, result reviewed cx , gram stain and fungal cxs are neg   Supplemental oxygen PRN, wean as tolerate     *Emphysematous Cystitis  UCx never collected  Urology consult appreciated  s/p rocephin iv completed 12/24     *Acute Hypoxic respiratory Failure - Multifactorial due to  Asthma, COVID-19, CHFpEF, Fluid overload.  Started on HD 12/13/22  Bronchodilators prn   Supplemental oxygen PRN, wean as tolerated    *Anemia likely due to  chronic disease/ CKD   hb stable   iron studies reviewed   add ferrous sulfate , folic acid , vit c   cont epogen with HD     *Uncontrolled Type 2 Diabetes on insulin  BG is low  this am ,hyperglycemia improved with insulin regimen   order bedtime snack , cont lantus ans ISS   will Dc admelog 2 unit before meal and cont to monitor to avoid hypoglycemia   A1c 11.2,    *Atrial Fibrillation  Rate controlled, INR returned to normal  on cardizem and metoprolol    on eliquis renal dose    *COVID-19   CT Chest with bilateral upper lobe opacities  completed decadron  cont oxygen   Will need repeat outpatient CT chest in 3 months for RLL nodule      Code Status: DNR/DNI      Dispo: pending HD / [facility] bed and insurance auth per CM / SW       Stable for discharge to [facility]

## 2023-01-01 NOTE — PROGRESS NOTE ADULT - SUBJECTIVE AND OBJECTIVE BOX
NEPHROLOGY INTERVAL HPI/OVERNIGHT EVENTS:    Examined  Sitting up in bed  helping to feed    MEDICATIONS  (STANDING):  acetaminophen     Tablet .. 650 milliGRAM(s) Oral every 8 hours  albuterol    90 MICROgram(s) HFA Inhaler 2 Puff(s) Inhalation every 4 hours  apixaban 2.5 milliGRAM(s) Oral two times a day  ascorbic acid 500 milliGRAM(s) Oral daily  budesonide  80 MICROgram(s)/formoterol 4.5 MICROgram(s) Inhaler 2 Puff(s) Inhalation two times a day  calcium carbonate   1250 mG (OsCal) 1 Tablet(s) Oral daily  chlorhexidine 2% Cloths 1 Application(s) Topical daily  chlorhexidine 2% Cloths 1 Application(s) Topical <User Schedule>  cholecalciferol 1000 Unit(s) Oral daily  dextrose 5%. 1000 milliLiter(s) (50 mL/Hr) IV Continuous <Continuous>  dextrose 5%. 1000 milliLiter(s) (100 mL/Hr) IV Continuous <Continuous>  dextrose 50% Injectable 25 Gram(s) IV Push once  dextrose 50% Injectable 12.5 Gram(s) IV Push once  dextrose 50% Injectable 25 Gram(s) IV Push once  diltiazem    milliGRAM(s) Oral daily  epoetin susie-epbx (RETACRIT) Injectable 52745 Unit(s) IV Push <User Schedule>  ergocalciferol 03191 Unit(s) Oral every week  escitalopram 10 milliGRAM(s) Oral daily  ferrous    sulfate 325 milliGRAM(s) Oral daily  glucagon  Injectable 1 milliGRAM(s) IntraMuscular once  hydrALAZINE 50 milliGRAM(s) Oral every 8 hours  insulin glargine Injectable (LANTUS) 8 Unit(s) SubCutaneous at bedtime  insulin lispro (ADMELOG) corrective regimen sliding scale   SubCutaneous three times a day before meals  metoprolol tartrate 50 milliGRAM(s) Oral two times a day  pantoprazole    Tablet 40 milliGRAM(s) Oral before breakfast    MEDICATIONS  (PRN):  aluminum hydroxide/magnesium hydroxide/simethicone Suspension 30 milliLiter(s) Oral every 6 hours PRN Dyspepsia  dextrose Oral Gel 15 Gram(s) Oral once PRN Blood Glucose LESS THAN 70 milliGRAM(s)/deciliter  dextrose Oral Gel 15 Gram(s) Oral once PRN Blood Glucose LESS THAN 70 milliGRAM(s)/deciliter  hydrALAZINE Injectable 10 milliGRAM(s) IV Push every 6 hours PRN SBP > 160  LORazepam     Tablet 0.5 milliGRAM(s) Oral every 8 hours PRN Anxiety  melatonin 1 milliGRAM(s) Oral at bedtime PRN Sleep  sodium chloride 0.9% lock flush 10 milliLiter(s) IV Push every 1 hour PRN Pre/post blood products, medications, blood draw, and to maintain line patency  sodium chloride 0.9% lock flush 10 milliLiter(s) IV Push every 1 hour PRN Pre/post blood products, medications, blood draw, and to maintain line patency      Allergies    No Known Allergies    Intolerances        Vital Signs Last 24 Hrs  T(C): 36.6 (01 Jan 2023 04:42), Max: 37.1 (31 Dec 2022 15:05)  T(F): 97.8 (01 Jan 2023 04:42), Max: 98.8 (31 Dec 2022 15:05)  HR: 71 (01 Jan 2023 04:42) (64 - 85)  BP: 165/64 (01 Jan 2023 04:42) (125/56 - 165/64)  BP(mean): --  RR: 17 (01 Jan 2023 04:42) (17 - 18)  SpO2: 100% (01 Jan 2023 04:42) (98% - 100%)    Parameters below as of 01 Jan 2023 08:35  Patient On (Oxygen Delivery Method): nasal cannula,02 2L      PHYSICAL EXAM:  GENERAL: Elderly, frail  HEENT:  Facial edema  NECK: Supple, No JVD  NERVOUS SYSTEM:  Awake, alert  EXTREMITIES:  + dependent edema improved    LABS:                        9.2    6.36  )-----------( 273      ( 01 Jan 2023 07:56 )             31.3                       RADIOLOGY & ADDITIONAL TESTS:

## 2023-01-01 NOTE — PROGRESS NOTE ADULT - ASSESSMENT
T2DM   cont current RX   pt BS drop low with 9 units Lantus       ABd discomfort  chronic-    may need GI eval   relieved at times with food     Ct scan abd done 12/15 did reveal compression fx   T1-0 and L4  can see if can get DEXA inpt   check 25 OH Vit D   T2DM   cont current RX   pt BS drop low with 9 units Lantus       ABd discomfort  chronic-    may need GI eval   relieved at times with food     Ct scan abd done 12/15 did reveal compression fx   T1-0 and L4  can see if can get DEXA inpt   started on Vit D 50 K per week as 25-OH Vit D was 13

## 2023-01-01 NOTE — PROGRESS NOTE ADULT - REASON FOR ADMISSION
LE edema
LE edema
acute hypoxic respiratory failure , acute kidney injury, and covid 19
LE edema
THERESA
acute renal failure
CHF  renal failure
THERESA
CHF exacerbation, COVID pneumonia
CHF exacerbation, COVID pneumonia
THERESA

## 2023-01-01 NOTE — PROGRESS NOTE ADULT - SUBJECTIVE AND OBJECTIVE BOX
INTERVAL HPI/OVERNIGHT EVENTS:   follow up T2DM  ESRD n HD    pt seen with selena at bedside    mom c/o diffuse abd pin- just given simethicone    per selena-- pt has been having this for weeks  but is interrmittent in nature  sometimes relieved with eating food -Pt does not tell her doctors about this all the time    Per RN pt had BM today   NO n/v  MEDICATIONS  (STANDING):  acetaminophen     Tablet .. 650 milliGRAM(s) Oral every 8 hours  albuterol    90 MICROgram(s) HFA Inhaler 2 Puff(s) Inhalation every 4 hours  apixaban 2.5 milliGRAM(s) Oral two times a day  ascorbic acid 500 milliGRAM(s) Oral daily  budesonide  80 MICROgram(s)/formoterol 4.5 MICROgram(s) Inhaler 2 Puff(s) Inhalation two times a day  calcium carbonate   1250 mG (OsCal) 1 Tablet(s) Oral daily  chlorhexidine 2% Cloths 1 Application(s) Topical daily  chlorhexidine 2% Cloths 1 Application(s) Topical <User Schedule>  cholecalciferol 1000 Unit(s) Oral daily  dextrose 5%. 1000 milliLiter(s) (100 mL/Hr) IV Continuous <Continuous>  dextrose 5%. 1000 milliLiter(s) (50 mL/Hr) IV Continuous <Continuous>  dextrose 50% Injectable 25 Gram(s) IV Push once  dextrose 50% Injectable 12.5 Gram(s) IV Push once  dextrose 50% Injectable 25 Gram(s) IV Push once  diltiazem    milliGRAM(s) Oral daily  epoetin susie-epbx (RETACRIT) Injectable 47562 Unit(s) IV Push <User Schedule>  ergocalciferol 20249 Unit(s) Oral every week  escitalopram 10 milliGRAM(s) Oral daily  ferrous    sulfate 325 milliGRAM(s) Oral daily  glucagon  Injectable 1 milliGRAM(s) IntraMuscular once  hydrALAZINE 50 milliGRAM(s) Oral every 8 hours  insulin glargine Injectable (LANTUS) 8 Unit(s) SubCutaneous at bedtime  insulin lispro (ADMELOG) corrective regimen sliding scale   SubCutaneous three times a day before meals  metoprolol tartrate 50 milliGRAM(s) Oral two times a day  pantoprazole    Tablet 40 milliGRAM(s) Oral before breakfast    MEDICATIONS  (PRN):  aluminum hydroxide/magnesium hydroxide/simethicone Suspension 30 milliLiter(s) Oral every 6 hours PRN Dyspepsia  dextrose Oral Gel 15 Gram(s) Oral once PRN Blood Glucose LESS THAN 70 milliGRAM(s)/deciliter  dextrose Oral Gel 15 Gram(s) Oral once PRN Blood Glucose LESS THAN 70 milliGRAM(s)/deciliter  hydrALAZINE Injectable 10 milliGRAM(s) IV Push every 6 hours PRN SBP > 160  LORazepam     Tablet 0.5 milliGRAM(s) Oral every 8 hours PRN Anxiety  melatonin 1 milliGRAM(s) Oral at bedtime PRN Sleep  simethicone 80 milliGRAM(s) Chew every 6 hours PRN Gas  sodium chloride 0.9% lock flush 10 milliLiter(s) IV Push every 1 hour PRN Pre/post blood products, medications, blood draw, and to maintain line patency  sodium chloride 0.9% lock flush 10 milliLiter(s) IV Push every 1 hour PRN Pre/post blood products, medications, blood draw, and to maintain line patency      Allergies    No Known Allergies    Intolerances        Review of systems:  as  above    Vital Signs Last 24 Hrs  T(C): 36.8 (01 Jan 2023 22:00), Max: 37.1 (01 Jan 2023 10:55)  T(F): 98.3 (01 Jan 2023 22:00), Max: 98.7 (01 Jan 2023 10:55)  HR: 73 (01 Jan 2023 22:00) (60 - 73)  BP: 153/67 (01 Jan 2023 22:00) (12/59 - 165/64)  BP(mean): --  RR: 18 (01 Jan 2023 22:00) (17 - 18)  SpO2: 99% (01 Jan 2023 22:00) (99% - 100%)    Parameters below as of 01 Jan 2023 22:00  Patient On (Oxygen Delivery Method): nasal cannula  O2 Flow (L/min): 2      PHYSICAL EXAM:      Constitutional: NAD, well-groomed, well-developed  HEENT: PERRLA, EOMI, no exophalmos  Neck: No LAD, No JVD, trachea midline, no thyroid enlargement  Back: Normal spine flexure, No CVA tenderness  Respiratory: CTAB  Cardiovascular: S1 and S2, RRR, no M/G/R  Gastrointestinal: BS+, soft, no organomegaly or mass  Extremities: No peripheral edema, no pedal lesions  Vascular: 2+ peripheral pulses  Neurological: A/O x 3, no focal deficits  Psychiatric: Normal mood, normal affect  Skin: No rashes, no acanthosis        LABS:                        9.2    6.36  )-----------( 273      ( 01 Jan 2023 07:56 )             31.3     01-01    131<L>  |  96  |  46.4<H>  ----------------------------<  256<H>  4.8   |  26.0  |  2.73<H>    Ca    7.9<L>      01 Jan 2023 07:56            CAPILLARY BLOOD GLUCOSE  CAPILLARY BLOOD GLUCOSE      POCT Blood Glucose.: 239 mg/dL (01 Jan 2023 22:10)  POCT Blood Glucose.: 214 mg/dL (01 Jan 2023 16:31)  POCT Blood Glucose.: 159 mg/dL (01 Jan 2023 11:23)  POCT Blood Glucose.: 273 mg/dL (01 Jan 2023 07:34)        RADIOLOGY & ADDITIONAL TESTS:    < from: CT Abdomen and Pelvis No Cont (12.15.22 @ 10:03) >  ACC: [id number] EXAM:  CT ABDOMEN AND PELVIS                          PROCEDURE DATE:  12/15/2022          INTERPRETATION:  CLINICAL INFORMATION: Abdominal pain    COMPARISON: CT chest dated 4/26/2013.    CONTRAST/COMPLICATIONS:  IV Contrast: NONE  Oral Contrast: NONE  Complications: None reported at time of study completion    PROCEDURE:  CT of the Abdomen and Pelvis was performed.  Sagittal and coronal reformats were performed.    FINDINGS:  LOWER CHEST: Small bilateral pleural effusions with associated   consolidation and air bronchograms/bronchiectasis. 0.7 cm nodule in the   right lower lobe.    LIVER: Hypertrophy of the caudate, similar in appearance to prior.  BILE DUCTS: Normal caliber.  GALLBLADDER: Within normal limits.  SPLEEN: Within normal limits.  PANCREAS: Within normal limits.  ADRENALS: Within normal limits.  KIDNEYS/URETERS: Within normal limits.    BLADDER: Area within the bladder lumen and within the bladder wall.  REPRODUCTIVE ORGANS: Large calcified fibroid    BOWEL:Small hiatal hernia. No bowel obstruction. Appendix is not   visualized. No evidence of inflammation in the pericecal region.  PERITONEUM: Small abdominal and pelvic.  VESSELS: Atherosclerotic changes. Right-sided femoral venous catheter   terminatesin the IVC.  RETROPERITONEUM/LYMPH NODES: No lymphadenopathy.  ABDOMINAL WALL: Diffuse anasarca, most pronounced in the right   flank/abdominal wall and right thigh.  BONES: Degenerative changes. Mild to moderate compression deformity of   L4. Mild anterolisthesis of L4 on L5.  AI VERTEBRAL BODY ANALYSIS:  T10: Severe compression fracture with 41% height loss.  T11: low bone density of 87 HU, suspicious for osteoporosis.  T12: low bone density of 68 HU, suspicious for osteoporosis.  L1: low bone density of 64 HU, suspicious for osteoporosis.  L2: low bone density of 77 HU, suspicious for osteoporosis.    IMPRESSION:  Small bilateral pleural effusions with associated consolidation with air   bronchograms/bronchiectasis. Findings may represent atelectasis, however   superimposed pneumonia cannot be excluded.    Emphysematous cystitis.    Above findings were discussed with Dr. Dominguez on 12/15/2022 10:30 AM.    There is a 0.7 cm nodule in the right lower lobe. 3-6 month follow-up   chest CT is recommended.    Multiple additional findings as above.    VERTEBRAL BODY ANALYSIS: Vertebral compression fractures as described,   consider further workup for osteoporosis.        --- End of Report ---            EVIE CARIAS MD; Attending Radiologist  This document has been electronically signed. Dec 15 2022 10:33AM    < end of copied text >

## 2023-01-02 LAB
ANION GAP SERPL CALC-SCNC: 11 MMOL/L — SIGNIFICANT CHANGE UP (ref 5–17)
BUN SERPL-MCNC: 66.4 MG/DL — HIGH (ref 8–20)
CALCIUM SERPL-MCNC: 7.7 MG/DL — LOW (ref 8.4–10.5)
CHLORIDE SERPL-SCNC: 95 MMOL/L — LOW (ref 96–108)
CO2 SERPL-SCNC: 25 MMOL/L — SIGNIFICANT CHANGE UP (ref 22–29)
CREAT SERPL-MCNC: 3.45 MG/DL — HIGH (ref 0.5–1.3)
EGFR: 13 ML/MIN/1.73M2 — LOW
GLUCOSE BLDC GLUCOMTR-MCNC: 172 MG/DL — HIGH (ref 70–99)
GLUCOSE BLDC GLUCOMTR-MCNC: 198 MG/DL — HIGH (ref 70–99)
GLUCOSE BLDC GLUCOMTR-MCNC: 199 MG/DL — HIGH (ref 70–99)
GLUCOSE BLDC GLUCOMTR-MCNC: 213 MG/DL — HIGH (ref 70–99)
GLUCOSE SERPL-MCNC: 199 MG/DL — HIGH (ref 70–99)
HCT VFR BLD CALC: 30.3 % — LOW (ref 34.5–45)
HGB BLD-MCNC: 8.7 G/DL — LOW (ref 11.5–15.5)
MAGNESIUM SERPL-MCNC: 2.1 MG/DL — SIGNIFICANT CHANGE UP (ref 1.6–2.6)
MCHC RBC-ENTMCNC: 21.9 PG — LOW (ref 27–34)
MCHC RBC-ENTMCNC: 28.7 GM/DL — LOW (ref 32–36)
MCV RBC AUTO: 76.3 FL — LOW (ref 80–100)
PHOSPHATE SERPL-MCNC: 3.5 MG/DL — SIGNIFICANT CHANGE UP (ref 2.4–4.7)
PLATELET # BLD AUTO: 296 K/UL — SIGNIFICANT CHANGE UP (ref 150–400)
POTASSIUM SERPL-MCNC: 4.8 MMOL/L — SIGNIFICANT CHANGE UP (ref 3.5–5.3)
POTASSIUM SERPL-SCNC: 4.8 MMOL/L — SIGNIFICANT CHANGE UP (ref 3.5–5.3)
RBC # BLD: 3.97 M/UL — SIGNIFICANT CHANGE UP (ref 3.8–5.2)
RBC # FLD: 24.1 % — HIGH (ref 10.3–14.5)
SODIUM SERPL-SCNC: 131 MMOL/L — LOW (ref 135–145)
WBC # BLD: 7.14 K/UL — SIGNIFICANT CHANGE UP (ref 3.8–10.5)
WBC # FLD AUTO: 7.14 K/UL — SIGNIFICANT CHANGE UP (ref 3.8–10.5)

## 2023-01-02 PROCEDURE — 99232 SBSQ HOSP IP/OBS MODERATE 35: CPT

## 2023-01-02 RX ORDER — IPRATROPIUM/ALBUTEROL SULFATE 18-103MCG
3 AEROSOL WITH ADAPTER (GRAM) INHALATION ONCE
Refills: 0 | Status: COMPLETED | OUTPATIENT
Start: 2023-01-02 | End: 2023-01-02

## 2023-01-02 RX ADMIN — Medication 2: at 12:07

## 2023-01-02 RX ADMIN — INSULIN GLARGINE 8 UNIT(S): 100 INJECTION, SOLUTION SUBCUTANEOUS at 21:16

## 2023-01-02 RX ADMIN — Medication 50 MILLIGRAM(S): at 21:16

## 2023-01-02 RX ADMIN — Medication 50 MILLIGRAM(S): at 17:27

## 2023-01-02 RX ADMIN — Medication 2: at 17:27

## 2023-01-02 RX ADMIN — Medication 650 MILLIGRAM(S): at 14:30

## 2023-01-02 RX ADMIN — Medication 650 MILLIGRAM(S): at 05:43

## 2023-01-02 RX ADMIN — Medication 500 MILLIGRAM(S): at 12:07

## 2023-01-02 RX ADMIN — ALBUTEROL 2 PUFF(S): 90 AEROSOL, METERED ORAL at 08:16

## 2023-01-02 RX ADMIN — BUDESONIDE AND FORMOTEROL FUMARATE DIHYDRATE 2 PUFF(S): 160; 4.5 AEROSOL RESPIRATORY (INHALATION) at 19:33

## 2023-01-02 RX ADMIN — Medication 650 MILLIGRAM(S): at 22:00

## 2023-01-02 RX ADMIN — APIXABAN 2.5 MILLIGRAM(S): 2.5 TABLET, FILM COATED ORAL at 05:43

## 2023-01-02 RX ADMIN — Medication 0.5 MILLIGRAM(S): at 21:15

## 2023-01-02 RX ADMIN — ALBUTEROL 2 PUFF(S): 90 AEROSOL, METERED ORAL at 20:47

## 2023-01-02 RX ADMIN — Medication 325 MILLIGRAM(S): at 12:06

## 2023-01-02 RX ADMIN — Medication 50 MILLIGRAM(S): at 05:44

## 2023-01-02 RX ADMIN — Medication 3 MILLILITER(S): at 21:24

## 2023-01-02 RX ADMIN — Medication 50 MILLIGRAM(S): at 14:30

## 2023-01-02 RX ADMIN — Medication 30 MILLILITER(S): at 21:47

## 2023-01-02 RX ADMIN — Medication 650 MILLIGRAM(S): at 15:00

## 2023-01-02 RX ADMIN — ESCITALOPRAM OXALATE 10 MILLIGRAM(S): 10 TABLET, FILM COATED ORAL at 12:07

## 2023-01-02 RX ADMIN — CHLORHEXIDINE GLUCONATE 1 APPLICATION(S): 213 SOLUTION TOPICAL at 05:41

## 2023-01-02 RX ADMIN — ALBUTEROL 2 PUFF(S): 90 AEROSOL, METERED ORAL at 12:09

## 2023-01-02 RX ADMIN — Medication 240 MILLIGRAM(S): at 05:44

## 2023-01-02 RX ADMIN — CHLORHEXIDINE GLUCONATE 1 APPLICATION(S): 213 SOLUTION TOPICAL at 12:09

## 2023-01-02 RX ADMIN — Medication 10 MILLIGRAM(S): at 21:09

## 2023-01-02 RX ADMIN — APIXABAN 2.5 MILLIGRAM(S): 2.5 TABLET, FILM COATED ORAL at 17:27

## 2023-01-02 RX ADMIN — ALBUTEROL 2 PUFF(S): 90 AEROSOL, METERED ORAL at 05:50

## 2023-01-02 RX ADMIN — ALBUTEROL 2 PUFF(S): 90 AEROSOL, METERED ORAL at 17:27

## 2023-01-02 RX ADMIN — Medication 650 MILLIGRAM(S): at 21:16

## 2023-01-02 RX ADMIN — Medication 1 TABLET(S): at 12:07

## 2023-01-02 RX ADMIN — PANTOPRAZOLE SODIUM 40 MILLIGRAM(S): 20 TABLET, DELAYED RELEASE ORAL at 05:44

## 2023-01-02 RX ADMIN — BUDESONIDE AND FORMOTEROL FUMARATE DIHYDRATE 2 PUFF(S): 160; 4.5 AEROSOL RESPIRATORY (INHALATION) at 08:16

## 2023-01-02 RX ADMIN — Medication 650 MILLIGRAM(S): at 05:50

## 2023-01-02 RX ADMIN — Medication 1000 UNIT(S): at 12:07

## 2023-01-02 RX ADMIN — Medication 4: at 08:15

## 2023-01-02 NOTE — PROGRESS NOTE ADULT - SUBJECTIVE AND OBJECTIVE BOX
[facility] Division of Hospital Medicine  Sanjay Crews MD    Chief Complaint:  Patient is a 82y old  Female who presents with a chief complaint of CHF  renal failure (01 Jan 2023 18:15)      SUBJECTIVE / OVERNIGHT EVENTS:  Patient seen and examined at bedside. No acute events reported overnight. No new complaints.    MEDICATIONS  (STANDING):  acetaminophen     Tablet .. 650 milliGRAM(s) Oral every 8 hours  albuterol    90 MICROgram(s) HFA Inhaler 2 Puff(s) Inhalation every 4 hours  apixaban 2.5 milliGRAM(s) Oral two times a day  ascorbic acid 500 milliGRAM(s) Oral daily  budesonide  80 MICROgram(s)/formoterol 4.5 MICROgram(s) Inhaler 2 Puff(s) Inhalation two times a day  calcium carbonate   1250 mG (OsCal) 1 Tablet(s) Oral daily  chlorhexidine 2% Cloths 1 Application(s) Topical daily  chlorhexidine 2% Cloths 1 Application(s) Topical <User Schedule>  cholecalciferol 1000 Unit(s) Oral daily  dextrose 5%. 1000 milliLiter(s) (100 mL/Hr) IV Continuous <Continuous>  dextrose 5%. 1000 milliLiter(s) (50 mL/Hr) IV Continuous <Continuous>  dextrose 50% Injectable 25 Gram(s) IV Push once  dextrose 50% Injectable 12.5 Gram(s) IV Push once  dextrose 50% Injectable 25 Gram(s) IV Push once  diltiazem    milliGRAM(s) Oral daily  epoetin susie-epbx (RETACRIT) Injectable 78219 Unit(s) IV Push <User Schedule>  ergocalciferol 65549 Unit(s) Oral every week  escitalopram 10 milliGRAM(s) Oral daily  ferrous    sulfate 325 milliGRAM(s) Oral daily  glucagon  Injectable 1 milliGRAM(s) IntraMuscular once  hydrALAZINE 50 milliGRAM(s) Oral every 8 hours  insulin glargine Injectable (LANTUS) 8 Unit(s) SubCutaneous at bedtime  insulin lispro (ADMELOG) corrective regimen sliding scale   SubCutaneous three times a day before meals  metoprolol tartrate 50 milliGRAM(s) Oral two times a day  pantoprazole    Tablet 40 milliGRAM(s) Oral before breakfast    MEDICATIONS  (PRN):  aluminum hydroxide/magnesium hydroxide/simethicone Suspension 30 milliLiter(s) Oral every 6 hours PRN Dyspepsia  dextrose Oral Gel 15 Gram(s) Oral once PRN Blood Glucose LESS THAN 70 milliGRAM(s)/deciliter  dextrose Oral Gel 15 Gram(s) Oral once PRN Blood Glucose LESS THAN 70 milliGRAM(s)/deciliter  hydrALAZINE Injectable 10 milliGRAM(s) IV Push every 6 hours PRN SBP > 160  LORazepam     Tablet 0.5 milliGRAM(s) Oral every 8 hours PRN Anxiety  melatonin 1 milliGRAM(s) Oral at bedtime PRN Sleep  simethicone 80 milliGRAM(s) Chew every 6 hours PRN Gas  sodium chloride 0.9% lock flush 10 milliLiter(s) IV Push every 1 hour PRN Pre/post blood products, medications, blood draw, and to maintain line patency  sodium chloride 0.9% lock flush 10 milliLiter(s) IV Push every 1 hour PRN Pre/post blood products, medications, blood draw, and to maintain line patency        I&O's Summary      PHYSICAL EXAM:  Vital Signs Last 24 Hrs  T(C): 36.7 (02 Jan 2023 09:26), Max: 37.1 (01 Jan 2023 10:55)  T(F): 98 (02 Jan 2023 09:26), Max: 98.7 (01 Jan 2023 10:55)  HR: 71 (02 Jan 2023 09:26) (60 - 80)  BP: 132/58 (02 Jan 2023 09:26) (12/59 - 153/67)  BP(mean): --  RR: 18 (02 Jan 2023 09:26) (17 - 18)  SpO2: 98% (02 Jan 2023 09:26) (98% - 100%)    Parameters below as of 02 Jan 2023 09:26  Patient On (Oxygen Delivery Method): nasal cannula            CONSTITUTIONAL: NAD  Chest wall: + Permacath  HEENT: NC/AT, PERRL, no JVD  RESPIRATORY: CTA bilaterally, normal effort  CARDIOVASCULAR: RRR, S1/S2+, no m/g/r  ABDOMEN: Nontender to palpation, normoactive bowel sounds, no rebound/guarding  MUSCULOSKELETAL: No edema, cyanosis or deformities.  PSYCH: Calm, affect appropriate.  NEUROLOGY: Awake, alert, no focal neurological deficits.   SKIN: No rashes; no palpable lesions  VASC: Distal pulses palpable    LABS:                        8.7    7.14  )-----------( 296      ( 02 Jan 2023 06:00 )             30.3     01-02    131<L>  |  95<L>  |  66.4<H>  ----------------------------<  199<H>  4.8   |  25.0  |  3.45<H>    Ca    7.7<L>      02 Jan 2023 06:00  Phos  3.5     01-02  Mg     2.1     01-02                CAPILLARY BLOOD GLUCOSE      POCT Blood Glucose.: 213 mg/dL (02 Jan 2023 07:53)  POCT Blood Glucose.: 239 mg/dL (01 Jan 2023 22:10)  POCT Blood Glucose.: 214 mg/dL (01 Jan 2023 16:31)  POCT Blood Glucose.: 159 mg/dL (01 Jan 2023 11:23)        RADIOLOGY & ADDITIONAL TESTS:  Results Reviewed:   Imaging Personally Reviewed:  Electrocardiogram Personally Reviewed:

## 2023-01-02 NOTE — PROGRESS NOTE ADULT - SUBJECTIVE AND OBJECTIVE BOX
NEPHROLOGY INTERVAL HPI/OVERNIGHT EVENTS:    Examined  Per  pt feels better since starting dialysis  Very poor UOP    MEDICATIONS  (STANDING):  acetaminophen     Tablet .. 650 milliGRAM(s) Oral every 8 hours  albuterol    90 MICROgram(s) HFA Inhaler 2 Puff(s) Inhalation every 4 hours  apixaban 2.5 milliGRAM(s) Oral two times a day  ascorbic acid 500 milliGRAM(s) Oral daily  budesonide  80 MICROgram(s)/formoterol 4.5 MICROgram(s) Inhaler 2 Puff(s) Inhalation two times a day  calcium carbonate   1250 mG (OsCal) 1 Tablet(s) Oral daily  chlorhexidine 2% Cloths 1 Application(s) Topical daily  chlorhexidine 2% Cloths 1 Application(s) Topical <User Schedule>  cholecalciferol 1000 Unit(s) Oral daily  dextrose 5%. 1000 milliLiter(s) (100 mL/Hr) IV Continuous <Continuous>  dextrose 5%. 1000 milliLiter(s) (50 mL/Hr) IV Continuous <Continuous>  dextrose 50% Injectable 25 Gram(s) IV Push once  dextrose 50% Injectable 12.5 Gram(s) IV Push once  dextrose 50% Injectable 25 Gram(s) IV Push once  diltiazem    milliGRAM(s) Oral daily  epoetin susie-epbx (RETACRIT) Injectable 95431 Unit(s) IV Push <User Schedule>  ergocalciferol 60860 Unit(s) Oral every week  escitalopram 10 milliGRAM(s) Oral daily  ferrous    sulfate 325 milliGRAM(s) Oral daily  glucagon  Injectable 1 milliGRAM(s) IntraMuscular once  hydrALAZINE 50 milliGRAM(s) Oral every 8 hours  insulin glargine Injectable (LANTUS) 8 Unit(s) SubCutaneous at bedtime  insulin lispro (ADMELOG) corrective regimen sliding scale   SubCutaneous three times a day before meals  metoprolol tartrate 50 milliGRAM(s) Oral two times a day  pantoprazole    Tablet 40 milliGRAM(s) Oral before breakfast    MEDICATIONS  (PRN):  aluminum hydroxide/magnesium hydroxide/simethicone Suspension 30 milliLiter(s) Oral every 6 hours PRN Dyspepsia  dextrose Oral Gel 15 Gram(s) Oral once PRN Blood Glucose LESS THAN 70 milliGRAM(s)/deciliter  dextrose Oral Gel 15 Gram(s) Oral once PRN Blood Glucose LESS THAN 70 milliGRAM(s)/deciliter  hydrALAZINE Injectable 10 milliGRAM(s) IV Push every 6 hours PRN SBP > 160  LORazepam     Tablet 0.5 milliGRAM(s) Oral every 8 hours PRN Anxiety  melatonin 1 milliGRAM(s) Oral at bedtime PRN Sleep  simethicone 80 milliGRAM(s) Chew every 6 hours PRN Gas  sodium chloride 0.9% lock flush 10 milliLiter(s) IV Push every 1 hour PRN Pre/post blood products, medications, blood draw, and to maintain line patency  sodium chloride 0.9% lock flush 10 milliLiter(s) IV Push every 1 hour PRN Pre/post blood products, medications, blood draw, and to maintain line patency      Allergies    No Known Allergies    Intolerances        Vital Signs Last 24 Hrs  T(C): 36.7 (02 Jan 2023 09:26), Max: 36.8 (01 Jan 2023 16:28)  T(F): 98 (02 Jan 2023 09:26), Max: 98.3 (01 Jan 2023 22:00)  HR: 71 (02 Jan 2023 09:26) (71 - 80)  BP: 132/58 (02 Jan 2023 09:26) (132/58 - 153/67)  BP(mean): --  RR: 18 (02 Jan 2023 09:26) (18 - 18)  SpO2: 98% (02 Jan 2023 09:26) (98% - 100%)    Parameters below as of 02 Jan 2023 09:26  Patient On (Oxygen Delivery Method): nasal cannula      Daily     Daily     PHYSICAL EXAM:  GENERAL: Elderly, frail  HEENT:  Facial edema  NECK: Supple, No JVD  NERVOUS SYSTEM:  Awake, alert  EXTREMITIES:  + dependent edema improved    LABS:                        8.7    7.14  )-----------( 296      ( 02 Jan 2023 06:00 )             30.3     01-02    131<L>  |  95<L>  |  66.4<H>  ----------------------------<  199<H>  4.8   |  25.0  |  3.45<H>    Ca    7.7<L>      02 Jan 2023 06:00  Phos  3.5     01-02  Mg     2.1     01-02          Magnesium, Serum: 2.1 mg/dL (01-02 @ 06:00)  Phosphorus Level, Serum: 3.5 mg/dL (01-02 @ 06:00)          RADIOLOGY & ADDITIONAL TESTS:

## 2023-01-02 NOTE — PROGRESS NOTE ADULT - ASSESSMENT
82F with PMH asthma, diabetes, hyperlipidemia, atrial fibrillation, hypertension, and hyperlipidemia, who was found to have a right superficial femoral vein thrombosis while on warfarin and acute kidney injury. She was also noted to have been recently diagnosed with COVID-19.    THERESA on CKD  - Cont HD, initiated this hospitalization   - cont HD per renal team on T,Thr,Sat   - S/p PC on 12/21 will eventually need avf  - SW for HD set up as op   - [facility] with HD seat pending     Acute on Chronic Diastolic Heart failure  - Resistant to diuresis, now on HD initiated 12/13/22   - fluid removal with HD   - Continue Metoprolol    HTN   - on metoprolol and hydralazine   - controlled     Pleural effusion   - Small to mod L and small R pleural effusion with LLL complete and RLL near complete compressive atelectasis   - Incentive spirometry  - S/p chest tube on 12/19, removed on 12/22 per TS team   - Fluid studies transudative, result reviewed cx , gram stain and fungal cxs are neg   - Supplemental oxygen PRN, wean as tolerate     Emphysematous Cystitis  - UCx never collected  - Urology consult appreciated  - s/p rocephin iv completed 12/24     Acute Hypoxic respiratory Failure - Multifactorial due to  Asthma, COVID-19, CHFpEF, Fluid overload.  - Started on HD 12/13/22  - Bronchodilators prn   - Supplemental oxygen PRN, wean as tolerated    Anemia likely due to  chronic disease/ CKD   - hb stable   - iron studies reviewed   - add ferrous sulfate , folic acid , vit c   - cont epogen with HD     Uncontrolled Type 2 Diabetes on insulin  - BG is low  this am ,hyperglycemia improved with insulin regimen   - order bedtime snack , cont lantus ans ISS   - will Dc admelog 2 unit before meal and cont to monitor to avoid hypoglycemia   - A1c 11.2    Atrial Fibrillation  - Rate controlled, INR returned to normal  - on cardizem and metoprolol    - on eliquis renal dose    COVID-19   - CT Chest with bilateral upper lobe opacities  - completed decadron  - Will need repeat outpatient CT chest in 3 months for RLL nodule  - Supplemental oxygen PRN, wean as tolerated    Code Status: DNR/DNI    Dispo: Pending HD / [facility] bed and insurance auth per CM / SW     Stable for discharge to [facility]    Updated family at bedside

## 2023-01-02 NOTE — PROGRESS NOTE ADULT - ASSESSMENT
CKD(IV): +COVID  THERESA, progressive + fluid overload + hyperkalemia  has h/o Nephrotic syndrome 2/2 DM w 3 gm proteinuria in past  Remains HD dependent  - HD to cont on TTS schedule  - has poor UOP no signs of renal recovery- dialysis tomorrow    Anemia: +CKD  - cont DILAN  - target Hgb > 10    Will follow

## 2023-01-03 LAB
GLUCOSE BLDC GLUCOMTR-MCNC: 109 MG/DL — HIGH (ref 70–99)
GLUCOSE BLDC GLUCOMTR-MCNC: 161 MG/DL — HIGH (ref 70–99)
GLUCOSE BLDC GLUCOMTR-MCNC: 218 MG/DL — HIGH (ref 70–99)
GLUCOSE BLDC GLUCOMTR-MCNC: 268 MG/DL — HIGH (ref 70–99)

## 2023-01-03 PROCEDURE — 99232 SBSQ HOSP IP/OBS MODERATE 35: CPT

## 2023-01-03 PROCEDURE — 99231 SBSQ HOSP IP/OBS SF/LOW 25: CPT

## 2023-01-03 RX ORDER — IPRATROPIUM/ALBUTEROL SULFATE 18-103MCG
3 AEROSOL WITH ADAPTER (GRAM) INHALATION ONCE
Refills: 0 | Status: COMPLETED | OUTPATIENT
Start: 2023-01-03 | End: 2023-01-03

## 2023-01-03 RX ADMIN — Medication 4: at 17:17

## 2023-01-03 RX ADMIN — CHLORHEXIDINE GLUCONATE 1 APPLICATION(S): 213 SOLUTION TOPICAL at 05:07

## 2023-01-03 RX ADMIN — PANTOPRAZOLE SODIUM 40 MILLIGRAM(S): 20 TABLET, DELAYED RELEASE ORAL at 05:09

## 2023-01-03 RX ADMIN — Medication 650 MILLIGRAM(S): at 06:00

## 2023-01-03 RX ADMIN — BUDESONIDE AND FORMOTEROL FUMARATE DIHYDRATE 2 PUFF(S): 160; 4.5 AEROSOL RESPIRATORY (INHALATION) at 08:23

## 2023-01-03 RX ADMIN — INSULIN GLARGINE 8 UNIT(S): 100 INJECTION, SOLUTION SUBCUTANEOUS at 22:20

## 2023-01-03 RX ADMIN — ESCITALOPRAM OXALATE 10 MILLIGRAM(S): 10 TABLET, FILM COATED ORAL at 14:07

## 2023-01-03 RX ADMIN — Medication 500 MILLIGRAM(S): at 14:07

## 2023-01-03 RX ADMIN — ALBUTEROL 2 PUFF(S): 90 AEROSOL, METERED ORAL at 08:23

## 2023-01-03 RX ADMIN — APIXABAN 2.5 MILLIGRAM(S): 2.5 TABLET, FILM COATED ORAL at 05:09

## 2023-01-03 RX ADMIN — Medication 240 MILLIGRAM(S): at 05:09

## 2023-01-03 RX ADMIN — Medication 50 MILLIGRAM(S): at 14:07

## 2023-01-03 RX ADMIN — Medication 650 MILLIGRAM(S): at 15:00

## 2023-01-03 RX ADMIN — APIXABAN 2.5 MILLIGRAM(S): 2.5 TABLET, FILM COATED ORAL at 17:16

## 2023-01-03 RX ADMIN — Medication 1000 UNIT(S): at 14:06

## 2023-01-03 RX ADMIN — Medication 650 MILLIGRAM(S): at 14:06

## 2023-01-03 RX ADMIN — Medication 50 MILLIGRAM(S): at 17:16

## 2023-01-03 RX ADMIN — Medication 650 MILLIGRAM(S): at 05:09

## 2023-01-03 RX ADMIN — Medication 0.5 MILLIGRAM(S): at 22:18

## 2023-01-03 RX ADMIN — Medication 1 TABLET(S): at 14:06

## 2023-01-03 RX ADMIN — ERYTHROPOIETIN 10000 UNIT(S): 10000 INJECTION, SOLUTION INTRAVENOUS; SUBCUTANEOUS at 10:28

## 2023-01-03 RX ADMIN — Medication 50 MILLIGRAM(S): at 05:09

## 2023-01-03 RX ADMIN — Medication 50 MILLIGRAM(S): at 22:19

## 2023-01-03 RX ADMIN — ALBUTEROL 2 PUFF(S): 90 AEROSOL, METERED ORAL at 14:08

## 2023-01-03 RX ADMIN — Medication 6: at 08:21

## 2023-01-03 RX ADMIN — ALBUTEROL 2 PUFF(S): 90 AEROSOL, METERED ORAL at 00:00

## 2023-01-03 RX ADMIN — Medication 3 MILLILITER(S): at 23:31

## 2023-01-03 RX ADMIN — Medication 325 MILLIGRAM(S): at 14:07

## 2023-01-03 RX ADMIN — ALBUTEROL 2 PUFF(S): 90 AEROSOL, METERED ORAL at 17:17

## 2023-01-03 RX ADMIN — Medication 30 MILLILITER(S): at 22:19

## 2023-01-03 RX ADMIN — Medication 650 MILLIGRAM(S): at 22:19

## 2023-01-03 RX ADMIN — CHLORHEXIDINE GLUCONATE 1 APPLICATION(S): 213 SOLUTION TOPICAL at 14:14

## 2023-01-03 RX ADMIN — Medication 1 MILLIGRAM(S): at 22:56

## 2023-01-03 RX ADMIN — ALBUTEROL 2 PUFF(S): 90 AEROSOL, METERED ORAL at 05:06

## 2023-01-03 RX ADMIN — Medication 1 MILLIGRAM(S): at 02:24

## 2023-01-03 NOTE — PROGRESS NOTE ADULT - NS ATTEND OPT1 GEN_ALL_CORE

## 2023-01-03 NOTE — PROGRESS NOTE ADULT - SUBJECTIVE AND OBJECTIVE BOX
[facility] Division of Hospital Medicine  Sanjay Crews MD    Chief Complaint:  Patient is a 82y old  Female who presents with a chief complaint of CHF  renal failure (01 Jan 2023 18:15)      SUBJECTIVE / OVERNIGHT EVENTS:  Patient seen and examined at bedside in HD. No acute events reported overnight. No new complaints.     MEDICATIONS  (STANDING):  acetaminophen     Tablet .. 650 milliGRAM(s) Oral every 8 hours  albuterol    90 MICROgram(s) HFA Inhaler 2 Puff(s) Inhalation every 4 hours  apixaban 2.5 milliGRAM(s) Oral two times a day  ascorbic acid 500 milliGRAM(s) Oral daily  budesonide  80 MICROgram(s)/formoterol 4.5 MICROgram(s) Inhaler 2 Puff(s) Inhalation two times a day  calcium carbonate   1250 mG (OsCal) 1 Tablet(s) Oral daily  chlorhexidine 2% Cloths 1 Application(s) Topical daily  chlorhexidine 2% Cloths 1 Application(s) Topical <User Schedule>  cholecalciferol 1000 Unit(s) Oral daily  dextrose 5%. 1000 milliLiter(s) (50 mL/Hr) IV Continuous <Continuous>  dextrose 5%. 1000 milliLiter(s) (100 mL/Hr) IV Continuous <Continuous>  dextrose 50% Injectable 25 Gram(s) IV Push once  dextrose 50% Injectable 12.5 Gram(s) IV Push once  dextrose 50% Injectable 25 Gram(s) IV Push once  diltiazem    milliGRAM(s) Oral daily  epoetin susie-epbx (RETACRIT) Injectable 01607 Unit(s) IV Push <User Schedule>  ergocalciferol 13879 Unit(s) Oral every week  escitalopram 10 milliGRAM(s) Oral daily  ferrous    sulfate 325 milliGRAM(s) Oral daily  glucagon  Injectable 1 milliGRAM(s) IntraMuscular once  hydrALAZINE 50 milliGRAM(s) Oral every 8 hours  insulin glargine Injectable (LANTUS) 8 Unit(s) SubCutaneous at bedtime  insulin lispro (ADMELOG) corrective regimen sliding scale   SubCutaneous three times a day before meals  metoprolol tartrate 50 milliGRAM(s) Oral two times a day  pantoprazole    Tablet 40 milliGRAM(s) Oral before breakfast    MEDICATIONS  (PRN):  aluminum hydroxide/magnesium hydroxide/simethicone Suspension 30 milliLiter(s) Oral every 6 hours PRN Dyspepsia  dextrose Oral Gel 15 Gram(s) Oral once PRN Blood Glucose LESS THAN 70 milliGRAM(s)/deciliter  dextrose Oral Gel 15 Gram(s) Oral once PRN Blood Glucose LESS THAN 70 milliGRAM(s)/deciliter  hydrALAZINE Injectable 10 milliGRAM(s) IV Push every 6 hours PRN SBP > 160  LORazepam     Tablet 0.5 milliGRAM(s) Oral every 8 hours PRN Anxiety  melatonin 1 milliGRAM(s) Oral at bedtime PRN Sleep  simethicone 80 milliGRAM(s) Chew every 6 hours PRN Gas  sodium chloride 0.9% lock flush 10 milliLiter(s) IV Push every 1 hour PRN Pre/post blood products, medications, blood draw, and to maintain line patency  sodium chloride 0.9% lock flush 10 milliLiter(s) IV Push every 1 hour PRN Pre/post blood products, medications, blood draw, and to maintain line patency        I&O's Summary      PHYSICAL EXAM:  Vital Signs Last 24 Hrs  T(C): 36.8 (03 Jan 2023 09:04), Max: 36.8 (02 Jan 2023 15:36)  T(F): 98.3 (03 Jan 2023 09:04), Max: 98.3 (02 Jan 2023 20:45)  HR: 67 (03 Jan 2023 09:04) (61 - 76)  BP: 166/55 (03 Jan 2023 09:04) (136/51 - 176/63)  BP(mean): --  RR: 18 (03 Jan 2023 09:04) (18 - 24)  SpO2: 100% (03 Jan 2023 09:04) (97% - 100%)    Parameters below as of 03 Jan 2023 09:04  Patient On (Oxygen Delivery Method): nasal cannula            CONSTITUTIONAL: Elderly female, NAD  HEENT: NC/AT, PERRL, no JVD  CHEST WALL: R permacath  RESPIRATORY: CTA bilaterally, normal effort  CARDIOVASCULAR: RRR, S1/S2+, no m/g/r  ABDOMEN: Nontender to palpation, normoactive bowel sounds, no rebound/guarding  MUSCULOSKELETAL: No edema, cyanosis or deformities.  PSYCH: Calm, affect appropriate.  NEUROLOGY: Awake, alert, no focal neurological deficits.   SKIN: No rashes; no palpable lesions  VASC: Distal pulses palpable    LABS:                        8.7    7.14  )-----------( 296      ( 02 Jan 2023 06:00 )             30.3     01-02    131<L>  |  95<L>  |  66.4<H>  ----------------------------<  199<H>  4.8   |  25.0  |  3.45<H>    Ca    7.7<L>      02 Jan 2023 06:00  Phos  3.5     01-02  Mg     2.1     01-02                CAPILLARY BLOOD GLUCOSE      POCT Blood Glucose.: 268 mg/dL (03 Jan 2023 07:29)  POCT Blood Glucose.: 199 mg/dL (02 Jan 2023 21:08)  POCT Blood Glucose.: 198 mg/dL (02 Jan 2023 16:37)  POCT Blood Glucose.: 172 mg/dL (02 Jan 2023 11:04)        RADIOLOGY & ADDITIONAL TESTS:  Results Reviewed:   Imaging Personally Reviewed:  Electrocardiogram Personally Reviewed:

## 2023-01-03 NOTE — PROGRESS NOTE ADULT - NS ATTEND OPT1A GEN_ALL_CORE
History/Exam/Medical decision making
History/Exam/Medical decision making
Medical decision making
History/Exam/Medical decision making
History/Exam/Medical decision making

## 2023-01-03 NOTE — PROGRESS NOTE ADULT - ASSESSMENT
83 y/o F with PMHx asthma, DM, HLD, atrial fibrillation, HTN, who was found to have a right superficial femoral vein thrombosis while on warfarin and acute kidney injury. Hemodialysis was initiated by nephrology.    1. Uncontrolled T2DM, a1c 11.2%  - Continue lantus 8 units qhs  - Sliding scale coverage    2. THERESA/CKD  - HD via permacath, T/Th/Sat  - Plan as per nephrology    3. Acute Hypoxic respiratory Failure - Multifactorial  - Started on HD  - Supplemental O2 as needed  - Bronchodilators  - S/p decadron    4. Compression fx T1-0 and L4  - Can see if can get DEXA inpatient, if not can be done outpatient  - 25-OH Vit D 13, started on vitmain d supplement

## 2023-01-03 NOTE — PROGRESS NOTE ADULT - ASSESSMENT
82F with PMH asthma, diabetes, hyperlipidemia, atrial fibrillation, hypertension, and hyperlipidemia, who was found to have a right superficial femoral vein thrombosis while on warfarin and acute kidney injury. She was also noted to have been recently diagnosed with COVID-19.    THERESA on CKD  - Cont HD, initiated this hospitalization   - cont HD per renal team on T,Thr,Sat   - S/p PC on 12/21 will eventually need avf  - SW for HD set up as op   - [facility] with HD seat pending     Acute on Chronic Diastolic Heart failure  - Resistant to diuresis, now on HD initiated 12/13/22   - fluid removal with HD   - Continue Metoprolol    HTN   - on metoprolol and hydralazine   - controlled     Pleural effusion   - Small to mod L and small R pleural effusion with LLL complete and RLL near complete compressive atelectasis   - Incentive spirometry  - S/p chest tube on 12/19, removed on 12/22 per TS team   - Fluid studies transudative, result reviewed cx , gram stain and fungal cxs are neg   - Supplemental oxygen PRN, wean as tolerate     Emphysematous Cystitis  - UCx never collected  - Urology consult appreciated  - s/p rocephin iv completed 12/24     Acute Hypoxic respiratory Failure - Multifactorial due to  Asthma, COVID-19, CHFpEF, Fluid overload.  - Started on HD 12/13/22  - Bronchodilators prn   - Supplemental oxygen PRN, wean as tolerated    Anemia likely due to  chronic disease/ CKD   - hb stable   - iron studies reviewed   - add ferrous sulfate , folic acid , vit c   - cont epogen with HD     Uncontrolled Type 2 Diabetes on insulin  - BG is low  this am ,hyperglycemia improved with insulin regimen   - order bedtime snack , cont lantus ans ISS   - will Dc admelog 2 unit before meal and cont to monitor to avoid hypoglycemia   - A1c 11.2    Atrial Fibrillation  - Rate controlled, INR returned to normal  - on cardizem and metoprolol    - on eliquis renal dose    COVID-19   - CT Chest with bilateral upper lobe opacities  - completed decadron  - Will need repeat outpatient CT chest in 3 months for RLL nodule  - Supplemental oxygen PRN, wean as tolerated    Code Status: DNR/DNI    Dispo: Pending HD / [facility] bed and insurance auth per CM / SW     Updated family at bedside

## 2023-01-03 NOTE — PROGRESS NOTE ADULT - NS ATTEND AMEND GEN_ALL_CORE FT
I have seen and examined pt with NP, agree  with above assessment and plan
Patient seen and examined at bedside with NP. Agree with the above plan.
Patient seen and examined at bedside with NP. Agree with the above plan.
I have seen and examined patient with NP, agree with above assessment and plan
I have seen and examined patient with np, agree with above assessment and plan
Patient seen and examined at bedside with NP. Agree with the above plan.
variable glucose levels, generally stable. Continue monitoring
I have seen and examined patient with NP, agree with above assessment and plan
Pt seen and examined   reports to be feelign a little better   FAmily at bedside stating she is eating all offood on tray    BS  82 this AM    T2DM with ESRD on HD    no further hypoglcyemia-    reduce LAntus to 8 and premeal Admelog to 2 units tidac
Pt with Taylor  pre lunch     Pt with inc creatinien - on HD  but missed it yesterday     BS back up to 74 from being in 40's   pt did eat breakfast and lunch       Pt perorts genie feeling ok now     o CP no presure no dyspnea  no n/v no abd pain      DM with hypoglcyemia-- insulin dsoes reduced by 50% LAntus and ADmelog   will do 3 AM accucheck    encouraged po intake       for HD later today
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 Cardiology consulted for worsening respiratory status.  -Patient was off diuretic and received at least 5L IVF since admission  -Kidney function initially improved, now worsening (Cr currently 4.96, baseline 2.5 in 3/2022 per nephrology notes)  -Fluid overloaded on exam with pBNP 76268 (88299 on admission)  -Small pleural effusions b/l on CT chest 12/7  -Repeat CXR pending   -Repeat US negative for DVT b/l 12/6   -Patient on lasix 40mg po daily outpatient; agree with starting bumex 1mg IV q12   -Strict I/O's, daily weights, renal function and electrolytes  -Supratherapeutic INR; reportedly labile outpatient per family. Currently on hold. Unclear why patient is on coumadin; would consider switching to low dose eliquis on d/c  -BP uncontrolled; was started on hydralazine 25mg q8 by primary team. Continue to monitor.    Patients family at bedside.  Today, Patient appears comfortable, in no acute distress.  ECHO from today is normal.  Still has elevated INR  Will sign off
Patient seen and examined by me.  I have discussed my recommendation with the PA which are outlined above.  NO ABSOLUTE CONTRAINDICATIONS TO PROCEED WITH THE PERMACATH  Will sign off

## 2023-01-03 NOTE — PROGRESS NOTE ADULT - SUBJECTIVE AND OBJECTIVE BOX
INTERVAL EVENTS:  Examined at dialysis  Follow up diabetes management   reports pt has good appetite    ROS negative, denies pain    MEDICATIONS  (STANDING):  acetaminophen     Tablet .. 650 milliGRAM(s) Oral every 8 hours  albuterol    90 MICROgram(s) HFA Inhaler 2 Puff(s) Inhalation every 4 hours  apixaban 2.5 milliGRAM(s) Oral two times a day  ascorbic acid 500 milliGRAM(s) Oral daily  budesonide  80 MICROgram(s)/formoterol 4.5 MICROgram(s) Inhaler 2 Puff(s) Inhalation two times a day  calcium carbonate   1250 mG (OsCal) 1 Tablet(s) Oral daily  chlorhexidine 2% Cloths 1 Application(s) Topical daily  chlorhexidine 2% Cloths 1 Application(s) Topical <User Schedule>  cholecalciferol 1000 Unit(s) Oral daily  dextrose 5%. 1000 milliLiter(s) (50 mL/Hr) IV Continuous <Continuous>  dextrose 5%. 1000 milliLiter(s) (100 mL/Hr) IV Continuous <Continuous>  dextrose 50% Injectable 25 Gram(s) IV Push once  dextrose 50% Injectable 12.5 Gram(s) IV Push once  dextrose 50% Injectable 25 Gram(s) IV Push once  diltiazem    milliGRAM(s) Oral daily  epoetin susie-epbx (RETACRIT) Injectable 14644 Unit(s) IV Push <User Schedule>  ergocalciferol 77867 Unit(s) Oral every week  escitalopram 10 milliGRAM(s) Oral daily  ferrous    sulfate 325 milliGRAM(s) Oral daily  glucagon  Injectable 1 milliGRAM(s) IntraMuscular once  hydrALAZINE 50 milliGRAM(s) Oral every 8 hours  insulin glargine Injectable (LANTUS) 8 Unit(s) SubCutaneous at bedtime  insulin lispro (ADMELOG) corrective regimen sliding scale   SubCutaneous three times a day before meals  metoprolol tartrate 50 milliGRAM(s) Oral two times a day  pantoprazole    Tablet 40 milliGRAM(s) Oral before breakfast    MEDICATIONS  (PRN):  aluminum hydroxide/magnesium hydroxide/simethicone Suspension 30 milliLiter(s) Oral every 6 hours PRN Dyspepsia  dextrose Oral Gel 15 Gram(s) Oral once PRN Blood Glucose LESS THAN 70 milliGRAM(s)/deciliter  dextrose Oral Gel 15 Gram(s) Oral once PRN Blood Glucose LESS THAN 70 milliGRAM(s)/deciliter  hydrALAZINE Injectable 10 milliGRAM(s) IV Push every 6 hours PRN SBP > 160  LORazepam     Tablet 0.5 milliGRAM(s) Oral every 8 hours PRN Anxiety  melatonin 1 milliGRAM(s) Oral at bedtime PRN Sleep  simethicone 80 milliGRAM(s) Chew every 6 hours PRN Gas  sodium chloride 0.9% lock flush 10 milliLiter(s) IV Push every 1 hour PRN Pre/post blood products, medications, blood draw, and to maintain line patency  sodium chloride 0.9% lock flush 10 milliLiter(s) IV Push every 1 hour PRN Pre/post blood products, medications, blood draw, and to maintain line patency    Allergies  No Known Allergies    Vital Signs Last 24 Hrs  T(C): 36.8 (03 Jan 2023 09:04), Max: 36.8 (02 Jan 2023 15:36)  T(F): 98.3 (03 Jan 2023 09:04), Max: 98.3 (02 Jan 2023 20:45)  HR: 67 (03 Jan 2023 09:04) (61 - 76)  BP: 166/55 (03 Jan 2023 09:04) (136/51 - 176/63)  BP(mean): --  RR: 18 (03 Jan 2023 09:04) (18 - 24)  SpO2: 100% (03 Jan 2023 09:04) (97% - 100%)    Parameters below as of 03 Jan 2023 09:04  Patient On (Oxygen Delivery Method): nasal cannula      PHYSICAL EXAM:  General: No apparent distress  Neck: Supple, trachea midline, no thyromegaly  Respiratory: Lungs clear bilaterally, normal rate, effort  Cardiac: +S1, S2, no m/r/g  GI: +BS, soft, non tender, non distended  Extremities: Mild LE edema  Neuro: A+O      LABS:                        8.7    7.14  )-----------( 296      ( 02 Jan 2023 06:00 )             30.3     01-02    131<L>  |  95<L>  |  66.4<H>  ----------------------------<  199<H>  4.8   |  25.0  |  3.45<H>    Ca    7.7<L>      02 Jan 2023 06:00  Phos  3.5     01-02  Mg     2.1     01-02      POCT Blood Glucose.: 268 mg/dL (01-03-23 @ 07:29)  POCT Blood Glucose.: 199 mg/dL (01-02-23 @ 21:08)  POCT Blood Glucose.: 198 mg/dL (01-02-23 @ 16:37)

## 2023-01-04 LAB
GLUCOSE BLDC GLUCOMTR-MCNC: 112 MG/DL — HIGH (ref 70–99)
GLUCOSE BLDC GLUCOMTR-MCNC: 183 MG/DL — HIGH (ref 70–99)
GLUCOSE BLDC GLUCOMTR-MCNC: 184 MG/DL — HIGH (ref 70–99)
GLUCOSE BLDC GLUCOMTR-MCNC: 75 MG/DL — SIGNIFICANT CHANGE UP (ref 70–99)
SARS-COV-2 RNA SPEC QL NAA+PROBE: SIGNIFICANT CHANGE UP

## 2023-01-04 PROCEDURE — 99232 SBSQ HOSP IP/OBS MODERATE 35: CPT

## 2023-01-04 RX ADMIN — Medication 50 MILLIGRAM(S): at 14:09

## 2023-01-04 RX ADMIN — ALBUTEROL 2 PUFF(S): 90 AEROSOL, METERED ORAL at 12:16

## 2023-01-04 RX ADMIN — CHLORHEXIDINE GLUCONATE 1 APPLICATION(S): 213 SOLUTION TOPICAL at 17:40

## 2023-01-04 RX ADMIN — Medication 240 MILLIGRAM(S): at 05:08

## 2023-01-04 RX ADMIN — ESCITALOPRAM OXALATE 10 MILLIGRAM(S): 10 TABLET, FILM COATED ORAL at 12:15

## 2023-01-04 RX ADMIN — Medication 2: at 17:37

## 2023-01-04 RX ADMIN — Medication 50 MILLIGRAM(S): at 17:37

## 2023-01-04 RX ADMIN — PANTOPRAZOLE SODIUM 40 MILLIGRAM(S): 20 TABLET, DELAYED RELEASE ORAL at 05:08

## 2023-01-04 RX ADMIN — Medication 500 MILLIGRAM(S): at 12:15

## 2023-01-04 RX ADMIN — APIXABAN 2.5 MILLIGRAM(S): 2.5 TABLET, FILM COATED ORAL at 17:37

## 2023-01-04 RX ADMIN — INSULIN GLARGINE 8 UNIT(S): 100 INJECTION, SOLUTION SUBCUTANEOUS at 22:42

## 2023-01-04 RX ADMIN — Medication 30 MILLILITER(S): at 22:41

## 2023-01-04 RX ADMIN — Medication 650 MILLIGRAM(S): at 22:42

## 2023-01-04 RX ADMIN — Medication 650 MILLIGRAM(S): at 14:09

## 2023-01-04 RX ADMIN — Medication 650 MILLIGRAM(S): at 05:08

## 2023-01-04 RX ADMIN — Medication 650 MILLIGRAM(S): at 15:00

## 2023-01-04 RX ADMIN — Medication 50 MILLIGRAM(S): at 05:09

## 2023-01-04 RX ADMIN — Medication 50 MILLIGRAM(S): at 22:42

## 2023-01-04 RX ADMIN — ALBUTEROL 2 PUFF(S): 90 AEROSOL, METERED ORAL at 08:17

## 2023-01-04 RX ADMIN — Medication 1000 UNIT(S): at 12:15

## 2023-01-04 RX ADMIN — ALBUTEROL 2 PUFF(S): 90 AEROSOL, METERED ORAL at 17:38

## 2023-01-04 RX ADMIN — Medication 0.5 MILLIGRAM(S): at 22:42

## 2023-01-04 RX ADMIN — Medication 1 TABLET(S): at 12:15

## 2023-01-04 RX ADMIN — CHLORHEXIDINE GLUCONATE 1 APPLICATION(S): 213 SOLUTION TOPICAL at 05:09

## 2023-01-04 RX ADMIN — BUDESONIDE AND FORMOTEROL FUMARATE DIHYDRATE 2 PUFF(S): 160; 4.5 AEROSOL RESPIRATORY (INHALATION) at 08:17

## 2023-01-04 RX ADMIN — Medication 325 MILLIGRAM(S): at 12:15

## 2023-01-04 RX ADMIN — APIXABAN 2.5 MILLIGRAM(S): 2.5 TABLET, FILM COATED ORAL at 05:09

## 2023-01-04 NOTE — CHART NOTE - NSCHARTNOTEFT_GEN_A_CORE
Palliative care social work note.    SW and palliative care NP Raisa Leung met with patients spouse and 2 sons. Education regarding medical issues and clarification of disease related issues addressed. Support provided throughout in discussing goals of care with dialysis. Issues around decisions as multi dimensional scenarios to be reviewed as if dialysis continues then impact on lifestyle. Although spouse expresses wanting patient home caregiver limitations addressed by other family members who are aware of need for 24 care to be provided if at home as well patient and family would need to consider quality of life with attending dialysis multiple times a week and overall impact on patient lifestyle. Further option with SNF facility addressed to be considered but spouse clear that patient would never want to be in a SNF. Family addressed understanding that they would need to address services in community and education provided that family would need private hire. Alternative thoughts reviewed with family if decision not to proceed with LT dialysis is hospice care understanding that patients time of life may be limited but quality of life optimized. Hospice services at home and inpatient explored so family has clear understanding of all dimensions involved in decision making. Family was hoping to have patient input . NP addressed mentation issues and may not be able to fully comprehend that complexities in the decisions and aftercare. Family planning to further discuss patients best interest and decisions in moving forward. Palliative team provided support in helping family cope with patients overall prognosis and in difficult family decisions.
Per CT surg, elquis need to be DCed 24hr prior to drainage  DCed Eluqis for tonight dose and planed for pleural effusion drainage tomorrow
Right femoral catheter removed  manual pressure applied for 10 minutes  patient tolerated procedure    - no hemorrhage  - monitor site and call if needed    -- NPO at midnight for PermCath tomorrow
Source: Patient [ ]  Family [ ]   other [ x]    Current Diet: Diet, NPO after Midnight:      NPO Start Date: 20-Dec-2022,   NPO Start Time: 23:59 (12-20-22 @ 10:55)  Diet, NPO after Midnight:      NPO Start Date: 20-Dec-2022,   NPO Start Time: 23:59  Except Medications (12-20-22 @ 10:45)  Diet, DASH/TLC:   Sodium & Cholesterol Restricted  Consistent Carbohydrate {No Snacks} (CSTCHO) (12-06-22 @ 11:23)    PO intake:  < 50% [ ]   50-75%  [ x]   %  [ ]  other :    Current Weight:   (12/15) 124.2 lbs  (12/14) 127.6 lbs  Obtain daily wts, continue to monitor  2+ R arm, b/l LE edema noted    Pertinent Medications: MEDICATIONS  (STANDING):  acetaminophen     Tablet .. 650 milliGRAM(s) Oral every 8 hours  albuterol    90 MICROgram(s) HFA Inhaler 2 Puff(s) Inhalation every 4 hours  budesonide  80 MICROgram(s)/formoterol 4.5 MICROgram(s) Inhaler 2 Puff(s) Inhalation two times a day  cefTRIAXone Injectable. 1000 milliGRAM(s) IV Push every 24 hours  chlorhexidine 2% Cloths 1 Application(s) Topical daily  cholecalciferol 1000 Unit(s) Oral daily  dexAMETHasone  Injectable 6 milliGRAM(s) IV Push daily  dextrose 5%. 1000 milliLiter(s) (50 mL/Hr) IV Continuous <Continuous>  dextrose 5%. 1000 milliLiter(s) (100 mL/Hr) IV Continuous <Continuous>  dextrose 50% Injectable 25 Gram(s) IV Push once  dextrose 50% Injectable 12.5 Gram(s) IV Push once  dextrose 50% Injectable 25 Gram(s) IV Push once  diltiazem    milliGRAM(s) Oral daily  epoetin susie-epbx (RETACRIT) Injectable 94346 Unit(s) IV Push <User Schedule>  escitalopram 20 milliGRAM(s) Oral daily  glucagon  Injectable 1 milliGRAM(s) IntraMuscular once  guaiFENesin ER 1200 milliGRAM(s) Oral every 12 hours  heparin   Injectable 5000 Unit(s) SubCutaneous every 12 hours  hydrALAZINE 50 milliGRAM(s) Oral every 8 hours  insulin glargine Injectable (LANTUS) 10 Unit(s) SubCutaneous at bedtime  insulin lispro (ADMELOG) corrective regimen sliding scale   SubCutaneous three times a day before meals  insulin lispro Injectable (ADMELOG) 5 Unit(s) SubCutaneous three times a day before meals  metoprolol tartrate 50 milliGRAM(s) Oral two times a day  pantoprazole    Tablet 40 milliGRAM(s) Oral before breakfast    MEDICATIONS  (PRN):  dextrose Oral Gel 15 Gram(s) Oral once PRN Blood Glucose LESS THAN 70 milliGRAM(s)/deciliter  hydrALAZINE Injectable 10 milliGRAM(s) IV Push every 6 hours PRN SBP > 160  melatonin 1 milliGRAM(s) Oral at bedtime PRN Sleep  sodium chloride 0.9% lock flush 10 milliLiter(s) IV Push every 1 hour PRN Pre/post blood products, medications, blood draw, and to maintain line patency    Pertinent Labs: CBC Full  -  ( 20 Dec 2022 05:53 )  WBC Count : 15.00 K/uL  RBC Count : 4.05 M/uL  Hemoglobin : 8.9 g/dL  Hematocrit : 29.8 %  Platelet Count - Automated : 144 K/uL  Mean Cell Volume : 73.6 fl  Mean Cell Hemoglobin : 22.0 pg  Mean Cell Hemoglobin Concentration : 29.9 gm/dL  Auto Neutrophil # : x  Auto Lymphocyte # : x  Auto Monocyte # : x  Auto Eosinophil # : x  Auto Basophil # : x  Auto Neutrophil % : x  Auto Lymphocyte % : x  Auto Monocyte % : x  Auto Eosinophil % : x  Auto Basophil % : x  12-20 Na133 mmol/L<L> Glu 98 mg/dL K+ 4.8 mmol/L Cr  3.81 mg/dL<H> BUN 63.4 mg/dL<H> Phos n/a   Alb n/a   PAB n/a       Skin: no breakdown noted    Nutrition focused physical exam previously conducted - found signs of malnutrition [ ]absent [ x]present    Subcutaneous fat loss: [x ] Orbital fat pads region, [x ]Buccal fat region, [ ]Triceps region,  [ ]Ribs region    Muscle wasting: [ x]Temples region, [x ]Clavicle region, [x ]Shoulder region, [ ]Scapula region, [ ]Interosseous region,  [ ]thigh region, [ ]Calf region    Estimated Needs:   [ x] no change since previous assessment  [ ] recalculated:     Current Nutrition Diagnosis: Pt remains at high nutrition risk and meets criteria for Severe chronic malnutrition related to inability to meet sufficient protein-energy in setting of persistent lack of appetite and advanced age as evidenced by pt meeting <75% estimated energy needs >1month, mod muscle wasting and fat loss. Per documentation, pt with fair po intake consuming 50-75% of meals. Plan for permacath tomorrow. Elevated glucose noted. RD to remain available.     Recommendations:   1) Continue diet as tolerated. Add Nepro TID to optimize po intake and provide an additional 425 kcal, 19.1g protein per serving   2) Provide assistance with meals prn  3) Monitor wts and labs    Monitoring and Evaluation:   [x ] PO intake [x ] Tolerance to diet prescription [X] Weights  [X] Follow up per protocol [X] Labs:
Source: Patient [ ]  Family [ ]   other [x ]    Current Diet: Diet, DASH/TLC:   Sodium & Cholesterol Restricted  Consistent Carbohydrate {No Snacks} (CSTCHO)  No Concentrated Potassium  No Concentrated Phosphorus (12-22-22 @ 08:31)      Patient reports [ ] nausea  [ ] vomiting [ ] diarrhea [ ] constipation  [ ]chewing problems [ ] swallowing issues  [ ] other:     PO intake:  < 50% [ ]   50-75%  [x ]   %  [ ]  other :    Source for PO intake [ ] Patient [ ] family [ x] chart [ ] staff [ ] other    Current Weight:   (12/15) 124.3 lbs  (12/14) 127.6 lbs  (12/5) 139.9 lbs  ? accuracy of weights, noted with 2+ generalized and 3+ b/l arm edema, continue to trend and maintain strict Is&Os     Pertinent Medications: MEDICATIONS  (STANDING):  acetaminophen     Tablet .. 650 milliGRAM(s) Oral every 8 hours  albuterol    90 MICROgram(s) HFA Inhaler 2 Puff(s) Inhalation every 4 hours  apixaban 2.5 milliGRAM(s) Oral two times a day  ascorbic acid 500 milliGRAM(s) Oral daily  budesonide  80 MICROgram(s)/formoterol 4.5 MICROgram(s) Inhaler 2 Puff(s) Inhalation two times a day  calcium carbonate   1250 mG (OsCal) 1 Tablet(s) Oral daily  chlorhexidine 2% Cloths 1 Application(s) Topical daily  chlorhexidine 2% Cloths 1 Application(s) Topical <User Schedule>  cholecalciferol 1000 Unit(s) Oral daily  dextrose 5%. 1000 milliLiter(s) (50 mL/Hr) IV Continuous <Continuous>  dextrose 5%. 1000 milliLiter(s) (100 mL/Hr) IV Continuous <Continuous>  dextrose 50% Injectable 25 Gram(s) IV Push once  dextrose 50% Injectable 12.5 Gram(s) IV Push once  dextrose 50% Injectable 25 Gram(s) IV Push once  diltiazem    milliGRAM(s) Oral daily  epoetin susie-epbx (RETACRIT) Injectable 47329 Unit(s) IV Push <User Schedule>  ergocalciferol 53585 Unit(s) Oral every week  escitalopram 10 milliGRAM(s) Oral daily  ferrous    sulfate 325 milliGRAM(s) Oral daily  glucagon  Injectable 1 milliGRAM(s) IntraMuscular once  hydrALAZINE 50 milliGRAM(s) Oral every 8 hours  insulin glargine Injectable (LANTUS) 8 Unit(s) SubCutaneous at bedtime  insulin lispro (ADMELOG) corrective regimen sliding scale   SubCutaneous three times a day before meals  metoprolol tartrate 50 milliGRAM(s) Oral two times a day  pantoprazole    Tablet 40 milliGRAM(s) Oral before breakfast    MEDICATIONS  (PRN):  aluminum hydroxide/magnesium hydroxide/simethicone Suspension 30 milliLiter(s) Oral every 6 hours PRN Dyspepsia  dextrose Oral Gel 15 Gram(s) Oral once PRN Blood Glucose LESS THAN 70 milliGRAM(s)/deciliter  dextrose Oral Gel 15 Gram(s) Oral once PRN Blood Glucose LESS THAN 70 milliGRAM(s)/deciliter  hydrALAZINE Injectable 10 milliGRAM(s) IV Push every 6 hours PRN SBP > 160  LORazepam     Tablet 0.5 milliGRAM(s) Oral every 8 hours PRN Anxiety  melatonin 1 milliGRAM(s) Oral at bedtime PRN Sleep  simethicone 80 milliGRAM(s) Chew every 6 hours PRN Gas  sodium chloride 0.9% lock flush 10 milliLiter(s) IV Push every 1 hour PRN Pre/post blood products, medications, blood draw, and to maintain line patency  sodium chloride 0.9% lock flush 10 milliLiter(s) IV Push every 1 hour PRN Pre/post blood products, medications, blood draw, and to maintain line patency    Pertinent Labs: N/A    Skin: Wound to right groin per documentation  Julian: 14    Nutrition focused physical exam previously conducted - found signs of malnutrition [ ]absent [ x]present    Subcutaneous fat loss: [x ] Orbital fat pads region, [x ]Buccal fat region, [ ]Triceps region,  [ ]Ribs region    Muscle wasting: [ x]Temples region, [x ]Clavicle region, [x ]Shoulder region, [ ]Scapula region, [ ]Interosseous region,  [ ]thigh region, [ ]Calf region    Estimated Needs:   [ x] no change since previous assessment  [ ] recalculated:     Current Nutrition Diagnosis: Pt remains at high nutrition risk secondary to malnutrition (severe, chronic) related to inability to meet sufficient protein-energy in setting of persistent lack of appetite and advanced age as evidenced by meeting <75% estimated energy needs >1 month, moderate muscle loss of temples, clavicles, shoulders, moderate fat loss of orbitals and buccal pads, and 2+ generalized edema. Pt lethargic. Noted  reports pt with good appetite, however, actual documented po intake appears poor. Last BM 1/3. RD to follow up.     Recommendations:   1) Continue diet as tolerated.   2) Add Nepro TID to optimize po intake and provide an additional 425 kcal, 19.1g protein per serving   3) Continue vitamin D, vitamin C, and ferrous sulfate supplementation.   4) Rx: Nephro-Gary daily.  5) Obtain daily weights to monitor trends.   6) Encourage po intake, monitor diet tolerance, and provide assistance at meals as needed.     Monitoring and Evaluation:   [x ] PO intake [x ] Tolerance to diet prescription [X] Weights  [X] Follow up per protocol [X] Labs: chem 8, mg, phos, H/H, GFR
Source: Patient [x ]  Family [ ]   other [ ]    Current Diet: Diet, DASH/TLC:   Sodium & Cholesterol Restricted  Consistent Carbohydrate {No Snacks} (CSTCHO)  No Concentrated Potassium  No Concentrated Phosphorus (12-22-22 @ 08:31)    PO intake:  < 50% [ ]   50-75%  [x ]   %  [ ]  other :    Current Weight:   (12/15) 124.3 lbs  (12/14) 127.6 lbs  Obtain daily wts, continue to monitor  No edema noted    Pertinent Medications: MEDICATIONS  (STANDING):  acetaminophen     Tablet .. 650 milliGRAM(s) Oral every 8 hours  albuterol    90 MICROgram(s) HFA Inhaler 2 Puff(s) Inhalation every 4 hours  apixaban 2.5 milliGRAM(s) Oral two times a day  budesonide  80 MICROgram(s)/formoterol 4.5 MICROgram(s) Inhaler 2 Puff(s) Inhalation two times a day  chlorhexidine 2% Cloths 1 Application(s) Topical daily  chlorhexidine 2% Cloths 1 Application(s) Topical <User Schedule>  cholecalciferol 1000 Unit(s) Oral daily  dextrose 5%. 1000 milliLiter(s) (100 mL/Hr) IV Continuous <Continuous>  dextrose 5%. 1000 milliLiter(s) (50 mL/Hr) IV Continuous <Continuous>  dextrose 50% Injectable 25 Gram(s) IV Push once  dextrose 50% Injectable 12.5 Gram(s) IV Push once  dextrose 50% Injectable 25 Gram(s) IV Push once  diltiazem    milliGRAM(s) Oral daily  epoetin susie-epbx (RETACRIT) Injectable 04936 Unit(s) IV Push <User Schedule>  escitalopram 10 milliGRAM(s) Oral daily  glucagon  Injectable 1 milliGRAM(s) IntraMuscular once  hydrALAZINE 50 milliGRAM(s) Oral every 8 hours  insulin glargine Injectable (LANTUS) 10 Unit(s) SubCutaneous at bedtime  insulin lispro (ADMELOG) corrective regimen sliding scale   SubCutaneous three times a day before meals  insulin lispro Injectable (ADMELOG) 4 Unit(s) SubCutaneous three times a day with meals  metoprolol tartrate 50 milliGRAM(s) Oral two times a day  pantoprazole    Tablet 40 milliGRAM(s) Oral before breakfast    MEDICATIONS  (PRN):  aluminum hydroxide/magnesium hydroxide/simethicone Suspension 30 milliLiter(s) Oral every 6 hours PRN Dyspepsia  dextrose Oral Gel 15 Gram(s) Oral once PRN Blood Glucose LESS THAN 70 milliGRAM(s)/deciliter  hydrALAZINE Injectable 10 milliGRAM(s) IV Push every 6 hours PRN SBP > 160  melatonin 1 milliGRAM(s) Oral at bedtime PRN Sleep  sodium chloride 0.9% lock flush 10 milliLiter(s) IV Push every 1 hour PRN Pre/post blood products, medications, blood draw, and to maintain line patency  sodium chloride 0.9% lock flush 10 milliLiter(s) IV Push every 1 hour PRN Pre/post blood products, medications, blood draw, and to maintain line patency    Pertinent Labs: CBC Full  -  ( 26 Dec 2022 11:42 )  WBC Count : 9.22 K/uL  RBC Count : 4.57 M/uL  Hemoglobin : 10.3 g/dL  Hematocrit : 34.7 %  Platelet Count - Automated : 215 K/uL  Mean Cell Volume : 75.9 fl  Mean Cell Hemoglobin : 22.5 pg  Mean Cell Hemoglobin Concentration : 29.7 gm/dL  Auto Neutrophil # : x  Auto Lymphocyte # : x  Auto Monocyte # : x  Auto Eosinophil # : x  Auto Basophil # : x  Auto Neutrophil % : x  Auto Lymphocyte % : x  Auto Monocyte % : x  Auto Eosinophil % : x  Auto Basophil % : x  12-27 Na135 mmol/L Glu 177 mg/dL<H> K+ 5.5 mmol/L<H> Cr  4.52 mg/dL<H> BUN 95.3 mg/dL<H> Phos 4.6 mg/dL Alb 2.1 g/dL<L> PAB n/a       Skin: no breakdown noted  Julian: 16     Nutrition focused physical exam previously conducted - found signs of malnutrition [ ]absent [ x]present    Subcutaneous fat loss: [x ] Orbital fat pads region, [x ]Buccal fat region, [ ]Triceps region,  [ ]Ribs region    Muscle wasting: [ x]Temples region, [x ]Clavicle region, [x ]Shoulder region, [ ]Scapula region, [ ]Interosseous region,  [ ]thigh region, [ ]Calf region    Estimated Needs:   [ x] no change since previous assessment  [ ] recalculated:     Current Nutrition Diagnosis: Pt remains at high nutrition risk and meets criteria for Severe chronic malnutrition related to inability to meet sufficient protein-energy in setting of persistent lack of appetite and advanced age as evidenced by pt meeting <75% estimated energy needs >1month, mod muscle wasting and fat loss. Pt with fair po intake, consuming ~50% of meals per tray observation. Aware HD initiated 12/13/22 via Right femoral access. K+/phos WNL. Last documented BM 12/24. RD to remain available.     Recommendations:   1) Continue diet as tolerated. Add Nepro TID to optimize po intake and provide an additional 425 kcal, 19.1g protein per serving   2) Provide assistance with meals prn  3) Monitor wts and labs    Monitoring and Evaluation:   [x ] PO intake [x ] Tolerance to diet prescription [X] Weights  [X] Follow up per protocol [X] Labs:
Goals established, symptoms managed, will sign off. Please reconsult our service should anything change.
Pt seen and examined at bedside with son present for c/o non radiating mid epigastric abd. pain x 2 days. Last BM yesterday AM.   Resting comfortably in NAD. Denies chest pain, SOB, n/v/d/c,  HA, dizziness, blurry vision  All vital signs stable.   On exam abd soft, nontender, nondistended. +BS x 4 quadrants.   Stat CBC, CMP  CT abd/pelvis  IV tylenol   Zofran  RN to notify of any acute change in pt status

## 2023-01-04 NOTE — PROGRESS NOTE ADULT - SUBJECTIVE AND OBJECTIVE BOX
[facility] Division of Hospital Medicine  Sanjay Crews MD    Chief Complaint:  Patient is a 82y old  Female who presents with a chief complaint of CHF  renal failure (01 Jan 2023 18:15)      SUBJECTIVE / OVERNIGHT EVENTS:  Patient seen and examined at bedside. No acute events reported overnight. No new complaints.    MEDICATIONS  (STANDING):  acetaminophen     Tablet .. 650 milliGRAM(s) Oral every 8 hours  albuterol    90 MICROgram(s) HFA Inhaler 2 Puff(s) Inhalation every 4 hours  apixaban 2.5 milliGRAM(s) Oral two times a day  ascorbic acid 500 milliGRAM(s) Oral daily  budesonide  80 MICROgram(s)/formoterol 4.5 MICROgram(s) Inhaler 2 Puff(s) Inhalation two times a day  calcium carbonate   1250 mG (OsCal) 1 Tablet(s) Oral daily  chlorhexidine 2% Cloths 1 Application(s) Topical daily  chlorhexidine 2% Cloths 1 Application(s) Topical <User Schedule>  cholecalciferol 1000 Unit(s) Oral daily  dextrose 5%. 1000 milliLiter(s) (50 mL/Hr) IV Continuous <Continuous>  dextrose 5%. 1000 milliLiter(s) (100 mL/Hr) IV Continuous <Continuous>  dextrose 50% Injectable 25 Gram(s) IV Push once  dextrose 50% Injectable 12.5 Gram(s) IV Push once  dextrose 50% Injectable 25 Gram(s) IV Push once  diltiazem    milliGRAM(s) Oral daily  epoetin susie-epbx (RETACRIT) Injectable 26495 Unit(s) IV Push <User Schedule>  ergocalciferol 81517 Unit(s) Oral every week  escitalopram 10 milliGRAM(s) Oral daily  ferrous    sulfate 325 milliGRAM(s) Oral daily  glucagon  Injectable 1 milliGRAM(s) IntraMuscular once  hydrALAZINE 50 milliGRAM(s) Oral every 8 hours  insulin glargine Injectable (LANTUS) 8 Unit(s) SubCutaneous at bedtime  insulin lispro (ADMELOG) corrective regimen sliding scale   SubCutaneous three times a day before meals  metoprolol tartrate 50 milliGRAM(s) Oral two times a day  pantoprazole    Tablet 40 milliGRAM(s) Oral before breakfast    MEDICATIONS  (PRN):  aluminum hydroxide/magnesium hydroxide/simethicone Suspension 30 milliLiter(s) Oral every 6 hours PRN Dyspepsia  dextrose Oral Gel 15 Gram(s) Oral once PRN Blood Glucose LESS THAN 70 milliGRAM(s)/deciliter  dextrose Oral Gel 15 Gram(s) Oral once PRN Blood Glucose LESS THAN 70 milliGRAM(s)/deciliter  hydrALAZINE Injectable 10 milliGRAM(s) IV Push every 6 hours PRN SBP > 160  LORazepam     Tablet 0.5 milliGRAM(s) Oral every 8 hours PRN Anxiety  melatonin 1 milliGRAM(s) Oral at bedtime PRN Sleep  simethicone 80 milliGRAM(s) Chew every 6 hours PRN Gas  sodium chloride 0.9% lock flush 10 milliLiter(s) IV Push every 1 hour PRN Pre/post blood products, medications, blood draw, and to maintain line patency  sodium chloride 0.9% lock flush 10 milliLiter(s) IV Push every 1 hour PRN Pre/post blood products, medications, blood draw, and to maintain line patency        I&O's Summary    03 Jan 2023 07:01  -  04 Jan 2023 07:00  --------------------------------------------------------  IN: 0 mL / OUT: 1002 mL / NET: -1002 mL        PHYSICAL EXAM:  Vital Signs Last 24 Hrs  T(C): 36.8 (04 Jan 2023 04:34), Max: 36.8 (03 Jan 2023 15:37)  T(F): 98.3 (04 Jan 2023 04:34), Max: 98.3 (03 Jan 2023 15:37)  HR: 77 (04 Jan 2023 04:34) (65 - 77)  BP: 136/82 (04 Jan 2023 04:34) (136/82 - 164/66)  BP(mean): --  RR: 16 (04 Jan 2023 04:34) (16 - 18)  SpO2: 99% (04 Jan 2023 04:34) (99% - 100%)    Parameters below as of 04 Jan 2023 04:34  Patient On (Oxygen Delivery Method): nasal cannula  O2 Flow (L/min): 2          CONSTITUTIONAL: NAD  HEENT: NC/AT, PERRL, no JVD  CHEST WALL: +PC  RESPIRATORY: CTA bilaterally, normal effort  CARDIOVASCULAR: RRR, S1/S2+, no m/g/r  ABDOMEN: Nontender to palpation, normoactive bowel sounds, no rebound/guarding  MUSCULOSKELETAL: No edema, cyanosis or deformities.  PSYCH: Calm, affect appropriate.  NEUROLOGY: Awake, alert, no focal neurological deficits.   SKIN: No rashes; no palpable lesions  VASC: Distal pulses palpable    LABS:                    CAPILLARY BLOOD GLUCOSE      POCT Blood Glucose.: 75 mg/dL (04 Jan 2023 07:27)  POCT Blood Glucose.: 161 mg/dL (03 Jan 2023 22:10)  POCT Blood Glucose.: 218 mg/dL (03 Jan 2023 16:39)  POCT Blood Glucose.: 109 mg/dL (03 Jan 2023 11:36)        RADIOLOGY & ADDITIONAL TESTS:  Results Reviewed:   Imaging Personally Reviewed:  Electrocardiogram Personally Reviewed:

## 2023-01-04 NOTE — PROGRESS NOTE ADULT - ASSESSMENT
82F with PMH asthma, diabetes, hyperlipidemia, atrial fibrillation, hypertension, and hyperlipidemia, who was found to have a right superficial femoral vein thrombosis while on warfarin and acute kidney injury. She was also noted to have been recently diagnosed with COVID-19.    THERESA on CKD  - Cont HD, initiated this hospitalization   - cont HD per renal team on T,Thr,Sat   - S/p PC on 12/21 will eventually need avf  - SW for HD set up as op   - [facility] with HD seat pending     Acute on Chronic Diastolic Heart failure  - Resistant to diuresis, now on HD initiated 12/13/22   - fluid removal with HD   - Continue Metoprolol    HTN   - on metoprolol and hydralazine   - controlled     Pleural effusion   - Small to mod L and small R pleural effusion with LLL complete and RLL near complete compressive atelectasis   - Incentive spirometry  - S/p chest tube on 12/19, removed on 12/22 per TS team   - Fluid studies transudative, result reviewed cx , gram stain and fungal cxs are neg   - Supplemental oxygen PRN, wean as tolerate     Emphysematous Cystitis  - UCx never collected  - Urology consult appreciated  - s/p rocephin iv completed 12/24     Acute Hypoxic respiratory Failure - Multifactorial due to  Asthma, COVID-19, CHFpEF, Fluid overload.  - Started on HD 12/13/22  - Bronchodilators prn   - Supplemental oxygen PRN, wean as tolerated    Anemia likely due to  chronic disease/ CKD   - hb stable   - iron studies reviewed   - add ferrous sulfate , folic acid , vit c   - cont epogen with HD     Uncontrolled Type 2 Diabetes on insulin  - BG is low  this am ,hyperglycemia improved with insulin regimen   - order bedtime snack , cont lantus ans ISS   - will Dc admelog 2 unit before meal and cont to monitor to avoid hypoglycemia   - A1c 11.2    Atrial Fibrillation  - Rate controlled, INR returned to normal  - on cardizem and metoprolol    - on eliquis renal dose    COVID-19   - CT Chest with bilateral upper lobe opacities  - completed decadron  - Will need repeat outpatient CT chest in 3 months for RLL nodule  - Supplemental oxygen PRN, wean as tolerated    Code Status: DNR/DNI    Dispo: Pending HD / [facility] bed and insurance auth per CM / SW

## 2023-01-04 NOTE — CHART NOTE - NSCHARTNOTESELECT_GEN_ALL_CORE
Event Note
Palliative care/Event Note
Event Note
Nutrition Services

## 2023-01-04 NOTE — PROGRESS NOTE ADULT - SUBJECTIVE AND OBJECTIVE BOX
NEPHROLOGY INTERVAL HPI/OVERNIGHT EVENTS:    No new events.    MEDICATIONS  (STANDING):  acetaminophen     Tablet .. 650 milliGRAM(s) Oral every 8 hours  albuterol    90 MICROgram(s) HFA Inhaler 2 Puff(s) Inhalation every 4 hours  apixaban 2.5 milliGRAM(s) Oral two times a day  ascorbic acid 500 milliGRAM(s) Oral daily  budesonide  80 MICROgram(s)/formoterol 4.5 MICROgram(s) Inhaler 2 Puff(s) Inhalation two times a day  calcium carbonate   1250 mG (OsCal) 1 Tablet(s) Oral daily  chlorhexidine 2% Cloths 1 Application(s) Topical daily  chlorhexidine 2% Cloths 1 Application(s) Topical <User Schedule>  cholecalciferol 1000 Unit(s) Oral daily  dextrose 5%. 1000 milliLiter(s) (100 mL/Hr) IV Continuous <Continuous>  dextrose 5%. 1000 milliLiter(s) (50 mL/Hr) IV Continuous <Continuous>  dextrose 50% Injectable 25 Gram(s) IV Push once  dextrose 50% Injectable 12.5 Gram(s) IV Push once  dextrose 50% Injectable 25 Gram(s) IV Push once  diltiazem    milliGRAM(s) Oral daily  epoetin susie-epbx (RETACRIT) Injectable 53520 Unit(s) IV Push <User Schedule>  ergocalciferol 25985 Unit(s) Oral every week  escitalopram 10 milliGRAM(s) Oral daily  ferrous    sulfate 325 milliGRAM(s) Oral daily  glucagon  Injectable 1 milliGRAM(s) IntraMuscular once  hydrALAZINE 50 milliGRAM(s) Oral every 8 hours  insulin glargine Injectable (LANTUS) 8 Unit(s) SubCutaneous at bedtime  insulin lispro (ADMELOG) corrective regimen sliding scale   SubCutaneous three times a day before meals  metoprolol tartrate 50 milliGRAM(s) Oral two times a day  pantoprazole    Tablet 40 milliGRAM(s) Oral before breakfast    MEDICATIONS  (PRN):  aluminum hydroxide/magnesium hydroxide/simethicone Suspension 30 milliLiter(s) Oral every 6 hours PRN Dyspepsia  dextrose Oral Gel 15 Gram(s) Oral once PRN Blood Glucose LESS THAN 70 milliGRAM(s)/deciliter  dextrose Oral Gel 15 Gram(s) Oral once PRN Blood Glucose LESS THAN 70 milliGRAM(s)/deciliter  hydrALAZINE Injectable 10 milliGRAM(s) IV Push every 6 hours PRN SBP > 160  LORazepam     Tablet 0.5 milliGRAM(s) Oral every 8 hours PRN Anxiety  melatonin 1 milliGRAM(s) Oral at bedtime PRN Sleep  simethicone 80 milliGRAM(s) Chew every 6 hours PRN Gas  sodium chloride 0.9% lock flush 10 milliLiter(s) IV Push every 1 hour PRN Pre/post blood products, medications, blood draw, and to maintain line patency  sodium chloride 0.9% lock flush 10 milliLiter(s) IV Push every 1 hour PRN Pre/post blood products, medications, blood draw, and to maintain line patency      Allergies    No Known Allergies            Vital Signs Last 24 Hrs  T(C): 36.8 (04 Jan 2023 04:34), Max: 36.8 (03 Jan 2023 15:37)  T(F): 98.3 (04 Jan 2023 04:34), Max: 98.3 (03 Jan 2023 15:37)  HR: 77 (04 Jan 2023 04:34) (65 - 77)  BP: 136/82 (04 Jan 2023 04:34) (136/82 - 164/66)  BP(mean): --  RR: 16 (04 Jan 2023 04:34) (16 - 18)  SpO2: 99% (04 Jan 2023 04:34) (99% - 100%)    Parameters below as of 04 Jan 2023 04:34  Patient On (Oxygen Delivery Method): nasal cannula  O2 Flow (L/min): 2        PHYSICAL EXAM:    GENERAL: appears  chronically ill in bed  HEAD:  wnl  EYES:   ENMT: wnl   NECK: right  permacath  site  clean  NERVOUS SYSTEM: alert, verbal   CHEST/LUNG: nasal 02, decreased  bs bases  HEART: IRR, No  rub  ABDOMEN: NT  EXTREMITIES: compression  devices  legs   LYMPH:   SKIN: pale    LABS:                      RADIOLOGY & ADDITIONAL TESTS:

## 2023-01-04 NOTE — PROGRESS NOTE ADULT - ASSESSMENT
81 y/o F with PMHx asthma, DM, HLD, atrial fibrillation, HTN, who was found to have a right superficial femoral vein thrombosis while on warfarin and acute kidney injury. Hemodialysis was initiated by nephrology.    1. Uncontrolled T2DM, a1c 11.2%  - Bgs 75 this am, has variable glucose levels  - Continue lantus 8 units qhs  - Sliding scale coverage    2. THERESA/CKD  - HD via permacath, T/Th/Sat  - Plan as per nephrology    3. Acute Hypoxic respiratory Failure - Multifactorial  - Started on HD  - Supplemental O2 as needed  - Bronchodilators  - S/p decadron    4. Compression fx T1-0 and L4  - Can see if can get DEXA inpatient, if not can be done outpatient  - 25-OH Vit D 13, started on vitmain d supplement

## 2023-01-04 NOTE — PROGRESS NOTE ADULT - SUBJECTIVE AND OBJECTIVE BOX
INTERVAL EVENTS:  Follow up diabetes management    ROS: Does not talk much, but denies pain  Breakfast tray untouched, usually on eats when she is fed    MEDICATIONS  (STANDING):  acetaminophen     Tablet .. 650 milliGRAM(s) Oral every 8 hours  albuterol    90 MICROgram(s) HFA Inhaler 2 Puff(s) Inhalation every 4 hours  apixaban 2.5 milliGRAM(s) Oral two times a day  ascorbic acid 500 milliGRAM(s) Oral daily  budesonide  80 MICROgram(s)/formoterol 4.5 MICROgram(s) Inhaler 2 Puff(s) Inhalation two times a day  calcium carbonate   1250 mG (OsCal) 1 Tablet(s) Oral daily  chlorhexidine 2% Cloths 1 Application(s) Topical daily  chlorhexidine 2% Cloths 1 Application(s) Topical <User Schedule>  cholecalciferol 1000 Unit(s) Oral daily  dextrose 5%. 1000 milliLiter(s) (50 mL/Hr) IV Continuous <Continuous>  dextrose 5%. 1000 milliLiter(s) (100 mL/Hr) IV Continuous <Continuous>  dextrose 50% Injectable 25 Gram(s) IV Push once  dextrose 50% Injectable 12.5 Gram(s) IV Push once  dextrose 50% Injectable 25 Gram(s) IV Push once  diltiazem    milliGRAM(s) Oral daily  epoetin susie-epbx (RETACRIT) Injectable 32618 Unit(s) IV Push <User Schedule>  ergocalciferol 30126 Unit(s) Oral every week  escitalopram 10 milliGRAM(s) Oral daily  ferrous    sulfate 325 milliGRAM(s) Oral daily  glucagon  Injectable 1 milliGRAM(s) IntraMuscular once  hydrALAZINE 50 milliGRAM(s) Oral every 8 hours  insulin glargine Injectable (LANTUS) 8 Unit(s) SubCutaneous at bedtime  insulin lispro (ADMELOG) corrective regimen sliding scale   SubCutaneous three times a day before meals  metoprolol tartrate 50 milliGRAM(s) Oral two times a day  pantoprazole    Tablet 40 milliGRAM(s) Oral before breakfast    MEDICATIONS  (PRN):  aluminum hydroxide/magnesium hydroxide/simethicone Suspension 30 milliLiter(s) Oral every 6 hours PRN Dyspepsia  dextrose Oral Gel 15 Gram(s) Oral once PRN Blood Glucose LESS THAN 70 milliGRAM(s)/deciliter  dextrose Oral Gel 15 Gram(s) Oral once PRN Blood Glucose LESS THAN 70 milliGRAM(s)/deciliter  hydrALAZINE Injectable 10 milliGRAM(s) IV Push every 6 hours PRN SBP > 160  LORazepam     Tablet 0.5 milliGRAM(s) Oral every 8 hours PRN Anxiety  melatonin 1 milliGRAM(s) Oral at bedtime PRN Sleep  simethicone 80 milliGRAM(s) Chew every 6 hours PRN Gas  sodium chloride 0.9% lock flush 10 milliLiter(s) IV Push every 1 hour PRN Pre/post blood products, medications, blood draw, and to maintain line patency  sodium chloride 0.9% lock flush 10 milliLiter(s) IV Push every 1 hour PRN Pre/post blood products, medications, blood draw, and to maintain line patency    Allergies  No Known Allergies    Vital Signs Last 24 Hrs  T(C): 36.7 (04 Jan 2023 10:17), Max: 36.8 (03 Jan 2023 15:37)  T(F): 98.1 (04 Jan 2023 10:17), Max: 98.3 (03 Jan 2023 15:37)  HR: 68 (04 Jan 2023 10:17) (65 - 77)  BP: 153/65 (04 Jan 2023 10:17) (136/82 - 164/66)  BP(mean): --  RR: 17 (04 Jan 2023 10:17) (16 - 18)  SpO2: 99% (04 Jan 2023 10:17) (99% - 100%)    Parameters below as of 04 Jan 2023 10:17  Patient On (Oxygen Delivery Method): nasal cannula    PHYSICAL EXAM:  General: No apparent distress  Respiratory: Lungs clear b/l  Cardiac: +S1, S2, no m/r/g  GI: +BS, soft, non tender, non distended  Extremities: Trace LE edema  Neuro: Alert/awake    POCT Blood Glucose.: 75 mg/dL (01-04-23 @ 07:27)  POCT Blood Glucose.: 161 mg/dL (01-03-23 @ 22:10)  POCT Blood Glucose.: 218 mg/dL (01-03-23 @ 16:39)  POCT Blood Glucose.: 109 mg/dL (01-03-23 @ 11:36)

## 2023-01-05 LAB
ALBUMIN SERPL ELPH-MCNC: 2.2 G/DL — LOW (ref 3.3–5.2)
ALP SERPL-CCNC: 117 U/L — SIGNIFICANT CHANGE UP (ref 40–120)
ALT FLD-CCNC: 7 U/L — SIGNIFICANT CHANGE UP
ANION GAP SERPL CALC-SCNC: 10 MMOL/L — SIGNIFICANT CHANGE UP (ref 5–17)
AST SERPL-CCNC: 8 U/L — SIGNIFICANT CHANGE UP
BILIRUB SERPL-MCNC: <0.2 MG/DL — LOW (ref 0.4–2)
BUN SERPL-MCNC: 79.6 MG/DL — HIGH (ref 8–20)
CALCIUM SERPL-MCNC: 7.8 MG/DL — LOW (ref 8.4–10.5)
CHLORIDE SERPL-SCNC: 97 MMOL/L — SIGNIFICANT CHANGE UP (ref 96–108)
CO2 SERPL-SCNC: 25 MMOL/L — SIGNIFICANT CHANGE UP (ref 22–29)
CREAT SERPL-MCNC: 3.69 MG/DL — HIGH (ref 0.5–1.3)
EGFR: 12 ML/MIN/1.73M2 — LOW
GLUCOSE BLDC GLUCOMTR-MCNC: 128 MG/DL — HIGH (ref 70–99)
GLUCOSE BLDC GLUCOMTR-MCNC: 150 MG/DL — HIGH (ref 70–99)
GLUCOSE BLDC GLUCOMTR-MCNC: 188 MG/DL — HIGH (ref 70–99)
GLUCOSE BLDC GLUCOMTR-MCNC: 250 MG/DL — HIGH (ref 70–99)
GLUCOSE SERPL-MCNC: 216 MG/DL — HIGH (ref 70–99)
HCT VFR BLD CALC: 30.8 % — LOW (ref 34.5–45)
HGB BLD-MCNC: 8.7 G/DL — LOW (ref 11.5–15.5)
MCHC RBC-ENTMCNC: 21.2 PG — LOW (ref 27–34)
MCHC RBC-ENTMCNC: 28.2 GM/DL — LOW (ref 32–36)
MCV RBC AUTO: 74.9 FL — LOW (ref 80–100)
PLATELET # BLD AUTO: 310 K/UL — SIGNIFICANT CHANGE UP (ref 150–400)
POTASSIUM SERPL-MCNC: 4.9 MMOL/L — SIGNIFICANT CHANGE UP (ref 3.5–5.3)
POTASSIUM SERPL-SCNC: 4.9 MMOL/L — SIGNIFICANT CHANGE UP (ref 3.5–5.3)
PROT SERPL-MCNC: 4.8 G/DL — LOW (ref 6.6–8.7)
RBC # BLD: 4.11 M/UL — SIGNIFICANT CHANGE UP (ref 3.8–5.2)
RBC # FLD: 23.4 % — HIGH (ref 10.3–14.5)
SODIUM SERPL-SCNC: 132 MMOL/L — LOW (ref 135–145)
WBC # BLD: 5.74 K/UL — SIGNIFICANT CHANGE UP (ref 3.8–10.5)
WBC # FLD AUTO: 5.74 K/UL — SIGNIFICANT CHANGE UP (ref 3.8–10.5)

## 2023-01-05 PROCEDURE — 99232 SBSQ HOSP IP/OBS MODERATE 35: CPT

## 2023-01-05 RX ADMIN — PANTOPRAZOLE SODIUM 40 MILLIGRAM(S): 20 TABLET, DELAYED RELEASE ORAL at 05:36

## 2023-01-05 RX ADMIN — Medication 650 MILLIGRAM(S): at 05:36

## 2023-01-05 RX ADMIN — Medication 4: at 17:40

## 2023-01-05 RX ADMIN — ALBUTEROL 2 PUFF(S): 90 AEROSOL, METERED ORAL at 13:20

## 2023-01-05 RX ADMIN — ALBUTEROL 2 PUFF(S): 90 AEROSOL, METERED ORAL at 17:35

## 2023-01-05 RX ADMIN — Medication 50 MILLIGRAM(S): at 05:37

## 2023-01-05 RX ADMIN — Medication 50 MILLIGRAM(S): at 17:32

## 2023-01-05 RX ADMIN — CHLORHEXIDINE GLUCONATE 1 APPLICATION(S): 213 SOLUTION TOPICAL at 13:22

## 2023-01-05 RX ADMIN — ERYTHROPOIETIN 10000 UNIT(S): 10000 INJECTION, SOLUTION INTRAVENOUS; SUBCUTANEOUS at 11:00

## 2023-01-05 RX ADMIN — Medication 325 MILLIGRAM(S): at 12:24

## 2023-01-05 RX ADMIN — BUDESONIDE AND FORMOTEROL FUMARATE DIHYDRATE 2 PUFF(S): 160; 4.5 AEROSOL RESPIRATORY (INHALATION) at 13:19

## 2023-01-05 RX ADMIN — ESCITALOPRAM OXALATE 10 MILLIGRAM(S): 10 TABLET, FILM COATED ORAL at 12:24

## 2023-01-05 RX ADMIN — Medication 650 MILLIGRAM(S): at 23:43

## 2023-01-05 RX ADMIN — Medication 650 MILLIGRAM(S): at 22:43

## 2023-01-05 RX ADMIN — CHLORHEXIDINE GLUCONATE 1 APPLICATION(S): 213 SOLUTION TOPICAL at 06:00

## 2023-01-05 RX ADMIN — Medication 50 MILLIGRAM(S): at 12:23

## 2023-01-05 RX ADMIN — APIXABAN 2.5 MILLIGRAM(S): 2.5 TABLET, FILM COATED ORAL at 05:36

## 2023-01-05 RX ADMIN — Medication 1 TABLET(S): at 12:24

## 2023-01-05 RX ADMIN — APIXABAN 2.5 MILLIGRAM(S): 2.5 TABLET, FILM COATED ORAL at 17:32

## 2023-01-05 RX ADMIN — Medication 50 MILLIGRAM(S): at 22:43

## 2023-01-05 RX ADMIN — INSULIN GLARGINE 8 UNIT(S): 100 INJECTION, SOLUTION SUBCUTANEOUS at 22:43

## 2023-01-05 RX ADMIN — Medication 500 MILLIGRAM(S): at 12:23

## 2023-01-05 RX ADMIN — Medication 240 MILLIGRAM(S): at 05:37

## 2023-01-05 RX ADMIN — Medication 50 MILLIGRAM(S): at 05:36

## 2023-01-05 RX ADMIN — Medication 1000 UNIT(S): at 12:24

## 2023-01-05 NOTE — PROGRESS NOTE ADULT - ASSESSMENT
82F with PMH asthma, diabetes, hyperlipidemia, atrial fibrillation, hypertension, and hyperlipidemia, who was found to have a right superficial femoral vein thrombosis while on warfarin and acute kidney injury. She was also noted to have been recently diagnosed with COVID-19.    THERESA on CKD  - Cont HD, initiated this hospitalization   - cont HD per renal team on T, Thr ,Sat   - S/p PC on 12/21 will eventually need avf  - SW for HD set up as op   - [facility] with HD seat pending     Acute on Chronic Diastolic Heart failure  - Resistant to diuresis, now on HD initiated 12/13/22   - fluid removal with HD   - Continue Metoprolol    HTN   - on metoprolol and hydralazine   - controlled     Pleural effusion   - Small to mod L and small R pleural effusion with LLL complete and RLL near complete compressive atelectasis   - Incentive spirometry  - S/p chest tube on 12/19, removed on 12/22 per TS team   - Fluid studies transudative, result reviewed cx , gram stain and fungal cxs are neg   - Supplemental oxygen PRN, wean as tolerate     Emphysematous Cystitis  - UCx never collected  - Urology consult appreciated  - s/p rocephin iv completed 12/24     Acute Hypoxic respiratory Failure - Multifactorial due to  Asthma, COVID-19, CHFpEF, Fluid overload.  - Started on HD 12/13/22  - Bronchodilators prn   - Supplemental oxygen PRN, wean as tolerated    Anemia likely due to  chronic disease/ CKD   - hb stable   - iron studies reviewed   - add ferrous sulfate , folic acid , vit c   - cont epogen with HD     Uncontrolled Type 2 Diabetes on insulin  - BG is low  this am ,hyperglycemia improved with insulin regimen   - order bedtime snack , cont lantus ans ISS   - will Dc admelog 2 unit before meal and cont to monitor to avoid hypoglycemia   - A1c 11.2    Atrial Fibrillation  - Rate controlled, INR returned to normal  - on cardizem and metoprolol    - on eliquis renal dose    COVID-19   - CT Chest with bilateral upper lobe opacities  - completed decadron  - Will need repeat outpatient CT chest in 3 months for RLL nodule  - Supplemental oxygen PRN, wean as tolerated    Code Status: DNR/DNI    Dispo: Pending HD / [facility] bed and insurance auth per CM / SW

## 2023-01-05 NOTE — PROGRESS NOTE ADULT - SUBJECTIVE AND OBJECTIVE BOX
NEPHROLOGY INTERVAL HPI/OVERNIGHT EVENTS:    No new events.    MEDICATIONS  (STANDING):  acetaminophen     Tablet .. 650 milliGRAM(s) Oral every 8 hours  albuterol    90 MICROgram(s) HFA Inhaler 2 Puff(s) Inhalation every 4 hours  apixaban 2.5 milliGRAM(s) Oral two times a day  ascorbic acid 500 milliGRAM(s) Oral daily  budesonide  80 MICROgram(s)/formoterol 4.5 MICROgram(s) Inhaler 2 Puff(s) Inhalation two times a day  calcium carbonate   1250 mG (OsCal) 1 Tablet(s) Oral daily  chlorhexidine 2% Cloths 1 Application(s) Topical daily  chlorhexidine 2% Cloths 1 Application(s) Topical <User Schedule>  cholecalciferol 1000 Unit(s) Oral daily  dextrose 5%. 1000 milliLiter(s) (100 mL/Hr) IV Continuous <Continuous>  dextrose 5%. 1000 milliLiter(s) (50 mL/Hr) IV Continuous <Continuous>  dextrose 50% Injectable 25 Gram(s) IV Push once  dextrose 50% Injectable 12.5 Gram(s) IV Push once  dextrose 50% Injectable 25 Gram(s) IV Push once  diltiazem    milliGRAM(s) Oral daily  epoetin susie-epbx (RETACRIT) Injectable 03770 Unit(s) IV Push <User Schedule>  ergocalciferol 90369 Unit(s) Oral every week  escitalopram 10 milliGRAM(s) Oral daily  ferrous    sulfate 325 milliGRAM(s) Oral daily  glucagon  Injectable 1 milliGRAM(s) IntraMuscular once  hydrALAZINE 50 milliGRAM(s) Oral every 8 hours  insulin glargine Injectable (LANTUS) 8 Unit(s) SubCutaneous at bedtime  insulin lispro (ADMELOG) corrective regimen sliding scale   SubCutaneous three times a day before meals  metoprolol tartrate 50 milliGRAM(s) Oral two times a day  pantoprazole    Tablet 40 milliGRAM(s) Oral before breakfast    MEDICATIONS  (PRN):  aluminum hydroxide/magnesium hydroxide/simethicone Suspension 30 milliLiter(s) Oral every 6 hours PRN Dyspepsia  dextrose Oral Gel 15 Gram(s) Oral once PRN Blood Glucose LESS THAN 70 milliGRAM(s)/deciliter  dextrose Oral Gel 15 Gram(s) Oral once PRN Blood Glucose LESS THAN 70 milliGRAM(s)/deciliter  hydrALAZINE Injectable 10 milliGRAM(s) IV Push every 6 hours PRN SBP > 160  LORazepam     Tablet 0.5 milliGRAM(s) Oral every 8 hours PRN Anxiety  melatonin 1 milliGRAM(s) Oral at bedtime PRN Sleep  simethicone 80 milliGRAM(s) Chew every 6 hours PRN Gas  sodium chloride 0.9% lock flush 10 milliLiter(s) IV Push every 1 hour PRN Pre/post blood products, medications, blood draw, and to maintain line patency  sodium chloride 0.9% lock flush 10 milliLiter(s) IV Push every 1 hour PRN Pre/post blood products, medications, blood draw, and to maintain line patency      Allergies    No Known Allergies        Vital Signs Last 24 Hrs  T(C): 37 (05 Jan 2023 10:55), Max: 37 (05 Jan 2023 10:55)  T(F): 98.6 (05 Jan 2023 10:55), Max: 98.6 (05 Jan 2023 10:55)  HR: 64 (05 Jan 2023 12:35) (64 - 84)  BP: 164/72 (05 Jan 2023 12:35) (130/68 - 164/72)  BP(mean): --  RR: 16 (05 Jan 2023 12:35) (16 - 18)  SpO2: 96% (05 Jan 2023 12:35) (96% - 100%)    Parameters below as of 05 Jan 2023 12:35  Patient On (Oxygen Delivery Method): nasal cannula  O2 Flow (L/min): 2    T(C): 36.8 (04 Jan 2023 04:34), Max: 36.8 (03 Jan 2023 15:37)  T(F): 98.3 (04 Jan 2023 04:34), Max: 98.3 (03 Jan 2023 15:37)  HR: 77 (04 Jan 2023 04:34) (65 - 77)  BP: 136/82 (04 Jan 2023 04:34) (136/82 - 164/66)  BP(mean): --  RR: 16 (04 Jan 2023 04:34) (16 - 18)  SpO2: 99% (04 Jan 2023 04:34) (99% - 100%)    Parameters below as of 04 Jan 2023 04:34  Patient On (Oxygen Delivery Method): nasal cannula  O2 Flow (L/min): 2        PHYSICAL EXAM:    GENERAL: appears  comfortabley in bed  HEAD:  wnl  EYES:   ENMT: wnl   NECK: right  permacath  site  clean  NERVOUS SYSTEM: alert, verbal   CHEST/LUNG: nasal 02, decreased  bs bases  HEART: IRR, No  rub  ABDOMEN: NT  EXTREMITIES: compression  devices  legs   LYMPH:   SKIN: pale    LABS:    01-05    132<L>  |  97  |  79.6<H>  ----------------------------<  216<H>  4.9   |  25.0  |  3.69<H>    Ca    7.8<L>      05 Jan 2023 05:46    TPro  4.8<L>  /  Alb  2.2<L>  /  TBili  <0.2<L>  /  DBili  x   /  AST  8   /  ALT  7   /  AlkPhos  117  01-05                        RADIOLOGY & ADDITIONAL TESTS:

## 2023-01-05 NOTE — PROGRESS NOTE ADULT - SUBJECTIVE AND OBJECTIVE BOX
[facility] Division of Hospital Medicine  Sanjay Crews MD    Chief Complaint:  Patient is a 82y old  Female who presents with a chief complaint of CHF  renal failure (01 Jan 2023 18:15)      SUBJECTIVE / OVERNIGHT EVENTS:  Patient seen and examined at bedside at . No acute events reported overnight. No new complaints.    MEDICATIONS  (STANDING):  acetaminophen     Tablet .. 650 milliGRAM(s) Oral every 8 hours  albuterol    90 MICROgram(s) HFA Inhaler 2 Puff(s) Inhalation every 4 hours  apixaban 2.5 milliGRAM(s) Oral two times a day  ascorbic acid 500 milliGRAM(s) Oral daily  budesonide  80 MICROgram(s)/formoterol 4.5 MICROgram(s) Inhaler 2 Puff(s) Inhalation two times a day  calcium carbonate   1250 mG (OsCal) 1 Tablet(s) Oral daily  chlorhexidine 2% Cloths 1 Application(s) Topical daily  chlorhexidine 2% Cloths 1 Application(s) Topical <User Schedule>  cholecalciferol 1000 Unit(s) Oral daily  dextrose 5%. 1000 milliLiter(s) (50 mL/Hr) IV Continuous <Continuous>  dextrose 5%. 1000 milliLiter(s) (100 mL/Hr) IV Continuous <Continuous>  dextrose 50% Injectable 25 Gram(s) IV Push once  dextrose 50% Injectable 12.5 Gram(s) IV Push once  dextrose 50% Injectable 25 Gram(s) IV Push once  diltiazem    milliGRAM(s) Oral daily  epoetin susie-epbx (RETACRIT) Injectable 35986 Unit(s) IV Push <User Schedule>  ergocalciferol 33458 Unit(s) Oral every week  escitalopram 10 milliGRAM(s) Oral daily  ferrous    sulfate 325 milliGRAM(s) Oral daily  glucagon  Injectable 1 milliGRAM(s) IntraMuscular once  hydrALAZINE 50 milliGRAM(s) Oral every 8 hours  insulin glargine Injectable (LANTUS) 8 Unit(s) SubCutaneous at bedtime  insulin lispro (ADMELOG) corrective regimen sliding scale   SubCutaneous three times a day before meals  metoprolol tartrate 50 milliGRAM(s) Oral two times a day  pantoprazole    Tablet 40 milliGRAM(s) Oral before breakfast    MEDICATIONS  (PRN):  aluminum hydroxide/magnesium hydroxide/simethicone Suspension 30 milliLiter(s) Oral every 6 hours PRN Dyspepsia  dextrose Oral Gel 15 Gram(s) Oral once PRN Blood Glucose LESS THAN 70 milliGRAM(s)/deciliter  dextrose Oral Gel 15 Gram(s) Oral once PRN Blood Glucose LESS THAN 70 milliGRAM(s)/deciliter  hydrALAZINE Injectable 10 milliGRAM(s) IV Push every 6 hours PRN SBP > 160  LORazepam     Tablet 0.5 milliGRAM(s) Oral every 8 hours PRN Anxiety  melatonin 1 milliGRAM(s) Oral at bedtime PRN Sleep  simethicone 80 milliGRAM(s) Chew every 6 hours PRN Gas  sodium chloride 0.9% lock flush 10 milliLiter(s) IV Push every 1 hour PRN Pre/post blood products, medications, blood draw, and to maintain line patency  sodium chloride 0.9% lock flush 10 milliLiter(s) IV Push every 1 hour PRN Pre/post blood products, medications, blood draw, and to maintain line patency        I&O's Summary    05 Jan 2023 07:01  -  05 Jan 2023 11:28  --------------------------------------------------------  IN: 0 mL / OUT: 1000 mL / NET: -1000 mL        PHYSICAL EXAM:  Vital Signs Last 24 Hrs  T(C): 37 (05 Jan 2023 10:55), Max: 37 (05 Jan 2023 10:55)  T(F): 98.6 (05 Jan 2023 10:55), Max: 98.6 (05 Jan 2023 10:55)  HR: 72 (05 Jan 2023 10:55) (70 - 84)  BP: 154/69 (05 Jan 2023 10:55) (130/68 - 157/68)  BP(mean): --  RR: 18 (05 Jan 2023 10:55) (17 - 18)  SpO2: 100% (05 Jan 2023 10:55) (99% - 100%)    Parameters below as of 05 Jan 2023 10:55  Patient On (Oxygen Delivery Method): nasal cannula            CONSTITUTIONAL: NAD  Chest wall: +PC  HEENT: NC/AT, PERRL, no JVD  RESPIRATORY: CTA bilaterally, normal effort  CARDIOVASCULAR: RRR, S1/S2+, no m/g/r  ABDOMEN: Nontender to palpation, normoactive bowel sounds, no rebound/guarding  MUSCULOSKELETAL: No edema, cyanosis or deformities.  PSYCH: Calm, affect appropriate.  NEUROLOGY: Awake, alert, no focal neurological deficits.   SKIN: No rashes; no palpable lesions  VASC: Distal pulses palpable    LABS:                        8.7    5.74  )-----------( 310      ( 05 Jan 2023 05:46 )             30.8     01-05    132<L>  |  97  |  79.6<H>  ----------------------------<  216<H>  4.9   |  25.0  |  3.69<H>    Ca    7.8<L>      05 Jan 2023 05:46    TPro  4.8<L>  /  Alb  2.2<L>  /  TBili  <0.2<L>  /  DBili  x   /  AST  8   /  ALT  7   /  AlkPhos  117  01-05              CAPILLARY BLOOD GLUCOSE      POCT Blood Glucose.: 150 mg/dL (05 Jan 2023 08:18)  POCT Blood Glucose.: 183 mg/dL (04 Jan 2023 22:17)  POCT Blood Glucose.: 184 mg/dL (04 Jan 2023 16:39)        RADIOLOGY & ADDITIONAL TESTS:  Results Reviewed:   Imaging Personally Reviewed:  Electrocardiogram Personally Reviewed:

## 2023-01-06 VITALS
DIASTOLIC BLOOD PRESSURE: 74 MMHG | OXYGEN SATURATION: 98 % | RESPIRATION RATE: 17 BRPM | HEART RATE: 78 BPM | TEMPERATURE: 98 F | SYSTOLIC BLOOD PRESSURE: 155 MMHG

## 2023-01-06 LAB
ANION GAP SERPL CALC-SCNC: 8 MMOL/L — SIGNIFICANT CHANGE UP (ref 5–17)
BUN SERPL-MCNC: 53.6 MG/DL — HIGH (ref 8–20)
CALCIUM SERPL-MCNC: 8.1 MG/DL — LOW (ref 8.4–10.5)
CHLORIDE SERPL-SCNC: 99 MMOL/L — SIGNIFICANT CHANGE UP (ref 96–108)
CO2 SERPL-SCNC: 28 MMOL/L — SIGNIFICANT CHANGE UP (ref 22–29)
CREAT SERPL-MCNC: 2.81 MG/DL — HIGH (ref 0.5–1.3)
EGFR: 16 ML/MIN/1.73M2 — LOW
GLUCOSE BLDC GLUCOMTR-MCNC: 104 MG/DL — HIGH (ref 70–99)
GLUCOSE BLDC GLUCOMTR-MCNC: 113 MG/DL — HIGH (ref 70–99)
GLUCOSE BLDC GLUCOMTR-MCNC: 232 MG/DL — HIGH (ref 70–99)
GLUCOSE SERPL-MCNC: 167 MG/DL — HIGH (ref 70–99)
POTASSIUM SERPL-MCNC: 4.5 MMOL/L — SIGNIFICANT CHANGE UP (ref 3.5–5.3)
POTASSIUM SERPL-SCNC: 4.5 MMOL/L — SIGNIFICANT CHANGE UP (ref 3.5–5.3)
SODIUM SERPL-SCNC: 135 MMOL/L — SIGNIFICANT CHANGE UP (ref 135–145)

## 2023-01-06 PROCEDURE — 83550 IRON BINDING TEST: CPT

## 2023-01-06 PROCEDURE — 85730 THROMBOPLASTIN TIME PARTIAL: CPT

## 2023-01-06 PROCEDURE — 82306 VITAMIN D 25 HYDROXY: CPT

## 2023-01-06 PROCEDURE — 83036 HEMOGLOBIN GLYCOSYLATED A1C: CPT

## 2023-01-06 PROCEDURE — 88342 IMHCHEM/IMCYTCHM 1ST ANTB: CPT

## 2023-01-06 PROCEDURE — 85027 COMPLETE CBC AUTOMATED: CPT

## 2023-01-06 PROCEDURE — 86706 HEP B SURFACE ANTIBODY: CPT

## 2023-01-06 PROCEDURE — 99285 EMERGENCY DEPT VISIT HI MDM: CPT

## 2023-01-06 PROCEDURE — 83540 ASSAY OF IRON: CPT

## 2023-01-06 PROCEDURE — 71250 CT THORAX DX C-: CPT

## 2023-01-06 PROCEDURE — 80053 COMPREHEN METABOLIC PANEL: CPT

## 2023-01-06 PROCEDURE — 81001 URINALYSIS AUTO W/SCOPE: CPT

## 2023-01-06 PROCEDURE — 82042 OTHER SOURCE ALBUMIN QUAN EA: CPT

## 2023-01-06 PROCEDURE — 87640 STAPH A DNA AMP PROBE: CPT

## 2023-01-06 PROCEDURE — 76775 US EXAM ABDO BACK WALL LIM: CPT

## 2023-01-06 PROCEDURE — 88341 IMHCHEM/IMCYTCHM EA ADD ANTB: CPT

## 2023-01-06 PROCEDURE — 88305 TISSUE EXAM BY PATHOLOGIST: CPT

## 2023-01-06 PROCEDURE — 83615 LACTATE (LD) (LDH) ENZYME: CPT

## 2023-01-06 PROCEDURE — 87102 FUNGUS ISOLATION CULTURE: CPT

## 2023-01-06 PROCEDURE — U0003: CPT

## 2023-01-06 PROCEDURE — 99261: CPT

## 2023-01-06 PROCEDURE — 80048 BASIC METABOLIC PNL TOTAL CA: CPT

## 2023-01-06 PROCEDURE — 85610 PROTHROMBIN TIME: CPT

## 2023-01-06 PROCEDURE — 87340 HEPATITIS B SURFACE AG IA: CPT

## 2023-01-06 PROCEDURE — C1894: CPT

## 2023-01-06 PROCEDURE — 85379 FIBRIN DEGRADATION QUANT: CPT

## 2023-01-06 PROCEDURE — 93971 EXTREMITY STUDY: CPT

## 2023-01-06 PROCEDURE — 87641 MR-STAPH DNA AMP PROBE: CPT

## 2023-01-06 PROCEDURE — 86901 BLOOD TYPING SEROLOGIC RH(D): CPT

## 2023-01-06 PROCEDURE — 86705 HEP B CORE ANTIBODY IGM: CPT

## 2023-01-06 PROCEDURE — 99239 HOSP IP/OBS DSCHRG MGMT >30: CPT

## 2023-01-06 PROCEDURE — 83986 ASSAY PH BODY FLUID NOS: CPT

## 2023-01-06 PROCEDURE — 93005 ELECTROCARDIOGRAM TRACING: CPT

## 2023-01-06 PROCEDURE — 84466 ASSAY OF TRANSFERRIN: CPT

## 2023-01-06 PROCEDURE — 87075 CULTR BACTERIA EXCEPT BLOOD: CPT

## 2023-01-06 PROCEDURE — 86923 COMPATIBILITY TEST ELECTRIC: CPT

## 2023-01-06 PROCEDURE — 83735 ASSAY OF MAGNESIUM: CPT

## 2023-01-06 PROCEDURE — 94640 AIRWAY INHALATION TREATMENT: CPT

## 2023-01-06 PROCEDURE — 82607 VITAMIN B-12: CPT

## 2023-01-06 PROCEDURE — 74018 RADEX ABDOMEN 1 VIEW: CPT

## 2023-01-06 PROCEDURE — 86704 HEP B CORE ANTIBODY TOTAL: CPT

## 2023-01-06 PROCEDURE — 83880 ASSAY OF NATRIURETIC PEPTIDE: CPT

## 2023-01-06 PROCEDURE — 36558 INSERT TUNNELED CV CATH: CPT

## 2023-01-06 PROCEDURE — 84145 PROCALCITONIN (PCT): CPT

## 2023-01-06 PROCEDURE — 76937 US GUIDE VASCULAR ACCESS: CPT

## 2023-01-06 PROCEDURE — 87070 CULTURE OTHR SPECIMN AEROBIC: CPT

## 2023-01-06 PROCEDURE — 82746 ASSAY OF FOLIC ACID SERUM: CPT

## 2023-01-06 PROCEDURE — 86900 BLOOD TYPING SEROLOGIC ABO: CPT

## 2023-01-06 PROCEDURE — 84157 ASSAY OF PROTEIN OTHER: CPT

## 2023-01-06 PROCEDURE — P9047: CPT

## 2023-01-06 PROCEDURE — P9040: CPT

## 2023-01-06 PROCEDURE — U0005: CPT

## 2023-01-06 PROCEDURE — 86140 C-REACTIVE PROTEIN: CPT

## 2023-01-06 PROCEDURE — 88112 CYTOPATH CELL ENHANCE TECH: CPT

## 2023-01-06 PROCEDURE — 36430 TRANSFUSION BLD/BLD COMPNT: CPT

## 2023-01-06 PROCEDURE — 82962 GLUCOSE BLOOD TEST: CPT

## 2023-01-06 PROCEDURE — 77001 FLUOROGUIDE FOR VEIN DEVICE: CPT

## 2023-01-06 PROCEDURE — 82945 GLUCOSE OTHER FLUID: CPT

## 2023-01-06 PROCEDURE — 74176 CT ABD & PELVIS W/O CONTRAST: CPT

## 2023-01-06 PROCEDURE — C1750: CPT

## 2023-01-06 PROCEDURE — 87205 SMEAR GRAM STAIN: CPT

## 2023-01-06 PROCEDURE — 71045 X-RAY EXAM CHEST 1 VIEW: CPT

## 2023-01-06 PROCEDURE — 84100 ASSAY OF PHOSPHORUS: CPT

## 2023-01-06 PROCEDURE — 86850 RBC ANTIBODY SCREEN: CPT

## 2023-01-06 PROCEDURE — 86803 HEPATITIS C AB TEST: CPT

## 2023-01-06 PROCEDURE — 93970 EXTREMITY STUDY: CPT

## 2023-01-06 PROCEDURE — 36415 COLL VENOUS BLD VENIPUNCTURE: CPT

## 2023-01-06 PROCEDURE — C8929: CPT

## 2023-01-06 PROCEDURE — 85025 COMPLETE CBC W/AUTO DIFF WBC: CPT

## 2023-01-06 PROCEDURE — 87637 SARSCOV2&INF A&B&RSV AMP PRB: CPT

## 2023-01-06 PROCEDURE — 82728 ASSAY OF FERRITIN: CPT

## 2023-01-06 PROCEDURE — 84484 ASSAY OF TROPONIN QUANT: CPT

## 2023-01-06 PROCEDURE — 89051 BODY FLUID CELL COUNT: CPT

## 2023-01-06 PROCEDURE — 93985 DUP-SCAN HEMO COMPL BI STD: CPT

## 2023-01-06 RX ORDER — FERROUS SULFATE 325(65) MG
1 TABLET ORAL
Qty: 0 | Refills: 0 | DISCHARGE
Start: 2023-01-06

## 2023-01-06 RX ORDER — CALCIUM CARBONATE 500(1250)
1 TABLET ORAL
Qty: 0 | Refills: 0 | DISCHARGE
Start: 2023-01-06

## 2023-01-06 RX ORDER — ASCORBIC ACID 60 MG
1 TABLET,CHEWABLE ORAL
Qty: 0 | Refills: 0 | DISCHARGE
Start: 2023-01-06

## 2023-01-06 RX ADMIN — PANTOPRAZOLE SODIUM 40 MILLIGRAM(S): 20 TABLET, DELAYED RELEASE ORAL at 06:30

## 2023-01-06 RX ADMIN — ALBUTEROL 2 PUFF(S): 90 AEROSOL, METERED ORAL at 06:31

## 2023-01-06 RX ADMIN — Medication 500 MILLIGRAM(S): at 13:33

## 2023-01-06 RX ADMIN — ALBUTEROL 2 PUFF(S): 90 AEROSOL, METERED ORAL at 17:48

## 2023-01-06 RX ADMIN — Medication 50 MILLIGRAM(S): at 13:33

## 2023-01-06 RX ADMIN — Medication 650 MILLIGRAM(S): at 14:17

## 2023-01-06 RX ADMIN — Medication 1 TABLET(S): at 16:29

## 2023-01-06 RX ADMIN — APIXABAN 2.5 MILLIGRAM(S): 2.5 TABLET, FILM COATED ORAL at 16:29

## 2023-01-06 RX ADMIN — Medication 1000 UNIT(S): at 13:32

## 2023-01-06 RX ADMIN — Medication 4: at 16:29

## 2023-01-06 RX ADMIN — Medication 325 MILLIGRAM(S): at 13:33

## 2023-01-06 RX ADMIN — CHLORHEXIDINE GLUCONATE 1 APPLICATION(S): 213 SOLUTION TOPICAL at 16:29

## 2023-01-06 RX ADMIN — Medication 650 MILLIGRAM(S): at 06:30

## 2023-01-06 RX ADMIN — APIXABAN 2.5 MILLIGRAM(S): 2.5 TABLET, FILM COATED ORAL at 06:30

## 2023-01-06 RX ADMIN — CHLORHEXIDINE GLUCONATE 1 APPLICATION(S): 213 SOLUTION TOPICAL at 05:34

## 2023-01-06 RX ADMIN — Medication 50 MILLIGRAM(S): at 16:29

## 2023-01-06 RX ADMIN — ALBUTEROL 2 PUFF(S): 90 AEROSOL, METERED ORAL at 02:36

## 2023-01-06 RX ADMIN — Medication 50 MILLIGRAM(S): at 09:53

## 2023-01-06 RX ADMIN — Medication 650 MILLIGRAM(S): at 13:33

## 2023-01-06 RX ADMIN — ESCITALOPRAM OXALATE 10 MILLIGRAM(S): 10 TABLET, FILM COATED ORAL at 13:33

## 2023-01-06 NOTE — PROGRESS NOTE ADULT - PROVIDER SPECIALTY LIST ADULT
Endocrinology
Hospitalist
Intervent Cardiology
Intervent Cardiology
Nephrology
Palliative Care
Palliative Care
Vascular Surgery
Endocrinology
Hospitalist
Nephrology
Pulmonology
Pulmonology
Vascular Surgery
Endocrinology
Hospitalist
Nephrology
Palliative Care
Vascular Surgery
Cardiology
Endocrinology
Endocrinology
Hospitalist
Nephrology
Nephrology
Pulmonology
Hospitalist
Hospitalist
Thoracic Surgery
Palliative Care
Thoracic Surgery
Hospitalist
Cardiology
Thoracic Surgery
Hospitalist

## 2023-01-06 NOTE — PROGRESS NOTE ADULT - NUTRITIONAL ASSESSMENT
This patient has been assessed with a concern for Malnutrition and has been determined to have a diagnosis/diagnoses of Severe protein-calorie malnutrition.    This patient is being managed with:   Diet DASH/TLC-  Sodium & Cholesterol Restricted  Consistent Carbohydrate {No Snacks} (CSTCHO)  No Concentrated Potassium  No Concentrated Phosphorus  Entered: Dec 22 2022  8:30AM    
This patient has been assessed with a concern for Malnutrition and has been determined to have a diagnosis/diagnoses of Severe protein-calorie malnutrition.    This patient is being managed with:   Diet NPO after Midnight-     NPO Start Date: 20-Dec-2022   NPO Start Time: 23:59  Entered: Dec 20 2022 10:54AM    Diet NPO after Midnight-     NPO Start Date: 20-Dec-2022   NPO Start Time: 23:59  Except Medications  Entered: Dec 20 2022 10:44AM    Diet DASH/TLC-  Sodium & Cholesterol Restricted  Consistent Carbohydrate {No Snacks} (CSTCHO)  Entered: Dec  6 2022 11:23AM    
This patient has been assessed with a concern for Malnutrition and has been determined to have a diagnosis/diagnoses of Severe protein-calorie malnutrition.    This patient is being managed with:   Diet DASH/TLC-  Sodium & Cholesterol Restricted  Consistent Carbohydrate {No Snacks} (CSTCHO)  Entered: Dec  6 2022 11:23AM    
This patient has been assessed with a concern for Malnutrition and has been determined to have a diagnosis/diagnoses of Severe protein-calorie malnutrition.    This patient is being managed with:   Diet DASH/TLC-  Sodium & Cholesterol Restricted  Consistent Carbohydrate {No Snacks} (CSTCHO)  Entered: Dec  6 2022 11:23AM    
This patient has been assessed with a concern for Malnutrition and has been determined to have a diagnosis/diagnoses of Severe protein-calorie malnutrition.    This patient is being managed with:   Diet DASH/TLC-  Sodium & Cholesterol Restricted  Consistent Carbohydrate {No Snacks} (CSTCHO)  No Concentrated Potassium  No Concentrated Phosphorus  Entered: Dec 22 2022  8:30AM    
This patient has been assessed with a concern for Malnutrition and has been determined to have a diagnosis/diagnoses of Severe protein-calorie malnutrition.    This patient is being managed with:   Diet NPO after Midnight-     NPO Start Date: 20-Dec-2022   NPO Start Time: 23:59  Entered: Dec 20 2022 10:54AM    Diet NPO after Midnight-     NPO Start Date: 20-Dec-2022   NPO Start Time: 23:59  Except Medications  Entered: Dec 20 2022 10:44AM    Diet DASH/TLC-  Sodium & Cholesterol Restricted  Consistent Carbohydrate {No Snacks} (CSTCHO)  Entered: Dec  6 2022 11:23AM    
T2DM   toggling between 8-9 units Lantus  9 was too much last ight    8 was too little the night before    but requiring less insulin than earlier in admission where her Lantus was up to 18 units      - will encourage bedtiem snack   reduce LAntus to 8 units   cont Admelog  dw family at bedside
This patient has been assessed with a concern for Malnutrition and has been determined to have a diagnosis/diagnoses of Severe protein-calorie malnutrition.    This patient is being managed with:   Diet DASH/TLC-  Sodium & Cholesterol Restricted  Consistent Carbohydrate {No Snacks} (CSTCHO)  Entered: Dec  6 2022 11:23AM    
This patient has been assessed with a concern for Malnutrition and has been determined to have a diagnosis/diagnoses of Severe protein-calorie malnutrition.    This patient is being managed with:   Diet DASH/TLC-  Sodium & Cholesterol Restricted  Consistent Carbohydrate {No Snacks} (CSTCHO)  No Concentrated Potassium  No Concentrated Phosphorus  Entered: Dec 22 2022  8:30AM    
This patient has been assessed with a concern for Malnutrition and has been determined to have a diagnosis/diagnoses of Severe protein-calorie malnutrition.    This patient is being managed with:   Diet DASH/TLC-  Sodium & Cholesterol Restricted  Consistent Carbohydrate {No Snacks} (CSTCHO)  No Concentrated Potassium  No Concentrated Phosphorus  Entered: Dec 22 2022  8:30AM    
This patient has been assessed with a concern for Malnutrition and has been determined to have a diagnosis/diagnoses of Severe protein-calorie malnutrition.    This patient is being managed with:   Diet NPO after Midnight-     NPO Start Date: 20-Dec-2022   NPO Start Time: 23:59  Entered: Dec 20 2022 10:54AM    Diet NPO after Midnight-     NPO Start Date: 20-Dec-2022   NPO Start Time: 23:59  Except Medications  Entered: Dec 20 2022 10:44AM    Diet DASH/TLC-  Sodium & Cholesterol Restricted  Consistent Carbohydrate {No Snacks} (CSTCHO)  Entered: Dec  6 2022 11:23AM    
This patient has been assessed with a concern for Malnutrition and has been determined to have a diagnosis/diagnoses of Severe protein-calorie malnutrition.    This patient is being managed with:   Diet DASH/TLC-  Sodium & Cholesterol Restricted  Consistent Carbohydrate {No Snacks} (CSTCHO)  Entered: Dec  6 2022 11:23AM    
This patient has been assessed with a concern for Malnutrition and has been determined to have a diagnosis/diagnoses of Severe protein-calorie malnutrition.    This patient is being managed with:   Diet DASH/TLC-  Sodium & Cholesterol Restricted  Consistent Carbohydrate {No Snacks} (CSTCHO)  No Concentrated Potassium  No Concentrated Phosphorus  Entered: Dec 22 2022  8:30AM    
This patient has been assessed with a concern for Malnutrition and has been determined to have a diagnosis/diagnoses of Severe protein-calorie malnutrition.    This patient is being managed with:   Diet NPO after Midnight-     NPO Start Date: 20-Dec-2022   NPO Start Time: 23:59  Entered: Dec 20 2022 10:54AM    Diet NPO after Midnight-     NPO Start Date: 20-Dec-2022   NPO Start Time: 23:59  Except Medications  Entered: Dec 20 2022 10:44AM    Diet DASH/TLC-  Sodium & Cholesterol Restricted  Consistent Carbohydrate {No Snacks} (CSTCHO)  Entered: Dec  6 2022 11:23AM    
This patient has been assessed with a concern for Malnutrition and has been determined to have a diagnosis/diagnoses of Severe protein-calorie malnutrition.    This patient is being managed with:   Diet DASH/TLC-  Sodium & Cholesterol Restricted  Consistent Carbohydrate {No Snacks} (CSTCHO)  Entered: Dec  6 2022 11:23AM    
This patient has been assessed with a concern for Malnutrition and has been determined to have a diagnosis/diagnoses of Severe protein-calorie malnutrition.    This patient is being managed with:   Diet DASH/TLC-  Sodium & Cholesterol Restricted  Consistent Carbohydrate {No Snacks} (CSTCHO)  No Concentrated Potassium  No Concentrated Phosphorus  Entered: Dec 22 2022  8:30AM    
This patient has been assessed with a concern for Malnutrition and has been determined to have a diagnosis/diagnoses of Severe protein-calorie malnutrition.    This patient is being managed with:   Diet NPO after Midnight-     NPO Start Date: 20-Dec-2022   NPO Start Time: 23:59  Entered: Dec 20 2022 10:54AM    Diet NPO after Midnight-     NPO Start Date: 20-Dec-2022   NPO Start Time: 23:59  Except Medications  Entered: Dec 20 2022 10:44AM    Diet DASH/TLC-  Sodium & Cholesterol Restricted  Consistent Carbohydrate {No Snacks} (CSTCHO)  Entered: Dec  6 2022 11:23AM    
This patient has been assessed with a concern for Malnutrition and has been determined to have a diagnosis/diagnoses of Severe protein-calorie malnutrition.    This patient is being managed with:   Diet DASH/TLC-  Sodium & Cholesterol Restricted  Consistent Carbohydrate {No Snacks} (CSTCHO)  Entered: Dec  6 2022 11:23AM    

## 2023-01-06 NOTE — PROGRESS NOTE ADULT - SUBJECTIVE AND OBJECTIVE BOX
[facility] Division of Hospital Medicine  Sanjay Crews MD    Chief Complaint:  Patient is a 82y old  Female who presents with a chief complaint of CHF  renal failure (01 Jan 2023 18:15)      SUBJECTIVE / OVERNIGHT EVENTS:  Patient seen and examined at bedside at . No acute events reported overnight. No new complaints.    MEDICATIONS  (STANDING):  acetaminophen     Tablet .. 650 milliGRAM(s) Oral every 8 hours  albuterol    90 MICROgram(s) HFA Inhaler 2 Puff(s) Inhalation every 4 hours  apixaban 2.5 milliGRAM(s) Oral two times a day  ascorbic acid 500 milliGRAM(s) Oral daily  budesonide  80 MICROgram(s)/formoterol 4.5 MICROgram(s) Inhaler 2 Puff(s) Inhalation two times a day  calcium carbonate   1250 mG (OsCal) 1 Tablet(s) Oral daily  chlorhexidine 2% Cloths 1 Application(s) Topical daily  chlorhexidine 2% Cloths 1 Application(s) Topical <User Schedule>  cholecalciferol 1000 Unit(s) Oral daily  dextrose 5%. 1000 milliLiter(s) (50 mL/Hr) IV Continuous <Continuous>  dextrose 5%. 1000 milliLiter(s) (100 mL/Hr) IV Continuous <Continuous>  dextrose 50% Injectable 25 Gram(s) IV Push once  dextrose 50% Injectable 12.5 Gram(s) IV Push once  dextrose 50% Injectable 25 Gram(s) IV Push once  diltiazem    milliGRAM(s) Oral daily  epoetin susie-epbx (RETACRIT) Injectable 66618 Unit(s) IV Push <User Schedule>  ergocalciferol 40397 Unit(s) Oral every week  escitalopram 10 milliGRAM(s) Oral daily  ferrous    sulfate 325 milliGRAM(s) Oral daily  glucagon  Injectable 1 milliGRAM(s) IntraMuscular once  hydrALAZINE 50 milliGRAM(s) Oral every 8 hours  insulin glargine Injectable (LANTUS) 8 Unit(s) SubCutaneous at bedtime  insulin lispro (ADMELOG) corrective regimen sliding scale   SubCutaneous three times a day before meals  metoprolol tartrate 50 milliGRAM(s) Oral two times a day  pantoprazole    Tablet 40 milliGRAM(s) Oral before breakfast    MEDICATIONS  (PRN):  aluminum hydroxide/magnesium hydroxide/simethicone Suspension 30 milliLiter(s) Oral every 6 hours PRN Dyspepsia  dextrose Oral Gel 15 Gram(s) Oral once PRN Blood Glucose LESS THAN 70 milliGRAM(s)/deciliter  dextrose Oral Gel 15 Gram(s) Oral once PRN Blood Glucose LESS THAN 70 milliGRAM(s)/deciliter  hydrALAZINE Injectable 10 milliGRAM(s) IV Push every 6 hours PRN SBP > 160  melatonin 1 milliGRAM(s) Oral at bedtime PRN Sleep  simethicone 80 milliGRAM(s) Chew every 6 hours PRN Gas  sodium chloride 0.9% lock flush 10 milliLiter(s) IV Push every 1 hour PRN Pre/post blood products, medications, blood draw, and to maintain line patency  sodium chloride 0.9% lock flush 10 milliLiter(s) IV Push every 1 hour PRN Pre/post blood products, medications, blood draw, and to maintain line patency        I&O's Summary    05 Jan 2023 07:01  -  06 Jan 2023 07:00  --------------------------------------------------------  IN: 0 mL / OUT: 1000 mL / NET: -1000 mL    06 Jan 2023 07:01  -  06 Jan 2023 11:41  --------------------------------------------------------  IN: 0 mL / OUT: 1000 mL / NET: -1000 mL        PHYSICAL EXAM:  Vital Signs Last 24 Hrs  T(C): 36.8 (06 Jan 2023 10:18), Max: 37.2 (05 Jan 2023 20:37)  T(F): 98.2 (06 Jan 2023 10:18), Max: 98.9 (05 Jan 2023 20:37)  HR: 85 (06 Jan 2023 10:18) (64 - 93)  BP: 167/85 (06 Jan 2023 10:18) (133/67 - 177/69)  BP(mean): --  RR: 18 (06 Jan 2023 10:18) (16 - 18)  SpO2: 100% (06 Jan 2023 10:18) (93% - 100%)    Parameters below as of 06 Jan 2023 10:18  Patient On (Oxygen Delivery Method): nasal cannula            CONSTITUTIONAL: NAD  Chest Wall: PC+  HEENT: NC/AT, PERRL, no JVD  RESPIRATORY: CTA bilaterally, normal effort  CARDIOVASCULAR: RRR, S1/S2+, no m/g/r  ABDOMEN: Nontender to palpation, normoactive bowel sounds, no rebound/guarding  MUSCULOSKELETAL: No edema, cyanosis or deformities.  PSYCH: Calm, affect appropriate.  NEUROLOGY: Awake, alert, no focal neurological deficits.   SKIN: No rashes; no palpable lesions  VASC: Distal pulses palpable    LABS:                        8.7    5.74  )-----------( 310      ( 05 Jan 2023 05:46 )             30.8     01-06    135  |  99  |  53.6<H>  ----------------------------<  167<H>  4.5   |  28.0  |  2.81<H>    Ca    8.1<L>      06 Jan 2023 07:00    TPro  4.8<L>  /  Alb  2.2<L>  /  TBili  <0.2<L>  /  DBili  x   /  AST  8   /  ALT  7   /  AlkPhos  117  01-05              CAPILLARY BLOOD GLUCOSE      POCT Blood Glucose.: 104 mg/dL (06 Jan 2023 11:35)  POCT Blood Glucose.: 113 mg/dL (06 Jan 2023 08:47)  POCT Blood Glucose.: 188 mg/dL (05 Jan 2023 22:41)  POCT Blood Glucose.: 250 mg/dL (05 Jan 2023 17:37)  POCT Blood Glucose.: 128 mg/dL (05 Jan 2023 12:32)        RADIOLOGY & ADDITIONAL TESTS:  Results Reviewed:   Imaging Personally Reviewed:  Electrocardiogram Personally Reviewed:

## 2023-01-06 NOTE — PROGRESS NOTE ADULT - SUBJECTIVE AND OBJECTIVE BOX
NEPHROLOGY INTERVAL HPI/OVERNIGHT EVENTS:  pt seen at HD   tolerating well  no acute distress    MEDICATIONS  (STANDING):  acetaminophen     Tablet .. 650 milliGRAM(s) Oral every 8 hours  albuterol    90 MICROgram(s) HFA Inhaler 2 Puff(s) Inhalation every 4 hours  apixaban 2.5 milliGRAM(s) Oral two times a day  ascorbic acid 500 milliGRAM(s) Oral daily  budesonide  80 MICROgram(s)/formoterol 4.5 MICROgram(s) Inhaler 2 Puff(s) Inhalation two times a day  calcium carbonate   1250 mG (OsCal) 1 Tablet(s) Oral daily  chlorhexidine 2% Cloths 1 Application(s) Topical <User Schedule>  chlorhexidine 2% Cloths 1 Application(s) Topical daily  cholecalciferol 1000 Unit(s) Oral daily  dextrose 5%. 1000 milliLiter(s) (100 mL/Hr) IV Continuous <Continuous>  dextrose 5%. 1000 milliLiter(s) (50 mL/Hr) IV Continuous <Continuous>  dextrose 50% Injectable 25 Gram(s) IV Push once  dextrose 50% Injectable 12.5 Gram(s) IV Push once  dextrose 50% Injectable 25 Gram(s) IV Push once  diltiazem    milliGRAM(s) Oral daily  epoetin susie-epbx (RETACRIT) Injectable 24271 Unit(s) IV Push <User Schedule>  ergocalciferol 81535 Unit(s) Oral every week  escitalopram 10 milliGRAM(s) Oral daily  ferrous    sulfate 325 milliGRAM(s) Oral daily  glucagon  Injectable 1 milliGRAM(s) IntraMuscular once  hydrALAZINE 50 milliGRAM(s) Oral every 8 hours  insulin glargine Injectable (LANTUS) 8 Unit(s) SubCutaneous at bedtime  insulin lispro (ADMELOG) corrective regimen sliding scale   SubCutaneous three times a day before meals  metoprolol tartrate 50 milliGRAM(s) Oral two times a day  pantoprazole    Tablet 40 milliGRAM(s) Oral before breakfast    MEDICATIONS  (PRN):  aluminum hydroxide/magnesium hydroxide/simethicone Suspension 30 milliLiter(s) Oral every 6 hours PRN Dyspepsia  dextrose Oral Gel 15 Gram(s) Oral once PRN Blood Glucose LESS THAN 70 milliGRAM(s)/deciliter  dextrose Oral Gel 15 Gram(s) Oral once PRN Blood Glucose LESS THAN 70 milliGRAM(s)/deciliter  hydrALAZINE Injectable 10 milliGRAM(s) IV Push every 6 hours PRN SBP > 160  melatonin 1 milliGRAM(s) Oral at bedtime PRN Sleep  simethicone 80 milliGRAM(s) Chew every 6 hours PRN Gas  sodium chloride 0.9% lock flush 10 milliLiter(s) IV Push every 1 hour PRN Pre/post blood products, medications, blood draw, and to maintain line patency  sodium chloride 0.9% lock flush 10 milliLiter(s) IV Push every 1 hour PRN Pre/post blood products, medications, blood draw, and to maintain line patency      Allergies        Vital Signs Last 24 Hrs  T(C): 36.8 (06 Jan 2023 10:18), Max: 37.2 (05 Jan 2023 20:37)  T(F): 98.2 (06 Jan 2023 10:18), Max: 98.9 (05 Jan 2023 20:37)  HR: 85 (06 Jan 2023 10:18) (64 - 93)  BP: 167/85 (06 Jan 2023 10:18) (133/67 - 177/69)  BP(mean): --  RR: 18 (06 Jan 2023 10:18) (16 - 18)  SpO2: 100% (06 Jan 2023 10:18) (93% - 100%)    Parameters below as of 06 Jan 2023 10:18  Patient On (Oxygen Delivery Method): nasal cannula        PHYSICAL EXAM:  GENERAL: Weak, debilitated  HEENT: No periorbital edema  COR:  No rub  LUNGS:  Clear, diminished BS  NECK: Supple, No JVD  NERVOUS SYSTEM:  Awake, alert  EXTREMITIES:  + less dependent edema   SKIN: No rashes    LABS:                        8.7    5.74  )-----------( 310      ( 05 Jan 2023 05:46 )             30.8     01-06    135  |  99  |  53.6<H>  ----------------------------<  167<H>  4.5   |  28.0  |  2.81<H>    Ca    8.1<L>      06 Jan 2023 07:00      TPro  4.8<L>  /  Alb  2.2<L>  /  TBili  <0.2<L>  /  DBili  x   /  AST  8   /  ALT  7   /  AlkPhos  117  01-05            RADIOLOGY & ADDITIONAL TESTS:

## 2023-01-06 NOTE — PROGRESS NOTE ADULT - ASSESSMENT
CKD(IV): +COVID  THERESA, progressive + fluid overload + hyperkalemia  Hx NS (3g) +DM  Pt clinically improved with initiation of HD  - HD today  - will need outpatient dialysis arrangements    Anemia: +CKD  - cont DILAN  - target Hgb > 10    RO: phos ok  - low phos diet  - cont CaCO3 and Vit D

## 2023-01-18 LAB
CULTURE RESULTS: SIGNIFICANT CHANGE UP
SPECIMEN SOURCE: SIGNIFICANT CHANGE UP

## 2023-02-25 ENCOUNTER — EMERGENCY (EMERGENCY)
Facility: HOSPITAL | Age: 83
LOS: 1 days | Discharge: HOME | End: 2023-02-25
Attending: EMERGENCY MEDICINE
Payer: MEDICARE

## 2023-02-25 VITALS
RESPIRATION RATE: 18 BRPM | SYSTOLIC BLOOD PRESSURE: 102 MMHG | OXYGEN SATURATION: 100 % | TEMPERATURE: 98 F | WEIGHT: 109.79 LBS | DIASTOLIC BLOOD PRESSURE: 46 MMHG | HEART RATE: 53 BPM

## 2023-02-25 PROCEDURE — 99284 EMERGENCY DEPT VISIT MOD MDM: CPT

## 2023-02-25 PROCEDURE — 99283 EMERGENCY DEPT VISIT LOW MDM: CPT

## 2023-02-25 NOTE — ED PROVIDER NOTE - CLINICAL SUMMARY MEDICAL DECISION MAKING FREE TEXT BOX
The patient presents with GI bleed with black tarry stool but the patient is DNR/DNI with comfort care only and the patient's family including health care proxy wants the patient to be in hospice care which [facility] [place] and will transfer back to [facility].

## 2023-02-25 NOTE — ED ADULT NURSE NOTE - NS ED NURSE LEVEL OF CONSCIOUSNESS ORIENTATION
-  History of diabetes with good control with lifestyle changes alone  -  Previously on metformin, with initiation of dexamethasone therapy there has been persistently elevated glucose readings  -  followed by endocrinology  -  Continue Levemir 6 units daily (home dose) and moderate SSI  -  DM diet  -  ac and hs glucose checks     Oriented - self; Oriented - place; Oriented - time/Disoriented

## 2023-02-25 NOTE — ED PROVIDER NOTE - PATIENT PORTAL LINK FT
You can access the FollowMyHealth Patient Portal offered by [facility] by registering at the following website: [email or web address]. By joining [facility]’s FollowMyHealth portal, you will also be able to view your health information using other applications (apps) compatible with our system.

## 2023-02-25 NOTE — ED PROVIDER NOTE - ATTENDING CONTRIBUTION TO CARE
The patient seen and examined    GI bleed    I, Sergei Bullard, performed the initial face to face bedside interview with this patient regarding history of present illness, review of symptoms and relevant past medical, social and family history.  I completed an independent physical examination.  I was the initial provider who evaluated this patient. I have signed out the follow up of any pending tests (i.e. labs, radiological studies) to the resident  I have communicated the patient’s plan of care and disposition with the resident

## 2023-02-25 NOTE — ED ADULT NURSE NOTE - OBJECTIVE STATEMENT
Pt presents from nursing home for GI bleed as per EMS pt has a MOLST, not with patient at this time. Pt is slow to respond, denies SOB, pain or any discomfort, abdomen is soft and nontender. breathing is even and unlabored, pt came in on 3L o2 sats 98%. Pt is bradycardic on cardiac monitor. Perma cath noted on right chest wall. As per son pt has dialysis [facility].

## 2023-02-25 NOTE — ED PROVIDER NOTE - OBJECTIVE STATEMENT
The patient is a 83 year old female presents from [facility] for episode of bloody stool.  The patient is ESRD on HD 3 time a week.    The patient is DNR/DNI and comfort care only but sent to us for further evaluation.  The patient is non-verbal at this time

## 2023-02-25 NOTE — ED ADULT TRIAGE NOTE - CHIEF COMPLAINT QUOTE
pt presents to ED form [facility] for 1 episode of bloody stool noted. pt presents with MOLST form showing DNR/DNI with comfort measures only and no IV fluids. EMS reports since going to NH pt has not been speaking, at baseline.

## 2023-02-25 NOTE — CHART NOTE - NSCHARTNOTEFT_GEN_A_CORE
SWNote: per pt's EDMD (Dr Bullard) spoke with pt's HCP/son (Jack Riggins) regarding pt's medical status and pt's family  would like to honor pt's wishes (pt has MOLST in place), states Hospice process has been started at the facility . Worker called [facility] ([phone]) to inform them about pt's discharge to continue process with Hospice. Worker spoke with RN (Ananth ) ,verbalized understanding . [facility] [place] [facility] not available until Monday however, will inform department about the above mentioned. [facility] transport called (Kailey lai ETA 60-90 min. NEAF/billing letter left at the [facility] nursing station. Worker made pt's son (Richard) aware ,understood states family will f/u with [facility] . No other SW needs reported at this moment.

## 2023-02-25 NOTE — ED ADULT NURSE NOTE - CHIEF COMPLAINT QUOTE
pt presents to ED form [facility] for 1 episode of bloody stool noted. pt presents with MOLST form showing DNR/DNI with comfort measures only and no IV fluids. EMS reports since going to NH pt has not been speaking, at baseline.

## 2023-02-25 NOTE — ED PROVIDER NOTE - PROGRESS NOTE DETAILS
Case d/w the patient's son Mr Bernard Bonilla and health care proxy Mr Jack Wilson.  The patient's family states that the patient is DNR/DNI and comfort measure only. No aggressive measures. No IV fluids and wants the patient to be in hospice.  The patient is in process of hospice care from [facility] currently.  The patient appears to be comfortable.  Will transfer back to [facility].  Case d/w ER RONALD Driscoll and will speak to [facility] RONALD for smooth transition

## 2023-04-05 NOTE — ED ADULT TRIAGE NOTE - NSWEIGHTCALCTOOLDRUG_GEN_A_CORE
Medication:   Requested Prescriptions     Pending Prescriptions Disp Refills    levothyroxine (SYNTHROID) 50 MCG tablet 30 tablet 0     Sig: Take 1 tablet by mouth daily        Last Filled:  2/13/23    Patient Phone Number: [phone] (home)     Last appt: 12/13/2022   Next appt: 6/13/2023    Last OARRS: No flowsheet data found.  used

## 2023-05-06 NOTE — PROGRESS NOTE ADULT - PROBLEM SELECTOR PROBLEM 6
Pleural effusion
Additional Complaints

## 2023-05-17 NOTE — ED ADULT NURSE NOTE - ISOLATION TYPE:
Date of Service:  2023    Yaya Fernandez (:  1960) is a 61 y.o. female, here for evaluation of the following medical concerns:    Chief Complaint   Patient presents with    Hypertension     High BP at dentist, rechecked at work and high with too small cuff        HPI    Hypertension:  Home blood pressure monitoring: Yes - went to dentist last week and BP was high 151/79 and 188/90 and refused to do filling and then 171/92 and then had filling done. Was told to call here to schedule filling. Checked at school by school nurse and it was 148/80 and was 171/82. She is not adherent to a low sodium diet. Patient complains of anxious. Antihypertensive medication side effects: no medication side effects noted. Use of agents associated with hypertension: ibuprofen 800 mg daily in the morning, has been doing that for leg pain for at least every other day or every day for days. Today in office 150/80. Feeling anxious and feeling more nervous and this time last year is when her anxiety got worse and her BP being high makes her even more anxious. No increased sodium intake. Drinks decaf, no change in diet. Gained 20 lbs since December. Patient was 182 lbs in Oct 2022. Currently has just been taking metoprolol for PVCs. Was able to wean off BP meds last time with lifestyle modification- diet, exercise, weight loss and management of anxiety. Has also been off xanax. Does not feel anything specifically has been making her more stressed lately. Was on losartan-hctz last time.                                        Sodium (mmol/L)   Date Value   2022 145    BUN (mg/dL)   Date Value   2022 20    Glucose (mg/dL)   Date Value   2022 118 (H)      Potassium (mmol/L)   Date Value   2022 4.0     Potassium reflex Magnesium (mmol/L)   Date Value   2022 2.8 (LL)    Creatinine (mg/dL)   Date Value   2022 1.0           Review of Systems   Constitutional:  Negative for activity None

## 2023-08-02 NOTE — ED PROVIDER NOTE - PSYCHIATRIC NEGATIVE STATEMENT, MLM
no known mental health issues. O-T Plasty Text: The defect edges were debeveled with a #15 scalpel blade.  Given the location of the defect, shape of the defect and the proximity to free margins an O-T plasty was deemed most appropriate.  Using a sterile surgical marker, an appropriate O-T plasty was drawn incorporating the defect and placing the expected incisions within the relaxed skin tension lines where possible.    The area thus outlined was incised deep to adipose tissue with a #15 scalpel blade.  The skin margins were undermined to an appropriate distance in all directions utilizing iris scissors.

## 2023-10-01 PROBLEM — Z92.3 HISTORY OF RADIATION THERAPY: Status: RESOLVED | Noted: 2018-01-03 | Resolved: 2023-10-01

## 2025-02-17 NOTE — ED PROVIDER NOTE - SEVERITY
[place] [address]  [address] [address]              Alisa Hernandez  [address] [facility] [id number]      2/17/2025    Dear Alisa Hernandez,                 Re:   Your screening Low dose CT done on: 8/30/2024          Report sent to: Zuleika Valle NP    We are writing to remind you that your recent low-dose lung screening CT showed abnormal areas/spots in your lung which were concerning. Therefore, a 6 month follow up diagnostic lung scan (due on 3/1/2025) was recommended to monitor for any worsening / suspicious changes since your prior lung scan.        At the time of this letter the recommended testing has not been scheduled. If you have already scheduled the test(s), please disregard this notice. If you haven't had the opportunity to schedule the test(s) we ask that you please call [facility] pre-services to schedule the test(s) at a time convenient for you.     You can reach [facility] pre-services Monday to Friday 7:00 am to 8:00 pm and Saturdays 7:30 am to 4:30 pm at [phone].   To avoid any delay, please call at your earliest convenience. Our schedulers will assist you with scheduling your follow up testing.        Sincerely,                  
PAIN SCALE 9 OF 10.